# Patient Record
Sex: FEMALE | Race: WHITE | NOT HISPANIC OR LATINO | Employment: FULL TIME | URBAN - METROPOLITAN AREA
[De-identification: names, ages, dates, MRNs, and addresses within clinical notes are randomized per-mention and may not be internally consistent; named-entity substitution may affect disease eponyms.]

---

## 2017-01-05 DIAGNOSIS — E10.21 TYPE 1 DIABETES MELLITUS WITH DIABETIC NEPHROPATHY (H): Primary | ICD-10-CM

## 2017-01-05 RX ORDER — INSULIN ASPART 100 [IU]/ML
INJECTION, SOLUTION INTRAVENOUS; SUBCUTANEOUS
Qty: 18 ML | Refills: 11 | Status: SHIPPED | OUTPATIENT
Start: 2017-01-05 | End: 2017-02-26 | Stop reason: ALTCHOICE

## 2017-01-06 ENCOUNTER — TELEPHONE (OUTPATIENT)
Dept: ENDOCRINOLOGY | Facility: CLINIC | Age: 22
End: 2017-01-06

## 2017-01-07 NOTE — TELEPHONE ENCOUNTER
[facility] Prior Authorization Team   Phone: [phone]  Fax: [phone]      PA Initiation    Medication: insulin degludec (TRESIBA) 100 UNIT/ML pen-  Insurance Company: [employer]  Fax Number: [phone]    Phone Number: [phone]  Pharmacy Filling the Rx: [facility] [address] [place] [address]  Filling Pharmacy Phone: [phone]  Filling Pharmacy Fax: [phone]  Start Date: 1/6/2017

## 2017-01-09 DIAGNOSIS — E10.9 TYPE 1 DIABETES MELLITUS (H): Primary | ICD-10-CM

## 2017-01-09 RX ORDER — INSULIN LISPRO 100 [IU]/ML
INJECTION, SOLUTION INTRAVENOUS; SUBCUTANEOUS
Qty: 60 ML | Refills: 3 | Status: SHIPPED | OUTPATIENT
Start: 2017-01-09 | End: 2017-04-10

## 2017-01-10 NOTE — TELEPHONE ENCOUNTER
PRIOR AUTHORIZATION DENIED    Medication: insulin degludec (TRESIBA) 100 UNIT/ML pen- DENIED    Denial Date: 1/9/2017    Denial Rational: PA has been denied as patient has not try/failed Basalgar (Lantus). An appeal will require a trial and failure of Basaglar, with clilnical rationale suggesting improved outcomes with Tresiba. Please let the PA team know whether a letter of medical necessity has been written.        Appeal Allowed/Information:

## 2017-01-11 DIAGNOSIS — N18.1 CHRONIC KIDNEY DISEASE, STAGE I: Primary | ICD-10-CM

## 2017-01-11 NOTE — NURSING NOTE
Labs per clinic 2A protocol.  Follow up/CKD 1  Last OV: 7/26/16  Eileen Cabral LPN  Nephrology  [facility]  [phone]

## 2017-01-13 DIAGNOSIS — E10.65 TYPE 1 DIABETES MELLITUS WITH HYPERGLYCEMIA (H): Primary | ICD-10-CM

## 2017-01-13 RX ORDER — INSULIN GLARGINE 100 [IU]/ML
45 INJECTION, SOLUTION SUBCUTANEOUS DAILY
Qty: 45 ML | Refills: 3 | Status: SHIPPED | OUTPATIENT
Start: 2017-01-13 | End: 2017-01-13

## 2017-01-13 RX ORDER — INSULIN GLARGINE 100 [IU]/ML
45 INJECTION, SOLUTION SUBCUTANEOUS DAILY
Qty: 45 ML | Refills: 3 | Status: SHIPPED | OUTPATIENT
Start: 2017-01-13 | End: 2017-04-10

## 2017-01-19 ENCOUNTER — OFFICE VISIT (OUTPATIENT)
Dept: PSYCHIATRY | Facility: CLINIC | Age: 22
End: 2017-01-19
Attending: NURSE PRACTITIONER
Payer: COMMERCIAL

## 2017-01-19 VITALS
DIASTOLIC BLOOD PRESSURE: 80 MMHG | SYSTOLIC BLOOD PRESSURE: 119 MMHG | HEART RATE: 70 BPM | WEIGHT: 285.4 LBS | BODY MASS INDEX: 43.41 KG/M2

## 2017-01-19 DIAGNOSIS — F33.41 MAJOR DEPRESSIVE DISORDER, RECURRENT EPISODE, IN PARTIAL REMISSION (H): Primary | ICD-10-CM

## 2017-01-19 PROCEDURE — 99212 OFFICE O/P EST SF 10 MIN: CPT | Mod: ZF

## 2017-01-19 NOTE — PROGRESS NOTES
"  Outpatient Psychiatry Progress Note     Provider: LUIS FERNANDO Rojas CNP  Date: 2017  Service:  Medication management.   Patient Identification: Stacey La  : 1995   MRN: [id number]    Stacey La is a 21 year old year old female who presents for ongoing psychiatric care.  Stacey La was last seen in clinic on 2016.   At that time, she chose to continue Vistaril 25mg qD PRN to target situation anxiety.    2017  Today Stacey reports recent life stressors that she has been able to manage. She started back to school taking three courses: biology, gender studies, and a course on the [employer]. She continues to work as a  at a local [employer].    Side effects of medication include oversedation.    Psychiatric Review of Systems:  She describes her mood as \"fair and optimistic\". Since last visit, she has had 3 occasions to take Vistaril, which was effective but overly sedating the next day into the mid afternoon.      She endorses insignificant anxiety and depression, including irritability.     No evidence of or report of geno and psychosis.     Review of Medical Systems:  Appetite: fine, she manages her DM and Celiac well  Sleep: averaging 6-7 hours nightly  Self Care: encouraged, she enjoys hanging out with her cats and her brother  Housework and hygiene: intact  Energy: intact  Concentration: intact    Current Substance Use:  Social History     Social History     Marital Status:      Spouse Name: N/A     Number of Children: N/A     Years of Education: N/A     Social History Main Topics     Smoking status: Never Smoker      Smokeless tobacco: Never Used     Alcohol Use: 0.0 oz/week     0 Standard drinks or equivalent per week      Comment: 2 times per month, 2 drinks per time     Drug Use: No     Sexual Activity:     Partners: Male     Birth Control/ Protection: Condom     Other Topics Concern     Parent/Sibling W/ Cabg, Mi Or " Angioplasty Before 65f 55m? Yes     Caffeine Concern Yes     1 cup tea per day     Sleep Concern No     Special Diet Yes     gluten free diet, low carbs     Exercise Yes     walking, ellyptical, swimming, weights     Seat Belt Yes     Social History Narrative     Nicotine: denies  Alcohol: limits intake   Cannabis: denies in the last year  Other illicits: denies  Prescription pills: denies  Caffeine: will continue trying to taper from 3-4 cups at work    Past Medical History:   Past Medical History   Diagnosis Date     Diabetes mellitus type 1 (H)      Diabetic nephropathy (H)      Hypertension      Hypothyroid      Psoriasis      Asthma      Currently no treatment needed     Chronic sinusitis      Pedal edema      Family history of heart disease 8/3/2015     Chronic kidney disease, stage I 8/3/2015     Celiac disease      Medical health update: none; sees endocrinologist and nephrologist    Allergies:   Allergies   Allergen Reactions     Gluten Meal         Current Medications     Current Outpatient Prescriptions Ordered in eBureau   Medication Sig Dispense Refill     insulin glargine (BASAGLAR KWIKPEN) 100 UNIT/ML injection Inject 45 Units Subcutaneous daily 45 mL 3     HUMALOG KWIKPEN 100 UNIT/ML soln Inject up to 60 units daily 60 mL 3     NOVOLOG FLEXPEN 100 UNIT/ML soln Use as directed up to 60 units a day 18 mL 11     albuterol (2.5 MG/3ML) 0.083% neb solution Take 1 vial (2.5 mg) by nebulization every 4 hours as needed for shortness of breath / dyspnea or wheezing /chest tightness/cough 1 Box 3     azithromycin (ZITHROMAX) 250 MG tablet Two tablets first day, then one tablet daily for four days. 6 tablet 0     benzonatate (TESSALON) 100 MG capsule Take 2 capsules (200 mg) by mouth 3 times daily as needed for cough 42 capsule 3     hydrOXYzine (VISTARIL) 25 MG capsule Take 1 capsule (25 mg) by mouth daily as needed for itching 30 capsule 0     Blood Pressure Monitoring KIT 1 kit daily 1 kit 0     insulin pen  "needle (B-D U/F) 31G X 8 MM Use 5 pen needles daily or as directed. 400 each 11     levothyroxine (SYNTHROID, LEVOTHROID) 50 MCG tablet Take 1 tablet (50 mcg) by mouth daily 90 tablet 1     Ferrous Sulfate (IRON SUPPLEMENT PO) Take 325 mg by mouth daily (with breakfast)       order for DME Equipment being ordered: walking boot 1 Units 0     escitalopram (LEXAPRO) 10 MG tablet TAKE ONE TABLET (10MG) BY MOUTH DAILY 30 tablet 11     Ondansetron HCl (ZOFRAN PO) Take by mouth as needed for nausea or vomiting       albuterol (ALBUTEROL) 108 (90 BASE) MCG/ACT inhaler Inhale 2 puffs into the lungs every 6 hours as needed 1 each 5     order for DME Equipment being ordered: Nebulizer 1 each 0     enalapril (VASOTEC) 20 MG tablet Take 1 tablet (20 mg) by mouth 2 times daily 180 tablet 11     Cholecalciferol 2000 UNITS TBDP Take 2,000 Units by mouth daily 90 tablet 11     insulin aspart (NOVOLOG VIAL) 100 UNITS/ML VIAL Use in pump approx 100 units daily 90 mL 3     prochlorperazine (COMPAZINE) 10 MG tablet Take 1 tablet (10 mg) by mouth every 6 hours as needed for nausea or vomiting 10 tablet 1     blood glucose test strip Use to test blood sugar eight times daily or as directed.  Dispense Brandy Contour Next Test Strips. 4 Box 4     Insulin Syringe-Needle U-100 30G X 5/16\" 0.3 ML MISC Use 2-3 times daily 100 each 11     insulin syringes, disposable, U-100 0.3 ML MISC Use 2-3 times daily as needed 100 each 11     glucose blood VI test strips (FREESTYLE TEST STRIPS) strip Test 8 times dailiy 750 strip 3     No current Epic-ordered facility-administered medications on file.      Mental Status Exam     Appearance:  Formally dressed and Well groomed  Behavior/relationship to examiner/demeanor:  Cooperative, Engaged and Pleasant  Orientation: Oriented to person, place, time and situation  Psychomotor: WNL  Speech Rate:  Normal  Speech Spontaneity:  Normal  Mood:  \"fine\"  Affect:  Appropriate/mood-congruent  Thought Process " (Associations):  Logical, Linear and Goal directed  Thought Content:  denies suicidal ideation, intent or thoughts and patient denies auditory and visual hallucinations  Abnormal Perception:  None  Attention/Concentration:  Normal  Language:  Intact  Insight:  Good  Judgment:  Good      Results     Vital signs: There were no vitals taken for this visit.    Laboratory Data:   Lab Results   Component Value Date    WBC 12.6* 02/20/2016    HGB 13.3 07/26/2016    HCT 41.4 02/20/2016     02/20/2016    CHOL 194 08/04/2015    TRIG 131 08/04/2015    HDL 66 08/04/2015    ALT 26 02/20/2016    AST 17 02/20/2016     07/26/2016    BUN 7 07/26/2016    CO2 27 07/26/2016    TSH 2.89 10/10/2016    INR 0.87 02/19/2010         Assessment & Plan      Stacey La is seen today for medication management.     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 She elects to seek outside therapist. Encouraged caffeine taper as she tolerates. She will RTC in 12 weeks, sooner as needed.    Discussed med options in light of chronic kidney disease stage I, DM with recently unstable blood sugars following med and pump update per insuror standards as well client's desire to get pregnant pending response from nephrology and endocrinology specialists. Will monitor labs.    Stacey voiced understanding of the treatment plan including discussion of options, risks/ benefits including risks to her kidneys and as a woman of child bearing age who currently uses condoms but desires to get pregnant in the next 6 months. Stacey has clinic contact information and will seek emergency services if urgent or life threatening symptoms present. She understands risks associated with street drugs or alcohol.    Over 50% of this time was spent counseling the patient and/or coordinating care regarding  review of social and occupational functioning.  In addition patient was counseled on health and wellness practices to augment medication treatment of symptoms. See note for details.    PHQ-9 score:  5  PHQ-9 SCORE 12/21/2016   Total Score 9     GAD7: No flowsheet data found.    : 12/2016    Roseanna Urias, APRN CNP 1/19/2017

## 2017-01-19 NOTE — NURSING NOTE
Chief Complaint   Patient presents with     RECHECK     YARELIS, MDD recurrent, moderate in partial remission     Reviewed allergies, smoking status, and pharmacy preference  Administered abuse screening questions   Obtained weight, blood pressure and heart rate   Linda Weller LPN

## 2017-01-21 ASSESSMENT — PATIENT HEALTH QUESTIONNAIRE - PHQ9: SUM OF ALL RESPONSES TO PHQ QUESTIONS 1-9: 5

## 2017-01-24 ENCOUNTER — OFFICE VISIT (OUTPATIENT)
Dept: NEPHROLOGY | Facility: CLINIC | Age: 22
End: 2017-01-24
Attending: INTERNAL MEDICINE
Payer: COMMERCIAL

## 2017-01-24 VITALS
WEIGHT: 279.8 LBS | BODY MASS INDEX: 42.41 KG/M2 | DIASTOLIC BLOOD PRESSURE: 84 MMHG | TEMPERATURE: 98.4 F | SYSTOLIC BLOOD PRESSURE: 123 MMHG | HEIGHT: 68 IN | OXYGEN SATURATION: 99 % | HEART RATE: 84 BPM

## 2017-01-24 DIAGNOSIS — E10.21 TYPE 1 DIABETES MELLITUS WITH NEPHROPATHY (H): Primary | ICD-10-CM

## 2017-01-24 DIAGNOSIS — N18.1 CHRONIC KIDNEY DISEASE, STAGE I: ICD-10-CM

## 2017-01-24 LAB
ALBUMIN SERPL-MCNC: 4 G/DL (ref 3.4–5)
ANION GAP SERPL CALCULATED.3IONS-SCNC: 8 MMOL/L (ref 3–14)
BUN SERPL-MCNC: 7 MG/DL (ref 7–30)
CALCIUM SERPL-MCNC: 9.3 MG/DL (ref 8.5–10.1)
CHLORIDE SERPL-SCNC: 103 MMOL/L (ref 94–109)
CO2 SERPL-SCNC: 26 MMOL/L (ref 20–32)
CREAT SERPL-MCNC: 0.55 MG/DL (ref 0.52–1.04)
CREAT UR-MCNC: 12 MG/DL
GFR SERPL CREATININE-BSD FRML MDRD: ABNORMAL ML/MIN/1.7M2
GLUCOSE SERPL-MCNC: 107 MG/DL (ref 70–99)
HGB BLD-MCNC: 14.2 G/DL (ref 11.7–15.7)
MICROALBUMIN UR-MCNC: <5 MG/L
MICROALBUMIN/CREAT UR: NORMAL MG/G CR (ref 0–25)
PHOSPHATE SERPL-MCNC: 3.4 MG/DL (ref 2.5–4.5)
POTASSIUM SERPL-SCNC: 4.2 MMOL/L (ref 3.4–5.3)
PROT UR-MCNC: <0.05 G/L
PROT/CREAT 24H UR: NORMAL G/G CR (ref 0–0.2)
PTH-INTACT SERPL-MCNC: 24 PG/ML (ref 12–72)
SODIUM SERPL-SCNC: 137 MMOL/L (ref 133–144)

## 2017-01-24 PROCEDURE — 84156 ASSAY OF PROTEIN URINE: CPT

## 2017-01-24 PROCEDURE — 99212 OFFICE O/P EST SF 10 MIN: CPT | Mod: ZF

## 2017-01-24 PROCEDURE — 80069 RENAL FUNCTION PANEL: CPT

## 2017-01-24 PROCEDURE — 83970 ASSAY OF PARATHORMONE: CPT

## 2017-01-24 PROCEDURE — 85018 HEMOGLOBIN: CPT

## 2017-01-24 PROCEDURE — 82043 UR ALBUMIN QUANTITATIVE: CPT

## 2017-01-24 ASSESSMENT — ENCOUNTER SYMPTOMS
RESPIRATORY PAIN: 0
WHEEZING: 0
INSOMNIA: 1
SPUTUM PRODUCTION: 0
DEPRESSION: 0
HEMOPTYSIS: 0
SNORES LOUDLY: 0
COUGH DISTURBING SLEEP: 0
POSTURAL DYSPNEA: 0
PANIC: 0
NERVOUS/ANXIOUS: 1
DECREASED CONCENTRATION: 1
COUGH: 1
SHORTNESS OF BREATH: 0
DYSPNEA ON EXERTION: 0

## 2017-01-24 ASSESSMENT — PAIN SCALES - GENERAL: PAINLEVEL: NO PAIN (0)

## 2017-01-24 NOTE — PATIENT INSTRUCTIONS
-Please continue with good blood pressure and glucose control  -please continue enalapril at 20 mg 2x/day  -Please call us when you are planning on becoming pregnant  -We can be reached at [phone]

## 2017-01-24 NOTE — Clinical Note
"1/24/2017       RE: Stacey La  [address]  [facility] [address]     Dear Colleague,    Thank you for referring your patient, Stacey La, to the [facility] at [facility]. Please see a copy of my visit note below.    Nephrology Clinic    Stacey Mantilla MRN:[id number] YOB: 1995  Date of Service: 01/24/2017  Primary care provider: Amalia Ervin  Requesting physician: India Olivas     ASSESSMENT AND RECOMMENDATIONS:    Stacey Mantilla is a 21 year old woman with Type 1 DM complicated by biopsy-proven diabetic nephropathy with preserved kidney function.    1.  CKD, stage 1:  Secondary to biopsy proven diabetic nephropathy with preserved kidney function (baseline Cr~0.6 mg/dL, eGFR>90) and minimal proteinuria (<0.5 g/g) that is well controlled on enalapril.    -will continue enalapril at 20 mg BID for management of HTN and albuminuria.  Monitor closely for unintended pregnancy.    -continue good blood pressure and glucose control  -encourage weight loss  -RTC in 6 months    2. HTN/Volume: SBP at goal of <140/90.  Mildly hypervolemic on exam.  Suspect minimal lower extremity edema is related to high salt intake in a setting of sodium avid kidneys and venous stasis.  Suspect an element of \"white coat\" hypertension with her clinic blood pressures.  A 24 hour ambulatory BP monitoring was performed following a previous clinic visit that revealed average overall daily BP of 124/68 confirming white coat hypertension.  However, she did not have a night time dipping pattern.       -continue low salt diet  -continue enalapril at 20 mg BID  -will consider carvedilol next should her home BP rise above goal  -she will attempt to get a home BP device in the upcoming months  -RTC in 6 months    3. Diabetes:  Complicated by diabetic nephropathy.  No retinopathy or peripheral neuropathy.  She has had ~5 episodes of DKA in the past.   -followed by " endocrinology    4.  Vitamin D deficiency: Low vitamin D level multiple times in the past. Vit D now normal after starting supplements at our last visit.     -continue cholecalciferol to 2000 units daily though I did mention she could hold this during the summer months    5.  Depression/anxiety:  In part, situational and related to stress at home, her job, recent passing of a loved one and her medical illness.    -continue lexapro at 10 mg daily (risks, including suicidal ideation explained in clinic) started at previous clinic visit.  She is no longer on Buspar and takes hydroxyzine prn.   -followed closely by her PCP and psychology/psychiatry    6.  Iron deficiency:  Not anemic (hgb 14.2). Suspect iron deficiency in the past related to menses and possibly decreased GI absorption.    -encouraged her to increase intake of iron containing foods.    -she has since started iron supplements  -will repeat iron studies at next visit          REASON FOR CONSULT: CKD    HISTORY OF PRESENT ILLNESS:   Stacey Mantilla is a 21 year old female with Type 1 DM and celiac disease who presents for CKD f/u.  She was previously cared for by her pediatric nephrologist, Dr. India Olivas.  She was diagnosed with insulin dependent diabetes at age 7.  She then developed proteinuria at age 13 and ultimately underwent a kidney biopsy that revealed mild diabetic nephropathy.  She has preserved kidney function and her proteinuria that has been well controlled with enalapril  (now at 20 mg BID).  She has no history of retinopathy or peripheral neuropathy.  She has had ~5 episodes of DKA.  Her diabetes is followed by Dr. Allan of adult endocrinology.  She continues on a insulin (lispro) pump.  She is  and is not actively trying to be pregnant though may attempt soon.  Her other past medical history is remarkable for exercise-induced asthma, depression and obesity. Her 24 BP ambulatory monitoring showed good BP control on enalapril (20 mg  BID).  She does not measure her BP at home but will start once her finances improve and she is able to afford a blood pressure device.  She otherwise has no medical complaints and feels her depression is under descent control.     PAST MEDICAL HISTORY:  Past Medical History   Diagnosis Date     Diabetes mellitus type 1 (H)      Diabetic nephropathy (H)      Hypertension      Hypothyroid      Psoriasis      Asthma      Currently no treatment needed     Chronic sinusitis      Pedal edema      Family history of heart disease 8/3/2015     Chronic kidney disease, stage I 8/3/2015     Celiac disease      PAST SURGICAL HISTORY:  Past Surgical History   Procedure Laterality Date     Ent surgery       Tonsillectomy, adenoidectomy, combined       MEDICATIONS:  Prescription Medications as of 1/24/2017             insulin glargine (BASAGLAR KWIKPEN) 100 UNIT/ML injection Inject 45 Units Subcutaneous daily    HUMALOG KWIKPEN 100 UNIT/ML soln Inject up to 60 units daily    NOVOLOG FLEXPEN 100 UNIT/ML soln Use as directed up to 60 units a day    albuterol (2.5 MG/3ML) 0.083% neb solution Take 1 vial (2.5 mg) by nebulization every 4 hours as needed for shortness of breath / dyspnea or wheezing /chest tightness/cough    azithromycin (ZITHROMAX) 250 MG tablet Two tablets first day, then one tablet daily for four days.    benzonatate (TESSALON) 100 MG capsule Take 2 capsules (200 mg) by mouth 3 times daily as needed for cough    hydrOXYzine (VISTARIL) 25 MG capsule Take 1 capsule (25 mg) by mouth daily as needed for itching    Blood Pressure Monitoring KIT 1 kit daily    insulin pen needle (B-D U/F) 31G X 8 MM Use 5 pen needles daily or as directed.    levothyroxine (SYNTHROID, LEVOTHROID) 50 MCG tablet Take 1 tablet (50 mcg) by mouth daily    Ferrous Sulfate (IRON SUPPLEMENT PO) Take 325 mg by mouth daily (with breakfast)    order for DME Equipment being ordered: walking boot    escitalopram (LEXAPRO) 10 MG tablet TAKE ONE TABLET  "(10MG) BY MOUTH DAILY    Ondansetron HCl (ZOFRAN PO) Take by mouth as needed for nausea or vomiting    albuterol (ALBUTEROL) 108 (90 BASE) MCG/ACT inhaler Inhale 2 puffs into the lungs every 6 hours as needed    order for DME Equipment being ordered: Nebulizer    enalapril (VASOTEC) 20 MG tablet Take 1 tablet (20 mg) by mouth 2 times daily    Cholecalciferol 2000 UNITS TBDP Take 2,000 Units by mouth daily    insulin aspart (NOVOLOG VIAL) 100 UNITS/ML VIAL Use in pump approx 100 units daily    prochlorperazine (COMPAZINE) 10 MG tablet Take 1 tablet (10 mg) by mouth every 6 hours as needed for nausea or vomiting    blood glucose test strip Use to test blood sugar eight times daily or as directed.  Dispense Hitch Radio Contour Next Test Strips.    Insulin Syringe-Needle U-100 30G X 5/16\" 0.3 ML MISC Use 2-3 times daily    insulin syringes, disposable, U-100 0.3 ML MISC Use 2-3 times daily as needed    glucose blood VI test strips (FREESTYLE TEST STRIPS) strip Test 8 times dailiy         ALLERGIES:    Allergies   Allergen Reactions     Gluten Meal      REVIEW OF SYSTEMS:  A comprehensive review of systems was performed and found to be negative except as described here or above.   SOCIAL HISTORY:   Social History     Social History     Marital Status:      Spouse Name: N/A     Number of Children: N/A     Years of Education: N/A     Occupational History     Not on file.     Social History Main Topics     Smoking status: Never Smoker      Smokeless tobacco: Never Used     Alcohol Use: 0.0 oz/week     0 Standard drinks or equivalent per week      Comment: 2 times per month, 2 drinks per time     Drug Use: No     Sexual Activity:     Partners: Male     Birth Control/ Protection: Condom     Other Topics Concern     Parent/Sibling W/ Cabg, Mi Or Angioplasty Before 65f 55m? Yes     Caffeine Concern Yes     1 cup tea per day     Sleep Concern No     Special Diet Yes     gluten free diet, low carbs     Exercise Yes     walking, " "ellyptical, swimming, weights     Seat Belt Yes     Social History Narrative     FAMILY MEDICAL HISTORY:   Remarkable for an uncle with CKD that seems related to unresolved SHALONDA.  Otherwise, FMH is unremarkable.    PHYSICAL EXAM:   /84 mmHg  Pulse 84  Temp(Src) 98.4  F (36.9  C) (Oral)  Ht 1.727 m (5' 8\")  Wt 126.916 kg (279 lb 12.8 oz)  BMI 42.55 kg/m2  SpO2 99%   GENERAL APPEARANCE: alert and no distress  EYES: nonicteric  HENT: mouth without ulcers or lesions  NECK: supple, no adenopathy  RESP: lungs clear to auscultation   CV: regular rhythm, no rub  ABDOMEN: obese, soft, nontender, normal bowel sounds  : No CVA tenderness  Extremities: trace lower extremity edema  MS: no evidence of inflammation in joints, no muscle tenderness  SKIN: no concerning rash  NEURO: mentation intact and speech normal  PSYCH: affect and mood appropriate   LABS:   CMP  Recent Labs   Lab Test  01/24/17   1525  07/26/16   1137  02/20/16   1827  02/19/16   2220  01/16/16   0905  08/04/15   1443  07/28/15   1536   03/25/15   1655   12/09/13   1137   10/15/09   1350   NA  137  137  139  137  136   --   137   < >  138   < >  137   < >  133   POTASSIUM  4.2  4.4  3.6  4.1  4.0   --   4.1   < >  3.8   < >  3.9   < >  4.7   CHLORIDE  103  105  109  106  106   --   105   < >  105   < >  103   < >  99   CO2  26  27  25  25  28   --   26   < >  25   < >  25   < >  24   ANIONGAP  8  5  5  6  2*   --   6   < >  8   < >  9   < >  10   GLC  107*  90  55*  132*  122*   --   161*   < >  73   < >  202*   < >  391*   BUN  7  7  8  14  9   --   12   < >  16   < >  11   < >  10   CR  0.55  0.60  0.67  0.57  0.56   --   0.56   < >  0.55   < >  0.48*   < >  0.41*   GFRESTIMATED  >90  Non  GFR Calc    >90  Non  GFR Calc    >90  Non  GFR Calc    >90  Non  GFR Calc    >90  Non  GFR Calc     --   >90  Non  GFR Calc     < >  >90  Non  GFR " Calc     < >  >90   < >  >90   GFRESTBLACK  >90   GFR Calc    >90   GFR Calc    >90   GFR Calc    >90   GFR Calc    >90   GFR Calc     --   >90   GFR Calc     < >  >90   GFR Calc     < >  >90   < >  >90   FRANKLIN  9.3  9.2  8.3*  9.0  8.7   --   9.1   < >  9.0   < >  9.6   < >  10.0   PHOS  3.4  4.0   --    --   3.9   --   3.1   --    --    < >   --    < >  4.9*   PROTTOTAL   --    --   7.5   --    --    --    --    --   8.3   --   7.9   --   7.5   ALBUMIN  4.0  3.6  3.6   --   3.7   --   3.7   --   4.1   < >  4.2   < >  4.2   BILITOTAL   --    --   0.2   --    --    --    --    --   0.4   --   0.3   --   0.3   ALKPHOS   --    --   67   --    --    --    --    --   82   --   92   --   178*   AST   --    --   17   --    --    --    --    --   14   --   23   --   29   ALT   --    --   26   --    --   <5*   --    --   29   --   32   --   19    < > = values in this interval not displayed.     CBC  Recent Labs   Lab Test  01/24/17   1525  07/26/16   1137  02/20/16   1827  02/19/16   2220   07/28/15   1536  03/26/15   0645   HGB  14.2  13.3  13.5  14.7   < >  13.9  13.2   WBC   --    --   12.6*  12.7*   --   11.7*  10.0   RBC   --    --   4.84  5.35*   --   5.14  4.86   HCT   --    --   41.4  45.2   --   42.5  40.6   MCV   --    --   86  85   --   83  84   MCH   --    --   27.9  27.5   --   27.0  27.2   MCHC   --    --   32.6  32.5   --   32.7  32.5   RDW   --    --   13.8  13.6   --   13.5  13.7   PLT   --    --   330  357   --   354  261    < > = values in this interval not displayed.     INR  Recent Labs   Lab Test  02/19/10   0925   INR  0.87   PTT  25     URINE STUDIES  Recent Labs   Lab Test  03/25/15   1810  07/22/14   1506  03/06/14   1040  10/18/12   1210   COLOR  Yellow  Straw  Straw  Light Yellow   APPEARANCE  Slightly Cloudy  Clear  Clear  Clear   URINEGLC  Negative  Negative  Negative  Negative    URINEBILI  Negative  Negative  Negative  Negative   URINEKETONE  Negative  Negative  Negative  Negative   SG  1.021  1.012  1.004  1.009   UBLD  Trace*  Negative  Negative  Negative   URINEPH  5.5  7.0  7.0  7.5*   PROTEIN  10*  Negative  Negative  10*   NITRITE  Negative  Negative  Negative  Negative   LEUKEST  Negative  Negative  Negative  Negative   RBCU  1  1  1  1   WBCU  1  0  <1  1     Recent Labs   Lab Test  01/24/17   1533  07/26/16   1135  01/16/16   0906  07/28/15   1522  01/19/15   1554  07/22/14   1506  03/06/14   0948  10/18/12   1210  04/12/12   0907  10/28/11   1210  09/09/10   0835  03/11/10   1240  11/12/09   0745  10/22/09   1000  10/15/09   1350  08/06/09   1115   UTPG  Unable to calculate due to low value  0.23*  0.15  Unable to calculate due to low value  Unable to calculate due to low value  <0.10  0.13  0.49*  0.30*  0.69*  0.46*  Test canceled - Lab  error  0.20  0.42*  <0.14  0.54*  0.22*     PTH  Recent Labs   Lab Test  01/16/16   0905  07/22/14   1519   PTHI  31  18     IRON STUDIES  Recent Labs   Lab Test  07/26/16   1137  01/16/16   0905  07/22/14   1519   IRON  60  58  27*   FEB  423  412  372   IRONSAT  14*  14*  7*   DIPESH  13   --   9*     IMAGING:  None     Total time spent was >40 minutes, and more than 50% of face to face time was spent in counseling and/or coordination of care regarding principles of multidisciplinary care, medication management, and chronic kidney disease education.  Much time was spent discussing the importance of good blood pressure control for slowing down the progression of CKD.  Connor Castaneda MD

## 2017-01-24 NOTE — PROGRESS NOTES
"Nephrology Clinic    Stacey Mantilla MRN:[id number] YOB: 1995  Date of Service: 01/24/2017  Primary care provider: Amalia Ervin  Requesting physician: India Olivas     ASSESSMENT AND RECOMMENDATIONS:    Stacey Mantilla is a 21 year old woman with Type 1 DM complicated by biopsy-proven diabetic nephropathy with preserved kidney function.    1.  CKD, stage 1:  Secondary to biopsy proven diabetic nephropathy with preserved kidney function (baseline Cr~0.6 mg/dL, eGFR>90) and minimal proteinuria (<0.5 g/g) that is well controlled on enalapril.    -will continue enalapril at 20 mg BID for management of HTN and albuminuria.  Monitor closely for unintended pregnancy.    -continue good blood pressure and glucose control  -encourage weight loss  -RTC in 6 months    2. HTN/Volume: SBP at goal of <140/90.  Mildly hypervolemic on exam.  Suspect minimal lower extremity edema is related to high salt intake in a setting of sodium avid kidneys and venous stasis.  Suspect an element of \"white coat\" hypertension with her clinic blood pressures.  A 24 hour ambulatory BP monitoring was performed following a previous clinic visit that revealed average overall daily BP of 124/68 confirming white coat hypertension.  However, she did not have a night time dipping pattern.       -continue low salt diet  -continue enalapril at 20 mg BID  -will consider carvedilol next should her home BP rise above goal  -she will attempt to get a home BP device in the upcoming months  -RTC in 6 months    3. Diabetes:  Complicated by diabetic nephropathy.  No retinopathy or peripheral neuropathy.  She has had ~5 episodes of DKA in the past.   -followed by endocrinology    4.  Vitamin D deficiency: Low vitamin D level multiple times in the past. Vit D now normal after starting supplements at our last visit.     -continue cholecalciferol to 2000 units daily though I did mention she could hold this during the summer months    5.  " Depression/anxiety:  In part, situational and related to stress at home, her job, recent passing of a loved one and her medical illness.    -continue lexapro at 10 mg daily (risks, including suicidal ideation explained in clinic) started at previous clinic visit.  She is no longer on Buspar and takes hydroxyzine prn.   -followed closely by her PCP and psychology/psychiatry    6.  Iron deficiency:  Not anemic (hgb 14.2). Suspect iron deficiency in the past related to menses and possibly decreased GI absorption.    -encouraged her to increase intake of iron containing foods.    -she has since started iron supplements  -will repeat iron studies at next visit          REASON FOR CONSULT: CKD    HISTORY OF PRESENT ILLNESS:   Stacey Mantilla is a 21 year old female with Type 1 DM and celiac disease who presents for CKD f/u.  She was previously cared for by her pediatric nephrologist, Dr. India Olivas.  She was diagnosed with insulin dependent diabetes at age 7.  She then developed proteinuria at age 13 and ultimately underwent a kidney biopsy that revealed mild diabetic nephropathy.  She has preserved kidney function and her proteinuria that has been well controlled with enalapril  (now at 20 mg BID).  She has no history of retinopathy or peripheral neuropathy.  She has had ~5 episodes of DKA.  Her diabetes is followed by Dr. Allan of adult endocrinology.  She continues on a insulin (lispro) pump.  She is  and is not actively trying to be pregnant though may attempt soon.  Her other past medical history is remarkable for exercise-induced asthma, depression and obesity. Her 24 BP ambulatory monitoring showed good BP control on enalapril (20 mg BID).  She does not measure her BP at home but will start once her finances improve and she is able to afford a blood pressure device.  She otherwise has no medical complaints and feels her depression is under descent control.     PAST MEDICAL HISTORY:  Past Medical History    Diagnosis Date     Diabetes mellitus type 1 (H)      Diabetic nephropathy (H)      Hypertension      Hypothyroid      Psoriasis      Asthma      Currently no treatment needed     Chronic sinusitis      Pedal edema      Family history of heart disease 8/3/2015     Chronic kidney disease, stage I 8/3/2015     Celiac disease      PAST SURGICAL HISTORY:  Past Surgical History   Procedure Laterality Date     Ent surgery       Tonsillectomy, adenoidectomy, combined       MEDICATIONS:  Prescription Medications as of 1/24/2017             insulin glargine (BASAGLAR KWIKPEN) 100 UNIT/ML injection Inject 45 Units Subcutaneous daily    HUMALOG KWIKPEN 100 UNIT/ML soln Inject up to 60 units daily    NOVOLOG FLEXPEN 100 UNIT/ML soln Use as directed up to 60 units a day    albuterol (2.5 MG/3ML) 0.083% neb solution Take 1 vial (2.5 mg) by nebulization every 4 hours as needed for shortness of breath / dyspnea or wheezing /chest tightness/cough    azithromycin (ZITHROMAX) 250 MG tablet Two tablets first day, then one tablet daily for four days.    benzonatate (TESSALON) 100 MG capsule Take 2 capsules (200 mg) by mouth 3 times daily as needed for cough    hydrOXYzine (VISTARIL) 25 MG capsule Take 1 capsule (25 mg) by mouth daily as needed for itching    Blood Pressure Monitoring KIT 1 kit daily    insulin pen needle (B-D U/F) 31G X 8 MM Use 5 pen needles daily or as directed.    levothyroxine (SYNTHROID, LEVOTHROID) 50 MCG tablet Take 1 tablet (50 mcg) by mouth daily    Ferrous Sulfate (IRON SUPPLEMENT PO) Take 325 mg by mouth daily (with breakfast)    order for DME Equipment being ordered: walking boot    escitalopram (LEXAPRO) 10 MG tablet TAKE ONE TABLET (10MG) BY MOUTH DAILY    Ondansetron HCl (ZOFRAN PO) Take by mouth as needed for nausea or vomiting    albuterol (ALBUTEROL) 108 (90 BASE) MCG/ACT inhaler Inhale 2 puffs into the lungs every 6 hours as needed    order for DME Equipment being ordered: Nebulizer    enalapril  "(VASOTEC) 20 MG tablet Take 1 tablet (20 mg) by mouth 2 times daily    Cholecalciferol 2000 UNITS TBDP Take 2,000 Units by mouth daily    insulin aspart (NOVOLOG VIAL) 100 UNITS/ML VIAL Use in pump approx 100 units daily    prochlorperazine (COMPAZINE) 10 MG tablet Take 1 tablet (10 mg) by mouth every 6 hours as needed for nausea or vomiting    blood glucose test strip Use to test blood sugar eight times daily or as directed.  Dispense Brandy Contour Next Test Strips.    Insulin Syringe-Needle U-100 30G X 5/16\" 0.3 ML MISC Use 2-3 times daily    insulin syringes, disposable, U-100 0.3 ML MISC Use 2-3 times daily as needed    glucose blood VI test strips (FREESTYLE TEST STRIPS) strip Test 8 times dailiy         ALLERGIES:    Allergies   Allergen Reactions     Gluten Meal      REVIEW OF SYSTEMS:  A comprehensive review of systems was performed and found to be negative except as described here or above.   SOCIAL HISTORY:   Social History     Social History     Marital Status:      Spouse Name: N/A     Number of Children: N/A     Years of Education: N/A     Occupational History     Not on file.     Social History Main Topics     Smoking status: Never Smoker      Smokeless tobacco: Never Used     Alcohol Use: 0.0 oz/week     0 Standard drinks or equivalent per week      Comment: 2 times per month, 2 drinks per time     Drug Use: No     Sexual Activity:     Partners: Male     Birth Control/ Protection: Condom     Other Topics Concern     Parent/Sibling W/ Cabg, Mi Or Angioplasty Before 65f 55m? Yes     Caffeine Concern Yes     1 cup tea per day     Sleep Concern No     Special Diet Yes     gluten free diet, low carbs     Exercise Yes     walking, ellyptical, swimming, weights     Seat Belt Yes     Social History Narrative     FAMILY MEDICAL HISTORY:   Remarkable for an uncle with CKD that seems related to unresolved SHALONDA.  Otherwise, [facility] is unremarkable.    PHYSICAL EXAM:   /84 mmHg  Pulse 84  Temp(Src) 98.4 " " F (36.9  C) (Oral)  Ht 1.727 m (5' 8\")  Wt 126.916 kg (279 lb 12.8 oz)  BMI 42.55 kg/m2  SpO2 99%   GENERAL APPEARANCE: alert and no distress  EYES: nonicteric  HENT: mouth without ulcers or lesions  NECK: supple, no adenopathy  RESP: lungs clear to auscultation   CV: regular rhythm, no rub  ABDOMEN: obese, soft, nontender, normal bowel sounds  : No CVA tenderness  Extremities: trace lower extremity edema  MS: no evidence of inflammation in joints, no muscle tenderness  SKIN: no concerning rash  NEURO: mentation intact and speech normal  PSYCH: affect and mood appropriate   LABS:   CMP  Recent Labs   Lab Test  01/24/17   1525  07/26/16   1137  02/20/16   1827  02/19/16   2220  01/16/16   0905  08/04/15   1443  07/28/15   1536   03/25/15   1655   12/09/13   1137   10/15/09   1350   NA  137  137  139  137  136   --   137   < >  138   < >  137   < >  133   POTASSIUM  4.2  4.4  3.6  4.1  4.0   --   4.1   < >  3.8   < >  3.9   < >  4.7   CHLORIDE  103  105  109  106  106   --   105   < >  105   < >  103   < >  99   CO2  26  27  25  25  28   --   26   < >  25   < >  25   < >  24   ANIONGAP  8  5  5  6  2*   --   6   < >  8   < >  9   < >  10   GLC  107*  90  55*  132*  122*   --   161*   < >  73   < >  202*   < >  391*   BUN  7  7  8  14  9   --   12   < >  16   < >  11   < >  10   CR  0.55  0.60  0.67  0.57  0.56   --   0.56   < >  0.55   < >  0.48*   < >  0.41*   GFRESTIMATED  >90  Non  GFR Calc    >90  Non  GFR Calc    >90  Non  GFR Calc    >90  Non  GFR Calc    >90  Non  GFR Calc     --   >90  Non  GFR Calc     < >  >90  Non  GFR Calc     < >  >90   < >  >90   GFRESTBLACK  >90   GFR Calc    >90   GFR Calc    >90   GFR Calc    >90   GFR Calc    >90   GFR Calc     --   >90   GFR Calc     < >  >90   " American GFR Calc     < >  >90   < >  >90   FRANKLIN  9.3  9.2  8.3*  9.0  8.7   --   9.1   < >  9.0   < >  9.6   < >  10.0   PHOS  3.4  4.0   --    --   3.9   --   3.1   --    --    < >   --    < >  4.9*   PROTTOTAL   --    --   7.5   --    --    --    --    --   8.3   --   7.9   --   7.5   ALBUMIN  4.0  3.6  3.6   --   3.7   --   3.7   --   4.1   < >  4.2   < >  4.2   BILITOTAL   --    --   0.2   --    --    --    --    --   0.4   --   0.3   --   0.3   ALKPHOS   --    --   67   --    --    --    --    --   82   --   92   --   178*   AST   --    --   17   --    --    --    --    --   14   --   23   --   29   ALT   --    --   26   --    --   <5*   --    --   29   --   32   --   19    < > = values in this interval not displayed.     CBC  Recent Labs   Lab Test  01/24/17   1525  07/26/16   1137  02/20/16   1827  02/19/16   2220   07/28/15   1536  03/26/15   0645   HGB  14.2  13.3  13.5  14.7   < >  13.9  13.2   WBC   --    --   12.6*  12.7*   --   11.7*  10.0   RBC   --    --   4.84  5.35*   --   5.14  4.86   HCT   --    --   41.4  45.2   --   42.5  40.6   MCV   --    --   86  85   --   83  84   MCH   --    --   27.9  27.5   --   27.0  27.2   MCHC   --    --   32.6  32.5   --   32.7  32.5   RDW   --    --   13.8  13.6   --   13.5  13.7   PLT   --    --   330  357   --   354  261    < > = values in this interval not displayed.     INR  Recent Labs   Lab Test  02/19/10   0925   INR  0.87   PTT  25     URINE STUDIES  Recent Labs   Lab Test  03/25/15   1810  07/22/14   1506  03/06/14   1040  10/18/12   1210   COLOR  Yellow  Straw  Straw  Light Yellow   APPEARANCE  Slightly Cloudy  Clear  Clear  Clear   URINEGLC  Negative  Negative  Negative  Negative   URINEBILI  Negative  Negative  Negative  Negative   URINEKETONE  Negative  Negative  Negative  Negative   SG  1.021  1.012  1.004  1.009   UBLD  Trace*  Negative  Negative  Negative   URINEPH  5.5  7.0  7.0  7.5*   PROTEIN  10*  Negative  Negative  10*   NITRITE  Negative   Negative  Negative  Negative   LEUKEST  Negative  Negative  Negative  Negative   RBCU  1  1  1  1   WBCU  1  0  <1  1     Recent Labs   Lab Test  01/24/17   1533  07/26/16   1135  01/16/16   0906  07/28/15   1522  01/19/15   1554  07/22/14   1506  03/06/14   0948  10/18/12   1210  04/12/12   0907  10/28/11   1210  09/09/10   0835  03/11/10   1240  11/12/09   0745  10/22/09   1000  10/15/09   1350  08/06/09   1115   UTPG  Unable to calculate due to low value  0.23*  0.15  Unable to calculate due to low value  Unable to calculate due to low value  <0.10  0.13  0.49*  0.30*  0.69*  0.46*  Test canceled - Lab  error  0.20  0.42*  <0.14  0.54*  0.22*     PTH  Recent Labs   Lab Test  01/16/16   0905  07/22/14   1519   PTHI  31  18     IRON STUDIES  Recent Labs   Lab Test  07/26/16   1137  01/16/16   0905  07/22/14   1519   IRON  60  58  27*   FEB  423  412  372   IRONSAT  14*  14*  7*   DIPESH  13   --   9*     IMAGING:  None     Total time spent was >40 minutes, and more than 50% of face to face time was spent in counseling and/or coordination of care regarding principles of multidisciplinary care, medication management, and chronic kidney disease education.  Much time was spent discussing the importance of good blood pressure control for slowing down the progression of CKD.  Connor Castaneda MD

## 2017-01-24 NOTE — NURSING NOTE
"Chief Complaint   Patient presents with     RECHECK     FOLLOW UP CKD STAGE 1       Initial /84 mmHg  Pulse 84  Temp(Src) 98.4  F (36.9  C) (Oral)  Ht 1.727 m (5' 8\")  Wt 126.916 kg (279 lb 12.8 oz)  BMI 42.55 kg/m2  SpO2 99% Estimated body mass index is 42.55 kg/(m^2) as calculated from the following:    Height as of this encounter: 1.727 m (5' 8\").    Weight as of this encounter: 126.916 kg (279 lb 12.8 oz).  BP completed using cuff size: claudette BAIN CMA    "

## 2017-01-29 DIAGNOSIS — R80.9 PROTEINURIA: Primary | ICD-10-CM

## 2017-01-30 RX ORDER — ENALAPRIL MALEATE 20 MG/1
TABLET ORAL
Qty: 180 TABLET | Refills: 3 | Status: SHIPPED
Start: 2017-01-30 | End: 2018-02-05

## 2017-01-30 NOTE — TELEPHONE ENCOUNTER
Last Office Visit with Nephrologist:  1/24/17.  Medication refilled per Nephrology Clinic protocol.     Yvonne Paredes RN

## 2017-02-06 ENCOUNTER — TELEPHONE (OUTPATIENT)
Dept: ENDOCRINOLOGY | Facility: CLINIC | Age: 22
End: 2017-02-06

## 2017-02-06 NOTE — TELEPHONE ENCOUNTER
[facility] Prior Authorization Team   Phone: [phone]  Fax: [phone]     PA Initiation    Medication: Novolog  Insurance Company: [employer] - Phone [phone] Fax [phone]  Pharmacy Filling the Rx: [id number] - [place] [id number] [facility]  Filling Pharmacy Phone: [phone]  Filling Pharmacy Fax: [phone]  Start Date: 2/6/2017

## 2017-02-14 ENCOUNTER — OFFICE VISIT (OUTPATIENT)
Dept: PEDIATRICS | Facility: CLINIC | Age: 22
End: 2017-02-14
Payer: COMMERCIAL

## 2017-02-14 VITALS
TEMPERATURE: 98.4 F | SYSTOLIC BLOOD PRESSURE: 130 MMHG | OXYGEN SATURATION: 99 % | HEART RATE: 87 BPM | DIASTOLIC BLOOD PRESSURE: 70 MMHG

## 2017-02-14 DIAGNOSIS — R50.9 FEVER, UNSPECIFIED: ICD-10-CM

## 2017-02-14 DIAGNOSIS — J06.9 VIRAL URI WITH COUGH: Primary | ICD-10-CM

## 2017-02-14 LAB
FLUAV+FLUBV AG SPEC QL: NORMAL
FLUAV+FLUBV AG SPEC QL: NORMAL
SPECIMEN SOURCE: NORMAL

## 2017-02-14 PROCEDURE — 87804 INFLUENZA ASSAY W/OPTIC: CPT | Performed by: NURSE PRACTITIONER

## 2017-02-14 PROCEDURE — 99213 OFFICE O/P EST LOW 20 MIN: CPT | Performed by: NURSE PRACTITIONER

## 2017-02-14 NOTE — NURSING NOTE
"Chief Complaint   Patient presents with     URI       Initial /70 (BP Location: Right arm, Patient Position: Chair, Cuff Size: Adult Large)  Pulse 87  Temp 98.4  F (36.9  C) (Tympanic)  SpO2 99% Estimated body mass index is 42.54 kg/(m^2) as calculated from the following:    Height as of 1/24/17: 5' 8\" (1.727 m).    Weight as of 1/24/17: 279 lb 12.8 oz (126.9 kg).  Medication Reconciliation: complete    "

## 2017-02-14 NOTE — PROGRESS NOTES
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 Also has some bilateral ear pain that started 1 day ago. Denies wheezing. Positive for myalgias. Hx of T1 DM and blood sugars have been elevated since onset of illness, highest reading around 260 and this is normal for her during times of illness. Had influenza vaccine this season. 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 Tmax 102.6 yesterday and had some chills.    Problem list and histories reviewed & adjusted, as indicated.  Additional history: as documented    Problem list, Medication list, Allergies, and Medical/Social/Surgical histories reviewed in [facility] and updated as appropriate.    ROS:  C: NEGATIVE for change in weight, POSITIVE for fever and chills, see above.  INTEGUMENTARY/SKIN: NEGATIVE for worrisome rashes, moles or lesions  HEAD: NEGATIVE for frontal or maxillary sinus pressure or pain  NOSE: POSITIVE for nasal congestion, see above.  E/M: POSITIVE for bilateral ear pain, and sore throat, see above.  R: NEGATIVE for wheeze and SOB, POSITIVE for nonproductive cough, see above.  CV: NEGATIVE for chest pain, palpitations or peripheral edema  MUSCULOSKELETAL: POSITIVE  for myalgia, see above.  ENDOCRINE: Hx of T1 DM, blood sugars have been elevated with illness, see above.    OBJECTIVE:                                                     /70 (BP Location: Right arm, Patient Position: Chair, Cuff Size: Adult Large)  Pulse 87  Temp 98.4  F (36.9  C) (Tympanic)  SpO2 99%  There is no height or weight on file to calculate BMI.  GENERAL: healthy, alert and no distress  EYES: Eyes grossly normal to inspection and conjunctivae and sclerae normal  HENT: left and right TMs with clear effusions, nasal mucosa erythema and edema, oropharynx with mild erythema but no ulcers or lesions, no frontal or maxillary sinus tenderness  NECK: no adenopathy, no asymmetry, masses, or scars and thyroid normal to palpation  RESP: lungs clear to auscultation - no rales, rhonchi or wheezes, breathing non-labored and no respiratory distress  CV: regular rate and rhythm, normal S1 S2, no S3 or S4, no murmur, click or rubABDOMEN: soft, nontender, no hepatosplenomegaly, no masses and bowel sounds normal  MS: no gross musculoskeletal defects noted, no edema  LYMPH: no cervical adenopathy    Diagnostic Test Results:  Rapid Influenza A/B antigen: negative     ASSESSMENT/PLAN:                                                    1. Fever, unspecified  Rapid influenza A/B antigen negative. Afebrile at today's visit, pt reports Tmax of 102.6 yesterday. Suspect fever is likely from viral URI, see below.    - Influenza A/B antigen    2. Viral URI with cough  Pt has been symptomatic for 4 days with nasal congestion, cough, sore throat, myalgias, and bilateral ear pain. Suspect viral URI with cough as negative for influenza and lung exam normal with no concerns for pneumonia or bronchitis at this time. Discussed symptomatic cares. Recommend call or return to clinic if symptoms persist for 10-14 days and would consider abx for sinus infection. Suspect blood sugars are elevated from acute illness    Patient Instructions   You do NOT have influenza.    You can use tylenol or ibuprofen for the fevers.    Drink plenty of fluids and rest as  needed.    Ashley wrote you a note to be excused from class tonight.    If you're not feeling better within 10-14 days or symptoms worsen, call or make appointment.          LUIS FERNANDO Perla CNP, [facility]

## 2017-02-14 NOTE — MR AVS SNAPSHOT
After Visit Summary   2/14/2017    Stacey La    MRN: [id number]           Patient Information     Date Of Birth          1995        Visit Information        Provider Department      2/14/2017 1:15 PM Ashley Jessica APRN [facility]        Today's Diagnoses     Fever, unspecified    -  1      Care Instructions    You do NOT have influenza.    You can use tylenol or ibuprofen for the fevers.    Drink plenty of fluids and rest as needed.    Ashley wrote you a note to be excused from class tonight.    If you're not feeling better within 10-14 days or symptoms worsen, call or make appointment.              Follow-ups after your visit        Your next 10 appointments already scheduled     Mar 13, 2017  7:00 AM CDT   (Arrive by 6:30 AM)   RETURN DIABETES with Laureen Goldstein PA-C   [facility]ology ([facility])    [address] [place] [id number] [phone]            Apr 13, 2017  4:30 PM CDT   Adult Med Follow UP with LUIS FERNANDO Soares CNP   Psychiatry Clinic ([facility])    [place] [id number]  [address] [facility] [address] [phone]            Jun 05, 2017  4:30 PM CDT   (Arrive by 4:00 PM)   RETURN DIABETES with Bia Allan MD   [facility] ([facility])    [address] [place] [id number] [phone]            Jul 25, 2017  4:00 PM CDT   Lab with  LAB   [facility] Lab ([facility])    [address] [place] [address]   [phone]            Jul 25, 2017  4:50 PM CDT   (Arrive by 4:20 PM)   Return Visit with Connor Castaneda MD   [facility] Nephrology ([facility])    [address] [place] [id number] [phone]              Who to contact     If you have questions or  need follow up information about today's clinic visit or your schedule please [facility] directly at [phone].  Normal or non-critical lab and imaging results will be communicated to you by MyChart, letter or phone within 4 business days after the clinic has received the results. If you do not hear from us within 7 days, please contact the clinic through Marketsynchart or phone. If you have a critical or abnormal lab result, we will notify you by phone as soon as possible.  Submit refill requests through AppMesh or call your pharmacy and they will forward the refill request to us. Please allow 3 business days for your refill to be completed.          Additional Information About Your Visit        AppMesh Information     AppMesh gives you secure access to your electronic health record. If you see a primary care provider, you can also send messages to your care team and make appointments. If you have questions, please call your primary care clinic.  If you do not have a primary care provider, please call [phone] and they will assist you.        Care EveryWhere ID     This is your Care EveryWhere ID. This could be used by other organizations to access your [place] medical records  [id number]        Your Vitals Were     Pulse Temperature Pulse Oximetry             87 98.4  F (36.9  C) (Tympanic) 99%          Blood Pressure from Last 3 Encounters:   02/14/17 130/70   01/24/17 123/84   12/23/16 122/84    Weight from Last 3 Encounters:   01/24/17 279 lb 12.8 oz (126.9 kg)   12/23/16 275 lb (124.7 kg)   12/05/16 289 lb 3.2 oz (131.2 kg)              We Performed the Following     Influenza A/B antigen        Primary Care Provider Office Phone # Fax #    LUIS FERNANDO Del Rio [phone]       [facility] [address] [place] [address]        Thank you!     Thank you for choosing [facility]  for your care. Our goal is always to provide you with  excellent care. Hearing back from our patients is one way we can continue to improve our services. Please take a few minutes to complete the written survey that you may receive in the mail after your visit with us. Thank you!             Your Updated Medication List - Protect others around you: Learn how to safely use, store and throw away your medicines at www.disposemymeds.org.          This list is accurate as of: 2/14/17  2:06 PM.  Always use your most recent med list.                   Brand Name Dispense Instructions for use    * albuterol 108 (90 BASE) MCG/ACT Inhaler    albuterol    1 each    Inhale 2 puffs into the lungs every 6 hours as needed       * albuterol (2.5 MG/3ML) 0.083% neb solution     1 Box    Take 1 vial (2.5 mg) by nebulization every 4 hours as needed for shortness of breath / dyspnea or wheezing /chest tightness/cough       * blood glucose monitoring test strip    FREESTYLE TEST STRIPS    750 strip    Test 8 times dailiy       * blood glucose monitoring test strip    no brand specified    4 Box    Use to test blood sugar eight times daily or as directed.  Dispense Brandy Contour Next Test Strips.       Blood Pressure Monitoring Kit     1 kit    1 kit daily       Cholecalciferol 2000 UNITS Tbdp     90 tablet    Take 2,000 Units by mouth daily       enalapril 20 MG tablet    VASOTEC    180 tablet    TAKE ONE TABLET BY MOUTH TWICE DAILY       escitalopram 10 MG tablet    LEXAPRO    30 tablet    TAKE ONE TABLET (10MG) BY MOUTH DAILY       HumaLOG KWIKpen 100 UNIT/ML injection   Generic drug:  insulin lispro     60 mL    Inject up to 60 units daily       hydrOXYzine 25 MG capsule    VISTARIL    30 capsule    Take 1 capsule (25 mg) by mouth daily as needed for itching       * insulin aspart 100 UNITS/ML injection    NovoLOG VIAL    90 mL    Use in pump approx 100 units daily       * NovoLOG FLEXPEN 100 UNIT/ML injection   Generic drug:  insulin aspart     18 mL    Use as directed up to 60 units a  "day       insulin glargine 100 UNIT/ML injection     45 mL    Inject 45 Units Subcutaneous daily       insulin pen needle 31G X 8 MM    B-D U/F    400 each    Use 5 pen needles daily or as directed.       insulin syringe-needle U-100 30G X 5/16\" 0.3 ML     100 each    Use 2-3 times daily       insulin syringes (disposable) U-100 0.3 ML Misc     100 each    Use 2-3 times daily as needed       IRON SUPPLEMENT PO      Take 325 mg by mouth daily (with breakfast)       levothyroxine 50 MCG tablet    SYNTHROID/LEVOTHROID    90 tablet    Take 1 tablet (50 mcg) by mouth daily       * order for DME     1 each    Equipment being ordered: Nebulizer       * order for DME     1 Units    Equipment being ordered: walking boot       prochlorperazine 10 MG tablet    COMPAZINE    10 tablet    Take 1 tablet (10 mg) by mouth every 6 hours as needed for nausea or vomiting       ZOFRAN PO      Take by mouth as needed for nausea or vomiting       * Notice:  This list has 8 medication(s) that are the same as other medications prescribed for you. Read the directions carefully, and ask your doctor or other care provider to review them with you.      "

## 2017-02-14 NOTE — PATIENT INSTRUCTIONS
You do NOT have influenza.    You can use tylenol or ibuprofen for the fevers.    Drink plenty of fluids and rest as needed.    Ashley wrote you a note to be excused from class tonight.    If you're not feeling better within 10-14 days or symptoms worsen, call or make appointment.

## 2017-02-14 NOTE — LETTER
[facility]  [address] [place] [address]  [phone]      February 14, 2017    RE:  Stacey La                                                                                                                                                       [address]  [facility] [place] [address]            To whom it may concern:    Stacey La is under my professional medical care. She was seen in the clinic and is ill today.           Sincerely,        LYNDSEY Jane

## 2017-02-17 NOTE — TELEPHONE ENCOUNTER
PRIOR AUTHORIZATION DENIED    Medication: Novolog- Denied     Denial Date: 2/17/2017    Denial Rational:   PA was denied for not try/failing humalog. An appeal is available and will require a letter of medical necessity.         Appeal Information:

## 2017-02-26 ENCOUNTER — OFFICE VISIT (OUTPATIENT)
Dept: URGENT CARE | Facility: URGENT CARE | Age: 22
End: 2017-02-26
Payer: COMMERCIAL

## 2017-02-26 VITALS
WEIGHT: 282.9 LBS | TEMPERATURE: 100.1 F | HEART RATE: 117 BPM | OXYGEN SATURATION: 98 % | RESPIRATION RATE: 16 BRPM | DIASTOLIC BLOOD PRESSURE: 82 MMHG | SYSTOLIC BLOOD PRESSURE: 136 MMHG | BODY MASS INDEX: 43.01 KG/M2

## 2017-02-26 DIAGNOSIS — J02.9 ACUTE PHARYNGITIS, UNSPECIFIED ETIOLOGY: ICD-10-CM

## 2017-02-26 DIAGNOSIS — J02.0 STREP THROAT: Primary | ICD-10-CM

## 2017-02-26 LAB
DEPRECATED S PYO AG THROAT QL EIA: ABNORMAL
MICRO REPORT STATUS: ABNORMAL
SPECIMEN SOURCE: ABNORMAL

## 2017-02-26 PROCEDURE — 99213 OFFICE O/P EST LOW 20 MIN: CPT | Performed by: PHYSICIAN ASSISTANT

## 2017-02-26 PROCEDURE — 87880 STREP A ASSAY W/OPTIC: CPT | Performed by: PHYSICIAN ASSISTANT

## 2017-02-26 RX ORDER — AMOXICILLIN 875 MG
875 TABLET ORAL 2 TIMES DAILY
Qty: 20 TABLET | Refills: 0 | Status: SHIPPED | OUTPATIENT
Start: 2017-02-26 | End: 2017-03-17

## 2017-02-26 NOTE — MR AVS SNAPSHOT
After Visit Summary   2/26/2017    Stacey La    MRN: [id number]           Patient Information     Date Of Birth          1995        Visit Information        Provider Department      2/26/2017 2:45 PM Kira Stapleton PA-C [facility]        Today's Diagnoses     Strep throat    -  1    Acute pharyngitis, unspecified etiology           Follow-ups after your visit        Your next 10 appointments already scheduled     Mar 13, 2017  7:00 AM CDT   (Arrive by 6:30 AM)   RETURN DIABETES with Laureen Goldstein PA-C   [facility] ([facility])    [address] [place] [address]  [address] [facility] [address]   [phone]            Apr 13, 2017  4:30 PM CDT   Adult Med Follow UP with LUIS FERNANDO Soares CNP   Psychiatry Clinic ([facility])    [place] [id number]  [address] [facility] [address] [phone]            Jun 05, 2017  4:30 PM CDT   (Arrive by 4:00 PM)   RETURN DIABETES with Bia Allan MD   [facility] ([facility])    [address] [place] [id number] [phone]            Jul 25, 2017  4:00 PM CDT   Lab with  LAB   [facility] ([facility])    [address] [place] [address]   [phone]            Jul 25, 2017  4:50 PM CDT   (Arrive by 4:20 PM)   Return Visit with Connor Castaneda MD   [facility] Nephrology ([facility])    [address] [place] [id number] [phone]              Who to contact     If you have questions or need follow up information about today's clinic visit or your schedule please contact [facility] [place] [facility] directly at [phone].  Normal or non-critical lab and imaging results will be communicated to you by MyChart, letter or phone within 4 business days after the  clinic has received the results. If you do not hear from us within 7 days, please contact the clinic through Pcsso or phone. If you have a critical or abnormal lab result, we will notify you by phone as soon as possible.  Submit refill requests through Pcsso or call your pharmacy and they will forward the refill request to us. Please allow 3 business days for your refill to be completed.          Additional Information About Your Visit        AmulyteharTargeGen Information     Pcsso gives you secure access to your electronic health record. If you see a primary care provider, you can also send messages to your care team and make appointments. If you have questions, please call your primary care clinic.  If you do not have a primary care provider, please call [phone] and they will assist you.        Care EveryWhere ID     This is your Care EveryWhere ID. This could be used by other organizations to access your [place] medical records  [id number]        Your Vitals Were     Pulse Temperature Respirations Pulse Oximetry BMI (Body Mass Index)       117 100.1  F (37.8  C) (Oral) 16 98% 43.01 kg/m2        Blood Pressure from Last 3 Encounters:   02/26/17 136/82   02/14/17 130/70   01/24/17 123/84    Weight from Last 3 Encounters:   02/26/17 282 lb 14.4 oz (128.3 kg)   01/24/17 279 lb 12.8 oz (126.9 kg)   12/23/16 275 lb (124.7 kg)              We Performed the Following     Strep, Rapid Screen          Today's Medication Changes          These changes are accurate as of: 2/26/17  5:47 PM.  If you have any questions, ask your nurse or doctor.               Start taking these medicines.        Dose/Directions    amoxicillin 875 MG tablet   Commonly known as:  AMOXIL   Used for:  Strep throat   Started by:  Kira Stapleton PA-C        Dose:  875 mg   Take 1 tablet (875 mg) by mouth 2 times daily   Quantity:  20 tablet   Refills:  0         Stop taking these medicines if you haven't already. Please contact your care  team if you have questions.     insulin aspart 100 UNITS/ML injection   Commonly known as:  NovoLOG VIAL   Stopped by:  Kira Stapleton PA-C           NovoLOG FLEXPEN 100 UNIT/ML injection   Generic drug:  insulin aspart   Stopped by:  Kira Stapleton PA-C                Where to get your medicines      These medications were sent to [id number] - [facility], [place] - [address]  [address] [facility] [place] [address]     Phone:  [phone]     amoxicillin 875 MG tablet                Primary Care Provider Office Phone # Fax #    LUIS FERNANDO Del Rio [phone]       [facility] [address] [place] [address]        Thank you!     Thank you for choosing [facility]  for your care. Our goal is always to provide you with excellent care. Hearing back from our patients is one way we can continue to improve our services. Please take a few minutes to complete the written survey that you may receive in the mail after your visit with us. Thank you!             Your Updated Medication List - Protect others around you: Learn how to safely use, store and throw away your medicines at www.disposemymeds.org.          This list is accurate as of: 2/26/17  5:47 PM.  Always use your most recent med list.                   Brand Name Dispense Instructions for use    * albuterol 108 (90 BASE) MCG/ACT Inhaler    albuterol    1 each    Inhale 2 puffs into the lungs every 6 hours as needed       * albuterol (2.5 MG/3ML) 0.083% neb solution     1 Box    Take 1 vial (2.5 mg) by nebulization every 4 hours as needed for shortness of breath / dyspnea or wheezing /chest tightness/cough       amoxicillin 875 MG tablet    AMOXIL    20 tablet    Take 1 tablet (875 mg) by mouth 2 times daily       * blood glucose monitoring test strip    FREESTYLE TEST STRIPS    750 strip    Test 8 times dailiy       * blood glucose monitoring test  "strip    no brand specified    4 Box    Use to test blood sugar eight times daily or as directed.  Dispense Brandy Contour Next Test Strips.       Blood Pressure Monitoring Kit     1 kit    1 kit daily       Cholecalciferol 2000 UNITS Tbdp     90 tablet    Take 2,000 Units by mouth daily       enalapril 20 MG tablet    VASOTEC    180 tablet    TAKE ONE TABLET BY MOUTH TWICE DAILY       escitalopram 10 MG tablet    LEXAPRO    30 tablet    TAKE ONE TABLET (10MG) BY MOUTH DAILY       HumaLOG KWIKpen 100 UNIT/ML injection   Generic drug:  insulin lispro     60 mL    Inject up to 60 units daily       hydrOXYzine 25 MG capsule    VISTARIL    30 capsule    Take 1 capsule (25 mg) by mouth daily as needed for itching       insulin glargine 100 UNIT/ML injection     45 mL    Inject 45 Units Subcutaneous daily       insulin pen needle 31G X 8 MM    B-D U/F    400 each    Use 5 pen needles daily or as directed.       insulin syringe-needle U-100 30G X 5/16\" 0.3 ML     100 each    Use 2-3 times daily       insulin syringes (disposable) U-100 0.3 ML Misc     100 each    Use 2-3 times daily as needed       IRON SUPPLEMENT PO      Take 325 mg by mouth daily (with breakfast)       levothyroxine 50 MCG tablet    SYNTHROID/LEVOTHROID    90 tablet    Take 1 tablet (50 mcg) by mouth daily       prochlorperazine 10 MG tablet    COMPAZINE    10 tablet    Take 1 tablet (10 mg) by mouth every 6 hours as needed for nausea or vomiting       ZOFRAN PO      Take by mouth as needed for nausea or vomiting       * Notice:  This list has 4 medication(s) that are the same as other medications prescribed for you. Read the directions carefully, and ask your doctor or other care provider to review them with you.      "

## 2017-02-26 NOTE — NURSING NOTE
"Chief Complaint   Patient presents with     Urgent Care     Sinus Problem     sinus pressure, cough, ST, ear pain, HA, neck pain x 1 1/2 weeks OTC: Tylenol sinus        Initial /82 (BP Location: Right arm, Patient Position: Chair, Cuff Size: Adult Regular)  Pulse 117  Temp 100.1  F (37.8  C) (Oral)  Resp 16  Wt 282 lb 14.4 oz (128.3 kg)  SpO2 98%  BMI 43.01 kg/m2 Estimated body mass index is 43.01 kg/(m^2) as calculated from the following:    Height as of 1/24/17: 5' 8\" (1.727 m).    Weight as of this encounter: 282 lb 14.4 oz (128.3 kg).  Medication Reconciliation: lamont Desai CMA                                2/26/2017 3:17 PM     "

## 2017-02-26 NOTE — PROGRESS NOTES
"SUBJECTIVE:   Stacey La is a 21 year old female presenting with a chief complaint of fever, nasal congestion, rhinorrhea, \"cold symptoms\", cough  and sore throat.  Negative flu last week.  Fevers still on and off.    Onset of symptoms was 1 week(s) ago.  Course of illness is same.    Severity moderate  Current and Associated symptoms: none  Treatment measures tried include Fluids, OTC meds and Rest.  Predisposing factors include None.    Past Medical History   Diagnosis Date     Asthma      Currently no treatment needed     Celiac disease      Chronic kidney disease, stage I 8/3/2015     Chronic sinusitis      Diabetes mellitus type 1 (H)      Diabetic nephropathy (H)      Family history of heart disease 8/3/2015     Hypertension      Hypothyroid      Pedal edema      Psoriasis      Current Outpatient Prescriptions   Medication Sig Dispense Refill     amoxicillin (AMOXIL) 875 MG tablet Take 1 tablet (875 mg) by mouth 2 times daily 20 tablet 0     enalapril (VASOTEC) 20 MG tablet TAKE ONE TABLET BY MOUTH TWICE DAILY 180 tablet 3     insulin glargine (BASAGLAR KWIKPEN) 100 UNIT/ML injection Inject 45 Units Subcutaneous daily 45 mL 3     HUMALOG KWIKPEN 100 UNIT/ML soln Inject up to 60 units daily 60 mL 3     albuterol (2.5 MG/3ML) 0.083% neb solution Take 1 vial (2.5 mg) by nebulization every 4 hours as needed for shortness of breath / dyspnea or wheezing /chest tightness/cough 1 Box 3     hydrOXYzine (VISTARIL) 25 MG capsule Take 1 capsule (25 mg) by mouth daily as needed for itching 30 capsule 0     Blood Pressure Monitoring KIT 1 kit daily 1 kit 0     insulin pen needle (B-D U/F) 31G X 8 MM Use 5 pen needles daily or as directed. 400 each 11     levothyroxine (SYNTHROID, LEVOTHROID) 50 MCG tablet Take 1 tablet (50 mcg) by mouth daily 90 tablet 1     Ferrous Sulfate (IRON SUPPLEMENT PO) Take 325 mg by mouth daily (with breakfast)       escitalopram (LEXAPRO) 10 MG tablet TAKE ONE TABLET (10MG) BY MOUTH " "DAILY 30 tablet 11     Ondansetron HCl (ZOFRAN PO) Take by mouth as needed for nausea or vomiting       albuterol (ALBUTEROL) 108 (90 BASE) MCG/ACT inhaler Inhale 2 puffs into the lungs every 6 hours as needed 1 each 5     Cholecalciferol 2000 UNITS TBDP Take 2,000 Units by mouth daily 90 tablet 11     prochlorperazine (COMPAZINE) 10 MG tablet Take 1 tablet (10 mg) by mouth every 6 hours as needed for nausea or vomiting 10 tablet 1     blood glucose test strip Use to test blood sugar eight times daily or as directed.  Dispense Brandy Contour Next Test Strips. 4 Box 4     Insulin Syringe-Needle U-100 30G X 5/16\" 0.3 ML MISC Use 2-3 times daily 100 each 11     insulin syringes, disposable, U-100 0.3 ML MISC Use 2-3 times daily as needed 100 each 11     glucose blood VI test strips (FREESTYLE TEST STRIPS) strip Test 8 times dailiy 750 strip 3     Social History   Substance Use Topics     Smoking status: Never Smoker     Smokeless tobacco: Never Used     Alcohol use 0.0 oz/week     0 Standard drinks or equivalent per week      Comment: 2 times per month, 2 drinks per time       ROS:  Review of systems negative except as stated above.    OBJECTIVE  :/82 (BP Location: Right arm, Patient Position: Chair, Cuff Size: Adult Regular)  Pulse 117  Temp 100.1  F (37.8  C) (Oral)  Resp 16  Wt 282 lb 14.4 oz (128.3 kg)  SpO2 98%  BMI 43.01 kg/m2  GENERAL APPEARANCE: healthy, alert and no distress  EYES: EOMI,  PERRL, conjunctiva clear  HENT: ear canals and TM's normal.  Nose and mouth without ulcers, erythema or lesions.  Moderate nasal congestion.    NECK: bilateral anterior cervical adenopathy  RESP: lungs clear to auscultation - no rales, rhonchi or wheezes  CV: regular rates and rhythm, normal S1 S2, no murmur noted  ABDOMEN:  soft, nontender, no HSM or masses and bowel sounds normal  NEURO: Normal strength and tone, sensory exam grossly normal,  normal speech and mentation  SKIN: no suspicious lesions or rashes    Results " for orders placed or performed in visit on 02/26/17   Strep, Rapid Screen   Result Value Ref Range    Specimen Description Throat     Rapid Strep A Screen (A)      POSITIVE: Group A Streptococcal antigen detected by immunoassay.    Micro Report Status FINAL 02/26/2017          assessment/plan:  (J02.0) Strep throat  (primary encounter diagnosis)  Comment:   Plan: amoxicillin (AMOXIL) 875 MG tablet         OTC med for sx relief, contagious for 24 hours.  FU with PCP as needed      (J02.9) Acute pharyngitis, unspecified etiology  Comment: =  Plan: Strep, Rapid Screen

## 2017-03-17 ENCOUNTER — OFFICE VISIT (OUTPATIENT)
Dept: PEDIATRICS | Facility: CLINIC | Age: 22
End: 2017-03-17

## 2017-03-17 VITALS
BODY MASS INDEX: 41.66 KG/M2 | OXYGEN SATURATION: 97 % | SYSTOLIC BLOOD PRESSURE: 130 MMHG | DIASTOLIC BLOOD PRESSURE: 80 MMHG | WEIGHT: 274 LBS | HEART RATE: 103 BPM | TEMPERATURE: 99.5 F

## 2017-03-17 DIAGNOSIS — H66.001 ACUTE SUPPURATIVE OTITIS MEDIA OF RIGHT EAR WITHOUT SPONTANEOUS RUPTURE OF TYMPANIC MEMBRANE, RECURRENCE NOT SPECIFIED: Primary | ICD-10-CM

## 2017-03-17 DIAGNOSIS — J02.9 ACUTE PHARYNGITIS, UNSPECIFIED ETIOLOGY: ICD-10-CM

## 2017-03-17 LAB
DEPRECATED S PYO AG THROAT QL EIA: NORMAL
MICRO REPORT STATUS: NORMAL
SPECIMEN SOURCE: NORMAL

## 2017-03-17 PROCEDURE — 87880 STREP A ASSAY W/OPTIC: CPT | Performed by: PHYSICIAN ASSISTANT

## 2017-03-17 PROCEDURE — 99213 OFFICE O/P EST LOW 20 MIN: CPT | Performed by: PHYSICIAN ASSISTANT

## 2017-03-17 PROCEDURE — 87081 CULTURE SCREEN ONLY: CPT | Performed by: PHYSICIAN ASSISTANT

## 2017-03-17 RX ORDER — CEFDINIR 300 MG/1
300 CAPSULE ORAL 2 TIMES DAILY
Qty: 20 CAPSULE | Refills: 0 | Status: SHIPPED | OUTPATIENT
Start: 2017-03-17 | End: 2017-04-10

## 2017-03-17 NOTE — PROGRESS NOTES
SUBJECTIVE:                                                    Stacey La is a 21 year old female who presents to clinic today for the following health issues:    Acute Illness   Acute illness concerns: strep ear pain   Onset: 3 days     Fever: YES- 99.3     Chills/Sweats: YES- chills     Headache (location?): YES- frontal     Sinus Pressure:no    Conjunctivitis:  no    Ear Pain: YES: bilateral- unable to sleep due to ear pain when lay down     Rhinorrhea: YES- some     Congestion: YES- allergies     Sore Throat: YES- hurts to swallow     Hx of strep      Cough: YES-non-productive    Wheeze: no    Decreased Appetite: yes a little     Nausea: no    Vomiting: no    Diarrhea:  no    Dysuria/Freq.: no    Fatigue/Achiness: YES- both     Sick/Strep Exposure: YES- work      Therapies Tried and outcome: tylenol, ear drops, warm compress     ROS:  ROS otherwise negative    OBJECTIVE:                                                    /80  Pulse 103  Temp 99.5  F (37.5  C) (Tympanic)  Wt 274 lb (124.3 kg)  SpO2 97%  BMI 41.66 kg/m2  Body mass index is 41.66 kg/(m^2).   GENERAL: alert, no distress  HENT: ear canals- normal; TMs- erythemic on right; wnl on left; Nose- normal; Mouth- no ulcers, no lesions  NECK: tonsillar LAD  RESP: lungs clear to auscultation - no rales, no rhonchi, no wheezes  CV: regular rates and rhythm, normal S1 S2, no S3 or S4 and no murmur, no click or rub    Diagnostic test results:  Results for orders placed or performed in visit on 03/17/17 (from the past 24 hour(s))   Strep, Rapid Screen   Result Value Ref Range    Specimen Description Throat     Rapid Strep A Screen       NEGATIVE: No Group A streptococcal antigen detected by immunoassay, await   culture report.      Micro Report Status FINAL 03/17/2017         ASSESSMENT/PLAN:                                                    1. Acute suppurative otitis media of right ear without spontaneous rupture of tympanic membrane, recurrence  not specified  Begin antibiotics.   - cefdinir (OMNICEF) 300 MG capsule; Take 1 capsule (300 mg) by mouth 2 times daily  Dispense: 20 capsule; Refill: 0    2. Acute pharyngitis, unspecified etiology  TC pending. Continue with symptomatic treatment.   - Strep, Rapid Screen  - Beta strep group A culture    Parag Mtz PA-C  [facility]

## 2017-03-17 NOTE — MR AVS SNAPSHOT
After Visit Summary   3/17/2017    Stacey La    MRN: [id number]           Patient Information     Date Of Birth          1995        Visit Information        Provider Department      3/17/2017 1:50 PM Parag Mtz PA-C [facility]        Today's Diagnoses     Acute suppurative otitis media of right ear without spontaneous rupture of tympanic membrane, recurrence not specified    -  1    Acute pharyngitis, unspecified etiology          Care Instructions    Begin antibiotics         Follow-ups after your visit        Your next 10 appointments already scheduled     Mar 21, 2017 10:00 AM CDT   (Arrive by 9:45 AM)   NEW GENERAL with Sonido Reyes OD   [facility] Ophthalmology ([facility])    [address] [place] [address]  [address] [facility] [address] [phone]            Apr 10, 2017  7:00 AM CDT   (Arrive by 6:45 AM)   RETURN DIABETES with Laureen Goldstein PA-C   [facility] Endocrinology ([facility])    [address] [place] [address]  3rd [facility] [id number] [phone]            Apr 13, 2017  4:30 PM CDT   Adult Med Follow UP with LUIS FERNANDO Soares CNP   Psychiatry Clinic ([facility])    [place] [id number]  [address] [facility] [address] [phone]            Jun 05, 2017  4:30 PM CDT   (Arrive by 4:00 PM)   RETURN DIABETES with Bia Allan MD   [facility] Endocrinology ([facility])    [address] [place] [address]  3rd [facility] [id number] [phone]            Jul 25, 2017  4:00 PM CDT   Lab with  LAB   [facility] Lab ([facility])    [address] [place] [address]  1st [facility] [address]   [phone]            Jul 25, 2017  4:50 PM CDT   (Arrive by 4:20 PM)   Return Visit with Connor Castaneda MD   [facility] Nephrology ([facility])    [address] [place] [address]  Se  3rd Floor  [facility] [address] [phone]              Who to contact     If you have questions or need follow up information about today's clinic visit or your schedule please contact [facility] directly at [phone].  Normal or non-critical lab and imaging results will be communicated to you by MyChart, letter or phone within 4 business days after the clinic has received the results. If you do not hear from us within 7 days, please contact the clinic through [employer] or phone. If you have a critical or abnormal lab result, we will notify you by phone as soon as possible.  Submit refill requests through [employer] or call your pharmacy and they will forward the refill request to us. Please allow 3 business days for your refill to be completed.          Additional Information About Your Visit        [employer] Information     [employer] gives you secure access to your electronic health record. If you see a primary care provider, you can also send messages to your care team and make appointments. If you have questions, please call your primary care clinic.  If you do not have a primary care provider, please call [phone] and they will assist you.        Care EveryWhere ID     This is your Care EveryWhere ID. This could be used by other organizations to access your [place] medical records  [id number]        Your Vitals Were     Pulse Temperature Pulse Oximetry BMI (Body Mass Index)          103 99.5  F (37.5  C) (Tympanic) 97% 41.66 kg/m2         Blood Pressure from Last 3 Encounters:   03/17/17 130/80   02/26/17 136/82   02/14/17 130/70    Weight from Last 3 Encounters:   03/17/17 274 lb (124.3 kg)   02/26/17 282 lb 14.4 oz (128.3 kg)   01/24/17 279 lb 12.8 oz (126.9 kg)              We Performed the Following     Beta strep group A culture     Strep, Rapid Screen          Today's Medication Changes          These changes are accurate as of: 3/17/17  3:34 PM.  If you have any questions, ask  your nurse or doctor.               Start taking these medicines.        Dose/Directions    cefdinir 300 MG capsule   Commonly known as:  OMNICEF   Used for:  Acute suppurative otitis media of right ear without spontaneous rupture of tympanic membrane, recurrence not specified        Dose:  300 mg   Take 1 capsule (300 mg) by mouth 2 times daily   Quantity:  20 capsule   Refills:  0            Where to get your medicines      These medications were sent to [facility] - [facility], [place] - [address] [facility]  [address] [facility] [address]     Phone:  [phone]     cefdinir 300 MG capsule                Primary Care Provider Office Phone # Fax #    LUIS FERNANDO [place] [phone]       [facility] [address] [place] [address]        Thank you!     Thank you for choosing [facility]  for your care. Our goal is always to provide you with excellent care. Hearing back from our patients is one way we can continue to improve our services. Please take a few minutes to complete the written survey that you may receive in the mail after your visit with us. Thank you!             Your Updated Medication List - Protect others around you: Learn how to safely use, store and throw away your medicines at www.disposemymeds.org.          This list is accurate as of: 3/17/17  3:34 PM.  Always use your most recent med list.                   Brand Name Dispense Instructions for use    * albuterol 108 (90 BASE) MCG/ACT Inhaler    albuterol    1 each    Inhale 2 puffs into the lungs every 6 hours as needed       * albuterol (2.5 MG/3ML) 0.083% neb solution     1 Box    Take 1 vial (2.5 mg) by nebulization every 4 hours as needed for shortness of breath / dyspnea or wheezing /chest tightness/cough       * blood glucose monitoring test strip    FREESTYLE TEST STRIPS    750 strip    Test 8 times dailiy       * blood glucose monitoring test  "strip    no brand specified    4 Box    Use to test blood sugar eight times daily or as directed.  Dispense Brandy Contour Next Test Strips.       Blood Pressure Monitoring Kit     1 kit    1 kit daily       cefdinir 300 MG capsule    OMNICEF    20 capsule    Take 1 capsule (300 mg) by mouth 2 times daily       Cholecalciferol 2000 UNITS Tbdp     90 tablet    Take 2,000 Units by mouth daily       enalapril 20 MG tablet    VASOTEC    180 tablet    TAKE ONE TABLET BY MOUTH TWICE DAILY       escitalopram 10 MG tablet    LEXAPRO    30 tablet    TAKE ONE TABLET (10MG) BY MOUTH DAILY       HumaLOG KWIKpen 100 UNIT/ML injection   Generic drug:  insulin lispro     60 mL    Inject up to 60 units daily       hydrOXYzine 25 MG capsule    VISTARIL    30 capsule    Take 1 capsule (25 mg) by mouth daily as needed for itching       insulin glargine 100 UNIT/ML injection     45 mL    Inject 45 Units Subcutaneous daily       insulin pen needle 31G X 8 MM    B-D U/F    400 each    Use 5 pen needles daily or as directed.       insulin syringe-needle U-100 30G X 5/16\" 0.3 ML     100 each    Use 2-3 times daily       insulin syringes (disposable) U-100 0.3 ML Misc     100 each    Use 2-3 times daily as needed       IRON SUPPLEMENT PO      Take 325 mg by mouth daily (with breakfast)       levothyroxine 50 MCG tablet    SYNTHROID/LEVOTHROID    90 tablet    Take 1 tablet (50 mcg) by mouth daily       prochlorperazine 10 MG tablet    COMPAZINE    10 tablet    Take 1 tablet (10 mg) by mouth every 6 hours as needed for nausea or vomiting       ZOFRAN PO      Take by mouth as needed for nausea or vomiting Reported on 3/17/2017       * Notice:  This list has 4 medication(s) that are the same as other medications prescribed for you. Read the directions carefully, and ask your doctor or other care provider to review them with you.      "

## 2017-03-19 LAB
BACTERIA SPEC CULT: NORMAL
MICRO REPORT STATUS: NORMAL
SPECIMEN SOURCE: NORMAL

## 2017-03-21 ENCOUNTER — OFFICE VISIT (OUTPATIENT)
Dept: OPHTHALMOLOGY | Facility: CLINIC | Age: 22
End: 2017-03-21

## 2017-03-21 DIAGNOSIS — H52.13 MYOPIA, BILATERAL: ICD-10-CM

## 2017-03-21 DIAGNOSIS — E10.3299: Primary | ICD-10-CM

## 2017-03-21 ASSESSMENT — VISUAL ACUITY
OD_SC: 20/40
OD_PH_SC: 20/25
OD_SC: J1+
METHOD: SNELLEN - LINEAR
OD_SC+: +2
OS_PH_SC: 20/25
OS_SC: 20/40
OS_SC: J1+

## 2017-03-21 ASSESSMENT — SLIT LAMP EXAM - LIDS
COMMENTS: NORMAL
COMMENTS: NORMAL

## 2017-03-21 ASSESSMENT — TONOMETRY
OS_IOP_MMHG: 23
OS_IOP_MMHG: 35
IOP_METHOD: ICARE
OD_IOP_MMHG: 23
IOP_METHOD: TONOPEN
OD_IOP_MMHG: 35

## 2017-03-21 ASSESSMENT — REFRACTION_MANIFEST
OD_SPHERE: -0.75
OS_CYLINDER: SPHERE
OS_SPHERE: -1.00
OD_CYLINDER: SPHERE

## 2017-03-21 ASSESSMENT — CONF VISUAL FIELD
OD_NORMAL: 1
OS_NORMAL: 1
METHOD: COUNTING FINGERS

## 2017-03-21 ASSESSMENT — EXTERNAL EXAM - LEFT EYE: OS_EXAM: NORMAL

## 2017-03-21 ASSESSMENT — GONIOSCOPY
OS_NASAL: CB
OD_NASAL: CB
OD_INFERIOR: CB
OS_SUPERIOR: CB
OD_SUPERIOR: CB
OD_TEMPORAL: CB
OS_TEMPORAL: CB
OS_INFERIOR: CB

## 2017-03-21 ASSESSMENT — PACHYMETRY
OS_CT(UM): 657
OD_CT(UM): 684

## 2017-03-21 ASSESSMENT — CUP TO DISC RATIO
OS_RATIO: 0.2
OD_RATIO: 0.2

## 2017-03-21 ASSESSMENT — EXTERNAL EXAM - RIGHT EYE: OD_EXAM: NORMAL

## 2017-03-21 NOTE — LETTER
March 21, 2017          Your patient, Stacey La, was examined in the Ophthalmology Clinic today for a diabetic evaluation.     Examination of the right eye today shows: Non-proliferative diabetic retinopathy:  mild.    Examination ofthe left eye today shows: Non-proliferative diabetic retinopathy:  mild.    The condition of the eyes today is: stable.    Treatment recommended: Monitor.    I recommend follow-up examination in: 1 year.    I have encouraged the patient to continue working with you to maintain tight control of blood glucose and blood pressure.   Thank you for allowing us to participate in the care of this patient.     Sincerely,    Sonido Reyes OD, FAAO

## 2017-03-21 NOTE — NURSING NOTE
Chief Complaints and History of Present Illnesses   Patient presents with     Diabetic Eye Exam     HPI    Affected eye(s):  Both   Symptoms:     Blurred vision (Comment: lost gls, little blurred per pt)   No floaters   No flashes   No Dryness   No itching         Do you have eye pain now?:  No      Comments:  DMI: 160  A1C: 6.9 5 months ago per pt   Lab Results       Component                Value               Date                       A1C                      7.6                 03/26/2015                Amita Moody March 21, 2017 9:56 AM

## 2017-03-21 NOTE — MR AVS SNAPSHOT
After Visit Summary   3/21/2017    Stacey La    MRN: [id number]           Patient Information     Date Of Birth          1995        Visit Information        Provider Department      3/21/2017 10:00 AM Sonido Reyes OD [facility] Ophthalmology        Today's Diagnoses     Mild nonproliferative diabetic retinopathy without macular edema associated with type 1 diabetes mellitus (H)    -  1    Myopia, bilateral           Follow-ups after your visit        Follow-up notes from your care team     Return in about 1 year (around 3/21/2018) for Annual Visit.      Your next 10 appointments already scheduled     Apr 10, 2017  7:00 AM CDT   (Arrive by 6:45 AM)   RETURN DIABETES with Laureen Goldstein PA-C   [facility] Endocrinology ([facility])    [address] [place] [id number] [phone]            Apr 13, 2017  4:30 PM CDT   Adult Med Follow UP with LUIS FERNANDO Soares CNP   Psychiatry Clinic ([facility])    [place] [id number]  [address] [facility] [id number] [phone]            Jun 05, 2017  4:30 PM CDT   (Arrive by 4:00 PM)   RETURN DIABETES with Bia Allan MD   [facility] Endocrinology ([facility])    [address] [place] [id number] [phone]            Jul 25, 2017  4:00 PM CDT   Lab with  LAB   [facility] Lab ([facility])    [address] [place] [id number] [phone]            Jul 25, 2017  4:50 PM CDT   (Arrive by 4:20 PM)   Return Visit with Connor Castaneda MD   [facility] Nephrology ([facility])    [address] [place] [id number] [phone]              Who to contact     Please call your clinic at [phone] to:    Ask questions about your health    Make or cancel appointments    Discuss your  medicines    Learn about your test results    Speak to your doctor   If you have compliments or concerns about an experience at your clinic, or if you wish to file a complaint, please contact [facility] Relations at [phone] or email us at [email or web address]         Additional Information About Your Visit        MyChart Information     [employer] gives you secure access to your electronic health record. If you see a primary care provider, you can also send messages to your care team and make appointments. If you have questions, please call your primary care clinic.  If you do not have a primary care provider, please call [phone] and they will assist you.      [employer] is an electronic gateway that provides easy, online access to your medical records. With [employer], you can request a clinic appointment, read your test results, renew a prescription or communicate with your care team.     To access your existing account, please contact your [facility] or call [phone] for assistance.        Care EveryWhere ID     This is your Care EveryWhere ID. This could be used by other organizations to access your [place] medical records  [id number]         Blood Pressure from Last 3 Encounters:   03/17/17 130/80   02/26/17 136/82   02/14/17 130/70    Weight from Last 3 Encounters:   03/17/17 124.3 kg (274 lb)   02/26/17 128.3 kg (282 lb 14.4 oz)   01/24/17 126.9 kg (279 lb 12.8 oz)              Today, you had the following     No orders found for display       Primary Care Provider Office Phone # Fax #    LUIS FERNANDO [place] [facility] [phone]       [facility] [address] [place] [address]        Thank you!     Thank you for choosing [facility] OPHTHALMOLOG[facility]  for your care. Our goal is always to provide you with excellent care. Hearing back from our patients is one way we can continue to improve our  services. Please take a few minutes to complete the written survey that you may receive in the mail after your visit with us. Thank you!             Your Updated Medication List - Protect others around you: Learn how to safely use, store and throw away your medicines at www.disposemymeds.org.          This list is accurate as of: 3/21/17  3:35 PM.  Always use your most recent med list.                   Brand Name Dispense Instructions for use    * albuterol 108 (90 BASE) MCG/ACT Inhaler    albuterol    1 each    Inhale 2 puffs into the lungs every 6 hours as needed       * albuterol (2.5 MG/3ML) 0.083% neb solution     1 Box    Take 1 vial (2.5 mg) by nebulization every 4 hours as needed for shortness of breath / dyspnea or wheezing /chest tightness/cough       * blood glucose monitoring test strip    FREESTYLE TEST STRIPS    750 strip    Test 8 times dailiy       * blood glucose monitoring test strip    no brand specified    4 Box    Use to test blood sugar eight times daily or as directed.  Dispense VDI Space Contour Next Test Strips.       Blood Pressure Monitoring Kit     1 kit    1 kit daily       cefdinir 300 MG capsule    OMNICEF    20 capsule    Take 1 capsule (300 mg) by mouth 2 times daily       Cholecalciferol 2000 UNITS Tbdp     90 tablet    Take 2,000 Units by mouth daily       enalapril 20 MG tablet    VASOTEC    180 tablet    TAKE ONE TABLET BY MOUTH TWICE DAILY       escitalopram 10 MG tablet    LEXAPRO    30 tablet    TAKE ONE TABLET (10MG) BY MOUTH DAILY       HumaLOG KWIKpen 100 UNIT/ML injection   Generic drug:  insulin lispro     60 mL    Inject up to 60 units daily       hydrOXYzine 25 MG capsule    VISTARIL    30 capsule    Take 1 capsule (25 mg) by mouth daily as needed for itching       insulin glargine 100 UNIT/ML injection     45 mL    Inject 45 Units Subcutaneous daily       insulin pen needle 31G X 8 MM    B-D U/F    400 each    Use 5 pen needles daily or as directed.       insulin  "syringe-needle U-100 30G X 5/16\" 0.3 ML     100 each    Use 2-3 times daily       insulin syringes (disposable) U-100 0.3 ML Misc     100 each    Use 2-3 times daily as needed       IRON SUPPLEMENT PO      Take 325 mg by mouth daily (with breakfast)       levothyroxine 50 MCG tablet    SYNTHROID/LEVOTHROID    90 tablet    Take 1 tablet (50 mcg) by mouth daily       prochlorperazine 10 MG tablet    COMPAZINE    10 tablet    Take 1 tablet (10 mg) by mouth every 6 hours as needed for nausea or vomiting       ZOFRAN PO      Take by mouth as needed for nausea or vomiting Reported on 3/17/2017       * Notice:  This list has 4 medication(s) that are the same as other medications prescribed for you. Read the directions carefully, and ask your doctor or other care provider to review them with you.      "

## 2017-04-09 ASSESSMENT — ENCOUNTER SYMPTOMS
TROUBLE SWALLOWING: 0
SMELL DISTURBANCE: 0
SINUS PAIN: 0
TASTE DISTURBANCE: 0
SORE THROAT: 0
SINUS CONGESTION: 0
HOARSE VOICE: 0
NECK MASS: 0

## 2017-04-10 ENCOUNTER — OFFICE VISIT (OUTPATIENT)
Dept: ENDOCRINOLOGY | Facility: CLINIC | Age: 22
End: 2017-04-10

## 2017-04-10 VITALS
BODY MASS INDEX: 43.95 KG/M2 | WEIGHT: 290 LBS | SYSTOLIC BLOOD PRESSURE: 136 MMHG | DIASTOLIC BLOOD PRESSURE: 82 MMHG | HEART RATE: 99 BPM | HEIGHT: 68 IN

## 2017-04-10 DIAGNOSIS — E10.9 WELL CONTROLLED TYPE 1 DIABETES MELLITUS (H): Primary | ICD-10-CM

## 2017-04-10 LAB — HBA1C MFR BLD: 7.6 % (ref 4.3–6)

## 2017-04-10 ASSESSMENT — PAIN SCALES - GENERAL: PAINLEVEL: NO PAIN (0)

## 2017-04-10 NOTE — NURSING NOTE
Chief Complaint   Patient presents with     RECHECK     F/U TYPE I DM     Ynes Acuña, CMA  Endocrinology & Diabetes 3G    Capillary Fingerstick performed for an Hemoglobin A1C test

## 2017-04-10 NOTE — PROGRESS NOTES
HPI:  Stacey is a 22 yo woman here for follow up of type 1 diabetes, diagnosed at age 7.   She also sees Dr. Allan.  Stacey's diabetes is complicated by nephropathy. She also has hyperlipidemia, a history of hypothyroidism (although she has been off of levothyroxine for several years) and celiac disease.  She follows a strict gluten free diet.  She has been working hard at improving her control, testing more, wearing her Dexcom sensor regularly.  She and her  would like to start trying to conceive soon.  A few months ago, however, she decided to go back to injections as she was getting tired of wearing her pump.  She had a much harder time controlling her blood sugars and she went back on her pump 3 weeks ago.  She thinks her numbers have improved since then.     She uses the T-slim pump with the integrated sensor.  She likes this. She has been having very little hypoglyemia, but sometimes doesn't feel it until she is in the 40's.     Stacey tests her blood sugar 4 times daily with an overall average this month of 203 mg/dL on her meter, 188 mg/dL on her sensor.  She tends to be highest at HS, through the night,  and in the morning.      Stacey wears a t-slim pump with basal rates as follows:   Mid- 2.25  3am- 2.1  7am- 1.85  10am- 1.95    IC ratio-   Mid-1:4g   3am- 1:5g   11am- 1:6g    Sensitivity- 18  Target glucose- 100 during the day, 110 overnight    Average total daily insulin dose-  125 Units (40%basal)    She is having to do a dual wave bolus to prevent leaking when she takes over 10 units on her pump at a time. She has tried multiple different types of insertion sets and rotating insertion sites with no improvement.    For her hypothyroidism- just restarted levothyroxine 50 mcg daily in October, 2016 in anticipation of attempting pregnancy.  She reports that her hypothyroidism had previously resolved on its own and had been off levothyroxine for a couple years.       For her dyslipidemia- we stopped  pravastatin at her last appointment in anticipation of pregnancy.       For her CKD (stage 1), she is taking enalapril. She follows with nephrology.  She understands she will need to stop this if she were to become pregnant.    A1c today is up from 6.9% to 7.6%.     ROS  GENERAL: no weight loss, weight gain, fevers, chills, malaise, night sweats.   HEENT: no dysphagia, diplopia, neck pain or tenderness, dry/scratchy eyes, URI, cough, sinus drainage, tinnitus, sinus pressure  CV: no chest pain, pressure, palpitations, skipped beats, LOC  LUNGS: no SOB, CHILDRESS, cough, sputum production, wheezing   GI: no diarrhea, constipation, abdominal pain  EXTREMITIES: no rashes, ulcers, edema  NEUROLOGY: no changes in vision, tingling or numbness in hands or feet.   MSK: no muscle aches or pains, weakness  PSYCH: mood stable    Past Medical History:   Diagnosis Date     Asthma     Currently no treatment needed     Celiac disease      Chronic kidney disease, stage I 8/3/2015     Chronic sinusitis      Diabetes mellitus type 1 (H)      Diabetic nephropathy (H)      Family history of heart disease 8/3/2015     Hypertension      Hypothyroid      Pedal edema      Psoriasis      PMH:   Type 1 diabetes- since 2003  Celiac disease- since age 13  Hypothyroidism- age 10 (no longer on levothyroxine for several years)  Hyperlipidemia- has been on a statin for a little more than a year, mostly eating a plant based diet now.    Past Surgical History:   Procedure Laterality Date     ENT SURGERY       TONSILLECTOMY, ADENOIDECTOMY, COMBINED         Family History   Problem Relation Age of Onset     Cardiovascular Father 48     MI, stents, diabetic, type 2     GASTROINTESTINAL DISEASE Mother      Celiacs     Cardiovascular Maternal Grandfather      Englarged heart, pacemaker, defib     Family Hx:   Dad- premature CVD, in his 40s.  Type 2 diabetes  Mom- celiac disease  Grandfather- type 2 diabetes  Half brother- Asperger's  Half sister- cervical  CA  Another half brother- bipolar and schizophrenia      History     Social History     Marital Status:      Spouse Name: N/A     Number of Children: N/A     Years of Education: N/A     Social History Main Topics     Smoking status: Never Smoker      Smokeless tobacco: Never Used     Alcohol Use: Yes      Comment: occ. use     Drug Use: No     Sexual Activity:     Partners: Male     Birth Control/ Protection: Condom     Other Topics Concern     Parent/Sibling W/ Cabg, Mi Or Angioplasty Before 65f 55m? Yes     Caffeine Concern Yes     1 cup tea per day     Sleep Concern No     Special Diet Yes     gluten free diet, low carbs     Exercise Yes     walking, ellyptical, swimming, weights     Seat Belt Yes     Social History Narrative     Social Hx:   . 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  Also taking classes at night studying nutrition. Had previously worked as a nanny. She also goes to the gym a few days a week.  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     Current Outpatient Prescriptions   Medication     insulin lispro (HUMALOG) 100 UNIT/ML injection     enalapril (VASOTEC) 20 MG tablet     albuterol (2.5 MG/3ML) 0.083% neb solution     hydrOXYzine (VISTARIL) 25 MG capsule     Blood Pressure Monitoring KIT     levothyroxine (SYNTHROID, LEVOTHROID) 50 MCG tablet     Ferrous Sulfate (IRON SUPPLEMENT PO)     escitalopram (LEXAPRO) 10 MG tablet     Ondansetron HCl (ZOFRAN PO)     albuterol (ALBUTEROL) 108 (90 BASE) MCG/ACT inhaler     Cholecalciferol 2000 UNITS TBDP     prochlorperazine (COMPAZINE) 10 MG tablet     blood glucose test strip     insulin syringes, disposable, U-100 0.3 ML MISC     No current facility-administered medications for this visit.           Allergies   Allergen Reactions     Dogs Other (See Comments)     Sinus symptoms      Dust Mites      Sinus      Gluten Meal      RESULTS  Lab Results   Component Value Date    A1C 7.6 03/26/2015       TSH   Date Value  "Ref Range Status   10/10/2016 2.89 0.40 - 4.00 mU/L Final   01/16/2016 3.33 0.40 - 4.00 mU/L Final   12/09/2013 2.95 0.4 - 5.0 mU/L Final     T4 Free   Date Value Ref Range Status   12/09/2013 1.18 0.70 - 1.85 ng/dL Final       Creatinine   Date Value Ref Range Status   01/24/2017 0.55 0.52 - 1.04 mg/dL Final   07/26/2016 0.60 0.52 - 1.04 mg/dL Final   ]    No results found for: MICROALBUMIN]    ALT   Date Value Ref Range Status   02/20/2016 26 0 - 50 U/L Final   08/04/2015 <5 (L) 5 - 30 U/L Final   ]    Recent Labs   Lab Test  08/04/15   1443  12/09/13   1137   CHOL  194  241*   HDL  66  66   LDL  102  142*   TRIG  131  165*   CHOLHDLRATIO  2.9  3.7         Physical Exam  /82  Pulse 99  Ht 1.727 m (5' 8\")  Wt 131.5 kg (290 lb)  BMI 44.09 kg/m2  GENERAL:  Alert and oriented X3, NAD, well dressed, answering questions appropriately, appears stated age.  EXTREMITIES: no edema, +pulses, no rashes, no lesions    RESULTS  Lab Results   Component Value Date    A1C 7.6 03/26/2015       TSH   Date Value Ref Range Status   10/10/2016 2.89 0.40 - 4.00 mU/L Final   01/16/2016 3.33 0.40 - 4.00 mU/L Final   12/09/2013 2.95 0.4 - 5.0 mU/L Final     T4 Free   Date Value Ref Range Status   12/09/2013 1.18 0.70 - 1.85 ng/dL Final       Creatinine   Date Value Ref Range Status   01/24/2017 0.55 0.52 - 1.04 mg/dL Final   07/26/2016 0.60 0.52 - 1.04 mg/dL Final   ]    No results found for: MICROALBUMIN]    ALT   Date Value Ref Range Status   02/20/2016 26 0 - 50 U/L Final   08/04/2015 <5 (L) 5 - 30 U/L Final   ]    Recent Labs   Lab Test  08/04/15   1443  12/09/13   1137   CHOL  194  241*   HDL  66  66   LDL  102  142*   TRIG  131  165*   CHOLHDLRATIO  2.9  3.7               Parathyroid Hormone Intact   Date Value Ref Range Status   01/24/2017 24 12 - 72 pg/mL Final   01/16/2016 31 12 - 72 pg/mL Final     Calcium   Date Value Ref Range Status   01/24/2017 9.3 8.5 - 10.1 mg/dL Final   07/26/2016 9.2 8.5 - 10.1 mg/dL Final "     No results found for: CALCIUMIONIZ  Albumin   Date Value Ref Range Status   01/24/2017 4.0 3.4 - 5.0 g/dL Final   07/26/2016 3.6 3.4 - 5.0 g/dL Final       A1c today: 7.6%.  Assessment/Plan:     1.  Type 1 diabetes-  Blood sugars have climbed since switching from the pump to injections.  She has been back on the pump for the past 3 weeks and numbers are looking better.  She is, however, going too high post-dinner and staying high all night.  Will make the following changes:     New basal rates:   Mid- 2.45 (up from 2.35)  3am- 2.3 (up from 2.2)  7am- 1.85  11am- 1.95    Tighten insulin:carb ratio to 1:5g starting at 6pm.    We again reviewed optimal glucose control prior to attempting pregnancy.     2.  Risk factors-     Retinopathy:  No.  Had eye exam within last year.   Nephropathy:  BP well controlled. No microalbuminuria.  Creatinine stable.  She is on enalapril currently, but she understands she will need to stop this if she becomes pregnant.     Neuropathy: No.    Feet: OK, no ulcers.   Lipids:  .  Had been on pravastatin 20 mg daily.  She stopped statin in anticipation of pregnancy.        3.  Hypothyroidism- last TSH was 2.89 on 10/10/16 on no levothyroxine.   She did resume 50 mcg daily at that time in anticipation of pregnancy.  Will recheck TSH.     4. F/U in 3 months with Dr. Allan, sooner if she were to become pregnant.     25/30 minute visit was spent counseling patient.     Laureen Goldstein PA-C, MPAS   [facility]  Department of Medicine  Division of Endocrinology and Diabetes

## 2017-04-10 NOTE — MR AVS SNAPSHOT
After Visit Summary   4/10/2017    Stacey La    MRN: [id number]           Patient Information     Date Of Birth          1995        Visit Information        Provider Department      4/10/2017 7:00 AM Laureen Goldstein PA-C [facility] Endocrinology        Today's Diagnoses     Well controlled type 1 diabetes mellitus (H)    -  1      Care Instructions    Change basal rates as follows:     New basal rates:   Mid- 2.45 (up from 2.35)  3am- 2.3 (up from 2.2)  7am- 1.85  11am- 1.95    Tighten insulin:carb ratio to 1:5g starting at 6pm.      If you find that you are dropping low post correction, relax your correction factor to 1:25        Follow-ups after your visit        Your next 10 appointments already scheduled     Jun 05, 2017  4:30 PM CDT   (Arrive by 4:00 PM)   RETURN DIABETES with Bia Allan MD   [facility] Endocrinology ([facility])    [address] [place] [address] [id number] [phone]            Jul 25, 2017  4:00 PM CDT   Lab with  LAB   [facility] ([facility])    [address] [place] [address] [id number] [phone]            Jul 25, 2017  4:50 PM CDT   (Arrive by 4:20 PM)   Return Visit with Connor Castaneda MD   [facility] Nephrology ([facility])    [address] [place] [address] [id number] [phone]              Who to contact     Please call your clinic at [phone] to:    Ask questions about your health    Make or cancel appointments    Discuss your medicines    Learn about your test results    Speak to your doctor   If you have compliments or concerns about an experience at your clinic, or if you wish to file a complaint, please contact [facility] Patient Relations at [phone] or email us at [email or web address]         Additional Information About Your Visit        Haley  "Information     [employer] gives you secure access to your electronic health record. If you see a primary care provider, you can also send messages to your care team and make appointments. If you have questions, please call your primary care clinic.  If you do not have a primary care provider, please call [phone] and they will assist you.      [employer] is an electronic gateway that provides easy, online access to your medical records. With [employer], you can request a clinic appointment, read your test results, renew a prescription or communicate with your care team.     To access your existing account, please contact your [facility] or call [phone] for assistance.        Care EveryWhere ID     This is your Care EveryWhere ID. This could be used by other organizations to access your [facility] medical records  [id number]        Your Vitals Were     Pulse Height BMI (Body Mass Index)             99 1.727 m (5' 8\") 44.09 kg/m2          Blood Pressure from Last 3 Encounters:   04/10/17 136/82   03/17/17 130/80   02/26/17 136/82    Weight from Last 3 Encounters:   04/10/17 131.5 kg (290 lb)   03/17/17 124.3 kg (274 lb)   02/26/17 128.3 kg (282 lb 14.4 oz)              We Performed the Following     Hemoglobin A1c POCT          Today's Medication Changes          These changes are accurate as of: 4/10/17  5:50 PM.  If you have any questions, ask your nurse or doctor.               Start taking these medicines.        Dose/Directions    glucagon 1 MG kit   Commonly known as:  GLUCAGON EMERGENCY   Used for:  Well controlled type 1 diabetes mellitus (H)   Started by:  Laureen Goldstein PA-C        Dose:  1 mg   Inject 1 mg into the muscle once for 1 dose   Quantity:  1 mg   Refills:  1         These medicines have changed or have updated prescriptions.        Dose/Directions    blood glucose monitoring test strip   Commonly known as:  no brand specified   This may have changed:  Another " "medication with the same name was removed. Continue taking this medication, and follow the directions you see here.   Used for:  Diabetes mellitus type 1 (H)   Changed by:  Luis Bhakta MD        Use to test blood sugar eight times daily or as directed.  Dispense Brandy Contour Next Test Strips.   Quantity:  4 Box   Refills:  4       insulin lispro 100 UNIT/ML injection   Commonly known as:  HumaLOG   This may have changed:    - how to take this  - when to take this  - additional instructions  - Another medication with the same name was removed. Continue taking this medication, and follow the directions you see here.   Used for:  Well controlled type 1 diabetes mellitus (H)   Changed by:  Laureen Goldstein PA-C        Use as directed up to 140 units daily in insulin pump.   Quantity:  12 mL   Refills:  3         Stop taking these medicines if you haven't already. Please contact your care team if you have questions.     cefdinir 300 MG capsule   Commonly known as:  OMNICEF   Stopped by:  Laureen Goldstein PA-C           insulin glargine 100 UNIT/ML injection   Stopped by:  Laureen Goldstein PA-C           insulin pen needle 31G X 8 MM   Commonly known as:  B-D U/F   Stopped by:  Laureen Goldstein PA-C           insulin syringe-needle U-100 30G X 5/16\" 0.3 ML   Stopped by:  Laureen Goldstein PA-C                Where to get your medicines      These medications were sent to [facility] [address] [place] [address] [address] [address]     Phone:  [phone]     glucagon 1 MG kit    insulin lispro 100 UNIT/ML injection                Primary Care Provider Office Phone # Fax #    LUIS FERNANDO [place] [phone]       [facility] [place] [address] [place] [address]        Thank you!     Thank you for choosing [facility] ENDOCRINOLOG[facility]  for your care. Our goal is always to provide you with excellent care. Hearing back from our " patients is one way we can continue to improve our services. Please take a few minutes to complete the written survey that you may receive in the mail after your visit with us. Thank you!             Your Updated Medication List - Protect others around you: Learn how to safely use, store and throw away your medicines at www.disposemymeds.org.          This list is accurate as of: 4/10/17  5:50 PM.  Always use your most recent med list.                   Brand Name Dispense Instructions for use    * albuterol 108 (90 BASE) MCG/ACT Inhaler    albuterol    1 each    Inhale 2 puffs into the lungs every 6 hours as needed       * albuterol (2.5 MG/3ML) 0.083% neb solution     1 Box    Take 1 vial (2.5 mg) by nebulization every 4 hours as needed for shortness of breath / dyspnea or wheezing /chest tightness/cough       blood glucose monitoring test strip    no brand specified    4 Box    Use to test blood sugar eight times daily or as directed.  Dispense Simbiosis Contour Next Test Strips.       Blood Pressure Monitoring Kit     1 kit    1 kit daily       Cholecalciferol 2000 UNITS Tbdp     90 tablet    Take 2,000 Units by mouth daily       enalapril 20 MG tablet    VASOTEC    180 tablet    TAKE ONE TABLET BY MOUTH TWICE DAILY       escitalopram 10 MG tablet    LEXAPRO    30 tablet    TAKE ONE TABLET (10MG) BY MOUTH DAILY       glucagon 1 MG kit    GLUCAGON EMERGENCY    1 mg    Inject 1 mg into the muscle once for 1 dose       hydrOXYzine 25 MG capsule    VISTARIL    30 capsule    Take 1 capsule (25 mg) by mouth daily as needed for itching       insulin lispro 100 UNIT/ML injection    HumaLOG    12 mL    Use as directed up to 140 units daily in insulin pump.       insulin syringes (disposable) U-100 0.3 ML Misc     100 each    Use 2-3 times daily as needed       IRON SUPPLEMENT PO      Take 325 mg by mouth daily (with breakfast)       levothyroxine 50 MCG tablet    SYNTHROID/LEVOTHROID    90 tablet    Take 1 tablet (50 mcg) by  mouth daily       prochlorperazine 10 MG tablet    COMPAZINE    10 tablet    Take 1 tablet (10 mg) by mouth every 6 hours as needed for nausea or vomiting       ZOFRAN PO      Take by mouth as needed for nausea or vomiting Reported on 3/17/2017       * Notice:  This list has 2 medication(s) that are the same as other medications prescribed for you. Read the directions carefully, and ask your doctor or other care provider to review them with you.

## 2017-04-10 NOTE — PATIENT INSTRUCTIONS
Change basal rates as follows:     New basal rates:   Mid- 2.45 (up from 2.35)  3am- 2.3 (up from 2.2)  7am- 1.85  11am- 1.95    Tighten insulin:carb ratio to 1:5g starting at 6pm.      If you find that you are dropping low post correction, relax your correction factor to 1:25

## 2017-05-06 ENCOUNTER — OFFICE VISIT (OUTPATIENT)
Dept: URGENT CARE | Facility: URGENT CARE | Age: 22
End: 2017-05-06
Payer: COMMERCIAL

## 2017-05-06 VITALS
WEIGHT: 282.9 LBS | HEART RATE: 98 BPM | SYSTOLIC BLOOD PRESSURE: 128 MMHG | TEMPERATURE: 98.9 F | DIASTOLIC BLOOD PRESSURE: 88 MMHG | OXYGEN SATURATION: 100 % | BODY MASS INDEX: 43.01 KG/M2

## 2017-05-06 DIAGNOSIS — B37.31 VAGINAL CANDIDIASIS: ICD-10-CM
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
## 2017-05-06 DIAGNOSIS — B35.4 TINEA CORPORIS: ICD-10-CM

## 2017-05-06 PROCEDURE — 99213 OFFICE O/P EST LOW 20 MIN: CPT | Performed by: INTERNAL MEDICINE

## 2017-05-06 RX ORDER — KETOCONAZOLE 20 MG/G
CREAM TOPICAL DAILY
Qty: 15 G | Refills: 1 | Status: SHIPPED | OUTPATIENT
Start: 2017-05-06 | End: 2017-07-25

## 2017-05-06 RX ORDER — FLUCONAZOLE 150 MG/1
150 TABLET ORAL ONCE
Qty: 1 TABLET | Refills: 0 | Status: SHIPPED | OUTPATIENT
Start: 2017-05-06 | End: 2017-05-06

## 2017-05-06 RX ORDER — DOXYCYCLINE 100 MG/1
100 TABLET ORAL 2 TIMES DAILY
Qty: 28 TABLET | Refills: 0 | Status: SHIPPED | OUTPATIENT
Start: 2017-05-06 | End: 2017-05-20

## 2017-05-06 ASSESSMENT — ENCOUNTER SYMPTOMS
VOMITING: 0
FEVER: 0
ABDOMINAL PAIN: 0
DIARRHEA: 0
MYALGIAS: 1
CHILLS: 0
NAUSEA: 0

## 2017-05-06 NOTE — MR AVS SNAPSHOT
After Visit Summary   5/6/2017    Stacey La    MRN: [id number]           Patient Information     Date Of Birth          1995        Visit Information        Provider Department      5/6/2017 11:10 AM Devan Joseph MD [facility]        Today's Diagnoses     Tick bite, initial encounter    -  1    Tinea corporis        Vaginal candidiasis          Care Instructions    Follow up with your doctor if your symptoms persist/worsens, or if you develop new symptoms or side effects from the medication/s.          Follow-ups after your visit        Your next 10 appointments already scheduled     Jun 05, 2017  4:30 PM CDT   (Arrive by 4:00 PM)   RETURN DIABETES with Bia Allan MD   [facility] ([facility])    [address] [place] [id number] [phone]            Jul 25, 2017  4:00 PM CDT   Lab with  LAB   [facility] ([facility])    [address] [place] [id number] [phone]            Jul 25, 2017  4:50 PM CDT   (Arrive by 4:20 PM)   Return Visit with Connor Castaneda MD   [facility] ([facility])    [address] [place] [id number] [phone]              Who to contact     If you have questions or need follow up information about today's clinic visit or your schedule please contact [facility] directly at [phone].  Normal or non-critical lab and imaging results will be communicated to you by MyChart, letter or phone within 4 business days after the clinic has received the results. If you do not hear from us within 7 days, please contact the clinic through MyChart or phone. If you have a critical or abnormal lab result, we will notify you by phone as soon as possible.  Submit refill requests through [employer] or call your pharmacy and they  will forward the refill request to us. Please allow 3 business days for your refill to be completed.          Additional Information About Your Visit        [facility] Information     [employer] gives you secure access to your electronic health record. If you see a primary care provider, you can also send messages to your care team and make appointments. If you have questions, please call your primary care clinic.  If you do not have a primary care provider, please call [phone] and they will assist you.        Care EveryWhere ID     This is your Care EveryWhere ID. This could be used by other organizations to access your [facility] medical records  [id number]        Your Vitals Were     Pulse Temperature Pulse Oximetry BMI (Body Mass Index)          98 98.9  F (37.2  C) (Oral) 100% 43.01 kg/m2         Blood Pressure from Last 3 Encounters:   05/06/17 128/88   04/10/17 136/82   03/17/17 130/80    Weight from Last 3 Encounters:   05/06/17 282 lb 14.4 oz (128.3 kg)   04/10/17 290 lb (131.5 kg)   03/17/17 274 lb (124.3 kg)              Today, you had the following     No orders found for display         Today's Medication Changes          These changes are accurate as of: 5/6/17 11:40 AM.  If you have any questions, ask your nurse or doctor.               Start taking these medicines.        Dose/Directions    doxycycline Monohydrate 100 MG Tabs   Used for:  Tick bite, initial encounter   Started by:  Devan Joseph MD        Dose:  100 mg   Take 100 mg by mouth 2 times daily for 14 days   Quantity:  28 tablet   Refills:  0       fluconazole 150 MG tablet   Commonly known as:  DIFLUCAN   Used for:  Vaginal candidiasis   Started by:  Devan Joseph MD        Dose:  150 mg   Take 1 tablet (150 mg) by mouth once for 1 dose   Quantity:  1 tablet   Refills:  0       ketoconazole 2 % cream   Commonly known as:  NIZORAL   Used for:  Tinea corporis   Started by:  Devan Joseph  MD Charly        Apply topically daily   Quantity:  15 g   Refills:  1            Where to get your medicines      These medications were sent to [facility], [place] - [address]  [address] [address]     Phone:  [phone]     doxycycline Monohydrate 100 MG Tabs    fluconazole 150 MG tablet    ketoconazole 2 % cream                Primary Care Provider Office Phone # Fax #    LUIS FERNANDO [facility] [phone]       [facility] [address] [place] [address]        Thank you!     Thank you for choosing [facility]  for your care. Our goal is always to provide you with excellent care. Hearing back from our patients is one way we can continue to improve our services. Please take a few minutes to complete the written survey that you may receive in the mail after your visit with us. Thank you!             Your Updated Medication List - Protect others around you: Learn how to safely use, store and throw away your medicines at www.disposemymeds.org.          This list is accurate as of: 5/6/17 11:40 AM.  Always use your most recent med list.                   Brand Name Dispense Instructions for use    * albuterol 108 (90 BASE) MCG/ACT Inhaler    albuterol    1 each    Inhale 2 puffs into the lungs every 6 hours as needed       * albuterol (2.5 MG/3ML) 0.083% neb solution     1 Box    Take 1 vial (2.5 mg) by nebulization every 4 hours as needed for shortness of breath / dyspnea or wheezing /chest tightness/cough       blood glucose monitoring test strip    no brand specified    4 Box    Use to test blood sugar eight times daily or as directed.  Dispense Brandy Contour Next Test Strips.       Blood Pressure Monitoring Kit     1 kit    1 kit daily       Cholecalciferol 2000 UNITS Tbdp     90 tablet    Take 2,000 Units by mouth daily       doxycycline Monohydrate 100 MG Tabs     28 tablet    Take  100 mg by mouth 2 times daily for 14 days       enalapril 20 MG tablet    VASOTEC    180 tablet    TAKE ONE TABLET BY MOUTH TWICE DAILY       escitalopram 10 MG tablet    LEXAPRO    30 tablet    TAKE ONE TABLET (10MG) BY MOUTH DAILY       fluconazole 150 MG tablet    DIFLUCAN    1 tablet    Take 1 tablet (150 mg) by mouth once for 1 dose       glucagon 1 MG kit    GLUCAGON EMERGENCY    1 mg    Inject 1 mg into the muscle once for 1 dose       hydrOXYzine 25 MG capsule    VISTARIL    30 capsule    Take 1 capsule (25 mg) by mouth daily as needed for itching       insulin lispro 100 UNIT/ML injection    HumaLOG    12 mL    Use as directed up to 140 units daily in insulin pump.       insulin syringes (disposable) U-100 0.3 ML Misc     100 each    Use 2-3 times daily as needed       IRON SUPPLEMENT PO      Take 325 mg by mouth daily (with breakfast)       ketoconazole 2 % cream    NIZORAL    15 g    Apply topically daily       levothyroxine 50 MCG tablet    SYNTHROID/LEVOTHROID    90 tablet    Take 1 tablet (50 mcg) by mouth daily       prochlorperazine 10 MG tablet    COMPAZINE    10 tablet    Take 1 tablet (10 mg) by mouth every 6 hours as needed for nausea or vomiting       ZOFRAN PO      Take by mouth as needed for nausea or vomiting Reported on 3/17/2017       * Notice:  This list has 2 medication(s) that are the same as other medications prescribed for you. Read the directions carefully, and ask your doctor or other care provider to review them with you.

## 2017-05-06 NOTE — PATIENT INSTRUCTIONS
Follow up with your doctor if your symptoms persist/worsens, or if you develop new symptoms or side effects from the medication/s.

## 2017-05-06 NOTE — NURSING NOTE
"Chief Complaint   Patient presents with     Urgent Care     Insect Bites     pt went camping 2 wks ago, noticed red ring on left arm yest.       Initial /88 (BP Location: Right arm, Patient Position: Chair, Cuff Size: Adult Large)  Pulse 98  Temp 98.9  F (37.2  C) (Oral)  Wt 282 lb 14.4 oz (128.3 kg)  SpO2 100%  BMI 43.01 kg/m2 Estimated body mass index is 43.01 kg/(m^2) as calculated from the following:    Height as of 4/10/17: 5' 8\" (1.727 m).    Weight as of this encounter: 282 lb 14.4 oz (128.3 kg).  Medication Reconciliation: complete      Elizabeth Desai CMA                                5/6/2017 11:31 AM     "

## 2017-05-06 NOTE — PROGRESS NOTES
HPI Comments:   2 weeks ago, patient went camping and two days after returning from her trip she noticed a tick on her chin which she removed.  There was not redness or swelling at the bite site.  Patient was doing well until the past few days when she has been feeling more fatigued.  Also noticed a pruritic circular rash at the medial aspect of her upper arm -- this worried her, prompting today's consult.      Past Medical History:   Diagnosis Date     Asthma     Currently no treatment needed     Celiac disease      Chronic kidney disease, stage I 8/3/2015     Chronic sinusitis      Diabetes mellitus type 1 (H)      Diabetic nephropathy (H)      Family history of heart disease 8/3/2015     Hypertension      Hypothyroid      Pedal edema      Psoriasis        Review of Systems   Constitutional: Positive for malaise/fatigue. Negative for chills and fever.   Gastrointestinal: Negative for abdominal pain, diarrhea, nausea and vomiting.   Musculoskeletal: Positive for myalgias.       /88 (BP Location: Right arm, Patient Position: Chair, Cuff Size: Adult Large)  Pulse 98  Temp 98.9  F (37.2  C) (Oral)  Wt 282 lb 14.4 oz (128.3 kg)  SpO2 100%  BMI 43.01 kg/m2      Physical Exam   Constitutional: She is oriented to person, place, and time. No distress.   Pulmonary/Chest: Effort normal. No respiratory distress.   Lymphadenopathy:     She has no cervical adenopathy.   Neurological: She is alert and oriented to person, place, and time. GCS score is 15.   Skin:   Circular scaly rash with raised erythematous border   Vitals reviewed.        ICD-10-CM    1. Tick bite, initial encounter W57.XXXA doxycycline Monohydrate 100 MG TABS   2. Tinea corporis B35.4 ketoconazole (NIZORAL) 2 % cream   3. Vaginal candidiasis B37.3 fluconazole (DIFLUCAN) 150 MG tablet   *patient states that she has a tendency towards developing vaginal yeast infection whenever she takes antibiotics      Patient Instructions   Follow up with your  doctor if your symptoms persist/worsens, or if you develop new symptoms or side effects from the medication/s.

## 2017-06-05 ENCOUNTER — OFFICE VISIT (OUTPATIENT)
Dept: ENDOCRINOLOGY | Facility: CLINIC | Age: 22
End: 2017-06-05

## 2017-06-05 VITALS
HEART RATE: 51 BPM | BODY MASS INDEX: 43.19 KG/M2 | DIASTOLIC BLOOD PRESSURE: 92 MMHG | HEIGHT: 68 IN | SYSTOLIC BLOOD PRESSURE: 135 MMHG | WEIGHT: 285 LBS

## 2017-06-05 DIAGNOSIS — E10.21 TYPE 1 DIABETES MELLITUS WITH DIABETIC NEPHROPATHY (H): Primary | ICD-10-CM

## 2017-06-05 ASSESSMENT — PAIN SCALES - GENERAL: PAINLEVEL: NO PAIN (0)

## 2017-06-05 NOTE — PROGRESS NOTES
Reason for visit/consult: DM type 1    Primary care provider: Nancy Mejia    HPI:  This is a 21 year old female with PMH of hyperlipidemia, hypothyroid history of type 1 DM diagnosed when she was 7 years old and had a lot of issues with stomach aches, fatigue and increased urination off and on with parents request and insistence had further testing done to show elevation in glucose, initially though to be type 2 but after meeting with physicians assistance had antibodies checked which were positive for type 1 DM. She was last seen in the clinic on 04/10/2017 she had recently decided to return to wearing an insulin pump. Most recent A1C 7.6 in 04/10/2017. The pt is currently on a regimen of humalog pump as described below. Pt brought glucometer which unfortunately due to it error was unable to load and will send in data at a later time to determine average readings. The pt has 1-2 episodes of hypoglycemia per week that she doesn't feel unless it falls below 50 and feels jittery with shakiness, dizziness, hunger and tongue numbing, otherwise she doesn't feel the episodes. The pt admits to never smoking. Activities include in the summer she goes hiking several times a week for at least an hour, in the winter she has a gym membership, she tries to go on walks and snowshoe. She denies any nausea, vomiting, diarrhea, constipation, abdominal pain, swelling, high BP home readings, tremor, racing heart. She has otherwise been well since her last appointment and has decided to hold off on pregnancy until she has tighter control of her HGA1C and has met with a Maternal Fetal Medicine specialist.     Lifestyle:  Wake up: she wakes up about 8am and weekends about 10am  Breakfast: protein shake or yogurt or skips, she [place] 100% of her food  Lunch: salad with fruit or leftovers, %  Snack: occasional popcorn, doesn't snack  Dinner: spaghetti squash, salmon, occasional pasta, lots of veggies 100%    Pump basal  insulin   8am-6pm basal 1.96 Units/hr  6pm-midnight 2 units/hr  Midnight-3am 2.55 units/hr  3am-7am 2.3 units/hr    Pending glucometer reading.     Complications of DM:  Last Eye exam showed Mild nonproliferative diabetic retinopathy without macular edema associated with type 1 diabetes mellitus 3/21/17 with Dr. Reyes  Patient follows with nephrology for mild diabetic nephropathy and has been told she has CKD stage 1. Her baseline Creatinine is ~-/6 mg/dL, wGFR > 90, and very minimal proteinuria.   There is no symptoms of Diabetic neuropathy, normal monofilament exam 06/05/2017    Past Medical/Surgical History:  Past Medical History:   Diagnosis Date     Asthma     Currently no treatment needed     Celiac disease      Chronic kidney disease, stage I 8/3/2015     Chronic sinusitis      Diabetes mellitus type 1 (H)      Diabetic nephropathy (H)      Family history of heart disease 8/3/2015     Hypertension      Hypothyroid      Pedal edema      Psoriasis      Past Surgical History:   Procedure Laterality Date     ENT SURGERY       TONSILLECTOMY, ADENOIDECTOMY, COMBINED         Allergies:  Allergies   Allergen Reactions     Dogs Other (See Comments)     Sinus symptoms      Dust Mites      Sinus      Gluten Meal        Current Medications   Current Outpatient Prescriptions   Medication     ketoconazole (NIZORAL) 2 % cream     insulin lispro (HUMALOG) 100 UNIT/ML injection     enalapril (VASOTEC) 20 MG tablet     albuterol (2.5 MG/3ML) 0.083% neb solution     hydrOXYzine (VISTARIL) 25 MG capsule     Blood Pressure Monitoring KIT     levothyroxine (SYNTHROID, LEVOTHROID) 50 MCG tablet     Ferrous Sulfate (IRON SUPPLEMENT PO)     escitalopram (LEXAPRO) 10 MG tablet     Ondansetron HCl (ZOFRAN PO)     albuterol (ALBUTEROL) 108 (90 BASE) MCG/ACT inhaler     Cholecalciferol 2000 UNITS TBDP     prochlorperazine (COMPAZINE) 10 MG tablet     blood glucose test strip     insulin syringes, disposable, U-100 0.3 ML MISC      "glucagon (GLUCAGON EMERGENCY) 1 MG kit     No current facility-administered medications for this visit.        Family History:  Family History   Problem Relation Age of Onset     Cardiovascular Father 48     MI, stents, diabetic, type 2     GASTROINTESTINAL DISEASE Mother      Celiacs     Cardiovascular Maternal Grandfather      Englarged heart, pacemaker, defib       Social History:  Social History   Substance Use Topics     Smoking status: Never Smoker     Smokeless tobacco: Never Used     Alcohol use 0.0 oz/week     0 Standard drinks or equivalent per week      Comment: 2 times per month, 2 drinks per time       ROS:  10 point ROS was negative except as mentioned in HPI    Exam  Blood pressure (!) 135/92, pulse 51, height 1.727 m (5' 8\"), weight 129.3 kg (285 lb).  Gen: Pleasant and conversation, has newly dyed pink with blue hair, is well appearing, nad.  HEENT: anicteric, EOMI, no proptosis or lid lag, no goiter or LAD  Cardio: RRR, no m/r/g  Resp: CTAB   Ab: soft, NT/ND  Ext: no swelling or edema   Feet: no deformities or ulcers, 2+ DP pulses, normal monofilament sensation  Neuro: relaxation phase of reflexes normal, no tremor on outstretched arms    Labs/Imaging  Lab Results   Component Value Date    A1C 7.6 03/26/2015     Last Basic Metabolic Panel:  Lab Results   Component Value Date     01/24/2017      Lab Results   Component Value Date    POTASSIUM 4.2 01/24/2017     Lab Results   Component Value Date    CHLORIDE 103 01/24/2017     Lab Results   Component Value Date    FRANKLIN 9.3 01/24/2017     Lab Results   Component Value Date    CO2 26 01/24/2017     Lab Results   Component Value Date    BUN 7 01/24/2017     Lab Results   Component Value Date    CR 0.55 01/24/2017     Lab Results   Component Value Date     01/24/2017     Lab Results   Component Value Date    WBC 12.6 02/20/2016     Lab Results   Component Value Date    RBC 4.84 02/20/2016     Lab Results   Component Value Date    HGB 14.2 " 01/24/2017     Lab Results   Component Value Date    HCT 41.4 02/20/2016     Lab Results   Component Value Date    MCV 86 02/20/2016     Lab Results   Component Value Date    RDW 13.8 02/20/2016     Lab Results   Component Value Date     02/20/2016           Assessment and Plan  1-DM Type 1, HGA1c goal < 7, todays value 7.0.  We discussed ongoing management of diabetes, with studies not having indicated a real clear number for HGA1c in glycemic control during pregnancy and that her HGA1C number of 7.0 today was good and any further tight control she gains, is in her favor.     Continue with T-slim insulin pump with continuous glucose monitoring    Send us glucometer log    Will wait for glucometer readings prior to making any adjustments in treatment    Continue with annual eye exams, and to follow with nephrology as needed.     Diabetes monitoring and goals  HTN: BP today 145/ 94 135/92 on recheck, well-controlled on 20 mg enalapril, Pt has had extensive workup showing high in office blood pressure related to white-coat hypertension.   Nephropathy: will continue to monitor any changes. Stable bsl ~Cr 0.6, last microalbunuria lab was normal.   Retinopathy: Mild nonproliferative diabetic retinopathy without macular edema, follow with annual exams  Neuropathy none  HLP none  Tobacco use none    2-History of Hypothyroid, TSH 2.89 (10/10/16)  Patient previous on levothyroxine. With history of hypothyroid we will need to work with her to ensure she is euthyroid for when she is planning on conception. She is currently using condoms for contraception, and is waiting until her HGA1C is better controled for some time prior to her attempt to conceive. She would like to know what her body is capable of doing, and thus would like to attempt pregnancy and knows she will need to be closely monitored along side Maternal Fetal Medicine, and obstetrics.     Continue levothyroxine 50 mcg daily    Follow with annual  labs    Encouraged her to see maternal fetal medicine for preconception visit and to inform us of how this goes.     RTC in 3 months with Laureen Goldstein  RTC in 6 months with Dr. Allan    Scribed by Nyasia Morel, MS4 for Dr. Allan

## 2017-06-05 NOTE — PROGRESS NOTES
Stacey was seen and examined by me with medical student Nyasia Morel, who served as scribe.  Please see student's note of the same date for full details.    Stacey has no concerns today other than improving control.  Couldn't download pump, meter or sensor today.      Current Outpatient Prescriptions on File Prior to Visit:  ketoconazole (NIZORAL) 2 % cream Apply topically daily   insulin lispro (HUMALOG) 100 UNIT/ML injection Use as directed up to 140 units daily in insulin pump.   enalapril (VASOTEC) 20 MG tablet TAKE ONE TABLET BY MOUTH TWICE DAILY   albuterol (2.5 MG/3ML) 0.083% neb solution Take 1 vial (2.5 mg) by nebulization every 4 hours as needed for shortness of breath / dyspnea or wheezing /chest tightness/cough   hydrOXYzine (VISTARIL) 25 MG capsule Take 1 capsule (25 mg) by mouth daily as needed for itching   Blood Pressure Monitoring KIT 1 kit daily   levothyroxine (SYNTHROID, LEVOTHROID) 50 MCG tablet Take 1 tablet (50 mcg) by mouth daily   Ferrous Sulfate (IRON SUPPLEMENT PO) Take 325 mg by mouth daily (with breakfast)   escitalopram (LEXAPRO) 10 MG tablet TAKE ONE TABLET (10MG) BY MOUTH DAILY   Ondansetron HCl (ZOFRAN PO) Take by mouth as needed for nausea or vomiting Reported on 3/17/2017   albuterol (ALBUTEROL) 108 (90 BASE) MCG/ACT inhaler Inhale 2 puffs into the lungs every 6 hours as needed   Cholecalciferol 2000 UNITS TBDP Take 2,000 Units by mouth daily   prochlorperazine (COMPAZINE) 10 MG tablet Take 1 tablet (10 mg) by mouth every 6 hours as needed for nausea or vomiting   blood glucose test strip Use to test blood sugar eight times daily or as directed.  Dispense Brandy Contour Next Test Strips.   insulin syringes, disposable, U-100 0.3 ML MISC Use 2-3 times daily as needed   glucagon (GLUCAGON EMERGENCY) 1 MG kit Inject 1 mg into the muscle once for 1 dose     No current facility-administered medications on file prior to visit.     ROS: 10 point ROS neg other than the symptoms noted above  "in the HPI.    So Hx - Taking summer off.  No cigs    Vital signs:   BP (!) 135/92  Pulse 51  Ht 1.727 m (5' 8\")  Wt 129.3 kg (285 lb)  BMI 43.33 kg/m2  Estimated body mass index is 43.33 kg/(m^2) as calculated from the following:    Height as of this encounter: 1.727 m (5' 8\").    Weight as of this encounter: 129.3 kg (285 lb).    NAD  Eyes - no periorbital edema, conjunctival injection, scleral icterus  CV -   No edema  Feet - no ulcers       Recent Labs   Lab Test 04/10/17  01/24/17   1533  01/24/17   1525  10/10/16   1643 10/03/16  07/26/16   1137  07/26/16   1135   01/16/16   0905  08/04/15   1443   03/26/15   0645   12/09/13   1137   A1C   --    --    --    --    --    --    --    --    --    --    --   7.6*   --    --    HEMOGLOBINA1  7.6*   --    --    --   6.9*   --    --    < >   --    --    --    --    < >   --    TSH   --    --    --   2.89   --    --    --    --   3.33   --    --    --    --   2.95   T4   --    --    --    --    --    --    --    --    --    --    --    --    --   1.18   LDL   --    --    --    --    --    --    --    --    --   102   --    --    --   142*   HDL   --    --    --    --    --    --    --    --    --   66   --    --    --   66   TRIG   --    --    --    --    --    --    --    --    --   131   --    --    --   165*   CR   --    --   0.55   --    --   0.60   --    < >  0.56   --    < >  0.54   < >  0.48*   MICROL   --   <5   --    --    --    --   <5   < >   --    --    < >   --    < >   --     < > = values in this interval not displayed.       A1c today 7.0    Assessment and plan:    1.  Diabetes control. A1c is better but cannot make suggestions for additional improvement with out her cgm/pump, data. She will upload to web at home and send to me      2.  Diabetes complications.  May have early renal disease.  Feet and eyes are fine.    F/u with Laureen Goldstein in 3 mo and me in 6 months      I spent 15 minutes with this patient face to face and explained the conditions " and plans (more than 50% of time was counseling/coordination of diabetes care) . The patient understood and is satisfied with today's visit.     Bia Allan MD

## 2017-06-05 NOTE — MR AVS SNAPSHOT
After Visit Summary   6/5/2017    Stacey La    MRN: [id number]           Patient Information     Date Of Birth          1995        Visit Information        Provider Department      6/5/2017 4:30 PM Bia Allan MD [facility] Endocrinology        Today's Diagnoses     Type 1 diabetes mellitus with diabetic nephropathy (H)    -  1       Follow-ups after your visit        Follow-up notes from your care team     Return for f/u 3 mo with Laureen Goldstein and 6 mo with me.      Your next 10 appointments already scheduled     Jul 25, 2017  4:00 PM CDT   Lab with  LAB   [facility] ([facility] [place])    [address] [place] [address]  [address] [facility] [address] [phone]            Jul 25, 2017  4:50 PM CDT   (Arrive by 4:20 PM)   Return Visit with Connor Castaneda MD   [facility]phrology ([facility] [place])    [address] [place] [address] [phone]              Who to contact     Please call your clinic at [phone] to:    Ask questions about your health    Make or cancel appointments    Discuss your medicines    Learn about your test results    Speak to your doctor   If you have compliments or concerns about an experience at your clinic, or if you wish to file a complaint, please contact [facility] Patient Relations at [phone] or email us at [email or web address]         Additional Information About Your Visit        MyChart Information     [employer] gives you secure access to your electronic health record. If you see a primary care provider, you can also send messages to your care team and make appointments. If you have questions, please call your primary care clinic.  If you do not have a primary care provider, please call [phone] and they will assist you.      [email or web address] is an electronic gateway that provides easy, online access to your medical records.  "With MyChart, you can request a clinic appointment, read your test results, renew a prescription or communicate with your care team.     To access your existing account, please contact your [facility] or call [phone] for assistance.        Care EveryWhere ID     This is your Care EveryWhere ID. This could be used by other organizations to access your [place] medical records  [id number]        Your Vitals Were     Pulse Height BMI (Body Mass Index)             51 1.727 m (5' 8\") 43.33 kg/m2          Blood Pressure from Last 3 Encounters:   06/05/17 (!) 135/92   05/06/17 128/88   04/10/17 136/82    Weight from Last 3 Encounters:   06/05/17 129.3 kg (285 lb)   05/06/17 128.3 kg (282 lb 14.4 oz)   04/10/17 131.5 kg (290 lb)              Today, you had the following     No orders found for display       Primary Care Provider Office Phone # Fax #    LUIS FERNANDO [facility] [phone]       [facility] [address] [place] [address]        Thank you!     Thank you for choosing [facility]  for your care. Our goal is always to provide you with excellent care. Hearing back from our patients is one way we can continue to improve our services. Please take a few minutes to complete the written survey that you may receive in the mail after your visit with us. Thank you!             Your Updated Medication List - Protect others around you: Learn how to safely use, store and throw away your medicines at www.disposemymeds.org.          This list is accurate as of: 6/5/17  5:59 PM.  Always use your most recent med list.                   Brand Name Dispense Instructions for use    * albuterol 108 (90 BASE) MCG/ACT Inhaler    albuterol    1 each    Inhale 2 puffs into the lungs every 6 hours as needed       * albuterol (2.5 MG/3ML) 0.083% neb solution     1 Box    Take 1 vial (2.5 mg) by nebulization every 4 hours as needed for shortness " of breath / dyspnea or wheezing /chest tightness/cough       blood glucose monitoring test strip    no brand specified    4 Box    Use to test blood sugar eight times daily or as directed.  Dispense Brandy Contour Next Test Strips.       Blood Pressure Monitoring Kit     1 kit    1 kit daily       Cholecalciferol 2000 UNITS Tbdp     90 tablet    Take 2,000 Units by mouth daily       enalapril 20 MG tablet    VASOTEC    180 tablet    TAKE ONE TABLET BY MOUTH TWICE DAILY       escitalopram 10 MG tablet    LEXAPRO    30 tablet    TAKE ONE TABLET (10MG) BY MOUTH DAILY       glucagon 1 MG kit    GLUCAGON EMERGENCY    1 mg    Inject 1 mg into the muscle once for 1 dose       hydrOXYzine 25 MG capsule    VISTARIL    30 capsule    Take 1 capsule (25 mg) by mouth daily as needed for itching       insulin lispro 100 UNIT/ML injection    HumaLOG    12 mL    Use as directed up to 140 units daily in insulin pump.       insulin syringes (disposable) U-100 0.3 ML Misc     100 each    Use 2-3 times daily as needed       IRON SUPPLEMENT PO      Take 325 mg by mouth daily (with breakfast)       ketoconazole 2 % cream    NIZORAL    15 g    Apply topically daily       levothyroxine 50 MCG tablet    SYNTHROID/LEVOTHROID    90 tablet    Take 1 tablet (50 mcg) by mouth daily       prochlorperazine 10 MG tablet    COMPAZINE    10 tablet    Take 1 tablet (10 mg) by mouth every 6 hours as needed for nausea or vomiting       ZOFRAN PO      Take by mouth as needed for nausea or vomiting Reported on 3/17/2017       * Notice:  This list has 2 medication(s) that are the same as other medications prescribed for you. Read the directions carefully, and ask your doctor or other care provider to review them with you.

## 2017-06-05 NOTE — LETTER
6/5/2017       RE: Stacey La  [address]  [place] [address]     Dear Colleague,    Thank you for referring your patient, Stacey La, to the [facility] at [facility]. Please see a copy of my visit note below.    Reason for visit/consult: DM type 1    Primary care provider: Nancy Mejia    HPI:  This is a 21 year old female with PMH of hyperlipidemia, hypothyroid history of type 1 DM diagnosed when she was 7 years old and had a lot of issues with stomach aches, fatigue and increased urination off and on with parents request and insistence had further testing done to show elevation in glucose, initially though to be type 2 but after meeting with physicians assistance had antibodies checked which were positive for type 1 DM. She was last seen in the clinic on 04/10/2017 she had recently decided to return to wearing an insulin pump. Most recent A1C 7.6 in 04/10/2017. The pt is currently on a regimen of humalog pump as described below. Pt brought glucometer which unfortunately due to it error was unable to load and will send in data at a later time to determine average readings. The pt has 1-2 episodes of hypoglycemia per week that she doesn't feel unless it falls below 50 and feels jittery with shakiness, dizziness, hunger and tongue numbing, otherwise she doesn't feel the episodes. The pt admits to never smoking. Activities include in the summer she goes hiking several times a week for at least an hour, in the winter she has a gym membership, she tries to go on walks and snowshoe. She denies any nausea, vomiting, diarrhea, constipation, abdominal pain, swelling, high BP home readings, tremor, racing heart. She has otherwise been well since her last appointment and has decided to hold off on pregnancy until she has tighter control of her HGA1C and has met with a Maternal Fetal Medicine specialist.     Lifestyle:  Wake up: she wakes up  about 8am and weekends about 10am  Breakfast: protein shake or yogurt or skips, she east 100% of her food  Lunch: salad with fruit or leftovers, %  Snack: occasional popcorn, doesn't snack  Dinner: spaghetti squash, salmon, occasional pasta, lots of veggies 100%    Pump basal insulin   8am-6pm basal 1.96 Units/hr  6pm-midnight 2 units/hr  Midnight-3am 2.55 units/hr  3am-7am 2.3 units/hr    Pending glucometer reading.     Complications of DM:  Last Eye exam showed Mild nonproliferative diabetic retinopathy without macular edema associated with type 1 diabetes mellitus 3/21/17 with Dr. Reyes  Patient follows with nephrology for mild diabetic nephropathy and has been told she has CKD stage 1. Her baseline Creatinine is ~-/6 mg/dL, wGFR > 90, and very minimal proteinuria.   There is no symptoms of Diabetic neuropathy, normal monofilament exam 06/05/2017    Past Medical/Surgical History:  Past Medical History:   Diagnosis Date     Asthma     Currently no treatment needed     Celiac disease      Chronic kidney disease, stage I 8/3/2015     Chronic sinusitis      Diabetes mellitus type 1 (H)      Diabetic nephropathy (H)      Family history of heart disease 8/3/2015     Hypertension      Hypothyroid      Pedal edema      Psoriasis      Past Surgical History:   Procedure Laterality Date     ENT SURGERY       TONSILLECTOMY, ADENOIDECTOMY, COMBINED         Allergies:  Allergies   Allergen Reactions     Dogs Other (See Comments)     Sinus symptoms      Dust Mites      Sinus      Gluten Meal        Current Medications   Current Outpatient Prescriptions   Medication     ketoconazole (NIZORAL) 2 % cream     insulin lispro (HUMALOG) 100 UNIT/ML injection     enalapril (VASOTEC) 20 MG tablet     albuterol (2.5 MG/3ML) 0.083% neb solution     hydrOXYzine (VISTARIL) 25 MG capsule     Blood Pressure Monitoring KIT     levothyroxine (SYNTHROID, LEVOTHROID) 50 MCG tablet     Ferrous Sulfate (IRON SUPPLEMENT PO)     escitalopram  "(LEXAPRO) 10 MG tablet     Ondansetron HCl (ZOFRAN PO)     albuterol (ALBUTEROL) 108 (90 BASE) MCG/ACT inhaler     Cholecalciferol 2000 UNITS TBDP     prochlorperazine (COMPAZINE) 10 MG tablet     blood glucose test strip     insulin syringes, disposable, U-100 0.3 ML MISC     glucagon (GLUCAGON EMERGENCY) 1 MG kit     No current facility-administered medications for this visit.        Family History:  Family History   Problem Relation Age of Onset     Cardiovascular Father 48     MI, stents, diabetic, type 2     GASTROINTESTINAL DISEASE Mother      Celiacs     Cardiovascular Maternal Grandfather      Englarged heart, pacemaker, defib       Social History:  Social History   Substance Use Topics     Smoking status: Never Smoker     Smokeless tobacco: Never Used     Alcohol use 0.0 oz/week     0 Standard drinks or equivalent per week      Comment: 2 times per month, 2 drinks per time       ROS:  10 point ROS was negative except as mentioned in HPI    Exam  Blood pressure (!) 135/92, pulse 51, height 1.727 m (5' 8\"), weight 129.3 kg (285 lb).  Gen: Pleasant and conversation, has newly dyed pink with blue hair, is well appearing, nad.  HEENT: anicteric, EOMI, no proptosis or lid lag, no goiter or LAD  Cardio: RRR, no m/r/g  Resp: CTAB   Ab: soft, NT/ND  Ext: no swelling or edema   Feet: no deformities or ulcers, 2+ DP pulses, normal monofilament sensation  Neuro: relaxation phase of reflexes normal, no tremor on outstretched arms    Labs/Imaging  Lab Results   Component Value Date    A1C 7.6 03/26/2015     Last Basic Metabolic Panel:  Lab Results   Component Value Date     01/24/2017      Lab Results   Component Value Date    POTASSIUM 4.2 01/24/2017     Lab Results   Component Value Date    CHLORIDE 103 01/24/2017     Lab Results   Component Value Date    FRANKLIN 9.3 01/24/2017     Lab Results   Component Value Date    CO2 26 01/24/2017     Lab Results   Component Value Date    BUN 7 01/24/2017     Lab Results "   Component Value Date    CR 0.55 01/24/2017     Lab Results   Component Value Date     01/24/2017     Lab Results   Component Value Date    WBC 12.6 02/20/2016     Lab Results   Component Value Date    RBC 4.84 02/20/2016     Lab Results   Component Value Date    HGB 14.2 01/24/2017     Lab Results   Component Value Date    HCT 41.4 02/20/2016     Lab Results   Component Value Date    MCV 86 02/20/2016     Lab Results   Component Value Date    RDW 13.8 02/20/2016     Lab Results   Component Value Date     02/20/2016           Assessment and Plan  1-DM Type 1, HGA1c goal < 7, todays value 7.0.  We discussed ongoing management of diabetes, with studies not having indicated a real clear number for HGA1c in glycemic control during pregnancy and that her HGA1C number of 7.0 today was good and any further tight control she gains, is in her favor.     Continue with T-slim insulin pump with continuous glucose monitoring    Send us glucometer log    Will wait for glucometer readings prior to making any adjustments in treatment    Continue with annual eye exams, and to follow with nephrology as needed.     Diabetes monitoring and goals  HTN: BP today 145/ 94 135/92 on recheck, well-controlled on 20 mg enalapril, Pt has had extensive workup showing high in office blood pressure related to white-coat hypertension.   Nephropathy: will continue to monitor any changes. Stable bsl ~Cr 0.6, last microalbunuria lab was normal.   Retinopathy: Mild nonproliferative diabetic retinopathy without macular edema, follow with annual exams  Neuropathy none  HLP none  Tobacco use none    2-History of Hypothyroid, TSH 2.89 (10/10/16)  Patient previous on levothyroxine. With history of hypothyroid we will need to work with her to ensure she is euthyroid for when she is planning on conception. She is currently using condoms for contraception, and is waiting until her HGA1C is better controled for some time prior to her attempt to  conceive. She would like to know what her body is capable of doing, and thus would like to attempt pregnancy and knows she will need to be closely monitored along side Maternal Fetal Medicine, and obstetrics.     Continue levothyroxine 50 mcg daily    Follow with annual labs    Encouraged her to see maternal fetal medicine for preconception visit and to inform us of how this goes.     RTC in 3 months with Laureen Goldstein  RTC in 6 months with Dr. Allan    Scribed by Nyasia Morel, MS4 for Dr. Allan      Stacey was seen and examined by me with medical student Nyasia Morel, who served as scribe.  Please see student's note of the same date for full details.    Stacey has no concerns today other than improving control.  Couldn't download pump, meter or sensor today.      Current Outpatient Prescriptions on File Prior to Visit:  ketoconazole (NIZORAL) 2 % cream Apply topically daily   insulin lispro (HUMALOG) 100 UNIT/ML injection Use as directed up to 140 units daily in insulin pump.   enalapril (VASOTEC) 20 MG tablet TAKE ONE TABLET BY MOUTH TWICE DAILY   albuterol (2.5 MG/3ML) 0.083% neb solution Take 1 vial (2.5 mg) by nebulization every 4 hours as needed for shortness of breath / dyspnea or wheezing /chest tightness/cough   hydrOXYzine (VISTARIL) 25 MG capsule Take 1 capsule (25 mg) by mouth daily as needed for itching   Blood Pressure Monitoring KIT 1 kit daily   levothyroxine (SYNTHROID, LEVOTHROID) 50 MCG tablet Take 1 tablet (50 mcg) by mouth daily   Ferrous Sulfate (IRON SUPPLEMENT PO) Take 325 mg by mouth daily (with breakfast)   escitalopram (LEXAPRO) 10 MG tablet TAKE ONE TABLET (10MG) BY MOUTH DAILY   Ondansetron HCl (ZOFRAN PO) Take by mouth as needed for nausea or vomiting Reported on 3/17/2017   albuterol (ALBUTEROL) 108 (90 BASE) MCG/ACT inhaler Inhale 2 puffs into the lungs every 6 hours as needed   Cholecalciferol 2000 UNITS TBDP Take 2,000 Units by mouth daily   prochlorperazine (COMPAZINE) 10 MG  "tablet Take 1 tablet (10 mg) by mouth every 6 hours as needed for nausea or vomiting   blood glucose test strip Use to test blood sugar eight times daily or as directed.  Dispense Brandy Contour Next Test Strips.   insulin syringes, disposable, U-100 0.3 ML MISC Use 2-3 times daily as needed   glucagon (GLUCAGON EMERGENCY) 1 MG kit Inject 1 mg into the muscle once for 1 dose     No current facility-administered medications on file prior to visit.     ROS: 10 point ROS neg other than the symptoms noted above in the HPI.    So Hx - Taking summer off.  No cigs    Vital signs:   BP (!) 135/92  Pulse 51  Ht 1.727 m (5' 8\")  Wt 129.3 kg (285 lb)  BMI 43.33 kg/m2  Estimated body mass index is 43.33 kg/(m^2) as calculated from the following:    Height as of this encounter: 1.727 m (5' 8\").    Weight as of this encounter: 129.3 kg (285 lb).    NAD  Eyes - no periorbital edema, conjunctival injection, scleral icterus  CV -   No edema  Feet - no ulcers       Recent Labs   Lab Test 04/10/17  01/24/17   1533  01/24/17   1525  10/10/16   1643 10/03/16  07/26/16   1137  07/26/16   1135   01/16/16   0905  08/04/15   1443   03/26/15   0645   12/09/13   1137   A1C   --    --    --    --    --    --    --    --    --    --    --   7.6*   --    --    HEMOGLOBINA1  7.6*   --    --    --   6.9*   --    --    < >   --    --    --    --    < >   --    TSH   --    --    --   2.89   --    --    --    --   3.33   --    --    --    --   2.95   T4   --    --    --    --    --    --    --    --    --    --    --    --    --   1.18   LDL   --    --    --    --    --    --    --    --    --   102   --    --    --   142*   HDL   --    --    --    --    --    --    --    --    --   66   --    --    --   66   TRIG   --    --    --    --    --    --    --    --    --   131   --    --    --   165*   CR   --    --   0.55   --    --   0.60   --    < >  0.56   --    < >  0.54   < >  0.48*   MICROL   --   <5   --    --    --    --   <5   < >   --    --  "   < >   --    < >   --     < > = values in this interval not displayed.       A1c today 7.0    Assessment and plan:    1.  Diabetes control. A1c is better but cannot make suggestions for additional improvement with out her cgm/pump, data. She will upload to web at home and send to me      2.  Diabetes complications.  May have early renal disease.  Feet and eyes are fine.    F/u with Laureen Goldstein in 3 mo and me in 6 months      I spent 15 minutes with this patient face to face and explained the conditions and plans (more than 50% of time was counseling/coordination of diabetes care) . The patient understood and is satisfied with today's visit.     Bia Allan MD

## 2017-06-22 ENCOUNTER — TELEPHONE (OUTPATIENT)
Dept: FAMILY MEDICINE | Facility: CLINIC | Age: 22
End: 2017-06-22

## 2017-06-22 NOTE — TELEPHONE ENCOUNTER
Panel Management Review        Composite cancer screening  Chart review shows that this patient is due/due soon for the following None  Summary:    Patient is due/failing the following:   Chlamydia screening    Action needed:   Patient needs non-fasting lab only appointment    Type of outreach:    Sent letter.    Questions for provider review:    None                                                                                                                                    WAGNER Finley       Chart routed to Care Team .

## 2017-06-22 NOTE — LETTER
[facility]  [address] [place], [address]  [phone]        2017      Stacey La  [address]  [place] [address]        Dear Stacey,      I would like you to come in for a Chlamydia screen. This is important if you have ever been sexually active. Chlamydia is the most common sexually transmitted infection and because it is often without symptoms, the infection can go untreated. Thankfully, testing and treatment is easy. Chlamydia is diagnosed through a simple urine test (if you have not urinated in the last hour) or vaginal swab. The CDC and the [place] [facility] recommend all women who are sexually active and under the age of 26 years old be screened for Chlamydia.    *Three out of four women with Chlamydia have no Chlamydia symptoms.   *Half of men with Chlamydia have no Chlamydia symptoms.     Chlamydia is spread by:  *Vaginal sex    *Oral sex   *Anal sex       *Infected mother to     If left untreated, Chlamydia can:  *Spread to sex partners     *Lead to ectopic (tubal) pregnancy   *Lead to pelvic inflammatory disease    *Lead to infertility in men and women   *Make it easier to transmit or acquire HIV during sex    *Can be passed to  during childbirth and cause serious eye infection or pneumonia   *Can lead to premature delivery and low birth weight    NOTE: A person can be re-infected after treatment if re-exposed    Please make an appointment to see me so we can do this screening test and ensure that your health is protected. For an appointment call:  [facility] [phone]    Sincerely,    [facility]

## 2017-07-21 ENCOUNTER — TELEPHONE (OUTPATIENT)
Dept: FAMILY MEDICINE | Facility: CLINIC | Age: 22
End: 2017-07-21

## 2017-07-21 DIAGNOSIS — N18.1 CHRONIC KIDNEY DISEASE, STAGE I: Primary | ICD-10-CM

## 2017-07-21 DIAGNOSIS — E11.21 DIABETIC NEPHROPATHY (H): ICD-10-CM

## 2017-07-21 NOTE — TELEPHONE ENCOUNTER
Panel Management Review      Patient has the following on her problem list:     Diabetes    ASA: Not Required     Last A1C  Lab Results   Component Value Date    A1C 7.6 03/26/2015     A1C tested: Passed    Last LDL:    Lab Results   Component Value Date    CHOL 194 08/04/2015     Lab Results   Component Value Date    HDL 66 08/04/2015     Lab Results   Component Value Date     08/04/2015     Lab Results   Component Value Date    TRIG 131 08/04/2015     Lab Results   Component Value Date    CHOLHDLRATIO 2.9 08/04/2015     No results found for: NHDL    Is the patient on a Statin? NO             Is the patient on Aspirin? NO        Last three blood pressure readings:  BP Readings from Last 3 Encounters:   06/05/17 (!) 135/92   05/06/17 128/88   04/10/17 136/82       Date of last diabetes office visit: 6/5/17     Tobacco History:     History   Smoking Status     Never Smoker   Smokeless Tobacco     Never Used             Composite cancer screening  Chart review shows that this patient is due/due soon for the following None  Summary:    Patient is due/failing the following:   BP CHECK    Action needed:   Patient needs nurse only BP check    Type of outreach:    Please call patient and have her schedule nurse only.     Questions for provider review:    None                                                                        Henry Meza, PAC

## 2017-07-21 NOTE — NURSING NOTE
Labs per clinic 2A protocol.  follow up/CKD1  Last OV: 1/24/17  Eileen Cabral LPN  Nephrology  [facility]  [phone]

## 2017-07-25 ENCOUNTER — OFFICE VISIT (OUTPATIENT)
Dept: NEPHROLOGY | Facility: CLINIC | Age: 22
End: 2017-07-25
Attending: INTERNAL MEDICINE
Payer: COMMERCIAL

## 2017-07-25 VITALS
DIASTOLIC BLOOD PRESSURE: 80 MMHG | SYSTOLIC BLOOD PRESSURE: 127 MMHG | HEART RATE: 93 BPM | HEIGHT: 68 IN | TEMPERATURE: 98.4 F | WEIGHT: 287 LBS | BODY MASS INDEX: 43.5 KG/M2

## 2017-07-25 DIAGNOSIS — E11.21 DIABETIC NEPHROPATHY (H): ICD-10-CM

## 2017-07-25 DIAGNOSIS — N18.1 CHRONIC KIDNEY DISEASE, STAGE I: ICD-10-CM

## 2017-07-25 DIAGNOSIS — E10.21 DIABETIC NEPHROPATHY ASSOCIATED WITH TYPE 1 DIABETES MELLITUS (H): Primary | ICD-10-CM

## 2017-07-25 LAB
ALBUMIN SERPL-MCNC: 3.7 G/DL (ref 3.4–5)
ANION GAP SERPL CALCULATED.3IONS-SCNC: 6 MMOL/L (ref 3–14)
BUN SERPL-MCNC: 7 MG/DL (ref 7–30)
CALCIUM SERPL-MCNC: 8.8 MG/DL (ref 8.5–10.1)
CHLORIDE SERPL-SCNC: 103 MMOL/L (ref 94–109)
CO2 SERPL-SCNC: 25 MMOL/L (ref 20–32)
CREAT SERPL-MCNC: 0.56 MG/DL (ref 0.52–1.04)
CREAT UR-MCNC: 32 MG/DL
FERRITIN SERPL-MCNC: 26 NG/ML (ref 12–150)
GFR SERPL CREATININE-BSD FRML MDRD: ABNORMAL ML/MIN/1.7M2
GLUCOSE SERPL-MCNC: 211 MG/DL (ref 70–99)
HGB BLD-MCNC: 13.3 G/DL (ref 11.7–15.7)
IRON SATN MFR SERPL: 13 % (ref 15–46)
IRON SERPL-MCNC: 50 UG/DL (ref 35–180)
MICROALBUMIN UR-MCNC: 5 MG/L
MICROALBUMIN/CREAT UR: 15.94 MG/G CR (ref 0–25)
PHOSPHATE SERPL-MCNC: 2.7 MG/DL (ref 2.5–4.5)
POTASSIUM SERPL-SCNC: 4.2 MMOL/L (ref 3.4–5.3)
PROT UR-MCNC: <0.05 G/L
PROT/CREAT 24H UR: NORMAL G/G CR (ref 0–0.2)
SODIUM SERPL-SCNC: 134 MMOL/L (ref 133–144)
TIBC SERPL-MCNC: 374 UG/DL (ref 240–430)

## 2017-07-25 PROCEDURE — 99212 OFFICE O/P EST SF 10 MIN: CPT | Mod: ZF

## 2017-07-25 PROCEDURE — 85018 HEMOGLOBIN: CPT | Performed by: INTERNAL MEDICINE

## 2017-07-25 PROCEDURE — 36415 COLL VENOUS BLD VENIPUNCTURE: CPT | Performed by: INTERNAL MEDICINE

## 2017-07-25 PROCEDURE — 83550 IRON BINDING TEST: CPT | Performed by: INTERNAL MEDICINE

## 2017-07-25 PROCEDURE — 80069 RENAL FUNCTION PANEL: CPT | Performed by: INTERNAL MEDICINE

## 2017-07-25 PROCEDURE — 82728 ASSAY OF FERRITIN: CPT | Performed by: INTERNAL MEDICINE

## 2017-07-25 PROCEDURE — 82043 UR ALBUMIN QUANTITATIVE: CPT | Performed by: INTERNAL MEDICINE

## 2017-07-25 PROCEDURE — 83540 ASSAY OF IRON: CPT | Performed by: INTERNAL MEDICINE

## 2017-07-25 PROCEDURE — 84156 ASSAY OF PROTEIN URINE: CPT | Performed by: INTERNAL MEDICINE

## 2017-07-25 ASSESSMENT — PAIN SCALES - GENERAL: PAINLEVEL: NO PAIN (0)

## 2017-07-25 NOTE — PROGRESS NOTES
"Nephrology Clinic    Stacey Mantilla MRN:[id number] YOB: 1995  Date of Service: 07/25/2017  Primary care provider: Amalia Ervin  Requesting physician: India Olivas     ASSESSMENT AND RECOMMENDATIONS:    Stacey Mantilla is a 22 year old woman with Type 1 DM complicated by biopsy-proven diabetic nephropathy with preserved kidney function.    1.  CKD, stage 1:  Secondary to biopsy proven diabetic nephropathy with preserved kidney function (baseline Cr~0.6 mg/dL, eGFR>90) and well controlled albuminuria  (<0.30 m/g).  However, suspect significant  hyperfiltration due to DM 2 and obesity.  She has responded well to RAAS blockade with enalapril.     -will continue enalapril at 20 mg BID for management of HTN and albuminuria.  Monitor closely for unintended pregnancy.    -continue good blood pressure and glucose control  -encourage weight loss  -RTC in 6 months    2. HTN/Volume: SBP at goal of <130/80.  Mildly hypervolemic on exam.  Suspect minimal lower extremity edema is related to high salt intake in a setting of sodium avid kidneys and venous stasis.  Suspect an element of \"white coat\" hypertension with her clinic blood pressures.  A 24 hour ambulatory BP monitoring was performed following a previous clinic visit that revealed average overall daily BP of 124/68 confirming white coat hypertension.  However, she did not have a night time dipping pattern.       -continue low salt diet  -continue enalapril at 20 mg BID  -will consider carvedilol next should her home BP rise above goal  -she will attempt to get a home BP device in the upcoming months  -RTC in 6 months    3. Diabetes:  Complicated by diabetic nephropathy.  No retinopathy or peripheral neuropathy.  She has had ~5 episodes of DKA in the past. Recent hgb A1c was 7.6.   -followed by endocrinology    4.  Vitamin D deficiency: Low vitamin D level multiple times in the past. Vit D now normal after starting supplements at our last visit.     -continue " cholecalciferol to 2000 units daily though I did mention she could hold this during the summer months    5.  Depression/anxiety:  In part, situational and related to stress at home, her job, passing of a loved one and her medical illness.    -continue lexapro at 10 mg daily (risks, including suicidal ideation explained in clinic) started at previous clinic visit.  She is no longer on Buspar and takes hydroxyzine prn.   -followed closely by her PCP and psychology/psychiatry    6.  Iron deficiency:  Not anemic (hgb 13.3). Iron stores are low (%sat 13, ferritin 26). Suspect iron deficiency related to menses and possibly decreased GI absorption.    -she would like to avoid iron supplements for now; encouraged her to increase intake of iron containing foods.    -will repeat hgb at next visit          REASON FOR CONSULT: CKD    HISTORY OF PRESENT ILLNESS:   Stacey Mantilla is a 22 year old woman with Type 1 DM and celiac disease who presents for CKD f/u.  She was previously cared for by her pediatric nephrologist, Dr. India Olivas.  She was diagnosed with insulin dependent diabetes at age 7.  She then developed proteinuria at age 13 and ultimately underwent a kidney biopsy that revealed mild diabetic nephropathy.  She has preserved kidney function and her proteinuria that has been well controlled with enalapril  (now at 20 mg BID).  She has no history of retinopathy or peripheral neuropathy.  She has had ~5 episodes of DKA.  Her diabetes is followed by Dr. Allan of adult endocrinology.  She continues on a insulin (lispro) pump.  She is  and is not actively trying to be pregnant.  Her other past medical history is remarkable for exercise-induced asthma, depression and obesity. Her 24 BP ambulatory monitoring showed good BP control on enalapril (20 mg BID). She otherwise has no medical complaints and feels her depression is under descent control.  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     PAST MEDICAL  HISTORY:  Past Medical History:   Diagnosis Date     Asthma     Currently no treatment needed     Celiac disease      Chronic kidney disease, stage I 8/3/2015     Chronic sinusitis      Diabetes mellitus type 1 (H)      Diabetic nephropathy (H)      Family history of heart disease 8/3/2015     Hypertension      Hypothyroid      Pedal edema      Psoriasis      PAST SURGICAL HISTORY:  Past Surgical History:   Procedure Laterality Date     ENT SURGERY       TONSILLECTOMY, ADENOIDECTOMY, COMBINED       MEDICATIONS:  Prescription Medications as of 7/25/2017             HYDROXYZINE HCL PO Take 25 mg by mouth 3 times daily    insulin lispro (HUMALOG) 100 UNIT/ML injection Use as directed up to 140 units daily in insulin pump.    enalapril (VASOTEC) 20 MG tablet TAKE ONE TABLET BY MOUTH TWICE DAILY    albuterol (2.5 MG/3ML) 0.083% neb solution Take 1 vial (2.5 mg) by nebulization every 4 hours as needed for shortness of breath / dyspnea or wheezing /chest tightness/cough    hydrOXYzine (VISTARIL) 25 MG capsule Take 1 capsule (25 mg) by mouth daily as needed for itching    Blood Pressure Monitoring KIT 1 kit daily    levothyroxine (SYNTHROID, LEVOTHROID) 50 MCG tablet Take 1 tablet (50 mcg) by mouth daily    escitalopram (LEXAPRO) 10 MG tablet TAKE ONE TABLET (10MG) BY MOUTH DAILY    Ondansetron HCl (ZOFRAN PO) Take by mouth as needed for nausea or vomiting Reported on 3/17/2017    albuterol (ALBUTEROL) 108 (90 BASE) MCG/ACT inhaler Inhale 2 puffs into the lungs every 6 hours as needed    Cholecalciferol 2000 UNITS TBDP Take 2,000 Units by mouth daily    prochlorperazine (COMPAZINE) 10 MG tablet Take 1 tablet (10 mg) by mouth every 6 hours as needed for nausea or vomiting    blood glucose test strip Use to test blood sugar eight times daily or as directed.  Dispense Brandy Contour Next Test Strips.    insulin syringes, disposable, U-100 0.3 ML MISC Use 2-3 times daily as needed    glucagon (GLUCAGON EMERGENCY) 1 MG kit Inject  "1 mg into the muscle once for 1 dose         ALLERGIES:    Allergies   Allergen Reactions     Dogs Other (See Comments)     Sinus symptoms      Dust Mites      Sinus      Gluten Meal      REVIEW OF SYSTEMS:  A comprehensive review of systems was performed and found to be negative except as described here or above.   SOCIAL HISTORY:   Social History     Social History     Marital status:      Spouse name: N/A     Number of children: N/A     Years of education: N/A     Occupational History     Not on file.     Social History Main Topics     Smoking status: Never Smoker     Smokeless tobacco: Never Used     Alcohol use 0.0 oz/week     0 Standard drinks or equivalent per week      Comment: 2 times per month, 2 drinks per time     Drug use: No     Sexual activity: Yes     Partners: Male     Birth control/ protection: Condom     Other Topics Concern     Parent/Sibling W/ Cabg, Mi Or Angioplasty Before 65f 55m? Yes     Caffeine Concern Yes     1 cup tea per day     Sleep Concern No     Special Diet Yes     gluten free diet, low carbs     Exercise Yes     walking, ellyptical, swimming, weights     Seat Belt Yes     Social History Narrative     FAMILY MEDICAL HISTORY:   Remarkable for an uncle with CKD that seems related to unresolved SHALONDA.  Otherwise, FMH is unremarkable.    PHYSICAL EXAM:   /80  Pulse 93  Temp 98.4  F (36.9  C) (Oral)  Ht 1.727 m (5' 8\")  Wt 130.2 kg (287 lb)  BMI 43.64 kg/m2   GENERAL APPEARANCE: alert and no distress  EYES: nonicteric  HENT: mouth without ulcers or lesions  NECK: supple, no adenopathy  RESP: lungs clear to auscultation   CV: regular rhythm, no rub  ABDOMEN: obese, soft, nontender, normal bowel sounds  : No CVA tenderness  Extremities: no significant lower extremity edema  MS: no evidence of inflammation in joints, no muscle tenderness  SKIN: no concerning rash  NEURO: mentation intact and speech normal  PSYCH: affect and mood appropriate   LABS:   CMP  Recent Labs "   Lab Test  07/25/17   1455  01/24/17   1525  07/26/16   1137  02/20/16   1827   01/16/16   0905  08/04/15   1443   03/25/15   1655   12/09/13   1137   10/15/09   1350   NA  134  137  137  139   < >  136   --    < >  138   < >  137   < >  133   POTASSIUM  4.2  4.2  4.4  3.6   < >  4.0   --    < >  3.8   < >  3.9   < >  4.7   CHLORIDE  103  103  105  109   < >  106   --    < >  105   < >  103   < >  99   CO2  25  26  27  25   < >  28   --    < >  25   < >  25   < >  24   ANIONGAP  6  8  5  5   < >  2*   --    < >  8   < >  9   < >  10   GLC  211*  107*  90  55*   < >  122*   --    < >  73   < >  202*   < >  391*   BUN  7  7  7  8   < >  9   --    < >  16   < >  11   < >  10   CR  0.56  0.55  0.60  0.67   < >  0.56   --    < >  0.55   < >  0.48*   < >  0.41*   GFRESTIMATED  >90  Non  GFR Calc    >90  Non  GFR Calc    >90  Non  GFR Calc    >90  Non  GFR Calc     < >  >90  Non  GFR Calc     --    < >  >90  Non  GFR Calc     < >  >90   < >  >90   GFRESTBLACK  >90   GFR Calc    >90   GFR Calc    >90   GFR Calc    >90   GFR Calc     < >  >90   GFR Calc     --    < >  >90   GFR Calc     < >  >90   < >  >90   FRANKLIN  8.8  9.3  9.2  8.3*   < >  8.7   --    < >  9.0   < >  9.6   < >  10.0   PHOS  2.7  3.4  4.0   --    --   3.9   --    < >   --    < >   --    < >  4.9*   PROTTOTAL   --    --    --   7.5   --    --    --    --   8.3   --   7.9   --   7.5   ALBUMIN  3.7  4.0  3.6  3.6   --   3.7   --    < >  4.1   < >  4.2   < >  4.2   BILITOTAL   --    --    --   0.2   --    --    --    --   0.4   --   0.3   --   0.3   ALKPHOS   --    --    --   67   --    --    --    --   82   --   92   --   178*   AST   --    --    --   17   --    --    --    --   14   --   23   --   29   ALT   --    --    --   26   --    --   <5*   --   29   --   32   --    19    < > = values in this interval not displayed.     CBC  Recent Labs   Lab Test  07/25/17   1455  01/24/17   1525  07/26/16   1137  02/20/16   1827  02/19/16   2220   07/28/15   1536  03/26/15   0645   HGB  13.3  14.2  13.3  13.5  14.7   < >  13.9  13.2   WBC   --    --    --   12.6*  12.7*   --   11.7*  10.0   RBC   --    --    --   4.84  5.35*   --   5.14  4.86   HCT   --    --    --   41.4  45.2   --   42.5  40.6   MCV   --    --    --   86  85   --   83  84   MCH   --    --    --   27.9  27.5   --   27.0  27.2   MCHC   --    --    --   32.6  32.5   --   32.7  32.5   RDW   --    --    --   13.8  13.6   --   13.5  13.7   PLT   --    --    --   330  357   --   354  261    < > = values in this interval not displayed.     INR  Recent Labs   Lab Test  02/19/10   0925   INR  0.87   PTT  25     URINE STUDIES  Recent Labs   Lab Test  03/25/15   1810  07/22/14   1506  03/06/14   1040  10/18/12   1210   COLOR  Yellow  Straw  Straw  Light Yellow   APPEARANCE  Slightly Cloudy  Clear  Clear  Clear   URINEGLC  Negative  Negative  Negative  Negative   URINEBILI  Negative  Negative  Negative  Negative   URINEKETONE  Negative  Negative  Negative  Negative   SG  1.021  1.012  1.004  1.009   UBLD  Trace*  Negative  Negative  Negative   URINEPH  5.5  7.0  7.0  7.5*   PROTEIN  10*  Negative  Negative  10*   NITRITE  Negative  Negative  Negative  Negative   LEUKEST  Negative  Negative  Negative  Negative   RBCU  1  1  1  1   WBCU  1  0  <1  1     Recent Labs   Lab Test  07/25/17   1457  01/24/17   1533  07/26/16   1135  01/16/16   0906  07/28/15   1522  01/19/15   1554  07/22/14   1506  03/06/14   0948  10/18/12   1210  04/12/12   0907  10/28/11   1210  09/09/10   0835  03/11/10   1240  11/12/09   0745  10/22/09   1000  10/15/09   1350  08/06/09   1115   [facility]  Unable to calculate due to low value  Unable to calculate due to low value  0.23*  0.15  Unable to calculate due to low value  Unable to calculate due to low value  <0.10   0.13  0.49*  0.30*  0.69*  0.46*  Test canceled - Lab  error  0.20  0.42*  <0.14  0.54*  0.22*     PTH  Recent Labs   Lab Test  01/24/17   1525  01/16/16   0905  07/22/14   1519   PTHI  24  31  18     IRON STUDIES  Recent Labs   Lab Test  07/25/17   1455  07/26/16   1137  01/16/16   0905  07/22/14   1519   IRON  50  60  58  27*   FEB  374  423  412  372   IRONSAT  13*  14*  14*  7*   DIPESH  26  13   --   9*     IMAGING:  None     Total time spent was >40 minutes, and more than 50% of face to face time was spent in counseling and/or coordination of care regarding principles of multidisciplinary care, medication management, and chronic kidney disease education.  Much time was spent discussing the importance of good blood pressure control and ace inhibitor use for slowing down the progression of CKD.  Connor Castaneda MD

## 2017-07-25 NOTE — LETTER
"7/25/2017       RE: Stacey La  [address]  [facility] [address]     Dear Colleague,    Thank you for referring your patient, Stacey La, to the [facility]. Please see a copy of my visit note below.    Nephrology Clinic    Stacey Mantilla MRN:[id number] YOB: 1995  Date of Service: 07/25/2017  Primary care provider: Amalia Ervin  Requesting physician: India Olivas     ASSESSMENT AND RECOMMENDATIONS:    Stacey Mantilla is a 22 year old woman with Type 1 DM complicated by biopsy-proven diabetic nephropathy with preserved kidney function.    1.  CKD, stage 1:  Secondary to biopsy proven diabetic nephropathy with preserved kidney function (baseline Cr~0.6 mg/dL, eGFR>90) and well controlled albuminuria  (<0.30 m/g).  However, suspect significant  hyperfiltration due to DM 2 and obesity.  She has responded well to RAAS blockade with enalapril.     -will continue enalapril at 20 mg BID for management of HTN and albuminuria.  Monitor closely for unintended pregnancy.    -continue good blood pressure and glucose control  -encourage weight loss  -RTC in 6 months    2. HTN/Volume: SBP at goal of <130/80.  Mildly hypervolemic on exam.  Suspect minimal lower extremity edema is related to high salt intake in a setting of sodium avid kidneys and venous stasis.  Suspect an element of \"white coat\" hypertension with her clinic blood pressures.  A 24 hour ambulatory BP monitoring was performed following a previous clinic visit that revealed average overall daily BP of 124/68 confirming white coat hypertension.  However, she did not have a night time dipping pattern.       -continue low salt diet  -continue enalapril at 20 mg BID  -will consider carvedilol next should her home BP rise above goal  -she will attempt to get a home BP device in the upcoming months  -RTC in 6 months    3. Diabetes:  Complicated by diabetic nephropathy.  No " retinopathy or peripheral neuropathy.  She has had ~5 episodes of DKA in the past. Recent hgb A1c was 7.6.   -followed by endocrinology    4.  Vitamin D deficiency: Low vitamin D level multiple times in the past. Vit D now normal after starting supplements at our last visit.     -continue cholecalciferol to 2000 units daily though I did mention she could hold this during the summer months    5.  Depression/anxiety:  In part, situational and related to stress at home, her job, passing of a loved one and her medical illness.    -continue lexapro at 10 mg daily (risks, including suicidal ideation explained in clinic) started at previous clinic visit.  She is no longer on Buspar and takes hydroxyzine prn.   -followed closely by her PCP and psychology/psychiatry    6.  Iron deficiency:  Not anemic (hgb 13.3). Iron stores are low (%sat 13, ferritin 26). Suspect iron deficiency related to menses and possibly decreased GI absorption.    -she would like to avoid iron supplements for now; encouraged her to increase intake of iron containing foods.    -will repeat hgb at next visit          REASON FOR CONSULT: CKD    HISTORY OF PRESENT ILLNESS:   Stacey Mantilla is a 22 year old woman with Type 1 DM and celiac disease who presents for CKD f/u.  She was previously cared for by her pediatric nephrologist, Dr. India Olivas.  She was diagnosed with insulin dependent diabetes at age 7.  She then developed proteinuria at age 13 and ultimately underwent a kidney biopsy that revealed mild diabetic nephropathy.  She has preserved kidney function and her proteinuria that has been well controlled with enalapril  (now at 20 mg BID).  She has no history of retinopathy or peripheral neuropathy.  She has had ~5 episodes of DKA.  Her diabetes is followed by Dr. Allan of adult endocrinology.  She continues on a insulin (lispro) pump.  She is  and is not actively trying to be pregnant.  Her other past medical history is remarkable for  exercise-induced asthma, depression and obesity. Her 24 BP ambulatory monitoring showed good BP control on enalapril (20 mg BID). She otherwise has no medical complaints and feels her depression is under descent control.  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     PAST MEDICAL HISTORY:  Past Medical History:   Diagnosis Date     Asthma     Currently no treatment needed     Celiac disease      Chronic kidney disease, stage I 8/3/2015     Chronic sinusitis      Diabetes mellitus type 1 (H)      Diabetic nephropathy (H)      Family history of heart disease 8/3/2015     Hypertension      Hypothyroid      Pedal edema      Psoriasis      PAST SURGICAL HISTORY:  Past Surgical History:   Procedure Laterality Date     ENT SURGERY       TONSILLECTOMY, ADENOIDECTOMY, COMBINED       MEDICATIONS:  Prescription Medications as of 7/25/2017             HYDROXYZINE HCL PO Take 25 mg by mouth 3 times daily    insulin lispro (HUMALOG) 100 UNIT/ML injection Use as directed up to 140 units daily in insulin pump.    enalapril (VASOTEC) 20 MG tablet TAKE ONE TABLET BY MOUTH TWICE DAILY    albuterol (2.5 MG/3ML) 0.083% neb solution Take 1 vial (2.5 mg) by nebulization every 4 hours as needed for shortness of breath / dyspnea or wheezing /chest tightness/cough    hydrOXYzine (VISTARIL) 25 MG capsule Take 1 capsule (25 mg) by mouth daily as needed for itching    Blood Pressure Monitoring KIT 1 kit daily    levothyroxine (SYNTHROID, LEVOTHROID) 50 MCG tablet Take 1 tablet (50 mcg) by mouth daily    escitalopram (LEXAPRO) 10 MG tablet TAKE ONE TABLET (10MG) BY MOUTH DAILY    Ondansetron HCl (ZOFRAN PO) Take by mouth as needed for nausea or vomiting Reported on 3/17/2017    albuterol (ALBUTEROL) 108 (90 BASE) MCG/ACT inhaler Inhale 2 puffs into the lungs every 6 hours as needed    Cholecalciferol 2000 UNITS TBDP Take 2,000 Units by mouth daily    prochlorperazine (COMPAZINE) 10 MG tablet Take 1 tablet (10 mg) by mouth every 6 hours as  "needed for nausea or vomiting    blood glucose test strip Use to test blood sugar eight times daily or as directed.  Dispense Brandy Contour Next Test Strips.    insulin syringes, disposable, U-100 0.3 ML MISC Use 2-3 times daily as needed    glucagon (GLUCAGON EMERGENCY) 1 MG kit Inject 1 mg into the muscle once for 1 dose         ALLERGIES:    Allergies   Allergen Reactions     Dogs Other (See Comments)     Sinus symptoms      Dust Mites      Sinus      Gluten Meal      REVIEW OF SYSTEMS:  A comprehensive review of systems was performed and found to be negative except as described here or above.   SOCIAL HISTORY:   Social History     Social History     Marital status:      Spouse name: N/A     Number of children: N/A     Years of education: N/A     Occupational History     Not on file.     Social History Main Topics     Smoking status: Never Smoker     Smokeless tobacco: Never Used     Alcohol use 0.0 oz/week     0 Standard drinks or equivalent per week      Comment: 2 times per month, 2 drinks per time     Drug use: No     Sexual activity: Yes     Partners: Male     Birth control/ protection: Condom     Other Topics Concern     Parent/Sibling W/ Cabg, Mi Or Angioplasty Before 65f 55m? Yes     Caffeine Concern Yes     1 cup tea per day     Sleep Concern No     Special Diet Yes     gluten free diet, low carbs     Exercise Yes     walking, ellyptical, swimming, weights     Seat Belt Yes     Social History Narrative     FAMILY MEDICAL HISTORY:   Remarkable for an uncle with CKD that seems related to unresolved SHALONDA.  Otherwise, FMH is unremarkable.    PHYSICAL EXAM:   /80  Pulse 93  Temp 98.4  F (36.9  C) (Oral)  Ht 1.727 m (5' 8\")  Wt 130.2 kg (287 lb)  BMI 43.64 kg/m2   GENERAL APPEARANCE: alert and no distress  EYES: nonicteric  HENT: mouth without ulcers or lesions  NECK: supple, no adenopathy  RESP: lungs clear to auscultation   CV: regular rhythm, no rub  ABDOMEN: obese, soft, nontender, normal " bowel sounds  : No CVA tenderness  Extremities: no significant lower extremity edema  MS: no evidence of inflammation in joints, no muscle tenderness  SKIN: no concerning rash  NEURO: mentation intact and speech normal  PSYCH: affect and mood appropriate   LABS:   CMP  Recent Labs   Lab Test  07/25/17   1455  01/24/17   1525  07/26/16   1137  02/20/16   1827   01/16/16   0905  08/04/15   1443   03/25/15   1655   12/09/13   1137   10/15/09   1350   NA  134  137  137  139   < >  136   --    < >  138   < >  137   < >  133   POTASSIUM  4.2  4.2  4.4  3.6   < >  4.0   --    < >  3.8   < >  3.9   < >  4.7   CHLORIDE  103  103  105  109   < >  106   --    < >  105   < >  103   < >  99   CO2  25  26  27  25   < >  28   --    < >  25   < >  25   < >  24   ANIONGAP  6  8  5  5   < >  2*   --    < >  8   < >  9   < >  10   GLC  211*  107*  90  55*   < >  122*   --    < >  73   < >  202*   < >  391*   BUN  7  7  7  8   < >  9   --    < >  16   < >  11   < >  10   CR  0.56  0.55  0.60  0.67   < >  0.56   --    < >  0.55   < >  0.48*   < >  0.41*   GFRESTIMATED  >90  Non  GFR Calc    >90  Non  GFR Calc    >90  Non  GFR Calc    >90  Non  GFR Calc     < >  >90  Non  GFR Calc     --    < >  >90  Non  GFR Calc     < >  >90   < >  >90   GFRESTBLACK  >90   GFR Calc    >90   GFR Calc    >90   GFR Calc    >90   GFR Calc     < >  >90   GFR Calc     --    < >  >90   GFR Calc     < >  >90   < >  >90   FRANKLIN  8.8  9.3  9.2  8.3*   < >  8.7   --    < >  9.0   < >  9.6   < >  10.0   PHOS  2.7  3.4  4.0   --    --   3.9   --    < >   --    < >   --    < >  4.9*   PROTTOTAL   --    --    --   7.5   --    --    --    --   8.3   --   7.9   --   7.5   ALBUMIN  3.7  4.0  3.6  3.6   --   3.7   --    < >  4.1   < >  4.2   < >  4.2   BILITOTAL   --    --    --    0.2   --    --    --    --   0.4   --   0.3   --   0.3   ALKPHOS   --    --    --   67   --    --    --    --   82   --   92   --   178*   AST   --    --    --   17   --    --    --    --   14   --   23   --   29   ALT   --    --    --   26   --    --   <5*   --   29   --   32   --   19    < > = values in this interval not displayed.     CBC  Recent Labs   Lab Test  07/25/17   1455  01/24/17   1525  07/26/16   1137  02/20/16   1827  02/19/16   2220   07/28/15   1536  03/26/15   0645   HGB  13.3  14.2  13.3  13.5  14.7   < >  13.9  13.2   WBC   --    --    --   12.6*  12.7*   --   11.7*  10.0   RBC   --    --    --   4.84  5.35*   --   5.14  4.86   HCT   --    --    --   41.4  45.2   --   42.5  40.6   MCV   --    --    --   86  85   --   83  84   MCH   --    --    --   27.9  27.5   --   27.0  27.2   MCHC   --    --    --   32.6  32.5   --   32.7  32.5   RDW   --    --    --   13.8  13.6   --   13.5  13.7   PLT   --    --    --   330  357   --   354  261    < > = values in this interval not displayed.     INR  Recent Labs   Lab Test  02/19/10   0925   INR  0.87   PTT  25     URINE STUDIES  Recent Labs   Lab Test  03/25/15   1810  07/22/14   1506  03/06/14   1040  10/18/12   1210   COLOR  Yellow  Straw  Straw  Light Yellow   APPEARANCE  Slightly Cloudy  Clear  Clear  Clear   URINEGLC  Negative  Negative  Negative  Negative   URINEBILI  Negative  Negative  Negative  Negative   URINEKETONE  Negative  Negative  Negative  Negative   SG  1.021  1.012  1.004  1.009   UBLD  Trace*  Negative  Negative  Negative   URINEPH  5.5  7.0  7.0  7.5*   PROTEIN  10*  Negative  Negative  10*   NITRITE  Negative  Negative  Negative  Negative   LEUKEST  Negative  Negative  Negative  Negative   RBCU  1  1  1  1   WBCU  1  0  <1  1     Recent Labs   Lab Test  07/25/17   1457  01/24/17   1533  07/26/16   1135  01/16/16   0906  07/28/15   1522  01/19/15   1554  07/22/14   1506  03/06/14   0948  10/18/12   1210  04/12/12   0907  10/28/11   1210   09/09/10   0835  03/11/10   1240  11/12/09   0745  10/22/09   1000  10/15/09   1350  08/06/09   1115   UTPG  Unable to calculate due to low value  Unable to calculate due to low value  0.23*  0.15  Unable to calculate due to low value  Unable to calculate due to low value  <0.10  0.13  0.49*  0.30*  0.69*  0.46*  Test canceled - Lab  error  0.20  0.42*  <0.14  0.54*  0.22*     PTH  Recent Labs   Lab Test  01/24/17   1525  01/16/16   0905  07/22/14   1519   PTHI  24  31  18     IRON STUDIES  Recent Labs   Lab Test  07/25/17   1455  07/26/16   1137  01/16/16   0905  07/22/14   1519   IRON  50  60  58  27*   FEB  374  423  412  372   IRONSAT  13*  14*  14*  7*   DIPESH  26  13   --   9*     IMAGING:  None     Total time spent was >40 minutes, and more than 50% of face to face time was spent in counseling and/or coordination of care regarding principles of multidisciplinary care, medication management, and chronic kidney disease education.  Much time was spent discussing the importance of good blood pressure control and ace inhibitor use for slowing down the progression of CKD.  Connor Castaneda MD    Again, thank you for allowing me to participate in the care of your patient.      Sincerely,    Connor Castaneda MD

## 2017-07-25 NOTE — NURSING NOTE
"Chief Complaint   Patient presents with     RECHECK     6 month follow up       Initial There were no vitals taken for this visit. Estimated body mass index is 43.33 kg/(m^2) as calculated from the following:    Height as of 6/5/17: 1.727 m (5' 8\").    Weight as of 6/5/17: 129.3 kg (285 lb).  Medication Reconciliation: complete  "

## 2017-08-18 DIAGNOSIS — F41.8 DEPRESSION WITH ANXIETY: ICD-10-CM

## 2017-08-23 RX ORDER — ESCITALOPRAM OXALATE 10 MG/1
TABLET ORAL
Qty: 30 TABLET | Refills: 11 | Status: SHIPPED | OUTPATIENT
Start: 2017-08-23 | End: 2017-12-04

## 2017-09-06 ENCOUNTER — ALLIED HEALTH/NURSE VISIT (OUTPATIENT)
Dept: PHARMACY | Facility: CLINIC | Age: 22
End: 2017-09-06
Payer: COMMERCIAL

## 2017-09-06 DIAGNOSIS — J45.20 MILD INTERMITTENT ASTHMA WITHOUT COMPLICATION: ICD-10-CM

## 2017-09-06 DIAGNOSIS — F41.9 ANXIETY: ICD-10-CM

## 2017-09-06 DIAGNOSIS — R11.0 NAUSEA: ICD-10-CM

## 2017-09-06 DIAGNOSIS — E10.9 TYPE 1 DIABETES MELLITUS WITHOUT COMPLICATION (H): Primary | ICD-10-CM

## 2017-09-06 DIAGNOSIS — E03.9 HYPOTHYROIDISM, UNSPECIFIED TYPE: ICD-10-CM

## 2017-09-06 DIAGNOSIS — J30.89 CHRONIC NONSEASONAL ALLERGIC RHINITIS DUE TO POLLEN: ICD-10-CM

## 2017-09-06 DIAGNOSIS — E63.9 NUTRITIONAL DEFICIENCY: ICD-10-CM

## 2017-09-06 PROCEDURE — 99605 MTMS BY PHARM NP 15 MIN: CPT | Mod: U4 | Performed by: PHARMACIST

## 2017-09-06 PROCEDURE — 99607 MTMS BY PHARM ADDL 15 MIN: CPT | Mod: U4 | Performed by: PHARMACIST

## 2017-09-06 RX ORDER — FLUTICASONE PROPIONATE 50 MCG
2 SPRAY, SUSPENSION (ML) NASAL DAILY
COMMUNITY
End: 2018-04-10

## 2017-09-06 NOTE — PATIENT INSTRUCTIONS
Recommendations from today's MTM visit:                                                    MTM (medication therapy management) is a service provided by a clinical pharmacist designed to help you get the most of out of your medicines.     1. If you have worsening allergy symptoms try Zyrtec (cetirizine 10mg), Claritin (loratadine 10mg), or Allegra (fexofenadine 180mg). Typically you have to give these daily for 3 days to a week to see the full effect from them, so buy a small quantity when you are trying them for the first time.    2. You are due for an A1c to check the 3 month average of yoru blood sugars. I will enter this into the system for you, go to your local clinic and get your A1c drawn!    Next MTM visit: 6-12 months. If you have any questions give me a call.     To schedule another MTM appointment, please call the clinic directly or you may call the MTM scheduling line at [phone] or toll-free at [phone].     My Clinical Pharmacist's contact information:                                                      It was a pleasure seeing you today!  Please feel free to contact me with any questions or concerns you have.      Jose Maria Bhakta, PharmD  MTM Pharmacist    Phone: [phone]     You may receive a survey about the MTM services you received.  I would appreciate your feedback to help me serve you better in the future. Please fill it out and return it when you can. Your comments will be anonymous.

## 2017-09-06 NOTE — MR AVS SNAPSHOT
After Visit Summary   9/6/2017    Stacey La    MRN: [id number]           Patient Information     Date Of Birth          1995        Visit Information        Provider Department      9/6/2017 2:00 PM [facility]        Today's Diagnoses     Type 1 diabetes mellitus without complication (H)    -  1    Mild intermittent asthma without complication        Anxiety        Nutritional deficiency        Nausea        Hypothyroidism, unspecified type        Chronic nonseasonal allergic rhinitis due to pollen          Care Instructions    Recommendations from today's MTM visit:                                                    MTM (medication therapy management) is a service provided by a clinical pharmacist designed to help you get the most of out of your medicines.     1. If you have worsening allergy symptoms try Zyrtec (cetirizine 10mg), Claritin (loratadine 10mg), or Allegra (fexofenadine 180mg). Typically you have to give these daily for 3 days to a week to see the full effect from them, so buy a small quantity when you are trying them for the first time.    2. You are due for an A1c to check the 3 month average of yoru blood sugars. I will enter this into the system for you, go to your local clinic and get your A1c drawn!    Next MTM visit: 6-12 months. If you have any questions give me a call.     To schedule another MTM appointment, please call the clinic directly or you may call the MTM scheduling line at [phone] or toll-free at [phone].     My Clinical Pharmacist's contact information:                                                      It was a pleasure seeing you today!  Please feel free to contact me with any questions or concerns you have.      Jose Maria Bhakta, PharmD  MTM Pharmacist    Phone: [phone]     You may receive a survey about the MTM services you received.  I would appreciate your feedback to help me  serve you better in the future. Please fill it out and return it when you can. Your comments will be anonymous.            Follow-ups after your visit        Your next 10 appointments already scheduled     Jan 16, 2018  3:30 PM CST   Lab with [facility]   [facility] ([facility])    [address] [place] [id number] [phone]            Jan 16, 2018  4:30 PM CST   (Arrive by 4:00 PM)   Return Visit with Connor Castaneda MD   [facility] ([facility])    [address] [place] [address] [phone]              Future tests that were ordered for you today     Open Future Orders        Priority Expected Expires Ordered    Hemoglobin A1c Routine  9/6/2018 9/6/2017            Who to contact     If you have questions or need follow up information about today's clinic visit or your schedule please contact [facility] MEDICATION THERAPY MANAGEMENT directly at [phone].  Normal or non-critical lab and imaging results will be communicated to you by [employer], letter or phone within 4 business days after the clinic has received the results. If you do not hear from us within 7 days, please contact the clinic through [employer] or phone. If you have a critical or abnormal lab result, we will notify you by phone as soon as possible.  Submit refill requests through [employer] or call your pharmacy and they will forward the refill request to us. Please allow 3 business days for your refill to be completed.          Additional Information About Your Visit        [employer] Information     [employer] gives you secure access to your electronic health record. If you see a primary care provider, you can also send messages to your care team and make appointments. If you have questions, please call your primary care clinic.  If you do not have a primary care provider, please call [phone] and they will assist you.        Care  EveryWhere ID     This is your Care EveryWhere ID. This could be used by other organizations to access your [place] medical records  [id number]         Blood Pressure from Last 3 Encounters:   07/25/17 127/80   06/05/17 (!) 135/92   05/06/17 128/88    Weight from Last 3 Encounters:   07/25/17 287 lb (130.2 kg)   06/05/17 285 lb (129.3 kg)   05/06/17 282 lb 14.4 oz (128.3 kg)                 Today's Medication Changes          These changes are accurate as of: 9/6/17  2:42 PM.  If you have any questions, ask your nurse or doctor.               Stop taking these medicines if you haven't already. Please contact your care team if you have questions.     HYDROXYZINE HCL PO   Stopped by:  Jose Maria Bhakta, [facility]                    Primary Care Provider Office Phone # Fax #    Nancy Mejia, LUIS FERNANDO [facility] [phone] [address] [place] [address]        Equal Access to Services     : Hadii aad ku hadasho Soomaali, waaxda luqadaha, qaybta kaalmada adeegyada, waxay brian haykaleb avalos . So [facility] [phone].    ATENCIÓN: Si habla español, tiene a escobar disposición servicios gratuitos de asistencia lingüística. Llame al [phone].    We comply with applicable federal civil rights laws and [place] laws. We do not discriminate on the basis of race, color, national origin, age, disability sex, sexual orientation or gender identity.            Thank you!     Thank you for choosing [facility] MEDICATION THERAPY MANAGEMENT  for your care. Our goal is always to provide you with excellent care. Hearing back from our patients is one way we can continue to improve our services. Please take a few minutes to complete the written survey that you may receive in the mail after your visit with us. Thank you!             Your Updated Medication List - Protect others around you: Learn how to safely use, store and throw away your medicines at www.disposemymeds.org.           This list is accurate as of: 9/6/17  2:42 PM.  Always use your most recent med list.                   Brand Name Dispense Instructions for use Diagnosis    * albuterol 108 (90 BASE) MCG/ACT Inhaler    PROAIR HFA    1 each    Inhale 2 puffs into the lungs every 6 hours as needed    Mild intermittent asthma without complication       * albuterol (2.5 MG/3ML) 0.083% neb solution     1 Box    Take 1 vial (2.5 mg) by nebulization every 4 hours as needed for shortness of breath / dyspnea or wheezing /chest tightness/cough    Uncomplicated asthma, unspecified asthma severity       blood glucose monitoring test strip    no brand specified    4 Box    Use to test blood sugar eight times daily or as directed.  Dispense Brandy Contour Next Test Strips.    Diabetes mellitus type 1 (H)       Blood Pressure Monitoring Kit     1 kit    1 kit daily    HTN (hypertension)       Cholecalciferol 2000 UNITS Tbdp     90 tablet    Take 2,000 Units by mouth daily    Proteinuria, Vitamin D deficiency       enalapril 20 MG tablet    VASOTEC    180 tablet    TAKE ONE TABLET BY MOUTH TWICE DAILY    Proteinuria       escitalopram 10 MG tablet    LEXAPRO    30 tablet    TAKE ONE TABLET BY MOUTH ONCE DAILY    Depression with anxiety       fluticasone 50 MCG/ACT spray    FLONASE     Spray 2 sprays into both nostrils daily        glucagon 1 MG kit    GLUCAGON EMERGENCY    1 mg    Inject 1 mg into the muscle once for 1 dose    Well controlled type 1 diabetes mellitus (H)       hydrOXYzine 25 MG capsule    VISTARIL    30 capsule    Take 1 capsule (25 mg) by mouth daily as needed for itching    Major depressive disorder, recurrent episode, in partial remission (H)       insulin lispro 100 UNIT/ML injection    HumaLOG    12 mL    Use as directed up to 140 units daily in insulin pump.    Well controlled type 1 diabetes mellitus (H)       insulin syringes (disposable) U-100 0.3 ML Misc     100 each    Use 2-3 times daily as needed    Type 1 diabetes  mellitus (H)       levothyroxine 50 MCG tablet    SYNTHROID/LEVOTHROID    90 tablet    Take 1 tablet (50 mcg) by mouth daily    Other specified hypothyroidism       MULTIVITAMIN ADULT PO      Take 1 tablet by mouth daily        prochlorperazine 10 MG tablet    COMPAZINE    10 tablet    Take 1 tablet (10 mg) by mouth every 6 hours as needed for nausea or vomiting    Intractable vomiting       VITAMIN C PO      Take 500 mg by mouth daily        ZOFRAN PO      Take by mouth as needed for nausea or vomiting Reported on 3/17/2017        * Notice:  This list has 2 medication(s) that are the same as other medications prescribed for you. Read the directions carefully, and ask your doctor or other care provider to review them with you.

## 2017-09-06 NOTE — PROGRESS NOTES
SUBJECTIVE/OBJECTIVE:                           Stacey La is a 22 year old female called for an initial visit for Medication Therapy Management.  She was referred to me from P, sees Nancy Mejia at [facility] and Dr. Castaneda at the .     Chief Complaint: none today.    Allergies/ADRs: None  Tobacco: No tobacco use  Alcohol: 1-3 beverages / week  Caffeine: 2 cups/day of coffee  Activity: Walk three times a week.   PMH: Reviewed in Epic    Medication Adherence/Access  The patient misses their medication 0 times per week. She is responsible for his/her own medications.   Adherence/Compliance is described as excellent (100%)  Insulin is expensive.   Other medications filled at: [facility]  Has the patient been offered to fill at [facility]? Yes    Diabetes:  Pt currently taking Humalog via pump.   SMBG: three times daily.   Ranges (patient reported): 100 in the morning.   Patient is experiencing hypoglycemia. Frequency of hypoglycemia? 1-2 times per week. Symptoms of low blood sugar? Doesn't have symptoms until she hits 50, shakiness, hungry, sweatiness. Usually these are from missing carbs when carb counting or exercising more than usual. Has a snack with her incase these occur.   Recent symptoms of high blood sugar? none  Eye exam: up to date  Foot exam: up to date  Microalbumin is < 30 mg/g. Pt is taking an Enalapril, had albuminuria in the past.  Aspirin: Not taking due to age  Diet/Exercise: not discussed in length today.     Allergic rhinitis: Current medications include fluticasone nasal spray 2 spray(s) once daily. Has tried claritin, Zyrtec, neither made much of an impact. Primary symptoms are nasal congestion.  Primary triggers are pollens and animal dander. Pt feels that current therapy is fairly effective, she does get a sinus infection a few times a year. .     Asthma:  Current asthma medications: Albuterol MDI and Nebs, uses maybe once a makes.    Asthma triggers include: pollens and animal  cici.  Pt reports the following symptoms: none.  AAP on file: YESACT Total Scores 5/19/2016 11/15/2016   ACT TOTAL SCORE (Goal Greater than or Equal to 20) 24 23   In the past 12 months, how many times did you visit the emergency room for your asthma without being admitted to the hospital? 0 0   In the past 12 months, how many times were you hospitalized overnight because of your asthma? 0 0     Supplements: Pt is taking Vitamin D 2000 units daily.   Vitamin D Deficiency Screening Results:  Lab Results   Component Value Date    VITDT 30 07/26/2016    VITDT 24 (L) 07/22/2014    VITDT 17 (L) 03/06/2014     Depression/ Anxiety:  Current medications include: Escitalopram 20mg once daily, Hydroxyzine 25mg TID prn (uses about twice monthly). Pt reports that depression symptoms are improved.   No flowsheet data found.    Nausea: Pt is taking Compazine 10mg prn, and Ondansetron prn. Uses them if she gets a stomach virus, only needs 1-2x per year.     Hypothyroidism: Patient is taking levothyroxine 50 mcg daily. Patient is having the following symptoms: none.    Current labs include  BP Readings from Last 3 Encounters:   07/25/17 127/80   06/05/17 (!) 135/92   05/06/17 128/88     Today's Vitals: There were no vitals taken for this visit.     Lab Results   Component Value Date    A1C 7.6 03/26/2015   .  Lab Results   Component Value Date    CHOL 194 08/04/2015     Lab Results   Component Value Date    TRIG 131 08/04/2015     Lab Results   Component Value Date    HDL 66 08/04/2015     Lab Results   Component Value Date     08/04/2015       Liver Function Studies -   Recent Labs   Lab Test  07/25/17   1455   02/20/16   1827   PROTTOTAL   --    --   7.5   ALBUMIN  3.7   < >  3.6   BILITOTAL   --    --   0.2   ALKPHOS   --    --   67   AST   --    --   17   ALT   --    --   26    < > = values in this interval not displayed.       Lab Results   Component Value Date    UCRR 32 07/25/2017    MICROL 5 07/25/2017    UMALCR  15.94 07/25/2017       Last Basic Metabolic Panel:  Lab Results   Component Value Date     07/25/2017      Lab Results   Component Value Date    POTASSIUM 4.2 07/25/2017     Lab Results   Component Value Date    CHLORIDE 103 07/25/2017     Lab Results   Component Value Date    BUN 7 07/25/2017     Lab Results   Component Value Date    CR 0.56 07/25/2017     GFR Estimate   Date Value Ref Range Status   07/25/2017 >90  Non  GFR Calc   >60 mL/min/1.7m2 Final   01/24/2017 >90  Non  GFR Calc   >60 mL/min/1.7m2 Final   07/26/2016 >90  Non  GFR Calc   >60 mL/min/1.7m2 Final     TSH   Date Value Ref Range Status   10/10/2016 2.89 0.40 - 4.00 mU/L Final     Most Recent Immunizations   Administered Date(s) Administered     Influenza Vaccine, 3 YRS +, IM (QUADRIVALENT W/PRESERVATIVES) 12/05/2016     TDAP Vaccine (Adacel) 05/19/2016       ASSESSMENT:                             Current medications were reviewed today.     Medication Adherence: no issues identified    Diabetes: Stable. Last A1c 7.6 on 4/10/17. Will enter for patient. Due to frequency of hypoglycemia I would not push patient much lower. Would like an updated A1c before making that decision though.    Allergic rhinitis: Needs improvement. Pt to try Loratadine, Cetirizine, or Fexofenadine.     Asthma:  Stable. Last ACT at goal >19.     Supplements: Stable. Last value at 30.     Depression/ Anxiety:  Stable. Improved symptoms.     Nausea: Stable.     Hypothyroidism: Stable. TSH WNL.     PLAN:                            Pt to...  1. Get updated A1c  2. Use a 2nd generation antihistamine to improve allergy symptoms.     I spent 30 minutes with this patient today. All changes were made via collaborative practice agreement with Nancy Mejia. A copy of the visit note was provided to the patient's primary care provider.    Will follow up in 6-12 months.    The patient was sent via [employer] a summary of these  recommendations as an after visit summary.     Jose Maria Bhakta, PharmD  [facility] Pharmacist    Phone: [phone]

## 2017-10-11 ENCOUNTER — TELEPHONE (OUTPATIENT)
Dept: PEDIATRICS | Facility: CLINIC | Age: 22
End: 2017-10-11

## 2017-10-11 NOTE — TELEPHONE ENCOUNTER
Please call her and ask about where she is seen for diab care, and see if she would like to establish with clinic. She is 'failing' for BP, could schedule a BP check here.

## 2017-10-11 NOTE — TELEPHONE ENCOUNTER
Patient is followed by nephrologist,declines appt with PMD or nurse only,states BP is always high in clinic,had negative 24hr blood pressure study which was normal.

## 2017-12-04 ENCOUNTER — MYC REFILL (OUTPATIENT)
Dept: NEPHROLOGY | Facility: CLINIC | Age: 22
End: 2017-12-04

## 2017-12-04 DIAGNOSIS — F41.8 DEPRESSION WITH ANXIETY: ICD-10-CM

## 2017-12-04 NOTE — TELEPHONE ENCOUNTER
Message from [facility]:  Original authorizing provider: MD Stacey Ngo would like a refill of the following medications:  escitalopram (LEXAPRO) 10 MG tablet [Connor Castaneda MD]    Preferred pharmacy: [facility] [address] [place] [address]    Comment:      Medication renewals requested in this message routed to other providers:  levothyroxine (SYNTHROID, LEVOTHROID) 50 MCG tablet [SHAMEKA GiangC]

## 2017-12-05 DIAGNOSIS — E03.8 OTHER SPECIFIED HYPOTHYROIDISM: ICD-10-CM

## 2017-12-05 RX ORDER — ESCITALOPRAM OXALATE 10 MG/1
10 TABLET ORAL DAILY
Qty: 30 TABLET | Refills: 11 | Status: SHIPPED | OUTPATIENT
Start: 2017-12-05 | End: 2019-05-28

## 2017-12-05 RX ORDER — LEVOTHYROXINE SODIUM 50 UG/1
50 TABLET ORAL DAILY
Qty: 90 TABLET | Refills: 0 | Status: SHIPPED | OUTPATIENT
Start: 2017-12-05 | End: 2018-03-09

## 2017-12-05 NOTE — TELEPHONE ENCOUNTER
Reviewed with Dr. Castaneda, ok to refill Lexapro as long as patient is still taking it. Call to patient, she reports that she is still taking it at 10 mg daily. Sent refill through.  Eileen Cabral LPN  Nephrology  [facility]  [phone]

## 2018-01-12 DIAGNOSIS — N18.1 CHRONIC KIDNEY DISEASE, STAGE I: Primary | ICD-10-CM

## 2018-01-16 ENCOUNTER — TELEPHONE (OUTPATIENT)
Dept: PEDIATRICS | Facility: CLINIC | Age: 23
End: 2018-01-16

## 2018-01-16 DIAGNOSIS — Z20.828 EXPOSURE TO THE FLU: Primary | ICD-10-CM

## 2018-01-16 RX ORDER — OSELTAMIVIR PHOSPHATE 75 MG/1
75 CAPSULE ORAL DAILY
Qty: 10 CAPSULE | Refills: 0 | Status: SHIPPED | OUTPATIENT
Start: 2018-01-16 | End: 2018-04-10

## 2018-01-16 NOTE — TELEPHONE ENCOUNTER
Influenza-Like Illness (DEMETRIO) Protocol    Stacey Rodríguezarelimargarita      Age: 22 year old     YOB: 1995    Are you currently sick or have you had close contact with someone who is currently sick?   This patient is not currently sick but has had close contact with someone who was. 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     Evaluation for Possible Influenza Exposure  (ADULTS)    Below are conditions which place adults at increased risk for the more severe complications of influenza.    Does this patient have ANY of the following conditions?  Chronic pulmonary disease such as asthma or COPD Yes   Heart disease (CHF, CAD, anticoag due to arrhythmia) No   Liver disease (hepatitis, liver failure, cirrhosis) Yes   Kidney disease (renal failure, insufficiency or dialysis) Yes   Metabolic disorder (e.g. diabetes) yes   Neuromuscular disorder (CAITY ROUSE MD, myasthenia gravis) No   Compromised ability to handle respiratory secretions No   Hematologic disorder (e.g. sickle cell disease) No   HIV / AIDS No   Chemotherapy or radiation within the last 3 months No   Received an organ or a bone marrow transplant No   Taking Prednisone in excess of 20mg daily No   Is age 65 years or older No   Is pregnant, thinks she may be pregnant or is within two weeks after delivery No   Is a resident of a chronic care facility No   Is patient  or [place] native  Yes     GFR Estimate   >60 mL/min/1.7m2 Final 07/25/2017  2:55 PM 1740   >90   Non  GFR Calc      GFR Estimate If Black   >60 mL/min/1.7m2 Final 07/25/2017  2:55 PM 1740           Nursing Plan  Does this patient have ANY of the above conditions?    Yes.  Plan: Routed chart to provider  Unsure if patient qualifies with above answers.     Provided home care instructions    If further questions/concerns or if new symptoms develop, call your PCP or [facility] as soon as possible.    When to seek medical  attention    Contact your health care provider right away if you experience:    A painful sore throat accompanied by fever persists for more than 48 hours    Ear pain, sinus pain, persistent vomiting and/or diarrhea    Oral temperature greater than 104  Fahrenheit (40  Celsius)    Dehydration (e.g., mouth feeling dry, dizzy when sitting/standing, decreased urine output)    Severe or persistent vomiting; unable to keep fluids down    Improvement in flu-like symptoms (fever and cough or sore throat) but then return of fever and worse cough or sore throat    Not drinking enough fluid    Any other concerns not stated above        Additional educational resources include:    http://www.[employer].com    http://www.cdc.gov/flu/    [phone] ok to haley.  Shiela López

## 2018-01-16 NOTE — TELEPHONE ENCOUNTER
Patient notified and would like Tamiflu.  Order placed and sent to provider for cosign.  Patient asked if she needs to wait to complete the Tamiflu prior to getting the Flu shot.  Discussed with provider and she doesn't feel that there is a need to wait.  My Chart message sent to patient with this information.  DADA Becerra RN

## 2018-01-16 NOTE — TELEPHONE ENCOUNTER
She can have prophylacitc dosing of tamiflu but it's very expensive. Might not be worth it to her.

## 2018-01-17 ENCOUNTER — OFFICE VISIT (OUTPATIENT)
Dept: NEPHROLOGY | Facility: CLINIC | Age: 23
End: 2018-01-17
Attending: INTERNAL MEDICINE
Payer: COMMERCIAL

## 2018-01-17 VITALS
SYSTOLIC BLOOD PRESSURE: 130 MMHG | BODY MASS INDEX: 44.41 KG/M2 | OXYGEN SATURATION: 97 % | TEMPERATURE: 98.6 F | HEART RATE: 100 BPM | DIASTOLIC BLOOD PRESSURE: 84 MMHG | WEIGHT: 293 LBS | HEIGHT: 68 IN

## 2018-01-17 DIAGNOSIS — N18.1 CHRONIC KIDNEY DISEASE, STAGE I: ICD-10-CM

## 2018-01-17 DIAGNOSIS — E10.9 TYPE 1 DIABETES MELLITUS WITHOUT COMPLICATION (H): ICD-10-CM

## 2018-01-17 DIAGNOSIS — N18.1 CKD (CHRONIC KIDNEY DISEASE) STAGE 1, GFR 90 ML/MIN OR GREATER: Primary | ICD-10-CM

## 2018-01-17 LAB
ALBUMIN SERPL-MCNC: 3.4 G/DL (ref 3.4–5)
ANION GAP SERPL CALCULATED.3IONS-SCNC: 6 MMOL/L (ref 3–14)
BUN SERPL-MCNC: 13 MG/DL (ref 7–30)
CALCIUM SERPL-MCNC: 8.8 MG/DL (ref 8.5–10.1)
CHLORIDE SERPL-SCNC: 103 MMOL/L (ref 94–109)
CO2 SERPL-SCNC: 28 MMOL/L (ref 20–32)
CREAT SERPL-MCNC: 0.7 MG/DL (ref 0.52–1.04)
CREAT UR-MCNC: 27 MG/DL
DEPRECATED CALCIDIOL+CALCIFEROL SERPL-MC: 18 UG/L (ref 20–75)
FERRITIN SERPL-MCNC: 13 NG/ML (ref 12–150)
GFR SERPL CREATININE-BSD FRML MDRD: >90 ML/MIN/1.7M2
GLUCOSE SERPL-MCNC: 118 MG/DL (ref 70–99)
HBA1C MFR BLD: 7 % (ref 4.3–6)
HGB BLD-MCNC: 13.9 G/DL (ref 11.7–15.7)
IRON SATN MFR SERPL: 14 % (ref 15–46)
IRON SERPL-MCNC: 58 UG/DL (ref 35–180)
MICROALBUMIN UR-MCNC: <5 MG/L
MICROALBUMIN/CREAT UR: NORMAL MG/G CR (ref 0–25)
PHOSPHATE SERPL-MCNC: 3.8 MG/DL (ref 2.5–4.5)
POTASSIUM SERPL-SCNC: 4.7 MMOL/L (ref 3.4–5.3)
PROT UR-MCNC: <0.05 G/L
PROT/CREAT 24H UR: NORMAL G/G CR (ref 0–0.2)
PTH-INTACT SERPL-MCNC: 60 PG/ML (ref 12–72)
SODIUM SERPL-SCNC: 136 MMOL/L (ref 133–144)
TIBC SERPL-MCNC: 417 UG/DL (ref 240–430)

## 2018-01-17 PROCEDURE — 36415 COLL VENOUS BLD VENIPUNCTURE: CPT | Performed by: NURSE PRACTITIONER

## 2018-01-17 PROCEDURE — 82043 UR ALBUMIN QUANTITATIVE: CPT | Performed by: INTERNAL MEDICINE

## 2018-01-17 PROCEDURE — 80069 RENAL FUNCTION PANEL: CPT | Performed by: NURSE PRACTITIONER

## 2018-01-17 PROCEDURE — 85018 HEMOGLOBIN: CPT | Performed by: NURSE PRACTITIONER

## 2018-01-17 PROCEDURE — 83550 IRON BINDING TEST: CPT | Performed by: NURSE PRACTITIONER

## 2018-01-17 PROCEDURE — 82306 VITAMIN D 25 HYDROXY: CPT | Performed by: NURSE PRACTITIONER

## 2018-01-17 PROCEDURE — G0463 HOSPITAL OUTPT CLINIC VISIT: HCPCS | Mod: ZF

## 2018-01-17 PROCEDURE — 83540 ASSAY OF IRON: CPT | Performed by: NURSE PRACTITIONER

## 2018-01-17 PROCEDURE — 82728 ASSAY OF FERRITIN: CPT | Performed by: NURSE PRACTITIONER

## 2018-01-17 PROCEDURE — 83036 HEMOGLOBIN GLYCOSYLATED A1C: CPT | Performed by: NURSE PRACTITIONER

## 2018-01-17 PROCEDURE — 84156 ASSAY OF PROTEIN URINE: CPT | Performed by: INTERNAL MEDICINE

## 2018-01-17 PROCEDURE — 83970 ASSAY OF PARATHORMONE: CPT | Performed by: NURSE PRACTITIONER

## 2018-01-17 ASSESSMENT — PAIN SCALES - GENERAL: PAINLEVEL: NO PAIN (0)

## 2018-01-17 NOTE — LETTER
"1/17/2018      RE: Stacey La  [address]  [facility] [address]       Nephrology Clinic    Stacey Mantilla MRN:[id number] YOB: 1995  Date of Service: January 17, 2018   Primary care provider: Amalia Ervin  Requesting physician: India Olivas     ASSESSMENT AND RECOMMENDATIONS:    Stacey Mantilla is a 22 year old woman with Type 1 DM complicated by biopsy-proven diabetic nephropathy with preserved kidney function.    1.  CKD, stage 1:  Secondary to biopsy proven diabetic nephropathy with preserved kidney function (baseline Cr~0.6 mg/dL, eGFR>90) and well controlled albuminuria  (<30 mg/g).  However, suspect significant  hyperfiltration due to DM 2 and obesity.  She has responded well to RAAS blockade with enalapril.     -will continue enalapril at 20 mg BID for management of HTN and albuminuria.  Monitor closely for unintended pregnancy.    -continue good blood pressure and glucose control  -encourage weight loss  -RTC in 6 months    2. HTN/Volume: Home SBP at goal of <130/80.  Mildly hypervolemic on exam.  Suspect minimal lower extremity edema is related to high salt intake in a setting of sodium avid kidneys and venous stasis.  Suspect an element of \"white coat\" hypertension with her clinic blood pressures.  A 24 hour ambulatory BP monitoring was performed following a previous clinic visit that revealed average overall daily BP of 124/68 confirming white coat hypertension.  However, she did not have a night time dipping pattern.       -continue low salt diet  -continue enalapril at 20 mg BID  -will consider carvedilol next should her home BP rise above goal  -RTC in 6 months    3. Diabetes:  Complicated by diabetic nephropathy.  No retinopathy or peripheral neuropathy.  She has had ~5 episodes of DKA in the past. Recent hgb A1c was 7 (Jan, 2018).   -followed closely by endocrinology    4.  Vitamin D deficiency: Low vitamin D level multiple times in the past. Vit D normalized but low " again at 18 today.     -continue cholecalciferol to 2000 units daily    5.  Depression/anxiety:  In part, situational and related to stress at home, her job, passing of a loved one and her medical illness.    -continue lexapro at 10 mg daily (risks, including suicidal ideation explained in clinic in the past) started at previous clinic visit.  She is no longer on Buspar and takes hydroxyzine prn.   -followed closely by her PCP and psychology/psychiatry    6.  Iron deficiency:  Not anemic (hgb 13.9). Iron stores are low (%sat 14, ferritin 13). Suspect iron deficiency related to menses and possibly decreased GI absorption.    -she would like to avoid iron supplements for now; encouraged her to increase intake of iron containing foods.    -will repeat hgb at next visit          REASON FOR CONSULT: CKD    HISTORY OF PRESENT ILLNESS:   Stacey Mantilla is a 22 year old woman with Type 1 DM and celiac disease who presents for CKD f/u.  She was previously cared for by her pediatric nephrologist, Dr. India Olivas.  She was diagnosed with insulin dependent diabetes at age 7.  She then developed proteinuria at age 13 and ultimately underwent a kidney biopsy that revealed mild diabetic nephropathy.  She has preserved kidney function and her proteinuria that has been well controlled with enalapril  (now at 20 mg BID).  She has no history of retinopathy or peripheral neuropathy.  She has had ~5 episodes of DKA.  Her diabetes is followed by Dr. Allan and Laureen Goldstein (PA) of adult endocrinology.  She continues on a insulin (lispro) pump.  She is  and is not actively trying to be pregnant.  Her other past medical history is remarkable for exercise-induced asthma, depression and obesity. Her 22 BP ambulatory monitoring showed good BP control on enalapril (20 mg BID) in the past. She otherwise has no medical complaints and feels her depression is under descent control.  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     PAST  MEDICAL HISTORY:  Past Medical History:   Diagnosis Date     Asthma     Currently no treatment needed     Celiac disease      Chronic kidney disease, stage I 8/3/2015     Chronic sinusitis      Diabetes mellitus type 1 (H)      Diabetic nephropathy (H)      Family history of heart disease 8/3/2015     Hypertension      Hypothyroid      Pedal edema      Psoriasis      PAST SURGICAL HISTORY:  Past Surgical History:   Procedure Laterality Date     ENT SURGERY       TONSILLECTOMY, ADENOIDECTOMY, COMBINED       MEDICATIONS:  Prescription Medications as of 1/23/2018             oseltamivir (TAMIFLU) 75 MG capsule Take 1 capsule (75 mg) by mouth daily    escitalopram (LEXAPRO) 10 MG tablet Take 1 tablet (10 mg) by mouth daily    levothyroxine (SYNTHROID/LEVOTHROID) 50 MCG tablet Take 1 tablet (50 mcg) by mouth daily    fluticasone (FLONASE) 50 MCG/ACT spray Spray 2 sprays into both nostrils daily    Ascorbic Acid (VITAMIN C PO) Take 500 mg by mouth daily    Multiple Vitamins-Minerals (MULTIVITAMIN ADULT PO) Take 1 tablet by mouth daily    insulin lispro (HUMALOG) 100 UNIT/ML injection Use as directed up to 140 units daily in insulin pump.    glucagon (GLUCAGON EMERGENCY) 1 MG kit Inject 1 mg into the muscle once for 1 dose    enalapril (VASOTEC) 20 MG tablet TAKE ONE TABLET BY MOUTH TWICE DAILY    albuterol (2.5 MG/3ML) 0.083% neb solution Take 1 vial (2.5 mg) by nebulization every 4 hours as needed for shortness of breath / dyspnea or wheezing /chest tightness/cough    hydrOXYzine (VISTARIL) 25 MG capsule Take 1 capsule (25 mg) by mouth daily as needed for itching    Blood Pressure Monitoring KIT 1 kit daily    Ondansetron HCl (ZOFRAN PO) Take by mouth as needed for nausea or vomiting Reported on 3/17/2017    albuterol (ALBUTEROL) 108 (90 BASE) MCG/ACT inhaler Inhale 2 puffs into the lungs every 6 hours as needed    Cholecalciferol 2000 UNITS TBDP Take 2,000 Units by mouth daily    prochlorperazine (COMPAZINE) 10 MG  "tablet Take 1 tablet (10 mg) by mouth every 6 hours as needed for nausea or vomiting    blood glucose test strip Use to test blood sugar eight times daily or as directed.  Dispense Brandy Contour Next Test Strips.    insulin syringes, disposable, U-100 0.3 ML MISC Use 2-3 times daily as needed         ALLERGIES:    Allergies   Allergen Reactions     Dogs Other (See Comments)     Sinus symptoms      Dust Mites      Sinus      Gluten Meal      REVIEW OF SYSTEMS:  A comprehensive review of systems was performed and found to be negative except as described here or above.   SOCIAL HISTORY:   Social History     Social History     Marital status:      Spouse name: N/A     Number of children: N/A     Years of education: N/A     Occupational History     Not on file.     Social History Main Topics     Smoking status: Never Smoker     Smokeless tobacco: Never Used     Alcohol use 0.0 oz/week     0 Standard drinks or equivalent per week      Comment: 2 times per month, 2 drinks per time     Drug use: No     Sexual activity: Yes     Partners: Male     Birth control/ protection: Condom     Other Topics Concern     Parent/Sibling W/ Cabg, Mi Or Angioplasty Before 65f 55m? Yes     Caffeine Concern Yes     1 cup tea per day     Sleep Concern No     Special Diet Yes     gluten free diet, low carbs     Exercise Yes     walking, ellyptical, swimming, weights     Seat Belt Yes     Social History Narrative     FAMILY MEDICAL HISTORY:   Remarkable for an uncle with CKD that seems related to unresolved HSALONDA.  Otherwise, FMH is unremarkable.    PHYSICAL EXAM:   /84 (BP Location: Right arm, Patient Position: Sitting, Cuff Size: Adult Large)  Pulse 100  Temp 98.6  F (37  C) (Oral)  Ht 1.727 m (5' 8\")  Wt 136 kg (299 lb 12.8 oz)  SpO2 97%  BMI 45.58 kg/m2   GENERAL APPEARANCE: alert and no distress  EYES: nonicteric  HENT: mouth without ulcers or lesions  NECK: supple, no adenopathy  RESP: lungs clear to auscultation   CV: " regular rhythm, no rub  ABDOMEN: obese, soft, nontender, normal bowel sounds  : No CVA tenderness  Extremities: no significant lower extremity edema  MS: no evidence of inflammation in joints, no muscle tenderness  SKIN: no concerning rash  NEURO: mentation intact and speech normal  PSYCH: affect and mood appropriate   LABS:   CMP  Recent Labs   Lab Test  01/17/18   1515  07/25/17   1455  01/24/17   1525  07/26/16   1137  02/20/16   1827   08/04/15   1443   03/25/15   1655   12/09/13   1137   NA  136  134  137  137  139   < >   --    < >  138   < >  137   POTASSIUM  4.7  4.2  4.2  4.4  3.6   < >   --    < >  3.8   < >  3.9   CHLORIDE  103  103  103  105  109   < >   --    < >  105   < >  103   CO2  28  25  26  27  25   < >   --    < >  25   < >  25   ANIONGAP  6  6  8  5  5   < >   --    < >  8   < >  9   GLC  118*  211*  107*  90  55*   < >   --    < >  73   < >  202*   BUN  13  7  7  7  8   < >   --    < >  16   < >  11   CR  0.70  0.56  0.55  0.60  0.67   < >   --    < >  0.55   < >  0.48*   GFRESTIMATED  >90  >90  Non African American GFR Calc    >90  Non  GFR Calc    >90  Non  GFR Calc    >90  Non  GFR Calc     < >   --    < >  >90  Non  GFR Calc     < >  >90   GFRESTBLACK  >90  >90  African American GFR Calc    >90   GFR Calc    >90   GFR Calc    >90   GFR Calc     < >   --    < >  >90   GFR Calc     < >  >90   FRANKLIN  8.8  8.8  9.3  9.2  8.3*   < >   --    < >  9.0   < >  9.6   PHOS  3.8  2.7  3.4  4.0   --    < >   --    < >   --    < >   --    PROTTOTAL   --    --    --    --   7.5   --    --    --   8.3   --   7.9   ALBUMIN  3.4  3.7  4.0  3.6  3.6   < >   --    < >  4.1   < >  4.2   BILITOTAL   --    --    --    --   0.2   --    --    --   0.4   --   0.3   ALKPHOS   --    --    --    --   67   --    --    --   82   --   92   AST   --    --    --    --   17   --    --    --   14    --   23   ALT   --    --    --    --   26   --   <5*   --   29   --   32    < > = values in this interval not displayed.     CBC  Recent Labs   Lab Test  01/17/18   1515  07/25/17   1455  01/24/17   1525  07/26/16   1137  02/20/16   1827  02/19/16   2220   07/28/15   1536  03/26/15   0645   HGB  13.9  13.3  14.2  13.3  13.5  14.7   < >  13.9  13.2   WBC   --    --    --    --   12.6*  12.7*   --   11.7*  10.0   RBC   --    --    --    --   4.84  5.35*   --   5.14  4.86   HCT   --    --    --    --   41.4  45.2   --   42.5  40.6   MCV   --    --    --    --   86  85   --   83  84   MCH   --    --    --    --   27.9  27.5   --   27.0  27.2   MCHC   --    --    --    --   32.6  32.5   --   32.7  32.5   RDW   --    --    --    --   13.8  13.6   --   13.5  13.7   PLT   --    --    --    --   330  357   --   354  261    < > = values in this interval not displayed.     INR  Recent Labs   Lab Test  02/19/10   0925   INR  0.87   PTT  25     URINE STUDIES  Recent Labs   Lab Test  03/25/15   1810  07/22/14   1506  03/06/14   1040  10/18/12   1210   COLOR  Yellow  Straw  Straw  Light Yellow   APPEARANCE  Slightly Cloudy  Clear  Clear  Clear   URINEGLC  Negative  Negative  Negative  Negative   URINEBILI  Negative  Negative  Negative  Negative   URINEKETONE  Negative  Negative  Negative  Negative   SG  1.021  1.012  1.004  1.009   UBLD  Trace*  Negative  Negative  Negative   URINEPH  5.5  7.0  7.0  7.5*   PROTEIN  10*  Negative  Negative  10*   NITRITE  Negative  Negative  Negative  Negative   LEUKEST  Negative  Negative  Negative  Negative   RBCU  1  1  1  1   WBCU  1  0  <1  1     Recent Labs   Lab Test  01/17/18   1517  07/25/17   1457  01/24/17   1533  07/26/16   1135  01/16/16   0906  07/28/15   1522  01/19/15   1554  07/22/14   1506  03/06/14   0948  10/18/12   1210  04/12/12   0907  10/28/11   1210  09/09/10   0835  03/11/10   1240  11/12/09   0745   UTPG  Unable to calculate due to low value  Unable to calculate due to  low value  Unable to calculate due to low value  0.23*  0.15  Unable to calculate due to low value  Unable to calculate due to low value  <0.10  0.13  0.49*  0.30*  0.69*  0.46*  Test canceled - Lab  error  0.20  0.42*     PTH  Recent Labs   Lab Test  01/17/18   1515  01/24/17   1525  01/16/16   0905  07/22/14   1519   PTHI  60  24  31  18     IRON STUDIES  Recent Labs   Lab Test  01/17/18   1515  07/25/17   1455  07/26/16   1137  01/16/16   0905  07/22/14   1519   IRON  58  50  60  58  27*   FEB  417  374  423  412  372   IRONSAT  14*  13*  14*  14*  7*   DIPESH  13  26  13   --   9*     IMAGING:  None     Total time spent was >40 minutes, and more than 50% of face to face time was spent in counseling and/or coordination of care regarding principles of multidisciplinary care, medication management, and chronic kidney disease education.  Much time was spent discussing the importance of good blood pressure control and ace inhibitor use for slowing down the progression of CKD.  Connor Castaneda MD

## 2018-01-17 NOTE — MR AVS SNAPSHOT
After Visit Summary   1/17/2018    Stacey La    MRN: [id number]           Patient Information     Date Of Birth          1995        Visit Information        Provider Department      1/17/2018 4:00 PM Connor Castaneda MD [facility]        Today's Diagnoses     CKD (chronic kidney disease) stage 1, GFR 90 ml/min or greater    -  1       Follow-ups after your visit        Follow-up notes from your care team     Return in about 6 months (around 7/17/2018).      Your next 10 appointments already scheduled     Jan 29, 2018  2:00 PM CST   (Arrive by 1:45 PM)   RETURN DIABETES with Laureen Goldstein PA-C   [facility] ([facility])    [address]  3rd Floor  [facility] [id number] [phone]            Apr 10, 2018  3:30 PM CDT   (Arrive by 3:15 PM)   RETURN DIABETES with Bia Allan MD   [facility] ([facility])    [address]  3rd Floor  [facility] [id number] [phone]            Sep 05, 2018  3:00 PM CDT   Lab with  LAB   [facility] ([facility])    [address]  1st Floor  [facility] [id number] [phone]            Sep 05, 2018  4:00 PM CDT   (Arrive by 3:30 PM)   Return Visit with Connor Castaneda MD   [facility] ([facility])    [address] [place] [address]  [address]  [facility] [id number] [phone]              Who to contact     If you have questions or need follow up information about today's clinic visit or your schedule please contact [facility] directly at [phone].  Normal or non-critical lab and imaging results will be communicated to you by MyChart, letter or phone within 4 business days after the clinic has received the results. If you do not hear from us within 7 days, please contact the clinic through MyChart or phone. If you have a critical or  "abnormal lab result, we will notify you by phone as soon as possible.  Submit refill requests through [employer] or call your pharmacy and they will forward the refill request to us. Please allow 3 business days for your refill to be completed.          Additional Information About Your Visit        [employer] Information     [employer] gives you secure access to your electronic health record. If you see a primary care provider, you can also send messages to your care team and make appointments. If you have questions, please call your primary care clinic.  If you do not have a primary care provider, please call [phone] and they will assist you.        Care EveryWhere ID     This is your Care EveryWhere ID. This could be used by other organizations to access your [place] medical records  [id number]        Your Vitals Were     Pulse Temperature Height Pulse Oximetry BMI (Body Mass Index)       100 98.6  F (37  C) (Oral) 1.727 m (5' 8\") 97% 45.58 kg/m2        Blood Pressure from Last 3 Encounters:   01/17/18 130/84   07/25/17 127/80   06/05/17 (!) 135/92    Weight from Last 3 Encounters:   01/17/18 136 kg (299 lb 12.8 oz)   07/25/17 130.2 kg (287 lb)   06/05/17 129.3 kg (285 lb)              Today, you had the following     No orders found for display       Primary Care Provider Office Phone # Fax #    LUIS FERNANDO [facility] [phone] [address] [place] [address]        Equal Access to Services     [facility]: Hadii aad ku hadasho Soomaali, waaxda luqadaha, qaybta kaalmada adeegyada, keagan avalos . So [facility] [phone].    ATENCIÓN: Si habla español, tiene a escobar disposición servicios gratuitos de asistencia lingüística. Llame al [phone].    We comply with applicable federal civil rights laws and [place] laws. We do not discriminate on the basis of race, color, national origin, age, disability, sex, sexual orientation, or gender " identity.            Thank you!     Thank you for choosing [facility]  for your care. Our goal is always to provide you with excellent care. Hearing back from our patients is one way we can continue to improve our services. Please take a few minutes to complete the written survey that you may receive in the mail after your visit with us. Thank you!             Your Updated Medication List - Protect others around you: Learn how to safely use, store and throw away your medicines at www.disposemymeds.org.          This list is accurate as of: 1/17/18 11:59 PM.  Always use your most recent med list.                   Brand Name Dispense Instructions for use Diagnosis    * albuterol 108 (90 BASE) MCG/ACT Inhaler    PROAIR HFA    1 each    Inhale 2 puffs into the lungs every 6 hours as needed    Mild intermittent asthma without complication       * albuterol (2.5 MG/3ML) 0.083% neb solution     1 Box    Take 1 vial (2.5 mg) by nebulization every 4 hours as needed for shortness of breath / dyspnea or wheezing /chest tightness/cough    Uncomplicated asthma, unspecified asthma severity       blood glucose monitoring test strip    no brand specified    4 Box    Use to test blood sugar eight times daily or as directed.  Dispense Brandy Contour Next Test Strips.    Diabetes mellitus type 1 (H)       Blood Pressure Monitoring Kit     1 kit    1 kit daily    HTN (hypertension)       Cholecalciferol 2000 UNITS Tbdp     90 tablet    Take 2,000 Units by mouth daily    Proteinuria, Vitamin D deficiency       enalapril 20 MG tablet    VASOTEC    180 tablet    TAKE ONE TABLET BY MOUTH TWICE DAILY    Proteinuria       escitalopram 10 MG tablet    LEXAPRO    30 tablet    Take 1 tablet (10 mg) by mouth daily    Depression with anxiety       fluticasone 50 MCG/ACT spray    FLONASE     Spray 2 sprays into both nostrils daily        glucagon 1 MG kit    GLUCAGON EMERGENCY    1 mg    Inject 1 mg into the muscle once for 1 dose    Well  controlled type 1 diabetes mellitus (H)       hydrOXYzine 25 MG capsule    VISTARIL    30 capsule    Take 1 capsule (25 mg) by mouth daily as needed for itching    Major depressive disorder, recurrent episode, in partial remission (H)       insulin lispro 100 UNIT/ML injection    HumaLOG    12 mL    Use as directed up to 140 units daily in insulin pump.    Well controlled type 1 diabetes mellitus (H)       insulin syringes (disposable) U-100 0.3 ML Misc     100 each    Use 2-3 times daily as needed    Type 1 diabetes mellitus (H)       levothyroxine 50 MCG tablet    SYNTHROID/LEVOTHROID    90 tablet    Take 1 tablet (50 mcg) by mouth daily    Other specified hypothyroidism       MULTIVITAMIN ADULT PO      Take 1 tablet by mouth daily        oseltamivir 75 MG capsule    TAMIFLU    10 capsule    Take 1 capsule (75 mg) by mouth daily    Exposure to the flu       prochlorperazine 10 MG tablet    COMPAZINE    10 tablet    Take 1 tablet (10 mg) by mouth every 6 hours as needed for nausea or vomiting    Intractable vomiting       VITAMIN C PO      Take 500 mg by mouth daily        ZOFRAN PO      Take by mouth as needed for nausea or vomiting Reported on 3/17/2017        * Notice:  This list has 2 medication(s) that are the same as other medications prescribed for you. Read the directions carefully, and ask your doctor or other care provider to review them with you.

## 2018-01-17 NOTE — NURSING NOTE
"Chief Complaint   Patient presents with     RECHECK     chronic kidney diease       Initial /84 (BP Location: Right arm, Patient Position: Sitting, Cuff Size: Adult Large)  Pulse 100  Temp 98.6  F (37  C) (Oral)  Ht 1.727 m (5' 8\")  Wt 136 kg (299 lb 12.8 oz)  SpO2 97%  BMI 45.58 kg/m2 Estimated body mass index is 45.58 kg/(m^2) as calculated from the following:    Height as of this encounter: 1.727 m (5' 8\").    Weight as of this encounter: 136 kg (299 lb 12.8 oz).  Medication Reconciliation: complete     Varsha Iniguez MA    "

## 2018-01-23 NOTE — PROGRESS NOTES
"Nephrology Clinic    Stacey Mantilla MRN:[id number] YOB: 1995  Date of Service: January 17, 2018   Primary care provider: Amalia Ervin  Requesting physician: India Olivas     ASSESSMENT AND RECOMMENDATIONS:    Stacey Mantilla is a 22 year old woman with Type 1 DM complicated by biopsy-proven diabetic nephropathy with preserved kidney function.    1.  CKD, stage 1:  Secondary to biopsy proven diabetic nephropathy with preserved kidney function (baseline Cr~0.6 mg/dL, eGFR>90) and well controlled albuminuria  (<30 mg/g).  However, suspect significant  hyperfiltration due to DM 2 and obesity.  She has responded well to RAAS blockade with enalapril.     -will continue enalapril at 20 mg BID for management of HTN and albuminuria.  Monitor closely for unintended pregnancy.    -continue good blood pressure and glucose control  -encourage weight loss  -RTC in 6 months    2. HTN/Volume: Home SBP at goal of <130/80.  Mildly hypervolemic on exam.  Suspect minimal lower extremity edema is related to high salt intake in a setting of sodium avid kidneys and venous stasis.  Suspect an element of \"white coat\" hypertension with her clinic blood pressures.  A 24 hour ambulatory BP monitoring was performed following a previous clinic visit that revealed average overall daily BP of 124/68 confirming white coat hypertension.  However, she did not have a night time dipping pattern.       -continue low salt diet  -continue enalapril at 20 mg BID  -will consider carvedilol next should her home BP rise above goal  -RTC in 6 months    3. Diabetes:  Complicated by diabetic nephropathy.  No retinopathy or peripheral neuropathy.  She has had ~5 episodes of DKA in the past. Recent hgb A1c was 7 (Jan, 2018).   -followed closely by endocrinology    4.  Vitamin D deficiency: Low vitamin D level multiple times in the past. Vit D normalized but low again at 18 today.     -continue cholecalciferol to 2000 units daily    5.  " Depression/anxiety:  In part, situational and related to stress at home, her job, passing of a loved one and her medical illness.    -continue lexapro at 10 mg daily (risks, including suicidal ideation explained in clinic in the past) started at previous clinic visit.  She is no longer on Buspar and takes hydroxyzine prn.   -followed closely by her PCP and psychology/psychiatry    6.  Iron deficiency:  Not anemic (hgb 13.9). Iron stores are low (%sat 14, ferritin 13). Suspect iron deficiency related to menses and possibly decreased GI absorption.    -she would like to avoid iron supplements for now; encouraged her to increase intake of iron containing foods.    -will repeat hgb at next visit          REASON FOR CONSULT: CKD    HISTORY OF PRESENT ILLNESS:   Stacey Mantilla is a 22 year old woman with Type 1 DM and celiac disease who presents for CKD f/u.  She was previously cared for by her pediatric nephrologist, Dr. India Olivas.  She was diagnosed with insulin dependent diabetes at age 7.  She then developed proteinuria at age 13 and ultimately underwent a kidney biopsy that revealed mild diabetic nephropathy.  She has preserved kidney function and her proteinuria that has been well controlled with enalapril  (now at 20 mg BID).  She has no history of retinopathy or peripheral neuropathy.  She has had ~5 episodes of DKA.  Her diabetes is followed by Dr. Allan and Laureen Goldstein (PA) of adult endocrinology.  She continues on a insulin (lispro) pump.  She is  and is not actively trying to be pregnant.  Her other past medical history is remarkable for exercise-induced asthma, depression and obesity. Her 22 BP ambulatory monitoring showed good BP control on enalapril (20 mg BID) in the past. She otherwise has no medical complaints and feels her depression is under descent control.  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     PAST MEDICAL HISTORY:  Past Medical History:   Diagnosis Date     Asthma      Currently no treatment needed     Celiac disease      Chronic kidney disease, stage I 8/3/2015     Chronic sinusitis      Diabetes mellitus type 1 (H)      Diabetic nephropathy (H)      Family history of heart disease 8/3/2015     Hypertension      Hypothyroid      Pedal edema      Psoriasis      PAST SURGICAL HISTORY:  Past Surgical History:   Procedure Laterality Date     ENT SURGERY       TONSILLECTOMY, ADENOIDECTOMY, COMBINED       MEDICATIONS:  Prescription Medications as of 1/23/2018             oseltamivir (TAMIFLU) 75 MG capsule Take 1 capsule (75 mg) by mouth daily    escitalopram (LEXAPRO) 10 MG tablet Take 1 tablet (10 mg) by mouth daily    levothyroxine (SYNTHROID/LEVOTHROID) 50 MCG tablet Take 1 tablet (50 mcg) by mouth daily    fluticasone (FLONASE) 50 MCG/ACT spray Spray 2 sprays into both nostrils daily    Ascorbic Acid (VITAMIN C PO) Take 500 mg by mouth daily    Multiple Vitamins-Minerals (MULTIVITAMIN ADULT PO) Take 1 tablet by mouth daily    insulin lispro (HUMALOG) 100 UNIT/ML injection Use as directed up to 140 units daily in insulin pump.    glucagon (GLUCAGON EMERGENCY) 1 MG kit Inject 1 mg into the muscle once for 1 dose    enalapril (VASOTEC) 20 MG tablet TAKE ONE TABLET BY MOUTH TWICE DAILY    albuterol (2.5 MG/3ML) 0.083% neb solution Take 1 vial (2.5 mg) by nebulization every 4 hours as needed for shortness of breath / dyspnea or wheezing /chest tightness/cough    hydrOXYzine (VISTARIL) 25 MG capsule Take 1 capsule (25 mg) by mouth daily as needed for itching    Blood Pressure Monitoring KIT 1 kit daily    Ondansetron HCl (ZOFRAN PO) Take by mouth as needed for nausea or vomiting Reported on 3/17/2017    albuterol (ALBUTEROL) 108 (90 BASE) MCG/ACT inhaler Inhale 2 puffs into the lungs every 6 hours as needed    Cholecalciferol 2000 UNITS TBDP Take 2,000 Units by mouth daily    prochlorperazine (COMPAZINE) 10 MG tablet Take 1 tablet (10 mg) by mouth every 6 hours as needed for nausea or  "vomiting    blood glucose test strip Use to test blood sugar eight times daily or as directed.  Dispense Brandy Contour Next Test Strips.    insulin syringes, disposable, U-100 0.3 ML MISC Use 2-3 times daily as needed         ALLERGIES:    Allergies   Allergen Reactions     Dogs Other (See Comments)     Sinus symptoms      Dust Mites      Sinus      Gluten Meal      REVIEW OF SYSTEMS:  A comprehensive review of systems was performed and found to be negative except as described here or above.   SOCIAL HISTORY:   Social History     Social History     Marital status:      Spouse name: N/A     Number of children: N/A     Years of education: N/A     Occupational History     Not on file.     Social History Main Topics     Smoking status: Never Smoker     Smokeless tobacco: Never Used     Alcohol use 0.0 oz/week     0 Standard drinks or equivalent per week      Comment: 2 times per month, 2 drinks per time     Drug use: No     Sexual activity: Yes     Partners: Male     Birth control/ protection: Condom     Other Topics Concern     Parent/Sibling W/ Cabg, Mi Or Angioplasty Before 65f 55m? Yes     Caffeine Concern Yes     1 cup tea per day     Sleep Concern No     Special Diet Yes     gluten free diet, low carbs     Exercise Yes     walking, ellyptical, swimming, weights     Seat Belt Yes     Social History Narrative     FAMILY MEDICAL HISTORY:   Remarkable for an uncle with CKD that seems related to unresolved SHALONDA.  Otherwise, FMH is unremarkable.    PHYSICAL EXAM:   /84 (BP Location: Right arm, Patient Position: Sitting, Cuff Size: Adult Large)  Pulse 100  Temp 98.6  F (37  C) (Oral)  Ht 1.727 m (5' 8\")  Wt 136 kg (299 lb 12.8 oz)  SpO2 97%  BMI 45.58 kg/m2   GENERAL APPEARANCE: alert and no distress  EYES: nonicteric  HENT: mouth without ulcers or lesions  NECK: supple, no adenopathy  RESP: lungs clear to auscultation   CV: regular rhythm, no rub  ABDOMEN: obese, soft, nontender, normal bowel " sounds  : No CVA tenderness  Extremities: no significant lower extremity edema  MS: no evidence of inflammation in joints, no muscle tenderness  SKIN: no concerning rash  NEURO: mentation intact and speech normal  PSYCH: affect and mood appropriate   LABS:   CMP  Recent Labs   Lab Test  01/17/18   1515  07/25/17   1455  01/24/17   1525  07/26/16   1137  02/20/16   1827   08/04/15   1443   03/25/15   1655   12/09/13   1137   NA  136  134  137  137  139   < >   --    < >  138   < >  137   POTASSIUM  4.7  4.2  4.2  4.4  3.6   < >   --    < >  3.8   < >  3.9   CHLORIDE  103  103  103  105  109   < >   --    < >  105   < >  103   CO2  28  25  26  27  25   < >   --    < >  25   < >  25   ANIONGAP  6  6  8  5  5   < >   --    < >  8   < >  9   GLC  118*  211*  107*  90  55*   < >   --    < >  73   < >  202*   BUN  13  7  7  7  8   < >   --    < >  16   < >  11   CR  0.70  0.56  0.55  0.60  0.67   < >   --    < >  0.55   < >  0.48*   GFRESTIMATED  >90  >90  Non African American GFR Calc    >90  Non  GFR Calc    >90  Non  GFR Calc    >90  Non  GFR Calc     < >   --    < >  >90  Non  GFR Calc     < >  >90   GFRESTBLACK  >90  >90  African American GFR Calc    >90   GFR Calc    >90   GFR Calc    >90   GFR Calc     < >   --    < >  >90   GFR Calc     < >  >90   FRANKLIN  8.8  8.8  9.3  9.2  8.3*   < >   --    < >  9.0   < >  9.6   PHOS  3.8  2.7  3.4  4.0   --    < >   --    < >   --    < >   --    PROTTOTAL   --    --    --    --   7.5   --    --    --   8.3   --   7.9   ALBUMIN  3.4  3.7  4.0  3.6  3.6   < >   --    < >  4.1   < >  4.2   BILITOTAL   --    --    --    --   0.2   --    --    --   0.4   --   0.3   ALKPHOS   --    --    --    --   67   --    --    --   82   --   92   AST   --    --    --    --   17   --    --    --   14   --   23   ALT   --    --    --    --   26   --   <5*   --   29   --    32    < > = values in this interval not displayed.     CBC  Recent Labs   Lab Test  01/17/18   1515  07/25/17   1455  01/24/17   1525  07/26/16   1137  02/20/16   1827  02/19/16   2220   07/28/15   1536  03/26/15   0645   HGB  13.9  13.3  14.2  13.3  13.5  14.7   < >  13.9  13.2   WBC   --    --    --    --   12.6*  12.7*   --   11.7*  10.0   RBC   --    --    --    --   4.84  5.35*   --   5.14  4.86   HCT   --    --    --    --   41.4  45.2   --   42.5  40.6   MCV   --    --    --    --   86  85   --   83  84   MCH   --    --    --    --   27.9  27.5   --   27.0  27.2   MCHC   --    --    --    --   32.6  32.5   --   32.7  32.5   RDW   --    --    --    --   13.8  13.6   --   13.5  13.7   PLT   --    --    --    --   330  357   --   354  261    < > = values in this interval not displayed.     INR  Recent Labs   Lab Test  02/19/10   0925   INR  0.87   PTT  25     URINE STUDIES  Recent Labs   Lab Test  03/25/15   1810  07/22/14   1506  03/06/14   1040  10/18/12   1210   COLOR  Yellow  Straw  Straw  Light Yellow   APPEARANCE  Slightly Cloudy  Clear  Clear  Clear   URINEGLC  Negative  Negative  Negative  Negative   URINEBILI  Negative  Negative  Negative  Negative   URINEKETONE  Negative  Negative  Negative  Negative   SG  1.021  1.012  1.004  1.009   UBLD  Trace*  Negative  Negative  Negative   URINEPH  5.5  7.0  7.0  7.5*   PROTEIN  10*  Negative  Negative  10*   NITRITE  Negative  Negative  Negative  Negative   LEUKEST  Negative  Negative  Negative  Negative   RBCU  1  1  1  1   WBCU  1  0  <1  1     Recent Labs   Lab Test  01/17/18   1517  07/25/17   1457  01/24/17   1533  07/26/16   1135  01/16/16   0906  07/28/15   1522  01/19/15   1554  07/22/14   1506  03/06/14   0948  10/18/12   1210  04/12/12   0907  10/28/11   1210  09/09/10   0835  03/11/10   1240  11/12/09   0745   UTPG  Unable to calculate due to low value  Unable to calculate due to low value  Unable to calculate due to low value  0.23*  0.15  Unable to  calculate due to low value  Unable to calculate due to low value  <0.10  0.13  0.49*  0.30*  0.69*  0.46*  Test canceled - Lab  error  0.20  0.42*     PTH  Recent Labs   Lab Test  01/17/18   1515  01/24/17   1525  01/16/16   0905  07/22/14   1519   PTHI  60  24  31  18     IRON STUDIES  Recent Labs   Lab Test  01/17/18   1515  07/25/17   1455  07/26/16   1137  01/16/16   0905  07/22/14   1519   IRON  58  50  60  58  27*   FEB  417  374  423  412  372   IRONSAT  14*  13*  14*  14*  7*   DIPESH  13  26  13   --   9*     IMAGING:  None     Total time spent was >40 minutes, and more than 50% of face to face time was spent in counseling and/or coordination of care regarding principles of multidisciplinary care, medication management, and chronic kidney disease education.  Much time was spent discussing the importance of good blood pressure control and ace inhibitor use for slowing down the progression of CKD.  Connor Castaneda MD

## 2018-01-26 ASSESSMENT — ENCOUNTER SYMPTOMS
SORE THROAT: 0
DIARRHEA: 1
BLOATING: 1
HEARTBURN: 0
NECK MASS: 0
PANIC: 0
TASTE DISTURBANCE: 0
CONSTIPATION: 0
INSOMNIA: 0
TROUBLE SWALLOWING: 0
BLOOD IN STOOL: 0
BOWEL INCONTINENCE: 0
HOARSE VOICE: 0
SINUS CONGESTION: 1
VOMITING: 0
JAUNDICE: 0
RECTAL PAIN: 0
NAUSEA: 0
NERVOUS/ANXIOUS: 1
SINUS PAIN: 1
DEPRESSION: 0
SMELL DISTURBANCE: 0
ABDOMINAL PAIN: 0
DECREASED CONCENTRATION: 1

## 2018-01-29 ENCOUNTER — OFFICE VISIT (OUTPATIENT)
Dept: ENDOCRINOLOGY | Facility: CLINIC | Age: 23
End: 2018-01-29
Payer: COMMERCIAL

## 2018-01-29 VITALS
DIASTOLIC BLOOD PRESSURE: 90 MMHG | WEIGHT: 293 LBS | BODY MASS INDEX: 44.41 KG/M2 | HEIGHT: 68 IN | HEART RATE: 97 BPM | SYSTOLIC BLOOD PRESSURE: 147 MMHG

## 2018-01-29 DIAGNOSIS — E10.9 WELL CONTROLLED TYPE 1 DIABETES MELLITUS (H): Primary | ICD-10-CM

## 2018-01-29 NOTE — MR AVS SNAPSHOT
After Visit Summary   1/29/2018    Stacey La    MRN: [id number]           Patient Information     Date Of Birth          1995        Visit Information        Provider Department      1/29/2018 2:00 PM Laureen Goldstein PA-C [facility]         Follow-ups after your visit        Your next 10 appointments already scheduled     Apr 10, 2018  3:30 PM CDT   (Arrive by 3:15 PM)   RETURN DIABETES with Bia Allan MD   [facility] ([facility])    [address]  3rd Floor  [place] [facility] [address] [phone]            Sep 05, 2018  3:00 PM CDT   Lab with  LAB   [facility] ([facility])    [address]  1st Floor  [place] [facility] [address] [phone]            Sep 05, 2018  4:00 PM CDT   (Arrive by 3:30 PM)   Return Visit with Connor Castaneda MD   [facility] ([facility])    [address]  [address]  [place] [facility] [address] [phone]              Who to contact     Please call your clinic at [phone] to:    Ask questions about your health    Make or cancel appointments    Discuss your medicines    Learn about your test results    Speak to your doctor   If you have compliments or concerns about an experience at your clinic, or if you wish to file a complaint, please contact [facility] Patient Relations at [phone] or email us at [email or web address]         Additional Information About Your Visit        [employer] Information     TableNOW gives you secure access to your electronic health record. If you see a primary care provider, you can also send messages to your care team and make appointments. If you have questions, please call your primary care clinic.  If you do not have a primary care provider, please call [phone] and they will assist you.      TableNOW is an electronic gateway that  "provides easy, online access to your medical records. With [employer], you can request a clinic appointment, read your test results, renew a prescription or communicate with your care team.     To access your existing account, please contact your [facility] or call [phone] for assistance.        Care EveryWhere ID     This is your Care EveryWhere ID. This could be used by other organizations to access your [facility] medical records  [id number]        Your Vitals Were     Pulse Height BMI (Body Mass Index)             97 1.727 m (5' 8\") 45.9 kg/m2          Blood Pressure from Last 3 Encounters:   01/29/18 147/90   01/17/18 130/84   07/25/17 127/80    Weight from Last 3 Encounters:   01/29/18 (!) 136.9 kg (301 lb 14.4 oz)   01/17/18 136 kg (299 lb 12.8 oz)   07/25/17 130.2 kg (287 lb)              Today, you had the following     No orders found for display       Primary Care Provider Office Phone # Fax #    LUIS FERNANDO [facility] [phone] [address] [place] [address]        Equal Access to Services     [facility] AH: Hadii aad ku hadasho Soomaali, waaxda luqadaha, qaybta kaalmada adeegyada, keagan torres haykimmien lissa west. So [facility] [phone].    ATENCIÓN: Si habla español, tiene a escobar disposición servicios gratuitos de asistencia lingüística. Hardeep al [phone].    We comply with applicable federal civil rights laws and [place] laws. We do not discriminate on the basis of race, color, national origin, age, disability, sex, sexual orientation, or gender identity.            Thank you!     Thank you for choosing [facility]  for your care. Our goal is always to provide you with excellent care. Hearing back from our patients is one way we can continue to improve our services. Please take a few minutes to complete the written survey that you may receive in the mail after your visit with us. Thank you!           "   Your Updated Medication List - Protect others around you: Learn how to safely use, store and throw away your medicines at www.disposemymeds.org.          This list is accurate as of 1/29/18  3:07 PM.  Always use your most recent med list.                   Brand Name Dispense Instructions for use Diagnosis    * albuterol 108 (90 BASE) MCG/ACT Inhaler    PROAIR HFA    1 each    Inhale 2 puffs into the lungs every 6 hours as needed    Mild intermittent asthma without complication       * albuterol (2.5 MG/3ML) 0.083% neb solution     1 Box    Take 1 vial (2.5 mg) by nebulization every 4 hours as needed for shortness of breath / dyspnea or wheezing /chest tightness/cough    Uncomplicated asthma, unspecified asthma severity       blood glucose monitoring test strip    no brand specified    4 Box    Use to test blood sugar eight times daily or as directed.  Dispense [address] Contour Next Test Strips.    Diabetes mellitus type 1 (H)       Blood Pressure Monitoring Kit     1 kit    1 kit daily    HTN (hypertension)       Cholecalciferol 2000 UNITS Tbdp     90 tablet    Take 2,000 Units by mouth daily    Proteinuria, Vitamin D deficiency       enalapril 20 MG tablet    VASOTEC    180 tablet    TAKE ONE TABLET BY MOUTH TWICE DAILY    Proteinuria       escitalopram 10 MG tablet    LEXAPRO    30 tablet    Take 1 tablet (10 mg) by mouth daily    Depression with anxiety       fluticasone 50 MCG/ACT spray    FLONASE     Spray 2 sprays into both nostrils daily        glucagon 1 MG kit    GLUCAGON EMERGENCY    1 mg    Inject 1 mg into the muscle once for 1 dose    Well controlled type 1 diabetes mellitus (H)       hydrOXYzine 25 MG capsule    VISTARIL    30 capsule    Take 1 capsule (25 mg) by mouth daily as needed for itching    Major depressive disorder, recurrent episode, in partial remission (H)       insulin lispro 100 UNIT/ML injection    HumaLOG    12 mL    Use as directed up to 140 units daily in insulin pump.    Well  controlled type 1 diabetes mellitus (H)       insulin syringes (disposable) U-100 0.3 ML Misc     100 each    Use 2-3 times daily as needed    Type 1 diabetes mellitus (H)       levothyroxine 50 MCG tablet    SYNTHROID/LEVOTHROID    90 tablet    Take 1 tablet (50 mcg) by mouth daily    Other specified hypothyroidism       MULTIVITAMIN ADULT PO      Take 1 tablet by mouth daily        oseltamivir 75 MG capsule    TAMIFLU    10 capsule    Take 1 capsule (75 mg) by mouth daily    Exposure to the flu       prochlorperazine 10 MG tablet    COMPAZINE    10 tablet    Take 1 tablet (10 mg) by mouth every 6 hours as needed for nausea or vomiting    Intractable vomiting       VITAMIN C PO      Take 500 mg by mouth daily        ZOFRAN PO      Take by mouth as needed for nausea or vomiting Reported on 3/17/2017        * Notice:  This list has 2 medication(s) that are the same as other medications prescribed for you. Read the directions carefully, and ask your doctor or other care provider to review them with you.

## 2018-01-29 NOTE — NURSING NOTE
"Chief Complaint   Patient presents with     RECHECK     DIABETES TYPE 1 F/U        Initial /90 (BP Location: Right arm, Patient Position: Sitting, Cuff Size: Adult Large)  Pulse 97  Ht 1.727 m (5' 8\")  Wt (!) 136.9 kg (301 lb 14.4 oz)  BMI 45.9 kg/m2 Estimated body mass index is 45.9 kg/(m^2) as calculated from the following:    Height as of this encounter: 1.727 m (5' 8\").    Weight as of this encounter: 136.9 kg (301 lb 14.4 oz).  Medication Reconciliation: complete    "

## 2018-01-29 NOTE — PROGRESS NOTES
HPI:  Stacey is a 23 yo woman here for follow up of type 1 diabetes, diagnosed at age 7.     She also sees Dr. Allan.  Stacey's diabetes is complicated by nephropathy. She also has hyperlipidemia, a history of hypothyroidism (although she had been off of levothyroxine for several years) and celiac disease.  She follows a strict gluten free diet.  She and her  were contemplating pregnancy, but they decided to get a puppy instead and wait a few years.  She had been wearing her Dexcom more regularly as she does not recognize hypoglycemia and she thinks it helps her stay on top of her glucose control.      She uses the T-slim pump with the integrated sensor.  She likes this. She has been having very little hypoglyemia, but sometimes doesn't feel it until she is in the 40's.     Stacey tests her blood sugar 4 times daily with an overall average this month of 161 mg/dL on her meter, 188 mg/dL on her sensor.  She tends to be highest at HS, through the night,  and in the morning.      Stacey wears a t-slim pump with basal rates as follows:   Mid- 2.65  3am- 3.2  7am- 3.2  11am- 2.05  6pm-2.05    IC ratio-   Mid-1:4g   3am- 1:5g   7am- 1:5g  11am- 1:6g  6pm- 1:5g    Sensitivity- 16  Target glucose- 100 during the day, 110 overnight    Average total daily insulin dose-  113 Units (53%basal)    For her hypothyroidism- just restarted levothyroxine 50 mcg daily in October, 2016 in anticipation of attempting pregnancy.  She reports that her hypothyroidism had previously resolved on its own and had been off levothyroxine for a couple years.       For her dyslipidemia- we stopped pravastatin at her last appointment in anticipation of pregnancy.       For her CKD (stage 1), she is taking enalapril. She follows with nephrology.  She understands she will need to stop this if she were to become pregnant.    A1c today is down from 7.6% to 7.0%.     ROS  GENERAL: no weight loss, weight gain, fevers, chills, malaise, night sweats.    HEENT: no dysphagia, diplopia, neck pain or tenderness, dry/scratchy eyes, URI, cough, sinus drainage, tinnitus, sinus pressure  CV: no chest pain, pressure, palpitations, skipped beats, LOC  LUNGS: no SOB, CHILDRESS, cough, sputum production, wheezing   GI: no diarrhea, constipation, abdominal pain  EXTREMITIES: no rashes, ulcers, edema  NEUROLOGY: no changes in vision, tingling or numbness in hands or feet.   MSK: no muscle aches or pains, weakness  PSYCH: mood stable    Past Medical History:   Diagnosis Date     Asthma     Currently no treatment needed     Celiac disease      Chronic kidney disease, stage I 8/3/2015     Chronic sinusitis      Diabetes mellitus type 1 (H)      Diabetic nephropathy (H)      Family history of heart disease 8/3/2015     Hypertension      Hypothyroid      Pedal edema      Psoriasis      PMH:   Type 1 diabetes- since 2003  Celiac disease- since age 13  Hypothyroidism- age 10 (no longer on levothyroxine for several years)  Hyperlipidemia- had been on a statin for a little more than a year, mostly eating a plant based diet now.    Past Surgical History:   Procedure Laterality Date     ENT SURGERY       TONSILLECTOMY, ADENOIDECTOMY, COMBINED         Family History   Problem Relation Age of Onset     Cardiovascular Father 48     MI, stents, diabetic, type 2     GASTROINTESTINAL DISEASE Mother      Celiacs     Cardiovascular Maternal Grandfather      Englarged heart, pacemaker, defib     Family Hx:   Dad- premature CVD, in his 40s.  Type 2 diabetes  Mom- celiac disease  Grandfather- type 2 diabetes  Half brother- Asperger's  Half sister- cervical CA  Another half brother- bipolar and schizophrenia      History     Social History     Marital Status:      Spouse Name: N/A     Number of Children: N/A     Years of Education: N/A     Social History Main Topics     Smoking status: Never Smoker      Smokeless tobacco: Never Used     Alcohol Use: Yes      Comment: occ. use     Drug Use: No      Sexual Activity:     Partners: Male     Birth Control/ Protection: Condom     Other Topics Concern     Parent/Sibling W/ Cabg, Mi Or Angioplasty Before 65f 55m? Yes     Caffeine Concern Yes     1 cup tea per day     Sleep Concern No     Special Diet Yes     gluten free diet, low carbs     Exercise Yes     walking, ellyptical, swimming, weights     Seat Belt Yes     Social History Narrative     Social Hx:   . 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 She also goes to the gym a few days a week.  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     Current Outpatient Prescriptions   Medication     oseltamivir (TAMIFLU) 75 MG capsule     escitalopram (LEXAPRO) 10 MG tablet     levothyroxine (SYNTHROID/LEVOTHROID) 50 MCG tablet     fluticasone (FLONASE) 50 MCG/ACT spray     Ascorbic Acid (VITAMIN C PO)     Multiple Vitamins-Minerals (MULTIVITAMIN ADULT PO)     insulin lispro (HUMALOG) 100 UNIT/ML injection     glucagon (GLUCAGON EMERGENCY) 1 MG kit     enalapril (VASOTEC) 20 MG tablet     albuterol (2.5 MG/3ML) 0.083% neb solution     hydrOXYzine (VISTARIL) 25 MG capsule     Blood Pressure Monitoring KIT     Ondansetron HCl (ZOFRAN PO)     albuterol (ALBUTEROL) 108 (90 BASE) MCG/ACT inhaler     Cholecalciferol 2000 UNITS TBDP     prochlorperazine (COMPAZINE) 10 MG tablet     blood glucose test strip     insulin syringes, disposable, U-100 0.3 ML MISC     No current facility-administered medications for this visit.           Allergies   Allergen Reactions     Dogs Other (See Comments)     Sinus symptoms      Dust Mites      Sinus      Gluten Meal      RESULTS  Lab Results   Component Value Date    A1C 7.0 (H) 01/17/2018    A1C 7.6 (H) 03/26/2015    HEMOGLOBINA1 7.6 (A) 04/10/2017    HEMOGLOBINA1 6.9 (A) 10/03/2016    HEMOGLOBINA1 7.3 (A) 06/28/2016    HEMOGLOBINA1 7.1 (A) 03/28/2016    HEMOGLOBINA1 8.0 (A) 03/16/2015       TSH   Date Value Ref Range Status    10/10/2016 2.89 0.40 - 4.00 mU/L Final   01/16/2016 3.33 0.40 - 4.00 mU/L Final   12/09/2013 2.95 0.4 - 5.0 mU/L Final     T4 Free   Date Value Ref Range Status   12/09/2013 1.18 0.70 - 1.85 ng/dL Final       ALT   Date Value Ref Range Status   02/20/2016 26 0 - 50 U/L Final   08/04/2015 <5 (L) 5 - 30 U/L Final   ]    Recent Labs   Lab Test  08/04/15   1443  12/09/13   1137   CHOL  194  241*   HDL  66  66   LDL  102  142*   TRIG  131  165*   CHOLHDLRATIO  2.9  3.7       Lab Results   Component Value Date     01/17/2018      Lab Results   Component Value Date    POTASSIUM 4.7 01/17/2018     Lab Results   Component Value Date    CHLORIDE 103 01/17/2018     Lab Results   Component Value Date    FRANKLIN 8.8 01/17/2018     Lab Results   Component Value Date    CO2 28 01/17/2018     Lab Results   Component Value Date    BUN 13 01/17/2018     Lab Results   Component Value Date    CR 0.70 01/17/2018       GFR Estimate   Date Value Ref Range Status   01/17/2018 >90 >60 mL/min/1.7m2 Final     Comment:     Non  GFR Calc   07/25/2017 >90  Non  GFR Calc   >60 mL/min/1.7m2 Final   01/24/2017 >90  Non  GFR Calc   >60 mL/min/1.7m2 Final     GFR Estimate If Black   Date Value Ref Range Status   01/17/2018 >90 >60 mL/min/1.7m2 Final     Comment:      GFR Calc   07/25/2017 >90   GFR Calc   >60 mL/min/1.7m2 Final   01/24/2017 >90   GFR Calc   >60 mL/min/1.7m2 Final       Lab Results   Component Value Date    MICROL <5 01/17/2018     No results found for: MICROALBUMIN  No results found for: CPEPT, GADAB, ISCAB    No results found for: B12]    Most recent eye exam date: : 03/21/2017        A1c today: 7.0%.  Assessment/Plan:     1.  Type 1 diabetes- A1c is better, but she is having post-prandial spikes and she is fighting lows often.  A review of her pump download shows that she is on 30% more basal than she was on at her last visit.  She  recognizes that she has been trying to run her basal higher so she does not have to be as vigilant at bolusing.  She will reduce basal rates by 20% and start bolusing more regularly and accurately.  I did tell her this is likely contributing to her weight gain. She is no longer considering pregnancy as she wishes to have more life experiences and travel before starting a family.  They are using condoms for birth control.     2.  Risk factors-     Retinopathy:  No.  Had eye exam within last year.   Nephropathy:  BP well controlled. No microalbuminuria.  Creatinine stable.  She is on enalapril.  White coat hypertension.       Neuropathy: No.    Feet: OK, no ulcers.   Lipids:  .  Had been on pravastatin 20 mg daily.  She stopped statin in anticipation of pregnancy.  She does not want to resume statin at this time.      3.  Hypothyroidism- last TSH was 2.89 on 10/10/16 on no levothyroxine.   She did resume 50 mcg daily at that time in anticipation of pregnancy.  Will recheck TSH.     4. F/U in 3 months with Dr. Allan.     I spent 30 minutes with this patient face to face and explained the conditions and plans (more than 50% of time was counseling/coordination of care, diabetes management, follow up plan for worsening hyper and hypoglycemia) . The patient understood and is satisfied with today's visit.     Laureen Goldstein PA-C, MPAS   [facility]  Department of Medicine  Division of Endocrinology and Diabetes

## 2018-01-29 NOTE — LETTER
1/29/2018       RE: Stacey aL  [id number] [address]  [facility] [address]     Dear Colleague,    Thank you for referring your patient, Stacey La, to the [facility] ENDOCRINOLOGY at [facility]. Please see a copy of my visit note below.    HPI:  Stacey is a 21 yo woman here for follow up of type 1 diabetes, diagnosed at age 7.     She also sees Dr. Allan.  Stacey's diabetes is complicated by nephropathy. She also has hyperlipidemia, a history of hypothyroidism (although she had been off of levothyroxine for several years) and celiac disease.  She follows a strict gluten free diet.  She and her  were contemplating pregnancy, but they decided to get a puppy instead and wait a few years.  She had been wearing her Dexcom more regularly as she does not recognize hypoglycemia and she thinks it helps her stay on top of her glucose control.      She uses the T-slim pump with the integrated sensor.  She likes this. She has been having very little hypoglyemia, but sometimes doesn't feel it until she is in the 40's.     Stacey tests her blood sugar 4 times daily with an overall average this month of 161 mg/dL on her meter, 188 mg/dL on her sensor.  She tends to be highest at HS, through the night,  and in the morning.      Stacey wears a t-slim pump with basal rates as follows:   Mid- 2.65  3am- 3.2  7am- 3.2  11am- 2.05  6pm-2.05    IC ratio-   Mid-1:4g   3am- 1:5g   7am- 1:5g  11am- 1:6g  6pm- 1:5g    Sensitivity- 16  Target glucose- 100 during the day, 110 overnight    Average total daily insulin dose-  113 Units (53%basal)    For her hypothyroidism- just restarted levothyroxine 50 mcg daily in October, 2016 in anticipation of attempting pregnancy.  She reports that her hypothyroidism had previously resolved on its own and had been off levothyroxine for a couple years.       For her dyslipidemia- we stopped pravastatin at her last appointment in anticipation of  pregnancy.       For her CKD (stage 1), she is taking enalapril. She follows with nephrology.  She understands she will need to stop this if she were to become pregnant.    A1c today is down from 7.6% to 7.0%.     ROS  GENERAL: no weight loss, weight gain, fevers, chills, malaise, night sweats.   HEENT: no dysphagia, diplopia, neck pain or tenderness, dry/scratchy eyes, URI, cough, sinus drainage, tinnitus, sinus pressure  CV: no chest pain, pressure, palpitations, skipped beats, LOC  LUNGS: no SOB, CHILDRESS, cough, sputum production, wheezing   GI: no diarrhea, constipation, abdominal pain  EXTREMITIES: no rashes, ulcers, edema  NEUROLOGY: no changes in vision, tingling or numbness in hands or feet.   MSK: no muscle aches or pains, weakness  PSYCH: mood stable    Past Medical History:   Diagnosis Date     Asthma     Currently no treatment needed     Celiac disease      Chronic kidney disease, stage I 8/3/2015     Chronic sinusitis      Diabetes mellitus type 1 (H)      Diabetic nephropathy (H)      Family history of heart disease 8/3/2015     Hypertension      Hypothyroid      Pedal edema      Psoriasis      PMH:   Type 1 diabetes- since 2003  Celiac disease- since age 13  Hypothyroidism- age 10 (no longer on levothyroxine for several years)  Hyperlipidemia- had been on a statin for a little more than a year, mostly eating a plant based diet now.    Past Surgical History:   Procedure Laterality Date     ENT SURGERY       TONSILLECTOMY, ADENOIDECTOMY, COMBINED         Family History   Problem Relation Age of Onset     Cardiovascular Father 48     MI, stents, diabetic, type 2     GASTROINTESTINAL DISEASE Mother      Celiacs     Cardiovascular Maternal Grandfather      Englarged heart, pacemaker, defib     Family Hx:   Dad- premature CVD, in his 40s.  Type 2 diabetes  Mom- celiac disease  Grandfather- type 2 diabetes  Half brother- Asperger's  Half sister- cervical CA  Another half brother- bipolar and  schizophrenia      History     Social History     Marital Status:      Spouse Name: N/A     Number of Children: N/A     Years of Education: N/A     Social History Main Topics     Smoking status: Never Smoker      Smokeless tobacco: Never Used     Alcohol Use: Yes      Comment: occ. use     Drug Use: No     Sexual Activity:     Partners: Male     Birth Control/ Protection: Condom     Other Topics Concern     Parent/Sibling W/ Cabg, Mi Or Angioplasty Before 65f 55m? Yes     Caffeine Concern Yes     1 cup tea per day     Sleep Concern No     Special Diet Yes     gluten free diet, low carbs     Exercise Yes     walking, ellyptical, swimming, weights     Seat Belt Yes     Social History Narrative     Social Hx:   . 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 She also goes to the gym a few days a week.  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     Current Outpatient Prescriptions   Medication     oseltamivir (TAMIFLU) 75 MG capsule     escitalopram (LEXAPRO) 10 MG tablet     levothyroxine (SYNTHROID/LEVOTHROID) 50 MCG tablet     fluticasone (FLONASE) 50 MCG/ACT spray     Ascorbic Acid (VITAMIN C PO)     Multiple Vitamins-Minerals (MULTIVITAMIN ADULT PO)     insulin lispro (HUMALOG) 100 UNIT/ML injection     glucagon (GLUCAGON EMERGENCY) 1 MG kit     enalapril (VASOTEC) 20 MG tablet     albuterol (2.5 MG/3ML) 0.083% neb solution     hydrOXYzine (VISTARIL) 25 MG capsule     Blood Pressure Monitoring KIT     Ondansetron HCl (ZOFRAN PO)     albuterol (ALBUTEROL) 108 (90 BASE) MCG/ACT inhaler     Cholecalciferol 2000 UNITS TBDP     prochlorperazine (COMPAZINE) 10 MG tablet     blood glucose test strip     insulin syringes, disposable, U-100 0.3 ML MISC     No current facility-administered medications for this visit.           Allergies   Allergen Reactions     Dogs Other (See Comments)     Sinus symptoms      Dust Mites      Sinus      Gluten Meal       RESULTS  Lab Results   Component Value Date    A1C 7.0 (H) 01/17/2018    A1C 7.6 (H) 03/26/2015    HEMOGLOBINA1 7.6 (A) 04/10/2017    HEMOGLOBINA1 6.9 (A) 10/03/2016    HEMOGLOBINA1 7.3 (A) 06/28/2016    HEMOGLOBINA1 7.1 (A) 03/28/2016    HEMOGLOBINA1 8.0 (A) 03/16/2015       TSH   Date Value Ref Range Status   10/10/2016 2.89 0.40 - 4.00 mU/L Final   01/16/2016 3.33 0.40 - 4.00 mU/L Final   12/09/2013 2.95 0.4 - 5.0 mU/L Final     T4 Free   Date Value Ref Range Status   12/09/2013 1.18 0.70 - 1.85 ng/dL Final       ALT   Date Value Ref Range Status   02/20/2016 26 0 - 50 U/L Final   08/04/2015 <5 (L) 5 - 30 U/L Final   ]    Recent Labs   Lab Test  08/04/15   1443  12/09/13   1137   CHOL  194  241*   HDL  66  66   LDL  102  142*   TRIG  131  165*   CHOLHDLRATIO  2.9  3.7       Lab Results   Component Value Date     01/17/2018      Lab Results   Component Value Date    POTASSIUM 4.7 01/17/2018     Lab Results   Component Value Date    CHLORIDE 103 01/17/2018     Lab Results   Component Value Date    FRANKLIN 8.8 01/17/2018     Lab Results   Component Value Date    CO2 28 01/17/2018     Lab Results   Component Value Date    BUN 13 01/17/2018     Lab Results   Component Value Date    CR 0.70 01/17/2018       GFR Estimate   Date Value Ref Range Status   01/17/2018 >90 >60 mL/min/1.7m2 Final     Comment:     Non  GFR Calc   07/25/2017 >90  Non  GFR Calc   >60 mL/min/1.7m2 Final   01/24/2017 >90  Non  GFR Calc   >60 mL/min/1.7m2 Final     GFR Estimate If Black   Date Value Ref Range Status   01/17/2018 >90 >60 mL/min/1.7m2 Final     Comment:      GFR Calc   07/25/2017 >90   GFR Calc   >60 mL/min/1.7m2 Final   01/24/2017 >90   GFR Calc   >60 mL/min/1.7m2 Final       Lab Results   Component Value Date    MICROL <5 01/17/2018     No results found for: MICROALBUMIN  No results found for: CPEPT, GADAB, ISCAB    No results found  for: B12]    Most recent eye exam date: : 03/21/2017        A1c today: 7.0%.  Assessment/Plan:     1.  Type 1 diabetes- A1c is better, but she is having post-prandial spikes and she is fighting lows often.  A review of her pump download shows that she is on 30% more basal than she was on at her last visit.  She recognizes that she has been trying to run her basal higher so she does not have to be as vigilant at bolusing.  She will reduce basal rates by 20% and start bolusing more regularly and accurately.  I did tell her this is likely contributing to her weight gain. She is no longer considering pregnancy as she wishes to have more life experiences and travel before starting a family.  They are using condoms for birth control.     2.  Risk factors-     Retinopathy:  No.  Had eye exam within last year.   Nephropathy:  BP well controlled. No microalbuminuria.  Creatinine stable.  She is on enalapril.  White coat hypertension.       Neuropathy: No.    Feet: OK, no ulcers.   Lipids:  .  Had been on pravastatin 20 mg daily.  She stopped statin in anticipation of pregnancy.  She does not want to resume statin at this time.      3.  Hypothyroidism- last TSH was 2.89 on 10/10/16 on no levothyroxine.   She did resume 50 mcg daily at that time in anticipation of pregnancy.  Will recheck TSH.     4. F/U in 3 months with Dr. Allan.     I spent 30 minutes with this patient face to face and explained the conditions and plans (more than 50% of time was counseling/coordination of care, diabetes management, follow up plan for worsening hyper and hypoglycemia) . The patient understood and is satisfied with today's visit.     Laureen Goldstein PA-C, MPAS   [facility]  Department of Medicine  Division of Endocrinology and Diabetes

## 2018-02-05 ENCOUNTER — MYC REFILL (OUTPATIENT)
Dept: NEPHROLOGY | Facility: CLINIC | Age: 23
End: 2018-02-05

## 2018-02-05 DIAGNOSIS — R80.9 PROTEINURIA: ICD-10-CM

## 2018-02-05 RX ORDER — ENALAPRIL MALEATE 20 MG/1
20 TABLET ORAL 2 TIMES DAILY
Qty: 180 TABLET | Refills: 3 | Status: SHIPPED | OUTPATIENT
Start: 2018-02-05 | End: 2018-02-07

## 2018-02-05 NOTE — TELEPHONE ENCOUNTER
Message from Haley:  Original authorizing provider: MD Stacey Ngo would like a refill of the following medications:  enalapril (VASOTEC) 20 MG tablet [Connor Castaneda MD]    Preferred pharmacy: [facility] [address] [place] [address]    Comment:

## 2018-02-07 ENCOUNTER — TELEPHONE (OUTPATIENT)
Dept: NEPHROLOGY | Facility: CLINIC | Age: 23
End: 2018-02-07

## 2018-02-07 DIAGNOSIS — R80.9 PROTEINURIA: ICD-10-CM

## 2018-02-07 RX ORDER — ENALAPRIL MALEATE 20 MG/1
20 TABLET ORAL 2 TIMES DAILY
Qty: 180 TABLET | Refills: 3 | Status: SHIPPED | OUTPATIENT
Start: 2018-02-07 | End: 2019-02-09

## 2018-03-09 DIAGNOSIS — E03.8 OTHER SPECIFIED HYPOTHYROIDISM: ICD-10-CM

## 2018-03-11 DIAGNOSIS — E10.9 WELL CONTROLLED TYPE 1 DIABETES MELLITUS (H): ICD-10-CM

## 2018-03-12 ENCOUNTER — HOSPITAL ENCOUNTER (EMERGENCY)
Facility: CLINIC | Age: 23
Discharge: HOME | End: 2018-03-12
Attending: EMERGENCY MEDICINE | Admitting: EMERGENCY MEDICINE
Payer: COMMERCIAL

## 2018-03-12 ENCOUNTER — APPOINTMENT (OUTPATIENT)
Dept: GENERAL RADIOLOGY | Facility: CLINIC | Age: 23
End: 2018-03-12
Attending: EMERGENCY MEDICINE
Payer: COMMERCIAL

## 2018-03-12 VITALS
TEMPERATURE: 99.3 F | DIASTOLIC BLOOD PRESSURE: 70 MMHG | RESPIRATION RATE: 18 BRPM | OXYGEN SATURATION: 97 % | BODY MASS INDEX: 44.31 KG/M2 | WEIGHT: 291.45 LBS | SYSTOLIC BLOOD PRESSURE: 114 MMHG

## 2018-03-12 DIAGNOSIS — J10.1 INFLUENZA A: ICD-10-CM

## 2018-03-12 LAB
FLUAV+FLUBV AG SPEC QL: NEGATIVE
FLUAV+FLUBV AG SPEC QL: POSITIVE
INTERPRETATION ECG - MUSE: NORMAL
SPECIMEN SOURCE: ABNORMAL

## 2018-03-12 PROCEDURE — 93005 ELECTROCARDIOGRAM TRACING: CPT

## 2018-03-12 PROCEDURE — 71046 X-RAY EXAM CHEST 2 VIEWS: CPT

## 2018-03-12 PROCEDURE — 99285 EMERGENCY DEPT VISIT HI MDM: CPT | Mod: 25

## 2018-03-12 PROCEDURE — 87804 INFLUENZA ASSAY W/OPTIC: CPT | Mod: 91 | Performed by: EMERGENCY MEDICINE

## 2018-03-12 PROCEDURE — 25000125 ZZHC RX 250: Performed by: EMERGENCY MEDICINE

## 2018-03-12 RX ORDER — ALBUTEROL SULFATE 90 UG/1
2 AEROSOL, METERED RESPIRATORY (INHALATION) EVERY 4 HOURS PRN
Qty: 1 INHALER | Refills: 0 | Status: SHIPPED | OUTPATIENT
Start: 2018-03-12 | End: 2018-04-10

## 2018-03-12 RX ORDER — LEVOTHYROXINE SODIUM 50 UG/1
50 TABLET ORAL DAILY
Qty: 90 TABLET | Refills: 0 | Status: SHIPPED | OUTPATIENT
Start: 2018-03-12 | End: 2019-02-11

## 2018-03-12 RX ORDER — PREDNISONE 20 MG/1
40 TABLET ORAL DAILY
Qty: 10 TABLET | Refills: 0 | Status: SHIPPED | OUTPATIENT
Start: 2018-03-12 | End: 2019-02-11

## 2018-03-12 RX ORDER — OSELTAMIVIR PHOSPHATE 75 MG/1
75 CAPSULE ORAL 2 TIMES DAILY
Qty: 10 CAPSULE | Refills: 0 | Status: SHIPPED | OUTPATIENT
Start: 2018-03-12 | End: 2019-02-11

## 2018-03-12 RX ORDER — PREDNISONE 20 MG/1
40 TABLET ORAL ONCE
Status: COMPLETED | OUTPATIENT
Start: 2018-03-12 | End: 2018-03-12

## 2018-03-12 RX ORDER — IPRATROPIUM BROMIDE AND ALBUTEROL SULFATE 2.5; .5 MG/3ML; MG/3ML
3 SOLUTION RESPIRATORY (INHALATION) ONCE
Status: COMPLETED | OUTPATIENT
Start: 2018-03-12 | End: 2018-03-12

## 2018-03-12 RX ORDER — IPRATROPIUM BROMIDE AND ALBUTEROL SULFATE 2.5; .5 MG/3ML; MG/3ML
1 SOLUTION RESPIRATORY (INHALATION) EVERY 6 HOURS PRN
Qty: 30 VIAL | Refills: 1 | Status: SHIPPED | OUTPATIENT
Start: 2018-03-12 | End: 2020-06-01

## 2018-03-12 RX ADMIN — PREDNISONE 40 MG: 20 TABLET ORAL at 06:45

## 2018-03-12 RX ADMIN — IPRATROPIUM BROMIDE AND ALBUTEROL SULFATE 3 ML: .5; 3 SOLUTION RESPIRATORY (INHALATION) at 06:45

## 2018-03-12 ASSESSMENT — ENCOUNTER SYMPTOMS
MYALGIAS: 1
FEVER: 1
RHINORRHEA: 1
COUGH: 1
WHEEZING: 1
VOMITING: 0
CHEST TIGHTNESS: 1
CHILLS: 0

## 2018-03-12 NOTE — ED NOTES
Patient alert and oriented times 3 .  Abc intact 1 day of not feeling well. Fever, cough, body aches

## 2018-03-12 NOTE — ED PROVIDER NOTES
History     Chief Complaint:  Cough and fever    HPI   Stacey La is a 22 year old female with a history of diabetes mellitus type 1, hypertension, CKD, and celiac disease who presents to the emergency department today for evaluation of a cough and fever. The patient reports yesterday morning she developed flu like symptoms including rhinorrhea, congestion, dry cough, wheezing, chest tightness, myalgias, and fever. Her temperature last night was 101 and raised to 102 this morning. Patient used her neb at home last night before going to bed with some improvement in her wheezing. She took tylenol 45 minutes prior to arrival and with persistent symptoms she presents to the ED for further evaluation. 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 The patient denies recent steroid use and has no other concerns at this time.     Allergies:  Dogs  Dust Mites  Gluten Meal     Medications:    enalapril (VASOTEC) 20 MG tablet  escitalopram (LEXAPRO) 10 MG tablet  levothyroxine (SYNTHROID/LEVOTHROID) 50 MCG tablet  fluticasone (FLONASE) 50 MCG/ACT spray  insulin lispro (HUMALOG) 100 UNIT/ML injection  albuterol (2.5 MG/3ML) 0.083% neb solution  hydrOXYzine (VISTARIL) 25 MG capsule  Ondansetron HCl (ZOFRAN PO)  oseltamivir (TAMIFLU) 75 MG capsule  albuterol (ALBUTEROL) 108 (90 BASE) MCG/ACT inhaler  prochlorperazine (COMPAZINE) 10 MG tablet     Past Medical History:    Asthma  Anxiety  Celiac disease  Chronic kidney disease, stage 1  Chronic sinusitis  Diabetes mellitus  Diabetic nephropathy  Hypertension  Hypothyroid  Pedal edema  Psoriasis    Past Surgical History:    ENT surgery  Tonsillectomy & Adenoidectomy     Family History:    MI  Diabetes mellitus type 2  Celiac disease  Enlarged heart    Social History:  The patient was alone.  Smoking Status: Never  Smokeless Tobacco: Never  Alcohol Use: Yes  Marital Status:        Review of Systems   Constitutional:  Positive for fever. Negative for chills.   HENT: Positive for congestion and rhinorrhea.    Respiratory: Positive for cough, chest tightness and wheezing.    Gastrointestinal: Negative for vomiting.   Musculoskeletal: Positive for myalgias.   All other systems reviewed and are negative.    Physical Exam   First Vitals:  BP: 144/79  Heart Rate: 130  Temp: 98.3  F (36.8  C)  Resp: 20  Weight: 132.2 kg (291 lb 7.2 oz)  SpO2: 98 %    Physical Exam  Constitutional: The patient is oriented to person, place, and time. Alert and cooperative.  HENT:   Right Ear: External ear normal.   Left Ear: External ear normal.   Nose: Nasal congestion present.   Mouth/Throat: Uvula is midline, oropharynx is clear and moist and mucous membranes are normal. No posterior oropharyngeal edema or erythema.   Eyes: Conjunctivae, EOM and lids are normal. Pupils are equal, round, and reactive to light.   Neck: Trachea normal. Normal range of motion. Neck supple.   Cardiovascular: tachycardia, regular rhythm, normal heart sounds, and intact distal pulses.    Pulmonary/Chest: Effort normal and breath sounds equal bilaterally. No crackles or wheezing.   Abdominal: Soft. No tenderness. No rebound and no guarding.   Musculoskeletal: Normal range of motion.  No extremity tenderness or edema.  Neurological: Alert and Oriented. Strength 5/5 in upper and lower extremities bilaterally. Sensation intact to light touch throughout.  Skin: Skin is dry. No rash noted.        Emergency Department Course     ECG:  Indication: wheezing and chest tightness  Completed at 0651.  Read at 0657.   Sinus tachycardia  Otherwise normal ECG   Rate 120 bpm. LA interval 140. QRS duration 84. QT/QTc 324/457. P-R-T axes 10 22 9.    Imaging:  Radiology findings were communicated with the patient who voiced understanding of the findings.  Chest XR PA & LAT   IMPRESSION: Negative.   Report per radiology     Laboratory:  Laboratory findings were communicated with the patient who  voiced understanding of the findings.  Influenza A/B Antigen: Positive for influenza A    Interventions:  0645 DuoNeb 3mL Nebulization   0645 Prednisone 40mg PO     Emergency Department Course:  Nursing notes and vitals reviewed.  The patient was sent for a Chest XR PA & LAT while in the emergency department, results above.   0635: I performed an exam of the patient as documented above.   0720: Patient rechecked and updated.   Findings and plan explained to the Patient. Patient discharged home with instructions regarding supportive care, medications, and reasons to return. The importance of close follow-up was reviewed. The patient was prescribed albuterol, prednisone, DuoNeb, and Tamiflu.   I personally reviewed the laboratory and imaging results with the Patient and answered all related questions prior to discharge.    Impression & Plan      Medical Decision Making:  Stacey La is a 22 year old female with a history of type 1 diabetes and asthma who presents to the ED for evaluation of an influenza like illness. Upon presentation to the ED, the patient is nontoxic appearing. She is tachycardic, though her vital signs are otherwise within normal limits and stable. On exam, she is well appearing. She is alert, oriented, and neuro exam is nonfocal. Aside from tachycardia, cardiopulmonary exam is unremarkable. Abdomen is soft and nontender throughout. The rest of her exam is as mentioned above. Influenza was obtained and is positive for influenza A. Chest x-ray was obtained and demonstrates no evidence of an acute cardiopulmonary process. In particular, there is no infiltrate to suggest pneumonia. Given this patient's history and presentation, I do feel her symptoms are most consistent with influenza A. The patient is well appearing on my exam here. She is in no acute respiratory distress and lungs are clear to auscultation. Therefore, I do feel patient is safe for discharge home with outpatient management of  her influenza A. Given that the patient's symptoms are within the window for initiation of treatment with Tamiflu and she does have underlying asthma and type 1 diabetes, I did discuss with the patient that I would recommend treatment with Tamiflu at this time. I did discuss the risks and benefits of Tamiflu with the patient and she notes understanding and agreement with treatment with Tamiflu at this time. I did discuss with the patient that I recommend very close follow up with a primary care physician. She notes understanding and agreement. Return instructions were given. She was stable/improved at the time of discharge.     Diagnosis:    ICD-10-CM    1. Influenza A J10.1      Disposition:  Discharged to home    Discharge Medications:  New Prescriptions    ALBUTEROL (PROAIR HFA) 108 (90 BASE) MCG/ACT INHALER    Inhale 2 puffs into the lungs every 4 hours as needed for shortness of breath / dyspnea    IPRATROPIUM - ALBUTEROL 0.5 MG/2.5 MG/3 ML (DUONEB) 0.5-2.5 (3) MG/3ML NEB SOLUTION    Take 1 vial (3 mLs) by nebulization every 6 hours as needed for shortness of breath / dyspnea or wheezing    OSELTAMIVIR (TAMIFLU) 75 MG CAPSULE    Take 1 capsule (75 mg) by mouth 2 times daily for 5 days    PREDNISONE (DELTASONE) 20 MG TABLET    Take 2 tablets (40 mg) by mouth daily for 5 days     Scribe Disclosure:  Jessica CARRENO, am serving as a scribe at 6:34 AM on 3/12/2018 to document services personally performed by Payton Burrell MD based on my observations and the provider's statements to me.     3/12/2018   [facility] EMERGENCY DEPARTMENT       Payton Burrell MD  03/12/18 0849

## 2018-03-12 NOTE — ED AVS SNAPSHOT
[facility] Department    [address] [place] [address]    Phone:  [phone]    Fax:  [phone]                                       Stacey La   MRN: [id number]    Department:  [facility] Emergency Department   Date of Visit:  3/12/2018           Patient Information     Date Of Birth          1995        Your diagnoses for this visit were:     Influenza A        You were seen by Payton Burrell MD.      Follow-up Information     Follow up with Nancy Mejia APRN CNP In 3 days.    Specialty:  Nurse Practitioner    Contact information:    [facility] [place] [address]  [phone]          Discharge Instructions       Please follow up closely with your regular physician. Please return to the ED if your symptoms worsen or if you develop new or concerning symptoms.     Discharge Instructions  Influenza    You were diagnosed today with influenza or influenza like illness.  Influenza is a respiratory illness caused by influenza A or B viruses.  Influenza causes fever, headache, muscle aches, and fatigue.  These symptoms start one to four days after you have been around a person with this illness.  People with influenza commonly have a dry cough, sore throat, and a runny nose.   Influenza is spread through sneezing and coughing and can live on surfaces for several days.  It is usually contagious for 5 days but in some cases up to 10 days and often affects several family members. If you have a family member who is less than 2 years old, older than 65 years old, pregnant or has a serious medical condition, they should be seen right away by a physician to decide if they should take preventative medications.      Return to the Emergency Department if:    You have trouble breathing.    You develop pain in your chest.    You have signs of being dehydrated, such as dizziness or unable to urinate at least three times  daily.    You feel confused.    You cannot stop vomiting or you cannot drink enough fluids.    In children, you should seek help if the child has any of the above or if child:    Has blue or purplish skin color.    Is so irritable that he or she does not want to be held.    Does not have tears when crying (in infants) or does not urinate at least three times daily.    Does not wake up easily.    Follow-up with your doctor if you are not improving after 5 days.    What can I do to help myself?    Rest.    Fluids -- Drink hydrating solutions such as Gatorade  or Pedialyte  as often as you can. If you are drinking enough, you should pass urine at least every eight hours.    Tylenol  (acetaminophen) and Advil  (ibuprofen) can relieve fever, headache, and muscle aches. Do not give aspirin to children under 18 years old.     Antiviral treatment -- Antiviral medicines do not make the flu symptoms go away immediately.  They have only been shown to make the symptoms go away 12 to 24 hours sooner than they would without treatment.       Antibiotics -- Antibiotics are NOT useful for treating viral illnesses such as influenza. Antibiotics should only be used if there is a bacterial complication of the flu such as bacterial pneumonia, ear infection, or sinusitis.    Because you were diagnosed with a flu like illness you are very contagious.  This means you cannot work, attend school or  for at least 24 hours or until you no longer have a fever.  If you were given a prescription for medicine here today, be sure to read all of the information (including the package insert) that comes with your prescription.  This will include important information about the medicine, its side effects, and any warnings that you need to know about.  The pharmacist who fills the prescription can provide more information and answer questions you may have about the medicine.  If you have questions or concerns that the pharmacist cannot address,  please call or return to the Emergency Department.   Opioid Medication Information    Pain medications are among the most commonly prescribed medicines, so we are including this information for all our patients. If you did not receive pain medication or get a prescription for pain medicine, you can ignore it.     You may have been given a prescription for an opioid (narcotic) pain medicine and/or have received a pain medicine while here in the Emergency Department. These medicines can make you drowsy or impaired. You must not drive, operate dangerous equipment, or engage in any other dangerous activities while taking these medications. If you drive while taking these medications, you could be arrested for DUI, or driving under the influence. Do not drink any alcohol while you are taking these medications.     Opioid pain medications can cause addiction. If you have a history of chemical dependency of any type, you are at a higher risk of becoming addicted to pain medications.  Only take these prescribed medications to treat your pain when all other options have been tried. Take it for as short a time and as few doses as possible. Store your pain pills in a secure place, as they are frequently stolen and provide a dangerous opportunity for children or visitors in your house to start abusing these powerful medications. We will not replace any lost or stolen medicine.  As soon as your pain is better, you should flush all your remaining medication.     Many prescription pain medications contain Tylenol  (acetaminophen), including Vicodin , Tylenol #3 , Norco , Lortab , and Percocet .  You should not take any extra pills of Tylenol  if you are using these prescription medications or you can get very sick.  Do not ever take more than 3000 mg of acetaminophen in any 24 hour period.    All opioids tend to cause constipation. Drink plenty of water and eat foods that have a lot of fiber, such as fruits, vegetables, prune  juice, apple juice and high fiber cereal.  Take a laxative if you don t move your bowels at least every other day. Miralax , Milk of Magnesia, Colace , or Senna  can be used to keep you regular.      Remember that you can always come back to the Emergency Department if you are not able to see your regular doctor in the amount of time listed above, if you get any new symptoms, or if there is anything that worries you.        Future Appointments        Provider Department Dept Phone Center    4/10/2018 3:30 PM Bia Allan MD [facility] Endocrinology [phone] [facility]    9/5/2018 3:00 PM Lab [facility] Lab [phone] [facility]    9/5/2018 4:00 PM Connor Castaneda MD [facility] Nephrology [phone] [facility]      24 Hour Appointment Hotline       To make an appointment at any [facility], call [phone] ([phone]). If you don't have a family doctor or clinic, we will help you find one. [facility] clinics are conveniently located to serve the needs of you and your family.             Review of your medicines      START taking        Dose / Directions Last dose taken    ipratropium - albuterol 0.5 mg/2.5 mg/3 mL 0.5-2.5 (3) MG/3ML neb solution   Commonly known as:  DUONEB   Dose:  1 vial   Quantity:  30 vial        Take 1 vial (3 mLs) by nebulization every 6 hours as needed for shortness of breath / dyspnea or wheezing   Refills:  1        predniSONE 20 MG tablet   Commonly known as:  DELTASONE   Dose:  40 mg   Quantity:  10 tablet        Take 2 tablets (40 mg) by mouth daily for 5 days   Refills:  0          CONTINUE these medicines which may have CHANGED, or have new prescriptions. If we are uncertain of the size of tablets/capsules you have at home, strength may be listed as something that might have changed.        Dose / Directions Last dose taken    * albuterol 108 (90 BASE) MCG/ACT Inhaler   Commonly known as:  PROAIR HFA   Dose:  2 puff   What changed:  Another medication with the same  name was added. Make sure you understand how and when to take each.   Quantity:  1 each        Inhale 2 puffs into the lungs every 6 hours as needed   Refills:  5        * albuterol (2.5 MG/3ML) 0.083% neb solution   Dose:  1 vial   What changed:  Another medication with the same name was added. Make sure you understand how and when to take each.   Quantity:  1 Box        Take 1 vial (2.5 mg) by nebulization every 4 hours as needed for shortness of breath / dyspnea or wheezing /chest tightness/cough   Refills:  3        * albuterol 108 (90 BASE) MCG/ACT Inhaler   Commonly known as:  PROAIR HFA   Dose:  2 puff   What changed:  You were already taking a medication with the same name, and this prescription was added. Make sure you understand how and when to take each.   Quantity:  1 Inhaler        Inhale 2 puffs into the lungs every 4 hours as needed for shortness of breath / dyspnea   Refills:  0        * oseltamivir 75 MG capsule   Commonly known as:  TAMIFLU   Dose:  75 mg   What changed:  Another medication with the same name was added. Make sure you understand how and when to take each.   Quantity:  10 capsule        Take 1 capsule (75 mg) by mouth daily   Refills:  0        * oseltamivir 75 MG capsule   Commonly known as:  TAMIFLU   Dose:  75 mg   What changed:  You were already taking a medication with the same name, and this prescription was added. Make sure you understand how and when to take each.   Quantity:  10 capsule        Take 1 capsule (75 mg) by mouth 2 times daily for 5 days   Refills:  0        * Notice:  This list has 5 medication(s) that are the same as other medications prescribed for you. Read the directions carefully, and ask your doctor or other care provider to review them with you.      Our records show that you are taking the medicines listed below. If these are incorrect, please call your family doctor or clinic.        Dose / Directions Last dose taken    blood glucose monitoring test  strip   Commonly known as:  no brand specified   Quantity:  4 Box        Use to test blood sugar eight times daily or as directed.  Dispense Brandy Contour Next Test Strips.   Refills:  4        Blood Pressure Monitoring Kit   Dose:  1 kit   Quantity:  1 kit        1 kit daily   Refills:  0        Cholecalciferol 2000 UNITS Tbdp   Dose:  2000 Units   Quantity:  90 tablet        Take 2,000 Units by mouth daily   Refills:  11        enalapril 20 MG tablet   Commonly known as:  VASOTEC   Dose:  20 mg   Quantity:  180 tablet        Take 1 tablet (20 mg) by mouth 2 times daily   Refills:  3        escitalopram 10 MG tablet   Commonly known as:  LEXAPRO   Dose:  10 mg   Quantity:  30 tablet        Take 1 tablet (10 mg) by mouth daily   Refills:  11        fluticasone 50 MCG/ACT spray   Commonly known as:  FLONASE   Dose:  2 spray        Spray 2 sprays into both nostrils daily   Refills:  0        glucagon 1 MG kit   Commonly known as:  GLUCAGON EMERGENCY   Dose:  1 mg   Quantity:  1 mg        Inject 1 mg into the muscle once for 1 dose   Refills:  1        hydrOXYzine 25 MG capsule   Commonly known as:  VISTARIL   Dose:  25 mg   Quantity:  30 capsule        Take 1 capsule (25 mg) by mouth daily as needed for itching   Refills:  0        insulin lispro 100 UNIT/ML injection   Commonly known as:  HumaLOG   Quantity:  12 mL        Use as directed up to 140 units daily in insulin pump.   Refills:  3        insulin syringes (disposable) U-100 0.3 ML Misc   Quantity:  100 each        Use 2-3 times daily as needed   Refills:  11        levothyroxine 50 MCG tablet   Commonly known as:  SYNTHROID/LEVOTHROID   Dose:  50 mcg   Quantity:  90 tablet        Take 1 tablet (50 mcg) by mouth daily   Refills:  0        MULTIVITAMIN ADULT PO   Dose:  1 tablet        Take 1 tablet by mouth daily   Refills:  0        prochlorperazine 10 MG tablet   Commonly known as:  COMPAZINE   Dose:  10 mg   Quantity:  10 tablet        Take 1 tablet (10 mg)  by mouth every 6 hours as needed for nausea or vomiting   Refills:  1        VITAMIN C PO   Dose:  500 mg        Take 500 mg by mouth daily   Refills:  0        ZOFRAN PO        Take by mouth as needed for nausea or vomiting Reported on 3/17/2017   Refills:  0                Prescriptions were sent or printed at these locations (4 Prescriptions)                   Other Prescriptions                Printed at Department/Unit printer (4 of 4)         albuterol (PROAIR HFA) 108 (90 BASE) MCG/ACT Inhaler               predniSONE (DELTASONE) 20 MG tablet               ipratropium - albuterol 0.5 mg/2.5 mg/3 mL (DUONEB) 0.5-2.5 (3) MG/3ML neb solution               oseltamivir (TAMIFLU) 75 MG capsule                Procedures and tests performed during your visit     Chest XR,  PA & LAT    EKG 12 lead    Influenza A/B antigen      Orders Needing Specimen Collection     None      Pending Results     Date and Time Order Name Status Description    3/12/2018 0649 EKG 12 lead Preliminary     3/12/2018 0641 Chest XR,  PA & LAT Preliminary             Pending Culture Results     No orders found from 3/10/2018 to 3/13/2018.            Pending Results Instructions     If you had any lab results that were not finalized at the time of your Discharge, you can call the ED Lab Result RN at [phone]. You will be contacted by this team for any positive Lab results or changes in treatment. The nurses are available 7 days a week from 10A to 6:30P.  You can leave a message 24 hours per day and they will return your call.        Test Results From Your Hospital Stay        3/12/2018  7:03 AM      Component Results     Component Value Ref Range & Units Status    Influenza A/B Agn Specimen Nasopharyngeal  Final    Influenza A Positive (A) NEG^Negative Final    Influenza B Negative NEG^Negative Final    Test results must be correlated with clinical data. If necessary, results   should be confirmed by a molecular assay or viral culture.            3/12/2018  7:15 AM      Narrative     CHEST TWO VIEWS March 12, 2018 7:07 AM    HISTORY: Cough, fever.    COMPARISON: 3/26/2015.        Impression     IMPRESSION: Negative.                 Clinical Quality Measure: Blood Pressure Screening     Your blood pressure was checked while you were in the emergency department today. The last reading we obtained was  BP: 114/70 . Please read the guidelines below about what these numbers mean and what you should do about them.  If your systolic blood pressure (the top number) is less than 120 and your diastolic blood pressure (the bottom number) is less than 80, then your blood pressure is normal. There is nothing more that you need to do about it.  If your systolic blood pressure (the top number) is 120-139 or your diastolic blood pressure (the bottom number) is 80-89, your blood pressure may be higher than it should be. You should have your blood pressure rechecked within a year by a primary care provider.  If your systolic blood pressure (the top number) is 140 or greater or your diastolic blood pressure (the bottom number) is 90 or greater, you may have high blood pressure. High blood pressure is treatable, but if left untreated over time it can put you at risk for heart attack, stroke, or kidney failure. You should have your blood pressure rechecked by a primary care provider within the next 4 weeks.  If your provider in the emergency department today gave you specific instructions to follow-up with your doctor or provider even sooner than that, you should follow that instruction and not wait for up to 4 weeks for your follow-up visit.        Thank you for choosing [facility]       Thank you for choosing [facility] for your care. Our goal is always to provide you with excellent care. Hearing back from our patients is one way we can continue to improve our services. Please take a few minutes to complete the written survey that you may receive in the mail after you visit  with us. Thank you!        [employer] Information     [employer] gives you secure access to your electronic health record. If you see a primary care provider, you can also send messages to your care team and make appointments. If you have questions, please call your primary care clinic.  If you do not have a primary care provider, please call [phone] and they will assist you.        Care EveryWhere ID     This is your Care EveryWhere ID. This could be used by other organizations to access your [facility] medical records  [id number]        Equal Access to Services     CRISTY HIGGINBOTHAM : Elias mackenzie Soyun, wacasey steele, tarah kaalcortney haskins, keagan west. So [facility] [phone].    ATENCIÓN: Si habla español, tiene a escobar disposición servicios gratuitos de asistencia lingüística. Llame al [phone].    We comply with applicable federal civil rights laws and [place] laws. We do not discriminate on the basis of race, color, national origin, age, disability, sex, sexual orientation, or gender identity.            After Visit Summary       This is your record. Keep this with you and show to your community pharmacist(s) and doctor(s) at your next visit.

## 2018-03-12 NOTE — ED AVS SNAPSHOT
[facility] Emergency Department    [address]    [facility] [address]    Phone:  [phone]    Fax:  [phone]                                       Stacey La   MRN: [id number]    Department:  [facility] Emergency Department   Date of Visit:  3/12/2018           After Visit Summary Signature Page     I have received my discharge instructions, and my questions have been answered. I have discussed any challenges I see with this plan with the nurse or doctor.    ..........................................................................................................................................  Patient/Patient Representative Signature      ..........................................................................................................................................  Patient Representative Print Name and Relationship to Patient    ..................................................               ................................................  Date                                            Time    ..........................................................................................................................................  Reviewed by Signature/Title    ...................................................              ..............................................  Date                                                            Time

## 2018-03-12 NOTE — TELEPHONE ENCOUNTER
levothyroxine (SYNTHROID/LEVOTHROID) 50 MCG tablet    Last Written Prescription Date:  12/5/17  Last Fill Quantity: 90,   # refills: 0  Last Office Visit : 1/29/18  Future Office visit: 4/10/18    Routing refill request to provider for review/approval because: TSH past due

## 2018-03-12 NOTE — DISCHARGE INSTRUCTIONS
Please follow up closely with your regular physician. Please return to the ED if your symptoms worsen or if you develop new or concerning symptoms.     Discharge Instructions  Influenza    You were diagnosed today with influenza or influenza like illness.  Influenza is a respiratory illness caused by influenza A or B viruses.  Influenza causes fever, headache, muscle aches, and fatigue.  These symptoms start one to four days after you have been around a person with this illness.  People with influenza commonly have a dry cough, sore throat, and a runny nose.   Influenza is spread through sneezing and coughing and can live on surfaces for several days.  It is usually contagious for 5 days but in some cases up to 10 days and often affects several family members. If you have a family member who is less than 2 years old, older than 65 years old, pregnant or has a serious medical condition, they should be seen right away by a physician to decide if they should take preventative medications.      Return to the Emergency Department if:    You have trouble breathing.    You develop pain in your chest.    You have signs of being dehydrated, such as dizziness or unable to urinate at least three times daily.    You feel confused.    You cannot stop vomiting or you cannot drink enough fluids.    In children, you should seek help if the child has any of the above or if child:    Has blue or purplish skin color.    Is so irritable that he or she does not want to be held.    Does not have tears when crying (in infants) or does not urinate at least three times daily.    Does not wake up easily.    Follow-up with your doctor if you are not improving after 5 days.    What can I do to help myself?    Rest.    Fluids -- Drink hydrating solutions such as Gatorade  or Pedialyte  as often as you can. If you are drinking enough, you should pass urine at least every eight hours.    Tylenol  (acetaminophen) and Advil  (ibuprofen) can relieve  fever, headache, and muscle aches. Do not give aspirin to children under 18 years old.     Antiviral treatment -- Antiviral medicines do not make the flu symptoms go away immediately.  They have only been shown to make the symptoms go away 12 to 24 hours sooner than they would without treatment.       Antibiotics -- Antibiotics are NOT useful for treating viral illnesses such as influenza. Antibiotics should only be used if there is a bacterial complication of the flu such as bacterial pneumonia, ear infection, or sinusitis.    Because you were diagnosed with a flu like illness you are very contagious.  This means you cannot work, attend school or  for at least 24 hours or until you no longer have a fever.  If you were given a prescription for medicine here today, be sure to read all of the information (including the package insert) that comes with your prescription.  This will include important information about the medicine, its side effects, and any warnings that you need to know about.  The pharmacist who fills the prescription can provide more information and answer questions you may have about the medicine.  If you have questions or concerns that the pharmacist cannot address, please call or return to the Emergency Department.   Opioid Medication Information    Pain medications are among the most commonly prescribed medicines, so we are including this information for all our patients. If you did not receive pain medication or get a prescription for pain medicine, you can ignore it.     You may have been given a prescription for an opioid (narcotic) pain medicine and/or have received a pain medicine while here in the Emergency Department. These medicines can make you drowsy or impaired. You must not drive, operate dangerous equipment, or engage in any other dangerous activities while taking these medications. If you drive while taking these medications, you could be arrested for DUI, or driving under the  influence. Do not drink any alcohol while you are taking these medications.     Opioid pain medications can cause addiction. If you have a history of chemical dependency of any type, you are at a higher risk of becoming addicted to pain medications.  Only take these prescribed medications to treat your pain when all other options have been tried. Take it for as short a time and as few doses as possible. Store your pain pills in a secure place, as they are frequently stolen and provide a dangerous opportunity for children or visitors in your house to start abusing these powerful medications. We will not replace any lost or stolen medicine.  As soon as your pain is better, you should flush all your remaining medication.     Many prescription pain medications contain Tylenol  (acetaminophen), including Vicodin , Tylenol #3 , Norco , Lortab , and Percocet .  You should not take any extra pills of Tylenol  if you are using these prescription medications or you can get very sick.  Do not ever take more than 3000 mg of acetaminophen in any 24 hour period.    All opioids tend to cause constipation. Drink plenty of water and eat foods that have a lot of fiber, such as fruits, vegetables, prune juice, apple juice and high fiber cereal.  Take a laxative if you don t move your bowels at least every other day. Miralax , Milk of Magnesia, Colace , or Senna  can be used to keep you regular.      Remember that you can always come back to the Emergency Department if you are not able to see your regular doctor in the amount of time listed above, if you get any new symptoms, or if there is anything that worries you.

## 2018-04-10 ENCOUNTER — OFFICE VISIT (OUTPATIENT)
Dept: ENDOCRINOLOGY | Facility: CLINIC | Age: 23
End: 2018-04-10
Payer: COMMERCIAL

## 2018-04-10 VITALS
HEIGHT: 68 IN | SYSTOLIC BLOOD PRESSURE: 139 MMHG | BODY MASS INDEX: 44.41 KG/M2 | WEIGHT: 293 LBS | HEART RATE: 98 BPM | DIASTOLIC BLOOD PRESSURE: 85 MMHG

## 2018-04-10 DIAGNOSIS — E10.21 TYPE 1 DIABETES MELLITUS WITH DIABETIC NEPHROPATHY (H): Primary | ICD-10-CM

## 2018-04-10 LAB — HBA1C MFR BLD: 7 % (ref 4.3–6)

## 2018-04-10 ASSESSMENT — ENCOUNTER SYMPTOMS
HEMOPTYSIS: 0
SMELL DISTURBANCE: 0
BOWEL INCONTINENCE: 0
DYSPNEA ON EXERTION: 0
HEARTBURN: 1
POSTURAL DYSPNEA: 0
HOARSE VOICE: 1
COUGH: 1
SINUS PAIN: 0
SINUS CONGESTION: 1
BLOATING: 0
WHEEZING: 0
SPUTUM PRODUCTION: 1
CONSTIPATION: 0
SORE THROAT: 1
SNORES LOUDLY: 0
ABDOMINAL PAIN: 0
TROUBLE SWALLOWING: 0
VOMITING: 0
RECTAL PAIN: 0
BLOOD IN STOOL: 0
TASTE DISTURBANCE: 0
NAUSEA: 0
SHORTNESS OF BREATH: 0
DIARRHEA: 1
JAUNDICE: 0
COUGH DISTURBING SLEEP: 0
NECK MASS: 0

## 2018-04-10 NOTE — MR AVS SNAPSHOT
After Visit Summary   4/10/2018    Stacey La    MRN: [id number]           Patient Information     Date Of Birth          1995        Visit Information        Provider Department      4/10/2018 3:30 PM Bia Allan MD [facility]        Today's Diagnoses     Type 1 diabetes mellitus with diabetic nephropathy (H)    -  1       Follow-ups after your visit        Follow-up notes from your care team     Return for f/u 3 mo with Laureen Goldstein and 6 mo with me.      Your next 10 appointments already scheduled     Jul 16, 2018  1:30 PM CDT   (Arrive by 1:15 PM)   RETURN DIABETES with Laureen Goldstein PA-C   [facility] ([facility])    [address]  3rd Floor  [facility] [id number] [phone]            Sep 05, 2018  3:00 PM CDT   Lab with  LAB   [facility] ([facility])    [address]  1st Floor  [facility] [id number] [phone]            Sep 05, 2018  4:00 PM CDT   (Arrive by 3:30 PM)   Return Visit with Connor Castaneda MD   [facility] ([facility])    [address]  [address]  [facility] [id number] [phone]            Oct 23, 2018  3:30 PM CDT   (Arrive by 3:15 PM)   RETURN DIABETES with Bia Allan MD   [facility] ([facility])    [address]  3rd Floor  [facility] [id number] [phone]              Future tests that were ordered for you today     Open Future Orders        Priority Expected Expires Ordered    TSH Routine 4/10/2018 4/24/2018 4/10/2018            Who to contact     Please call your clinic at [phone] to:    Ask questions about your health    Make or cancel appointments    Discuss your medicines    Learn about your test results    Speak to your doctor            Additional Information About Your Visit        MyChart Information     MyChart  "gives you secure access to your electronic health record. If you see a primary care provider, you can also send messages to your care team and make appointments. If you have questions, please call your primary care clinic.  If you do not have a primary care provider, please call [phone] and they will assist you.      FONU2 is an electronic gateway that provides easy, online access to your medical records. With FONU2, you can request a clinic appointment, read your test results, renew a prescription or communicate with your care team.     To access your existing account, please contact your [facility] or call [phone] for assistance.        Care EveryWhere ID     This is your Care EveryWhere ID. This could be used by other organizations to access your [facility] medical records  [id number]        Your Vitals Were     Pulse Height BMI (Body Mass Index)             98 1.727 m (5' 8\") 45.6 kg/m2          Blood Pressure from Last 3 Encounters:   04/10/18 139/85   03/12/18 114/70   01/29/18 147/90    Weight from Last 3 Encounters:   04/10/18 136 kg (299 lb 14.4 oz)   03/12/18 132.2 kg (291 lb 7.2 oz)   01/29/18 (!) 136.9 kg (301 lb 14.4 oz)                 Today's Medication Changes          These changes are accurate as of 4/10/18  4:54 PM.  If you have any questions, ask your nurse or doctor.               These medicines have changed or have updated prescriptions.        Dose/Directions    * albuterol 108 (90 Base) MCG/ACT Inhaler   Commonly known as:  PROAIR HFA   This may have changed:  Another medication with the same name was removed. Continue taking this medication, and follow the directions you see here.   Used for:  Mild intermittent asthma without complication        Dose:  2 puff   Inhale 2 puffs into the lungs every 6 hours as needed   Quantity:  1 each   Refills:  5       * albuterol (2.5 MG/3ML) 0.083% neb solution   This may have changed:  Another medication with " the same name was removed. Continue taking this medication, and follow the directions you see here.   Used for:  Uncomplicated asthma, unspecified asthma severity        Dose:  1 vial   Take 1 vial (2.5 mg) by nebulization every 4 hours as needed for shortness of breath / dyspnea or wheezing /chest tightness/cough   Quantity:  1 Box   Refills:  3       * Notice:  This list has 2 medication(s) that are the same as other medications prescribed for you. Read the directions carefully, and ask your doctor or other care provider to review them with you.      Stop taking these medicines if you haven't already. Please contact your care team if you have questions.     fluticasone 50 MCG/ACT spray   Commonly known as:  FLONASE           oseltamivir 75 MG capsule   Commonly known as:  TAMIFLU                    Primary Care Provider Office Phone # Fax #    LUIS FERNANDO [place] [phone] [address] [place] [address]        Equal Access to Services     [facility]: Hadii eleanor donald hadasho Soyun, waaxda luqadaha, qaybta kaalmada adeegyada, keagan avalos . So [facility] [phone].    ATENCIÓN: Si habla español, tiene a escobar disposición servicios gratuitos de asistencia lingüística. [facility] [phone].    We comply with applicable federal civil rights laws and [place] laws. We do not discriminate on the basis of race, color, national origin, age, disability, sex, sexual orientation, or gender identity.            Thank you!     Thank you for choosing [facility]  for your care. Our goal is always to provide you with excellent care. Hearing back from our patients is one way we can continue to improve our services. Please take a few minutes to complete the written survey that you may receive in the mail after your visit with us. Thank you!             Your Updated Medication List - Protect others around you: Learn how to safely use, store and  throw away your medicines at www.disposemymeds.org.          This list is accurate as of 4/10/18  4:54 PM.  Always use your most recent med list.                   Brand Name Dispense Instructions for use Diagnosis    * albuterol 108 (90 Base) MCG/ACT Inhaler    PROAIR HFA    1 each    Inhale 2 puffs into the lungs every 6 hours as needed    Mild intermittent asthma without complication       * albuterol (2.5 MG/3ML) 0.083% neb solution     1 Box    Take 1 vial (2.5 mg) by nebulization every 4 hours as needed for shortness of breath / dyspnea or wheezing /chest tightness/cough    Uncomplicated asthma, unspecified asthma severity       ALLEGRA PO      Take by mouth daily UNSURE OF DOSAGE        blood glucose monitoring test strip    no brand specified    4 Box    Use to test blood sugar eight times daily or as directed.  Dispense Brandy Contour Next Test Strips.    Diabetes mellitus type 1 (H)       Blood Pressure Monitoring Kit     1 kit    1 kit daily    HTN (hypertension)       Cholecalciferol 2000 units Tbdp     90 tablet    Take 2,000 Units by mouth daily    Proteinuria, Vitamin D deficiency       enalapril 20 MG tablet    VASOTEC    180 tablet    Take 1 tablet (20 mg) by mouth 2 times daily    Proteinuria       escitalopram 10 MG tablet    LEXAPRO    30 tablet    Take 1 tablet (10 mg) by mouth daily    Depression with anxiety       glucagon 1 MG kit    GLUCAGON EMERGENCY    1 mg    Inject 1 mg into the muscle once for 1 dose    Well controlled type 1 diabetes mellitus (H)       humaLOG 100 UNIT/ML injection   Generic drug:  insulin lispro     120 mL    USE AS DIRECTED UP  TO  140  UNITS  DAILY  IN  INSULIN  PUMP    Well controlled type 1 diabetes mellitus (H)       hydrOXYzine 25 MG capsule    VISTARIL    30 capsule    Take 1 capsule (25 mg) by mouth daily as needed for itching    Major depressive disorder, recurrent episode, in partial remission (H)       insulin syringes (disposable) U-100 0.3 ML Misc     100  each    Use 2-3 times daily as needed    Type 1 diabetes mellitus (H)       ipratropium - albuterol 0.5 mg/2.5 mg/3 mL 0.5-2.5 (3) MG/3ML neb solution    DUONEB    30 vial    Take 1 vial (3 mLs) by nebulization every 6 hours as needed for shortness of breath / dyspnea or wheezing        levothyroxine 50 MCG tablet    SYNTHROID/LEVOTHROID    90 tablet    Take 1 tablet (50 mcg) by mouth daily    Other specified hypothyroidism       MULTIVITAMIN ADULT PO      Take 1 tablet by mouth daily        prochlorperazine 10 MG tablet    COMPAZINE    10 tablet    Take 1 tablet (10 mg) by mouth every 6 hours as needed for nausea or vomiting    Intractable vomiting       VITAMIN C PO      Take 500 mg by mouth daily        ZOFRAN PO      Take by mouth as needed for nausea or vomiting Reported on 3/17/2017        * Notice:  This list has 2 medication(s) that are the same as other medications prescribed for you. Read the directions carefully, and ask your doctor or other care provider to review them with you.

## 2018-04-10 NOTE — PROGRESS NOTES
Ms. La is a 22-year-old with a PMH of nephropathy, hyperlipidemia, hypothyroidism here today for follow-up for Type 1 DM (dx age 7). She also has celiac disease.      She uses a Dexcom. Previously, had an issue of significantly increasing basal doses to reduce reliance on bolusing. As a result, her basal settings were reduced by 20%. Her current pump settings as follows:     Basal:   0000 2.20  0300 2.30  0700 2.20  1100 1.75  1800 1.75    I:C settings:  0000 4  0300 5  0700 5  1100 6  1800 3    Target settings:   0000 110  0300 110  0700 100  1100 100  1800 100    Sensitivity: 16  Active insulin: 3 hrs    Ms. La has an average of 213 mg/dl (range 113-400), and is above target 64% of the time. Boluses comprise 60% of her total daily insulin intake. Her highest numbers occur after midnight, with an average of 233 between midnight and 6am, and 245 between 1535-9411. She has a typical routine during the school year (coffee at 9am, home-cooked, gluten-free meal at lunch around 1-2pm, and dinner around 6pm), but sometimes eats junk food after dinner. Her average before bedtime is 252.     Ms. La was previously considering pregnancy, but that is no longer the case. She is taking levothyroxine 50mcg for her hypothyroidism, and denies any weight change, fatigue, change in menses, cold/heat intolerance, skin changes.    She previously visited the ER for flu symptoms and was treated with tamiflu. She developed bronchitis with possible pneumonia and received a course of antibiotics; she is currently feeling much better. She is due for an optho visit in May. She followed-up in January with nephrology with no changes in kidney function, and is currently also on enalapril 20 mcg twice a day.     ROS  General: No fatigue, weight loss.   Eyes: No itchiness, redness, blurred vision, change in vision.  ENT: No change in hearing, tinnitus. Recovering from flu and bronchitis, with nasal discharge, sinus problems  "but currently resolving as described above.  Respiratory: No SOB, dyspnea on exertion. Recovering from cough.   Cardiovascular: No chest pain, palpitations, orthopnea, lower extremity edema.   GI: No abdominal pain, nausea, vomiting, change in bowel movements.  : No polyuria, hesitation, retention.   MSK: Chronic left shoulder soreness with some limitation of movement. Seeing chiropractor.   Neuro: No change in sensation, numbness, tingling of hands or feet, memory problems, headaches.   Endocrine: As described above.    Physical Exam      BP: 139/85 (2ND ATTEMPT) Pulse: 98           Height: 172.7 cm (5' 8\") Weight: 136 kg (299 lb 14.4 oz)  Estimated body mass index is 45.6 kg/(m^2) as calculated from the following:    Height as of this encounter: 1.727 m (5' 8\").    Weight as of this encounter: 136 kg (299 lb 14.4 oz).    General: NAD  Eyes: No scleral icterus, conjunctival injection, periorbital edema. Extraocular movements grossly intact.   Neck: No thyromegaly, tenderness.   Feet: Intact bilaterally to monofilament. PT and DP pulses intact bilaterally. No edema.     Recent Labs   Lab Test 04/10/18  01/17/18   1517  01/17/18   1515  07/25/17   1457  07/25/17   1455 04/10/17   10/10/16   1643   01/16/16   0905  08/04/15   1443   03/26/15   0645   12/09/13   1137   A1C   --    --   7.0*   --    --    --    --    --    --    --    --    --   7.6*   --    --    HEMOGLOBINA1  7.0*   --    --    --    --   7.6*   --    --    < >   --    --    --    --    < >   --    TSH   --    --    --    --    --    --    --   2.89   --   3.33   --    --    --    --   2.95   T4   --    --    --    --    --    --    --    --    --    --    --    --    --    --   1.18   LDL   --    --    --    --    --    --    --    --    --    --   102   --    --    --   142*   HDL   --    --    --    --    --    --    --    --    --    --   66   --    --    --   66   TRIG   --    --    --    --    --    --    --    --    --    --   131   --    --  "   --   165*   CR   --    --   0.70   --   0.56   --    < >   --    < >  0.56   --    < >  0.54   < >  0.48*   MICROL   --   <5   --   5   --    --    < >   --    < >   --    --    < >   --    < >   --     < > = values in this interval not displayed.     A1c today was 7.0    Assessment/Plan:  1. Diabetes: Ms. La is doing well overall. Given her slightly higher numbers after midnight, her basal levels will be altered slightly as follows:    Basal:  0000 2.30 (was 2.20)  0300 2.30  0700 2.20  1100 1.75  1800 1.75    2. Hypothyroidism: Last TSH in 10/2016 was 2.89. Will recheck today. Will adjust levothyroxine dose (currently 50mg) if needed based on results.     3. Diabetes complications:   -- Eyes: Will visit optho in May  -- Kidneys: Followed-up with nephrology in January, and kidney functions were stable. She is currently on enalapril 20mg BID for HTN and albuminuria and will continue.    RTC in 3 mo with Laureen and 6 mo with Dr. Allan.    I, Elida Godfrey am acting as scribe for Dr. Bia Allan MD.

## 2018-04-10 NOTE — PROGRESS NOTES
Stacey was seen and examined by me with medical student Elida Godfrey who served as scribe.  Please see student's note of the same date for full details.  In brief, Stacey thinks her sugars are doing OK. She is still on T4, which was started because of a slight elevation in TSH before she was trying to get pregnant. No longer planning a pregnancy      Current Outpatient Prescriptions on File Prior to Visit:  levothyroxine (SYNTHROID/LEVOTHROID) 50 MCG tablet Take 1 tablet (50 mcg) by mouth daily   HUMALOG 100 UNIT/ML injection USE AS DIRECTED UP  TO  140  UNITS  DAILY  IN  INSULIN  PUMP   enalapril (VASOTEC) 20 MG tablet Take 1 tablet (20 mg) by mouth 2 times daily   escitalopram (LEXAPRO) 10 MG tablet Take 1 tablet (10 mg) by mouth daily   Ascorbic Acid (VITAMIN C PO) Take 500 mg by mouth daily   Multiple Vitamins-Minerals (MULTIVITAMIN ADULT PO) Take 1 tablet by mouth daily   albuterol (2.5 MG/3ML) 0.083% neb solution Take 1 vial (2.5 mg) by nebulization every 4 hours as needed for shortness of breath / dyspnea or wheezing /chest tightness/cough   hydrOXYzine (VISTARIL) 25 MG capsule Take 1 capsule (25 mg) by mouth daily as needed for itching   Ondansetron HCl (ZOFRAN PO) Take by mouth as needed for nausea or vomiting Reported on 3/17/2017   albuterol (ALBUTEROL) 108 (90 BASE) MCG/ACT inhaler Inhale 2 puffs into the lungs every 6 hours as needed   Cholecalciferol 2000 UNITS TBDP Take 2,000 Units by mouth daily   prochlorperazine (COMPAZINE) 10 MG tablet Take 1 tablet (10 mg) by mouth every 6 hours as needed for nausea or vomiting   insulin syringes, disposable, U-100 0.3 ML MISC Use 2-3 times daily as needed   ipratropium - albuterol 0.5 mg/2.5 mg/3 mL (DUONEB) 0.5-2.5 (3) MG/3ML neb solution Take 1 vial (3 mLs) by nebulization every 6 hours as needed for shortness of breath / dyspnea or wheezing (Patient not taking: Reported on 4/10/2018)   [DISCONTINUED] albuterol (PROAIR HFA) 108 (90 BASE) MCG/ACT Inhaler Inhale  "2 puffs into the lungs every 4 hours as needed for shortness of breath / dyspnea   glucagon (GLUCAGON EMERGENCY) 1 MG kit Inject 1 mg into the muscle once for 1 dose   Blood Pressure Monitoring KIT 1 kit daily   blood glucose test strip Use to test blood sugar eight times daily or as directed.  Dispense Brandy Contour Next Test Strips.     No current facility-administered medications on file prior to visit.     ROS: 10 point ROS neg other than the symptoms noted above in the HPI.    So Hx - will be traveling to [place] soon  Vital signs:   /85 (BP Location: Right arm)  Pulse 98  Ht 1.727 m (5' 8\")  Wt 136 kg (299 lb 14.4 oz)  BMI 45.6 kg/m2  Estimated body mass index is 45.6 kg/(m^2) as calculated from the following:    Height as of this encounter: 1.727 m (5' 8\").    Weight as of this encounter: 136 kg (299 lb 14.4 oz).    NAD  Skin - normal texture   Feet - no ulcers   Mood - cheerful    Recent Labs   Lab Test 04/10/18  01/17/18   1517  01/17/18   1515  07/25/17   1457  07/25/17   1455 04/10/17   10/10/16   1643   01/16/16   0905  08/04/15   1443   03/26/15   0645   12/09/13   1137   A1C   --    --   7.0*   --    --    --    --    --    --    --    --    --   7.6*   --    --    HEMOGLOBINA1  7.0*   --    --    --    --   7.6*   --    --    < >   --    --    --    --    < >   --    TSH   --    --    --    --    --    --    --   2.89   --   3.33   --    --    --    --   2.95   T4   --    --    --    --    --    --    --    --    --    --    --    --    --    --   1.18   LDL   --    --    --    --    --    --    --    --    --    --   102   --    --    --   142*   HDL   --    --    --    --    --    --    --    --    --    --   66   --    --    --   66   TRIG   --    --    --    --    --    --    --    --    --    --   131   --    --    --   165*   CR   --    --   0.70   --   0.56   --    < >   --    < >  0.56   --    < >  0.54   < >  0.48*   MICROL   --   <5   --   5   --    --    < >   --    < >   --    --  "   < >   --    < >   --     < > = values in this interval not displayed.       Assessment and plan:    1.  Diabetes control.  She is doing very well but some of her AM values are high. Changed basals to:    Basal:  0000 2.30 (was 2.20)  0300 2.30  0700 2.20  1100 1.75  1800 1.75    2.  Diabetes complications. She has early nephropathy. Eyes and feet are OK.    3.  ? Hypothyroidism.  Will check TSH. If suppressed will stop the drug.    F/u with Laureen Goldstein in 3 mo and me in 6 months      I spent 25 minutes with this patient face to face and explained the conditions and plans (more than 50% of time was counseling/coordination of diabetes  And thyroid care) . The patient understood and is satisfied with today's visit.     Bia Allan MD

## 2018-04-10 NOTE — LETTER
4/10/2018       RE: Stacey La  [id number]  [facility] [address]     Dear Colleague,    Thank you for referring your patient, Stacey La, to the [facility]. Please see a copy of my visit note below.    Ms. La is a 22-year-old with a PMH of nephropathy, hyperlipidemia, hypothyroidism here today for follow-up for Type 1 DM (dx age 7). She also has celiac disease.      She uses a Dexcom. Previously, had an issue of significantly increasing basal doses to reduce reliance on bolusing. As a result, her basal settings were reduced by 20%. Her current pump settings as follows:     Basal:   0000 2.20  0300 2.30  0700 2.20  1100 1.75  1800 1.75    I:C settings:  0000 4  0300 5  0700 5  1100 6  1800 3    Target settings:   0000 110  0300 110  0700 100  1100 100  1800 100    Sensitivity: 16  Active insulin: 3 hrs    Ms. La has an average of 213 mg/dl (range 113-400), and is above target 64% of the time. Boluses comprise 60% of her total daily insulin intake. Her highest numbers occur after midnight, with an average of 233 between midnight and 6am, and 245 between 9453-9544. She has a typical routine during the school year (coffee at 9am, home-cooked, gluten-free meal at lunch around 1-2pm, and dinner around 6pm), but sometimes eats junk food after dinner. Her average before bedtime is 252.     Ms. La was previously considering pregnancy, but that is no longer the case. She is taking levothyroxine 50mcg for her hypothyroidism, and denies any weight change, fatigue, change in menses, cold/heat intolerance, skin changes.    She previously visited the ER for flu symptoms and was treated with tamiflu. She developed bronchitis with possible pneumonia and received a course of antibiotics; she is currently feeling much better. She is due for an optho visit in May. She followed-up in January with nephrology with no changes in  "kidney function, and is currently also on enalapril 20 mcg twice a day.     ROS  General: No fatigue, weight loss.   Eyes: No itchiness, redness, blurred vision, change in vision.  ENT: No change in hearing, tinnitus. Recovering from flu and bronchitis, with nasal discharge, sinus problems but currently resolving as described above.  Respiratory: No SOB, dyspnea on exertion. Recovering from cough.   Cardiovascular: No chest pain, palpitations, orthopnea, lower extremity edema.   GI: No abdominal pain, nausea, vomiting, change in bowel movements.  : No polyuria, hesitation, retention.   MSK: Chronic left shoulder soreness with some limitation of movement. Seeing chiropractor.   Neuro: No change in sensation, numbness, tingling of hands or feet, memory problems, headaches.   Endocrine: As described above.    Physical Exam      BP: 139/85 (2ND ATTEMPT) Pulse: 98           Height: 172.7 cm (5' 8\") Weight: 136 kg (299 lb 14.4 oz)  Estimated body mass index is 45.6 kg/(m^2) as calculated from the following:    Height as of this encounter: 1.727 m (5' 8\").    Weight as of this encounter: 136 kg (299 lb 14.4 oz).    General: NAD  Eyes: No scleral icterus, conjunctival injection, periorbital edema. Extraocular movements grossly intact.   Neck: No thyromegaly, tenderness.   Feet: Intact bilaterally to monofilament. PT and DP pulses intact bilaterally. No edema.     Recent Labs   Lab Test 04/10/18  01/17/18   1517  01/17/18   1515  07/25/17   1457  07/25/17   1455 04/10/17   10/10/16   1643   01/16/16   0905  08/04/15   1443   03/26/15   0645   12/09/13   1137   A1C   --    --   7.0*   --    --    --    --    --    --    --    --    --   7.6*   --    --    HEMOGLOBINA1  7.0*   --    --    --    --   7.6*   --    --    < >   --    --    --    --    < >   --    TSH   --    --    --    --    --    --    --   2.89   --   3.33   --    --    --    --   2.95   T4   --    --    --    --    --    --    --    --    --    --    --    " --    --    --   1.18   LDL   --    --    --    --    --    --    --    --    --    --   102   --    --    --   142*   HDL   --    --    --    --    --    --    --    --    --    --   66   --    --    --   66   TRIG   --    --    --    --    --    --    --    --    --    --   131   --    --    --   165*   CR   --    --   0.70   --   0.56   --    < >   --    < >  0.56   --    < >  0.54   < >  0.48*   MICROL   --   <5   --   5   --    --    < >   --    < >   --    --    < >   --    < >   --     < > = values in this interval not displayed.     A1c today was 7.0    Assessment/Plan:  1. Diabetes: Ms. La is doing well overall. Given her slightly higher numbers after midnight, her basal levels will be altered slightly as follows:    Basal:  0000 2.30 (was 2.20)  0300 2.30  0700 2.20  1100 1.75  1800 1.75    2. Hypothyroidism: Last TSH in 10/2016 was 2.89. Will recheck today. Will adjust levothyroxine dose (currently 50mg) if needed based on results.     3. Diabetes complications:   -- Eyes: Will visit optho in May  -- Kidneys: Followed-up with nephrology in January, and kidney functions were stable. She is currently on enalapril 20mg BID for HTN and albuminuria and will continue.    RTC in 3 mo with Laureen and 6 mo with Dr. Allan.    I, Elida Godfrey am acting as scribe for Dr. Bia Allan MD.          Stacey was seen and examined by me with medical student Elida Godfrey who served as scribe.  Please see student's note of the same date for full details.  In brief, Stacey thinks her sugars are doing OK. She is still on T4, which was started because of a slight elevation in TSH before she was trying to get pregnant. No longer planning a pregnancy      Current Outpatient Prescriptions on File Prior to Visit:  levothyroxine (SYNTHROID/LEVOTHROID) 50 MCG tablet Take 1 tablet (50 mcg) by mouth daily   HUMALOG 100 UNIT/ML injection USE AS DIRECTED UP  TO  140  UNITS  DAILY  IN  INSULIN  PUMP   enalapril  (VASOTEC) 20 MG tablet Take 1 tablet (20 mg) by mouth 2 times daily   escitalopram (LEXAPRO) 10 MG tablet Take 1 tablet (10 mg) by mouth daily   Ascorbic Acid (VITAMIN C PO) Take 500 mg by mouth daily   Multiple Vitamins-Minerals (MULTIVITAMIN ADULT PO) Take 1 tablet by mouth daily   albuterol (2.5 MG/3ML) 0.083% neb solution Take 1 vial (2.5 mg) by nebulization every 4 hours as needed for shortness of breath / dyspnea or wheezing /chest tightness/cough   hydrOXYzine (VISTARIL) 25 MG capsule Take 1 capsule (25 mg) by mouth daily as needed for itching   Ondansetron HCl (ZOFRAN PO) Take by mouth as needed for nausea or vomiting Reported on 3/17/2017   albuterol (ALBUTEROL) 108 (90 BASE) MCG/ACT inhaler Inhale 2 puffs into the lungs every 6 hours as needed   Cholecalciferol 2000 UNITS TBDP Take 2,000 Units by mouth daily   prochlorperazine (COMPAZINE) 10 MG tablet Take 1 tablet (10 mg) by mouth every 6 hours as needed for nausea or vomiting   insulin syringes, disposable, U-100 0.3 ML MISC Use 2-3 times daily as needed   ipratropium - albuterol 0.5 mg/2.5 mg/3 mL (DUONEB) 0.5-2.5 (3) MG/3ML neb solution Take 1 vial (3 mLs) by nebulization every 6 hours as needed for shortness of breath / dyspnea or wheezing (Patient not taking: Reported on 4/10/2018)   [DISCONTINUED] albuterol (PROAIR HFA) 108 (90 BASE) MCG/ACT Inhaler Inhale 2 puffs into the lungs every 4 hours as needed for shortness of breath / dyspnea   glucagon (GLUCAGON EMERGENCY) 1 MG kit Inject 1 mg into the muscle once for 1 dose   Blood Pressure Monitoring KIT 1 kit daily   blood glucose test strip Use to test blood sugar eight times daily or as directed.  Dispense Brandy Contour Next Test Strips.     No current facility-administered medications on file prior to visit.     ROS: 10 point ROS neg other than the symptoms noted above in the HPI.    So Hx - will be traveling to [place] soon  Vital signs:   /85 (BP Location: Right arm)  Pulse 98  Ht 1.727 m  "(5' 8\")  Wt 136 kg (299 lb 14.4 oz)  BMI 45.6 kg/m2  Estimated body mass index is 45.6 kg/(m^2) as calculated from the following:    Height as of this encounter: 1.727 m (5' 8\").    Weight as of this encounter: 136 kg (299 lb 14.4 oz).    NAD  Skin - normal texture   Feet - no ulcers   Mood - cheerful    Recent Labs   Lab Test 04/10/18  01/17/18   1517  01/17/18   1515  07/25/17   1457  07/25/17   1455 04/10/17   10/10/16   1643   01/16/16   0905  08/04/15   1443   03/26/15   0645   12/09/13   1137   A1C   --    --   7.0*   --    --    --    --    --    --    --    --    --   7.6*   --    --    HEMOGLOBINA1  7.0*   --    --    --    --   7.6*   --    --    < >   --    --    --    --    < >   --    TSH   --    --    --    --    --    --    --   2.89   --   3.33   --    --    --    --   2.95   T4   --    --    --    --    --    --    --    --    --    --    --    --    --    --   1.18   LDL   --    --    --    --    --    --    --    --    --    --   102   --    --    --   142*   HDL   --    --    --    --    --    --    --    --    --    --   66   --    --    --   66   TRIG   --    --    --    --    --    --    --    --    --    --   131   --    --    --   165*   CR   --    --   0.70   --   0.56   --    < >   --    < >  0.56   --    < >  0.54   < >  0.48*   MICROL   --   <5   --   5   --    --    < >   --    < >   --    --    < >   --    < >   --     < > = values in this interval not displayed.       Assessment and plan:    1.  Diabetes control.  She is doing very well but some of her AM values are high. Changed basals to:    Basal:  0000 2.30 (was 2.20)  0300 2.30  0700 2.20  1100 1.75  1800 1.75    2.  Diabetes complications. She has early nephropathy. Eyes and feet are OK.    3.  ? Hypothyroidism.  Will check TSH. If suppressed will stop the drug.    F/u with Laureen Goldstein in 3 mo and me in 6 months      I spent 25 minutes with this patient face to face and explained the conditions and plans (more than 50% of " time was counseling/coordination of diabetes  And thyroid care) . The patient understood and is satisfied with today's visit.     Bia Allan MD

## 2018-04-10 NOTE — NURSING NOTE
"Chief Complaint   Patient presents with     RECHECK     DIABETES TYPE 1 F/ U       Initial /85 (BP Location: Right arm)  Pulse 98  Ht 1.727 m (5' 8\")  Wt 136 kg (299 lb 14.4 oz)  BMI 45.6 kg/m2 Estimated body mass index is 45.6 kg/(m^2) as calculated from the following:    Height as of this encounter: 1.727 m (5' 8\").    Weight as of this encounter: 136 kg (299 lb 14.4 oz).  Medication Reconciliation: complete       Performed A1C test - patient tolerated well.    Savannah Marin CMA       "

## 2018-08-28 ENCOUNTER — OFFICE VISIT (OUTPATIENT)
Dept: OPHTHALMOLOGY | Facility: CLINIC | Age: 23
End: 2018-08-28
Payer: COMMERCIAL

## 2018-08-28 DIAGNOSIS — H52.13 MYOPIA OF BOTH EYES: ICD-10-CM

## 2018-08-28 DIAGNOSIS — E10.9 TYPE 1 DIABETES MELLITUS WITHOUT RETINOPATHY (H): Primary | ICD-10-CM

## 2018-08-28 ASSESSMENT — VISUAL ACUITY
METHOD: SNELLEN - LINEAR
OD_CC: 20/20
CORRECTION_TYPE: GLASSES
OS_CC: 20/20
OD_CC+: -

## 2018-08-28 ASSESSMENT — EXTERNAL EXAM - RIGHT EYE: OD_EXAM: NORMAL

## 2018-08-28 ASSESSMENT — TONOMETRY
OD_IOP_MMHG: 24
OS_IOP_MMHG: 22
IOP_METHOD: ICARE
OD_IOP_MMHG: 33
IOP_METHOD: APPLANATION
OS_IOP_MMHG: 32

## 2018-08-28 ASSESSMENT — REFRACTION
OD_CYLINDER: SPHERE
OS_CYLINDER: SPHERE
OD_SPHERE: -1.00
OS_SPHERE: -1.25

## 2018-08-28 ASSESSMENT — REFRACTION_WEARINGRX
SPECS_TYPE: SVL
OD_CYLINDER: SPHERE
OD_SPHERE: -0.75
OS_SPHERE: -1.00
OS_CYLINDER: SPHERE

## 2018-08-28 ASSESSMENT — REFRACTION_MANIFEST
OD_CYLINDER: SPHERE
OS_CYLINDER: SPHERE
OD_SPHERE: -1.25
OS_SPHERE: -1.50

## 2018-08-28 ASSESSMENT — CONF VISUAL FIELD
OS_NORMAL: 1
METHOD: COUNTING FINGERS
OD_NORMAL: 1

## 2018-08-28 ASSESSMENT — SLIT LAMP EXAM - LIDS
COMMENTS: NORMAL
COMMENTS: NORMAL

## 2018-08-28 ASSESSMENT — EXTERNAL EXAM - LEFT EYE: OS_EXAM: NORMAL

## 2018-08-28 ASSESSMENT — CUP TO DISC RATIO
OS_RATIO: 0.2
OD_RATIO: 0.2

## 2018-08-28 NOTE — MR AVS SNAPSHOT
After Visit Summary   8/28/2018    Stacey La    MRN: [id number]           Patient Information     Date Of Birth          1995        Visit Information        Provider Department      8/28/2018 4:00 PM Sonido Reyes OD M [facility] Ophthalmology         Follow-ups after your visit        Your next 10 appointments already scheduled     Sep 11, 2018  5:40 PM CDT   (Arrive by 5:25 PM)   RETURN GENERAL with VERONIQUE Claros [facility] Ophthalmology ([facility])    [address]  4th Floor  [place] [facility] [id number] [phone]            Sep 13, 2018  3:30 PM CDT   Lab with  LAB    [facility] ([facility])    [address]  1st Floor  [facility] [id number] [phone]            Sep 13, 2018  4:30 PM CDT   (Arrive by 4:00 PM)   Return Visit with Connor Castaneda MD   [facility] Nephrology ([facility])    [address]  [address]  [facility] [id number] [phone]            Sep 17, 2018 10:30 AM CDT   (Arrive by 10:15 AM)   RETURN DIABETES with Laureen Goldstein PA-C   [facility] Endocrinology ([facility])    [address]  3rd Floor  [facility] [id number] [phone]            Oct 10, 2018  9:15 AM CDT   New Visit with Robby Olivas MD   [facility] ([facility])    [address]  [facility] [id number] [phone]            Nov 13, 2018  2:30 PM CST   (Arrive by 2:15 PM)   RETURN DIABETES with Bia Allan MD    [facility] ([facility])    [address] [place] [address]  3rd Floor  [facility] [id number] [phone]              Who to contact     Please call your clinic at [phone] to:    Ask questions about your health    Make or cancel appointments    Discuss your medicines    Learn about your test  results    Speak to your doctor            Additional Information About Your Visit        [employer] Information     [employer] gives you secure access to your electronic health record. If you see a primary care provider, you can also send messages to your care team and make appointments. If you have questions, please call your primary care clinic.  If you do not have a primary care provider, please call [phone] and they will assist you.      [employer] is an electronic gateway that provides easy, online access to your medical records. With [employer], you can request a clinic appointment, read your test results, renew a prescription or communicate with your care team.     To access your existing account, please contact your [facility] or call [phone] for assistance.        Care EveryWhere ID     This is your Care EveryWhere ID. This could be used by other organizations to access your [facility] medical records  [id number]         Blood Pressure from Last 3 Encounters:   04/10/18 139/85   03/12/18 114/70   01/29/18 147/90    Weight from Last 3 Encounters:   04/10/18 136 kg (299 lb 14.4 oz)   03/12/18 132.2 kg (291 lb 7.2 oz)   01/29/18 (!) 136.9 kg (301 lb 14.4 oz)              Today, you had the following     No orders found for display       Primary Care Provider Office Phone # Fax #    LUIS FERNANDO [facility] [phone] [address] [place] [address]        Equal Access to Services     CRISTY HIGGINBOTHAM : Hadii aad ku hadasho Soomaali, waaxda luqadaha, qaybta kaalmada adeegyada, keagan west. So [facility] [phone].    ATENCIÓN: Si habla español, tiene a escobar disposición servicios gratuitos de asistencia lingüística. Llame al [phone].    We comply with applicable federal civil rights laws and [place] laws. We do not discriminate on the basis of race, color, national origin, age, disability, sex, sexual orientation,  or gender identity.            Thank you!     Thank you for choosing [facility] OPHTHALMOLOGY  for your care. Our goal is always to provide you with excellent care. Hearing back from our patients is one way we can continue to improve our services. Please take a few minutes to complete the written survey that you may receive in the mail after your visit with us. Thank you!             Your Updated Medication List - Protect others around you: Learn how to safely use, store and throw away your medicines at www.disposemymeds.org.          This list is accurate as of 8/28/18  4:36 PM.  Always use your most recent med list.                   Brand Name Dispense Instructions for use Diagnosis    * albuterol 108 (90 Base) MCG/ACT inhaler    PROAIR HFA    1 each    Inhale 2 puffs into the lungs every 6 hours as needed    Mild intermittent asthma without complication       * albuterol (2.5 MG/3ML) 0.083% neb solution     1 Box    Take 1 vial (2.5 mg) by nebulization every 4 hours as needed for shortness of breath / dyspnea or wheezing /chest tightness/cough    Uncomplicated asthma, unspecified asthma severity       ALLEGRA PO      Take by mouth daily UNSURE OF DOSAGE        blood glucose monitoring test strip    no brand specified    4 Box    Use to test blood sugar eight times daily or as directed.  Dispense Win[employer] Contour Next Test Strips.    Diabetes mellitus type 1 (H)       Blood Pressure Monitoring Kit     1 kit    1 kit daily    HTN (hypertension)       Cholecalciferol 2000 units Tbdp     90 tablet    Take 2,000 Units by mouth daily    Proteinuria, Vitamin D deficiency       enalapril 20 MG tablet    VASOTEC    180 tablet    Take 1 tablet (20 mg) by mouth 2 times daily    Proteinuria       escitalopram 10 MG tablet    LEXAPRO    30 tablet    Take 1 tablet (10 mg) by mouth daily    Depression with anxiety       glucagon 1 MG kit    GLUCAGON EMERGENCY    1 mg    Inject 1 mg into the muscle once for 1 dose    Well controlled  type 1 diabetes mellitus (H)       humaLOG 100 UNIT/ML injection   Generic drug:  insulin lispro     120 mL    USE AS DIRECTED UP  TO  140  UNITS  DAILY  IN  INSULIN  PUMP    Well controlled type 1 diabetes mellitus (H)       hydrOXYzine 25 MG capsule    VISTARIL    30 capsule    Take 1 capsule (25 mg) by mouth daily as needed for itching    Major depressive disorder, recurrent episode, in partial remission (H)       insulin syringes (disposable) U-100 0.3 ML Misc     100 each    Use 2-3 times daily as needed    Type 1 diabetes mellitus (H)       ipratropium - albuterol 0.5 mg/2.5 mg/3 mL 0.5-2.5 (3) MG/3ML neb solution    DUONEB    30 vial    Take 1 vial (3 mLs) by nebulization every 6 hours as needed for shortness of breath / dyspnea or wheezing        levothyroxine 50 MCG tablet    SYNTHROID/LEVOTHROID    90 tablet    Take 1 tablet (50 mcg) by mouth daily    Other specified hypothyroidism       MULTIVITAMIN ADULT PO      Take 1 tablet by mouth daily        prochlorperazine 10 MG tablet    COMPAZINE    10 tablet    Take 1 tablet (10 mg) by mouth every 6 hours as needed for nausea or vomiting    Intractable vomiting       VITAMIN C PO      Take 500 mg by mouth daily        ZOFRAN PO      Take by mouth as needed for nausea or vomiting Reported on 3/17/2017        * Notice:  This list has 2 medication(s) that are the same as other medications prescribed for you. Read the directions carefully, and ask your doctor or other care provider to review them with you.

## 2018-08-28 NOTE — NURSING NOTE
Chief Complaints and History of Present Illnesses   Patient presents with     Annual Eye Exam     HPI    Affected eye(s):  Both   Symptoms:     No blurred vision      Frequency:  Constant       Do you have eye pain now?:  No      Comments:  Patient states vision has been stable since last eye exam, both eyes. Denies eye pain or irritation. Does not take eye drops.    Interested in CLs, has not worn in the past but would like to get.    Ashley Norton COT 3:44 PM August 28, 2018

## 2018-08-28 NOTE — PROGRESS NOTES
Assessment/Plan  (E10.9) Type 1 diabetes mellitus without retinopathy (H)  (primary encounter diagnosis)  Comment: No retinopathy both eyes, improvement compared to previous exam  Plan:  Educated patient on clinical findings and the importance of continued management with primary care physician. Continue management as directed and return to clinic in 1 year for dilated exam, or sooner, as needed.    (H52.13) Myopia of both eyes  Comment: Myopia OU  Plan: REFRACTION [26047]         Dispensed spectacle prescription for full time wear. Educated patient on possibility of adaptation period, if symptoms do not improve return to clinic for further testing.   Patient to return for first time contact lens fitting, insertion, and removal training. Discussed $100 fitting fee.    Return to clinic in 2 weeks for contact lens fitting, I&R training.    Complete documentation of historical and exam elements from today's encounter can  be found in the full encounter summary report (not reduplicated in this progress  note). I personally obtained the chief complaint(s) and history of present illness. I  confirmed and edited as necessary the review of systems, past medical/surgical  history, family history, social history, and examination findings as documented by  others; and I examined the patient myself. I personally reviewed the relevant tests,  images, and reports as documented above. I formulated and edited as necessary the  assessment and plan and discussed the findings and management plan with the  patient and family.    Sonido Reyes OD, FAAO

## 2018-08-31 DIAGNOSIS — N18.1 CHRONIC KIDNEY DISEASE, STAGE I: Primary | ICD-10-CM

## 2018-09-11 ENCOUNTER — OFFICE VISIT (OUTPATIENT)
Dept: OPHTHALMOLOGY | Facility: CLINIC | Age: 23
End: 2018-09-11
Payer: COMMERCIAL

## 2018-09-11 DIAGNOSIS — H52.13 MYOPIA OF BOTH EYES: Primary | ICD-10-CM

## 2018-09-11 ASSESSMENT — VISUAL ACUITY
OS_CC: 20/20
OD_CC: 20/20
METHOD: SNELLEN - LINEAR
CORRECTION_TYPE: GLASSES

## 2018-09-11 NOTE — NURSING NOTE
Chief Complaints and History of Present Illnesses   Patient presents with     Follow Up For     CL I&R     HPI    Affected eye(s):  Both   Symptoms:     No blurred vision      Frequency:  Constant       Do you have eye pain now?:  No      Comments:  Patient states vision has been stable since last eye exam, both eyes. Denies eye pain or irritation. Does not take eye drops.  Ashley Norton COT 5:50 PM September 11, 2018

## 2018-09-11 NOTE — PATIENT INSTRUCTIONS
Stacey La   1995  [id number]    Procedure      Wearing Schedule 1st Week    Wash hands with oil/perfume free soap.   Day 1    4 hours  Cleanse and disinfect contacts daily.    Day 2    6 hours  Clean________________________    Day 3    8 hours  Rinse________________________    Day 4    8 hours  Disinfect______________________    Day 5    10 hours  Rewet________________________    Day 6    10 hours          Day 7    10 hours  Use only eye drops made for contact lenses.  Return in 1-2 weeks for contact check appointment-Come in wearing your contacts.    Replacement Schedule  Replace in 1 month.  Sleeping in contacts is NOT recommended.    The Federal Drug Administration has approved extended wear of some brands of contact lenes from one day to a maximum of 7 days.  Sleeping contact lenses increases the risk of contact lens related problems such as but not limited to corneal ulceration,  infiltrates, conjunctivitis, and neovascularization.  Not all contacts are approved for overnight wear.  Make sure you are using your contacts as they are intended.    Congratulations! You have been fitted with contact lenses.  Please follow these simple steps to insure successful contact lens wear.  1.  If your lenses are uncomfortable, cause redness, pain, or blurry vision discontinue wear immediately and call the clinic.  2. Return to [facility] for contact lens checks and yearly eye exams.  3. Never wear lenses longer than the prescribed time.  Maximum wearing time of 12 hours a day.  4. Use only prescribed solutions because mixing brands or types may result in problems.  5. Never wear a torn, discolored, scratched, or chipped lens for any reason.  6. Always follow your doctor and 's recommendations.  7. Use only water-soluble cosmetics, especially mascara.  8. Always have a current pair of eyeglasses available.  9. Do not wear contacts while soaking in a hot tub or  while swimming.  10. Only FDA approved extended wear contacts are suitable for sleeping.    I have read and understand all the enclosed material and have been instructed on insertion, removal, and care of my contact lenses.    X______________________________________________________________________

## 2018-09-13 NOTE — PROGRESS NOTES
Assessment/Plan  (H52.13) Myopia of both eyes  (primary encounter diagnosis)  Comment: Successful insertion and removal training  Plan: HC CONTACT LENS FITTING COSMETIC LVL 2 (05345.012)         Dispensed trial lenses. Return to clinic in 2 weeks for contact lens follow-up. Recheck over-refraction at that time.    Return to clinic in 2 weeks for contact lens follow-up.    Contact Lens Billing  V-Code:  - Soft spherical     CL Fitting Fee: $100    These are for cosmetic contact lenses.    Encounter Diagnosis   Name Primary?     Myopia of both eyes Yes      Complete documentation of historical and exam elements from today's encounter can  be found in the full encounter summary report (not reduplicated in this progress  note). I personally obtained the chief complaint(s) and history of present illness. I  confirmed and edited as necessary the review of systems, past medical/surgical  history, family history, social history, and examination findings as documented by  others; and I examined the patient myself. I personally reviewed the relevant tests,  images, and reports as documented above. I formulated and edited as necessary the  assessment and plan and discussed the findings and management plan with the  patient and family.    Sonido Reyes, VERONIQUE, FAAO

## 2018-10-09 ENCOUNTER — MEDICAL CORRESPONDENCE (OUTPATIENT)
Dept: HEALTH INFORMATION MANAGEMENT | Facility: CLINIC | Age: 23
End: 2018-10-09

## 2018-10-09 ENCOUNTER — TELEPHONE (OUTPATIENT)
Dept: ENDOCRINOLOGY | Facility: CLINIC | Age: 23
End: 2018-10-09

## 2018-10-09 NOTE — TELEPHONE ENCOUNTER
[facility] Call Center    Phone Message    May a detailed message be left on voicemail: no    Reason for Call: Other: [employer] called to follow up on order request for CGM/ diabetis supplies, they can be reached at [phone] pt's account number is [id number]     Action Taken: Message routed to:  [facility] ([facility] endo clinic

## 2018-11-13 ENCOUNTER — TELEPHONE (OUTPATIENT)
Dept: OPHTHALMOLOGY | Facility: CLINIC | Age: 23
End: 2018-11-13

## 2018-11-13 ENCOUNTER — OFFICE VISIT (OUTPATIENT)
Dept: ENDOCRINOLOGY | Facility: CLINIC | Age: 23
End: 2018-11-13
Payer: COMMERCIAL

## 2018-11-13 VITALS
HEART RATE: 108 BPM | HEIGHT: 68 IN | SYSTOLIC BLOOD PRESSURE: 138 MMHG | DIASTOLIC BLOOD PRESSURE: 86 MMHG | WEIGHT: 293 LBS | BODY MASS INDEX: 44.41 KG/M2

## 2018-11-13 DIAGNOSIS — E10.21 TYPE 1 DIABETES MELLITUS WITH DIABETIC NEPHROPATHY (H): Primary | ICD-10-CM

## 2018-11-13 LAB — HBA1C MFR BLD: 7.7 % (ref 4.3–6)

## 2018-11-13 RX ORDER — LIRAGLUTIDE 6 MG/ML
INJECTION SUBCUTANEOUS
Qty: 9 ML | Refills: 11 | Status: SHIPPED | OUTPATIENT
Start: 2018-11-13 | End: 2020-06-08 | Stop reason: ALTCHOICE

## 2018-11-13 NOTE — LETTER
11/13/2018       RE: Stacey La  [id number]  [facility] [id number]     Dear Colleague,    Thank you for referring your patient, Stacey La, to the [facility] ENDOCRINOLOGY at [facility]. Please see a copy of my visit note below.    This 23-year-old woman was seen in follow-up for her long-standing type 1 diabetes that is complicated by early nephropathy.  She also has hypothyroidism.  Stacey is currently using a tandem T slim pump.  Her settings are:    Basal:    Midnight 3.2    3 AM 3.0    7 AM 3.0    11 AM 2.3    4:30 PM 2.3    Carb ratios:    Midnight 3.5    3 AM 4.5    11 AM 5    4:30 PM for    Sensitivity factors:    Midnight 16    4:30 PM 15    Target 110    She also uses a Dex com continuous glucose monitor. Unfortunately, she lost the  about 6 months ago and her insurance company would not give her another one until recently . Over the last 2 weeks she has worn at 99% of the time.  Her average sensor glucose was 181.  She was in target 58% of the time, above target 42% of the time, and below target 0% of the time.  About 50% of her daily dose of insulin is given as basal.  Reviewing her daily sensor data shows she generally is in target overnight and during the day when she is vigilant about bolusing for her food.  When she relaxes, she often has higher postprandial values.  This is particularly true over the weekend when she was drinking high calorie alcoholic drinks with her friends.  During this time she did do corrections with great cautiousness.  She came down into the low 200s after that.    Today Stacey asks if we could consider using a GLP-1 analogue.  She used Byetta in the past but does not really remember how well it worked for her.  She is no longer interested in pregnancy and she would like to achieve a little bit better postprandial glucose control.    She has regular follow-up with nephrology.  She is seen the eye doctor and has no  retinopathy.  She has no foot concerns.  She is taking her thyroid hormone daily.      Current Outpatient Prescriptions on File Prior to Visit:  albuterol (2.5 MG/3ML) 0.083% neb solution Take 1 vial (2.5 mg) by nebulization every 4 hours as needed for shortness of breath / dyspnea or wheezing /chest tightness/cough   albuterol (ALBUTEROL) 108 (90 BASE) MCG/ACT inhaler Inhale 2 puffs into the lungs every 6 hours as needed   Ascorbic Acid (VITAMIN C PO) Take 500 mg by mouth daily   Cholecalciferol 2000 UNITS TBDP Take 2,000 Units by mouth daily   enalapril (VASOTEC) 20 MG tablet Take 1 tablet (20 mg) by mouth 2 times daily   Fexofenadine HCl (ALLEGRA PO) Take by mouth daily UNSURE OF DOSAGE   HUMALOG 100 UNIT/ML injection USE AS DIRECTED UP  TO  140  UNITS  DAILY  IN  INSULIN  PUMP   hydrOXYzine (VISTARIL) 25 MG capsule Take 1 capsule (25 mg) by mouth daily as needed for itching   Multiple Vitamins-Minerals (MULTIVITAMIN ADULT PO) Take 1 tablet by mouth daily   Ondansetron HCl (ZOFRAN PO) Take by mouth as needed for nausea or vomiting Reported on 3/17/2017   prochlorperazine (COMPAZINE) 10 MG tablet Take 1 tablet (10 mg) by mouth every 6 hours as needed for nausea or vomiting   blood glucose test strip Use to test blood sugar eight times daily or as directed.  Dispense [employer] Contour Next Test Strips.   Blood Pressure Monitoring KIT 1 kit daily   escitalopram (LEXAPRO) 10 MG tablet Take 1 tablet (10 mg) by mouth daily (Patient not taking: Reported on 11/13/2018)   glucagon (GLUCAGON EMERGENCY) 1 MG kit Inject 1 mg into the muscle once for 1 dose   insulin syringes, disposable, U-100 0.3 ML MISC Use 2-3 times daily as needed   ipratropium - albuterol 0.5 mg/2.5 mg/3 mL (DUONEB) 0.5-2.5 (3) MG/3ML neb solution Take 1 vial (3 mLs) by nebulization every 6 hours as needed for shortness of breath / dyspnea or wheezing (Patient not taking: Reported on 11/13/2018)   levothyroxine (SYNTHROID/LEVOTHROID) 50 MCG tablet Take 1  "tablet (50 mcg) by mouth daily (Patient not taking: Reported on 11/13/2018)     No current facility-administered medications on file prior to visit.     ROS: 10 point ROS neg other than the symptoms noted above in the HPI.'  So Hx -     Vital signs:   /86  Pulse 108  Ht 1.727 m (5' 8\")  Wt 135.6 kg (299 lb)  BMI 45.46 kg/m2  Estimated body mass index is 45.46 kg/(m^2) as calculated from the following:    Height as of this encounter: 1.727 m (5' 8\").    Weight as of this encounter: 135.6 kg (299 lb).  NAD  Eyes - no periorbital edema, conjunctival injection, scleral icterus  Neck - no thyromegaly  Skin - normal texture   Mood - upbeat    Recent Labs   Lab Test 11/13/18 04/10/18  01/17/18   1517  01/17/18   1515  07/25/17   1457  07/25/17   1455   10/10/16   1643   01/16/16   0905  08/04/15   1443   03/26/15   0645   12/09/13   1137   A1C   --    --    --   7.0*   --    --    --    --    --    --    --    --   7.6*   --    --    HEMOGLOBINA1  7.7*  7.0*   --    --    --    --    < >   --    < >   --    --    --    --    < >   --    TSH   --    --    --    --    --    --    --   2.89   --   3.33   --    --    --    --   2.95   T4   --    --    --    --    --    --    --    --    --    --    --    --    --    --   1.18   LDL   --    --    --    --    --    --    --    --    --    --   102   --    --    --   142*   HDL   --    --    --    --    --    --    --    --    --    --   66   --    --    --   66   TRIG   --    --    --    --    --    --    --    --    --    --   131   --    --    --   165*   CR   --    --    --   0.70   --   0.56   < >   --    < >  0.56   --    < >  0.54   < >  0.48*   MICROL   --    --   <5   --   5   --    < >   --    < >   --    --    < >   --    < >   --     < > = values in this interval not displayed.       Assessment and plan:    1.  Diabetes control.  Her A1c has gone up a bit and she attributes this to being without her sensor.  Now that she is back on the sensor, her " overall glycemic control looks a bit better than an A1c of 7.7 would suggest.  We talked about adding a GLP-1 analog.  She elected for a daily treatment with Victoza.  Discussed side effects of nausea, vomiting, weight loss and associated risk of pancreatitis, pancreatic cancer, thyroid cancer.  We will started a dose of 0.6 mg each day and advance as tolerated to a dose of 1.8 mg each day.  I warned her of hypoglycemia but suggested we not change her carb insulin ratios and less we see she is getting low.      2.  Diabetes complications.  She has stage I nephropathy that is being managed with an ACE inhibitor by nephrology.  She has no retinopathy or neuropathy.    3.hypothyroidism.  We will check her TSH today.  She appears to be euthyroid.    4.  CVD risk.  Her blood pressure is well controlled.  She is not on a statin because of probable plans for pregnancy in the future.  Her LDL is around 100.    F/u with Laureen Goldstein in 3 mo and me in 6 months      I spent 25 minutes with this patient face to face and explained the conditions and plans (more than 50% of time was counseling/coordination of diabetes care with emphasis on using GLP1 agonists). The patient understood and is satisfied with today's visit.     Bia Allan MD

## 2018-11-13 NOTE — Clinical Note
Stacey wants to participate in one of our studies.  I prescribed victoza for her today, which she may need to stop. Can you contact her?

## 2018-11-13 NOTE — NURSING NOTE
Chief Complaint   Patient presents with     RECHECK     Type I Diabetes     Performed capillary puncture for A1C testing. Patient tolerated well.    Beverly Wilkerson, [facility]  Endocrinology & Diabetes

## 2018-11-13 NOTE — PROGRESS NOTES
This 23-year-old woman was seen in follow-up for her long-standing type 1 diabetes that is complicated by early nephropathy.  She also has hypothyroidism.  Stacey is currently using a tandem T slim pump.  Her settings are:    Basal:    Midnight 3.2    3 AM 3.0    7 AM 3.0    11 AM 2.3    4:30 PM 2.3    Carb ratios:    Midnight 3.5    3 AM 4.5    11 AM 5    4:30 PM for    Sensitivity factors:    Midnight 16    4:30 PM 15    Target 110    She also uses a Dex com continuous glucose monitor. Unfortunately, she lost the  about 6 months ago and her insurance company would not give her another one until recently . Over the last 2 weeks she has worn at 99% of the time.  Her average sensor glucose was 181.  She was in target 58% of the time, above target 42% of the time, and below target 0% of the time.  About 50% of her daily dose of insulin is given as basal.  Reviewing her daily sensor data shows she generally is in target overnight and during the day when she is vigilant about bolusing for her food.  When she relaxes, she often has higher postprandial values.  This is particularly true over the weekend when she was drinking high calorie alcoholic drinks with her friends.  During this time she did do corrections with great cautiousness.  She came down into the low 200s after that.    Today Stacey asks if we could consider using a GLP-1 analogue.  She used Byetta in the past but does not really remember how well it worked for her.  She is no longer interested in pregnancy and she would like to achieve a little bit better postprandial glucose control.    She has regular follow-up with nephrology.  She is seen the eye doctor and has no retinopathy.  She has no foot concerns.  She is taking her thyroid hormone daily.      Current Outpatient Prescriptions on File Prior to Visit:  albuterol (2.5 MG/3ML) 0.083% neb solution Take 1 vial (2.5 mg) by nebulization every 4 hours as needed for shortness of breath / dyspnea or  wheezing /chest tightness/cough   albuterol (ALBUTEROL) 108 (90 BASE) MCG/ACT inhaler Inhale 2 puffs into the lungs every 6 hours as needed   Ascorbic Acid (VITAMIN C PO) Take 500 mg by mouth daily   Cholecalciferol 2000 UNITS TBDP Take 2,000 Units by mouth daily   enalapril (VASOTEC) 20 MG tablet Take 1 tablet (20 mg) by mouth 2 times daily   Fexofenadine HCl (ALLEGRA PO) Take by mouth daily UNSURE OF DOSAGE   HUMALOG 100 UNIT/ML injection USE AS DIRECTED UP  TO  140  UNITS  DAILY  IN  INSULIN  PUMP   hydrOXYzine (VISTARIL) 25 MG capsule Take 1 capsule (25 mg) by mouth daily as needed for itching   Multiple Vitamins-Minerals (MULTIVITAMIN ADULT PO) Take 1 tablet by mouth daily   Ondansetron HCl (ZOFRAN PO) Take by mouth as needed for nausea or vomiting Reported on 3/17/2017   prochlorperazine (COMPAZINE) 10 MG tablet Take 1 tablet (10 mg) by mouth every 6 hours as needed for nausea or vomiting   blood glucose test strip Use to test blood sugar eight times daily or as directed.  Dispense [employer] Contour Next Test Strips.   Blood Pressure Monitoring KIT 1 kit daily   escitalopram (LEXAPRO) 10 MG tablet Take 1 tablet (10 mg) by mouth daily (Patient not taking: Reported on 11/13/2018)   glucagon (GLUCAGON EMERGENCY) 1 MG kit Inject 1 mg into the muscle once for 1 dose   insulin syringes, disposable, U-100 0.3 ML MISC Use 2-3 times daily as needed   ipratropium - albuterol 0.5 mg/2.5 mg/3 mL (DUONEB) 0.5-2.5 (3) MG/3ML neb solution Take 1 vial (3 mLs) by nebulization every 6 hours as needed for shortness of breath / dyspnea or wheezing (Patient not taking: Reported on 11/13/2018)   levothyroxine (SYNTHROID/LEVOTHROID) 50 MCG tablet Take 1 tablet (50 mcg) by mouth daily (Patient not taking: Reported on 11/13/2018)     No current facility-administered medications on file prior to visit.     ROS: 10 point ROS neg other than the symptoms noted above in the HPI.'  So Hx -     Vital signs:   /86  Pulse 108  Ht  "1.727 m (5' 8\")  Wt 135.6 kg (299 lb)  BMI 45.46 kg/m2  Estimated body mass index is 45.46 kg/(m^2) as calculated from the following:    Height as of this encounter: 1.727 m (5' 8\").    Weight as of this encounter: 135.6 kg (299 lb).  NAD  Eyes - no periorbital edema, conjunctival injection, scleral icterus  Neck - no thyromegaly  Skin - normal texture   Mood - upbeat    Recent Labs   Lab Test 11/13/18 04/10/18  01/17/18   1517  01/17/18   1515  07/25/17   1457  07/25/17   1455   10/10/16   1643   01/16/16   0905  08/04/15   1443   03/26/15   0645   12/09/13   1137   A1C   --    --    --   7.0*   --    --    --    --    --    --    --    --   7.6*   --    --    HEMOGLOBINA1  7.7*  7.0*   --    --    --    --    < >   --    < >   --    --    --    --    < >   --    TSH   --    --    --    --    --    --    --   2.89   --   3.33   --    --    --    --   2.95   T4   --    --    --    --    --    --    --    --    --    --    --    --    --    --   1.18   LDL   --    --    --    --    --    --    --    --    --    --   102   --    --    --   142*   HDL   --    --    --    --    --    --    --    --    --    --   66   --    --    --   66   TRIG   --    --    --    --    --    --    --    --    --    --   131   --    --    --   165*   CR   --    --    --   0.70   --   0.56   < >   --    < >  0.56   --    < >  0.54   < >  0.48*   MICROL   --    --   <5   --   5   --    < >   --    < >   --    --    < >   --    < >   --     < > = values in this interval not displayed.       Assessment and plan:    1.  Diabetes control.  Her A1c has gone up a bit and she attributes this to being without her sensor.  Now that she is back on the sensor, her overall glycemic control looks a bit better than an A1c of 7.7 would suggest.  We talked about adding a GLP-1 analog.  She elected for a daily treatment with Victoza.  Discussed side effects of nausea, vomiting, weight loss and associated risk of pancreatitis, pancreatic cancer, thyroid " cancer.  We will started a dose of 0.6 mg each day and advance as tolerated to a dose of 1.8 mg each day.  I warned her of hypoglycemia but suggested we not change her carb insulin ratios and less we see she is getting low.      2.  Diabetes complications.  She has stage I nephropathy that is being managed with an ACE inhibitor by nephrology.  She has no retinopathy or neuropathy.    3.hypothyroidism.  We will check her TSH today.  She appears to be euthyroid.    4.  CVD risk.  Her blood pressure is well controlled.  She is not on a statin because of probable plans for pregnancy in the future.  Her LDL is around 100.    F/u with Laureen Goldstein in 3 mo and me in 6 months      I spent 25 minutes with this patient face to face and explained the conditions and plans (more than 50% of time was counseling/coordination of diabetes care with emphasis on using GLP1 agonists). The patient understood and is satisfied with today's visit.     Bia Allan MD

## 2018-11-13 NOTE — MR AVS SNAPSHOT
After Visit Summary   11/13/2018    Stacey La    MRN: [id number]           Patient Information     Date Of Birth          1995        Visit Information        Provider Department      11/13/2018 2:30 PM Bia Allan MD [facility] Endocrinology        Today's Diagnoses     Type 1 diabetes mellitus with diabetic nephropathy (H)    -  1      Care Instructions    Start victoza at dose of 0.6 mg each day for 3 days, then increase to 1.2 mg each day for 3 days, then to 1.8 mg a day for 3 days.  You can titrate the dose more slowly if you feel nauseated          Follow-ups after your visit        Follow-up notes from your care team     Return for f/u 3 mo with Laureen Goldstein and 6 mo with me.      Your next 10 appointments already scheduled     Feb 18, 2019 11:30 AM CST   (Arrive by 11:15 AM)   RETURN DIABETES with Laureen Goldstein PA-C   [facility] Endocrinology ([facility])    [address] [place] [address]  3rd [facility] [address]   [phone]            Mar 04, 2019  8:00 AM CST   Lab with  LAB   [facility] Lab ([facility])    [address] [place] [address]  1st Floor  [facility] [address]   [phone]            Mar 04, 2019  9:00 AM CST   (Arrive by 8:30 AM)   Return Visit with Connor Castaneda MD   [facility] Nephrology ([facility])    [address] [place] [address]  [address]  [facility] [address]   [phone]            May 14, 2019  3:30 PM CDT   (Arrive by 3:15 PM)   RETURN DIABETES with Bia Allan MD   [facility] Endocrinology ([facility])    [address] [place] [address]  3rd Floor  [facility] [address] [phone]              Future tests that were ordered for you today     Open Future Orders        Priority Expected Expires Ordered    TSH Routine 11/13/2018 11/13/2019 11/13/2018            Who to contact     Please call your clinic at [phone] to:    Ask  "questions about your health    Make or cancel appointments    Discuss your medicines    Learn about your test results    Speak to your doctor            Additional Information About Your Visit        [employer] Information     [employer] gives you secure access to your electronic health record. If you see a primary care provider, you can also send messages to your care team and make appointments. If you have questions, please call your primary care clinic.  If you do not have a primary care provider, please call [phone] and they will assist you.      [employer] is an electronic gateway that provides easy, online access to your medical records. With [employer], you can request a clinic appointment, read your test results, renew a prescription or communicate with your care team.     To access your existing account, please contact your [facility] or call [phone] for assistance.        Care EveryWhere ID     This is your Care EveryWhere ID. This could be used by other organizations to access your [place] medical records  [id number]        Your Vitals Were     Pulse Height BMI (Body Mass Index)             108 1.727 m (5' 8\") 45.46 kg/m2          Blood Pressure from Last 3 Encounters:   11/13/18 138/86   04/10/18 139/85   03/12/18 114/70    Weight from Last 3 Encounters:   11/13/18 135.6 kg (299 lb)   04/10/18 136 kg (299 lb 14.4 oz)   03/12/18 132.2 kg (291 lb 7.2 oz)              We Performed the Following     Hemoglobin A1c POCT          Today's Medication Changes          These changes are accurate as of 11/13/18  5:38 PM.  If you have any questions, ask your nurse or doctor.               Start taking these medicines.        Dose/Directions    liraglutide 18 MG/3ML soln   Commonly known as:  VICTOZA PEN   Used for:  Type 1 diabetes mellitus with diabetic nephropathy (H)   Started by:  Bia Allan MD        0.6 mg x 3 days, then 1.2 mg x 3 days, then 1.8 mg   Quantity:  9 mL "   Refills:  11            Where to get your medicines      These medications were sent to [id number] - [place], [place] - [address] [facility]  [address] [facility] [address]     Phone:  [phone]     liraglutide 18 MG/3ML soln                Primary Care Provider Office Phone # Fax #    LUIS FERNANDO [place] [facility] [phone] [address] [place] [address]        Equal Access to Services     [facility]: Hadii aad ku hadasho Soomaali, waaxda luqadaha, qaybta kaalmada adeegyada, waxay idiin hayaan adeeg kharash la'kaleb . So [facility] [phone].    ATENCIÓN: Si habla español, tiene a escobar disposición servicios gratuitos de asistencia lingüística. [facility] [phone].    We comply with applicable federal civil rights laws and [place] laws. We do not discriminate on the basis of race, color, national origin, age, disability, sex, sexual orientation, or gender identity.            Thank you!     Thank you for choosing [facility]  for your care. Our goal is always to provide you with excellent care. Hearing back from our patients is one way we can continue to improve our services. Please take a few minutes to complete the written survey that you may receive in the mail after your visit with us. Thank you!             Your Updated Medication List - Protect others around you: Learn how to safely use, store and throw away your medicines at www.disposemymeds.org.          This list is accurate as of 11/13/18  5:38 PM.  Always use your most recent med list.                   Brand Name Dispense Instructions for use Diagnosis    * albuterol 108 (90 Base) MCG/ACT inhaler    PROAIR HFA    1 each    Inhale 2 puffs into the lungs every 6 hours as needed    Mild intermittent asthma without complication       * albuterol (2.5 MG/3ML) 0.083% neb solution     1 Box    Take 1 vial (2.5 mg) by nebulization every 4 hours as needed for shortness of breath /  dyspnea or wheezing /chest tightness/cough    Uncomplicated asthma, unspecified asthma severity       ALLEGRA PO      Take by mouth daily UNSURE OF DOSAGE        blood glucose monitoring test strip    no brand specified    4 Box    Use to test blood sugar eight times daily or as directed.  Dispense Brandy Contour Next Test Strips.    Diabetes mellitus type 1 (H)       Blood Pressure Monitoring Kit     1 kit    1 kit daily    HTN (hypertension)       Cholecalciferol 2000 units Tbdp     90 tablet    Take 2,000 Units by mouth daily    Proteinuria, Vitamin D deficiency       enalapril 20 MG tablet    VASOTEC    180 tablet    Take 1 tablet (20 mg) by mouth 2 times daily    Proteinuria       escitalopram 10 MG tablet    LEXAPRO    30 tablet    Take 1 tablet (10 mg) by mouth daily    Depression with anxiety       glucagon 1 MG kit    GLUCAGON EMERGENCY    1 mg    Inject 1 mg into the muscle once for 1 dose    Well controlled type 1 diabetes mellitus (H)       humaLOG 100 UNIT/ML injection   Generic drug:  insulin lispro     120 mL    USE AS DIRECTED UP  TO  140  UNITS  DAILY  IN  INSULIN  PUMP    Well controlled type 1 diabetes mellitus (H)       hydrOXYzine 25 MG capsule    VISTARIL    30 capsule    Take 1 capsule (25 mg) by mouth daily as needed for itching    Major depressive disorder, recurrent episode, in partial remission (H)       insulin syringes (disposable) U-100 0.3 ML Misc     100 each    Use 2-3 times daily as needed    Type 1 diabetes mellitus (H)       ipratropium - albuterol 0.5 mg/2.5 mg/3 mL 0.5-2.5 (3) MG/3ML neb solution    DUONEB    30 vial    Take 1 vial (3 mLs) by nebulization every 6 hours as needed for shortness of breath / dyspnea or wheezing        levothyroxine 50 MCG tablet    SYNTHROID/LEVOTHROID    90 tablet    Take 1 tablet (50 mcg) by mouth daily    Other specified hypothyroidism       liraglutide 18 MG/3ML soln    VICTOZA PEN    9 mL    0.6 mg x 3 days, then 1.2 mg x 3 days, then 1.8 mg     Type 1 diabetes mellitus with diabetic nephropathy (H)       MULTIVITAMIN ADULT PO      Take 1 tablet by mouth daily        prochlorperazine 10 MG tablet    COMPAZINE    10 tablet    Take 1 tablet (10 mg) by mouth every 6 hours as needed for nausea or vomiting    Intractable vomiting       VITAMIN C PO      Take 500 mg by mouth daily        ZOFRAN PO      Take by mouth as needed for nausea or vomiting Reported on 3/17/2017        * Notice:  This list has 2 medication(s) that are the same as other medications prescribed for you. Read the directions carefully, and ask your doctor or other care provider to review them with you.

## 2018-11-13 NOTE — PATIENT INSTRUCTIONS
Start victoza at dose of 0.6 mg each day for 3 days, then increase to 1.2 mg each day for 3 days, then to 1.8 mg a day for 3 days.  You can titrate the dose more slowly if you feel nauseated

## 2018-11-28 ENCOUNTER — OFFICE VISIT (OUTPATIENT)
Dept: OPHTHALMOLOGY | Facility: CLINIC | Age: 23
End: 2018-11-28
Payer: COMMERCIAL

## 2018-11-28 DIAGNOSIS — H52.13 MYOPIA OF BOTH EYES: Primary | ICD-10-CM

## 2018-11-28 ASSESSMENT — CUP TO DISC RATIO
OD_RATIO: 0.2
OS_RATIO: 0.2

## 2018-11-28 ASSESSMENT — EXTERNAL EXAM - LEFT EYE: OS_EXAM: NORMAL

## 2018-11-28 ASSESSMENT — CONF VISUAL FIELD
OD_NORMAL: 1
OS_NORMAL: 1
METHOD: COUNTING FINGERS

## 2018-11-28 ASSESSMENT — VISUAL ACUITY
OS_CC: 20/20
CORRECTION_TYPE: CONTACTS
OS_CC+: -1
METHOD: SNELLEN - LINEAR
OD_CC: 20/20

## 2018-11-28 ASSESSMENT — REFRACTION_CURRENTRX
OS_BRAND: COOPER BIOFINITY BC 8.6, D 14.0
OS_CYLINDER: SPHERE
OD_CYLINDER: SPHERE
OS_BASECURVE: 8.6
OD_BRAND: COOPER BIOFINITY BC 8.6, D 14.0
OS_SPHERE: -1.25
OD_SPHERE: -1.00
OS_DIAMETER: 14.0
OD_DIAMETER: 14.0
OD_BASECURVE: 8.6

## 2018-11-28 ASSESSMENT — SLIT LAMP EXAM - LIDS
COMMENTS: NORMAL
COMMENTS: NORMAL

## 2018-11-28 ASSESSMENT — EXTERNAL EXAM - RIGHT EYE: OD_EXAM: NORMAL

## 2018-11-28 NOTE — MR AVS SNAPSHOT
After Visit Summary   11/28/2018    Stacey La    MRN: [id number]           Patient Information     Date Of Birth          1995        Visit Information        Provider Department      11/28/2018 2:40 PM Sonido Reyes OD [facility] Ophthalmology        Today's Diagnoses     Myopia of both eyes    -  1       Follow-ups after your visit        Follow-up notes from your care team     Return in about 1 year (around 11/28/2019) for Comprehensive Eye Exam.      Your next 10 appointments already scheduled     Feb 18, 2019 11:30 AM CST   (Arrive by 11:15 AM)   RETURN DIABETES with Laureen Goldstein PA-C   [facility] Endocrinology ([facility])    [address]  3rd Floor  [facility] [address] [phone]            Mar 04, 2019  8:00 AM CST   Lab with  LAB   [facility] Lab ([facility])    [address]  1st Floor  [facility] [address] [phone]            Mar 04, 2019  9:00 AM CST   (Arrive by 8:30 AM)   Return Visit with Connor Castaneda MD   [facility] Nephrology ([facility])    [address]  [address]  [facility] [address] [phone]            May 14, 2019  3:30 PM CDT   (Arrive by 3:15 PM)   RETURN DIABETES with Bia Allan MD   [facility] Endocrinology ([facility])    [address] [place] [address]  3rd Floor  [facility] [address] [phone]              Who to contact     Please call your clinic at [phone] to:    Ask questions about your health    Make or cancel appointments    Discuss your medicines    Learn about your test results    Speak to your doctor            Additional Information About Your Visit        MyChart Information     [employer] gives you secure access to your electronic health record. If you see a primary care provider, you can also send messages to your care team and make appointments. If you have questions,  please call your primary care clinic.  If you do not have a primary care provider, please call [phone] and they will assist you.      [employer] is an electronic gateway that provides easy, online access to your medical records. With [employer], you can request a clinic appointment, read your test results, renew a prescription or communicate with your care team.     To access your existing account, please contact your [facility] or call [phone] for assistance.        Care EveryWhere ID     This is your Care EveryWhere ID. This could be used by other organizations to access your [place] medical records  [id number]         Blood Pressure from Last 3 Encounters:   11/13/18 138/86   04/10/18 139/85   03/12/18 114/70    Weight from Last 3 Encounters:   11/13/18 135.6 kg (299 lb)   04/10/18 136 kg (299 lb 14.4 oz)   03/12/18 132.2 kg (291 lb 7.2 oz)              Today, you had the following     No orders found for display       Primary Care Provider Office Phone # Fax #    Nancy Mejia, LUIS FERNANDO [facility] [phone] [address] [place] [address]        Equal Access to Services     [facility]: Hadii aad ku hadasho Soomaali, waaxda luqadaha, qaybta kaalmada adeegyada, keagan west. So [facility] [phone].    ATENCIÓN: Si habla español, tiene a escobar disposición servicios gratuitos de asistencia lingüística. Llame al [phone].    We comply with applicable federal civil rights laws and [place] laws. We do not discriminate on the basis of race, color, national origin, age, disability, sex, sexual orientation, or gender identity.            Thank you!     Thank you for choosing [facility] OPHTHALMOLOGY  for your care. Our goal is always to provide you with excellent care. Hearing back from our patients is one way we can continue to improve our services. Please take a few minutes to complete the written survey that you may  receive in the mail after your visit with us. Thank you!             Your Updated Medication List - Protect others around you: Learn how to safely use, store and throw away your medicines at www.disposemymeds.org.          This list is accurate as of 11/28/18  3:01 PM.  Always use your most recent med list.                   Brand Name Dispense Instructions for use Diagnosis    * albuterol 108 (90 Base) MCG/ACT inhaler    PROAIR HFA    1 each    Inhale 2 puffs into the lungs every 6 hours as needed    Mild intermittent asthma without complication       * albuterol (2.5 MG/3ML) 0.083% neb solution    PROVENTIL    1 Box    Take 1 vial (2.5 mg) by nebulization every 4 hours as needed for shortness of breath / dyspnea or wheezing /chest tightness/cough    Uncomplicated asthma, unspecified asthma severity       ALLEGRA PO      Take by mouth daily UNSURE OF DOSAGE        blood glucose monitoring test strip    NO BRAND SPECIFIED    4 Box    Use to test blood sugar eight times daily or as directed.  Dispense Brandy Contour Next Test Strips.    Diabetes mellitus type 1 (H)       Blood Pressure Monitoring Kit     1 kit    1 kit daily    HTN (hypertension)       Cholecalciferol 2000 units Tbdp     90 tablet    Take 2,000 Units by mouth daily    Proteinuria, Vitamin D deficiency       enalapril 20 MG tablet    VASOTEC    180 tablet    Take 1 tablet (20 mg) by mouth 2 times daily    Proteinuria       escitalopram 10 MG tablet    LEXAPRO    30 tablet    Take 1 tablet (10 mg) by mouth daily    Depression with anxiety       glucagon 1 MG kit    GLUCAGON EMERGENCY    1 mg    Inject 1 mg into the muscle once for 1 dose    Well controlled type 1 diabetes mellitus (H)       humaLOG 100 UNIT/ML vial   Generic drug:  insulin lispro     120 mL    USE AS DIRECTED UP  TO  140  UNITS  DAILY  IN  INSULIN  PUMP    Well controlled type 1 diabetes mellitus (H)       hydrOXYzine 25 MG capsule    VISTARIL    30 capsule    Take 1 capsule (25 mg) by  mouth daily as needed for itching    Major depressive disorder, recurrent episode, in partial remission (H)       insulin syringes (disposable) U-100 0.3 ML Misc     100 each    Use 2-3 times daily as needed    Type 1 diabetes mellitus (H)       ipratropium - albuterol 0.5 mg/2.5 mg/3 mL 0.5-2.5 (3) MG/3ML neb solution    DUONEB    30 vial    Take 1 vial (3 mLs) by nebulization every 6 hours as needed for shortness of breath / dyspnea or wheezing        levothyroxine 50 MCG tablet    SYNTHROID/LEVOTHROID    90 tablet    Take 1 tablet (50 mcg) by mouth daily    Other specified hypothyroidism       liraglutide 18 MG/3ML solution    VICTOZA PEN    9 mL    0.6 mg x 3 days, then 1.2 mg x 3 days, then 1.8 mg    Type 1 diabetes mellitus with diabetic nephropathy (H)       MULTIVITAMIN ADULT PO      Take 1 tablet by mouth daily        prochlorperazine 10 MG tablet    COMPAZINE    10 tablet    Take 1 tablet (10 mg) by mouth every 6 hours as needed for nausea or vomiting    Intractable vomiting       VITAMIN C PO      Take 500 mg by mouth daily        ZOFRAN PO      Take by mouth as needed for nausea or vomiting Reported on 3/17/2017        * Notice:  This list has 2 medication(s) that are the same as other medications prescribed for you. Read the directions carefully, and ask your doctor or other care provider to review them with you.

## 2018-11-28 NOTE — PROGRESS NOTES
Assessment/Plan  (H52.13) Myopia of both eyes  (primary encounter diagnosis)  Comment: Myopia both eyes; patient reports good comfort and vision  Plan:  Dispensed trial lenses and finalized contact lens prescription for 2 years.    Return to clinic in 1 year for comprehensive eye exam.    Complete documentation of historical and exam elements from today's encounter can  be found in the full encounter summary report (not reduplicated in this progress  note). I personally obtained the chief complaint(s) and history of present illness. I  confirmed and edited as necessary the review of systems, past medical/surgical  history, family history, social history, and examination findings as documented by  others; and I examined the patient myself. I personally reviewed the relevant tests,  images, and reports as documented above. I formulated and edited as necessary the  assessment and plan and discussed the findings and management plan with the  patient and family.    Sonido Reyes, VERONIQUE, FAAO

## 2018-11-28 NOTE — NURSING NOTE
Chief Complaints and History of Present Illnesses   Patient presents with     Follow Up For     cl fitting     HPI    Symptoms:        Frequency:  Constant       Do you have eye pain now?:  No      Comments:  Feels that the cls are not as good as her gls  Emilie Burdick COT 2:41 PM November 28, 2018

## 2018-11-29 ENCOUNTER — HOSPITAL ENCOUNTER (EMERGENCY)
Facility: CLINIC | Age: 23
Discharge: HOME | End: 2018-11-29
Attending: EMERGENCY MEDICINE | Admitting: EMERGENCY MEDICINE
Payer: COMMERCIAL

## 2018-11-29 VITALS
BODY MASS INDEX: 44.58 KG/M2 | WEIGHT: 293 LBS | RESPIRATION RATE: 18 BRPM | SYSTOLIC BLOOD PRESSURE: 129 MMHG | DIASTOLIC BLOOD PRESSURE: 94 MMHG | OXYGEN SATURATION: 97 % | TEMPERATURE: 97.6 F

## 2018-11-29 DIAGNOSIS — R11.2 NON-INTRACTABLE VOMITING WITH NAUSEA, UNSPECIFIED VOMITING TYPE: ICD-10-CM

## 2018-11-29 LAB
ANION GAP SERPL CALCULATED.3IONS-SCNC: 8 MMOL/L (ref 3–14)
B-HCG FREE SERPL-ACNC: <5 IU/L
BASOPHILS # BLD AUTO: 0 10E9/L (ref 0–0.2)
BASOPHILS NFR BLD AUTO: 0.3 %
BUN SERPL-MCNC: 15 MG/DL (ref 7–30)
CALCIUM SERPL-MCNC: 9.2 MG/DL (ref 8.5–10.1)
CHLORIDE SERPL-SCNC: 106 MMOL/L (ref 94–109)
CO2 SERPL-SCNC: 24 MMOL/L (ref 20–32)
CREAT SERPL-MCNC: 0.57 MG/DL (ref 0.52–1.04)
DIFFERENTIAL METHOD BLD: ABNORMAL
EOSINOPHIL # BLD AUTO: 0.1 10E9/L (ref 0–0.7)
EOSINOPHIL NFR BLD AUTO: 0.9 %
ERYTHROCYTE [DISTWIDTH] IN BLOOD BY AUTOMATED COUNT: 13 % (ref 10–15)
GFR SERPL CREATININE-BSD FRML MDRD: >90 ML/MIN/1.7M2
GLUCOSE SERPL-MCNC: 71 MG/DL (ref 70–99)
HCT VFR BLD AUTO: 45.1 % (ref 35–47)
HGB BLD-MCNC: 14.8 G/DL (ref 11.7–15.7)
IMM GRANULOCYTES # BLD: 0.1 10E9/L (ref 0–0.4)
IMM GRANULOCYTES NFR BLD: 0.6 %
LYMPHOCYTES # BLD AUTO: 2.4 10E9/L (ref 0.8–5.3)
LYMPHOCYTES NFR BLD AUTO: 21 %
MCH RBC QN AUTO: 27.6 PG (ref 26.5–33)
MCHC RBC AUTO-ENTMCNC: 32.8 G/DL (ref 31.5–36.5)
MCV RBC AUTO: 84 FL (ref 78–100)
MONOCYTES # BLD AUTO: 1.1 10E9/L (ref 0–1.3)
MONOCYTES NFR BLD AUTO: 9.7 %
NEUTROPHILS # BLD AUTO: 7.6 10E9/L (ref 1.6–8.3)
NEUTROPHILS NFR BLD AUTO: 67.5 %
NRBC # BLD AUTO: 0 10*3/UL
NRBC BLD AUTO-RTO: 0 /100
PLATELET # BLD AUTO: 363 10E9/L (ref 150–450)
POTASSIUM SERPL-SCNC: 3.8 MMOL/L (ref 3.4–5.3)
RBC # BLD AUTO: 5.37 10E12/L (ref 3.8–5.2)
SODIUM SERPL-SCNC: 138 MMOL/L (ref 133–144)
WBC # BLD AUTO: 11.3 10E9/L (ref 4–11)

## 2018-11-29 PROCEDURE — 85025 COMPLETE CBC W/AUTO DIFF WBC: CPT | Performed by: EMERGENCY MEDICINE

## 2018-11-29 PROCEDURE — 99284 EMERGENCY DEPT VISIT MOD MDM: CPT | Mod: 25

## 2018-11-29 PROCEDURE — 96361 HYDRATE IV INFUSION ADD-ON: CPT

## 2018-11-29 PROCEDURE — 96374 THER/PROPH/DIAG INJ IV PUSH: CPT

## 2018-11-29 PROCEDURE — 80048 BASIC METABOLIC PNL TOTAL CA: CPT | Performed by: EMERGENCY MEDICINE

## 2018-11-29 PROCEDURE — 96376 TX/PRO/DX INJ SAME DRUG ADON: CPT

## 2018-11-29 PROCEDURE — 84702 CHORIONIC GONADOTROPIN TEST: CPT

## 2018-11-29 PROCEDURE — 25000128 H RX IP 250 OP 636: Performed by: EMERGENCY MEDICINE

## 2018-11-29 RX ORDER — ONDANSETRON 4 MG/1
4 TABLET, ORALLY DISINTEGRATING ORAL EVERY 8 HOURS PRN
Qty: 10 TABLET | Refills: 0 | Status: SHIPPED | OUTPATIENT
Start: 2018-11-29 | End: 2018-12-02

## 2018-11-29 RX ORDER — SODIUM CHLORIDE 9 MG/ML
1000 INJECTION, SOLUTION INTRAVENOUS CONTINUOUS
Status: DISCONTINUED | OUTPATIENT
Start: 2018-11-29 | End: 2018-11-29 | Stop reason: HOSPADM

## 2018-11-29 RX ORDER — PROCHLORPERAZINE MALEATE 10 MG
10 TABLET ORAL EVERY 6 HOURS PRN
Qty: 10 TABLET | Refills: 0 | Status: SHIPPED | OUTPATIENT
Start: 2018-11-29 | End: 2019-02-11

## 2018-11-29 RX ORDER — ONDANSETRON 2 MG/ML
4 INJECTION INTRAMUSCULAR; INTRAVENOUS EVERY 30 MIN PRN
Status: DISCONTINUED | OUTPATIENT
Start: 2018-11-29 | End: 2018-11-29 | Stop reason: HOSPADM

## 2018-11-29 RX ADMIN — ONDANSETRON 4 MG: 2 INJECTION INTRAMUSCULAR; INTRAVENOUS at 05:44

## 2018-11-29 RX ADMIN — ONDANSETRON 4 MG: 2 INJECTION INTRAMUSCULAR; INTRAVENOUS at 07:06

## 2018-11-29 RX ADMIN — SODIUM CHLORIDE 1000 ML: 9 INJECTION, SOLUTION INTRAVENOUS at 05:44

## 2018-11-29 NOTE — LETTER
November 29, 2018      To Whom It May Concern:      Stacey La was seen in our Emergency Department today, 11/29/18.  I expect her condition to improve over the next 1-2 days.  She may return to work/school when improved.    Sincerely,        Kenneth Frazier MD

## 2018-11-29 NOTE — ED NOTES
Answered pt call light. Pt requesting update. RN notified. Pt requiring PO challenge. Brought pt ice water.

## 2018-11-29 NOTE — ED NOTES
Pt seen by myself. The patient's labs do not indicate DKA. She does not have signs of obstruction. Her nausea is improved and she tolerated PO. The patient is requesting discharge. Likely this is gastritis with vomiting related to family illness suggesting viral process.    Diagnosis:  N/V     Ricardo Garduno MD  11/29/18 0806

## 2018-11-29 NOTE — ED AVS SNAPSHOT
[facility] Emergency Department    [address]    [facility] [address]    Phone:  [phone]    Fax:  [phone]                                       Stacey La   MRN: [id number]    Department:  [facility] Emergency Department   Date of Visit:  11/29/2018           After Visit Summary Signature Page     I have received my discharge instructions, and my questions have been answered. I have discussed any challenges I see with this plan with the nurse or doctor.    ..........................................................................................................................................  Patient/Patient Representative Signature      ..........................................................................................................................................  Patient Representative Print Name and Relationship to Patient    ..................................................               ................................................  Date                                   Time    ..........................................................................................................................................  Reviewed by Signature/Title    ...................................................              ..............................................  Date                                               Time          22EPIC Rev 08/18

## 2018-11-29 NOTE — ED TRIAGE NOTES
N/V since 3am, pt is type 1 diabetic.  Pts continuous monitor shows BG 86 in triage.   Father recently admitted for stomach virus.

## 2018-11-29 NOTE — ED PROVIDER NOTES
History     Chief Complaint:  Nausea & Vomiting    HPI   Stacey La is a 23 year old female who presents to the emergency department today with nausea and vomiting.  She is accompanied to the ER by her  who is well.  However her father is currently admitted to the hospital with a gastrointestinal illness, thought to be a viral illness.    The patient was healthy and well yesterday and ate a normal dinner-tacos.  At around 2 to 2:30 AM she awoke with nausea and took some Tums to try to resolve possible reflux.  She did not feel any better.  Subsequently she had several episodes that were nonbloody and nonbilious.  They included some water and some of the food feedings last night.  He does not have any fever.  No associated abdominal pain.  No diarrhea yet but in truth she is been constipated the past couple of days and is now feeling some gurgling in her intestines and thinks she might be about to have diarrhea.  No urinary symptoms.  Patient is confident she is not pregnant.    Patient is a type I diabetic on an insulin pump.  Her continuous glucose monitor is been showing normal blood sugars tonight with a blood sugar currently at 85.  She does have a history of frequent DKA and is worried that if she did get her vomiting under control she might develop ketosis.    Allergies:  Dogs  Dust Mites  Gluten Meal    Medications:    Albuterol (2.5 Mg/3ml) 0.083% Neb Solution  Albuterol (Albuterol) 108 (90 Base) Mcg/act Inhaler  Ascorbic Acid (Vitamin C Po)  Blood Glucose Test Strip  Blood Pressure Monitoring Kit  Cholecalciferol 2000 Units Tbdp  Enalapril (Vasotec) 20 Mg Tablet  Escitalopram (Lexapro) 10 Mg Tablet  Fexofenadine Hcl (Allegra Po)  Glucagon (Glucagon Emergency) 1 Mg Kit  Humalog 100 Unit/ml Injection  Hydroxyzine (Vistaril) 25 Mg Capsule  Insulin Syringes, Disposable, U-100 0.3 Ml Misc  Ipratropium - Albuterol 0.5 Mg/2.5 Mg/3 Ml (Duoneb) 0.5-2.5 (3) Mg/3ml Neb Solution  Levothyroxine  (Synthroid/levothroid) 50 Mcg Tablet  Liraglutide (Victoza Pen) 18 Mg/3ml Soln  Multiple Vitamins-minerals (Multivitamin Adult Po)  Ondansetron Hcl (Zofran Po)  Prochlorperazine (Compazine) 10 Mg Tablet    Past Medical History:    Asthma   Anxiety   Celiac disease  Chronic kidney disease  Chronic sinusitis   Diabetes Type 1   Diabetic neuropathy  Hypertension  Hypothyroid   Pedal edema   Psoriasis     Past Surgical History:    ENT surgery   Tonsillectomy     Family History:    Cardiovascular   Gastrointestinal disease    Social History:  The patient was accompanied to the ED by her .  Smoking Status: Never  Smokeless Tobacco: Never  Alcohol Use: 0.0 oz/week   Marital Status:      Review of Systems  As noted above, otherwise negative    Physical Exam     Patient Vitals for the past 24 hrs:   BP Temp Temp src Heart Rate Resp SpO2 Weight   11/29/18 0516 (!) 152/103 97.6  F (36.4  C) Temporal 104 18 96 % 133 kg (293 lb 3.4 oz)      Physical Exam  Constitutional:  Appears well-developed and well-nourished. Alert. Conversant. Non toxic.   attentively at bedside  HENT:   Head: Atraumatic.   Nose: Nose normal.  Mouth/Throat: Oral mucosa is clear and moist. no trismus. Pharynx normal. Tonsils symmetric. No tonsillar enlargement, erythema, or exudate.  Eyes: Conjunctivae normal. EOM normal. Pupils equal, round, and reactive to light. No scleral icterus.   Neck: Normal range of motion. Neck supple. No tracheal deviation present.   Cardiovascular: Normal rate, regular rhythm. No gallop. No friction rub. No murmur heard. Symmetric radial artery pulses   Pulmonary/Chest: Effort normal. No stridor. No respiratory distress. No wheezes. No rales. No rhonchi . No tenderness.   Abdominal: Soft. Bowel sounds normal. No distension. No mass. No tenderness. No rebound. No guarding.   No CVA tenderness  Musculoskeletal:   RUE: Normal range of motion. No tenderness. No deformity  LUE: Normal range of motion. No  tenderness. No deformity  RLE: Normal range of motion. No edema. No tenderness. No deformity  LLE: Normal range of motion. No edema. No tenderness. No deformity  Neurological: Alert and oriented to person, place, and time. Normal strength. CN II-VII intact. No sensory deficit. GCS eye subscore is 4. GCS verbal subscore is 5. GCS motor subscore is 6. Normal coordination   Skin: Skin is warm and dry. No rash noted. No pallor. Normal capillary refill.  Psychiatric:  Normal mood. Normal affect.   Emergency Department Course     Laboratory:  Laboratory findings were communicated with the patient who voiced understanding of the findings.  CBC: AWNL (WBC 11.3(H), HGB 14.8, )   BMP: AWNL (Creatinine 0.57)   I-STAT hCG less than 5    Interventions:  0544: 0.9% NaCl 1L IV Bolus   0544: Zofran 4mg IV   Medications   0.9% sodium chloride BOLUS (1,000 mLs Intravenous New Bag 11/29/18 0544)     Followed by   sodium chloride 0.9% infusion (not administered)   ondansetron (ZOFRAN) injection 4 mg (4 mg Intravenous Given 11/29/18 0544)        Emergency Department Course:  Nursing notes and vitals reviewed.  0537: I performed an exam of the patient as documented above.   IV was inserted and blood was drawn for laboratory testing, results above.    I personally reviewed the laboratory the patient and her  results with the Patient and answered all related questions prior to signout.   Impression & Plan          CMS Diagnoses:         Medical Decision Making:  Stacey La is a 23 year old female presents with vomiting. The patient's symptoms and exam could be consistent with a viral GI infection.  Her father is currently ill with a viral GI infection and similar symptoms.  There is no high fever, severe pain, bilious or bloody emesis, blood or mucous in the stool, severe abdominal pain, or other concerning signs for a bacterial infection. I don't see any evidence for appendicitis, bowel obstruction, abscess, bowel  perforation, or other surgical emergency. Labs show no concerning electrolyte disturbance or renal failure.  Although she is diabetic, blood sugars been normal here in the ER with no unusual hyperglycemia, elevated anion gap, low bicarb to suggest DKA and no hypoglycemia.   At this point, the patient is non-septic appearing and well hydrated.I think the patient can be managed as an outpatient.    Patient is beginning to feel better after zofran, but is still nauseous. We will continue to hydrate with IV and attempt p.o. challenge when ready.  If Zofran is not effective in controlling her nausea and vomiting, she has had success in the past with Compazine/Benadryl.  I discussed this plan of care with the patient and her  as well as with my oncoming partner Dr. Garduno.  He will reevaluate the patient and order additional meds as needed.  If she is feeling better and is tolerating p.o., she can be discharged home per my plan.  I have already written some presumptive discharge instructions and discharge prescriptions which are on her chart.  If the patient has unforeseen trouble, including worsening vomiting or diarrhea, development of abdominal pain, or other symptoms, Dr. Garduno will evaluate and disposition appropriately.    Critical Care time:  none    Diagnosis:    1.  Nausea and vomiting-suspect infectious gastroenteritis  Disposition:  To be determined by Dr. Garduno, likely discharge    Discharge Medications:  Discharge Medication List as of 11/29/2018  8:08 AM      START taking these medications    Details   ondansetron (ZOFRAN ODT) 4 MG ODT tab Take 1 tablet (4 mg) by mouth every 8 hours as needed for nausea, Disp-10 tablet, R-0, Local Print      !! prochlorperazine (COMPAZINE) 10 MG tablet Take 1 tablet (10 mg) by mouth every 6 hours as needed for nausea or vomiting, Disp-10 tablet, R-0, Local Print       !! - Potential duplicate medications found. Please discuss with provider.            Scribe  Disclosure:  I, Sweta Rea, am serving as a scribe at 5:34 AM on 11/29/2018 to document services personally performed by Kenneth Frazier,  based on my observations and the provider's statements to me.    11/29/2018   [facility] EMERGENCY DEPARTMENT       Kenneth Frazier MD  11/29/18 9205

## 2018-11-29 NOTE — ED AVS SNAPSHOT
[facility] Emergency Department    [address] [place] [address]    Phone:  [phone]    Fax:  [phone]                                       Stacey La   MRN: [id number]    Department:  [facility] Emergency Department   Date of Visit:  11/29/2018           Patient Information     Date Of Birth          1995        Your diagnoses for this visit were:     Non-intractable vomiting with nausea, unspecified vomiting type        You were seen by Kenneth Frazier MD.      Follow-up Information     Follow up with Nancy Mejia APRN CNP In 1 day.    Specialty:  Nurse Practitioner    Contact information:    [address] [place] [address]   [place] [id number] [phone]          Discharge Instructions         Discharge Instructions  Vomiting    You have been seen today for vomiting. This is usually caused by a virus, but some bacteria, parasites, medicines or other medical conditions can cause similar symptoms. At this time your doctor does not find that your vomiting is a sign of anything dangerous or life-threatening. However, sometimes the signs of serious illness do not show up right away. If you have new or worse symptoms, you may need to be seen again in the emergency department or by your primary doctor. Remember that serious problems like appendicitis can start as vomiting.     Return to the Emergency Department if:    You keep throwing up and you are not able to keep liquids down.     You feel you are getting dehydrated, such as being very thirsty, not urinating at least every 8-12 hours, or feeling faint or lightheaded.     You develop a new fever, or your fever continues for more than 2 days.     You have belly pain that seems worse than cramps, is in one spot, or is getting worse over time.     You have blood in your vomit or stools.     You feel very weak.    You are not starting to improve within 24 hours of your visit here.     What  can I do to help myself?    The most important thing to do is to drink clear liquids. If you have been vomiting a lot, it is best to have only small, frequent sips of liquids. Drinking too much at once may cause more vomiting. If you are vomiting often, you must replace minerals, sodium and potassium lost with your illness. Pedialyte  and sports drinks can help you replace these minerals. You can also drink clear liquids such as water, weak tea, apple juice, and 7-Up . Avoid acid liquids (orange), caffeine (coffee) or alcohol. Do not drink milk until you no longer have diarrhea.     After liquids are staying down, you may start eating mild foods. Soda crackers, toast, plain noodles, gelatin, applesauce and bananas are good first choices. Avoid foods that have acid, are spicy, fatty or have a lot of fiber (such as meats, coarse grains, vegetables). You may start eating these foods again in about 3 days when you are better.     Sometimes treatment includes prescription medicine to prevent nausea and vomiting. If your doctor prescribes these for you, take them as directed.     Don t take ibuprofen, or other nonsteroidal anti-inflammatory medicines without checking with your healthcare provider.   If you were given a prescription for medicine here today, be sure to read all of the information (including the package insert) that comes with your prescription.  This will include important information about the medicine, its side effects, and any warnings that you need to know about.  The pharmacist who fills the prescription can provide more information and answer questions you may have about the medicine.  If you have questions or concerns that the pharmacist cannot address, please call or return to the Emergency Department.       Opioid Medication Information    Pain medications are among the most commonly prescribed medicines, so we are including this information for all our patients. If you did not receive pain  medication or get a prescription for pain medicine, you can ignore it.     You may have been given a prescription for an opioid (narcotic) pain medicine and/or have received a pain medicine while here in the Emergency Department. These medicines can make you drowsy or impaired. You must not drive, operate dangerous equipment, or engage in any other dangerous activities while taking these medications. If you drive while taking these medications, you could be arrested for DUI, or driving under the influence. Do not drink any alcohol while you are taking these medications.     Opioid pain medications can cause addiction. If you have a history of chemical dependency of any type, you are at a higher risk of becoming addicted to pain medications.  Only take these prescribed medications to treat your pain when all other options have been tried. Take it for as short a time and as few doses as possible. Store your pain pills in a secure place, as they are frequently stolen and provide a dangerous opportunity for children or visitors in your house to start abusing these powerful medications. We will not replace any lost or stolen medicine.  As soon as your pain is better, you should flush all your remaining medication.     Many prescription pain medications contain Tylenol  (acetaminophen), including Vicodin , Tylenol #3 , Norco , Lortab , and Percocet .  You should not take any extra pills of Tylenol  if you are using these prescription medications or you can get very sick.  Do not ever take more than 3000 mg of acetaminophen in any 24 hour period.    All opioids tend to cause constipation. Drink plenty of water and eat foods that have a lot of fiber, such as fruits, vegetables, prune juice, apple juice and high fiber cereal.  Take a laxative if you don t move your bowels at least every other day. Miralax , Milk of Magnesia, Colace , or Senna  can be used to keep you regular.      Remember that you can always come back to  the Emergency Department if you are not able to see your regular doctor in the amount of time listed above, if you get any new symptoms, or if there is anything that worries you.          Your next 10 appointments already scheduled     Feb 18, 2019 11:30 AM CST   (Arrive by 11:15 AM)   RETURN DIABETES with Laureen Goldstein PA-C   [facility] ([facility])    [address]  3rd Floor  [facility] [address]   [phone]            Mar 04, 2019  8:00 AM CST   Lab with  LAB   [facility] ([facility])    [address]  [address] [facility] [address]   [phone]            Mar 04, 2019  9:00 AM CST   (Arrive by 8:30 AM)   Return Visit with Connor Castaneda MD   [facility] ([facility])    [address] [place] [address]  [address]  [facility] [address]   [phone]            May 14, 2019  3:30 PM CDT   (Arrive by 3:15 PM)   RETURN DIABETES with Bia Allan MD   [facility] ([facility])    [address] [place] [address]  [address] [facility] [address]   [phone]              24 Hour Appointment Hotline       To make an appointment at any [facility], call [phone] ([phone]). If you don't have a family doctor or clinic, we will help you find one. [place] clinics are conveniently located to serve the needs of you and your family.             Review of your medicines      START taking        Dose / Directions Last dose taken    ondansetron 4 MG ODT tab   Commonly known as:  ZOFRAN ODT   Dose:  4 mg   Quantity:  10 tablet        Take 1 tablet (4 mg) by mouth every 8 hours as needed for nausea   Refills:  0          CONTINUE these medicines which may have CHANGED, or have new prescriptions. If we are uncertain of the size of tablets/capsules you have at home, strength may be listed as something that might have changed.        Dose /  Directions Last dose taken    * prochlorperazine 10 MG tablet   Commonly known as:  COMPAZINE   Dose:  10 mg   What changed:  Another medication with the same name was added. Make sure you understand how and when to take each.   Quantity:  10 tablet        Take 1 tablet (10 mg) by mouth every 6 hours as needed for nausea or vomiting   Refills:  1        * prochlorperazine 10 MG tablet   Commonly known as:  COMPAZINE   Dose:  10 mg   What changed:  You were already taking a medication with the same name, and this prescription was added. Make sure you understand how and when to take each.   Quantity:  10 tablet        Take 1 tablet (10 mg) by mouth every 6 hours as needed for nausea or vomiting   Refills:  0        * Notice:  This list has 2 medication(s) that are the same as other medications prescribed for you. Read the directions carefully, and ask your doctor or other care provider to review them with you.      Our records show that you are taking the medicines listed below. If these are incorrect, please call your family doctor or clinic.        Dose / Directions Last dose taken    * albuterol 108 (90 Base) MCG/ACT inhaler   Commonly known as:  PROAIR HFA   Dose:  2 puff   Quantity:  1 each        Inhale 2 puffs into the lungs every 6 hours as needed   Refills:  5        * albuterol (2.5 MG/3ML) 0.083% neb solution   Commonly known as:  PROVENTIL   Dose:  1 vial   Quantity:  1 Box        Take 1 vial (2.5 mg) by nebulization every 4 hours as needed for shortness of breath / dyspnea or wheezing /chest tightness/cough   Refills:  3        ALLEGRA PO        Take by mouth daily UNSURE OF DOSAGE   Refills:  0        blood glucose monitoring test strip   Commonly known as:  NO BRAND SPECIFIED   Quantity:  4 Box        Use to test blood sugar eight times daily or as directed.  Dispense Brandy Contour Next Test Strips.   Refills:  4        Blood Pressure Monitoring Kit   Dose:  1 kit   Quantity:  1 kit        1 kit daily    Refills:  0        Cholecalciferol 2000 units Tbdp   Dose:  2000 Units   Quantity:  90 tablet        Take 2,000 Units by mouth daily   Refills:  11        enalapril 20 MG tablet   Commonly known as:  VASOTEC   Dose:  20 mg   Quantity:  180 tablet        Take 1 tablet (20 mg) by mouth 2 times daily   Refills:  3        escitalopram 10 MG tablet   Commonly known as:  LEXAPRO   Dose:  10 mg   Quantity:  30 tablet        Take 1 tablet (10 mg) by mouth daily   Refills:  11        glucagon 1 MG kit   Commonly known as:  GLUCAGON EMERGENCY   Dose:  1 mg   Quantity:  1 mg        Inject 1 mg into the muscle once for 1 dose   Refills:  1        humaLOG 100 UNIT/ML vial   Quantity:  120 mL   Generic drug:  insulin lispro        USE AS DIRECTED UP  TO  140  UNITS  DAILY  IN  INSULIN  PUMP   Refills:  3        hydrOXYzine 25 MG capsule   Commonly known as:  VISTARIL   Dose:  25 mg   Quantity:  30 capsule        Take 1 capsule (25 mg) by mouth daily as needed for itching   Refills:  0        insulin syringes (disposable) U-100 0.3 ML Misc   Quantity:  100 each        Use 2-3 times daily as needed   Refills:  11        ipratropium - albuterol 0.5 mg/2.5 mg/3 mL 0.5-2.5 (3) MG/3ML neb solution   Commonly known as:  DUONEB   Dose:  1 vial   Quantity:  30 vial        Take 1 vial (3 mLs) by nebulization every 6 hours as needed for shortness of breath / dyspnea or wheezing   Refills:  1        levothyroxine 50 MCG tablet   Commonly known as:  SYNTHROID/LEVOTHROID   Dose:  50 mcg   Quantity:  90 tablet        Take 1 tablet (50 mcg) by mouth daily   Refills:  0        liraglutide 18 MG/3ML solution   Commonly known as:  VICTOZA PEN   Quantity:  9 mL        0.6 mg x 3 days, then 1.2 mg x 3 days, then 1.8 mg   Refills:  11        MULTIVITAMIN ADULT PO   Dose:  1 tablet        Take 1 tablet by mouth daily   Refills:  0        VITAMIN C PO   Dose:  500 mg        Take 500 mg by mouth daily   Refills:  0        ZOFRAN PO        Take by mouth  as needed for nausea or vomiting Reported on 3/17/2017   Refills:  0        * Notice:  This list has 2 medication(s) that are the same as other medications prescribed for you. Read the directions carefully, and ask your doctor or other care provider to review them with you.            Prescriptions were sent or printed at these locations (2 Prescriptions)                   Other Prescriptions                Printed at Department/Unit printer (2 of 2)         ondansetron (ZOFRAN ODT) 4 MG ODT tab               prochlorperazine (COMPAZINE) 10 MG tablet                Procedures and tests performed during your visit     Basic metabolic panel    CBC with platelets differential    ISTAT HCG Quantitative Pregnancy Nursing POCT    ISTAT HCG Quantitative Pregnancy POCT    Peripheral IV catheter      Orders Needing Specimen Collection     None      Pending Results     No orders found from 11/27/2018 to 11/30/2018.            Pending Culture Results     No orders found from 11/27/2018 to 11/30/2018.            Pending Results Instructions     If you had any lab results that were not finalized at the time of your Discharge, you can call the ED Lab Result RN at [phone]. You will be contacted by this team for any positive Lab results or changes in treatment. The nurses are available 7 days a week from 10A to 6:30P.  You can leave a message 24 hours per day and they will return your call.        Test Results From Your Hospital Stay        11/29/2018  5:47 AM      Component Results     Component Value Ref Range & Units Status    WBC 11.3 (H) 4.0 - 11.0 10e9/L Final    RBC Count 5.37 (H) 3.8 - 5.2 10e12/L Final    Hemoglobin 14.8 11.7 - 15.7 g/dL Final    Hematocrit 45.1 35.0 - 47.0 % Final    MCV 84 78 - 100 fl Final    MCH 27.6 26.5 - 33.0 pg Final    MCHC 32.8 31.5 - 36.5 g/dL Final    RDW 13.0 10.0 - 15.0 % Final    Platelet Count 363 150 - 450 10e9/L Final    Diff Method Automated Method  Final    % Neutrophils 67.5 %  Final    % Lymphocytes 21.0 % Final    % Monocytes 9.7 % Final    % Eosinophils 0.9 % Final    % Basophils 0.3 % Final    % Immature Granulocytes 0.6 % Final    Nucleated RBCs 0 0 /100 Final    Absolute Neutrophil 7.6 1.6 - 8.3 10e9/L Final    Absolute Lymphocytes 2.4 0.8 - 5.3 10e9/L Final    Absolute Monocytes 1.1 0.0 - 1.3 10e9/L Final    Absolute Eosinophils 0.1 0.0 - 0.7 10e9/L Final    Absolute Basophils 0.0 0.0 - 0.2 10e9/L Final    Abs Immature Granulocytes 0.1 0 - 0.4 10e9/L Final    Absolute Nucleated RBC 0.0  Final         11/29/2018  6:00 AM      Component Results     Component Value Ref Range & Units Status    Sodium 138 133 - 144 mmol/L Final    Potassium 3.8 3.4 - 5.3 mmol/L Final    Chloride 106 94 - 109 mmol/L Final    Carbon Dioxide 24 20 - 32 mmol/L Final    Anion Gap 8 3 - 14 mmol/L Final    Glucose 71 70 - 99 mg/dL Final    Urea Nitrogen 15 7 - 30 mg/dL Final    Creatinine 0.57 0.52 - 1.04 mg/dL Final    GFR Estimate >90 >60 mL/min/1.7m2 Final    Non  GFR Calc    GFR Estimate If Black >90 >60 mL/min/1.7m2 Final    African American GFR Calc    Calcium 9.2 8.5 - 10.1 mg/dL Final         11/29/2018  5:59 AM      Component Results     Component Value Ref Range & Units Status    HCG Quantitative Serum <5.0 <5.0 IU/L Final                Clinical Quality Measure: Blood Pressure Screening     Your blood pressure was checked while you were in the emergency department today. The last reading we obtained was  BP: (!) 129/94 . Please read the guidelines below about what these numbers mean and what you should do about them.  If your systolic blood pressure (the top number) is less than 120 and your diastolic blood pressure (the bottom number) is less than 80, then your blood pressure is normal. There is nothing more that you need to do about it.  If your systolic blood pressure (the top number) is 120-139 or your diastolic blood pressure (the bottom number) is 80-89, your blood pressure may  be higher than it should be. You should have your blood pressure rechecked within a year by a primary care provider.  If your systolic blood pressure (the top number) is 140 or greater or your diastolic blood pressure (the bottom number) is 90 or greater, you may have high blood pressure. High blood pressure is treatable, but if left untreated over time it can put you at risk for heart attack, stroke, or kidney failure. You should have your blood pressure rechecked by a primary care provider within the next 4 weeks.  If your provider in the emergency department today gave you specific instructions to follow-up with your doctor or provider even sooner than that, you should follow that instruction and not wait for up to 4 weeks for your follow-up visit.        Thank you for choosing [facility]       Thank you for choosing [facility] for your care. Our goal is always to provide you with excellent care. Hearing back from our patients is one way we can continue to improve our services. Please take a few minutes to complete the written survey that you may receive in the mail after you visit with us. Thank you!        [employer] Information     [employer] gives you secure access to your electronic health record. If you see a primary care provider, you can also send messages to your care team and make appointments. If you have questions, please call your primary care clinic.  If you do not have a primary care provider, please call [phone] and they will assist you.        Care EveryWhere ID     This is your Care EveryWhere ID. This could be used by other organizations to access your [facility] medical records  [id number]        Equal Access to Services     CRISTY HIGGINBOTHAM : Hadii eleanor Munson, shannon steele, tarah stahlalkeagan martin. So [facility] [phone].    ATENCIÓN: Si habla español, tiene a escobar disposición servicios gratuitos de asistencia lingüística. Llame al  [phone].    We comply with applicable federal civil rights laws and [place] laws. We do not discriminate on the basis of race, color, national origin, age, disability, sex, sexual orientation, or gender identity.            After Visit Summary       This is your record. Keep this with you and show to your community pharmacist(s) and doctor(s) at your next visit.

## 2019-02-09 DIAGNOSIS — R80.9 PROTEINURIA: ICD-10-CM

## 2019-02-10 RX ORDER — ENALAPRIL MALEATE 20 MG/1
TABLET ORAL
Qty: 180 TABLET | Refills: 3 | Status: SHIPPED | OUTPATIENT
Start: 2019-02-10 | End: 2020-02-07

## 2019-02-11 ENCOUNTER — HOSPITAL ENCOUNTER (EMERGENCY)
Facility: CLINIC | Age: 24
Discharge: HOME | End: 2019-02-11
Attending: EMERGENCY MEDICINE | Admitting: EMERGENCY MEDICINE
Payer: COMMERCIAL

## 2019-02-11 VITALS
DIASTOLIC BLOOD PRESSURE: 86 MMHG | OXYGEN SATURATION: 100 % | WEIGHT: 280 LBS | BODY MASS INDEX: 42.44 KG/M2 | SYSTOLIC BLOOD PRESSURE: 140 MMHG | TEMPERATURE: 98.7 F | RESPIRATION RATE: 16 BRPM | HEIGHT: 68 IN | HEART RATE: 101 BPM

## 2019-02-11 DIAGNOSIS — R07.9 CHEST PAIN, UNSPECIFIED TYPE: ICD-10-CM

## 2019-02-11 DIAGNOSIS — R73.9 HYPERGLYCEMIA: ICD-10-CM

## 2019-02-11 DIAGNOSIS — F41.9 ANXIETY: ICD-10-CM

## 2019-02-11 LAB
ANION GAP SERPL CALCULATED.3IONS-SCNC: 8 MMOL/L (ref 3–14)
BUN SERPL-MCNC: 12 MG/DL (ref 7–30)
CALCIUM SERPL-MCNC: 8.8 MG/DL (ref 8.5–10.1)
CHLORIDE SERPL-SCNC: 106 MMOL/L (ref 94–109)
CO2 SERPL-SCNC: 24 MMOL/L (ref 20–32)
CREAT SERPL-MCNC: 0.66 MG/DL (ref 0.52–1.04)
ERYTHROCYTE [DISTWIDTH] IN BLOOD BY AUTOMATED COUNT: 13.1 % (ref 10–15)
GFR SERPL CREATININE-BSD FRML MDRD: >90 ML/MIN/{1.73_M2}
GLUCOSE SERPL-MCNC: 198 MG/DL (ref 70–99)
HCG SERPL QL: NEGATIVE
HCT VFR BLD AUTO: 42 % (ref 35–47)
HGB BLD-MCNC: 13.5 G/DL (ref 11.7–15.7)
MCH RBC QN AUTO: 27.2 PG (ref 26.5–33)
MCHC RBC AUTO-ENTMCNC: 32.1 G/DL (ref 31.5–36.5)
MCV RBC AUTO: 85 FL (ref 78–100)
PLATELET # BLD AUTO: 385 10E9/L (ref 150–450)
POTASSIUM SERPL-SCNC: 3.9 MMOL/L (ref 3.4–5.3)
RBC # BLD AUTO: 4.96 10E12/L (ref 3.8–5.2)
SODIUM SERPL-SCNC: 138 MMOL/L (ref 133–144)
TROPONIN I SERPL-MCNC: <0.015 UG/L (ref 0–0.04)
WBC # BLD AUTO: 13.3 10E9/L (ref 4–11)

## 2019-02-11 PROCEDURE — 99285 EMERGENCY DEPT VISIT HI MDM: CPT | Mod: 25

## 2019-02-11 PROCEDURE — 84703 CHORIONIC GONADOTROPIN ASSAY: CPT | Performed by: EMERGENCY MEDICINE

## 2019-02-11 PROCEDURE — 80048 BASIC METABOLIC PNL TOTAL CA: CPT | Performed by: EMERGENCY MEDICINE

## 2019-02-11 PROCEDURE — 93005 ELECTROCARDIOGRAM TRACING: CPT

## 2019-02-11 PROCEDURE — 25000128 H RX IP 250 OP 636: Performed by: EMERGENCY MEDICINE

## 2019-02-11 PROCEDURE — 84484 ASSAY OF TROPONIN QUANT: CPT | Performed by: EMERGENCY MEDICINE

## 2019-02-11 PROCEDURE — 85027 COMPLETE CBC AUTOMATED: CPT | Performed by: EMERGENCY MEDICINE

## 2019-02-11 PROCEDURE — 96374 THER/PROPH/DIAG INJ IV PUSH: CPT

## 2019-02-11 RX ORDER — HYDROXYZINE HYDROCHLORIDE 25 MG/1
25 TABLET, FILM COATED ORAL 3 TIMES DAILY PRN
Qty: 15 TABLET | Refills: 0 | Status: SHIPPED | OUTPATIENT
Start: 2019-02-11 | End: 2019-02-21

## 2019-02-11 RX ORDER — LORAZEPAM 2 MG/ML
1 INJECTION INTRAMUSCULAR ONCE
Status: COMPLETED | OUTPATIENT
Start: 2019-02-11 | End: 2019-02-11

## 2019-02-11 RX ADMIN — LORAZEPAM 1 MG: 2 INJECTION INTRAMUSCULAR; INTRAVENOUS at 21:43

## 2019-02-11 ASSESSMENT — ENCOUNTER SYMPTOMS
PALPITATIONS: 1
ABDOMINAL PAIN: 1
NERVOUS/ANXIOUS: 1
NAUSEA: 0

## 2019-02-11 ASSESSMENT — MIFFLIN-ST. JEOR: SCORE: 2063.57

## 2019-02-11 NOTE — ED AVS SNAPSHOT
[facility] Emergency Department  [address]  [facility] [address]  Phone:  [phone]  Fax:  [phone]                                    Stacey La   MRN: [id number]    Department:  [facility] Emergency Department   Date of Visit:  2/11/2019           After Visit Summary Signature Page    I have received my discharge instructions, and my questions have been answered. I have discussed any challenges I see with this plan with the nurse or doctor.    ..........................................................................................................................................  Patient/Patient Representative Signature      ..........................................................................................................................................  Patient Representative Print Name and Relationship to Patient    ..................................................               ................................................  Date                                   Time    ..........................................................................................................................................  Reviewed by Signature/Title    ...................................................              ..............................................  Date                                               Time          22EPIC Rev 08/18

## 2019-02-12 LAB — INTERPRETATION ECG - MUSE: NORMAL

## 2019-02-12 NOTE — DISCHARGE INSTRUCTIONS
Discharge Instructions  Hyperglycemia, High Blood Sugar    Today we found your blood sugar (glucose) was high. This may mean that you have developed diabetes, or if you already know that you have diabetes, it may mean that your diabetes is not as well controlled as it should be. Sometimes blood sugar can be high temporarily and it is not diabetes. Signs of elevated blood sugar include increased thirst, frequent urination (peeing), blurred vision, fatigue, unexplained weight loss, poor wound healing, and frequent infections.    We sometimes give medicine in the Emergency Department to lower the blood sugar. We may also prescribe medicine for you to use at home, or increase the medicine that you already take. While we do not like to see your blood sugar high, it is much more dangerous to let your blood sugar get too low, so it is reasonable to take time to bring it down, or to wait and watch to see if it comes down on its own.    Generally, every Emergency Department visit should have a follow-up clinic visit with either a primary or a specialty clinic/provider. Please follow-up as instructed by your emergency provider today.     Return to the Emergency Department if you develop:  Vomiting (throwing up).  Confusion, disorientation, or being unable to wake up.  Severe weakness or illness.  Abdominal (belly) pain.    What can I do to help myself?  Check your blood sugar as instructed by your provider.  Take medications prescribed by your provider.  Follow a diabetic diet (low fat, low concentrated sweets, high fiber).  Exercise regularly.  Moderate or eliminate alcohol use.  Stop smoking.    Diabetes: Diabetes mellitus is a disease in which the body cannot regulate the amount of sugar (glucose) in the blood. Insulin allows glucose to move out of the blood into cells throughout the body where it is used for fuel. People with diabetes do not produce enough insulin (type 1 diabetes), or cannot use insulin properly (type 2  diabetes), or both. This starves the cells that need the glucose for fuel, and also harms certain organs and tissues exposed to the high glucose levels.  Over a long period of time, uncontrolled diabetes can lead to heart and blood vessel disease, blindness, kidney failure, foot ulcers and many other problems.          About 17 million Americans (6.2% of adults) have diabetes. We think that about one third of adults with diabetes do not know they have diabetes.  The incidence of diabetes is increasing rapidly. This increase is due to many factors, but the most significant are our increasing weight and decreased activity levels.     Diabetes can be a very serious and life-threatening illness if not treated.  If you were given a prescription for medicine here today, be sure to read all of the information (including the package insert) that comes with your prescription.  This will include important information about the medicine, its side effects, and any warnings that you need to know about.  The pharmacist who fills the prescription can provide more information and answer questions you may have about the medicine.  If you have questions or concerns that the pharmacist cannot address, please call or return to the Emergency Department.   Remember that you can always come back to the Emergency Department if you are not able to see your regular provider in the amount of time listed above, if you get any new symptoms, or if there is anything that worries you.    Discharge Instructions  Chest Pain    You have been seen today for chest pain or discomfort.  At this time, your provider has found no signs that your chest pain is due to a serious or life-threatening condition, (or you have declined more testing and/or admission to the hospital). However, sometimes there is a serious problem that does not show up right away. Your evaluation today may not be complete and you may need further testing and evaluation.     Generally,  every Emergency Department visit should have a follow-up clinic visit with either a primary or a specialty clinic/provider. Please follow-up as instructed by your emergency provider today.  Return to the Emergency Department if:  Your chest pain changes, gets worse, starts to happen more often, or comes with less activity.  You are newly short of breath.  You get very weak or tired.  You pass out or faint.  You have any new symptoms, like fever, cough, numb legs, or you cough up blood.  You have anything else that worries you.    Until you follow-up with your regular provider, please do the following:  Take one aspirin daily unless you have an allergy or are told not to by your provider.  If a stress test appointment has been made, go to the appointment.  If you have questions, contact your regular provider.  Follow-up with your regular provider/clinic as directed; this is very important.    If you were given a prescription for medicine here today, be sure to read all of the information (including the package insert) that comes with your prescription.  This will include important information about the medicine, its side effects, and any warnings that you need to know about.  The pharmacist who fills the prescription can provide more information and answer questions you may have about the medicine.  If you have questions or concerns that the pharmacist cannot address, please call or return to the Emergency Department.       Remember that you can always come back to the Emergency Department if you are not able to see your regular provider in the amount of time listed above, if you get any new symptoms, or if there is anything that worries you.

## 2019-02-12 NOTE — ED PROVIDER NOTES
"  History     Chief Complaint:  Anxiety and chest tightness      HPI   Stacey La is a 23 year old female with a history of diabetes, CKD and anxiety who presents with anxiety and chest tightness. The patient states that she has a gluten allergy and when she eats gluten she typically becomes anxious. Today, she ate a cookie that she believes was cross contaminated with gluten. At 1500, she developed abdominal cramping along with palpitations and anxiety. She also did have some chest tightness which she states is normal for her every day. This chest discomfort was no different than her daily pain but the palpitations were new. The patient has not had any nausea. She was on anti-anxiety medications in the past, but has not been taking any of these recently. Her blood sugar was 201.     Allergies:  No known drug allergies.      Medications:    Albuterol   Vasotec  Lexapro   Glucagon   Humalog   Victoza  Zofran      Past Medical History:    Asthma  Celiac disease   CKD  Diabetes mellitus   Diabetic nephropathy   Hypertension   Hypothyroid  Psoriasis     Past Surgical History:    Tonsillectomy and Adenoidectomy   ENT surgery     Family History:    Cardiovascular-Father  GI-Mother    Social History:  Marital Status:   Presents to the ED with family  Tobacco Use: Never Used  Alcohol Use: Yes  PCP: Nancy Mejia      Review of Systems   Cardiovascular: Positive for palpitations.   Gastrointestinal: Positive for abdominal pain. Negative for nausea.   Psychiatric/Behavioral: The patient is nervous/anxious.    All other systems reviewed and are negative.      Physical Exam   First Vitals:  BP: 140/86  Pulse: 101  Temp: 98.7  F (37.1  C)  Resp: 18  Height: 172.7 cm (5' 8\")  Weight: 127 kg (280 lb)  SpO2: 100 %      Physical Exam    General:   Pleasant, age appropriate.  HEENT:    Oropharynx is moist, without lesions or trismus.  Eyes:    Conjunctiva normal  Neck:    Supple, no meningismus.     CV:  "    Regular rate and rhythm.      No murmurs, rubs or gallops.       No unilateral leg swelling.       2+ radial pulses bilateral.       No lower extremity edema.  PULM:    Clear to auscultation bilateral.       No respiratory distress.      Good air exchange.     No rales or wheezing.  ABD:    Soft, non-tender, non-distended.       No pulsatile masses.       No rebound, guarding or rigidity.  MSK:     No gross deformity to all four extremities.   LYMPH:   No cervical lymphadenopathy.  NEURO:   Alert. Good muscle tone, no atrophy.  Skin:    Warm, dry and intact.    Psych:    Mildly anxious        Emergency Department Course   ECG:  @   Indication: Palpitations   Vent. Rate 102 bpm. MO interval 144 ms. QRS duration 88 ms. QT/QTc 340/443 ms. P-R-T axis 35 28 25.   Sinus tachycardia. Otherwise normal ECG.  No significant change when compared to previous ECG from 3/12/18   Read @  by Dr. Oliveira.     Laboratory:  CBC:  WBC 13.3 (H), HGB 13.5,   BMP: Glucose 198 (H), otherwise WNL (Creatinine 0.66)   () Troponin I: <0.015  HCG: Negative    Interventions:   () Ativan, 1 mg, IV     Emergency Department Course:  Nursing notes and vitals reviewed.  () I performed an exam of the patient as documented above.    EKG was done, interpretation as above.   A peripheral IV was established. Blood was drawn from the patient. This was sent for laboratory testing, findings above.    Findings and plan explained to the patient. Patient discharged home with instructions regarding supportive care, medications, and reasons to return. The importance of close follow-up was reviewed.    I personally reviewed the laboratory results with the patient and answered all related questions prior to discharge.         Impression & Plan    Medical Decision Makin-year-old female with history of anxiety presents to the ED with palpitations and anxiety.  She had no hua chest pain other than her chronic daily pain.  EKG  reveals no evidence of ischemic changes or dysrhythmia.  No concerning features for pulmonary embolus or aortic disease.  She was given lorazepam with remarkable improvement of symptoms.  Symptoms are most suggestive of recurrent anxiety.  Patient will be discharged with outpatient mental health resources and trial of hydroxyzine to use as needed.    Diagnosis:    ICD-10-CM    1. Anxiety F41.9    2. Chest pain, unspecified type R07.9    3. Hyperglycemia R73.9        Disposition:  discharged to home    Discharge Medications:     Medication List      Started    hydrOXYzine 25 MG tablet  Commonly known as:  ATARAX  25 mg, Oral, 3 TIMES DAILY PRN               I, Bia Dumont, am serving as a scribe on 2/11/2019 at 9:18 PM to personally document services performed by Dr. Oliveira based on my observations and the provider's statements to me.    2/11/2019   [facility] EMERGENCY DEPARTMENT       Andrea Oliveira MD  02/12/19 0046

## 2019-02-17 ASSESSMENT — ENCOUNTER SYMPTOMS
PANIC: 1
NERVOUS/ANXIOUS: 1
DEPRESSION: 0
DECREASED CONCENTRATION: 1
INSOMNIA: 0

## 2019-02-18 ENCOUNTER — OFFICE VISIT (OUTPATIENT)
Dept: ENDOCRINOLOGY | Facility: CLINIC | Age: 24
End: 2019-02-18
Payer: COMMERCIAL

## 2019-02-18 VITALS
BODY MASS INDEX: 44.41 KG/M2 | HEART RATE: 105 BPM | WEIGHT: 293 LBS | DIASTOLIC BLOOD PRESSURE: 84 MMHG | HEIGHT: 68 IN | SYSTOLIC BLOOD PRESSURE: 137 MMHG

## 2019-02-18 DIAGNOSIS — E10.29 TYPE 1 DIABETES MELLITUS WITH OTHER KIDNEY COMPLICATION (H): Primary | ICD-10-CM

## 2019-02-18 LAB — HBA1C MFR BLD: 6.8 % (ref 4.3–6)

## 2019-02-18 ASSESSMENT — PAIN SCALES - GENERAL: PAINLEVEL: NO PAIN (0)

## 2019-02-18 ASSESSMENT — MIFFLIN-ST. JEOR: SCORE: 2151.12

## 2019-02-18 NOTE — PROGRESS NOTES
HPI:  Stacey is a 22 yo woman here for follow up of type 1 diabetes, diagnosed at age 7.   She also sees Dr. Allan.  Stacey's diabetes is complicated by nephropathy. She also has hyperlipidemia, a history of hypothyroidism (although she has been off of levothyroxine for several years) and celiac disease.  She follows a strict gluten free diet. She started a new Dexcom G6 sensor and she really likes it.  She feels the accuracy is much better.     She uses the T-slim pump with the integrated sensor.  She likes this. She has been having very little hypoglyemia.     Stacey tests her blood sugar 4 times daily with an overall average this month of 195 mg/dL on her meter, 162 mg/dL on her sensor.  She tends to be highest at HS, through the night,  and in the morning.      Dr. Allan started her on Victoza at her last visit and she is up to 1.8 mg daily.  She initially felt like it decreased her appetite, but no longer feels that way.    Stacey wears a t-slim pump with basal rates as follows:   Mid- 2.6  3am- 3.5  7am- 3.3  11am- 2.5  12pm- 2.3  4:30pm- 2.0  5:40pm- 2.2  10pm-3.2    IC ratio-   Mid-1:4g   7am- 1:4.5g  11am- 1:5g  12pm- 1:4.5g  4:30pm- 1/5g  5:40pm- 1/4g  10pm- 1/5g    Sensitivity- 16  Target glucose- 100 during the day, 110 overnight    Average total daily insulin dose-  85 Units (76%basal)    For her hypothyroidism- untreated with normal TSH.  She forgot to have her labs done.     For her CKD (stage 1), she is taking enalapril. She follows with nephrology.  She understands she will need to stop this if she were to become pregnant.    A1c today is down from 7.6% to 6.8%.     ROS  GENERAL: no weight loss, weight gain, fevers, chills, malaise, night sweats.   HEENT: no dysphagia, diplopia, neck pain or tenderness, dry/scratchy eyes, URI, cough, sinus drainage, tinnitus, sinus pressure  CV: no chest pain, pressure, palpitations, skipped beats, LOC  LUNGS: no SOB, CHILDRESS, cough, sputum production, wheezing   GI:  no diarrhea, constipation, abdominal pain  EXTREMITIES: no rashes, ulcers, edema  NEUROLOGY: no changes in vision, tingling or numbness in hands or feet.   MSK: no muscle aches or pains, weakness  PSYCH: mood stable    Past Medical History:   Diagnosis Date     Asthma     Currently no treatment needed     Celiac disease      Chronic kidney disease, stage I 8/3/2015     Chronic sinusitis      Diabetes mellitus type 1 (H)      Diabetic nephropathy (H)      Family history of heart disease 8/3/2015     Hypertension      Hypothyroid      Pedal edema      Psoriasis      PMH:   Type 1 diabetes- since 2003  Celiac disease- since age 13  Hypothyroidism- age 10 (no longer on levothyroxine for several years)  Hyperlipidemia- had been on a statin for a little more than a year, mostly eating a plant based diet now.    Past Surgical History:   Procedure Laterality Date     ENT SURGERY       TONSILLECTOMY, ADENOIDECTOMY, COMBINED         Family History   Problem Relation Age of Onset     Cardiovascular Father 48        MI, stents, diabetic, type 2     Gastrointestinal Disease Mother         Celiacs     Cardiovascular Maternal Grandfather         Englarged heart, pacemaker, defib     Family Hx:   Dad- premature CVD, in his 40s.  Type 2 diabetes  Mom- celiac disease  Grandfather- type 2 diabetes  Half brother- Asperger's  Half sister- cervical CA  Another half brother- bipolar and schizophrenia      History     Social History     Marital Status:      Spouse Name: N/A     Number of Children: N/A     Years of Education: N/A     Social History Main Topics     Smoking status: Never Smoker      Smokeless tobacco: Never Used     Alcohol Use: Yes      Comment: occ. use     Drug Use: No     Sexual Activity:     Partners: Male     Birth Control/ Protection: Condom     Other Topics Concern     Parent/Sibling W/ Cabg, Mi Or Angioplasty Before 65f 55m? Yes     Caffeine Concern Yes     1 cup tea per day     Sleep Concern No     Special  Diet Yes     gluten free diet, low carbs     Exercise Yes     walking, ellyptical, swimming, weights     Seat Belt Yes     Social History Narrative     Social Hx:   . 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  Had previously worked as a nanny. She also goes to the gym a few days a week.  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     Current Outpatient Medications   Medication     albuterol (2.5 MG/3ML) 0.083% neb solution     albuterol (ALBUTEROL) 108 (90 BASE) MCG/ACT inhaler     Ascorbic Acid (VITAMIN C PO)     blood glucose test strip     Blood Pressure Monitoring KIT     Cholecalciferol 2000 UNITS TBDP     enalapril (VASOTEC) 20 MG tablet     escitalopram (LEXAPRO) 10 MG tablet     HUMALOG 100 UNIT/ML injection     hydrOXYzine (ATARAX) 25 MG tablet     insulin syringes, disposable, U-100 0.3 ML MISC     ipratropium - albuterol 0.5 mg/2.5 mg/3 mL (DUONEB) 0.5-2.5 (3) MG/3ML neb solution     liraglutide (VICTOZA PEN) 18 MG/3ML soln     Multiple Vitamins-Minerals (MULTIVITAMIN ADULT PO)     Ondansetron HCl (ZOFRAN PO)     glucagon (GLUCAGON EMERGENCY) 1 MG kit     No current facility-administered medications for this visit.           Allergies   Allergen Reactions     Dogs Other (See Comments)     Sinus symptoms      Dust Mites      Sinus      Gluten Meal      RESULTS  Lab Results   Component Value Date    A1C 7.0 (H) 01/17/2018    A1C 7.6 (H) 03/26/2015    HEMOGLOBINA1 6.8 (A) 02/18/2019    HEMOGLOBINA1 7.7 (A) 11/13/2018    HEMOGLOBINA1 7.0 (A) 04/10/2018    HEMOGLOBINA1 7.6 (A) 04/10/2017    HEMOGLOBINA1 6.9 (A) 10/03/2016       TSH   Date Value Ref Range Status   10/10/2016 2.89 0.40 - 4.00 mU/L Final   01/16/2016 3.33 0.40 - 4.00 mU/L Final   12/09/2013 2.95 0.4 - 5.0 mU/L Final     T4 Free   Date Value Ref Range Status   12/09/2013 1.18 0.70 - 1.85 ng/dL Final       ALT   Date Value Ref Range Status   02/20/2016 26 0 - 50 U/L Final   08/04/2015 <5 (L) 5 - 30 U/L Final    ]    Recent Labs   Lab Test 08/04/15  1443 12/09/13  1137   CHOL 194 241*   HDL 66 66    142*   TRIG 131 165*   CHOLHDLRATIO 2.9 3.7       Lab Results   Component Value Date     02/11/2019      Lab Results   Component Value Date    POTASSIUM 3.9 02/11/2019     Lab Results   Component Value Date    CHLORIDE 106 02/11/2019     Lab Results   Component Value Date    FRANKLIN 8.8 02/11/2019     Lab Results   Component Value Date    CO2 24 02/11/2019     Lab Results   Component Value Date    BUN 12 02/11/2019     Lab Results   Component Value Date    CR 0.66 02/11/2019       GFR Estimate   Date Value Ref Range Status   02/11/2019 >90 >60 mL/min/[1.73_m2] Final     Comment:     Non  GFR Calc  Starting 12/18/2018, serum creatinine based estimated GFR (eGFR) will be   calculated using the Chronic Kidney Disease Epidemiology Collaboration   (CKD-EPI) equation.     11/29/2018 >90 >60 mL/min/1.7m2 Final     Comment:     Non  GFR Calc   01/17/2018 >90 >60 mL/min/1.7m2 Final     Comment:     Non  GFR Calc     GFR Estimate If Black   Date Value Ref Range Status   02/11/2019 >90 >60 mL/min/[1.73_m2] Final     Comment:      GFR Calc  Starting 12/18/2018, serum creatinine based estimated GFR (eGFR) will be   calculated using the Chronic Kidney Disease Epidemiology Collaboration   (CKD-EPI) equation.     11/29/2018 >90 >60 mL/min/1.7m2 Final     Comment:      GFR Calc   01/17/2018 >90 >60 mL/min/1.7m2 Final     Comment:      GFR Calc       Lab Results   Component Value Date    MICROL <5 01/17/2018     No results found for: MICROALBUMIN  No results found for: CPEPT, GADAB, ISCAB    No results found for: B12]    Most recent eye exam date: : 11/28/2018     A1c today: 7.0%.    Assessment/Plan:     1.  Type 1 diabetes- Overall, doing much better.  A1c is down to 6.8%, which she thinks is the best it has ever been.  She is physically  active and adherent to gluten free diet.  No changes today.     2.  Risk factors-     Retinopathy:  No.  Had eye exam within last year.   Nephropathy:  BP well controlled. No microalbuminuria.  Creatinine stable.  She is on enalapril.  White coat hypertension.     She follows with nephrology.   Neuropathy: No.    Feet: OK, no ulcers.   Lipids:  .  Had been on pravastatin 20 mg daily.  She stopped statin in anticipation of pregnancy.  She does not want to resume statin at this time.  Will recheck cholesterol.     3.  Hypothyroidism- last TSH was 2.89 on 10/10/16 on no levothyroxine.   She has not had her labs drawn as she forgot.  Will reorder.     4. Anxiety- Suggested scheduling with one of our health psychologists.  Gave her a list and phone numbers.  She will consider this.     5.  F/U in 3 months with Dr. Allan.     I spent 30 minutes with this patient face to face and explained the conditions and plans (more than 50% of time was counseling/coordination of care, diabetes management, follow up plan for worsening hyper and hypoglycemia) . The patient understood and is satisfied with today's visit.     Laureen Goldstein PA-C, MPAS   [facility]  Department of Medicine  Division of Endocrinology and Diabetes

## 2019-02-18 NOTE — LETTER
2/18/2019       RE: Stacey La  [address]  [facility] [address]     Dear Colleague,    Thank you for referring your patient, Stacey La, to the [facility] ENDOCRINOLOGY at [facility]. Please see a copy of my visit note below.    HPI:  Stacey is a 22 yo woman here for follow up of type 1 diabetes, diagnosed at age 7.   She also sees Dr. Allan.  Stacey's diabetes is complicated by nephropathy. She also has hyperlipidemia, a history of hypothyroidism (although she has been off of levothyroxine for several years) and celiac disease.  She follows a strict gluten free diet. She started a new Dexcom G6 sensor and she really likes it.  She feels the accuracy is much better.     She uses the T-slim pump with the integrated sensor.  She likes this. She has been having very little hypoglyemia.     Stacey tests her blood sugar 4 times daily with an overall average this month of 195 mg/dL on her meter, 162 mg/dL on her sensor.  She tends to be highest at HS, through the night,  and in the morning.      Dr. Allan started her on Victoza at her last visit and she is up to 1.8 mg daily.  She initially felt like it decreased her appetite, but no longer feels that way.    Stacey wears a t-slim pump with basal rates as follows:   Mid- 2.6  3am- 3.5  7am- 3.3  11am- 2.5  12pm- 2.3  4:30pm- 2.0  5:40pm- 2.2  10pm-3.2    IC ratio-   Mid-1:4g   7am- 1:4.5g  11am- 1:5g  12pm- 1:4.5g  4:30pm- 1/5g  5:40pm- 1/4g  10pm- 1/5g    Sensitivity- 16  Target glucose- 100 during the day, 110 overnight    Average total daily insulin dose-  85 Units (76%basal)    For her hypothyroidism- untreated with normal TSH.  She forgot to have her labs done.     For her CKD (stage 1), she is taking enalapril. She follows with nephrology.  She understands she will need to stop this if she were to become pregnant.    A1c today is down from 7.6% to 6.8%.     ROS  GENERAL: no weight loss, weight gain,  fevers, chills, malaise, night sweats.   HEENT: no dysphagia, diplopia, neck pain or tenderness, dry/scratchy eyes, URI, cough, sinus drainage, tinnitus, sinus pressure  CV: no chest pain, pressure, palpitations, skipped beats, LOC  LUNGS: no SOB, CHILDRESS, cough, sputum production, wheezing   GI: no diarrhea, constipation, abdominal pain  EXTREMITIES: no rashes, ulcers, edema  NEUROLOGY: no changes in vision, tingling or numbness in hands or feet.   MSK: no muscle aches or pains, weakness  PSYCH: mood stable    Past Medical History:   Diagnosis Date     Asthma     Currently no treatment needed     Celiac disease      Chronic kidney disease, stage I 8/3/2015     Chronic sinusitis      Diabetes mellitus type 1 (H)      Diabetic nephropathy (H)      Family history of heart disease 8/3/2015     Hypertension      Hypothyroid      Pedal edema      Psoriasis      PMH:   Type 1 diabetes- since 2003  Celiac disease- since age 13  Hypothyroidism- age 10 (no longer on levothyroxine for several years)  Hyperlipidemia- had been on a statin for a little more than a year, mostly eating a plant based diet now.    Past Surgical History:   Procedure Laterality Date     ENT SURGERY       TONSILLECTOMY, ADENOIDECTOMY, COMBINED         Family History   Problem Relation Age of Onset     Cardiovascular Father 48        MI, stents, diabetic, type 2     Gastrointestinal Disease Mother         Celiacs     Cardiovascular Maternal Grandfather         Englarged heart, pacemaker, defib     Family Hx:   Dad- premature CVD, in his 40s.  Type 2 diabetes  Mom- celiac disease  Grandfather- type 2 diabetes  Half brother- Asperger's  Half sister- cervical CA  Another half brother- bipolar and schizophrenia      History     Social History     Marital Status:      Spouse Name: N/A     Number of Children: N/A     Years of Education: N/A     Social History Main Topics     Smoking status: Never Smoker      Smokeless tobacco: Never Used     Alcohol Use:  Yes      Comment: occ. use     Drug Use: No     Sexual Activity:     Partners: Male     Birth Control/ Protection: Condom     Other Topics Concern     Parent/Sibling W/ Cabg, Mi Or Angioplasty Before 65f 55m? Yes     Caffeine Concern Yes     1 cup tea per day     Sleep Concern No     Special Diet Yes     gluten free diet, low carbs     Exercise Yes     walking, ellyptical, swimming, weights     Seat Belt Yes     Social History Narrative     Social Hx:   . 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  Had previously worked as a nanny. She also goes to the gym a few days a week.  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     Current Outpatient Medications   Medication     albuterol (2.5 MG/3ML) 0.083% neb solution     albuterol (ALBUTEROL) 108 (90 BASE) MCG/ACT inhaler     Ascorbic Acid (VITAMIN C PO)     blood glucose test strip     Blood Pressure Monitoring KIT     Cholecalciferol 2000 UNITS TBDP     enalapril (VASOTEC) 20 MG tablet     escitalopram (LEXAPRO) 10 MG tablet     HUMALOG 100 UNIT/ML injection     hydrOXYzine (ATARAX) 25 MG tablet     insulin syringes, disposable, U-100 0.3 ML MISC     ipratropium - albuterol 0.5 mg/2.5 mg/3 mL (DUONEB) 0.5-2.5 (3) MG/3ML neb solution     liraglutide (VICTOZA PEN) 18 MG/3ML soln     Multiple Vitamins-Minerals (MULTIVITAMIN ADULT PO)     Ondansetron HCl (ZOFRAN PO)     glucagon (GLUCAGON EMERGENCY) 1 MG kit     No current facility-administered medications for this visit.           Allergies   Allergen Reactions     Dogs Other (See Comments)     Sinus symptoms      Dust Mites      Sinus      Gluten Meal      RESULTS  Lab Results   Component Value Date    A1C 7.0 (H) 01/17/2018    A1C 7.6 (H) 03/26/2015    HEMOGLOBINA1 6.8 (A) 02/18/2019    HEMOGLOBINA1 7.7 (A) 11/13/2018    HEMOGLOBINA1 7.0 (A) 04/10/2018    HEMOGLOBINA1 7.6 (A) 04/10/2017    HEMOGLOBINA1 6.9 (A) 10/03/2016       TSH   Date Value Ref Range Status   10/10/2016 2.89 0.40 - 4.00  mU/L Final   01/16/2016 3.33 0.40 - 4.00 mU/L Final   12/09/2013 2.95 0.4 - 5.0 mU/L Final     T4 Free   Date Value Ref Range Status   12/09/2013 1.18 0.70 - 1.85 ng/dL Final       ALT   Date Value Ref Range Status   02/20/2016 26 0 - 50 U/L Final   08/04/2015 <5 (L) 5 - 30 U/L Final   ]    Recent Labs   Lab Test 08/04/15  1443 12/09/13  1137   CHOL 194 241*   HDL 66 66    142*   TRIG 131 165*   CHOLHDLRATIO 2.9 3.7       Lab Results   Component Value Date     02/11/2019      Lab Results   Component Value Date    POTASSIUM 3.9 02/11/2019     Lab Results   Component Value Date    CHLORIDE 106 02/11/2019     Lab Results   Component Value Date    FRANKLIN 8.8 02/11/2019     Lab Results   Component Value Date    CO2 24 02/11/2019     Lab Results   Component Value Date    BUN 12 02/11/2019     Lab Results   Component Value Date    CR 0.66 02/11/2019       GFR Estimate   Date Value Ref Range Status   02/11/2019 >90 >60 mL/min/[1.73_m2] Final     Comment:     Non  GFR Calc  Starting 12/18/2018, serum creatinine based estimated GFR (eGFR) will be   calculated using the Chronic Kidney Disease Epidemiology Collaboration   (CKD-EPI) equation.     11/29/2018 >90 >60 mL/min/1.7m2 Final     Comment:     Non  GFR Calc   01/17/2018 >90 >60 mL/min/1.7m2 Final     Comment:     Non  GFR Calc     GFR Estimate If Black   Date Value Ref Range Status   02/11/2019 >90 >60 mL/min/[1.73_m2] Final     Comment:      GFR Calc  Starting 12/18/2018, serum creatinine based estimated GFR (eGFR) will be   calculated using the Chronic Kidney Disease Epidemiology Collaboration   (CKD-EPI) equation.     11/29/2018 >90 >60 mL/min/1.7m2 Final     Comment:      GFR Calc   01/17/2018 >90 >60 mL/min/1.7m2 Final     Comment:      GFR Calc       Lab Results   Component Value Date    MICROL <5 01/17/2018     No results found for: MICROALBUMIN  No results  found for: CPEPT, GADAB, ISCAB    No results found for: B12]    Most recent eye exam date: : 11/28/2018     A1c today: 7.0%.    Assessment/Plan:     1.  Type 1 diabetes- Overall, doing much better.  A1c is down to 6.8%, which she thinks is the best it has ever been.  She is physically active and adherent to gluten free diet.  No changes today.     2.  Risk factors-     Retinopathy:  No.  Had eye exam within last year.   Nephropathy:  BP well controlled. No microalbuminuria.  Creatinine stable.  She is on enalapril.  White coat hypertension.     She follows with nephrology.   Neuropathy: No.    Feet: OK, no ulcers.   Lipids:  .  Had been on pravastatin 20 mg daily.  She stopped statin in anticipation of pregnancy.  She does not want to resume statin at this time.  Will recheck cholesterol.     3.  Hypothyroidism- last TSH was 2.89 on 10/10/16 on no levothyroxine.   She has not had her labs drawn as she forgot.  Will reorder.     4. Anxiety- Suggested scheduling with one of our health psychologists.  Gave her a list and phone numbers.  She will consider this.     5.  F/U in 3 months with Dr. Allan.     I spent 30 minutes with this patient face to face and explained the conditions and plans (more than 50% of time was counseling/coordination of care, diabetes management, follow up plan for worsening hyper and hypoglycemia) . The patient understood and is satisfied with today's visit.     Laureen Goldstein PA-C, MPAS   [facility]  Department of Medicine  Division of Endocrinology and Diabetes

## 2019-02-18 NOTE — PATIENT INSTRUCTIONS
Scheduling with a Health Psychologist  *Our Health Psychologists prefer that the patient reaches out to them directly to schedule an appointment. After choosing one of the providers listed below, you will more than likely reach their voicemail, as they are typically seeing patients during normal business hours. Make sure to leave your name, contact number, and the name of the doctor who referred you. They will try to get back to you within 24-48 business hours.     Dr. Amado Kamara: [phone]  Dr. Qamar Reza: [phone]  Dr. Sandrita Horton: [phone]

## 2019-03-04 ENCOUNTER — OFFICE VISIT (OUTPATIENT)
Dept: NEPHROLOGY | Facility: CLINIC | Age: 24
End: 2019-03-04
Attending: INTERNAL MEDICINE
Payer: COMMERCIAL

## 2019-03-04 VITALS
SYSTOLIC BLOOD PRESSURE: 121 MMHG | OXYGEN SATURATION: 99 % | WEIGHT: 293 LBS | TEMPERATURE: 98.4 F | DIASTOLIC BLOOD PRESSURE: 86 MMHG | HEART RATE: 81 BPM | BODY MASS INDEX: 45.54 KG/M2

## 2019-03-04 DIAGNOSIS — E03.9 ACQUIRED HYPOTHYROIDISM: ICD-10-CM

## 2019-03-04 DIAGNOSIS — N18.1 CHRONIC KIDNEY DISEASE, STAGE I: ICD-10-CM

## 2019-03-04 DIAGNOSIS — N18.1 CKD (CHRONIC KIDNEY DISEASE) STAGE 1, GFR 90 ML/MIN OR GREATER: Primary | ICD-10-CM

## 2019-03-04 DIAGNOSIS — N18.1 CKD (CHRONIC KIDNEY DISEASE) STAGE 1, GFR 90 ML/MIN OR GREATER: ICD-10-CM

## 2019-03-04 DIAGNOSIS — E10.21 TYPE 1 DIABETES MELLITUS WITH DIABETIC NEPHROPATHY (H): ICD-10-CM

## 2019-03-04 LAB
ALBUMIN SERPL-MCNC: 3.5 G/DL (ref 3.4–5)
ANION GAP SERPL CALCULATED.3IONS-SCNC: 6 MMOL/L (ref 3–14)
BUN SERPL-MCNC: 9 MG/DL (ref 7–30)
CALCIUM SERPL-MCNC: 9 MG/DL (ref 8.5–10.1)
CHLORIDE SERPL-SCNC: 105 MMOL/L (ref 94–109)
CHOLEST SERPL-MCNC: 189 MG/DL
CO2 SERPL-SCNC: 27 MMOL/L (ref 20–32)
CREAT SERPL-MCNC: 0.51 MG/DL (ref 0.52–1.04)
CREAT UR-MCNC: 11 MG/DL
DEPRECATED CALCIDIOL+CALCIFEROL SERPL-MC: 24 UG/L (ref 20–75)
GFR SERPL CREATININE-BSD FRML MDRD: >90 ML/MIN/{1.73_M2}
GLUCOSE SERPL-MCNC: 72 MG/DL (ref 70–99)
HDLC SERPL-MCNC: 58 MG/DL
HGB BLD-MCNC: 14.1 G/DL (ref 11.7–15.7)
LDLC SERPL CALC-MCNC: 106 MG/DL
MICROALBUMIN UR-MCNC: <5 MG/L
MICROALBUMIN/CREAT UR: NORMAL MG/G CR (ref 0–25)
NONHDLC SERPL-MCNC: 131 MG/DL
PHOSPHATE SERPL-MCNC: 3.4 MG/DL (ref 2.5–4.5)
POTASSIUM SERPL-SCNC: 3.8 MMOL/L (ref 3.4–5.3)
PROT UR-MCNC: <0.05 G/L
PROT/CREAT 24H UR: NORMAL G/G CR (ref 0–0.2)
SODIUM SERPL-SCNC: 138 MMOL/L (ref 133–144)
TRIGL SERPL-MCNC: 126 MG/DL
TSH SERPL DL<=0.005 MIU/L-ACNC: 3.41 MU/L (ref 0.4–4)

## 2019-03-04 PROCEDURE — 84443 ASSAY THYROID STIM HORMONE: CPT | Performed by: INTERNAL MEDICINE

## 2019-03-04 PROCEDURE — 36415 COLL VENOUS BLD VENIPUNCTURE: CPT | Performed by: INTERNAL MEDICINE

## 2019-03-04 PROCEDURE — 80061 LIPID PANEL: CPT | Performed by: INTERNAL MEDICINE

## 2019-03-04 PROCEDURE — 80069 RENAL FUNCTION PANEL: CPT | Performed by: INTERNAL MEDICINE

## 2019-03-04 PROCEDURE — 85018 HEMOGLOBIN: CPT | Performed by: INTERNAL MEDICINE

## 2019-03-04 PROCEDURE — 84156 ASSAY OF PROTEIN URINE: CPT | Performed by: INTERNAL MEDICINE

## 2019-03-04 PROCEDURE — 82043 UR ALBUMIN QUANTITATIVE: CPT | Performed by: INTERNAL MEDICINE

## 2019-03-04 PROCEDURE — 82306 VITAMIN D 25 HYDROXY: CPT | Performed by: INTERNAL MEDICINE

## 2019-03-04 PROCEDURE — G0463 HOSPITAL OUTPT CLINIC VISIT: HCPCS | Mod: ZF

## 2019-03-04 ASSESSMENT — PAIN SCALES - GENERAL: PAINLEVEL: NO PAIN (0)

## 2019-03-04 NOTE — LETTER
"3/4/2019      RE: Stacey La  [address] [facility] [address]         Nephrology Clinic    Connor Castaneda MD  2019     Name: Stacey La  MRN: [id number]  Age: 23 year old  : 1995  Referring provider: Amalia Ervin     Assessment and Plan:  1.  CKD, stage 1: Secondary to biopsy proven diabetic nephropathy with preserved kidney function (baseline Cr~0.6 mg/dL, eGFR>90) and well controlled albuminuria (<30 mg/g). However, suspect significant hyperfiltration due to DM 2 and obesity. She has responded well to RAAS blockade with enalapril.     -- Will continue enalapril at 20 mg BID for management of HTN and albuminuria. Monitor closely for unintended pregnancy.    -- Continue good blood pressure and glucose control  -- Lipid panel  -- Encourage weight loss  -- RTC in 6 months     2. HTN/Volume: Home SBP at goal of <130/80.  Mildly hypervolemic on exam. Suspect minimal lower extremity edema is related to high salt intake in a setting of sodium avid kidneys and venous stasis. Suspect an element of \"white coat\" hypertension with her clinic blood pressures.  A 24 hour ambulatory BP monitoring was performed following a previous clinic visit that revealed average overall daily BP of 124/68 confirming white coat hypertension. However, she did not have a night time dipping pattern.       -- Continue low salt diet  -- Continue enalapril at 20 mg BID  -- Will consider carvedilol next should her home BP rise above goal     3. Diabetes:  Complicated by diabetic nephropathy. No retinopathy or peripheral neuropathy. She has had ~5 episodes of DKA in the past. Recent hgb A1c was 6.8 ().   -- Followed closely by endocrinology     4.  Vitamin D deficiency: Low vitamin D level multiple times in the past. Vit D normalized but low again at 18 in 2018.     -- Continue cholecalciferol to 2000 units daily  -- Check Vitamin D with next lab draw.     5.  Depression/anxiety:  In " part, situational and related to stress at home, her job, passing of a loved one and her medical illness.    -- she no longer appears to be on lexapro at 10 mg daily that was started at a previous clinic visit over a year ago.  She is no longer on Buspar and does not take hydroxyzine prn.   -- Followed closely by her PCP and psychology/psychiatry.     6.  Iron deficiency:  Not anemic (hgb 14.1). Suspect iron deficiency related to menses and possibly decreased GI absorption.    -- She would like to avoid iron supplements for now; encouraged her to increase intake of iron containing foods.    -- Will repeat hgb at next visit       7. Acquired hypothyroidism: Patient is not being treated for hypothyroidism with Levothyroxine. TSH today in clinic was within normal range.  - f/u with endocrinology    Follow-up: Return in about 6 months (around 9/4/2019).     Reason For Visit: Follow Up    HPI:   Stacey La is a 23 year old woman with a history of Type 1 DM and celiac disease who presents for follow up regarding chronic kidney disease. She was previously cared for by her pediatric nephrologist, Dr. India Olivas. She was last seen by me on 01/17/2018, please refer to that note for further details. Today, the patient reports feeling well overall. Her hemoglobin A1c is the best it has been for her in some time. She is not actively trying for a baby at this point in time. She reports that she is currently trying to schedule a visit with a psychiatrist in order to help cope with anxiety, but notes that daily stressors and responsibilities are a barrier to treatment. She follows up with Endocrinology for her thyroid and notes that she hasn't been taking Levothyroxine for some time. She otherwise is well and has no specific medical complaints.      Review of Systems:   Pertinent items are noted in HPI or as below, remainder of complete ROS is negative.      Active Medications:      albuterol (2.5 MG/3ML) 0.083% neb  solution, Take 1 vial (2.5 mg) by nebulization every 4 hours as needed for shortness of breath / dyspnea or wheezing /chest tightness/cough, Disp: 1 Box, Rfl: 3     albuterol (ALBUTEROL) 108 (90 BASE) MCG/ACT inhaler, Inhale 2 puffs into the lungs every 6 hours as needed, Disp: 1 each, Rfl: 5     blood glucose test strip, Use to test blood sugar eight times daily or as directed.  Dispense Brandy Contour Next Test Strips., Disp: 4 Box, Rfl: 4     Blood Pressure Monitoring KIT, 1 kit daily, Disp: 1 kit, Rfl: 0     Cholecalciferol 2000 UNITS TBDP, Take 2,000 Units by mouth daily, Disp: 90 tablet, Rfl: 11     enalapril (VASOTEC) 20 MG tablet, TAKE 1 TABLET BY MOUTH TWICE DAILY, Disp: 180 tablet, Rfl: 3     HUMALOG 100 UNIT/ML injection, USE AS DIRECTED UP  TO  140  UNITS  DAILY  IN  INSULIN  PUMP, Disp: 120 mL, Rfl: 3     insulin syringes, disposable, U-100 0.3 ML MISC, Use 2-3 times daily as needed, Disp: 100 each, Rfl: 11     liraglutide (VICTOZA PEN) 18 MG/3ML soln, 0.6 mg x 3 days, then 1.2 mg x 3 days, then 1.8 mg, Disp: 9 mL, Rfl: 11     Multiple Vitamins-Minerals (MULTIVITAMIN ADULT PO), Take 1 tablet by mouth daily, Disp: , Rfl:      Ascorbic Acid (VITAMIN C PO), Take 500 mg by mouth daily, Disp: , Rfl:      escitalopram (LEXAPRO) 10 MG tablet, Take 1 tablet (10 mg) by mouth daily (Patient not taking: Reported on 3/4/2019), Disp: 30 tablet, Rfl: 11     glucagon (GLUCAGON EMERGENCY) 1 MG kit, Inject 1 mg into the muscle once for 1 dose, Disp: 1 mg, Rfl: 1     ipratropium - albuterol 0.5 mg/2.5 mg/3 mL (DUONEB) 0.5-2.5 (3) MG/3ML neb solution, Take 1 vial (3 mLs) by nebulization every 6 hours as needed for shortness of breath / dyspnea or wheezing (Patient not taking: Reported on 3/4/2019), Disp: 30 vial, Rfl: 1     Ondansetron HCl (ZOFRAN PO), Take by mouth as needed for nausea or vomiting Reported on 3/17/2017, Disp: , Rfl:      Allergies:   Dogs  Dust mites  Gluten meal      Past Medical  History:  Anxiety  Asthma  Celiac disease  Chronic kidney disease, stage I  Chronic sinusitis  Diabetes mellitus type 1  Diabetic nephropathy  Hypertension  Hypothyroidism  Pedal edema  Psoriasis    Past Surgical History:  ENT Surgery  Tonsillectomy, adenoidectomy, combined    Family History:   Father (Diabetes, Myocardial Infarct)  Mother (Celiacs)  Maternal Grandfather (Enlarged heart)    Social History:   The patient is . The patient drinks roughly two times a month. She is currently sexually active with male partners and uses condoms for birth control.The patient has no reported social history of smoking, smokeless tobacco use, or drug use.     Physical Exam:  /86   Pulse 81   Temp 98.4  F (36.9  C)   Wt 135.9 kg (299 lb 8 oz)   LMP 02/04/2019   SpO2 99%   BMI 45.54 kg/m      GENERAL APPEARANCE: alert and no distress, obese  EYES: nonicteric  HENT: mouth without ulcers or lesions  NECK: supple, no adenopathy  RESP: lungs clear to auscultation   CV: regular rhythm, normal rate, no rub  ABD: soft, nontender, nondistended  : No CVA tendereness  Extremities: no edema  MS: no evidence of inflammation in joints, no muscle tenderness  SKIN: no concerning rash  NEURO: mentation intact and speech normal  PSYCH: affect normal/bright     Laboratory:  CMP  Recent Labs   Lab Test 03/04/19  0854 02/11/19  2138 11/29/18  0537 01/17/18  1515 07/25/17  1455 01/24/17  1525  02/20/16  1827  08/04/15  1443  03/25/15  1655  12/09/13  1137    138 138 136 134 137   < > 139   < >  --    < > 138   < > 137   POTASSIUM 3.8 3.9 3.8 4.7 4.2 4.2   < > 3.6   < >  --    < > 3.8   < > 3.9   CHLORIDE 105 106 106 103 103 103   < > 109   < >  --    < > 105   < > 103   CO2 27 24 24 28 25 26   < > 25   < >  --    < > 25   < > 25   ANIONGAP 6 8 8 6 6 8   < > 5   < >  --    < > 8   < > 9   GLC 72 198* 71 118* 211* 107*   < > 55*   < >  --    < > 73   < > 202*   BUN 9 12 15 13 7 7   < > 8   < >  --    < > 16   < > 11   CR  0.51* 0.66 0.57 0.70 0.56 0.55   < > 0.67   < >  --    < > 0.55   < > 0.48*   GFRESTIMATED >90 >90 >90 >90 >90  Non  GFR Calc   >90  Non  GFR Calc     < > >90  Non  GFR Calc     < >  --    < > >90  Non  GFR Calc     < > >90   GFRESTBLACK >90 >90 >90 >90 >90   GFR Calc   >90   GFR Calc     < > >90   GFR Calc     < >  --    < > >90   GFR Calc     < > >90   FRANKLIN 9.0 8.8 9.2 8.8 8.8 9.3   < > 8.3*   < >  --    < > 9.0   < > 9.6   PHOS 3.4  --   --  3.8 2.7 3.4   < >  --    < >  --    < >  --    < >  --    PROTTOTAL  --   --   --   --   --   --   --  7.5  --   --   --  8.3  --  7.9   ALBUMIN 3.5  --   --  3.4 3.7 4.0   < > 3.6   < >  --    < > 4.1   < > 4.2   BILITOTAL  --   --   --   --   --   --   --  0.2  --   --   --  0.4  --  0.3   ALKPHOS  --   --   --   --   --   --   --  67  --   --   --  82  --  92   AST  --   --   --   --   --   --   --  17  --   --   --  14  --  23   ALT  --   --   --   --   --   --   --  26  --  <5*  --  29  --  32    < > = values in this interval not displayed.     CBC  Recent Labs   Lab Test 03/04/19  0854 02/11/19  2138 11/29/18  0537 01/17/18  1515  02/20/16  1827 02/19/16  2220   HGB 14.1 13.5 14.8 13.9   < > 13.5 14.7   WBC  --  13.3* 11.3*  --   --  12.6* 12.7*   RBC  --  4.96 5.37*  --   --  4.84 5.35*   HCT  --  42.0 45.1  --   --  41.4 45.2   MCV  --  85 84  --   --  86 85   MCH  --  27.2 27.6  --   --  27.9 27.5   MCHC  --  32.1 32.8  --   --  32.6 32.5   RDW  --  13.1 13.0  --   --  13.8 13.6   PLT  --  385 363  --   --  330 357    < > = values in this interval not displayed.     INR  No lab results found.     ABG  No lab results found.     URINE STUDIES  Recent Labs   Lab Test 03/25/15  1810 07/22/14  1506 03/06/14  1040 10/18/12  1210   COLOR Yellow Straw Straw Light Yellow   APPEARANCE Slightly Cloudy Clear Clear Clear   URINEGLC Negative Negative  Negative Negative   URINEBILI Negative Negative Negative Negative   URINEKETONE Negative Negative Negative Negative   SG 1.021 1.012 1.004 1.009   UBLD Trace* Negative Negative Negative   URINEPH 5.5 7.0 7.0 7.5*   PROTEIN 10* Negative Negative 10*   NITRITE Negative Negative Negative Negative   LEUKEST Negative Negative Negative Negative   RBCU 1 1 1 1   WBCU 1 0 <1 1     Recent Labs   Lab Test 03/04/19  0900 01/17/18  1517 07/25/17  1457 01/24/17  1533 07/26/16  1135 01/16/16  0906 07/28/15  1522 01/19/15  1554 07/22/14  1506 03/06/14  0948 10/18/12  1210 04/12/12  0907 10/28/11  1210   [facility] PENDING Unable to calculate due to low value Unable to calculate due to low value Unable to calculate due to low value 0.23* 0.15 Unable to calculate due to low value Unable to calculate due to low value <0.10 0.13 0.49* 0.30* 0.69*     PTH  Recent Labs   Lab Test 01/17/18  1515 01/24/17  1525 01/16/16  0905 07/22/14  1519   PTHI 60 24 31 18     IRON STUDIES   Recent Labs   Lab Test 01/17/18  1515 07/25/17  1455 07/26/16  1137 01/16/16  0905 07/22/14  1519   IRON 58 50 60 58 27*    374 423 412 372   IRONSAT 14* 13* 14* 14* 7*   DIPESH 13 26 13  --  9*     Imaging:   Not applicable to this visit.     Scribe Disclosure:   I, Yohana Cuellar, am serving as a scribe to document services personally performed by Connor Castaneda MD at this visit, based upon the provider's statements to me. All documentation has been reviewed by the aforementioned provider prior to being entered into the official medical record.     Portions of this medical record were completed by a scribe. UPON MY REVIEW AND AUTHENTICATION BY ELECTRONIC SIGNATURE, this confirms (a) I performed the applicable clinical services, and (b) the record is accurate.    Total time spent was >40 minutes, and more than 50% of face to face time was spent in counseling and/or coordination of care regarding principles of multidisciplinary care, medication management,  and chronic kidney disease education.    Connor Castaneda MD

## 2019-03-04 NOTE — NURSING NOTE
"Chief Complaint   Patient presents with     RECHECK     Follow up CKD     Vital signs:  Temp: 98.4  F (36.9  C)   BP: 121/86 Pulse: 81     SpO2: 99 %       Weight: 135.9 kg (299 lb 8 oz)  Estimated body mass index is 45.54 kg/m  as calculated from the following:    Height as of 2/18/19: 1.727 m (5' 8\").    Weight as of this encounter: 135.9 kg (299 lb 8 oz).        Eloisa Herring    "

## 2019-03-04 NOTE — PROGRESS NOTES
"  Nephrology Clinic    Connor Castaneda MD  2019     Name: Stacey La  MRN: [id number]  Age: 23 year old  : 1995  Referring provider: Amalia Ervin     Assessment and Plan:  1.  CKD, stage 1: Secondary to biopsy proven diabetic nephropathy with preserved kidney function (baseline Cr~0.6 mg/dL, eGFR>90) and well controlled albuminuria (<30 mg/g). However, suspect significant hyperfiltration due to DM 2 and obesity. She has responded well to RAAS blockade with enalapril.     -- Will continue enalapril at 20 mg BID for management of HTN and albuminuria. Monitor closely for unintended pregnancy.    -- Continue good blood pressure and glucose control  -- Lipid panel  -- Encourage weight loss  -- RTC in 6 months     2. HTN/Volume: Home SBP at goal of <130/80.  Mildly hypervolemic on exam. Suspect minimal lower extremity edema is related to high salt intake in a setting of sodium avid kidneys and venous stasis. Suspect an element of \"white coat\" hypertension with her clinic blood pressures.  A 24 hour ambulatory BP monitoring was performed following a previous clinic visit that revealed average overall daily BP of 124/68 confirming white coat hypertension. However, she did not have a night time dipping pattern.       -- Continue low salt diet  -- Continue enalapril at 20 mg BID  -- Will consider carvedilol next should her home BP rise above goal     3. Diabetes:  Complicated by diabetic nephropathy. No retinopathy or peripheral neuropathy. She has had ~5 episodes of DKA in the past. Recent hgb A1c was 6.8 ().   -- Followed closely by endocrinology     4.  Vitamin D deficiency: Low vitamin D level multiple times in the past. Vit D normalized but low again at 18 in 2018.     -- Continue cholecalciferol to 2000 units daily  -- Check Vitamin D with next lab draw.     5.  Depression/anxiety:  In part, situational and related to stress at home, her job, passing of a loved one " and her medical illness.    -- she no longer appears to be on lexapro at 10 mg daily that was started at a previous clinic visit over a year ago.  She is no longer on Buspar and does not take hydroxyzine prn.   -- Followed closely by her PCP and psychology/psychiatry.     6.  Iron deficiency:  Not anemic (hgb 14.1). Suspect iron deficiency related to menses and possibly decreased GI absorption.    -- She would like to avoid iron supplements for now; encouraged her to increase intake of iron containing foods.    -- Will repeat hgb at next visit       7. Acquired hypothyroidism: Patient is not being treated for hypothyroidism with Levothyroxine. TSH today in clinic was within normal range.  - f/u with endocrinology    Follow-up: Return in about 6 months (around 9/4/2019).     Reason For Visit: Follow Up    HPI:   Stacey La is a 23 year old woman with a history of Type 1 DM and celiac disease who presents for follow up regarding chronic kidney disease. She was previously cared for by her pediatric nephrologist, Dr. India Olivas. She was last seen by me on 01/17/2018, please refer to that note for further details. Today, the patient reports feeling well overall. Her hemoglobin A1c is the best it has been for her in some time. She is not actively trying for a baby at this point in time. She reports that she is currently trying to schedule a visit with a psychiatrist in order to help cope with anxiety, but notes that daily stressors and responsibilities are a barrier to treatment. She follows up with Endocrinology for her thyroid and notes that she hasn't been taking Levothyroxine for some time. She otherwise is well and has no specific medical complaints.      Review of Systems:   Pertinent items are noted in HPI or as below, remainder of complete ROS is negative.      Active Medications:      albuterol (2.5 MG/3ML) 0.083% neb solution, Take 1 vial (2.5 mg) by nebulization every 4 hours as needed for shortness of  breath / dyspnea or wheezing /chest tightness/cough, Disp: 1 Box, Rfl: 3     albuterol (ALBUTEROL) 108 (90 BASE) MCG/ACT inhaler, Inhale 2 puffs into the lungs every 6 hours as needed, Disp: 1 each, Rfl: 5     blood glucose test strip, Use to test blood sugar eight times daily or as directed.  Dispense [employer] Contour Next Test Strips., Disp: 4 Box, Rfl: 4     Blood Pressure Monitoring KIT, 1 kit daily, Disp: 1 kit, Rfl: 0     Cholecalciferol 2000 UNITS TBDP, Take 2,000 Units by mouth daily, Disp: 90 tablet, Rfl: 11     enalapril (VASOTEC) 20 MG tablet, TAKE 1 TABLET BY MOUTH TWICE DAILY, Disp: 180 tablet, Rfl: 3     HUMALOG 100 UNIT/ML injection, USE AS DIRECTED UP  TO  140  UNITS  DAILY  IN  INSULIN  PUMP, Disp: 120 mL, Rfl: 3     insulin syringes, disposable, U-100 0.3 ML MISC, Use 2-3 times daily as needed, Disp: 100 each, Rfl: 11     liraglutide (VICTOZA PEN) 18 MG/3ML soln, 0.6 mg x 3 days, then 1.2 mg x 3 days, then 1.8 mg, Disp: 9 mL, Rfl: 11     Multiple Vitamins-Minerals (MULTIVITAMIN ADULT PO), Take 1 tablet by mouth daily, Disp: , Rfl:      Ascorbic Acid (VITAMIN C PO), Take 500 mg by mouth daily, Disp: , Rfl:      escitalopram (LEXAPRO) 10 MG tablet, Take 1 tablet (10 mg) by mouth daily (Patient not taking: Reported on 3/4/2019), Disp: 30 tablet, Rfl: 11     glucagon (GLUCAGON EMERGENCY) 1 MG kit, Inject 1 mg into the muscle once for 1 dose, Disp: 1 mg, Rfl: 1     ipratropium - albuterol 0.5 mg/2.5 mg/3 mL (DUONEB) 0.5-2.5 (3) MG/3ML neb solution, Take 1 vial (3 mLs) by nebulization every 6 hours as needed for shortness of breath / dyspnea or wheezing (Patient not taking: Reported on 3/4/2019), Disp: 30 vial, Rfl: 1     Ondansetron HCl (ZOFRAN PO), Take by mouth as needed for nausea or vomiting Reported on 3/17/2017, Disp: , Rfl:      Allergies:   Dogs  Dust mites  Gluten meal      Past Medical History:  Anxiety  Asthma  Celiac disease  Chronic kidney disease, stage I  Chronic sinusitis  Diabetes mellitus  type 1  Diabetic nephropathy  Hypertension  Hypothyroidism  Pedal edema  Psoriasis    Past Surgical History:  ENT Surgery  Tonsillectomy, adenoidectomy, combined    Family History:   Father (Diabetes, Myocardial Infarct)  Mother (Celiacs)  Maternal Grandfather (Enlarged heart)    Social History:   The patient is . The patient drinks roughly two times a month. She is currently sexually active with male partners and uses condoms for birth control.The patient has no reported social history of smoking, smokeless tobacco use, or drug use.     Physical Exam:  /86   Pulse 81   Temp 98.4  F (36.9  C)   Wt 135.9 kg (299 lb 8 oz)   LMP 02/04/2019   SpO2 99%   BMI 45.54 kg/m     GENERAL APPEARANCE: alert and no distress, obese  EYES: nonicteric  HENT: mouth without ulcers or lesions  NECK: supple, no adenopathy  RESP: lungs clear to auscultation   CV: regular rhythm, normal rate, no rub  ABD: soft, nontender, nondistended  : No CVA tendereness  Extremities: no edema  MS: no evidence of inflammation in joints, no muscle tenderness  SKIN: no concerning rash  NEURO: mentation intact and speech normal  PSYCH: affect normal/bright     Laboratory:  CMP  Recent Labs   Lab Test 03/04/19  0854 02/11/19  2138 11/29/18  0537 01/17/18  1515 07/25/17  1455 01/24/17  1525  02/20/16  1827  08/04/15  1443  03/25/15  1655  12/09/13  1137    138 138 136 134 137   < > 139   < >  --    < > 138   < > 137   POTASSIUM 3.8 3.9 3.8 4.7 4.2 4.2   < > 3.6   < >  --    < > 3.8   < > 3.9   CHLORIDE 105 106 106 103 103 103   < > 109   < >  --    < > 105   < > 103   CO2 27 24 24 28 25 26   < > 25   < >  --    < > 25   < > 25   ANIONGAP 6 8 8 6 6 8   < > 5   < >  --    < > 8   < > 9   GLC 72 198* 71 118* 211* 107*   < > 55*   < >  --    < > 73   < > 202*   BUN 9 12 15 13 7 7   < > 8   < >  --    < > 16   < > 11   CR 0.51* 0.66 0.57 0.70 0.56 0.55   < > 0.67   < >  --    < > 0.55   < > 0.48*   GFRESTIMATED >90 >90 >90 >90 >90  Non   GFR Calc   >90  Non  GFR Calc     < > >90  Non  GFR Calc     < >  --    < > >90  Non  GFR Calc     < > >90   GFRESTBLACK >90 >90 >90 >90 >90   GFR Calc   >90   GFR Calc     < > >90   GFR Calc     < >  --    < > >90   GFR Calc     < > >90   FRANKLIN 9.0 8.8 9.2 8.8 8.8 9.3   < > 8.3*   < >  --    < > 9.0   < > 9.6   PHOS 3.4  --   --  3.8 2.7 3.4   < >  --    < >  --    < >  --    < >  --    PROTTOTAL  --   --   --   --   --   --   --  7.5  --   --   --  8.3  --  7.9   ALBUMIN 3.5  --   --  3.4 3.7 4.0   < > 3.6   < >  --    < > 4.1   < > 4.2   BILITOTAL  --   --   --   --   --   --   --  0.2  --   --   --  0.4  --  0.3   ALKPHOS  --   --   --   --   --   --   --  67  --   --   --  82  --  92   AST  --   --   --   --   --   --   --  17  --   --   --  14  --  23   ALT  --   --   --   --   --   --   --  26  --  <5*  --  29  --  32    < > = values in this interval not displayed.     CBC  Recent Labs   Lab Test 03/04/19  0854 02/11/19  2138 11/29/18  0537 01/17/18  1515  02/20/16  1827 02/19/16  2220   HGB 14.1 13.5 14.8 13.9   < > 13.5 14.7   WBC  --  13.3* 11.3*  --   --  12.6* 12.7*   RBC  --  4.96 5.37*  --   --  4.84 5.35*   HCT  --  42.0 45.1  --   --  41.4 45.2   MCV  --  85 84  --   --  86 85   MCH  --  27.2 27.6  --   --  27.9 27.5   MCHC  --  32.1 32.8  --   --  32.6 32.5   RDW  --  13.1 13.0  --   --  13.8 13.6   PLT  --  385 363  --   --  330 357    < > = values in this interval not displayed.     INR  No lab results found.     ABG  No lab results found.     URINE STUDIES  Recent Labs   Lab Test 03/25/15  1810 07/22/14  1506 03/06/14  1040 10/18/12  1210   COLOR Yellow Straw Straw Light Yellow   APPEARANCE Slightly Cloudy Clear Clear Clear   URINEGLC Negative Negative Negative Negative   URINEBILI Negative Negative Negative Negative   URINEKETONE Negative Negative Negative Negative   SG  1.021 1.012 1.004 1.009   UBLD Trace* Negative Negative Negative   URINEPH 5.5 7.0 7.0 7.5*   PROTEIN 10* Negative Negative 10*   NITRITE Negative Negative Negative Negative   LEUKEST Negative Negative Negative Negative   RBCU 1 1 1 1   WBCU 1 0 <1 1     Recent Labs   Lab Test 03/04/19  0900 01/17/18  1517 07/25/17  1457 01/24/17  1533 07/26/16  1135 01/16/16  0906 07/28/15  1522 01/19/15  1554 07/22/14  1506 03/06/14  0948 10/18/12  1210 04/12/12  0907 10/28/11  1210   [facility] PENDING Unable to calculate due to low value Unable to calculate due to low value Unable to calculate due to low value 0.23* 0.15 Unable to calculate due to low value Unable to calculate due to low value <0.10 0.13 0.49* 0.30* 0.69*     PTH  Recent Labs   Lab Test 01/17/18  1515 01/24/17  1525 01/16/16  0905 07/22/14  1519   PTHI 60 24 31 18     IRON STUDIES   Recent Labs   Lab Test 01/17/18  1515 07/25/17  1455 07/26/16  1137 01/16/16  0905 07/22/14  1519   IRON 58 50 60 58 27*    374 423 412 372   IRONSAT 14* 13* 14* 14* 7*   DIPESH 13 26 13  --  9*     Imaging:   Not applicable to this visit.     Scribe Disclosure:   I, Yohana Cuellar, am serving as a scribe to document services personally performed by Connor Castaneda MD at this visit, based upon the provider's statements to me. All documentation has been reviewed by the aforementioned provider prior to being entered into the official medical record.     Portions of this medical record were completed by a scribe. UPON MY REVIEW AND AUTHENTICATION BY ELECTRONIC SIGNATURE, this confirms (a) I performed the applicable clinical services, and (b) the record is accurate.    Total time spent was >40 minutes, and more than 50% of face to face time was spent in counseling and/or coordination of care regarding principles of multidisciplinary care, medication management, and chronic kidney disease education.  Connor Castaneda MD

## 2019-03-13 DIAGNOSIS — E10.9 WELL CONTROLLED TYPE 1 DIABETES MELLITUS (H): ICD-10-CM

## 2019-03-13 NOTE — TELEPHONE ENCOUNTER
HUMALOG 100 UNIT/ML injection      Last Written Prescription Date:  3-12-18  Last Fill Quantity: 120 ml,   # refills: 3  Last Office Visit : 2-18-19  Future Office visit:  5-4-19    Routing refill request to provider for review/approval because:  Insulin - refilled per clinic

## 2019-03-18 ENCOUNTER — TELEPHONE (OUTPATIENT)
Dept: ENDOCRINOLOGY | Facility: CLINIC | Age: 24
End: 2019-03-18

## 2019-03-18 NOTE — TELEPHONE ENCOUNTER
Stacey calls with  Pump failure new one being shipped. This is her pump settings :  Stacey wears a t-slim pump with basal rates as follows:   Mid- 2.6  3am- 3.5  7am- 3.3  11am- 2.5  12pm- 2.3  4:30pm- 2.0  5:40pm- 2.2  10pm-3.2     IC ratio-   Mid-1:4g   7am- 1:4.5g  11am- 1:5g  12pm- 1:4.5g  4:30pm- 1/5g  5:40pm- 1/4g  10pm- 1/5g     Sensitivity- 16  Target glucose- 100 during the day, 110 overnight     Average total daily insulin dose-  85 Units (76%basal)     She has been using Humalog CHO counting  and Lantus 50 units in AM  Yesterday AM when  Pump broke and this morning. BS this Am was 130  Should she continue Lantus 50 units  In AM  until pump arrives ? .

## 2019-05-09 ENCOUNTER — OFFICE VISIT (OUTPATIENT)
Dept: OBGYN | Facility: CLINIC | Age: 24
End: 2019-05-09
Payer: COMMERCIAL

## 2019-05-09 VITALS — DIASTOLIC BLOOD PRESSURE: 82 MMHG | BODY MASS INDEX: 45.68 KG/M2 | WEIGHT: 293 LBS | SYSTOLIC BLOOD PRESSURE: 122 MMHG

## 2019-05-09 DIAGNOSIS — L98.8 SKIN LESION OF BREAST: Primary | ICD-10-CM

## 2019-05-09 PROCEDURE — 99203 OFFICE O/P NEW LOW 30 MIN: CPT | Performed by: ADVANCED PRACTICE MIDWIFE

## 2019-05-09 NOTE — NURSING NOTE
"Chief Complaint   Patient presents with     Breast Problem     Breast discoloration         Initial /82 (BP Location: Right arm, Patient Position: Chair, Cuff Size: Adult Large)   Wt 136.3 kg (300 lb 6.4 oz)   LMP 2019 (Approximate)   Breastfeeding? No   BMI 45.68 kg/m   Estimated body mass index is 45.68 kg/m  as calculated from the following:    Height as of 19: 1.727 m (5' 8\").    Weight as of this encounter: 136.3 kg (300 lb 6.4 oz).  BP completed using cuff size: large    Questioned patient about current smoking habits.  Pt. has never smoked.          The following HM Due: NONE      Pt noticed few small red spots on breasts 3-5 days ago.      Sena Escobar, SHARMAINE on 2019 at 8:41 AM     "

## 2019-05-10 NOTE — PROGRESS NOTES
SUBJECTIVE:                                                   Stacey La is a 23 year old who presents to clinic today for the following health issue(s):  Patient presents with:  Breast Problem: Breast discoloration      Patient reports several days ago she noticed red spots on the under side of both breasts. She did get a new bra, but it is a supportive sports bra without underwire. She denies pain, lumps/masses, color changes, unusual discharge, or puckering. Denies change in soaps, detergent, lotions. No other associated symptoms. Has not tried anything to make it better.     Patient's last menstrual period was 2019 (approximate).  Menstrual History: frequency: every 28 days  Patient is sexually active  .  Using condoms for contraception.       Problem list and histories reviewed & adjusted, as indicated.  Additional history: as documented.    Patient Active Problem List   Diagnosis     Type 1 diabetes mellitus (H)     Diabetic nephropathy (H)     Family history of heart disease     Chronic kidney disease, stage I     Mild intermittent asthma without complication     Anxiety     Type 1 diabetes mellitus with diabetic nephropathy (H)     Past Surgical History:   Procedure Laterality Date     ENT SURGERY       TONSILLECTOMY, ADENOIDECTOMY, COMBINED        Social History     Tobacco Use     Smoking status: Never Smoker     Smokeless tobacco: Never Used   Substance Use Topics     Alcohol use: Yes     Alcohol/week: 0.0 oz     Comment: 2 times per month, 2 drinks per time      Problem (# of Occurrences) Relation (Name,Age of Onset)    Cardiovascular (2) Father (48): MI, stents, diabetic, type 2, Maternal Grandfather: Englarged heart, pacemaker, defib    Gastrointestinal Disease (1) Mother: Celiacs    Heart Disease (1) Paternal Half-Brother (45)            Current Outpatient Medications   Medication Sig     albuterol (2.5 MG/3ML) 0.083% neb solution Take 1 vial (2.5 mg) by nebulization every 4  hours as needed for shortness of breath / dyspnea or wheezing /chest tightness/cough     albuterol (ALBUTEROL) 108 (90 BASE) MCG/ACT inhaler Inhale 2 puffs into the lungs every 6 hours as needed     Ascorbic Acid (VITAMIN C PO) Take 500 mg by mouth daily     Blood Pressure Monitoring KIT 1 kit daily     Cholecalciferol 2000 UNITS TBDP Take 2,000 Units by mouth daily     enalapril (VASOTEC) 20 MG tablet TAKE 1 TABLET BY MOUTH TWICE DAILY     HUMALOG 100 UNIT/ML injection USE AS DIRECTED UP  UNITS DAILY IN INSULIN PUMP     insulin syringes, disposable, U-100 0.3 ML MISC Use 2-3 times daily as needed     liraglutide (VICTOZA PEN) 18 MG/3ML soln 0.6 mg x 3 days, then 1.2 mg x 3 days, then 1.8 mg     Multiple Vitamins-Minerals (MULTIVITAMIN ADULT PO) Take 1 tablet by mouth daily     Ondansetron HCl (ZOFRAN PO) Take by mouth as needed for nausea or vomiting Reported on 3/17/2017     blood glucose test strip Use to test blood sugar eight times daily or as directed.  Dispense [employer] Contour Next Test Strips.     escitalopram (LEXAPRO) 10 MG tablet Take 1 tablet (10 mg) by mouth daily (Patient not taking: Reported on 3/4/2019)     glucagon (GLUCAGON EMERGENCY) 1 MG kit Inject 1 mg into the muscle once for 1 dose     ipratropium - albuterol 0.5 mg/2.5 mg/3 mL (DUONEB) 0.5-2.5 (3) MG/3ML neb solution Take 1 vial (3 mLs) by nebulization every 6 hours as needed for shortness of breath / dyspnea or wheezing (Patient not taking: Reported on 5/9/2019)     No current facility-administered medications for this visit.      Allergies   Allergen Reactions     Dogs Other (See Comments)     Sinus symptoms      Dust Mites      Sinus      Gluten Meal        ROS:  12 point review of systems negative other than symptoms noted below.  Breast: Red spots on both breasts    OBJECTIVE:     /82 (BP Location: Right arm, Patient Position: Chair, Cuff Size: Adult Large)   Wt 136.3 kg (300 lb 6.4 oz)   LMP 04/09/2019 (Approximate)    Breastfeeding? No   BMI 45.68 kg/m    Body mass index is 45.68 kg/m .    PHYSICAL EXAM:  Constitutional:  Appearance: Well nourished, well developed alert, in no acute distress  Chest:  Respiratory Effort:  Breathing unlabored  Breasts:  Inspection of Breasts:  3 macules about 3 mm in size noted on under side of right breast and 2 similar macules noted on left breast.   No lymphadenopathy present; Palpation of Breasts and Axillae:  No masses present on palpation, no breast tenderness Axillary Lymph Nodes:  No lymphadenopathy present  Neurologic/Psychiatric:  Mental Status:  Oriented X3          ASSESSMENT/PLAN:                                                        ICD-10-CM    1. Skin lesion of breast N64.9 MA Diagnostic Digital Bilateral       COUNSELING:  Ordered bilateral diagnostic mammogram and breast ultrasounds  Recommended to keep skin folds clean and dry, well-fitting/supportive bra without underwire, natural soaps/detergent.   Recommended to try 1% hydrocortisone cream OTC on the macules for 1 week and assess for improvement  RETURN TO CLINIC if symptoms persist    Sagrario Draper CNM, WHGENIE-BC

## 2019-05-13 ENCOUNTER — HOSPITAL ENCOUNTER (EMERGENCY)
Facility: CLINIC | Age: 24
Discharge: HOME | End: 2019-05-13
Attending: EMERGENCY MEDICINE | Admitting: EMERGENCY MEDICINE
Payer: COMMERCIAL

## 2019-05-13 VITALS
OXYGEN SATURATION: 98 % | RESPIRATION RATE: 18 BRPM | HEART RATE: 92 BPM | SYSTOLIC BLOOD PRESSURE: 166 MMHG | HEIGHT: 68 IN | BODY MASS INDEX: 44.41 KG/M2 | DIASTOLIC BLOOD PRESSURE: 88 MMHG | TEMPERATURE: 98.6 F | WEIGHT: 293 LBS

## 2019-05-13 DIAGNOSIS — F41.9 ANXIETY: ICD-10-CM

## 2019-05-13 DIAGNOSIS — R07.89 CHEST PRESSURE: ICD-10-CM

## 2019-05-13 LAB
ANION GAP SERPL CALCULATED.3IONS-SCNC: 8 MMOL/L (ref 3–14)
BASOPHILS # BLD AUTO: 0.1 10E9/L (ref 0–0.2)
BASOPHILS NFR BLD AUTO: 0.4 %
BUN SERPL-MCNC: 8 MG/DL (ref 7–30)
CALCIUM SERPL-MCNC: 9.2 MG/DL (ref 8.5–10.1)
CHLORIDE SERPL-SCNC: 105 MMOL/L (ref 94–109)
CO2 SERPL-SCNC: 23 MMOL/L (ref 20–32)
CREAT SERPL-MCNC: 0.59 MG/DL (ref 0.52–1.04)
DIFFERENTIAL METHOD BLD: ABNORMAL
EOSINOPHIL # BLD AUTO: 0.1 10E9/L (ref 0–0.7)
EOSINOPHIL NFR BLD AUTO: 0.6 %
ERYTHROCYTE [DISTWIDTH] IN BLOOD BY AUTOMATED COUNT: 13.3 % (ref 10–15)
GFR SERPL CREATININE-BSD FRML MDRD: >90 ML/MIN/{1.73_M2}
GLUCOSE SERPL-MCNC: 175 MG/DL (ref 70–99)
HCT VFR BLD AUTO: 45 % (ref 35–47)
HGB BLD-MCNC: 14.5 G/DL (ref 11.7–15.7)
IMM GRANULOCYTES # BLD: 0.1 10E9/L (ref 0–0.4)
IMM GRANULOCYTES NFR BLD: 0.6 %
LYMPHOCYTES # BLD AUTO: 3.1 10E9/L (ref 0.8–5.3)
LYMPHOCYTES NFR BLD AUTO: 22 %
MCH RBC QN AUTO: 27.6 PG (ref 26.5–33)
MCHC RBC AUTO-ENTMCNC: 32.2 G/DL (ref 31.5–36.5)
MCV RBC AUTO: 86 FL (ref 78–100)
MONOCYTES # BLD AUTO: 0.9 10E9/L (ref 0–1.3)
MONOCYTES NFR BLD AUTO: 6.3 %
NEUTROPHILS # BLD AUTO: 9.8 10E9/L (ref 1.6–8.3)
NEUTROPHILS NFR BLD AUTO: 70.1 %
NRBC # BLD AUTO: 0 10*3/UL
NRBC BLD AUTO-RTO: 0 /100
PLATELET # BLD AUTO: 396 10E9/L (ref 150–450)
POTASSIUM SERPL-SCNC: 3.9 MMOL/L (ref 3.4–5.3)
RBC # BLD AUTO: 5.26 10E12/L (ref 3.8–5.2)
SODIUM SERPL-SCNC: 136 MMOL/L (ref 133–144)
TROPONIN I SERPL-MCNC: <0.015 UG/L (ref 0–0.04)
WBC # BLD AUTO: 14.1 10E9/L (ref 4–11)

## 2019-05-13 PROCEDURE — 93005 ELECTROCARDIOGRAM TRACING: CPT

## 2019-05-13 PROCEDURE — 80048 BASIC METABOLIC PNL TOTAL CA: CPT | Performed by: EMERGENCY MEDICINE

## 2019-05-13 PROCEDURE — 85025 COMPLETE CBC W/AUTO DIFF WBC: CPT | Performed by: EMERGENCY MEDICINE

## 2019-05-13 PROCEDURE — 96374 THER/PROPH/DIAG INJ IV PUSH: CPT

## 2019-05-13 PROCEDURE — 84484 ASSAY OF TROPONIN QUANT: CPT | Performed by: EMERGENCY MEDICINE

## 2019-05-13 PROCEDURE — 99285 EMERGENCY DEPT VISIT HI MDM: CPT | Mod: 25

## 2019-05-13 PROCEDURE — 90791 PSYCH DIAGNOSTIC EVALUATION: CPT

## 2019-05-13 PROCEDURE — 25000128 H RX IP 250 OP 636: Performed by: EMERGENCY MEDICINE

## 2019-05-13 RX ORDER — LORAZEPAM 2 MG/ML
1 INJECTION INTRAMUSCULAR ONCE
Status: COMPLETED | OUTPATIENT
Start: 2019-05-13 | End: 2019-05-13

## 2019-05-13 RX ORDER — HYDROXYZINE HYDROCHLORIDE 25 MG/1
25-50 TABLET, FILM COATED ORAL 3 TIMES DAILY PRN
Qty: 20 TABLET | Refills: 0 | Status: SHIPPED | OUTPATIENT
Start: 2019-05-13 | End: 2020-06-01

## 2019-05-13 RX ADMIN — LORAZEPAM 1 MG: 2 INJECTION INTRAMUSCULAR; INTRAVENOUS at 15:22

## 2019-05-13 ASSESSMENT — MIFFLIN-ST. JEOR: SCORE: 2127.08

## 2019-05-13 ASSESSMENT — ENCOUNTER SYMPTOMS
NERVOUS/ANXIOUS: 1
HEADACHES: 1
ABDOMINAL PAIN: 1
NAUSEA: 0
SHORTNESS OF BREATH: 0
DIZZINESS: 1
LIGHT-HEADEDNESS: 1
PALPITATIONS: 1
VOMITING: 0

## 2019-05-13 NOTE — ED TRIAGE NOTES
"Pt presents for eval for having intermittent chest pain, heart palpatations, feeling \"on edge\", and tense. Pt admits due to personal stressors that it may all be anxiety. Pt's half brother passed away at the age of 45 due to an MI 1 mo ago. Pt is A&O, ABC's intact.   "

## 2019-05-13 NOTE — ED AVS SNAPSHOT
[facility] Emergency Department  [address]  [facility] [address]  Phone:  [phone]  Fax:  [phone]                                    Stacey La   MRN: [id number]    Department:  [facility] Emergency Department   Date of Visit:  5/13/2019           After Visit Summary Signature Page    I have received my discharge instructions, and my questions have been answered. I have discussed any challenges I see with this plan with the nurse or doctor.    ..........................................................................................................................................  Patient/Patient Representative Signature      ..........................................................................................................................................  Patient Representative Print Name and Relationship to Patient    ..................................................               ................................................  Date                                   Time    ..........................................................................................................................................  Reviewed by Signature/Title    ...................................................              ..............................................  Date                                               Time          22EPIC Rev 08/18

## 2019-05-13 NOTE — ED PROVIDER NOTES
"  History     Chief Complaint:  Chest pressure    The history is provided by the patient.      Stacey La is a 23 year old female with a history of HTN, celiac disease, asthma, type 1 diabetes, CKD, and anxiety who presents to the emergency department for evaluation of chest pressure. For the past several days, the patient has had intermittent chest pain, heart palpitations, and has been \"feeling on edge\" and tense. She admits to recent increases in stress, noting her half-brother recently past away at the age of 45 from a heart attack, and thinks perhaps her symptoms are related to anxiety. Out of concern though, she presents here to the ED for evaluation.     Here, the patient states she normally is just \"an anxious mess\" and this leads to feeling chest pressure and palpitations. She states if she is able to distract herself then her symptoms go away. She denies any shortness of breath but does not she has hyperventilated in the past with her symptoms. She denies any leg swelling, nausea, or vomiting, but does not some stomach discomfort. She thinks a recent trigger for her anxiety was a rash on her breast, which was evaluated for, and this was fine. She also notes a few lightheaded, dizzy spells that lasts about a minute each but she is unsure if this is related to the ongoing tension headaches she has been having after working herself up continuously. She states she has a mental health appointment on July 1.     Allergies:  Dogs  Dust mites  Gluten  NKDA     Medications:    Albuterol  Zofran  Liraglutide  Humalog  Glucagon  Enalapril    Past Medical History:    Psoriasis  Pedal edema  Hypothyroidism  HTN  Type 1 diabestes  Anxiety  Mild intermittent asthma   Diabetes nephropathy  CKD  Celiac disease  Chronic sinusitis    Past Surgical History:    ENT surgery  Tympanostomy  Tonsillectomy, adenoidectomy, combined    Family History:    Cardiovascular Disease: Father, maternal grandfather, " "half-brother  Celiac disease: Mother  Asthma  Hyperlipidemia  Autism  Psychiatric illness  Fibromyalgia  Cervical cancer  HTN    Social History:  Negative for tobacco use.  Positive for alcohol use.  Negative for drug use.  Marital Status:        Review of Systems   Respiratory: Negative for shortness of breath.    Cardiovascular: Positive for chest pain and palpitations. Negative for leg swelling.   Gastrointestinal: Positive for abdominal pain. Negative for nausea and vomiting.   Neurological: Positive for dizziness, light-headedness and headaches.   Psychiatric/Behavioral: The patient is nervous/anxious.    All other systems reviewed and are negative.    Physical Exam     Patient Vitals for the past 24 hrs:   BP Temp Temp src Pulse Resp SpO2 Height Weight   05/13/19 1442 166/88 -- -- 92 18 98 % -- --   05/13/19 1440 -- 98.6  F (37  C) Oral -- -- -- 1.727 m (5' 8\") 133.4 kg (294 lb)     Physical Exam  Nursing note and vitals reviewed.  Constitutional: Cooperative.   HENT:   Mouth/Throat: Moist mucous membranes.   Eyes: EOMI, nonicteric sclera  Cardiovascular: Normal rate, regular rhythm, no murmurs, rubs, or gallops  Pulmonary/Chest: Effort normal and breath sounds normal. No respiratory distress. No wheezes. No rales.   Abdominal: Soft. Nontender, nondistended, no guarding or rigidity. BS present.   Musculoskeletal: Normal range of motion.   Neurological: Alert. Moves all extremities spontaneously.   Skin: Skin is warm and dry. No rash noted.   Psychiatric: anxious mood and affect.       Emergency Department Course   ECG:  Indication: chest pressure, palpitations  Time: 1439  Vent. Rate 100 bpm. IN interval 134. QRS duration 84. QT/QTc 348/448. P-R-T axis 32 20 12. Normal sinus rhythm. Normal ECG. Agrees with computer interpretation. No significant change compared to prior EKG on 2/11/2019. Read time: 1451.    Laboratory:  CBC: WBC: 14.1 (H), HGB: 14.5, PLT: 396  BMP: Glucose 175 (H), o/w WNL (Creatinine: " 0.59)  1450 Troponin: <0.015    Interventions:  1522 Ativan 1 mg IV    Emergency Department Course:  1448 Nursing notes and vitals reviewed.  I performed an exam of the patient as documented above.     IV inserted. Medicine administered as documented above. Blood drawn. This was sent to the lab for further testing, results above.    1630 I spoke with DEC regarding the patient's history and presentation here in the ED and they agreed to evaluate the patient.    1706 I spoke with DEC again after their evaluation. He was able to make a psychology appointment for the patient.     1721 I rechecked the patient and discussed the results of her workup thus far.     Findings and plan explained to the Patient. Patient discharged home with instructions regarding supportive care, medications, and reasons to return. The importance of close follow-up was reviewed.    I personally reviewed the laboratory results with the Patient and answered all related questions prior to discharge.   Impression & Plan    Medical Decision Making:  Pt presents with CC chest pressure. Pt reports feeling quite anxious. She had similar presentation in February. Pt reports triggers are her health as she's currently concerned about a rash on her breasts with ultrasound and mammogram scheduled. No signs of emergent intrathoracic process as the cause of her symptoms. Her exam is normal. Trop/EKG are negative. She is PERC negative. She was seen by DEC who scheduled her with an appointment as soon as this week. She is in stable condition at the time of discharge, indications for return to the ED were discussed as well as follow up. All questions were answered and she is in agreement with the plan.      Diagnosis:    ICD-10-CM    1. Chest pressure R07.89    2. Anxiety F41.9      Disposition:  discharged to home    Scribe Disclosure:  I, Stacey Mccoy, am serving as a scribe on 5/13/2019 at 2:42 PM to personally document services performed by Jacobo Delgado  MD Jurgen based on my observations and the provider's statements to me.     Stacey Mccoy  5/13/2019   [facility] EMERGENCY DEPARTMENT       Jacobo Delgado MD  05/15/19 0438

## 2019-05-14 ENCOUNTER — OFFICE VISIT (OUTPATIENT)
Dept: ENDOCRINOLOGY | Facility: CLINIC | Age: 24
End: 2019-05-14
Payer: COMMERCIAL

## 2019-05-14 VITALS
SYSTOLIC BLOOD PRESSURE: 121 MMHG | HEIGHT: 68 IN | DIASTOLIC BLOOD PRESSURE: 83 MMHG | WEIGHT: 293 LBS | HEART RATE: 103 BPM | BODY MASS INDEX: 44.41 KG/M2

## 2019-05-14 DIAGNOSIS — E10.29 TYPE 1 DIABETES MELLITUS WITH OTHER KIDNEY COMPLICATION (H): Primary | ICD-10-CM

## 2019-05-14 LAB
HBA1C MFR BLD: 6.8 % (ref 4.3–6)
INTERPRETATION ECG - MUSE: NORMAL

## 2019-05-14 ASSESSMENT — MIFFLIN-ST. JEOR: SCORE: 2125.26

## 2019-05-14 ASSESSMENT — PAIN SCALES - GENERAL: PAINLEVEL: NO PAIN (0)

## 2019-05-14 NOTE — PROGRESS NOTES
This 23-year-old woman was seen in follow-up for her long-standing type 1 diabetes that is complicated by early nephropathy.  She also has hypothyroidism.  Stacey is currently using a tandem T slim pump.  Her settings are:    Mid- 2.6   3am- 3.5   7am- 3.3   11am- 2.5   12pm- 2.3   4:30pm- 2.0   5:40pm- 2.2   10pm-3.2       IC ratio-   Mid-1:4g   7am- 1:4.5g   11am- 1:5g   12pm- 1:4.5g   4:30pm- 1/5g   5:40pm- 1/4g   10pm- 1/5g       Sensitivity- 16   Target glucose- 100 during the day, 110 overnight       Average total daily insulin dose-  85 Units (76%basal)       She also uses a Dex com continuous glucose monitor. . Over the last 2 weeks she has worn it 60% of the time.  Her average sensor glucose was 140.  She was in target 77% of the time, > 180 18.6% of the time, and below target 4% of the time.  About 55% of her daily dose of insulin is given as basal.  She is bolusing about 5 times each day.  She also checks her blood sugars about 3 times each day and has an average of 217.  The range of glucose values is 126-429. She is also taking Victoza 1.8 mg a day.  She is tolerating it very well.  She has no GI symptoms. Reviewing her individual days shows she comes to target after boluses.  She does tend to have lows in between 3 AM and 6 AM several nights of the week.  The alarm goes off and she is able to manage them.  During the day she recognizes those blood sugar she has.     Stacey reports she is feeling much more anxious lately than she was in the past.  Some of this is because her brother  suddenly from heart attack about a month ago.  She actually went to the ER yesterday for evaluation of chest pain.  They did not find anything remarkable.  She is seeing a therapist now and reports she does have ways to help manage her anxiety when it occurs.    She has regular follow-up with nephrology.  She is seen the eye doctor and has no retinopathy.  She has no foot concerns.  She is taking her thyroid hormone  daily.      Current Outpatient Medications on File Prior to Visit:  albuterol (2.5 MG/3ML) 0.083% neb solution Take 1 vial (2.5 mg) by nebulization every 4 hours as needed for shortness of breath / dyspnea or wheezing /chest tightness/cough   albuterol (ALBUTEROL) 108 (90 BASE) MCG/ACT inhaler Inhale 2 puffs into the lungs every 6 hours as needed   Ascorbic Acid (VITAMIN C PO) Take 500 mg by mouth daily   blood glucose test strip Use to test blood sugar eight times daily or as directed.  Dispense WIDIP Contour Next Test Strips.   Blood Pressure Monitoring KIT 1 kit daily   Cholecalciferol 2000 UNITS TBDP Take 2,000 Units by mouth daily   enalapril (VASOTEC) 20 MG tablet TAKE 1 TABLET BY MOUTH TWICE DAILY   escitalopram (LEXAPRO) 10 MG tablet Take 1 tablet (10 mg) by mouth daily (Patient not taking: Reported on 3/4/2019)   glucagon (GLUCAGON EMERGENCY) 1 MG kit Inject 1 mg into the muscle once for 1 dose   HUMALOG 100 UNIT/ML injection USE AS DIRECTED UP  UNITS DAILY IN INSULIN PUMP   hydrOXYzine (ATARAX) 25 MG tablet Take 1-2 tablets (25-50 mg) by mouth 3 times daily as needed for itching   [] hydrOXYzine (ATARAX) 25 MG tablet Take 1 tablet (25 mg) by mouth 3 times daily as needed for anxiety   insulin syringes, disposable, U-100 0.3 ML MISC Use 2-3 times daily as needed   ipratropium - albuterol 0.5 mg/2.5 mg/3 mL (DUONEB) 0.5-2.5 (3) MG/3ML neb solution Take 1 vial (3 mLs) by nebulization every 6 hours as needed for shortness of breath / dyspnea or wheezing (Patient not taking: Reported on 2019)   liraglutide (VICTOZA PEN) 18 MG/3ML soln 0.6 mg x 3 days, then 1.2 mg x 3 days, then 1.8 mg   Multiple Vitamins-Minerals (MULTIVITAMIN ADULT PO) Take 1 tablet by mouth daily   [] ondansetron (ZOFRAN ODT) 4 MG ODT tab Take 1 tablet (4 mg) by mouth every 8 hours as needed for nausea   Ondansetron HCl (ZOFRAN PO) Take by mouth as needed for nausea or vomiting Reported on 3/17/2017     No current  "facility-administered medications on file prior to visit.     ROS: 10 point ROS neg other than the symptoms noted above in the HPI.    So Hx -   Vital signs:   /83   Pulse 103   Ht 1.727 m (5' 8\")   Wt 133.2 kg (293 lb 9.6 oz)   LMP 04/15/2019   BMI 44.64 kg/m    Estimated body mass index is 44.64 kg/m  as calculated from the following:    Height as of this encounter: 1.727 m (5' 8\").    Weight as of this encounter: 133.2 kg (293 lb 9.6 oz).    NAD  Eyes - no periorbital edema, conjunctival injection, scleral icterus  CV - No edemas  Skin - normal texture   Mood - a bit anxious but not overly so today    Recent Labs   Lab Test 05/14/19 05/13/19  1450 03/04/19  0900 03/04/19  0854 02/18/19 01/17/18  1517 01/17/18  1515  10/10/16  1643  08/04/15  1443  03/26/15  0645  12/09/13  1137   A1C  --   --   --   --   --   --   --  7.0*  --   --   --   --   --  7.6*  --   --    HEMOGLOBINA1 6.8*  --   --   --  6.8*   < >  --   --    < >  --    < >  --   --   --    < >  --    TSH  --   --   --  3.41  --   --   --   --   --  2.89   < >  --   --   --   --  2.95   T4  --   --   --   --   --   --   --   --   --   --   --   --   --   --   --  1.18   LDL  --   --   --  106*  --   --   --   --   --   --   --  102  --   --   --  142*   HDL  --   --   --  58  --   --   --   --   --   --   --  66  --   --   --  66   TRIG  --   --   --  126  --   --   --   --   --   --   --  131  --   --   --  165*   CR  --  0.59  --  0.51*  --    < >  --  0.70   < >  --    < >  --    < > 0.54   < > 0.48*   MICROL  --   --  <5  --   --   --  <5  --    < >  --    < >  --    < >  --    < >  --     < > = values in this interval not displayed.       Assessment and plan:    1.  Diabetes control.  Her glucose control is excellent although she is getting low overnight.  I will make a small reduction in her basal rate at that point.  New basal rates:      Mid- 2.6   3am- 3.3 (was 3.5)   7am- 3.3   11am- 2.5   12pm- 2.3   4:30pm- 2.0   5:40pm- " 2.2   10pm-3.2     She is on a lot of different basal rates and its possible that she would do better if he simplify them to just 2 or 3 rates.  However, I did not make that change today because she is doing well.  We will continue the Victoza as well.      2.  Diabetes complications.  She has early nephropathy based on a kidney biopsy.  Her microalbumin and creatinine are normal on an ACE inhibitor.  She has no retinopathy.  She has no foot concerns.    3.CVD risk.  She is particularly concerned about her own risk of heart disease given her brother's recent death from heart attack.  I explained that managing her blood pressure and considering treatment for hyperlipidemia are the best ways to reduce her risk.  She also should not begin smoking.  Her blood pressure is well controlled today.  Her LDL is 106 but she is considering having a baby in the next many months.  Given her age, I think we will hold off on a statin for now but be ready to start it in the future when she is done with childbearing.    4.hypothyroidism.  Her TSH is at target earlier this year.    5.  Anxiety.  Encouraged her to continue f/u with her therapist.    F/u with Laureen Goldstein in 3 mo and me in 6 months      I spent 25 minutes with this patient face to face and explained the conditions and plans (more than 50% of time was counseling/coordination of diabetes care with a discussion of how to screen and manage CVD) . The patient understood and is satisfied with today's visit.     Bia Allan MD

## 2019-05-14 NOTE — LETTER
2019     RE: Stacey La  [id number]  [place] [facility] [address]     Dear Colleague,    Thank you for referring your patient, Stacey La, to the [facility] ENDOCRINOLOGY at [facility]. Please see a copy of my visit note below.    This 23-year-old woman was seen in follow-up for her long-standing type 1 diabetes that is complicated by early nephropathy.  She also has hypothyroidism.  Stacey is currently using a tandem T slim pump.  Her settings are:    Mid- 2.6   3am- 3.5   7am- 3.3   11am- 2.5   12pm- 2.3   4:30pm- 2.0   5:40pm- 2.2   10pm-3.2       IC ratio-   Mid-1:4g   7am- 1:4.5g   11am- 1:5g   12pm- 1:4.5g   4:30pm- 1/5g   5:40pm- 1/4g   10pm- 1/5g       Sensitivity- 16   Target glucose- 100 during the day, 110 overnight       Average total daily insulin dose-  85 Units (76%basal)       She also uses a Dex com continuous glucose monitor. . Over the last 2 weeks she has worn it 60% of the time.  Her average sensor glucose was 140.  She was in target 77% of the time, > 180 18.6% of the time, and below target 4% of the time.  About 55% of her daily dose of insulin is given as basal.  She is bolusing about 5 times each day.  She also checks her blood sugars about 3 times each day and has an average of 217.  The range of glucose values is 126-429. She is also taking Victoza 1.8 mg a day.  She is tolerating it very well.  She has no GI symptoms. Reviewing her individual days shows she comes to target after boluses.  She does tend to have lows in between 3 AM and 6 AM several nights of the week.  The alarm goes off and she is able to manage them.  During the day she recognizes those blood sugar she has.     Stacey reports she is feeling much more anxious lately than she was in the past.  Some of this is because her brother  suddenly from heart attack about a month ago.  She actually went to the ER yesterday for evaluation of chest pain.  They did not find  anything remarkable.  She is seeing a therapist now and reports she does have ways to help manage her anxiety when it occurs.    She has regular follow-up with nephrology.  She is seen the eye doctor and has no retinopathy.  She has no foot concerns.  She is taking her thyroid hormone daily.      Current Outpatient Medications on File Prior to Visit:  albuterol (2.5 MG/3ML) 0.083% neb solution Take 1 vial (2.5 mg) by nebulization every 4 hours as needed for shortness of breath / dyspnea or wheezing /chest tightness/cough   albuterol (ALBUTEROL) 108 (90 BASE) MCG/ACT inhaler Inhale 2 puffs into the lungs every 6 hours as needed   Ascorbic Acid (VITAMIN C PO) Take 500 mg by mouth daily   blood glucose test strip Use to test blood sugar eight times daily or as directed.  Dispense Brandy Contour Next Test Strips.   Blood Pressure Monitoring KIT 1 kit daily   Cholecalciferol 2000 UNITS TBDP Take 2,000 Units by mouth daily   enalapril (VASOTEC) 20 MG tablet TAKE 1 TABLET BY MOUTH TWICE DAILY   escitalopram (LEXAPRO) 10 MG tablet Take 1 tablet (10 mg) by mouth daily (Patient not taking: Reported on 3/4/2019)   glucagon (GLUCAGON EMERGENCY) 1 MG kit Inject 1 mg into the muscle once for 1 dose   HUMALOG 100 UNIT/ML injection USE AS DIRECTED UP  UNITS DAILY IN INSULIN PUMP   hydrOXYzine (ATARAX) 25 MG tablet Take 1-2 tablets (25-50 mg) by mouth 3 times daily as needed for itching   [] hydrOXYzine (ATARAX) 25 MG tablet Take 1 tablet (25 mg) by mouth 3 times daily as needed for anxiety   insulin syringes, disposable, U-100 0.3 ML MISC Use 2-3 times daily as needed   ipratropium - albuterol 0.5 mg/2.5 mg/3 mL (DUONEB) 0.5-2.5 (3) MG/3ML neb solution Take 1 vial (3 mLs) by nebulization every 6 hours as needed for shortness of breath / dyspnea or wheezing (Patient not taking: Reported on 2019)   liraglutide (VICTOZA PEN) 18 MG/3ML soln 0.6 mg x 3 days, then 1.2 mg x 3 days, then 1.8 mg   Multiple Vitamins-Minerals  "(MULTIVITAMIN ADULT PO) Take 1 tablet by mouth daily   [] ondansetron (ZOFRAN ODT) 4 MG ODT tab Take 1 tablet (4 mg) by mouth every 8 hours as needed for nausea   Ondansetron HCl (ZOFRAN PO) Take by mouth as needed for nausea or vomiting Reported on 3/17/2017     No current facility-administered medications on file prior to visit.     ROS: 10 point ROS neg other than the symptoms noted above in the HPI.    So Hx -   Vital signs:   /83   Pulse 103   Ht 1.727 m (5' 8\")   Wt 133.2 kg (293 lb 9.6 oz)   LMP 04/15/2019   BMI 44.64 kg/m     Estimated body mass index is 44.64 kg/m  as calculated from the following:    Height as of this encounter: 1.727 m (5' 8\").    Weight as of this encounter: 133.2 kg (293 lb 9.6 oz).    NAD  Eyes - no periorbital edema, conjunctival injection, scleral icterus  CV - No edemas  Skin - normal texture   Mood - a bit anxious but not overly so today    Recent Labs   Lab Test 19  1450 19  0900 19  0854 19  1517 18  1515  10/10/16  1643  08/04/15  1443  03/26/15  0645  13  1137   A1C  --   --   --   --   --   --   --  7.0*  --   --   --   --   --  7.6*  --   --    HEMOGLOBINA1 6.8*  --   --   --  6.8*   < >  --   --    < >  --    < >  --   --   --    < >  --    TSH  --   --   --  3.41  --   --   --   --   --  2.89   < >  --   --   --   --  2.95   T4  --   --   --   --   --   --   --   --   --   --   --   --   --   --   --  1.18   LDL  --   --   --  106*  --   --   --   --   --   --   --  102  --   --   --  142*   HDL  --   --   --  58  --   --   --   --   --   --   --  66  --   --   --  66   TRIG  --   --   --  126  --   --   --   --   --   --   --  131  --   --   --  165*   CR  --  0.59  --  0.51*  --    < >  --  0.70   < >  --    < >  --    < > 0.54   < > 0.48*   MICROL  --   --  <5  --   --   --  <5  --    < >  --    < >  --    < >  --    < >  --     < > = values in this interval not displayed.       Assessment " and plan:    1.  Diabetes control.  Her glucose control is excellent although she is getting low overnight.  I will make a small reduction in her basal rate at that point.  New basal rates:      Mid- 2.6   3am- 3.3 (was 3.5)   7am- 3.3   11am- 2.5   12pm- 2.3   4:30pm- 2.0   5:40pm- 2.2   10pm-3.2     She is on a lot of different basal rates and its possible that she would do better if he simplify them to just 2 or 3 rates.  However, I did not make that change today because she is doing well.  We will continue the Victoza as well.      2.  Diabetes complications.  She has early nephropathy based on a kidney biopsy.  Her microalbumin and creatinine are normal on an ACE inhibitor.  She has no retinopathy.  She has no foot concerns.    3.CVD risk.  She is particularly concerned about her own risk of heart disease given her brother's recent death from heart attack.  I explained that managing her blood pressure and considering treatment for hyperlipidemia are the best ways to reduce her risk.  She also should not begin smoking.  Her blood pressure is well controlled today.  Her LDL is 106 but she is considering having a baby in the next many months.  Given her age, I think we will hold off on a statin for now but be ready to start it in the future when she is done with childbearing.    4.hypothyroidism.  Her TSH is at target earlier this year.    5.  Anxiety.  Encouraged her to continue f/u with her therapist.    F/u with Laureen Goldstein in 3 mo and me in 6 months      I spent 25 minutes with this patient face to face and explained the conditions and plans (more than 50% of time was counseling/coordination of diabetes care with a discussion of how to screen and manage CVD) . The patient understood and is satisfied with today's visit.     Bia Allan MD

## 2019-05-15 ENCOUNTER — HOSPITAL ENCOUNTER (OUTPATIENT)
Dept: ULTRASOUND IMAGING | Facility: CLINIC | Age: 24
Discharge: HOME | End: 2019-05-15
Attending: ADVANCED PRACTICE MIDWIFE | Admitting: ADVANCED PRACTICE MIDWIFE
Payer: COMMERCIAL

## 2019-05-15 DIAGNOSIS — L98.8 SKIN LESION OF BREAST: ICD-10-CM

## 2019-05-15 PROCEDURE — 76642 ULTRASOUND BREAST LIMITED: CPT | Mod: 50

## 2019-05-17 ENCOUNTER — HOSPITAL ENCOUNTER (EMERGENCY)
Facility: CLINIC | Age: 24
Discharge: HOME | End: 2019-05-17
Attending: EMERGENCY MEDICINE | Admitting: EMERGENCY MEDICINE
Payer: COMMERCIAL

## 2019-05-17 VITALS
WEIGHT: 292 LBS | DIASTOLIC BLOOD PRESSURE: 86 MMHG | RESPIRATION RATE: 16 BRPM | TEMPERATURE: 98.5 F | HEART RATE: 98 BPM | SYSTOLIC BLOOD PRESSURE: 148 MMHG | OXYGEN SATURATION: 98 % | BODY MASS INDEX: 44.4 KG/M2

## 2019-05-17 DIAGNOSIS — F41.9 ANXIETY: ICD-10-CM

## 2019-05-17 DIAGNOSIS — R07.89 CHEST PRESSURE: ICD-10-CM

## 2019-05-17 LAB
ALBUMIN SERPL-MCNC: 4 G/DL (ref 3.4–5)
ALBUMIN UR-MCNC: NEGATIVE MG/DL
ALP SERPL-CCNC: 88 U/L (ref 40–150)
ALT SERPL W P-5'-P-CCNC: 33 U/L (ref 0–50)
ANION GAP SERPL CALCULATED.3IONS-SCNC: 9 MMOL/L (ref 3–14)
APPEARANCE UR: CLEAR
AST SERPL W P-5'-P-CCNC: 14 U/L (ref 0–45)
BASOPHILS # BLD AUTO: 0 10E9/L (ref 0–0.2)
BASOPHILS NFR BLD AUTO: 0.4 %
BILIRUB SERPL-MCNC: 0.2 MG/DL (ref 0.2–1.3)
BILIRUB UR QL STRIP: NEGATIVE
BUN SERPL-MCNC: 9 MG/DL (ref 7–30)
CALCIUM SERPL-MCNC: 9 MG/DL (ref 8.5–10.1)
CHLORIDE SERPL-SCNC: 107 MMOL/L (ref 94–109)
CO2 SERPL-SCNC: 25 MMOL/L (ref 20–32)
COLOR UR AUTO: ABNORMAL
CREAT SERPL-MCNC: 0.62 MG/DL (ref 0.52–1.04)
DIFFERENTIAL METHOD BLD: NORMAL
EOSINOPHIL # BLD AUTO: 0.1 10E9/L (ref 0–0.7)
EOSINOPHIL NFR BLD AUTO: 0.7 %
ERYTHROCYTE [DISTWIDTH] IN BLOOD BY AUTOMATED COUNT: 13.3 % (ref 10–15)
GFR SERPL CREATININE-BSD FRML MDRD: >90 ML/MIN/{1.73_M2}
GLUCOSE SERPL-MCNC: 95 MG/DL (ref 70–99)
GLUCOSE UR STRIP-MCNC: NEGATIVE MG/DL
HCG UR QL: NEGATIVE
HCT VFR BLD AUTO: 42.8 % (ref 35–47)
HGB BLD-MCNC: 14.1 G/DL (ref 11.7–15.7)
HGB UR QL STRIP: NEGATIVE
IMM GRANULOCYTES # BLD: 0.1 10E9/L (ref 0–0.4)
IMM GRANULOCYTES NFR BLD: 0.5 %
INTERPRETATION ECG - MUSE: NORMAL
KETONES UR STRIP-MCNC: NEGATIVE MG/DL
LEUKOCYTE ESTERASE UR QL STRIP: NEGATIVE
LYMPHOCYTES # BLD AUTO: 2 10E9/L (ref 0.8–5.3)
LYMPHOCYTES NFR BLD AUTO: 19.8 %
MCH RBC QN AUTO: 27.9 PG (ref 26.5–33)
MCHC RBC AUTO-ENTMCNC: 32.9 G/DL (ref 31.5–36.5)
MCV RBC AUTO: 85 FL (ref 78–100)
MONOCYTES # BLD AUTO: 1 10E9/L (ref 0–1.3)
MONOCYTES NFR BLD AUTO: 10.1 %
NEUTROPHILS # BLD AUTO: 6.8 10E9/L (ref 1.6–8.3)
NEUTROPHILS NFR BLD AUTO: 68.5 %
NITRATE UR QL: NEGATIVE
NRBC # BLD AUTO: 0 10*3/UL
NRBC BLD AUTO-RTO: 0 /100
PH UR STRIP: 7 PH (ref 5–7)
PLATELET # BLD AUTO: 375 10E9/L (ref 150–450)
POTASSIUM SERPL-SCNC: 3.6 MMOL/L (ref 3.4–5.3)
PROT SERPL-MCNC: 8.3 G/DL (ref 6.8–8.8)
RBC # BLD AUTO: 5.06 10E12/L (ref 3.8–5.2)
SODIUM SERPL-SCNC: 141 MMOL/L (ref 133–144)
SOURCE: ABNORMAL
SP GR UR STRIP: 1 (ref 1–1.03)
TROPONIN I SERPL-MCNC: <0.015 UG/L (ref 0–0.04)
UROBILINOGEN UR STRIP-MCNC: NORMAL MG/DL (ref 0–2)
WBC # BLD AUTO: 9.9 10E9/L (ref 4–11)

## 2019-05-17 PROCEDURE — 80053 COMPREHEN METABOLIC PANEL: CPT | Performed by: EMERGENCY MEDICINE

## 2019-05-17 PROCEDURE — 85025 COMPLETE CBC W/AUTO DIFF WBC: CPT | Performed by: EMERGENCY MEDICINE

## 2019-05-17 PROCEDURE — 99285 EMERGENCY DEPT VISIT HI MDM: CPT | Mod: 25 | Performed by: EMERGENCY MEDICINE

## 2019-05-17 PROCEDURE — 93010 ELECTROCARDIOGRAM REPORT: CPT | Mod: Z6 | Performed by: EMERGENCY MEDICINE

## 2019-05-17 PROCEDURE — 25000128 H RX IP 250 OP 636: Performed by: EMERGENCY MEDICINE

## 2019-05-17 PROCEDURE — 81003 URINALYSIS AUTO W/O SCOPE: CPT | Performed by: EMERGENCY MEDICINE

## 2019-05-17 PROCEDURE — 84484 ASSAY OF TROPONIN QUANT: CPT | Performed by: EMERGENCY MEDICINE

## 2019-05-17 PROCEDURE — 99284 EMERGENCY DEPT VISIT MOD MDM: CPT | Mod: 25 | Performed by: EMERGENCY MEDICINE

## 2019-05-17 PROCEDURE — 96374 THER/PROPH/DIAG INJ IV PUSH: CPT | Performed by: EMERGENCY MEDICINE

## 2019-05-17 PROCEDURE — 93005 ELECTROCARDIOGRAM TRACING: CPT | Performed by: EMERGENCY MEDICINE

## 2019-05-17 PROCEDURE — 81025 URINE PREGNANCY TEST: CPT | Performed by: EMERGENCY MEDICINE

## 2019-05-17 RX ORDER — LORAZEPAM 0.5 MG/1
0.5 TABLET ORAL EVERY 6 HOURS PRN
Qty: 8 TABLET | Refills: 0 | Status: SHIPPED | OUTPATIENT
Start: 2019-05-17 | End: 2019-08-21

## 2019-05-17 RX ORDER — LORAZEPAM 2 MG/ML
1 INJECTION INTRAMUSCULAR ONCE
Status: COMPLETED | OUTPATIENT
Start: 2019-05-17 | End: 2019-05-17

## 2019-05-17 RX ADMIN — LORAZEPAM 0.5 MG: 2 INJECTION INTRAMUSCULAR; INTRAVENOUS at 15:38

## 2019-05-17 ASSESSMENT — ENCOUNTER SYMPTOMS
PALPITATIONS: 1
CHEST TIGHTNESS: 1
FEVER: 0
COUGH: 0

## 2019-05-17 NOTE — ED PROVIDER NOTES
[facility] [place] EMERGENCY DEPARTMENT ([facility])    5/17/19     [facility] 5  3:05 PM   History     Chief Complaint   Patient presents with     Chest Pain     Pt reports chest pressure and fast heart rate. She states that she has had anxiety attacks previously but this is the worst she has ever felt     The history is provided by the patient and medical records.     Stacey La is a 23 year old female who presents with chest pressure, rapid heart rate, and mild chest pain for past hour and a half. She has a history of type I diabetes mellitus on insulin pump, anxiety, celiac disease.  Patient was brought to the ER today by her mother after she developed chest pressure, rapid heart rate and mild chest pain.  Patient states that she has been stressful day at work with a a huge , and this may have triggered a panic attack in her.  She states that has had similar episodes of anxiety but this is the worst that she has had.  She endorses some left shoulder pain may be related. No history of cardiac stenting.  No fevers or cough.  She notes that today her blood sugar has been in the 80s, has not eaten all day today.  She could use some juice at this time.      I have reviewed the Medications, Allergies, Past Medical and Surgical History, and Social History in the [employer] system.  Past Medical History:   Diagnosis Date     Asthma     Currently no treatment needed     Celiac disease      Chronic kidney disease, stage I 8/3/2015     Chronic sinusitis      Diabetes mellitus type 1 (H)      Diabetic nephropathy (H)      Family history of heart disease 8/3/2015     Hypertension      Hypothyroid      Pedal edema      Psoriasis        Past Surgical History:   Procedure Laterality Date     ENT SURGERY       TONSILLECTOMY, ADENOIDECTOMY, COMBINED         Family History   Problem Relation Age of Onset     Cardiovascular Father 48        MI, stents, diabetic, type 2     Gastrointestinal Disease Mother         Celiacs      Heart Disease Paternal Half-Brother 45     Cardiovascular Maternal Grandfather         Englarged heart, pacemaker, defib       Social History     Tobacco Use     Smoking status: Never Smoker     Smokeless tobacco: Never Used   Substance Use Topics     Alcohol use: Yes     Alcohol/week: 0.0 oz     Comment: 2 times per month, 2 drinks per time      Review of Systems   Constitutional: Negative for fever.   Respiratory: Positive for chest tightness. Negative for cough.    Cardiovascular: Positive for chest pain and palpitations.       Physical Exam   BP: 153/89  Pulse: 102  Temp: 98.5  F (36.9  C)  Resp: 24  Weight: 132.5 kg (292 lb)  SpO2: 99 %      Physical Exam   Constitutional: No distress.   HENT:   Head: Atraumatic.   Mouth/Throat: Oropharynx is clear and moist. No oropharyngeal exudate.   Eyes: EOM are normal. No scleral icterus.   Neck: Neck supple.   Cardiovascular: Normal rate, regular rhythm and normal heart sounds.   Pulmonary/Chest: Breath sounds normal. No respiratory distress.   Abdominal: Soft. She exhibits no distension. There is no tenderness.   Musculoskeletal: She exhibits no edema or deformity.   Neurological: She is alert.   Skin: Skin is warm and dry. She is not diaphoretic.   Psychiatric: She has a normal mood and affect. Her behavior is normal.       ED Course        Procedures             EKG Interpretation:      Interpreted by Stanley Urbina  Time reviewed: 1510  Symptoms at time of EKG: chest pressure  Rhythm: normal sinus   Rate: normal  Axis: normal  Ectopy: none  Conduction: normal  ST Segments/ T Waves: No ST-T wave changes  Q Waves: none  Comparison to prior: Unchanged    Clinical Impression: normal EKG          No results found for this or any previous visit (from the past 24 hour(s)).  Medications   LORazepam (ATIVAN) injection 1 mg (0.5 mg Intravenous Given 5/17/19 2261)         Assessments & Plan (with Medical Decision Making)   23-year-old female presents to us with a chief  complaint of chest pressure.  The symptoms started today after a long day in which she was had a stressful work events.  She has had issues with similar chest pressure secondary to anxiety in the past.  She feels this is most likely what is going on today.  Differential includes but not limited to palpitations, MI, acute coronary syndrome, ammonia, costochondritis, anxiety.  EKG and troponin are normal.  Labs are otherwise unremarkable.  Patient is not anemic.  Patient was given a dose of Ativan which resolved her symptoms.  Vital signs were otherwise unremarkable.  Based on her stressful day earlier today and her history of previous anxiety this does seem consistent with a panic attack.  Patient is reassured and feels back to her normal self.  I will give her a small prescription for Ativan as needed for anxiety at home.  She is in agreement with the plan to follow-up with her primary care provider in order to consider daily medication for anxiety.    I have reviewed the nursing notes.    I have reviewed the findings, diagnosis, plan and need for follow up with the patient.       Medication List      Started    LORazepam 0.5 MG tablet  Commonly known as:  ATIVAN  0.5 mg, Oral, EVERY 6 HOURS PRN            Final diagnoses:   Chest pressure   Anxiety     Carina CARRENO, am serving as a trained medical scribe to document services personally performed by Live Urbina DO based on the provider's statements to me on May 17, 2019.  This document has been checked and approved by the attending provider.    Live CARRENO DO, was physically present and have reviewed and verified the accuracy of this note documented by Carina Wiley medical scribe.      5/17/2019   [facility], [place], EMERGENCY DEPARTMENT     Stanley Urbina DO  05/20/19 7818

## 2019-05-17 NOTE — DISCHARGE INSTRUCTIONS
Follow-up with your primary care provider and your upcoming appointment.  Follow-up with your therapist the releasing.  Do not miss your psychiatry appointment that is scheduled in the next month.  Return the emergency department for any new or worsening symptoms as needed.

## 2019-05-17 NOTE — ED TRIAGE NOTES
Pt states that she has had anxiety attacks in the past which mimiced cardiac symptoms but this is the worst she has ever felt. Pt also states that she is a type 1 diabetic and on a continous monitor. She sugar is currently 88

## 2019-05-17 NOTE — ED AVS SNAPSHOT
[facility], Emergency Department  [address]  [place] [address]  Phone:  [phone]  Fax:  [phone]                                    Stacey La   MRN: [id number]    Department:  [facility], Emergency Department   Date of Visit:  5/17/2019           After Visit Summary Signature Page    I have received my discharge instructions, and my questions have been answered. I have discussed any challenges I see with this plan with the nurse or doctor.    ..........................................................................................................................................  Patient/Patient Representative Signature      ..........................................................................................................................................  Patient Representative Print Name and Relationship to Patient    ..................................................               ................................................  Date                                   Time    ..........................................................................................................................................  Reviewed by Signature/Title    ...................................................              ..............................................  Date                                               Time          22EPIC Rev 08/18

## 2019-05-28 ENCOUNTER — OFFICE VISIT (OUTPATIENT)
Dept: PEDIATRICS | Facility: CLINIC | Age: 24
End: 2019-05-28
Payer: COMMERCIAL

## 2019-05-28 VITALS
DIASTOLIC BLOOD PRESSURE: 72 MMHG | SYSTOLIC BLOOD PRESSURE: 138 MMHG | HEART RATE: 105 BPM | TEMPERATURE: 96.9 F | BODY MASS INDEX: 44.41 KG/M2 | WEIGHT: 293 LBS | HEIGHT: 68 IN | OXYGEN SATURATION: 98 %

## 2019-05-28 DIAGNOSIS — E10.21 TYPE 1 DIABETES MELLITUS WITH DIABETIC NEPHROPATHY (H): Primary | ICD-10-CM

## 2019-05-28 DIAGNOSIS — N18.1 CHRONIC KIDNEY DISEASE, STAGE I: ICD-10-CM

## 2019-05-28 DIAGNOSIS — J45.909 MILD ASTHMA WITHOUT COMPLICATION, UNSPECIFIED WHETHER PERSISTENT: ICD-10-CM

## 2019-05-28 DIAGNOSIS — Z11.4 ENCOUNTER FOR SCREENING FOR HIV: ICD-10-CM

## 2019-05-28 DIAGNOSIS — R79.89 ABNORMAL CBC: ICD-10-CM

## 2019-05-28 DIAGNOSIS — J45.20 MILD INTERMITTENT ASTHMA WITHOUT COMPLICATION: ICD-10-CM

## 2019-05-28 DIAGNOSIS — F41.9 ANXIETY: ICD-10-CM

## 2019-05-28 DIAGNOSIS — E66.01 MORBID OBESITY (H): ICD-10-CM

## 2019-05-28 DIAGNOSIS — Z00.00 HEALTHCARE MAINTENANCE: ICD-10-CM

## 2019-05-28 DIAGNOSIS — F41.8 DEPRESSION WITH ANXIETY: ICD-10-CM

## 2019-05-28 LAB
BASOPHILS # BLD AUTO: 0 10E9/L (ref 0–0.2)
BASOPHILS NFR BLD AUTO: 0.2 %
DIFFERENTIAL METHOD BLD: ABNORMAL
EOSINOPHIL # BLD AUTO: 0.2 10E9/L (ref 0–0.7)
EOSINOPHIL NFR BLD AUTO: 1.3 %
ERYTHROCYTE [DISTWIDTH] IN BLOOD BY AUTOMATED COUNT: 13.4 % (ref 10–15)
HCT VFR BLD AUTO: 42 % (ref 35–47)
HGB BLD-MCNC: 13.6 G/DL (ref 11.7–15.7)
LYMPHOCYTES # BLD AUTO: 3 10E9/L (ref 0.8–5.3)
LYMPHOCYTES NFR BLD AUTO: 25.1 %
MCH RBC QN AUTO: 27.7 PG (ref 26.5–33)
MCHC RBC AUTO-ENTMCNC: 32.4 G/DL (ref 31.5–36.5)
MCV RBC AUTO: 86 FL (ref 78–100)
MONOCYTES # BLD AUTO: 0.8 10E9/L (ref 0–1.3)
MONOCYTES NFR BLD AUTO: 6.6 %
NEUTROPHILS # BLD AUTO: 8 10E9/L (ref 1.6–8.3)
NEUTROPHILS NFR BLD AUTO: 66.8 %
PLATELET # BLD AUTO: 402 10E9/L (ref 150–450)
RBC # BLD AUTO: 4.91 10E12/L (ref 3.8–5.2)
WBC # BLD AUTO: 12.1 10E9/L (ref 4–11)

## 2019-05-28 PROCEDURE — 36415 COLL VENOUS BLD VENIPUNCTURE: CPT | Performed by: NURSE PRACTITIONER

## 2019-05-28 PROCEDURE — 85025 COMPLETE CBC W/AUTO DIFF WBC: CPT | Performed by: NURSE PRACTITIONER

## 2019-05-28 PROCEDURE — G0145 SCR C/V CYTO,THINLAYER,RESCR: HCPCS | Performed by: NURSE PRACTITIONER

## 2019-05-28 PROCEDURE — 87389 HIV-1 AG W/HIV-1&-2 AB AG IA: CPT | Performed by: NURSE PRACTITIONER

## 2019-05-28 PROCEDURE — 99395 PREV VISIT EST AGE 18-39: CPT | Performed by: NURSE PRACTITIONER

## 2019-05-28 RX ORDER — ESCITALOPRAM OXALATE 10 MG/1
10 TABLET ORAL DAILY
Qty: 90 TABLET | Refills: 3 | Status: SHIPPED | OUTPATIENT
Start: 2019-05-28 | End: 2020-05-19

## 2019-05-28 RX ORDER — ALBUTEROL SULFATE 0.83 MG/ML
2.5 SOLUTION RESPIRATORY (INHALATION) EVERY 4 HOURS PRN
Qty: 1 BOX | Refills: 3 | Status: SHIPPED | OUTPATIENT
Start: 2019-05-28 | End: 2021-05-07

## 2019-05-28 ASSESSMENT — ANXIETY QUESTIONNAIRES
3. WORRYING TOO MUCH ABOUT DIFFERENT THINGS: MORE THAN HALF THE DAYS
2. NOT BEING ABLE TO STOP OR CONTROL WORRYING: MORE THAN HALF THE DAYS
5. BEING SO RESTLESS THAT IT IS HARD TO SIT STILL: MORE THAN HALF THE DAYS
GAD7 TOTAL SCORE: 13
6. BECOMING EASILY ANNOYED OR IRRITABLE: MORE THAN HALF THE DAYS
1. FEELING NERVOUS, ANXIOUS, OR ON EDGE: MORE THAN HALF THE DAYS
IF YOU CHECKED OFF ANY PROBLEMS ON THIS QUESTIONNAIRE, HOW DIFFICULT HAVE THESE PROBLEMS MADE IT FOR YOU TO DO YOUR WORK, TAKE CARE OF THINGS AT HOME, OR GET ALONG WITH OTHER PEOPLE: VERY DIFFICULT
7. FEELING AFRAID AS IF SOMETHING AWFUL MIGHT HAPPEN: SEVERAL DAYS

## 2019-05-28 ASSESSMENT — PATIENT HEALTH QUESTIONNAIRE - PHQ9: 5. POOR APPETITE OR OVEREATING: MORE THAN HALF THE DAYS

## 2019-05-28 ASSESSMENT — MIFFLIN-ST. JEOR: SCORE: 2121.4

## 2019-05-28 NOTE — PROGRESS NOTES
gf   SUBJECTIVE:   CC: Stacey La is an 23 year old woman who presents for preventive health visit.     Healthy Habits:    Getting at least 3 servings of Calcium per day:  Yes    Bi-annual eye exam:  Yes    Dental care twice a year:  NO    Sleep apnea or symptoms of sleep apnea:  None    Diet:  Gluten-free/reduced    Frequency of exercise:  2-3 days/week    Duration of exercise:  15-30 minutes    Taking medications regularly:  Yes    Barriers to taking medications:  None    Medication side effects:  None    PHQ-2 Total Score:    Additional concerns today:  No        -------------------------------------    Today's PHQ-2 Score:   PHQ-2 ( 1999 Pf[employer]) 5/14/2019   Q1: Little interest or pleasure in doing things 0   Q2: Feeling down, depressed or hopeless 0   PHQ-2 Score 0       Abuse: Current or Past(Physical, Sexual or Emotional)- No  Do you feel safe in your environment? Yes    Social History     Tobacco Use     Smoking status: Never Smoker     Smokeless tobacco: Never Used   Substance Use Topics     Alcohol use: Yes     Alcohol/week: 0.0 oz     Comment: 2 times per month, 2 drinks per time     If you drink alcohol do you typically have >3 drinks per day or >7 drinks per week? No    No flowsheet data found.    Reviewed orders with patient.  Reviewed health maintenance and updated orders accordingly - Yes  Lab work is in process    Mammogram not appropriate for this patient based on age.    Pertinent mammograms are reviewed under the imaging tab.  History of abnormal Pap smear: NO - age 21-29 PAP every 3 years recommended  PAP / HPV 5/19/2016   PAP NIL     Reviewed and updated as needed this visit by clinical staff  Tobacco  Allergies  Meds  Med Hx  Surg Hx  Fam Hx  Soc Hx        Reviewed and updated as needed this visit by Provider  Meds            Review of Systems  CONSTITUTIONAL: NEGATIVE for fever, chills, change in weight  INTEGUMENTARU/SKIN: NEGATIVE for worrisome rashes, moles or lesions  EYES:  "NEGATIVE for vision changes or irritation  ENT: NEGATIVE for ear, mouth and throat problems  RESP: NEGATIVE for significant cough or SOB  BREAST: NEGATIVE for masses, tenderness or discharge  CV: NEGATIVE for chest pain, palpitations or peripheral edema  GI: NEGATIVE for nausea, abdominal pain, heartburn, or change in bowel habits  : NEGATIVE for unusual urinary or vaginal symptoms. Periods are regular.  MUSCULOSKELETAL: NEGATIVE for significant arthralgias or myalgia  NEURO: NEGATIVE for weakness, dizziness or paresthesias  PSYCHIATRIC: NEGATIVE for changes in mood or affect     OBJECTIVE:   /72 (BP Location: Right arm, Patient Position: Chair, Cuff Size: Adult Large)   Pulse 105   Temp 96.9  F (36.1  C) (Tympanic)   Ht 1.715 m (5' 7.5\")   Wt 133.6 kg (294 lb 8 oz)   LMP 05/14/2019   SpO2 98%   BMI 45.44 kg/m    Physical Exam  GENERAL: healthy, alert and no distress  EYES: Eyes grossly normal to inspection, PERRL and conjunctivae and sclerae normal  HENT: ear canals and TM's normal, nose and mouth without ulcers or lesions  NECK: no adenopathy, no asymmetry, masses, or scars and thyroid normal to palpation  RESP: lungs clear to auscultation - no rales, rhonchi or wheezes  BREAST: normal without masses, tenderness or nipple discharge and no palpable axillary masses or adenopathy  CV: regular rate and rhythm, normal S1 S2, no S3 or S4, no murmur, click or rub, no peripheral edema and peripheral pulses strong  ABDOMEN: soft, nontender, no hepatosplenomegaly, no masses and bowel sounds normal  MS: no gross musculoskeletal defects noted, no edema  SKIN: no suspicious lesions or rashes  NEURO: Normal strength and tone, mentation intact and speech normal  PSYCH: mentation appears normal, affect normal/bright    Diagnostic Test Results:  Labs reviewed in Epic    ASSESSMENT/PLAN:   (E10.21) Type 1 diabetes mellitus with diabetic nephropathy (H)  (primary encounter diagnosis)  Comment: Sees mitch. Well " "controlled.     (E66.01) Morbid obesity (H)  Plan: BARIATRIC ADULT REFERRAL            (N18.1) Chronic kidney disease, stage I  Comment: Follows with nephrology      (J45.20) Mild intermittent asthma without complication  Comment: Well controlled. Uses nebs rarely, when has a viral illness.    (F41.9) Anxiety  Comment: Worsened lately. Wishes to re-start Lexapro  Plan:   -PHQ given after visit to establish baseline  -Reviewed r/b/se  -F/U if sx not imrproving.       (Z00.00) Healthcare maintenance  Plan: HIV Screening, Pap imaged thin layer screen         reflex to HPV if ASCUS - recommend age 25 - 29            (R79.89) Abnormal CBC  Comment: States it has been low, wants it rechecked.  Plan: CBC with platelets differential            (Z11.4) Encounter for screening for HIV  Plan: HIV Screening            (F41.8) Depression with anxiety  Comment: As above. No SI  Plan: escitalopram (LEXAPRO) 10 MG tablet        .        COUNSELING:  Reviewed preventive health counseling, as reflected in patient instructions    Estimated body mass index is 45.44 kg/m  as calculated from the following:    Height as of this encounter: 1.715 m (5' 7.5\").    Weight as of this encounter: 133.6 kg (294 lb 8 oz).    Weight management plan: Discussed healthy diet and exercise guidelines     reports that she has never smoked. She has never used smokeless tobacco.      Counseling Resources:  ATP IV Guidelines  Pooled Cohorts Equation Calculator  Breast Cancer Risk Calculator  FRAX Risk Assessment  ICSI Preventive Guidelines  Dietary Guidelines for Americans, 2010  USDA's MyPlate  ASA Prophylaxis  Lung CA Screening    LUIS FERNANDO [facility]  "

## 2019-05-28 NOTE — PATIENT INSTRUCTIONS
Weight clinic              Preventive Health Recommendations  Female Ages 21 to 25     Yearly exam:     See your health care provider every year in order to  o Review health changes.   o Discuss preventive care.    o Review your medicines if your doctor has prescribed any.      You should be tested each year for STDs (sexually transmitted diseases).       Talk to your provider about how often you should have cholesterol testing.      Get a Pap test every three years. If you have an abnormal result, your doctor may have you test more often.      If you are at risk for diabetes, you should have a diabetes test (fasting glucose).     Shots:     Get a flu shot each year.     Get a tetanus shot every 10 years.     Consider getting the shot (vaccine) that prevents cervical cancer (Gardasil).    Nutrition:     Eat at least 5 servings of fruits and vegetables each day.    Eat whole-grain bread, whole-wheat pasta and brown rice instead of white grains and rice.    Get adequate Calcium and Vitamin D.     Lifestyle    Exercise at least 150 minutes a week each week (30 minutes a day, 5 days a week). This will help you control your weight and prevent disease.    Limit alcohol to one drink per day.    No smoking.     Wear sunscreen to prevent skin cancer.    See your dentist every six months for an exam and cleaning.

## 2019-05-29 ASSESSMENT — ANXIETY QUESTIONNAIRES
1. FEELING NERVOUS, ANXIOUS, OR ON EDGE: MORE THAN HALF THE DAYS
6. BECOMING EASILY ANNOYED OR IRRITABLE: MORE THAN HALF THE DAYS
GAD7 TOTAL SCORE: 13
7. FEELING AFRAID AS IF SOMETHING AWFUL MIGHT HAPPEN: SEVERAL DAYS
IF YOU CHECKED OFF ANY PROBLEMS ON THIS QUESTIONNAIRE, HOW DIFFICULT HAVE THESE PROBLEMS MADE IT FOR YOU TO DO YOUR WORK, TAKE CARE OF THINGS AT HOME, OR GET ALONG WITH OTHER PEOPLE: VERY DIFFICULT
5. BEING SO RESTLESS THAT IT IS HARD TO SIT STILL: MORE THAN HALF THE DAYS
GAD7 TOTAL SCORE: 13
2. NOT BEING ABLE TO STOP OR CONTROL WORRYING: MORE THAN HALF THE DAYS
3. WORRYING TOO MUCH ABOUT DIFFERENT THINGS: MORE THAN HALF THE DAYS

## 2019-05-29 ASSESSMENT — PATIENT HEALTH QUESTIONNAIRE - PHQ9
5. POOR APPETITE OR OVEREATING: MORE THAN HALF THE DAYS
SUM OF ALL RESPONSES TO PHQ QUESTIONS 1-9: 6

## 2019-05-30 LAB
COPATH REPORT: NORMAL
PAP: NORMAL

## 2019-05-31 LAB — HIV 1+2 AB+HIV1 P24 AG SERPL QL IA: NONREACTIVE

## 2019-06-07 DIAGNOSIS — E10.29 TYPE 1 DIABETES MELLITUS WITH OTHER KIDNEY COMPLICATION (H): ICD-10-CM

## 2019-06-07 DIAGNOSIS — R79.89 ABNORMAL CBC: ICD-10-CM

## 2019-06-07 DIAGNOSIS — D72.829 LEUKOCYTOSIS, UNSPECIFIED TYPE: ICD-10-CM

## 2019-06-07 LAB
BASOPHILS # BLD AUTO: 0.1 10E9/L (ref 0–0.2)
BASOPHILS NFR BLD AUTO: 1 %
CHOLEST SERPL-MCNC: 195 MG/DL
COPATH REPORT: NORMAL
CORTIS SERPL-MCNC: 12.6 UG/DL (ref 4–22)
CREAT UR-MCNC: 35 MG/DL
CRP SERPL-MCNC: 2.9 MG/L (ref 0–8)
DIFFERENTIAL METHOD BLD: NORMAL
EOSINOPHIL # BLD AUTO: 0.2 10E9/L (ref 0–0.7)
EOSINOPHIL NFR BLD AUTO: 2 %
ERYTHROCYTE [DISTWIDTH] IN BLOOD BY AUTOMATED COUNT: 13.6 % (ref 10–15)
HCT VFR BLD AUTO: 42.6 % (ref 35–47)
HDLC SERPL-MCNC: 55 MG/DL
HGB BLD-MCNC: 13.9 G/DL (ref 11.7–15.7)
LDLC SERPL CALC-MCNC: 114 MG/DL
LYMPHOCYTES # BLD AUTO: 2.7 10E9/L (ref 0.8–5.3)
LYMPHOCYTES NFR BLD AUTO: 28 %
MCH RBC QN AUTO: 28 PG (ref 26.5–33)
MCHC RBC AUTO-ENTMCNC: 32.6 G/DL (ref 31.5–36.5)
MCV RBC AUTO: 86 FL (ref 78–100)
MICROALBUMIN UR-MCNC: <5 MG/L
MICROALBUMIN/CREAT UR: NORMAL MG/G CR (ref 0–25)
MONOCYTES # BLD AUTO: 1.1 10E9/L (ref 0–1.3)
MONOCYTES NFR BLD AUTO: 11 %
NEUTROPHILS # BLD AUTO: 5.7 10E9/L (ref 1.6–8.3)
NEUTROPHILS NFR BLD AUTO: 58 %
NONHDLC SERPL-MCNC: 140 MG/DL
PLATELET # BLD AUTO: 359 10E9/L (ref 150–450)
PLATELET # BLD EST: NORMAL 10*3/UL
RBC # BLD AUTO: 4.97 10E12/L (ref 3.8–5.2)
RBC MORPH BLD: NORMAL
RETICS # AUTO: 100.2 10E9/L (ref 25–95)
RETICS/RBC NFR AUTO: 2 % (ref 0.5–2)
TRIGL SERPL-MCNC: 128 MG/DL
TSH SERPL DL<=0.005 MIU/L-ACNC: 3.91 MU/L (ref 0.4–4)
WBC # BLD AUTO: 9.8 10E9/L (ref 4–11)

## 2019-06-07 PROCEDURE — 82043 UR ALBUMIN QUANTITATIVE: CPT | Performed by: PHYSICIAN ASSISTANT

## 2019-06-07 PROCEDURE — 85025 COMPLETE CBC W/AUTO DIFF WBC: CPT | Performed by: NURSE PRACTITIONER

## 2019-06-07 PROCEDURE — 86140 C-REACTIVE PROTEIN: CPT | Performed by: NURSE PRACTITIONER

## 2019-06-07 PROCEDURE — 85045 AUTOMATED RETICULOCYTE COUNT: CPT | Performed by: NURSE PRACTITIONER

## 2019-06-07 PROCEDURE — 84443 ASSAY THYROID STIM HORMONE: CPT | Performed by: NURSE PRACTITIONER

## 2019-06-07 PROCEDURE — 80061 LIPID PANEL: CPT | Performed by: NURSE PRACTITIONER

## 2019-06-07 PROCEDURE — 36415 COLL VENOUS BLD VENIPUNCTURE: CPT | Performed by: NURSE PRACTITIONER

## 2019-06-07 PROCEDURE — 82533 TOTAL CORTISOL: CPT | Performed by: NURSE PRACTITIONER

## 2019-06-07 PROCEDURE — 85060 BLOOD SMEAR INTERPRETATION: CPT | Performed by: NURSE PRACTITIONER

## 2019-06-13 ENCOUNTER — TELEPHONE (OUTPATIENT)
Dept: PEDIATRICS | Facility: CLINIC | Age: 24
End: 2019-06-13

## 2019-06-13 PROBLEM — D72.829 ELEVATED WBC COUNT: Status: ACTIVE | Noted: 2019-06-13

## 2019-06-13 NOTE — TELEPHONE ENCOUNTER
Reason for Call:  Other call back    Detailed comments: The pt sent in a my chart scheduling message that she needs to be seen for a recent ED visit before 6/20/19. There are no openings. Please traige and advise when and where to overbook if possible.     Phone Number Patient can be reached at: Cell number on file:    Telephone Information:   Mobile [phone]       Best Time: Anytime    Can we leave a detailed message on this number? YES    Call taken on 6/13/2019 at 12:05 PM by Caryn Bingham

## 2019-06-13 NOTE — TELEPHONE ENCOUNTER
Called patient and offered her openings at [place]. Also offered appointments on 6/28 with different provider in [place]. Patient wanted to wait and see Nancy Mejia. She is scheduled for 7/8/19.

## 2019-06-13 NOTE — TELEPHONE ENCOUNTER
Unfortunately I don't have any overbooks available.  Please give her my sincere apologies.    I recommend she do an ED f/u at [place] or another closer clinic with availability then set up f/u with me in July or August.

## 2019-06-13 NOTE — TELEPHONE ENCOUNTER
Routing to TC at the station.  Please see below.  Mariama Conde, RN  Message handled by Nurse Triage.

## 2019-06-13 NOTE — TELEPHONE ENCOUNTER
Please advise on double booking this pt. Thanks.     [facility] ED notes from 6/9:  DDX: ETOH, illicit drugs, psychosis, metabolic abnormality, sepsis, meningitis, renal failure, seizure, post ictal, SAH, SDH, TIA, CVA, etc.    A/P: AMS, likely secondary to etoh, cannot rule out more serious pathology initially but planned observation and reassessment for need for further evaluation. Initial breathalyzer noted. If AMS does not clear appropriately, as anticipated, further work up of possible head CT, drug screen or lab testing may be indicated.     ED Course and Disposition: Patient cleared appropriately, was tolerating PO, ambulating and appears clinically sober. Patient discharged home with instructions to follow up with PCP to discuss alcohol cessation. Blood glucose noted to be normal prior to discharge, pregnancy negative. Discharged in care of her .    Megan RN  Triage Nurse

## 2019-07-08 ENCOUNTER — OFFICE VISIT (OUTPATIENT)
Dept: PEDIATRICS | Facility: CLINIC | Age: 24
End: 2019-07-08
Payer: COMMERCIAL

## 2019-07-08 VITALS
SYSTOLIC BLOOD PRESSURE: 118 MMHG | TEMPERATURE: 98.3 F | BODY MASS INDEX: 44.41 KG/M2 | HEART RATE: 84 BPM | WEIGHT: 293 LBS | OXYGEN SATURATION: 98 % | DIASTOLIC BLOOD PRESSURE: 78 MMHG | HEIGHT: 68 IN

## 2019-07-08 DIAGNOSIS — R41.840 INATTENTION: Primary | ICD-10-CM

## 2019-07-08 DIAGNOSIS — Z87.898 HISTORY OF ALCOHOL CONSUMPTION: ICD-10-CM

## 2019-07-08 PROCEDURE — 99214 OFFICE O/P EST MOD 30 MIN: CPT | Performed by: NURSE PRACTITIONER

## 2019-07-08 ASSESSMENT — MIFFLIN-ST. JEOR: SCORE: 2128.65

## 2019-07-08 NOTE — PROGRESS NOTES
"Subjective     Stacey La is a 24 year old female who presents to clinic today for the following health issues:    HPI   ED/UC Followup:    Facility:  [facility] [place]  Date of visit: 7/3/19  Reason for visit: knee pain - left  Current Status: Patient states feeling better -  but not s much pin, movement is better, swelling has lessened, just icing no ibuprofen or tylenol.:    States knee is almost completely better.      AND  Patient wants to discuss ADD - has difficulty focusing, multitasks but never completes tasks, fidgety  Started as a young child.  Was on Concerta for sleepiness and felt calmer.    ROS: const/psych otherwise negative     OBJECTIVE:  /78 (BP Location: Right arm, Cuff Size: Adult Large)   Pulse 84   Temp 98.3  F (36.8  C) (Tympanic)   Ht 1.715 m (5' 7.5\")   Wt 134.8 kg (297 lb 3.2 oz)   SpO2 98%   BMI 45.86 kg/m    CONSTITUTIONAL: Alert, well-nourished, well-groomed, NAD  RESP: Lungs CTA. No wheeze, rhonchi, rales.  CV: HRRR S1 S2 No MRG. No peripheral edema  PSYCH: Bright affect. Appropriate mentation and speech.       ASSESSMENT/PLAN:  (R41.504) Inattention  (primary encounter diagnosis)  Comment: ADD vs Anxiety vs both. States anxiety is mild. See above.   Plan: MENTAL HEALTH REFERRAL  - Adult; Assessments         and Testing; ADHD; Other: Behavioral Healthcare        Providers [phone]; We will contact you         to schedule the appointment or please call with        any questions  -Agrees to see therapist for formal diagnosis.     (G04.645) History of alcohol consumption  Comment: See recent ED notes. States friends from work were buying her shots which is very unusual for her. States she didn't eat much and her blood sugar dropped, which caused altered mental status. States she drinks about 1-2 times per month, usually 1-3 ciders at a time. States this type of drinking was very unusual for her and she is embarrassed. She felt back to normal the " next day and has no signs of other more serious etiology.   Plan:   -Discussed safe drinking, especially in the setting of diabetes  -Discussed supportive cares and reasons to return. Discussed reasons to seek care urgently.         Nancy Mejia, HARMAN-DNP.

## 2019-07-08 NOTE — LETTER
My Depression Action Plan  Name: Stacey La   Date of Birth 1995  Date: 7/8/2019    My doctor: Nancy Mejia   My clinic: [address]  [address]  [facility] [address] [phone]          GREEN    ZONE   Good Control    What it looks like:     Things are going generally well. You have normal up s and down s. You may even feel depressed from time to time, but bad moods usually last less than a day.   What you need to do:  1. Continue to care for yourself (see self care plan)  2. Check your depression survival kit and update it as needed  3. Follow your physician s recommendations including any medication.  4. Do not stop taking medication unless you consult with your physician first.           YELLOW         ZONE Getting Worse    What it looks like:     Depression is starting to interfere with your life.     It may be hard to get out of bed; you may be starting to isolate yourself from others.    Symptoms of depression are starting to last most all day and this has happened for several days.     You may have suicidal thoughts but they are not constant.   What you need to do:     1. Call your care team, your response to treatment will improve if you keep your care team informed of your progress. Yellow periods are signs an adjustment may need to be made.     2. Continue your self-care, even if you have to fake it!    3. Talk to someone in your support network    4. Open up your depression survival kit           RED    ZONE Medical Alert - Get Help    What it looks like:     Depression is seriously interfering with your life.     You may experience these or other symptoms: You can t get out of bed most days, can t work or engage in other necessary activities, you have trouble taking care of basic hygiene, or basic responsibilities, thoughts of suicide or death that will not go away, self-injurious behavior.     What you need to do:  1. Call  your care team and request a same-day appointment. If they are not available (weekends or after hours) call your local crisis line, emergency room or 911.            Depression Self Care Plan / Survival Kit    Self-Care for Depression  Here s the deal. Your body and mind are really not as separate as most people think.  What you do and think affects how you feel and how you feel influences what you do and think. This means if you do things that people who feel good do, it will help you feel better.  Sometimes this is all it takes.  There is also a place for medication and therapy depending on how severe your depression is, so be sure to consult with your medical provider and/ or Behavioral Health Consultant if your symptoms are worsening or not improving.     In order to better manage my stress, I will:    Exercise  Get some form of exercise, every day. This will help reduce pain and release endorphins, the  feel good  chemicals in your brain. This is almost as good as taking antidepressants!  This is not the same as joining a gym and then never going! (they count on that by the way ) It can be as simple as just going for a walk or doing some gardening, anything that will get you moving.      Hygiene   Maintain good hygiene (Get out of bed in the morning, Make your bed, Brush your teeth, Take a shower, and Get dressed like you were going to work, even if you are unemployed).  If your clothes don't fit try to get ones that do.    Diet  I will strive to eat foods that are good for me, drink plenty of water, and avoid excessive sugar, caffeine, alcohol, and other mood-altering substances.  Some foods that are helpful in depression are: complex carbohydrates, B vitamins, flaxseed, fish or fish oil, fresh fruits and vegetables.    Psychotherapy  I agree to participate in Individual Therapy (if recommended).    Medication  If prescribed medications, I agree to take them.  Missing doses can result in serious side effects.   I understand that drinking alcohol, or other illicit drug use, may cause potential side effects.  I will not stop my medication abruptly without first discussing it with my provider.    Staying Connected With Others  I will stay in touch with my friends, family members, and my primary care provider/team.    Use your imagination  Be creative.  We all have a creative side; it doesn t matter if it s oil painting, sand castles, or mud pies! This will also kick up the endorphins.    Witness Beauty  (AKA stop and smell the roses) Take a look outside, even in mid-winter. Notice colors, textures. Watch the squirrels and birds.     Service to others  Be of service to others.  There is always someone else in need.  By helping others we can  get out of ourselves  and remember the really important things.  This also provides opportunities for practicing all the other parts of the program.    Humor  Laugh and be silly!  Adjust your TV habits for less news and crime-drama and more comedy.    Control your stress  Try breathing deep, massage therapy, biofeedback, and meditation. Find time to relax each day.     My support system    Clinic Contact:  Phone number:    Contact 1:  Phone number:    Contact 2:  Phone number:    [place]/:  Phone number:    Therapist:  Phone number:    [facility] crisis center:    Phone number:    Other community support:  Phone number:

## 2019-07-08 NOTE — LETTER
My Asthma Action Plan  Name: Stacey La   YOB: 1995  Date: 7/8/2019   My doctor: LUIS FERNANDO Del Rio CNP   My clinic: [facility]        My Control Medicine: None  My Rescue Medicine: Albuterol nebulizer solution .  Albuterol/ipratroprium  (Duoneb) nebulizer solution .   My Asthma Severity: intermittent  Avoid your asthma triggers: upper respiratory infections               GREEN ZONE   Good Control    I feel good    No cough or wheeze    Can work, sleep and play without asthma symptoms       Take your asthma control medicine every day.     1. If exercise triggers your asthma, take your rescue medication    15 minutes before exercise or sports, and    During exercise if you have asthma symptoms  2. Spacer to use with inhaler: If you have a spacer, make sure to use it with your inhaler             YELLOW ZONE Getting Worse  I have ANY of these:    I do not feel good    Cough or wheeze    Chest feels tight    Wake up at night   1. Keep taking your Green Zone medications  2. Start taking your rescue medicine:    every 20 minutes for up to 1 hour. Then every 4 hours for 24-48 hours.  3. If you stay in the Yellow Zone for more than 12-24 hours, contact your doctor.  4. If you do not return to the Green Zone in 12-24 hours or you get worse, start taking your oral steroid medicine if prescribed by your provider.           RED ZONE Medical Alert - Get Help  I have ANY of these:    I feel awful    Medicine is not helping    Breathing getting harder    Trouble walking or talking    Nose opens wide to breathe       1. Take your rescue medicine NOW  2. If your provider has prescribed an oral steroid medicine, start taking it NOW  3. Call your doctor NOW  4. If you are still in the Red Zone after 20 minutes and you have not reached your doctor:    Take your rescue medicine again and    Call 911 or go to the emergency room right away    See your regular doctor within 2 weeks of an  Emergency Room or Urgent Care visit for follow-up treatment.          Annual Reminders:  Meet with Asthma Educator,  Flu Shot in the Fall, consider Pneumonia Vaccination for patients with asthma (aged 19 and older).    Pharmacy: [facility] [address] [facility] [address] [facility]                      Asthma Triggers  How To Control Things That Make Your Asthma Worse    Triggers are things that make your asthma worse.  Look at the list below to help you find your triggers and what you can do about them.  You can help prevent asthma flare-ups by staying away from your triggers.      Trigger                                                          What you can do   Cigarette Smoke  Tobacco smoke can make asthma worse. Do not allow smoking in your home, car or around you.  Be sure no one smokes at a child s day care or school.  If you smoke, ask your health care provider for ways to help you quit.  Ask family members to quit too.  Ask your health care provider for a referral to Quit Plan to help you quit smoking, or call 1-924-206-PLAN.     Colds, Flu, Bronchitis  These are common triggers of asthma. Wash your hands often.  Don t touch your eyes, nose or mouth.  Get a flu shot every year.     Dust Mites  These are tiny bugs that live in cloth or carpet. They are too small to see. Wash sheets and blankets in hot water every week.   Encase pillows and mattress in dust mite proof covers.  Avoid having carpet if you can. If you have carpet, vacuum weekly.   Use a dust mask and HEPA vacuum.   Pollen and Outdoor Mold  Some people are allergic to trees, grass, or weed pollen, or molds. Try to keep your windows closed.  Limit time out doors when pollen count is high.   Ask you health care provider about taking medicine during allergy season.     Animal Dander  Some people are allergic to skin flakes, urine or saliva from pets with fur or feathers. Keep pets with fur or feathers out of your home.    If you can t keep  the pet outdoors, then keep the pet out of your bedroom.  Keep the bedroom door closed.  Keep pets off cloth furniture and away from stuffed toys.     Mice, Rats, and Cockroaches  Some people are allergic to the waste from these pests.   Cover food and garbage.  Clean up spills and food crumbs.  Store grease in the refrigerator.   Keep food out of the bedroom.   Indoor Mold  This can be a trigger if your home has high moisture. Fix leaking faucets, pipes, or other sources of water.   Clean moldy surfaces.  Dehumidify basement if it is damp and smelly.   Smoke, Strong Odors, and Sprays  These can reduce air quality. Stay away from strong odors and sprays, such as perfume, powder, hair spray, paints, smoke incense, paint, cleaning products, candles and new carpet.   Exercise or Sports  Some people with asthma have this trigger. Be active!  Ask your doctor about taking medicine before sports or exercise to prevent symptoms.    Warm up for 5-10 minutes before and after sports or exercise.     Other Triggers of Asthma  Cold air:  Cover your nose and mouth with a scarf.  Sometimes laughing or crying can be a trigger.  Some medicines and food can trigger asthma.

## 2019-07-09 ASSESSMENT — ASTHMA QUESTIONNAIRES: ACT_TOTALSCORE: 24

## 2019-08-19 ENCOUNTER — OFFICE VISIT (OUTPATIENT)
Dept: ENDOCRINOLOGY | Facility: CLINIC | Age: 24
End: 2019-08-19
Payer: COMMERCIAL

## 2019-08-19 DIAGNOSIS — E10.9 WELL CONTROLLED TYPE 1 DIABETES MELLITUS (H): ICD-10-CM

## 2019-08-19 DIAGNOSIS — E10.21 TYPE 1 DIABETES MELLITUS WITH DIABETIC NEPHROPATHY (H): Primary | ICD-10-CM

## 2019-08-19 LAB — HBA1C MFR BLD: 7.3 % (ref 4.3–6)

## 2019-08-19 ASSESSMENT — MIFFLIN-ST. JEOR: SCORE: 2136.82

## 2019-08-19 ASSESSMENT — PAIN SCALES - GENERAL: PAINLEVEL: NO PAIN (0)

## 2019-08-19 NOTE — LETTER
8/19/2019       RE: Stacey La  [address]  [facility] [address]     Dear Colleague,    Thank you for referring your patient, Stacey La, to the [facility] ENDO[facility] at [facility]. Please see a copy of my visit note below.    HPI:  Stacey is a 25 yo woman here for follow up of type 1 diabetes, diagnosed at age 7.   She also sees Dr. Allan.  Stacey's diabetes is complicated by nephropathy. She also has hyperlipidemia, a history of hypothyroidism (although she has been off of levothyroxine for several years) and celiac disease.  She follows a strict gluten free diet. She continues wearing a Dexcom G6 sensor and she really likes it.  She feels the accuracy is much better.     She uses the T-slim pump with the integrated sensor.  She likes this. She has been having very little hypoglyemia.     Stacey tests her blood sugar 4 times daily with an overall average this month of 203 mg/dL on her meter, 156 mg/dL on her sensor.  She tends to drift down in the early morning and into the noon hour.  She does not eat breakfast.       Stacey remains on Victoza 1.8 mg daily.  She initially felt like it decreased her appetite, but no longer feels that way.    Stacey wears a t-slim pump with basal rates as follows:   Mid- 2.6  3am- 3.0  7am- 3.3  11am- 2.5  12pm- 2.3  4:30pm- 2.0  5:40pm- 2.2  10pm-3.2    IC ratio-   Mid-1:4g   7am- 1:4.5g  11am- 1:5g  12pm- 1:4.5g  4:30pm- 1/5g  5:40pm- 1/4g  10pm- 1/5g    Sensitivity- 16  Target glucose- 100 during the day, 110 overnight    Average total daily insulin dose-111 Units (56%basal)    For her hypothyroidism- untreated with normal TSH.      For her CKD (stage 1), she is taking enalapril. She follows with nephrology.  She understands she will need to stop this if she were to become pregnant.    A1c today is stable at 7.3%.       ROS  GENERAL: no weight loss, weight gain, fevers, chills, malaise, night sweats.   HEENT: no  dysphagia, diplopia, neck pain or tenderness, dry/scratchy eyes, URI, cough, sinus drainage, tinnitus, sinus pressure  CV: no chest pain, pressure, palpitations, skipped beats, LOC  LUNGS: no SOB, CHILDRESS, cough, sputum production, wheezing   GI: no diarrhea, constipation, abdominal pain  EXTREMITIES: no rashes, ulcers, edema  NEUROLOGY: no changes in vision, tingling or numbness in hands or feet.   MSK: no muscle aches or pains, weakness  PSYCH: mood stable    Past Medical History:   Diagnosis Date     Asthma     Currently no treatment needed     Celiac disease      Chronic kidney disease, stage I 8/3/2015     Chronic sinusitis      Diabetes mellitus type 1 (H)      Diabetic nephropathy (H)      Family history of heart disease 8/3/2015     Hypertension      Hypothyroid      Pedal edema      Psoriasis      PMH:   Type 1 diabetes- since 2003  Celiac disease- since age 13  Hypothyroidism- age 10 (no longer on levothyroxine for several years)  Hyperlipidemia- had been on a statin for a little more than a year, mostly eating a plant based diet now.    Past Surgical History:   Procedure Laterality Date     ENT SURGERY       TONSILLECTOMY, ADENOIDECTOMY, COMBINED         Family History   Problem Relation Age of Onset     Cardiovascular Father 48        MI, stents, diabetic, type 2     Gastrointestinal Disease Mother         Celiacs     Heart Disease Paternal Half-Brother 45     Cardiovascular Maternal Grandfather         Englarged heart, pacemaker, defib     Family Hx:   Dad- premature CVD, in his 40s.  Type 2 diabetes  Mom- celiac disease  Grandfather- type 2 diabetes  Half brother- Asperger's  Half sister- cervical CA  Another half brother- bipolar and schizophrenia      History     Social History     Marital Status:      Spouse Name: N/A     Number of Children: N/A     Years of Education: N/A     Social History Main Topics     Smoking status: Never Smoker      Smokeless tobacco: Never Used     Alcohol Use: Yes       "Comment: occ. use     Drug Use: No     Sexual Activity:     Partners: Male     Birth Control/ Protection: Condom     Other Topics Concern     Parent/Sibling W/ Cabg, Mi Or Angioplasty Before 65f 55m? Yes     Caffeine Concern Yes     1 cup tea per day     Sleep Concern No     Special Diet Yes     gluten free diet, low carbs     Exercise Yes     walking, ellyptical, swimming, weights     Seat Belt Yes     Social History Narrative     Social Hx:   . 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 She also goes to the gym a few days a week.  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     Current Outpatient Medications   Medication     albuterol (PROVENTIL) (2.5 MG/3ML) 0.083% neb solution     Ascorbic Acid (VITAMIN C PO)     ASPIRIN NOT PRESCRIBED (INTENTIONAL)     blood glucose test strip     Blood Pressure Monitoring KIT     Cholecalciferol 2000 UNITS TBDP     enalapril (VASOTEC) 20 MG tablet     escitalopram (LEXAPRO) 10 MG tablet     HUMALOG 100 UNIT/ML injection     hydrOXYzine (ATARAX) 25 MG tablet     insulin syringes, disposable, U-100 0.3 ML MISC     liraglutide (VICTOZA PEN) 18 MG/3ML soln     LORazepam (ATIVAN) 0.5 MG tablet     Multiple Vitamins-Minerals (MULTIVITAMIN ADULT PO)     Ondansetron HCl (ZOFRAN PO)     STATIN NOT PRESCRIBED (INTENTIONAL)     glucagon (GLUCAGON EMERGENCY) 1 MG kit     ipratropium - albuterol 0.5 mg/2.5 mg/3 mL (DUONEB) 0.5-2.5 (3) MG/3ML neb solution     No current facility-administered medications for this visit.           Allergies   Allergen Reactions     Dogs Other (See Comments)     Sinus symptoms      Dust Mites      Sinus      Gluten Meal      Physical Exam  BP (P) 127/83   Pulse (P) 88   Ht (P) 1.715 m (5' 7.5\")   Wt (P) 135.6 kg (299 lb)   BMI (P) 46.14 kg/m     GENERAL:  Alert and oriented X3, NAD, well dressed, answering questions appropriately, appears stated age.  EXTREMITIES: no edema, +pulses, no rashes, " no lesions    RESULTS  Lab Results   Component Value Date    A1C 7.0 (H) 01/17/2018    A1C 7.6 (H) 03/26/2015    HEMOGLOBINA1 7.3 (A) 08/19/2019    HEMOGLOBINA1 6.8 (A) 05/14/2019    HEMOGLOBINA1 6.8 (A) 02/18/2019    HEMOGLOBINA1 7.7 (A) 11/13/2018    HEMOGLOBINA1 7.0 (A) 04/10/2018       TSH   Date Value Ref Range Status   06/07/2019 3.91 0.40 - 4.00 mU/L Final   03/04/2019 3.41 0.40 - 4.00 mU/L Final   10/10/2016 2.89 0.40 - 4.00 mU/L Final   01/16/2016 3.33 0.40 - 4.00 mU/L Final   12/09/2013 2.95 0.4 - 5.0 mU/L Final     T4 Free   Date Value Ref Range Status   12/09/2013 1.18 0.70 - 1.85 ng/dL Final       ALT   Date Value Ref Range Status   05/17/2019 33 0 - 50 U/L Final   02/20/2016 26 0 - 50 U/L Final   ]    Recent Labs   Lab Test 06/07/19  0858 03/04/19  0854 08/04/15  1443 12/09/13  1137   CHOL 195 189 194 241*   HDL 55 58 66 66   * 106* 102 142*   TRIG 128 126 131 165*   CHOLHDLRATIO  --   --  2.9 3.7       Lab Results   Component Value Date     05/17/2019      Lab Results   Component Value Date    POTASSIUM 3.6 05/17/2019     Lab Results   Component Value Date    CHLORIDE 107 05/17/2019     Lab Results   Component Value Date    FRANKLIN 9.0 05/17/2019     Lab Results   Component Value Date    CO2 25 05/17/2019     Lab Results   Component Value Date    BUN 9 05/17/2019     Lab Results   Component Value Date    CR 0.62 05/17/2019       GFR Estimate   Date Value Ref Range Status   05/17/2019 >90 >60 mL/min/[1.73_m2] Final     Comment:     Non  GFR Calc  Starting 12/18/2018, serum creatinine based estimated GFR (eGFR) will be   calculated using the Chronic Kidney Disease Epidemiology Collaboration   (CKD-EPI) equation.     05/13/2019 >90 >60 mL/min/[1.73_m2] Final     Comment:     Non  GFR Calc  Starting 12/18/2018, serum creatinine based estimated GFR (eGFR) will be   calculated using the Chronic Kidney Disease Epidemiology Collaboration   (CKD-EPI) equation.      03/04/2019 >90 >60 mL/min/[1.73_m2] Final     Comment:     Non  GFR Calc  Starting 12/18/2018, serum creatinine based estimated GFR (eGFR) will be   calculated using the Chronic Kidney Disease Epidemiology Collaboration   (CKD-EPI) equation.       GFR Estimate If Black   Date Value Ref Range Status   05/17/2019 >90 >60 mL/min/[1.73_m2] Final     Comment:      GFR Calc  Starting 12/18/2018, serum creatinine based estimated GFR (eGFR) will be   calculated using the Chronic Kidney Disease Epidemiology Collaboration   (CKD-EPI) equation.     05/13/2019 >90 >60 mL/min/[1.73_m2] Final     Comment:      GFR Calc  Starting 12/18/2018, serum creatinine based estimated GFR (eGFR) will be   calculated using the Chronic Kidney Disease Epidemiology Collaboration   (CKD-EPI) equation.     03/04/2019 >90 >60 mL/min/[1.73_m2] Final     Comment:      GFR Calc  Starting 12/18/2018, serum creatinine based estimated GFR (eGFR) will be   calculated using the Chronic Kidney Disease Epidemiology Collaboration   (CKD-EPI) equation.         Lab Results   Component Value Date    MICROL <5 06/07/2019     No results found for: MICROALBUMIN  No results found for: CPEPT, GADAB, ISCAB    No results found for: B12]    Most recent eye exam date: : Not Found       A1c today: 7.0%.    Assessment/Plan:     1.  Type 1 diabetes- Overall, doing much better.  A1c is up a bit to 7.3%, but she is drifting low overnight and treating lows frequently.  Will make the following changes (instructions given to patient):     Mid- 2.4 (down from 2.6)  3am- 2.8 (down from 3.0)  7am- 3.1 (down from 3.3)  11am- 2.5 (no change)  12pm- 2.3 (no change)  4:30pm- 2.0 (no change)  5:40pm- 2.2 (no change)  10pm- 3.2 (no change)    2.  Risk factors-     Retinopathy:  No.  Had eye exam within last year.   Nephropathy:  BP well controlled. No microalbuminuria.  Creatinine stable.  She is on enalapril.  White coat  hypertension.     She follows with nephrology.   Neuropathy: No.    Feet: OK, no ulcers.   Lipids:  .  Had been on pravastatin 20 mg daily.  She stopped statin in anticipation of pregnancy.  She does not want to resume statin at this time.  Will recheck cholesterol.     3.  Hypothyroidism- last TSH was 2.89 on 10/10/16 on no levothyroxine.   Will check TSH    4.  F/U in 3 months with Dr. Allan.     I spent 30 minutes with this patient face to face and explained the conditions and plans (more than 50% of time was counseling/coordination of care, diabetes management, follow up plan for worsening hyper and hypoglycemia) . The patient understood and is satisfied with today's visit.     Laureen Goldstein PA-C, MPAS   [facility]  Department of Medicine  Division of Endocrinology and Diabetes

## 2019-08-21 ENCOUNTER — ALLIED HEALTH/NURSE VISIT (OUTPATIENT)
Dept: SURGERY | Facility: CLINIC | Age: 24
End: 2019-08-21
Payer: COMMERCIAL

## 2019-08-21 ENCOUNTER — OFFICE VISIT (OUTPATIENT)
Dept: SURGERY | Facility: CLINIC | Age: 24
End: 2019-08-21
Payer: COMMERCIAL

## 2019-08-21 VITALS
WEIGHT: 293 LBS | SYSTOLIC BLOOD PRESSURE: 130 MMHG | HEART RATE: 82 BPM | BODY MASS INDEX: 44.41 KG/M2 | DIASTOLIC BLOOD PRESSURE: 86 MMHG | OXYGEN SATURATION: 97 % | HEIGHT: 68 IN

## 2019-08-21 VITALS — BODY MASS INDEX: 44.41 KG/M2 | HEIGHT: 68 IN | WEIGHT: 293 LBS

## 2019-08-21 DIAGNOSIS — E66.01 MORBID OBESITY (H): ICD-10-CM

## 2019-08-21 DIAGNOSIS — J45.20 MILD INTERMITTENT ASTHMA WITHOUT COMPLICATION: ICD-10-CM

## 2019-08-21 DIAGNOSIS — G47.9 SLEEP DISTURBANCE: ICD-10-CM

## 2019-08-21 DIAGNOSIS — E10.21 TYPE 1 DIABETES MELLITUS WITH DIABETIC NEPHROPATHY (H): ICD-10-CM

## 2019-08-21 DIAGNOSIS — E66.01 MORBID OBESITY WITH BMI OF 45.0-49.9, ADULT (H): Primary | ICD-10-CM

## 2019-08-21 PROCEDURE — 99204 OFFICE O/P NEW MOD 45 MIN: CPT | Performed by: PHYSICIAN ASSISTANT

## 2019-08-21 PROCEDURE — 97802 MEDICAL NUTRITION INDIV IN: CPT | Performed by: DIETITIAN, REGISTERED

## 2019-08-21 ASSESSMENT — MIFFLIN-ST. JEOR
SCORE: 2125.56
SCORE: 2125.48

## 2019-08-21 NOTE — PROGRESS NOTES
"INITIAL WEIGHT MANAGEMENT VISIT      August 21, 2019      HPI:  Stacey La is a 24 year old year old female who I was asked to see by Nancy Mejia CNP for medical bariatric consultation. Weight related co-morbidities include   Patient Active Problem List   Diagnosis     Type 1 diabetes mellitus (H)     Diabetic nephropathy (H)     Family history of heart disease     Chronic kidney disease, stage I     Mild intermittent asthma without complication     Anxiety     Type 1 diabetes mellitus with diabetic nephropathy (H)     Morbid obesity (H)     Elevated WBC count   .  Her Body mass index is 45.75 kg/m ..      Assessment/ Plan:  Stacey is a 24 year old year old female who presents for medical weight management.    Patient has concerns about weight gain since teenage years.  Her diabetes was not under good control at that time but is now.  Her last hemoglobin A1C = 7.3 on 8/19/19  Reviewed recent CBC and CMP and TSH.       We discussed the role of pharmacological agents in the treatment of obesity and the \"off-label\" use of medications in this practice. We discussed the risks and benefits of each. We discussed indications, contraindications, potential side effects, and estimated costs of each. Discussed that medications must always be used together with lifestyle changes such as improvements in diet choices, portion control and establishing and maintaining a regular exercise program.      Due to pateints anxiety she does not want to start phentermine.  Also wellbutrin would not be the best option either.  Topamax is not currently an option because does not want to go on birth control.  Discussed naltrexone but patient opted to not start.    All of patients questions about the weight loss program were answered fully.  Strongly encouraged patient to discuss weight loss with endocrinologist      She has seen a diabetic dietitian.  The last one was in the past year.   Ordered Sleep Study    Goals:   Try to increase " activity: 1 additional cardio session in addition to her walking.  Encouraged doing after dinner  Start with having breakfast within the first hour of getting up.  Diet education  Food journal    Diagnosis:  Morbid Obesity  Diabetes Type I      Follow up: seeing diet today  Provider in 1 month      >45 minutes spent with patient, > 50% spent in counseling          Counseling:  We discussed [facility] Basics including:  -eating 3 meals daily  -eating protein first  -eating slowly, chewing food well  -avoiding/limiting calorie containing beverages  -choosing wheat, not white with breads, crackers, pastas, jay, bagels, tortillas, rice  -limiting carbohydrates in general  -limiting restaurant or cafeteria eating to twice a week or less    We discussed the importance of restorative sleep and stress management in maintaining a healthy weight.    We reviewed medications associated with weight gain.    We discussed insulin resistance and glycemic index as it relates to appetite and weight control.     We discussed the National Weight Control Registry healthy weight maintenance strategies and ways to optimize metabolism.  We discussed the importance of physical activity including cardiovascular and strength training in maintaining a healthier weight and explored viable options.        History Surrouding Consultation:  Struggles with weight started at age 11  Her weight at age 16 was 200  She has had several past supervised and unsupervised weight loss attempts  The most weight lost was: None  Unfortunately there was not durable weight maintenance.  History of bulimia, anorexia, or binge eating disorder? Concern as a teenager only  If Present has eating disorder been in remission at least 3 years? No    Patient perceived barriers to weight loss:  Not sure.  Does not feel she eats bad.      Dietary History  Meals per day:   B: skips most often.  Not hungry initially.  If she does it would be around 10 am would be hard  boiled eggs or pancakes.  Coffee -2 cups - black  L: Around 1:30 -Salad or tacos.  Typical salad has feta cheese, olives, broccoli with olive oil.  Water. Maybe a burger or soup  Mid Afternoon - cheese stick  D: 6-7 pm. Usually cooks dinner herself. Brat, cucumber slices and baked french fries.  Water. Other options gluten free pasta, chicken.   Sometimes will snack after dinner. Popcorn, veggies, fruit or cheese  Who does the grocery shopping? Patient  Who does the cooking? patient  Sugared beverages: only if blood sugar is low  Caffeine: 2 cups coffee and occasional energy drink   Amount of restaurant eating per week: 3  Eating in front of a screen  Yes    Physical Activity Patterns  Current physical activity routine includes: walking several times weekly    Limitations from being physically active on a regular basis includes: hip pain        PMH:  Past Medical History:   Diagnosis Date     Asthma     Currently no treatment needed     Celiac disease      Chronic kidney disease, stage I 8/3/2015     Chronic sinusitis      Diabetes mellitus type 1 (H)      Diabetic nephropathy (H)      Family history of heart disease 8/3/2015     Hypertension      Hypothyroid      Pedal edema      Psoriasis        Past Surgical Hx:  Past Surgical History:   Procedure Laterality Date     ENT SURGERY       TONSILLECTOMY, ADENOIDECTOMY, COMBINED         Medication:  Current Outpatient Medications   Medication     albuterol (PROVENTIL) (2.5 MG/3ML) 0.083% neb solution     Ascorbic Acid (VITAMIN C PO)     Cholecalciferol 2000 UNITS TBDP     enalapril (VASOTEC) 20 MG tablet     escitalopram (LEXAPRO) 10 MG tablet     HUMALOG 100 UNIT/ML injection     hydrOXYzine (ATARAX) 25 MG tablet     liraglutide (VICTOZA PEN) 18 MG/3ML soln     Multiple Vitamins-Minerals (MULTIVITAMIN ADULT PO)     Ondansetron HCl (ZOFRAN PO)     ASPIRIN NOT PRESCRIBED (INTENTIONAL)     blood glucose test strip     Blood Pressure Monitoring KIT     glucagon (GLUCAGON  EMERGENCY) 1 MG kit     insulin pen needle (B-D U/F) 31G X 5 MM miscellaneous     insulin syringes, disposable, U-100 0.3 ML MISC     ipratropium - albuterol 0.5 mg/2.5 mg/3 mL (DUONEB) 0.5-2.5 (3) MG/3ML neb solution     STATIN NOT PRESCRIBED (INTENTIONAL)     No current facility-administered medications for this visit.        Allergies:Dogs; Dust mites; and Gluten meal    Family Hx:  Family History   Problem Relation Age of Onset     Cardiovascular Father 48        MI, stents, diabetic, type 2     Gastrointestinal Disease Mother         Celiacs     Heart Disease Paternal Half-Brother 45     Cardiovascular Maternal Grandfather         Englarged heart, pacemaker, defib       Social Hx:  Social History     Socioeconomic History     Marital status:      Spouse name: Not on file     Number of children: Not on file     Years of education: Not on file     Highest education level: Not on file   Occupational History     Not on file   Social Needs     Financial resource strain: Not on file     Food insecurity:     Worry: Not on file     Inability: Not on file     Transportation needs:     Medical: Not on file     Non-medical: Not on file   Tobacco Use     Smoking status: Never Smoker     Smokeless tobacco: Never Used   Substance and Sexual Activity     Alcohol use: Yes     Alcohol/week: 0.0 oz     Comment: 2 times per month, 2 drinks per time     Drug use: No     Sexual activity: Yes     Partners: Male     Birth control/protection: Condom   Lifestyle     Physical activity:               Walks and hikes a couple times weekly for about an hour     Days per week: Not on file     Minutes per session: Not on file     Stress: Not on file   Relationships     Social connections:     Talks on phone: Not on file     Gets together: Not on file     Attends Anabaptism service: Not on file     Active member of club or organization: Not on file     Attends meetings of clubs or organizations: Not on file     Relationship status: Not  "on file     Intimate partner violence:     Fear of current or ex partner: Not on file     Emotionally abused: Not on file     Physically abused: Not on file     Forced sexual activity: Not on file   Other Topics Concern     Parent/sibling w/ CABG, MI or angioplasty before 65F 55M? Yes      Service Not Asked     Blood Transfusions Not Asked     Caffeine Concern Yes     Comment: 1 cup tea per day     Occupational Exposure Not Asked     Hobby Hazards Not Asked     Sleep Concern No     Stress Concern Not Asked     Weight Concern Not Asked     Special Diet Yes     Comment: gluten free diet, low carbs     Back Care Not Asked     Exercise Yes     Comment: walking, ellyptical, swimming, weights     Bike Helmet Not Asked     Seat Belt Yes     Self-Exams Not Asked   Social History Narrative     Not on file       Tobacco Use Hx:  History   Smoking Status     Never Smoker   Smokeless Tobacco     Never Used       ROS  General  Fatigue: No  Sleep Quality: ok  HEENT  Hx of glaucoma: No  Vision changes: No  Cardiovascular  Chest Pain with Exertion: No  Palpitations: if anxious.  On lexapro for anxiety   Hx of heart disease: No  Pulmonary  Shortness of breath at rest: No  Shortness of breath with exertion: sometimes  Snoring: yes  Stop-bang score: 4  Griffin Score: 3  Gastrointestinal  Abdominal pain: No  Psychiatric  Moods Stable: yes  Endocrine  Polydipsia: No  Neurologic:  Hx of seizures: might have had 1 years ago due to low blood sugars - age 16    History of kidney stones: No  History of kidney disease: yes but recent numbers are good  Current birth control: No - uses condoms    /86   Pulse 82   Ht 5' 7.5\" (1.715 m)   Wt 296 lb 8 oz (134.5 kg)   SpO2 97%   BMI 45.75 kg/m    Wt Readings from Last 2 Encounters:   08/21/19 296 lb 8 oz (134.5 kg)   08/19/19 (P) 299 lb (135.6 kg)     Body mass index is 45.75 kg/m .        Physical Exam:    GEN: Alert and oriented in no acute distress.   NECK: Supple without LAD " or thyromegaly.   LUNGS: CTA without wheezes or crackles, good air movement throughout    Labs:    Lab Results   Component Value Date    WBC 9.8 06/07/2019    HGB 13.9 06/07/2019    HCT 42.6 06/07/2019    MCV 86 06/07/2019     06/07/2019     Lab Results   Component Value Date    CHOL 195 06/07/2019    CHOL 189 03/04/2019    CHOL 194 08/04/2015     Lab Results   Component Value Date    HDL 55 06/07/2019    HDL 58 03/04/2019    HDL 66 08/04/2015     No components found for: LDLCALC  Lab Results   Component Value Date    TRIG 128 06/07/2019    TRIG 126 03/04/2019    TRIG 131 08/04/2015     No components found for: CHOLHDL  Lab Results   Component Value Date    ALT 33 05/17/2019    AST 14 05/17/2019    ALKPHOS 88 05/17/2019     No components found for: HGBA1C  No components found for: [id number]

## 2019-08-21 NOTE — PROGRESS NOTES
"MEDICAL WEIGHT LOSS INITIAL EVALUATION  DIAGNOSIS:  Class III Obesity    NUTRITION HISTORY:  Breakfast: skips 6X per week or hard cooked egg or waterman, eggs, hash browns (1X per month)  Lunch: Skips 1-2X per week or dines out Monday-Friday: chicken tacos or curried chicken, rice, enchiladas (hot bar)  at co-op or leftovers from dinner on weekend @ 1:00 P.M.  Dinner: salon burger on a bell pepper or 2 spring rolls or 1-2 brats, baked french fries @ 7:00 P.M.  Snacks: cheese stick- mid morning/ dill pickle, sliced cheese or  pop corn (air popped) or veggies, humus-after dinner  Beverage choices: 64-70 oz water, black coffee (occasional specialty), ETOH 4X per month-3 mixed drinks or ciders  Dining out: 5X per week for lunches  Binge eating: not an issue at this time, but if blood sugar gets low may over eat  Emotional eating: yes, due to YARELIS or boredom  Night time eating: only if wakes up with a low blood sugar (cereal/ milk)  Exercise: walks dog 3X per week for 30-60 minutes  Other: Patient was diagnosed with type I DM at the age of 8 years old. At the age of 12 pt was diagnosed with Celiac dz. (has seen a RD at [facility]).  Has an insulin pump and a continuous glucose monitor. Patient dose most of the cooking at home. Spouse (picky eater, likes fast food or convenience foods) works a night shift and rotating shifts.   Patient works at a [employer] school.  ANTHROPOMETRICS:  Height: 67.5\"  Weight: 295.9 lb   BMI:  45.76 kg/m2  NUTRITION DIAGNOSIS:   Obese, class III related to excess energy intake as evidence by BMI of  45.76  NUTRITION INTERVENTIONS  Nutrition Prescription:  Recommend modified energy- nutrient intake  Implementation:  Nutrition Education (Content):    Discussed portion sizes     Determined alternative to emotional eating    Reviewed healthy beverage choices and the importance of eating 3 meal per day    Provided  My Plate Guidelines    Nutrition Education (Application):     Patient to practice goals as " stated below    Patient verbalizes understanding of diet by stating the value of ding meal prep    Anticipate good compliance    Goals:  Practice alternatives to emotional eating  Eat 3 meals per day  Limit alcohol to 2 drinks 4X per month  Do meal prep on the weekends for 4 lunches     FOLLOW UP AND MONITORING:    Other  - follow up in 4 weeks.     TIME SPENT WITH PATIENT:   45 minutes   Tommy Beaver RD, LD  [facility]  [phone]

## 2019-09-30 ENCOUNTER — TELEPHONE (OUTPATIENT)
Dept: ENDOCRINOLOGY | Facility: CLINIC | Age: 24
End: 2019-09-30

## 2019-09-30 NOTE — TELEPHONE ENCOUNTER
[facility] Call Center    Phone Message    May a detailed message be left on voicemail: yes    Reason for Call: Medication Question or concern regarding medication   Prescription Clarification  Name of Medication: insulin pen needle (B-D U/F) 31G X 5 MM miscellaneous  Prescribing Provider: pharmacy says JERRELL   Pharmacy: [facility],  #: [phone], EXT [phone]  FAX #: [phone]   What on the order needs clarification? Pharmacy says that they faxed a detailed written request to be signed by Jerrell which was sent on 08/25. Verified that it was sent to wrong fax #. [facility] will fax it again to clinic fax number. Please keep an eye out for it.     Action Taken: Message routed to:  [facility] ([facility]): SEBASTIAN

## 2019-09-30 NOTE — TELEPHONE ENCOUNTER
Forms received from: [facility]     Forms were Diabetic testing supplies     Faxed Date:10/1/19

## 2019-10-01 ENCOUNTER — MEDICAL CORRESPONDENCE (OUTPATIENT)
Dept: HEALTH INFORMATION MANAGEMENT | Facility: CLINIC | Age: 24
End: 2019-10-01

## 2020-01-28 ENCOUNTER — OFFICE VISIT (OUTPATIENT)
Dept: ENDOCRINOLOGY | Facility: CLINIC | Age: 25
End: 2020-01-28
Payer: COMMERCIAL

## 2020-01-28 VITALS
HEART RATE: 92 BPM | DIASTOLIC BLOOD PRESSURE: 86 MMHG | WEIGHT: 293 LBS | BODY MASS INDEX: 46.94 KG/M2 | SYSTOLIC BLOOD PRESSURE: 138 MMHG

## 2020-01-28 DIAGNOSIS — E10.21 TYPE 1 DIABETES MELLITUS WITH DIABETIC NEPHROPATHY (H): Primary | ICD-10-CM

## 2020-01-28 LAB — HBA1C MFR BLD: 7 % (ref 4.3–6)

## 2020-01-28 ASSESSMENT — PAIN SCALES - GENERAL: PAINLEVEL: NO PAIN (0)

## 2020-01-28 NOTE — PROGRESS NOTES
"Stacey was seen and examined by me with medical student Elida Godfrey.  Please see student's note of the same date for full details.  In brief, she is having lows after exercise.  She hasn't had any severe lows.    meds reviewed      ROS: 10 point ROS neg other than the symptoms noted above in the HPI.  Vital signs:   /86   Pulse 92   Wt 138 kg (304 lb 3.2 oz)   BMI 46.94 kg/m    Estimated body mass index is 46.94 kg/m  as calculated from the following:    Height as of 8/21/19: 1.715 m (5' 7.5\").    Weight as of this encounter: 138 kg (304 lb 3.2 oz).    NAD  Eyes - no periorbital edema, conjunctival injection, scleral icterus  Neck - no thyromegaly  Skin - normal texture   Mood - not depressed    Recent Labs   Lab Test 01/28/20 08/19/19 06/07/19  0858 05/17/19  1528  05/13/19  1450 03/04/19  0900 03/04/19  0854  01/17/18  1515  03/26/15  0645  12/09/13  1137   A1C  --   --   --   --   --   --   --   --   --  7.0*  --  7.6*  --   --    HEMOGLOBINA1 7.0* 7.3*  --   --    < >  --   --   --    < >  --    < >  --    < >  --    TSH  --   --  3.91  --   --   --   --  3.41  --   --    < >  --   --  2.95   T4  --   --   --   --   --   --   --   --   --   --   --   --   --  1.18   LDL  --   --  114*  --   --   --   --  106*  --   --    < >  --   --  142*   HDL  --   --  55  --   --   --   --  58  --   --    < >  --   --  66   TRIG  --   --  128  --   --   --   --  126  --   --    < >  --   --  165*   CR  --   --   --  0.62  --  0.59  --  0.51*   < > 0.70   < > 0.54   < > 0.48*   MICROL  --   --  <5  --   --   --  <5  --    < >  --    < >  --    < >  --     < > = values in this interval not displayed.       Assessment and plan:    1.  Diabetes control.  Overall she is doing well.  Recommend she use temp basal for 60 min before exercise and then disconnect.  Should also do temp basal of 50% overnight after exercise in evening to avoid lows.  Reviewed how control IQ works. Will have her meet with [facility] when she uploads " the new software.    2.  Diabetes complications.  Has hx of albuminuria but Cr is fine.  Eyes and feet are OK    3. CVD risk.  BP is OK.  Discussed diet and exercise as ways to reduce LDL    4.  Obesity.  Now longer taking victoza.  Informed her that ozempic is more potent and that this might be option in the future.  She will think about this.      F/u with Laureen Goldstein in 3 mo and me in 6 months    Bia Allan MD

## 2020-01-28 NOTE — LETTER
1/28/2020       RE: Stacey La  [id number]  [facility] [address]     Dear Colleague,    Thank you for referring your patient, Stacey La, to the [facility] ENDOCRINOLOGY at [facility]. Please see a copy of my visit note below.    Stacey La is a 24 year-old here to follow-up for Type 1 diabetes, diagnosed at age 7. She has a history of nephropathy (CKD stage 1), hyperlipidemia, hypothyroidism, and celiac disease. Her primary concerns today are persistent overnight lows, a tingling sensation in her fingers overnight, and upcoming upgrade for her T-slim pump.    Currently for her diabetes, she is using the T-slim pump with an integrated sensor and Dexcom G6 sensor. She is now working out 4-5 times per week, often during late evening at the end of the day, and has noticed repeated lows within an hour of working and overnight. She often wakes up between 2-3am to an alarm, and has to drink juice to correct for her lows. She is sometimes still low at 7am,    She has been changing her basal rates over the past few weeks to treat these lows. Current settings are as follows:    Basal-  Mid- 2.0 (down from 2.4)  3am- 1.8 (down from 2.8)  7am- 2.3 (down from 3.1)  11am- 2.5 (no change)  12pm- 2.4 (up from 2.3)  4:30pm- 2.3 (up from 2.0)  5:40pm- 2.5 (up from 2.2)  10pm- 2.8 (down from 3.2)    IC ratio-   Mid-1:4g   7am- 1:4.5g  11am- 1:5g  12pm- 1:4.5g  4:30pm- 1/5g  5:40pm- 1/4g  10pm- 1/5g    Sensitivity- 16  Target glucose- 100 during the day, 110 overnight    She has an average total daily insulin dose of 103 Units; 59% of her insulin is basal. Reviewing her download confirms that she is experiencing frequent lows over night. She is experiencing overnight lows even on days when she works out in the morning. Her routine for working out varies, but she will often disconnect from her pump during cardio-heavy workouts at the start of her workout and a little  after.     She is monitoring her carb intake more. She boluses often at the start of her meal, except for carb-heavy meals which she will bolus 15-20 min before. The dowload shows her post-meal blood sugars are decreasing appropriately after boluses and corrections.     Stacey has also been experiencing numbness and tingling of her fingers bilaterally at night and when sowing and doing beadwork. The sensation is limited to her fingers, lasts a few minutes, and will resolve if she stops sowing or stretches out her hand. She has woken up from this sensation at night. She denies any numbness, tingling, or loss of sensation in her feet.     She is due for an upgrade of her T-slim. She would like a T-slim X2 with control IQ. She has previously been treated for hypothyroidism but has not been for many years. She is due for an optho visit and has not previously had any concerns for retinopathy. She is followed by nephrology for her CKD stage 1, and is taking enalapril. She has a visit upcoming with renal in may.       Current Outpatient Medications   Medication     albuterol (PROVENTIL) (2.5 MG/3ML) 0.083% neb solution     Ascorbic Acid (VITAMIN C PO)     ASPIRIN NOT PRESCRIBED (INTENTIONAL)     blood glucose test strip     Blood Pressure Monitoring KIT     Cholecalciferol 2000 UNITS TBDP     enalapril (VASOTEC) 20 MG tablet     escitalopram (LEXAPRO) 10 MG tablet     glucagon (GLUCAGON EMERGENCY) 1 MG kit     HUMALOG 100 UNIT/ML injection     hydrOXYzine (ATARAX) 25 MG tablet     insulin pen needle (B-D U/F) 31G X 5 MM miscellaneous     insulin syringes, disposable, U-100 0.3 ML MISC     ipratropium - albuterol 0.5 mg/2.5 mg/3 mL (DUONEB) 0.5-2.5 (3) MG/3ML neb solution     liraglutide (VICTOZA PEN) 18 MG/3ML soln     Multiple Vitamins-Minerals (MULTIVITAMIN ADULT PO)     Ondansetron HCl (ZOFRAN PO)     STATIN NOT PRESCRIBED (INTENTIONAL)     No current facility-administered medications for this visit.      ROS  Gen: No  "change in skin, hair, temp intolerance. No excess fatigue.  Eyes: No diplopia, redness, itching.  ENT: No dysphagia, dysphonia.  Resp: No SOB, CHILDRESS  CV: No palpitations, tachycardia, CP  GI: No abd pain, n/v, diarrhea  : No hesitancy, frequency, dysuria, hematuria  Neuro: Numbness, tingling in hands as described in HPI. No paraesthesias in feet.      SocHx: . She is now working as a  in an [employer] school in [place].    Current Outpatient Medications   Medication     albuterol (PROVENTIL) (2.5 MG/3ML) 0.083% neb solution     Ascorbic Acid (VITAMIN C PO)     ASPIRIN NOT PRESCRIBED (INTENTIONAL)     blood glucose test strip     Blood Pressure Monitoring KIT     Cholecalciferol 2000 UNITS TBDP     enalapril (VASOTEC) 20 MG tablet     escitalopram (LEXAPRO) 10 MG tablet     glucagon (GLUCAGON EMERGENCY) 1 MG kit     HUMALOG 100 UNIT/ML injection     hydrOXYzine (ATARAX) 25 MG tablet     insulin pen needle (B-D U/F) 31G X 5 MM miscellaneous     insulin syringes, disposable, U-100 0.3 ML MISC     ipratropium - albuterol 0.5 mg/2.5 mg/3 mL (DUONEB) 0.5-2.5 (3) MG/3ML neb solution     liraglutide (VICTOZA PEN) 18 MG/3ML soln     Multiple Vitamins-Minerals (MULTIVITAMIN ADULT PO)     Ondansetron HCl (ZOFRAN PO)     STATIN NOT PRESCRIBED (INTENTIONAL)     No current facility-administered medications for this visit.        Physical Exam  Vital signs:      BP: 138/86 Pulse: 92             Weight: 138 kg (304 lb 3.2 oz)  Estimated body mass index is 46.94 kg/m  as calculated from the following:    Height as of 8/21/19: 1.715 m (5' 7.5\").    Weight as of this encounter: 138 kg (304 lb 3.2 oz).    Gen: NAD, VSS  Eyes: EOM grossly intact. Anicteric sclera. No periorbital edema, conjunctival injection.  Neck: No thyromegaly or LAD  Resp: CTAB  CV: RRR, no m/r/g  Neuro: 2/4 DTR biceps. Positive Phalen test, with parasthesias reproduced bilaterally (left > right).  Feet: 3+ DP, PT bilaterally. "         Lab Results   Component Value Date    A1C 7.0 (H) 01/17/2018    A1C 7.6 (H) 03/26/2015    HEMOGLOBINA1 7.3 (A) 08/19/2019    HEMOGLOBINA1 6.8 (A) 05/14/2019    HEMOGLOBINA1 6.8 (A) 02/18/2019    HEMOGLOBINA1 7.7 (A) 11/13/2018    HEMOGLOBINA1 7.0 (A) 04/10/2018       TSH   Date Value Ref Range Status   06/07/2019 3.91 0.40 - 4.00 mU/L Final   03/04/2019 3.41 0.40 - 4.00 mU/L Final   10/10/2016 2.89 0.40 - 4.00 mU/L Final   01/16/2016 3.33 0.40 - 4.00 mU/L Final   12/09/2013 2.95 0.4 - 5.0 mU/L Final       T4 Free   Date Value Ref Range Status   12/09/2013 1.18 0.70 - 1.85 ng/dL Final     GFR Estimate   Date Value Ref Range Status   05/17/2019 >90 >60 mL/min/[1.73_m2] Final     Comment:     Non  GFR Calc  Starting 12/18/2018, serum creatinine based estimated GFR (eGFR) will be   calculated using the Chronic Kidney Disease Epidemiology Collaboration   (CKD-EPI) equation.       GFR Estimate If Black   Date Value Ref Range Status   05/17/2019 >90 >60 mL/min/[1.73_m2] Final     Comment:      GFR Calc  Starting 12/18/2018, serum creatinine based estimated GFR (eGFR) will be   calculated using the Chronic Kidney Disease Epidemiology Collaboration   (CKD-EPI) equation.       Lab Results   Component Value Date    MICROL <5 06/07/2019       Recent Labs   Lab Test 06/07/19  0858 03/04/19  0854 08/04/15  1443 12/09/13  1137   CHOL 195 189 194 241*   HDL 55 58 66 66   * 106* 102 142*   TRIG 128 126 131 165*   CHOLHDLRATIO  --   --  2.9 3.7       A1c today: 7.0%.    Assessment/Plan:     #Type 1 DM: Stacey continues to have overnight lows. She has lowered her basal rates independently to help address these lows (most recent change was Sunday), but the low BGs persist. These overnight lows are likely associated with her new exercise routine, especially since she works out at the end of the day, she   -- We discussed benefits of lowering her basal rate by 50% 1 hour before exercise,  "which may help with her post-exercise lows. She disconnects from her pump during exercise due to discomfort, and will continue to do so.   -- On nights that she exercises, she will try a temporary basal of 80% overnight, which may help with her overnight lows. No other changes to her basal rates or settings.  -- She will meet with educator once she upgrades to the T-slim X2.     #Diabetes risk:   -- Retinopathy:  No concerns currently, upcoming optho.   -- Nephropathy: BP was well controlled today. She is taking enalapril and is being followed by nephrology.  -- Neuropathy: No concerns.    #CVD risk: BP well controlled today. Her LDL was 114 in 06/19. She stopped pravastatin in anticipation of pregnancy, and does not want to resume a statin at this time.     #Hypothyroidism: Last TSH was 3.91 in 06/2019 and no levothyroxine. She is asymptomatic and appears euthyroid. Will check TSH today, especially given her new symptoms of tingling in her hands.     #Tingling in hands: Stacey experiences tingling in her hands at night and when doing needlework, and resolves with stretching. She also had a positive phalen test today. which likely makes this carpal tunnel. She will start by using a hand splint at night to alleviate compression, and will follow-up with PCP if the discomfort persists. Also checking TSH today.     F/U in 3 months with Laureen and 6-months with Dr. Allan.       Elida Godfrey, MS3      Stacey was seen and examined by me with medical student Elida Godfrey.  Please see student's note of the same date for full details.  In brief, she is having lows after exercise.  She hasn't had any severe lows.    meds reviewed      ROS: 10 point ROS neg other than the symptoms noted above in the HPI.  Vital signs:   /86   Pulse 92   Wt 138 kg (304 lb 3.2 oz)   BMI 46.94 kg/m     Estimated body mass index is 46.94 kg/m  as calculated from the following:    Height as of 8/21/19: 1.715 m (5' 7.5\").    Weight as " of this encounter: 138 kg (304 lb 3.2 oz).    NAD  Eyes - no periorbital edema, conjunctival injection, scleral icterus  Neck - no thyromegaly  Skin - normal texture   Mood - not depressed    Recent Labs   Lab Test 01/28/20 08/19/19 06/07/19  0858 05/17/19  1528  05/13/19  1450 03/04/19  0900 03/04/19  0854  01/17/18  1515  03/26/15  0645  12/09/13  1137   A1C  --   --   --   --   --   --   --   --   --  7.0*  --  7.6*  --   --    HEMOGLOBINA1 7.0* 7.3*  --   --    < >  --   --   --    < >  --    < >  --    < >  --    TSH  --   --  3.91  --   --   --   --  3.41  --   --    < >  --   --  2.95   T4  --   --   --   --   --   --   --   --   --   --   --   --   --  1.18   LDL  --   --  114*  --   --   --   --  106*  --   --    < >  --   --  142*   HDL  --   --  55  --   --   --   --  58  --   --    < >  --   --  66   TRIG  --   --  128  --   --   --   --  126  --   --    < >  --   --  165*   CR  --   --   --  0.62  --  0.59  --  0.51*   < > 0.70   < > 0.54   < > 0.48*   MICROL  --   --  <5  --   --   --  <5  --    < >  --    < >  --    < >  --     < > = values in this interval not displayed.       Assessment and plan:    1.  Diabetes control.  Overall she is doing well.  Recommend she use temp basal for 60 min before exercise and then disconnect.  Should also do temp basal of 50% overnight after exercise in evening to avoid lows.  Reviewed how control IQ works. Will have her meet with [email or web address] when she uploads the new software.    2.  Diabetes complications.  Has hx of albuminuria but Cr is fine.  Eyes and feet are OK    3. CVD risk.  BP is OK.  Discussed diet and exercise as ways to reduce LDL    4.  Obesity.  Now longer taking victoza.  Informed her that ozempic is more potent and that this might be option in the future.  She will think about this.      F/u with Laureen Goldstein in 3 mo and me in 6 months    Bia Allan MD

## 2020-01-28 NOTE — PROGRESS NOTES
Stacey La is a 24 year-old here to follow-up for Type 1 diabetes, diagnosed at age 7. She has a history of nephropathy (CKD stage 1), hyperlipidemia, hypothyroidism, and celiac disease. Her primary concerns today are persistent overnight lows, a tingling sensation in her fingers overnight, and upcoming upgrade for her T-slim pump.    Currently for her diabetes, she is using the T-slim pump with an integrated sensor and Dexcom G6 sensor. She is now working out 4-5 times per week, often during late evening at the end of the day, and has noticed repeated lows within an hour of working and overnight. She often wakes up between 2-3am to an alarm, and has to drink juice to correct for her lows. She is sometimes still low at 7am,    She has been changing her basal rates over the past few weeks to treat these lows. Current settings are as follows:    Basal-  Mid- 2.0 (down from 2.4)  3am- 1.8 (down from 2.8)  7am- 2.3 (down from 3.1)  11am- 2.5 (no change)  12pm- 2.4 (up from 2.3)  4:30pm- 2.3 (up from 2.0)  5:40pm- 2.5 (up from 2.2)  10pm- 2.8 (down from 3.2)    IC ratio-   Mid-1:4g   7am- 1:4.5g  11am- 1:5g  12pm- 1:4.5g  4:30pm- 1/5g  5:40pm- 1/4g  10pm- 1/5g    Sensitivity- 16  Target glucose- 100 during the day, 110 overnight    She has an average total daily insulin dose of 103 Units; 59% of her insulin is basal. Reviewing her download confirms that she is experiencing frequent lows over night. She is experiencing overnight lows even on days when she works out in the morning. Her routine for working out varies, but she will often disconnect from her pump during cardio-heavy workouts at the start of her workout and a little after.     She is monitoring her carb intake more. She boluses often at the start of her meal, except for carb-heavy meals which she will bolus 15-20 min before. The dowload shows her post-meal blood sugars are decreasing appropriately after boluses and corrections.     Stacey has also been  experiencing numbness and tingling of her fingers bilaterally at night and when sowing and doing beadwork. The sensation is limited to her fingers, lasts a few minutes, and will resolve if she stops sowing or stretches out her hand. She has woken up from this sensation at night. She denies any numbness, tingling, or loss of sensation in her feet.     She is due for an upgrade of her T-slim. She would like a T-slim X2 with control IQ. She has previously been treated for hypothyroidism but has not been for many years. She is due for an optho visit and has not previously had any concerns for retinopathy. She is followed by nephrology for her CKD stage 1, and is taking enalapril. She has a visit upcoming with renal in may.       Current Outpatient Medications   Medication     albuterol (PROVENTIL) (2.5 MG/3ML) 0.083% neb solution     Ascorbic Acid (VITAMIN C PO)     ASPIRIN NOT PRESCRIBED (INTENTIONAL)     blood glucose test strip     Blood Pressure Monitoring KIT     Cholecalciferol 2000 UNITS TBDP     enalapril (VASOTEC) 20 MG tablet     escitalopram (LEXAPRO) 10 MG tablet     glucagon (GLUCAGON EMERGENCY) 1 MG kit     HUMALOG 100 UNIT/ML injection     hydrOXYzine (ATARAX) 25 MG tablet     insulin pen needle (B-D U/F) 31G X 5 MM miscellaneous     insulin syringes, disposable, U-100 0.3 ML MISC     ipratropium - albuterol 0.5 mg/2.5 mg/3 mL (DUONEB) 0.5-2.5 (3) MG/3ML neb solution     liraglutide (VICTOZA PEN) 18 MG/3ML soln     Multiple Vitamins-Minerals (MULTIVITAMIN ADULT PO)     Ondansetron HCl (ZOFRAN PO)     STATIN NOT PRESCRIBED (INTENTIONAL)     No current facility-administered medications for this visit.      ROS  Gen: No change in skin, hair, temp intolerance. No excess fatigue.  Eyes: No diplopia, redness, itching.  ENT: No dysphagia, dysphonia.  Resp: No SOB, CHILRDESS  CV: No palpitations, tachycardia, CP  GI: No abd pain, n/v, diarrhea  : No hesitancy, frequency, dysuria, hematuria  Neuro: Numbness, tingling  "in hands as described in HPI. No paraesthesias in feet.      SocHx: . She is now working as a  in an [employer] school in [place].    Current Outpatient Medications   Medication     albuterol (PROVENTIL) (2.5 MG/3ML) 0.083% neb solution     Ascorbic Acid (VITAMIN C PO)     ASPIRIN NOT PRESCRIBED (INTENTIONAL)     blood glucose test strip     Blood Pressure Monitoring KIT     Cholecalciferol 2000 UNITS TBDP     enalapril (VASOTEC) 20 MG tablet     escitalopram (LEXAPRO) 10 MG tablet     glucagon (GLUCAGON EMERGENCY) 1 MG kit     HUMALOG 100 UNIT/ML injection     hydrOXYzine (ATARAX) 25 MG tablet     insulin pen needle (B-D U/F) 31G X 5 MM miscellaneous     insulin syringes, disposable, U-100 0.3 ML MISC     ipratropium - albuterol 0.5 mg/2.5 mg/3 mL (DUONEB) 0.5-2.5 (3) MG/3ML neb solution     liraglutide (VICTOZA PEN) 18 MG/3ML soln     Multiple Vitamins-Minerals (MULTIVITAMIN ADULT PO)     Ondansetron HCl (ZOFRAN PO)     STATIN NOT PRESCRIBED (INTENTIONAL)     No current facility-administered medications for this visit.        Physical Exam  Vital signs:      BP: 138/86 Pulse: 92             Weight: 138 kg (304 lb 3.2 oz)  Estimated body mass index is 46.94 kg/m  as calculated from the following:    Height as of 8/21/19: 1.715 m (5' 7.5\").    Weight as of this encounter: 138 kg (304 lb 3.2 oz).    Gen: NAD, VSS  Eyes: EOM grossly intact. Anicteric sclera. No periorbital edema, conjunctival injection.  Neck: No thyromegaly or LAD  Resp: CTAB  CV: RRR, no m/r/g  Neuro: 2/4 DTR biceps. Positive Phalen test, with parasthesias reproduced bilaterally (left > right).  Feet: 3+ DP, PT bilaterally.         Lab Results   Component Value Date    A1C 7.0 (H) 01/17/2018    A1C 7.6 (H) 03/26/2015    HEMOGLOBINA1 7.3 (A) 08/19/2019    HEMOGLOBINA1 6.8 (A) 05/14/2019    HEMOGLOBINA1 6.8 (A) 02/18/2019    HEMOGLOBINA1 7.7 (A) 11/13/2018    HEMOGLOBINA1 7.0 (A) 04/10/2018       TSH   Date Value " Ref Range Status   06/07/2019 3.91 0.40 - 4.00 mU/L Final   03/04/2019 3.41 0.40 - 4.00 mU/L Final   10/10/2016 2.89 0.40 - 4.00 mU/L Final   01/16/2016 3.33 0.40 - 4.00 mU/L Final   12/09/2013 2.95 0.4 - 5.0 mU/L Final       T4 Free   Date Value Ref Range Status   12/09/2013 1.18 0.70 - 1.85 ng/dL Final     GFR Estimate   Date Value Ref Range Status   05/17/2019 >90 >60 mL/min/[1.73_m2] Final     Comment:     Non  GFR Calc  Starting 12/18/2018, serum creatinine based estimated GFR (eGFR) will be   calculated using the Chronic Kidney Disease Epidemiology Collaboration   (CKD-EPI) equation.       GFR Estimate If Black   Date Value Ref Range Status   05/17/2019 >90 >60 mL/min/[1.73_m2] Final     Comment:      GFR Calc  Starting 12/18/2018, serum creatinine based estimated GFR (eGFR) will be   calculated using the Chronic Kidney Disease Epidemiology Collaboration   (CKD-EPI) equation.       Lab Results   Component Value Date    MICROL <5 06/07/2019       Recent Labs   Lab Test 06/07/19  0858 03/04/19  0854 08/04/15  1443 12/09/13  1137   CHOL 195 189 194 241*   HDL 55 58 66 66   * 106* 102 142*   TRIG 128 126 131 165*   CHOLHDLRATIO  --   --  2.9 3.7       A1c today: 7.0%.    Assessment/Plan:     #Type 1 DM: Stacey continues to have overnight lows. She has lowered her basal rates independently to help address these lows (most recent change was Sunday), but the low BGs persist. These overnight lows are likely associated with her new exercise routine, especially since she works out at the end of the day, she   -- We discussed benefits of lowering her basal rate by 50% 1 hour before exercise, which may help with her post-exercise lows. She disconnects from her pump during exercise due to discomfort, and will continue to do so.   -- On nights that she exercises, she will try a temporary basal of 80% overnight, which may help with her overnight lows. No other changes to her basal rates  or settings.  -- She will meet with educator once she upgrades to the T-slim X2.     #Diabetes risk:   -- Retinopathy:  No concerns currently, upcoming optho.   -- Nephropathy: BP was well controlled today. She is taking enalapril and is being followed by nephrology.  -- Neuropathy: No concerns.    #CVD risk: BP well controlled today. Her LDL was 114 in 06/19. She stopped pravastatin in anticipation of pregnancy, and does not want to resume a statin at this time.     #Hypothyroidism: Last TSH was 3.91 in 06/2019 and no levothyroxine. She is asymptomatic and appears euthyroid. Will check TSH today, especially given her new symptoms of tingling in her hands.     #Tingling in hands: Stacey experiences tingling in her hands at night and when doing needlework, and resolves with stretching. She also had a positive phalen test today. which likely makes this carpal tunnel. She will start by using a hand splint at night to alleviate compression, and will follow-up with PCP if the discomfort persists. Also checking TSH today.     F/U in 3 months with Laureen and 6-months with Dr. Allan.       Elida Godfrey, MS3

## 2020-01-28 NOTE — NURSING NOTE
Chief Complaint   Patient presents with     RECHECK     Type 1 Diabetes     Capillary puncture performed for Hemoglobin A1C test. Patient tolerated well.    Dulce Maria Faye MA

## 2020-02-06 DIAGNOSIS — R80.9 PROTEINURIA: ICD-10-CM

## 2020-02-07 RX ORDER — ENALAPRIL MALEATE 20 MG/1
TABLET ORAL
Qty: 180 TABLET | Refills: 3 | Status: SHIPPED | OUTPATIENT
Start: 2020-02-07 | End: 2020-04-16

## 2020-02-17 DIAGNOSIS — E10.21 TYPE 1 DIABETES MELLITUS WITH DIABETIC NEPHROPATHY (H): ICD-10-CM

## 2020-02-17 LAB — HBA1C MFR BLD: 7.3 % (ref 0–5.6)

## 2020-02-17 PROCEDURE — 36415 COLL VENOUS BLD VENIPUNCTURE: CPT | Performed by: INTERNAL MEDICINE

## 2020-02-17 PROCEDURE — 84443 ASSAY THYROID STIM HORMONE: CPT | Performed by: INTERNAL MEDICINE

## 2020-02-17 PROCEDURE — 83036 HEMOGLOBIN GLYCOSYLATED A1C: CPT | Performed by: INTERNAL MEDICINE

## 2020-02-18 ENCOUNTER — TRANSFERRED RECORDS (OUTPATIENT)
Dept: HEALTH INFORMATION MANAGEMENT | Facility: CLINIC | Age: 25
End: 2020-02-18

## 2020-02-18 LAB — TSH SERPL DL<=0.005 MIU/L-ACNC: 3.97 MU/L (ref 0.4–4)

## 2020-03-10 ENCOUNTER — MEDICAL CORRESPONDENCE (OUTPATIENT)
Dept: HEALTH INFORMATION MANAGEMENT | Facility: CLINIC | Age: 25
End: 2020-03-10

## 2020-03-13 ENCOUNTER — TRANSFERRED RECORDS (OUTPATIENT)
Dept: HEALTH INFORMATION MANAGEMENT | Facility: CLINIC | Age: 25
End: 2020-03-13

## 2020-04-02 ENCOUNTER — DOCUMENTATION ONLY (OUTPATIENT)
Dept: CARE COORDINATION | Facility: CLINIC | Age: 25
End: 2020-04-02

## 2020-04-16 DIAGNOSIS — R80.9 PROTEINURIA: ICD-10-CM

## 2020-04-16 RX ORDER — ENALAPRIL MALEATE 20 MG/1
20 TABLET ORAL 2 TIMES DAILY
Qty: 180 TABLET | Refills: 3 | Status: SHIPPED | OUTPATIENT
Start: 2020-04-16 | End: 2021-02-03

## 2020-05-05 DIAGNOSIS — N18.1 CKD (CHRONIC KIDNEY DISEASE) STAGE 1, GFR 90 ML/MIN OR GREATER: Primary | ICD-10-CM

## 2020-05-10 ENCOUNTER — MYC MEDICAL ADVICE (OUTPATIENT)
Dept: ENDOCRINOLOGY | Facility: CLINIC | Age: 25
End: 2020-05-10

## 2020-05-10 DIAGNOSIS — E10.9 WELL CONTROLLED TYPE 1 DIABETES MELLITUS (H): Primary | ICD-10-CM

## 2020-05-18 ENCOUNTER — VIRTUAL VISIT (OUTPATIENT)
Dept: NEPHROLOGY | Facility: CLINIC | Age: 25
End: 2020-05-18
Attending: INTERNAL MEDICINE
Payer: COMMERCIAL

## 2020-05-18 DIAGNOSIS — E10.21 DIABETIC NEPHROPATHY ASSOCIATED WITH TYPE 1 DIABETES MELLITUS (H): ICD-10-CM

## 2020-05-18 DIAGNOSIS — N18.1 CHRONIC KIDNEY DISEASE, STAGE I: Primary | ICD-10-CM

## 2020-05-18 DIAGNOSIS — E10.21 TYPE 1 DIABETES MELLITUS WITH DIABETIC NEPHROPATHY (H): ICD-10-CM

## 2020-05-18 ASSESSMENT — PAIN SCALES - GENERAL: PAINLEVEL: NO PAIN (0)

## 2020-05-18 NOTE — PROGRESS NOTES
"Stacey La is a 24 year old female who is being evaluated via a billable video visit.      The patient has been notified of following:     \"This video visit will be conducted via a call between you and your physician/provider. We have found that certain health care needs can be provided without the need for an in-person physical exam.  This service lets us provide the care you need with a video conversation.  If a prescription is necessary we can send it directly to your pharmacy.  If lab work is needed we can place an order for that and you can then stop by our lab to have the test done at a later time.    Video visits are billed at different rates depending on your insurance coverage.  Please reach out to your insurance provider with any questions.    If during the course of the call the physician/provider feels a video visit is not appropriate, you will not be charged for this service.\"    Patient has given verbal consent for Video visit? Yes    How would you like to obtain your AVS? Haley    Patient would like the video invitation sent by: Send to e-mail at: [email or web address]    Will anyone else be joining your video visit? No    Video-Visit Details    Type of service:  Video Visit    Video Start Time: 8:00  Video End Time: 8:30    Originating Location (pt. Location): Home    Distant Location (provider location):  [facility] NEPHROLOGY     Platform used for Video Visit: Brian Castaneda MD      "

## 2020-05-18 NOTE — LETTER
"2020       RE: Stacey La  [id number]  [facility] [address]     Dear Colleague,    Thank you for referring your patient, Stacey La, to the [facility] NEPHROLOGY at [facility]. Please see a copy of my visit note below.      Nephrology Clinic - Video Visit    Connor Castaneda MD   DOS:2020     Name: Stacey La  MRN: [id number]  Age: 23 year old  : 1995  Referring provider: Amalia Ervin     Assessment and Plan:  1.  CKD, stage 1: Secondary to biopsy proven diabetic nephropathy with preserved kidney function (baseline Cr~0.6 mg/dL, eGFR>90) and well controlled albuminuria (<30 mg/g). However, suspect significant hyperfiltration due to DM 2 and obesity. She has responded well to RAAS blockade with enalapril.     -- Will continue enalapril at 20 mg BID for management of HTN and albuminuria. Monitor closely for unintended pregnancy.    -- Continue good blood pressure and glucose control  -- Encourage weight loss  -- RTC in 1 year     2. HTN/Volume: Home SBP at goal of <130/80.  Mildly hypervolemic on exam in past. Suspect minimal lower extremity edema is related to high salt intake in a setting of sodium avid kidneys and venous stasis. Suspect an element of \"white coat\" hypertension with her clinic blood pressures.  A 24 hour ambulatory BP monitoring was performed following a previous clinic visit that revealed average overall daily BP of 124/68 confirming white coat hypertension. However, she did not have a night time dipping pattern.       -- Continue low salt diet  -- Continue enalapril at 20 mg BID  -- Will consider carvedilol next should her home BP rise above goal     3. Diabetes:  Complicated by diabetic nephropathy. No retinopathy or peripheral neuropathy. She has had ~5 episodes of DKA in the past. Recent hgb A1c was 7.3  (2020).   -- Followed closely by endocrinology     4.  Vitamin D deficiency: Low vitamin D " level multiple times in the past. Vit D normalized but low again at 18 in January 2018.     -- Continue cholecalciferol to 2000 units daily  -- Check Vitamin D with next lab draw.     5.  Depression/anxiety:  In part, situational and related to stress at home, her job, passing of a loved one and her medical illness.    -- she no longer appears to be on lexapro at 10 mg daily that was started at a previous clinic visit over a year ago.  She is no longer on Buspar and does not take hydroxyzine prn.   -- Followed closely by her PCP and psychology/psychiatry.     6.  Iron deficiency:  Not anemic (hgb 14). Iron studies on low side.  Suspect iron deficiency related to menses and possibly decreased GI absorption.    -- She would like to avoid iron supplements for now; encouraged her to increase intake of iron containing foods.    -- Will repeat hgb and iron studies at next visit     7. Acquired hypothyroidism: Patient is not being treated for hypothyroidism with Levothyroxine. Recent TSHwithin normal range.  - f/u with endocrinology    Follow-up: No follow-ups on file.     Reason For Visit: Follow Up    HPI:   Stacey La is a 24 year old woman with a history of Type 1 DM and celiac disease who presents for follow up regarding chronic kidney disease. She was previously cared for by her pediatric nephrologist, Dr. India Olivas. She was last seen by me on 03/04/2019, please refer to that note for further details. Today, the patient reports feeling well overall. Her hemoglobin A1c was 7.3 in Feb, 2020. She is not actively trying for a baby at this point in time. She follows up with Endocrinology for her thyroid and notes that she hasn't been taking levothyroxine for some time. She otherwise is well and has no specific medical complaints.      Review of Systems:   Pertinent items are noted in HPI or as below, remainder of complete ROS is negative.      Active Medications:   Current Outpatient Medications   Medication      "albuterol (PROVENTIL) (2.5 MG/3ML) 0.083% neb solution     Ascorbic Acid (VITAMIN C PO)     blood glucose test strip     Cholecalciferol 2000 UNITS TBDP     enalapril (VASOTEC) 20 MG tablet     glucagon (GLUCAGON EMERGENCY) 1 MG kit     insulin syringes, disposable, U-100 0.3 ML MISC     Multiple Vitamins-Minerals (MULTIVITAMIN ADULT PO)     Ondansetron HCl (ZOFRAN PO)     albuterol (PROAIR HFA/PROVENTIL HFA/VENTOLIN HFA) 108 (90 Base) MCG/ACT inhaler     ASPIRIN NOT PRESCRIBED (INTENTIONAL)     Blood Pressure Monitoring KIT     escitalopram (LEXAPRO) 10 MG tablet     hydrOXYzine (ATARAX) 25 MG tablet     insulin detemir (LEVEMIR VIAL) 100 UNIT/ML vial     Insulin Infusion Pump Supplies (INSULIN PUMP SYRINGE RESERVOIR) MISC     insulin lispro (HUMALOG VIAL) 100 UNIT/ML vial     insulin pen needle (B-D U/F) 31G X 5 MM miscellaneous     Insulin Pump Accessories MISC     insulin syringe-needle U-100 (31G X 5/16\" 0.5 ML) 31G X 5/16\" 0.5 ML miscellaneous     ipratropium - albuterol 0.5 mg/2.5 mg/3 mL (DUONEB) 0.5-2.5 (3) MG/3ML neb solution     KETOSTIX test strip     STATIN NOT PRESCRIBED (INTENTIONAL)     No current facility-administered medications for this visit.       Allergies:   Dogs  Dust mites  Gluten meal      Past Medical History:  Anxiety  Asthma  Celiac disease  Chronic kidney disease, stage I  Chronic sinusitis  Diabetes mellitus type 1  Diabetic nephropathy  Hypertension  Hypothyroidism  Pedal edema  Psoriasis    Past Surgical History:  ENT Surgery  Tonsillectomy, adenoidectomy, combined    Family History:   Father (Diabetes, Myocardial Infarct)  Mother (Celiacs)  Maternal Grandfather (Enlarged heart)    Social History:   The patient is . The patient drinks roughly two times a month. She is currently sexually active with male partners and uses condoms for birth control.The patient has no reported social history of smoking, smokeless tobacco use, or drug use.     Physical Exam:  There were no vitals " taken for this visit.   Deferred as encounter was a video visit.      Laboratory:  CMP  Recent Labs   Lab Test 05/17/19  1528 05/13/19  1450 03/04/19  0854 02/11/19  2138  01/17/18  1515 07/25/17  1455 01/24/17  1525  02/20/16  1827  08/04/15  1443  03/25/15  1655  12/09/13  1137    136 138 138   < > 136 134 137   < > 139   < >  --    < > 138   < > 137   POTASSIUM 3.6 3.9 3.8 3.9   < > 4.7 4.2 4.2   < > 3.6   < >  --    < > 3.8   < > 3.9   CHLORIDE 107 105 105 106   < > 103 103 103   < > 109   < >  --    < > 105   < > 103   CO2 25 23 27 24   < > 28 25 26   < > 25   < >  --    < > 25   < > 25   ANIONGAP 9 8 6 8   < > 6 6 8   < > 5   < >  --    < > 8   < > 9   GLC 95 175* 72 198*   < > 118* 211* 107*   < > 55*   < >  --    < > 73   < > 202*   BUN 9 8 9 12   < > 13 7 7   < > 8   < >  --    < > 16   < > 11   CR 0.62 0.59 0.51* 0.66   < > 0.70 0.56 0.55   < > 0.67   < >  --    < > 0.55   < > 0.48*   GFRESTIMATED >90 >90 >90 >90   < > >90 >90  Non  GFR Calc   >90  Non  GFR Calc     < > >90  Non  GFR Calc     < >  --    < > >90  Non  GFR Calc     < > >90   GFRESTBLACK >90 >90 >90 >90   < > >90 >90   GFR Calc   >90   GFR Calc     < > >90   GFR Calc     < >  --    < > >90   GFR Calc     < > >90   FRANKLIN 9.0 9.2 9.0 8.8   < > 8.8 8.8 9.3   < > 8.3*   < >  --    < > 9.0   < > 9.6   PHOS  --   --  3.4  --   --  3.8 2.7 3.4   < >  --    < >  --    < >  --    < >  --    PROTTOTAL 8.3  --   --   --   --   --   --   --   --  7.5  --   --   --  8.3  --  7.9   ALBUMIN 4.0  --  3.5  --   --  3.4 3.7 4.0   < > 3.6   < >  --    < > 4.1   < > 4.2   BILITOTAL 0.2  --   --   --   --   --   --   --   --  0.2  --   --   --  0.4  --  0.3   ALKPHOS 88  --   --   --   --   --   --   --   --  67  --   --   --  82  --  92   AST 14  --   --   --   --   --   --   --   --  17  --   --   --  14  --  23   ALT 33  --   --    --   --   --   --   --   --  26  --  <5*  --  29  --  32    < > = values in this interval not displayed.     CBC  Recent Labs   Lab Test 06/07/19  0858 05/28/19  1416 05/17/19  1528 05/13/19  1450   HGB 13.9 13.6 14.1 14.5   WBC 9.8 12.1* 9.9 14.1*   RBC 4.97 4.91 5.06 5.26*   HCT 42.6 42.0 42.8 45.0   MCV 86 86 85 86   MCH 28.0 27.7 27.9 27.6   MCHC 32.6 32.4 32.9 32.2   RDW 13.6 13.4 13.3 13.3    402 375 396     INR  No lab results found.     ABG  No lab results found.     URINE STUDIES  Recent Labs   Lab Test 05/17/19  1511 03/25/15  1810 07/22/14  1506 03/06/14  1040 10/18/12  1210   COLOR Straw Yellow Straw Straw Light Yellow   APPEARANCE Clear Slightly Cloudy Clear Clear Clear   URINEGLC Negative Negative Negative Negative Negative   URINEBILI Negative Negative Negative Negative Negative   URINEKETONE Negative Negative Negative Negative Negative   SG 1.002* 1.021 1.012 1.004 1.009   UBLD Negative Trace* Negative Negative Negative   URINEPH 7.0 5.5 7.0 7.0 7.5*   PROTEIN Negative 10* Negative Negative 10*   NITRITE Negative Negative Negative Negative Negative   LEUKEST Negative Negative Negative Negative Negative   RBCU  --  1 1 1 1   WBCU  --  1 0 <1 1     Recent Labs   Lab Test 03/04/19  0900 01/17/18  1517 07/25/17  1457 01/24/17  1533 07/26/16  1135 01/16/16  0906 07/28/15  1522 01/19/15  1554 07/22/14  1506 03/06/14  0948 10/18/12  1210 04/12/12  0907   UTPG Unable to calculate due to low value Unable to calculate due to low value Unable to calculate due to low value Unable to calculate due to low value 0.23* 0.15 Unable to calculate due to low value Unable to calculate due to low value <0.10 0.13 0.49* 0.30*     PTH  Recent Labs   Lab Test 01/17/18  1515 01/24/17  1525 01/16/16  0905 07/22/14  1519   PTHI 60 24 31 18     IRON STUDIES   Recent Labs   Lab Test 01/17/18  1515 07/25/17  1455 07/26/16  1137 01/16/16  0905 07/22/14  1519   IRON 58 50 60 58 27*    374 423 412 372   IRONSAT 14* 13*  "14* 14* 7*   DIPESH 13 26 13  --  9*     Imaging:   Not applicable to this visit.     Total time spent was 30 minutes, and more than 50% of face to face time was spent in counseling and/or coordination of care regarding principles of multidisciplinary care, medication management, and chronic kidney disease education.  Connor Castaneda MD     Stacey La is a 24 year old female who is being evaluated via a billable video visit.      The patient has been notified of following:     \"This video visit will be conducted via a call between you and your physician/provider. We have found that certain health care needs can be provided without the need for an in-person physical exam.  This service lets us provide the care you need with a video conversation.  If a prescription is necessary we can send it directly to your pharmacy.  If lab work is needed we can place an order for that and you can then stop by our lab to have the test done at a later time.    Video visits are billed at different rates depending on your insurance coverage.  Please reach out to your insurance provider with any questions.    If during the course of the call the physician/provider feels a video visit is not appropriate, you will not be charged for this service.\"    Patient has given verbal consent for Video visit? Yes    How would you like to obtain your AVS? Nyasiahart    Patient would like the video invitation sent by: Send to e-mail at: [email or web address]    Will anyone else be joining your video visit? No    Video-Visit Details    Type of service:  Video Visit    Video Start Time: 8:00 AM  Video End Time: 8:30 AM    Originating Location (pt. Location): Home    Distant Location (provider location):  [facility] NEPHROLOGY     Platform used for Video Visit: Brijesh Castaneda MD    Total time spent was 30 minutes, and more than 50% of face to face time was spent in counseling and/or coordination of care regarding " principles of multidisciplinary care, medication management, and chronic kidney disease education.  Connor Castaneda MD

## 2020-05-18 NOTE — PROGRESS NOTES
"  Nephrology Clinic - Video Visit    Connor Castaneda MD   DOS:2020     Name: Stacey La  MRN: [id number]  Age: 23 year old  : 1995  Referring provider: Amalia Ervin     Assessment and Plan:  1.  CKD, stage 1: Secondary to biopsy proven diabetic nephropathy with preserved kidney function (baseline Cr~0.6 mg/dL, eGFR>90) and well controlled albuminuria (<30 mg/g). However, suspect significant hyperfiltration due to DM 2 and obesity. She has responded well to RAAS blockade with enalapril.     -- Will continue enalapril at 20 mg BID for management of HTN and albuminuria. Monitor closely for unintended pregnancy.    -- Continue good blood pressure and glucose control  -- Encourage weight loss  -- RTC in 1 year     2. HTN/Volume: Home SBP at goal of <130/80.  Mildly hypervolemic on exam in past. Suspect minimal lower extremity edema is related to high salt intake in a setting of sodium avid kidneys and venous stasis. Suspect an element of \"white coat\" hypertension with her clinic blood pressures.  A 24 hour ambulatory BP monitoring was performed following a previous clinic visit that revealed average overall daily BP of 124/68 confirming white coat hypertension. However, she did not have a night time dipping pattern.       -- Continue low salt diet  -- Continue enalapril at 20 mg BID  -- Will consider carvedilol next should her home BP rise above goal     3. Diabetes:  Complicated by diabetic nephropathy. No retinopathy or peripheral neuropathy. She has had ~5 episodes of DKA in the past. Recent hgb A1c was 7.3  (2020).   -- Followed closely by endocrinology     4.  Vitamin D deficiency: Low vitamin D level multiple times in the past. Vit D normalized but low again at 18 in 2018.     -- Continue cholecalciferol to 2000 units daily  -- Check Vitamin D with next lab draw.     5.  Depression/anxiety:  In part, situational and related to stress at home, her job, passing of a " loved one and her medical illness.    -- she no longer appears to be on lexapro at 10 mg daily that was started at a previous clinic visit over a year ago.  She is no longer on Buspar and does not take hydroxyzine prn.   -- Followed closely by her PCP and psychology/psychiatry.     6.  Iron deficiency:  Not anemic (hgb 14). Iron studies on low side.  Suspect iron deficiency related to menses and possibly decreased GI absorption.    -- She would like to avoid iron supplements for now; encouraged her to increase intake of iron containing foods.    -- Will repeat hgb and iron studies at next visit     7. Acquired hypothyroidism: Patient is not being treated for hypothyroidism with Levothyroxine. Recent TSHwithin normal range.  - f/u with endocrinology    Follow-up: No follow-ups on file.     Reason For Visit: Follow Up    HPI:   Stacey La is a 24 year old woman with a history of Type 1 DM and celiac disease who presents for follow up regarding chronic kidney disease. She was previously cared for by her pediatric nephrologist, Dr. India Olivas. She was last seen by me on 03/04/2019, please refer to that note for further details. Today, the patient reports feeling well overall. Her hemoglobin A1c was 7.3 in Feb, 2020. She is not actively trying for a baby at this point in time. She follows up with Endocrinology for her thyroid and notes that she hasn't been taking levothyroxine for some time. She otherwise is well and has no specific medical complaints.      Review of Systems:   Pertinent items are noted in HPI or as below, remainder of complete ROS is negative.      Active Medications:   Current Outpatient Medications   Medication     albuterol (PROVENTIL) (2.5 MG/3ML) 0.083% neb solution     Ascorbic Acid (VITAMIN C PO)     blood glucose test strip     Cholecalciferol 2000 UNITS TBDP     enalapril (VASOTEC) 20 MG tablet     glucagon (GLUCAGON EMERGENCY) 1 MG kit     insulin syringes, disposable, U-100 0.3 ML  "MISC     Multiple Vitamins-Minerals (MULTIVITAMIN ADULT PO)     Ondansetron HCl (ZOFRAN PO)     albuterol (PROAIR HFA/PROVENTIL HFA/VENTOLIN HFA) 108 (90 Base) MCG/ACT inhaler     ASPIRIN NOT PRESCRIBED (INTENTIONAL)     Blood Pressure Monitoring KIT     escitalopram (LEXAPRO) 10 MG tablet     hydrOXYzine (ATARAX) 25 MG tablet     insulin detemir (LEVEMIR VIAL) 100 UNIT/ML vial     Insulin Infusion Pump Supplies (INSULIN PUMP SYRINGE RESERVOIR) MISC     insulin lispro (HUMALOG VIAL) 100 UNIT/ML vial     insulin pen needle (B-D U/F) 31G X 5 MM miscellaneous     Insulin Pump Accessories MISC     insulin syringe-needle U-100 (31G X 5/16\" 0.5 ML) 31G X 5/16\" 0.5 ML miscellaneous     ipratropium - albuterol 0.5 mg/2.5 mg/3 mL (DUONEB) 0.5-2.5 (3) MG/3ML neb solution     KETOSTIX test strip     STATIN NOT PRESCRIBED (INTENTIONAL)     No current facility-administered medications for this visit.       Allergies:   Dogs  Dust mites  Gluten meal      Past Medical History:  Anxiety  Asthma  Celiac disease  Chronic kidney disease, stage I  Chronic sinusitis  Diabetes mellitus type 1  Diabetic nephropathy  Hypertension  Hypothyroidism  Pedal edema  Psoriasis    Past Surgical History:  ENT Surgery  Tonsillectomy, adenoidectomy, combined    Family History:   Father (Diabetes, Myocardial Infarct)  Mother (Celiacs)  Maternal Grandfather (Enlarged heart)    Social History:   The patient is . The patient drinks roughly two times a month. She is currently sexually active with male partners and uses condoms for birth control.The patient has no reported social history of smoking, smokeless tobacco use, or drug use.     Physical Exam:  There were no vitals taken for this visit.   Deferred as encounter was a video visit.      Laboratory:  CMP  Recent Labs   Lab Test 05/17/19  1528 05/13/19  1450 03/04/19  0854 02/11/19  2138  01/17/18  1515 07/25/17  1455 01/24/17  1525  02/20/16  1827  08/04/15  1443  03/25/15  1655  12/09/13  1137 "    136 138 138   < > 136 134 137   < > 139   < >  --    < > 138   < > 137   POTASSIUM 3.6 3.9 3.8 3.9   < > 4.7 4.2 4.2   < > 3.6   < >  --    < > 3.8   < > 3.9   CHLORIDE 107 105 105 106   < > 103 103 103   < > 109   < >  --    < > 105   < > 103   CO2 25 23 27 24   < > 28 25 26   < > 25   < >  --    < > 25   < > 25   ANIONGAP 9 8 6 8   < > 6 6 8   < > 5   < >  --    < > 8   < > 9   GLC 95 175* 72 198*   < > 118* 211* 107*   < > 55*   < >  --    < > 73   < > 202*   BUN 9 8 9 12   < > 13 7 7   < > 8   < >  --    < > 16   < > 11   CR 0.62 0.59 0.51* 0.66   < > 0.70 0.56 0.55   < > 0.67   < >  --    < > 0.55   < > 0.48*   GFRESTIMATED >90 >90 >90 >90   < > >90 >90  Non  GFR Calc   >90  Non  GFR Calc     < > >90  Non  GFR Calc     < >  --    < > >90  Non  GFR Calc     < > >90   GFRESTBLACK >90 >90 >90 >90   < > >90 >90   GFR Calc   >90   GFR Calc     < > >90   GFR Calc     < >  --    < > >90   GFR Calc     < > >90   FRANKLIN 9.0 9.2 9.0 8.8   < > 8.8 8.8 9.3   < > 8.3*   < >  --    < > 9.0   < > 9.6   PHOS  --   --  3.4  --   --  3.8 2.7 3.4   < >  --    < >  --    < >  --    < >  --    PROTTOTAL 8.3  --   --   --   --   --   --   --   --  7.5  --   --   --  8.3  --  7.9   ALBUMIN 4.0  --  3.5  --   --  3.4 3.7 4.0   < > 3.6   < >  --    < > 4.1   < > 4.2   BILITOTAL 0.2  --   --   --   --   --   --   --   --  0.2  --   --   --  0.4  --  0.3   ALKPHOS 88  --   --   --   --   --   --   --   --  67  --   --   --  82  --  92   AST 14  --   --   --   --   --   --   --   --  17  --   --   --  14  --  23   ALT 33  --   --   --   --   --   --   --   --  26  --  <5*  --  29  --  32    < > = values in this interval not displayed.     CBC  Recent Labs   Lab Test 06/07/19  0858 05/28/19  1416 05/17/19  1528 05/13/19  1450   HGB 13.9 13.6 14.1 14.5   WBC 9.8 12.1* 9.9 14.1*   RBC 4.97 4.91 5.06 5.26*    HCT 42.6 42.0 42.8 45.0   MCV 86 86 85 86   MCH 28.0 27.7 27.9 27.6   MCHC 32.6 32.4 32.9 32.2   RDW 13.6 13.4 13.3 13.3    402 375 396     INR  No lab results found.     ABG  No lab results found.     URINE STUDIES  Recent Labs   Lab Test 05/17/19  1511 03/25/15  1810 07/22/14  1506 03/06/14  1040 10/18/12  1210   COLOR Straw Yellow Straw Straw Light Yellow   APPEARANCE Clear Slightly Cloudy Clear Clear Clear   URINEGLC Negative Negative Negative Negative Negative   URINEBILI Negative Negative Negative Negative Negative   URINEKETONE Negative Negative Negative Negative Negative   SG 1.002* 1.021 1.012 1.004 1.009   UBLD Negative Trace* Negative Negative Negative   URINEPH 7.0 5.5 7.0 7.0 7.5*   PROTEIN Negative 10* Negative Negative 10*   NITRITE Negative Negative Negative Negative Negative   LEUKEST Negative Negative Negative Negative Negative   RBCU  --  1 1 1 1   WBCU  --  1 0 <1 1     Recent Labs   Lab Test 03/04/19  0900 01/17/18  1517 07/25/17  1457 01/24/17  1533 07/26/16  1135 01/16/16  0906 07/28/15  1522 01/19/15  1554 07/22/14  1506 03/06/14  0948 10/18/12  1210 04/12/12  0907   UTPG Unable to calculate due to low value Unable to calculate due to low value Unable to calculate due to low value Unable to calculate due to low value 0.23* 0.15 Unable to calculate due to low value Unable to calculate due to low value <0.10 0.13 0.49* 0.30*     PTH  Recent Labs   Lab Test 01/17/18  1515 01/24/17  1525 01/16/16  0905 07/22/14  1519   PTHI 60 24 31 18     IRON STUDIES   Recent Labs   Lab Test 01/17/18  1515 07/25/17  1455 07/26/16  1137 01/16/16  0905 07/22/14  1519   IRON 58 50 60 58 27*    374 423 412 372   IRONSAT 14* 13* 14* 14* 7*   DIPESH 13 26 13  --  9*     Imaging:   Not applicable to this visit.     Total time spent was 30 minutes, and more than 50% of face to face time was spent in counseling and/or coordination of care regarding principles of multidisciplinary care, medication  "management, and chronic kidney disease education.  Connor Castaneda MD     Stacey La is a 24 year old female who is being evaluated via a billable video visit.      The patient has been notified of following:     \"This video visit will be conducted via a call between you and your physician/provider. We have found that certain health care needs can be provided without the need for an in-person physical exam.  This service lets us provide the care you need with a video conversation.  If a prescription is necessary we can send it directly to your pharmacy.  If lab work is needed we can place an order for that and you can then stop by our lab to have the test done at a later time.    Video visits are billed at different rates depending on your insurance coverage.  Please reach out to your insurance provider with any questions.    If during the course of the call the physician/provider feels a video visit is not appropriate, you will not be charged for this service.\"    Patient has given verbal consent for Video visit? Yes    How would you like to obtain your AVS? Haley    Patient would like the video invitation sent by: Send to e-mail at: [email or web address]    Will anyone else be joining your video visit? No    Video-Visit Details    Type of service:  Video Visit    Video Start Time: 8:00 AM  Video End Time: 8:30 AM    Originating Location (pt. Location): Home    Distant Location (provider location):  [facility] NEPHROLOGY     Platform used for Video Visit: Brian Castaneda MD    Total time spent was 30 minutes, and more than 50% of face to face time was spent in counseling and/or coordination of care regarding principles of multidisciplinary care, medication management, and chronic kidney disease education.  Connor Castaneda MD     "

## 2020-05-19 DIAGNOSIS — F41.8 DEPRESSION WITH ANXIETY: ICD-10-CM

## 2020-05-19 DIAGNOSIS — N18.1 CKD (CHRONIC KIDNEY DISEASE) STAGE 1, GFR 90 ML/MIN OR GREATER: ICD-10-CM

## 2020-05-19 LAB
HGB BLD-MCNC: 14 G/DL (ref 11.7–15.7)
PROT UR-MCNC: <0.05 G/L
PROT/CREAT 24H UR: NORMAL G/G CR (ref 0–0.2)
PTH-INTACT SERPL-MCNC: 50 PG/ML (ref 18–80)

## 2020-05-19 PROCEDURE — 83970 ASSAY OF PARATHORMONE: CPT | Performed by: INTERNAL MEDICINE

## 2020-05-19 PROCEDURE — 83550 IRON BINDING TEST: CPT | Performed by: INTERNAL MEDICINE

## 2020-05-19 PROCEDURE — 82306 VITAMIN D 25 HYDROXY: CPT | Performed by: INTERNAL MEDICINE

## 2020-05-19 PROCEDURE — 82043 UR ALBUMIN QUANTITATIVE: CPT | Performed by: INTERNAL MEDICINE

## 2020-05-19 PROCEDURE — 84156 ASSAY OF PROTEIN URINE: CPT | Performed by: INTERNAL MEDICINE

## 2020-05-19 PROCEDURE — 85018 HEMOGLOBIN: CPT | Performed by: INTERNAL MEDICINE

## 2020-05-19 PROCEDURE — 82728 ASSAY OF FERRITIN: CPT | Performed by: INTERNAL MEDICINE

## 2020-05-19 PROCEDURE — 80069 RENAL FUNCTION PANEL: CPT | Performed by: INTERNAL MEDICINE

## 2020-05-19 PROCEDURE — 36415 COLL VENOUS BLD VENIPUNCTURE: CPT | Performed by: INTERNAL MEDICINE

## 2020-05-19 PROCEDURE — 83540 ASSAY OF IRON: CPT | Performed by: INTERNAL MEDICINE

## 2020-05-19 RX ORDER — ESCITALOPRAM OXALATE 10 MG/1
TABLET ORAL
Qty: 90 TABLET | Refills: 0 | Status: SHIPPED | OUTPATIENT
Start: 2020-05-19 | End: 2020-05-29

## 2020-05-19 NOTE — TELEPHONE ENCOUNTER
Routing refill request to provider for review/approval because:  Labs not current:  PHQ    PHQ 5/28/2019 5/29/2019   PHQ-9 Total Score 6 6   Q9: Thoughts of better off dead/self-harm past 2 weeks Not at all Not at all   Some encounter information is confidential and restricted. Go to Review Flowsheets activity to see all data.     Joi GRULLON RN, BSN

## 2020-05-19 NOTE — TELEPHONE ENCOUNTER
90 day fill given.  Overdue for yearly med check.  Please schedule at least a 30 minute phone or video visit.

## 2020-05-20 LAB
ALBUMIN SERPL-MCNC: 3.6 G/DL (ref 3.4–5)
ANION GAP SERPL CALCULATED.3IONS-SCNC: 12 MMOL/L (ref 3–14)
BUN SERPL-MCNC: 9 MG/DL (ref 7–30)
CALCIUM SERPL-MCNC: 9.3 MG/DL (ref 8.5–10.1)
CHLORIDE SERPL-SCNC: 104 MMOL/L (ref 94–109)
CO2 SERPL-SCNC: 22 MMOL/L (ref 20–32)
CREAT SERPL-MCNC: 0.49 MG/DL (ref 0.52–1.04)
CREAT UR-MCNC: 14 MG/DL
FERRITIN SERPL-MCNC: 26 NG/ML (ref 12–150)
GFR SERPL CREATININE-BSD FRML MDRD: >90 ML/MIN/{1.73_M2}
GLUCOSE SERPL-MCNC: 180 MG/DL (ref 70–99)
IRON SATN MFR SERPL: 20 % (ref 15–46)
IRON SERPL-MCNC: 81 UG/DL (ref 35–180)
MICROALBUMIN UR-MCNC: <5 MG/L
MICROALBUMIN/CREAT UR: NORMAL MG/G CR (ref 0–25)
PHOSPHATE SERPL-MCNC: 3.7 MG/DL (ref 2.5–4.5)
POTASSIUM SERPL-SCNC: 4.3 MMOL/L (ref 3.4–5.3)
SODIUM SERPL-SCNC: 138 MMOL/L (ref 133–144)
TIBC SERPL-MCNC: 415 UG/DL (ref 240–430)

## 2020-05-22 LAB
DEPRECATED CALCIDIOL+CALCIFEROL SERPL-MC: <42 UG/L (ref 20–75)
VITAMIN D2 SERPL-MCNC: <5 UG/L
VITAMIN D3 SERPL-MCNC: 37 UG/L

## 2020-05-23 NOTE — TELEPHONE ENCOUNTER
No--I haven't seen any requests.  Does Stacey know she needs to tell Hayden to send the script to us?

## 2020-05-29 RX ORDER — LIRAGLUTIDE 6 MG/ML
INJECTION SUBCUTANEOUS
Qty: 9 ML | Refills: 11 | Status: CANCELLED | OUTPATIENT
Start: 2020-05-29

## 2020-05-29 RX ORDER — HYDROXYZINE HYDROCHLORIDE 25 MG/1
25-50 TABLET, FILM COATED ORAL 3 TIMES DAILY PRN
Qty: 20 TABLET | Refills: 0 | Status: CANCELLED | OUTPATIENT
Start: 2020-05-29

## 2020-05-29 NOTE — PROGRESS NOTES
"Stacey La is a 24 year old female who is being evaluated via a billable video visit.      The patient has been notified of following:     \"This video visit will be conducted via a call between you and your physician/provider. We have found that certain health care needs can be provided without the need for an in-person physical exam.  This service lets us provide the care you need with a video conversation.  If a prescription is necessary we can send it directly to your pharmacy.  If lab work is needed we can place an order for that and you can then stop by our lab to have the test done at a later time.    Video visits are billed at different rates depending on your insurance coverage.  Please reach out to your insurance provider with any questions.    If during the course of the call the physician/provider feels a video visit is not appropriate, you will not be charged for this service.\"    Patient has given verbal consent for Video visit? Yes    How would you like to obtain your AVS? Nyasiahart    Patient would like the video invitation sent by: Text to cell phone: [phone]    Will anyone else be joining your video visit? No      Subjective     Stacey La is a 24 year old female who presents today via video visit for the following health issues:    History of Present Illness        Mental Health Follow-up:  Patient presents to follow-up on Depression & Anxiety.Patient's depression since last visit has been:  Medium  The patient is not having other symptoms associated with depression.  Patient's anxiety since last visit has been:  Medium  The patient is not having other symptoms associated with anxiety.  Any significant life events: relationship concerns, health concerns and other  Patient is feeling anxious or having panic attacks.  Patient has no concerns about alcohol or drug use.     Social History  Tobacco Use    Smoking status: Never Smoker    Smokeless tobacco: Never Used  Alcohol use: " Yes    Alcohol/week: 0.0 standard drinks    Comment: couple times per month will have 3 mixed drinks  Drug use: No      Today's PHQ-9         PHQ-9 Total Score:     (P) 5   PHQ-9 Q9 Thoughts of better off dead/self-harm past 2 weeks :   (P) Not at all   Thoughts of suicide or self harm:      Self-harm Plan:        Self-harm Action:          Safety concerns for self or others:           She eats 4 or more servings of fruits and vegetables daily.She consumes 0 sweetened beverage(s) daily.She exercises with enough effort to increase her heart rate 20 to 29 minutes per day.  She exercises with enough effort to increase her heart rate 5 days per week.   She is taking medications regularly.        Social History     Tobacco Use     Smoking status: Never Smoker     Smokeless tobacco: Never Used   Substance Use Topics     Alcohol use: Yes     Alcohol/week: 0.0 standard drinks     Comment: couple times per month will have 3 mixed drinks     Drug use: No     PHQ 5/28/2019 5/29/2019 5/31/2020   PHQ-9 Total Score 6 6 5   Q9: Thoughts of better off dead/self-harm past 2 weeks Not at all Not at all Not at all   Some encounter information is confidential and restricted. Go to Review Flowsheets activity to see all data.     YARELIS-7 SCORE 5/28/2019 5/29/2019 5/31/2020   Total Score - - 5 (mild anxiety)   Total Score 13 13 5     Last PHQ-9 5/31/2020   1.  Little interest or pleasure in doing things 1   2.  Feeling down, depressed, or hopeless 0   3.  Trouble falling or staying asleep, or sleeping too much 1   4.  Feeling tired or having little energy 1   5.  Poor appetite or overeating 1   6.  Feeling bad about yourself 0   7.  Trouble concentrating 1   8.  Moving slowly or restless 0   Q9: Thoughts of better off dead/self-harm past 2 weeks 0   PHQ-9 Total Score 5   Difficulty at work, home, or with people -     YARELIS-7  5/31/2020   1. Feeling nervous, anxious, or on edge 0   2. Not being able to stop or control worrying 1   3. Worrying  "too much about different things 0   4. Trouble relaxing 1   5. Being so restless that it is hard to sit still 1   6. Becoming easily annoyed or irritable 1   7. Feeling afraid, as if something awful might happen 1   YARELIS-7 Total Score 5   If you checked any problems, how difficult have they made it for you to do your work, take care of things at home, or get along with other people? -     Reviewed and updated as needed this visit by Provider         Review of Systems   Constitutional, HEENT, cardiovascular, pulmonary, gi and gu systems are negative, except as otherwise noted.      Diagnostic Test Results:  Labs reviewed in Epic        Assessment & Plan     1. Depression with anxiety  Well controlled. Has situational stressors such as the [employer] unrest and COVID. Doing well overall  -Increase self care.   - escitalopram (LEXAPRO) 10 MG tablet; Take 1 tablet (10 mg) by mouth daily  Dispense: 90 tablet; Refill: 3    2. Type 1 diabetes mellitus with diabetic nephropathy (H)  Sees endo  Due for eye exam, foot exam, and PNA shot    4. Mild asthma without complication, unspecified whether persistent  Well controlled. Only needs during viral illness.   - ipratropium - albuterol 0.5 mg/2.5 mg/3 mL (DUONEB) 0.5-2.5 (3) MG/3ML neb solution; Take 1 vial (3 mLs) by nebulization every 6 hours as needed for shortness of breath / dyspnea or wheezing  Dispense: 30 vial; Refill: 1  - albuterol (PROAIR HFA/PROVENTIL HFA/VENTOLIN HFA) 108 (90 Base) MCG/ACT inhaler; Inhale 2 puffs into the lungs every 6 hours  Dispense: 1 Inhaler; Refill: 0     BMI:   Estimated body mass index is 46.94 kg/m  as calculated from the following:    Height as of 8/21/19: 1.715 m (5' 7.5\").    Weight as of 1/28/20: 138 kg (304 lb 3.2 oz).   Weight management plan: Patient referred to endocrine and/or weight management specialty      Switched to phone visit 10 minutes    LUIS FERNANDO [place] [facility]      "

## 2020-05-31 ASSESSMENT — ANXIETY QUESTIONNAIRES
4. TROUBLE RELAXING: SEVERAL DAYS
GAD7 TOTAL SCORE: 5
7. FEELING AFRAID AS IF SOMETHING AWFUL MIGHT HAPPEN: SEVERAL DAYS
GAD7 TOTAL SCORE: 5
5. BEING SO RESTLESS THAT IT IS HARD TO SIT STILL: SEVERAL DAYS
1. FEELING NERVOUS, ANXIOUS, OR ON EDGE: NOT AT ALL
GAD7 TOTAL SCORE: 5
3. WORRYING TOO MUCH ABOUT DIFFERENT THINGS: NOT AT ALL
6. BECOMING EASILY ANNOYED OR IRRITABLE: SEVERAL DAYS
7. FEELING AFRAID AS IF SOMETHING AWFUL MIGHT HAPPEN: SEVERAL DAYS
2. NOT BEING ABLE TO STOP OR CONTROL WORRYING: SEVERAL DAYS

## 2020-05-31 ASSESSMENT — PATIENT HEALTH QUESTIONNAIRE - PHQ9
SUM OF ALL RESPONSES TO PHQ QUESTIONS 1-9: 5
SUM OF ALL RESPONSES TO PHQ QUESTIONS 1-9: 5
10. IF YOU CHECKED OFF ANY PROBLEMS, HOW DIFFICULT HAVE THESE PROBLEMS MADE IT FOR YOU TO DO YOUR WORK, TAKE CARE OF THINGS AT HOME, OR GET ALONG WITH OTHER PEOPLE: SOMEWHAT DIFFICULT

## 2020-06-01 ENCOUNTER — VIRTUAL VISIT (OUTPATIENT)
Dept: PEDIATRICS | Facility: CLINIC | Age: 25
End: 2020-06-01
Payer: COMMERCIAL

## 2020-06-01 DIAGNOSIS — J45.909 MILD ASTHMA WITHOUT COMPLICATION, UNSPECIFIED WHETHER PERSISTENT: ICD-10-CM

## 2020-06-01 DIAGNOSIS — F41.8 DEPRESSION WITH ANXIETY: ICD-10-CM

## 2020-06-01 DIAGNOSIS — F41.9 ANXIETY: Primary | ICD-10-CM

## 2020-06-01 DIAGNOSIS — E10.21 TYPE 1 DIABETES MELLITUS WITH DIABETIC NEPHROPATHY (H): ICD-10-CM

## 2020-06-01 PROCEDURE — 99214 OFFICE O/P EST MOD 30 MIN: CPT | Mod: TEL | Performed by: NURSE PRACTITIONER

## 2020-06-01 RX ORDER — HYDROXYZINE HYDROCHLORIDE 25 MG/1
25-50 TABLET, FILM COATED ORAL 3 TIMES DAILY PRN
Qty: 30 TABLET | Refills: 3 | Status: SHIPPED | OUTPATIENT
Start: 2020-06-01 | End: 2023-04-21

## 2020-06-01 RX ORDER — ESCITALOPRAM OXALATE 10 MG/1
10 TABLET ORAL DAILY
Qty: 90 TABLET | Refills: 3 | Status: SHIPPED | OUTPATIENT
Start: 2020-06-01 | End: 2021-05-07

## 2020-06-01 RX ORDER — ALBUTEROL SULFATE 90 UG/1
2 AEROSOL, METERED RESPIRATORY (INHALATION) EVERY 6 HOURS
Qty: 1 INHALER | Refills: 0 | Status: SHIPPED | OUTPATIENT
Start: 2020-06-01 | End: 2021-05-07

## 2020-06-01 RX ORDER — IPRATROPIUM BROMIDE AND ALBUTEROL SULFATE 2.5; .5 MG/3ML; MG/3ML
1 SOLUTION RESPIRATORY (INHALATION) EVERY 6 HOURS PRN
Qty: 30 VIAL | Refills: 1 | Status: SHIPPED | OUTPATIENT
Start: 2020-06-01 | End: 2023-04-21

## 2020-06-01 ASSESSMENT — PATIENT HEALTH QUESTIONNAIRE - PHQ9: SUM OF ALL RESPONSES TO PHQ QUESTIONS 1-9: 5

## 2020-06-01 ASSESSMENT — ANXIETY QUESTIONNAIRES: GAD7 TOTAL SCORE: 5

## 2020-06-02 DIAGNOSIS — E10.9 WELL CONTROLLED TYPE 1 DIABETES MELLITUS (H): ICD-10-CM

## 2020-06-02 ASSESSMENT — ASTHMA QUESTIONNAIRES: ACT_TOTALSCORE: 25

## 2020-06-02 NOTE — TELEPHONE ENCOUNTER
Insulin Lispro 100 UNIT/ML Subcutaneous Solution   Last Written Prescription Date:  3/13/2019  Last Fill Quantity: 50,   # refills: 11  Last Office Visit : 1/28/2020  Future Office visit:  None    Routing refill request to provider for review/approval because:  Drug not on the [facility], [facility] or [facility] refill protocol or controlled substance      Maria R Mckeon RN  Central Triage Red Flags/Med Refills

## 2020-06-08 ENCOUNTER — VIRTUAL VISIT (OUTPATIENT)
Dept: ENDOCRINOLOGY | Facility: CLINIC | Age: 25
End: 2020-06-08
Payer: COMMERCIAL

## 2020-06-08 DIAGNOSIS — E10.9 WELL CONTROLLED TYPE 1 DIABETES MELLITUS (H): ICD-10-CM

## 2020-06-08 RX ORDER — SYRINGE-NEEDLE,INSULIN,0.5 ML 27GX1/2"
SYRINGE, EMPTY DISPOSABLE MISCELLANEOUS
Qty: 100 EACH | Refills: 3 | Status: SHIPPED | OUTPATIENT
Start: 2020-06-08 | End: 2024-09-27

## 2020-06-08 RX ORDER — INSULIN DETEMIR 100 [IU]/ML
INJECTION, SOLUTION SUBCUTANEOUS
Qty: 10 ML | Refills: 3 | Status: SHIPPED | OUTPATIENT
Start: 2020-06-08 | End: 2020-09-23

## 2020-06-08 RX ORDER — URINE ACETONE TEST STRIPS
STRIP MISCELLANEOUS
Qty: 25 EACH | Refills: 3 | Status: SHIPPED | OUTPATIENT
Start: 2020-06-08 | End: 2023-12-22

## 2020-06-08 NOTE — PROGRESS NOTES
This visit was converted from an in person visit to a virtual visit due to the ongoing COVID-19 pandemic.    Start time: 1014  End time: 1035    HPI:  Stacey is a 25 yo woman here for follow up of type 1 diabetes, diagnosed at age 7.   She also sees Dr. Allan.  Stacey's diabetes is complicated by nephropathy. She also has hyperlipidemia, a history of hypothyroidism (although she has been off of levothyroxine for several years) and celiac disease.  She follows a strict gluten free diet. She continues wearing a Dexcom G6 sensor and she really likes it.  She feels the accuracy is much better.     She uses the T-slim pump with the integrated sensor.  She likes this. She has been having very little hypoglycemia. She upgraded to control IQ and feels this has been very helpful.      Stacey tests her blood sugar 4 times daily with an overall average this month of 167 mg/dL on her sensor.            Stacey wears a t-slim pump with pump settings as follows:             For her hypothyroidism- untreated with normal TSH.      For her CKD (stage 1), she is taking enalapril. She follows with nephrology.  She understands she will need to stop this if she were to become pregnant.    Stacey has been mostly working from home.  She does food delivery for students on Mondays.  She is wearing a mask and trying to be as socially distant as possible.  She has not concerns today.       ROS  GENERAL: no weight loss, weight gain, fevers, chills, malaise, night sweats.   HEENT: no dysphagia, diplopia, neck pain or tenderness, dry/scratchy eyes, URI, cough, sinus drainage, tinnitus, sinus pressure  CV: no chest pain, pressure, palpitations, skipped beats, LOC  LUNGS: no SOB, CHILDRESS, cough, sputum production, wheezing   GI: no diarrhea, constipation, abdominal pain  EXTREMITIES: no rashes, ulcers, edema  NEUROLOGY: no changes in vision, tingling or numbness in hands or feet.   MSK: no muscle aches or pains, weakness  PSYCH: mood stable    Past  Medical History:   Diagnosis Date     Asthma     Currently no treatment needed     Celiac disease      Chronic kidney disease, stage I 8/3/2015     Chronic sinusitis      Diabetes mellitus type 1 (H)      Diabetic nephropathy (H)      Family history of heart disease 8/3/2015     Hypertension      Hypothyroid      Pedal edema      Psoriasis      PMH:   Type 1 diabetes- since 2003  Celiac disease- since age 13  Hypothyroidism- age 10 (no longer on levothyroxine for several years)  Hyperlipidemia- had been on a statin for a little more than a year, mostly eating a plant based diet now.    Past Surgical History:   Procedure Laterality Date     ENT SURGERY       TONSILLECTOMY, ADENOIDECTOMY, COMBINED         Family History   Problem Relation Age of Onset     Cardiovascular Father 48        MI, stents, diabetic, type 2     Diabetes Father      Coronary Artery Disease Father      Obesity Father      Gastrointestinal Disease Mother         Celiacs     Obesity Mother      Heart Disease Paternal Half-Brother 45     Cardiovascular Maternal Grandfather         Englarged heart, pacemaker, defib     Obesity Maternal Half-Sister      Family Hx:   Dad- premature CVD, in his 40s.  Type 2 diabetes  Mom- celiac disease  Grandfather- type 2 diabetes  Half brother- Asperger's  Half sister- cervical CA  Another half brother- bipolar and schizophrenia      History     Social History     Marital Status:      Spouse Name: N/A     Number of Children: N/A     Years of Education: N/A     Social History Main Topics     Smoking status: Never Smoker      Smokeless tobacco: Never Used     Alcohol Use: Yes      Comment: occ. use     Drug Use: No     Sexual Activity:     Partners: Male     Birth Control/ Protection: Condom     Other Topics Concern     Parent/Sibling W/ Cabg, Mi Or Angioplasty Before 65f 55m? Yes     Caffeine Concern Yes     1 cup tea per day     Sleep Concern No     Special Diet Yes     gluten free diet, low carbs      Exercise Yes     walking, ellyptical, swimming, weights     Seat Belt Yes     Social History Narrative     Social Hx:   . 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 She also goes to the gym a few days a week.  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     Current Outpatient Medications   Medication     albuterol (PROAIR HFA/PROVENTIL HFA/VENTOLIN HFA) 108 (90 Base) MCG/ACT inhaler     albuterol (PROVENTIL) (2.5 MG/3ML) 0.083% neb solution     Ascorbic Acid (VITAMIN C PO)     ASPIRIN NOT PRESCRIBED (INTENTIONAL)     blood glucose test strip     Blood Pressure Monitoring KIT     Cholecalciferol 2000 UNITS TBDP     enalapril (VASOTEC) 20 MG tablet     escitalopram (LEXAPRO) 10 MG tablet     glucagon (GLUCAGON EMERGENCY) 1 MG kit     hydrOXYzine (ATARAX) 25 MG tablet     Insulin Infusion Pump Supplies (INSULIN PUMP SYRINGE RESERVOIR) MISC     insulin lispro (HUMALOG VIAL) 100 UNIT/ML vial     insulin pen needle (B-D U/F) 31G X 5 MM miscellaneous     Insulin Pump Accessories MISC     insulin syringes, disposable, U-100 0.3 ML MISC     ipratropium - albuterol 0.5 mg/2.5 mg/3 mL (DUONEB) 0.5-2.5 (3) MG/3ML neb solution     liraglutide (VICTOZA PEN) 18 MG/3ML soln     Multiple Vitamins-Minerals (MULTIVITAMIN ADULT PO)     Ondansetron HCl (ZOFRAN PO)     STATIN NOT PRESCRIBED (INTENTIONAL)     No current facility-administered medications for this visit.           Allergies   Allergen Reactions     Dogs Other (See Comments)     Sinus symptoms      Dust Mites      Sinus      Gluten Meal      Physical Exam  There were no vitals taken for this visit.  GENERAL:  Alert and oriented X3, NAD    RESULTS  Lab Results   Component Value Date    A1C 7.3 (H) 02/17/2020    A1C 7.0 (H) 01/17/2018    A1C 7.6 (H) 03/26/2015    HEMOGLOBINA1 7.0 (A) 01/28/2020    HEMOGLOBINA1 7.3 (A) 08/19/2019    HEMOGLOBINA1 6.8 (A) 05/14/2019    HEMOGLOBINA1 6.8 (A) 02/18/2019    HEMOGLOBINA1  7.7 (A) 11/13/2018       TSH   Date Value Ref Range Status   02/17/2020 3.97 0.40 - 4.00 mU/L Final   06/07/2019 3.91 0.40 - 4.00 mU/L Final   03/04/2019 3.41 0.40 - 4.00 mU/L Final   10/10/2016 2.89 0.40 - 4.00 mU/L Final   01/16/2016 3.33 0.40 - 4.00 mU/L Final     T4 Free   Date Value Ref Range Status   12/09/2013 1.18 0.70 - 1.85 ng/dL Final       ALT   Date Value Ref Range Status   05/17/2019 33 0 - 50 U/L Final   02/20/2016 26 0 - 50 U/L Final   ]    Recent Labs   Lab Test 06/07/19  0858 03/04/19  0854 08/04/15  1443 12/09/13  1137   CHOL 195 189 194 241*   HDL 55 58 66 66   * 106* 102 142*   TRIG 128 126 131 165*   CHOLHDLRATIO  --   --  2.9 3.7       Lab Results   Component Value Date     05/19/2020      Lab Results   Component Value Date    POTASSIUM 4.3 05/19/2020     Lab Results   Component Value Date    CHLORIDE 104 05/19/2020     Lab Results   Component Value Date    FRANKLIN 9.3 05/19/2020     Lab Results   Component Value Date    CO2 22 05/19/2020     Lab Results   Component Value Date    BUN 9 05/19/2020     Lab Results   Component Value Date    CR 0.49 05/19/2020       GFR Estimate   Date Value Ref Range Status   05/19/2020 >90 >60 mL/min/[1.73_m2] Final     Comment:     Non  GFR Calc  Starting 12/18/2018, serum creatinine based estimated GFR (eGFR) will be   calculated using the Chronic Kidney Disease Epidemiology Collaboration   (CKD-EPI) equation.     05/17/2019 >90 >60 mL/min/[1.73_m2] Final     Comment:     Non  GFR Calc  Starting 12/18/2018, serum creatinine based estimated GFR (eGFR) will be   calculated using the Chronic Kidney Disease Epidemiology Collaboration   (CKD-EPI) equation.     05/13/2019 >90 >60 mL/min/[1.73_m2] Final     Comment:     Non  GFR Calc  Starting 12/18/2018, serum creatinine based estimated GFR (eGFR) will be   calculated using the Chronic Kidney Disease Epidemiology Collaboration   (CKD-EPI) equation.       GFR  "Estimate If Black   Date Value Ref Range Status   05/19/2020 >90 >60 mL/min/[1.73_m2] Final     Comment:      GFR Calc  Starting 12/18/2018, serum creatinine based estimated GFR (eGFR) will be   calculated using the Chronic Kidney Disease Epidemiology Collaboration   (CKD-EPI) equation.     05/17/2019 >90 >60 mL/min/[1.73_m2] Final     Comment:      GFR Calc  Starting 12/18/2018, serum creatinine based estimated GFR (eGFR) will be   calculated using the Chronic Kidney Disease Epidemiology Collaboration   (CKD-EPI) equation.     05/13/2019 >90 >60 mL/min/[1.73_m2] Final     Comment:      GFR Calc  Starting 12/18/2018, serum creatinine based estimated GFR (eGFR) will be   calculated using the Chronic Kidney Disease Epidemiology Collaboration   (CKD-EPI) equation.         Lab Results   Component Value Date    MICROL <5 05/19/2020     No results found for: MICROALBUMIN  No results found for: CPEPT, GADAB, ISCAB    No results found for: B12]    Most recent eye exam date: : Not Found     Assessment/Plan:     1.  Type 1 diabetes- Stacey is doing well and likes control IQ.  She tends to be too high overnight and is receiving multiple corrections that are not bringing her down enough.  Advised she use \"sleep\" mode overnight and will also increase her overnight basal.  She is also climbing too hihg post-lunch, so will tighten IC ratio.  We made the following changes today (instructions given to patient):   Control IQ is having to correct you a lot in the night. It appears you are not using \"sleep\" mode in the night.  I would suggest putting it into \"sleep\" mode (targets glucose at 100, but does not give you automatic boluses in the night) before you go to bed, and also change basal rates as follows:   Mid- 2.8 (was 2.6)    You tend to go highest after lunch.  Please change insulin:carb ratio to:   Noon: 1/4.5g (was 1/5g)  Discussed COVID-19 precautions and increased incidence of " DKA.  Will send rx for ketone strips.   The American Diabetes Association has issued a nice video with checklist for patients with diabetes: https://www.diabetes.org/diabetes/treatment-care/planning-sick-days/coronavirus    2.  Risk factors-     Retinopathy:  No.  Had eye exam within last year.   Nephropathy:  BP historically well controlled. No microalbuminuria.  Creatinine stable.  She is on enalapril.  White coat hypertension.     She follows with nephrology. Ordered a home BP cuff for her to check.   Neuropathy: No.    Feet: OK, no ulcers.   Lipids:  .  Had been on pravastatin 20 mg daily.  She stopped statin in anticipation of pregnancy.  She does not want to resume statin at this time.  Will recheck cholesterol.     3.  Hypothyroidism- last TSH in target on no levothyroxine.      4.  F/U in 3 months with Dr. Allan.     I spent 21 minutes with this patient on the phone and explained the conditions and plans (more than 50% of time was counseling/coordination of care, diabetes management, follow up plan for worsening hyper and hypoglycemia) . The patient understood and is satisfied with today's visit.     Laureen Goldstein PA-C, MPAS   [facility]  Department of Medicine  Division of Endocrinology and Diabetes

## 2020-06-12 NOTE — PATIENT INSTRUCTIONS
"Control IQ is having to correct you a lot in the night. It appears you are not using \"sleep\" mode in the night.  I would suggest putting it into \"sleep\" mode (targets glucose at 100, but does not give you automatic boluses in the night) before you go to bed, and also change basal rates as follows:   Mid- 2.8 (was 2.6)    You tend to go highest after lunch.  Please change insulin:carb ratio to:   Noon: 1/4.5g (was 1/5g)    The American diabetes association has issued a nice video with checklist for patients with diabetes:     https://www.diabetes.org/diabetes/treatment-care/planning-sick-days/coronavirus    "

## 2020-06-18 ENCOUNTER — TELEPHONE (OUTPATIENT)
Dept: ENDOCRINOLOGY | Facility: CLINIC | Age: 25
End: 2020-06-18

## 2020-06-18 DIAGNOSIS — E10.29 TYPE 1 DIABETES MELLITUS WITH OTHER KIDNEY COMPLICATION (H): Primary | ICD-10-CM

## 2020-06-18 NOTE — TELEPHONE ENCOUNTER
[facility] Call Center    Phone Message    May a detailed message be left on voicemail: no     Reason for Call: prior auth needed for LEVEMIR VIAL prescribed on 06/08/20. Caller name is Shena from [employer]  ref# [id number]. Thank you     Action Taken: Message routed to:  [facility] ([facility] endo    Travel Screening: Not Applicable

## 2020-06-18 NOTE — TELEPHONE ENCOUNTER
Prior Authorization Retail Medication Request    Medication/Dose: LEVEMIR VIAL prior auth   ICD code (if different than what is on RX):E10.9  Rationale:Patient needs to mange diabetes in case of pump failure     Insurance Name:[employer]   Insurance ID:[id number]       Pharmacy Information (if different than what is on RX)  Name:    Phone:

## 2020-06-19 NOTE — TELEPHONE ENCOUNTER
Central Prior Authorization Team   Phone: [phone]      PA Initiation    Medication: LEVEMIR VIAL-PA initiated  Insurance Company: [employer] - Phone [phone] Fax [phone]  Pharmacy Filling the Rx: [facility] [address] [facility] [address] [facility]  Filling Pharmacy Phone: [phone]  Filling Pharmacy Fax:    Start Date: 6/19/2020

## 2020-06-23 NOTE — TELEPHONE ENCOUNTER
I spoke to Donna at [facility]. She said this was denied on 6/17. She will refax denial. Will update when received.

## 2020-06-23 NOTE — TELEPHONE ENCOUNTER
PRIOR AUTHORIZATION DENIED    Medication: LEVEMIR VIAL-PA denied    Denial Date: 6/16/2020    Denial Rational:           Appeal Information:

## 2020-06-26 RX ORDER — INSULIN GLARGINE 100 [IU]/ML
INJECTION, SOLUTION SUBCUTANEOUS
Qty: 10 ML | Refills: 3 | Status: SHIPPED | OUTPATIENT
Start: 2020-06-26 | End: 2021-05-29

## 2020-06-26 NOTE — TELEPHONE ENCOUNTER
Lantus preferred. Order sent.   Cari Collazo RN on 6/26/2020 at 4:09 PM     Laureen Goldstein, Emily Zazueta 8 hours ago (7:32 AM)       Lantus, or whatever alternative is fine.  ThanksLaureen

## 2020-08-01 ENCOUNTER — TRANSFERRED RECORDS (OUTPATIENT)
Dept: HEALTH INFORMATION MANAGEMENT | Facility: CLINIC | Age: 25
End: 2020-08-01

## 2020-08-03 ENCOUNTER — VIRTUAL VISIT (OUTPATIENT)
Dept: FAMILY MEDICINE | Facility: OTHER | Age: 25
End: 2020-08-03
Payer: COMMERCIAL

## 2020-08-03 PROCEDURE — 99421 OL DIG E/M SVC 5-10 MIN: CPT | Performed by: PHYSICIAN ASSISTANT

## 2020-08-03 NOTE — PROGRESS NOTES
"Date: 2020 10:38:38  Clinician: Maurilio Valencia  Clinician NPI: [id number]  Patient: Stacey La  Patient : 1995  Patient Address: [address] [place] [address]  Patient Phone: [phone]  Visit Protocol: URI  Patient Summary:  Stacey is a 25 year old ( : 1995 ) female who initiated a Visit for COVID-19 (Coronavirus) evaluation and screening. When asked the question \"Please sign me up to receive news, health information and promotions from [employer].\", Stacey responded \"No\".    Stacey states her symptoms started 1-2 days ago.   Her symptoms consist of a sore throat, a cough, nasal congestion, malaise, and a headache.   Symptom details     Nasal secretions: The color of her mucus is clear and yellow.    Cough: Stacey coughs a few times an hour and her cough is not more bothersome at night. Phlegm does not come into her throat when she coughs. She does not believe her cough is caused by post-nasal drip.     Sore throat: Stacey reports having mild throat pain (1-3 on a 10 point pain scale), does not have exudate on her tonsils, and can swallow liquids. The lymph nodes in her neck are not enlarged. A rash has not appeared on the skin since the sore throat started.     Headache: She states the headache is mild (1-3 on a 10 point pain scale).      Stacey denies having wheezing, nausea, teeth pain, ageusia, diarrhea, myalgias, anosmia, facial pain or pressure, fever, vomiting, rhinitis, ear pain, chills, and enlarged lymph nodes. She also denies having recent facial or sinus surgery in the past 60 days and taking antibiotic medication in the past month. She is not experiencing dyspnea.   Precipitating events  Within the past week, Stacey has not been exposed to someone with strep throat. She has not recently been exposed to someone with influenza. Stacey has been in close contact with the following high risk individuals: adults 65 or older and people with asthma, heart disease or diabetes.   " Pertinent COVID-19 (Coronavirus) information  In the past 14 days, Stacey has not worked in a congregate living setting.   She does not work or volunteer as healthcare worker or a  and does not work or volunteer in a healthcare facility.   Stacey also has not lived in a congregate living setting in the past 14 days. She does not live with a healthcare worker.   Stacey has had a close contact with a laboratory-confirmed COVID-19 patient within 14 days of symptom onset. Additional information about contact with COVID-19 (Coronavirus) patient as reported by the patient (free text): I was exposed to a coworker on monday of last week. He became symptomatic tuesday, tested on wednesday, positive results on friday. We worked closely for roughly 20-30 minutes, both wearing masks   Pertinent medical history  Stacey had 2 sinus infections within the past year.   Stacey typically gets a yeast infection when she takes antibiotics. She has used fluconazole (Diflucan) to treat previous yeast infections. 2 doses of fluconazole (Diflucan) has typically been needed for symptoms to resolve in the past.  Stacey does not need a return to work/school note.   Weight: 300 lbs   Stacey does not smoke or use smokeless tobacco.   She denies pregnancy and denies breastfeeding. She is currently menstruating.   Additional information as reported by the patient (free text): I am a type 1 diabetic   Weight: 300 lbs  Reason for repeat visit for the same protocol within 24 hours:  I entered the wrong info in my first visit and I noticed my incorrect last name was used. I have now fixed the error  See the History of referred by protocol and completed visits section for details on previous visits (visits currently in queue to be diagnosed will not appear in this section).    MEDICATIONS: Lexapro oral, albuterol sulfate inhalation, Vasotec oral, Humalog U-100 Insulin subcutaneous, ALLERGIES: NKDA  Clinician Response:  Dear Stacey,   Your  "symptoms show that you may have coronavirus (COVID-19). This illness can cause fever, cough and trouble breathing. Many people get a mild case and get better on their own. Some people can get very sick.  What should I do?  We would like to test you for this virus.   1. Please call [phone] to schedule your visit. Explain that you were referred by [facility] to have a COVID-19 test. Be ready to share your [facility] visit ID number.  The following will serve as your written order for this COVID Test, ordered by me, for the indication of suspected COVID [Z20.828]: The test will be ordered in [employer], our electronic health record, after you are scheduled. It will show as ordered and authorized by Daniel Castro MD.  Order: COVID-19 (Coronavirus) PCR for SYMPTOMATIC testing from [facility].      2. When it's time for your COVID test:  Stay at least 6 feet away from others. (If someone will drive you to your test, stay in the backseat, as far away from the  as you can.)   Cover your mouth and nose with a mask, tissue or washcloth.  Go straight to the testing site. Don't make any stops on the way there or back.      3.Starting now: Stay home and away from others (self-isolate) until:   You've had no fever---and no medicine that reduces fever---for 3 full days (72 hours). And...   Your other symptoms have gotten better. For example, your cough or breathing has improved. And...   At least 10 days have passed since your symptoms started.       During this time, don't leave the house except for testing or medical care.   Stay in your own room, even for meals. Use your own bathroom if you can.   Stay away from others in your home. No hugging, kissing or shaking hands. No visitors.  Don't go to work, school or anywhere else.    Clean \"high touch\" surfaces often (doorknobs, counters, handles, etc.). Use a household cleaning spray or wipes. You'll find a full list of  on the EPA website: " www.epa.gov/pesticide-registration/list-n-disinfectants-use-against-sars-cov-2.   Cover your mouth and nose with a mask, tissue or washcloth to avoid spreading germs.  Wash your hands and face often. Use soap and water.  Caregivers in these groups are at risk for severe illness due to COVID-19:  o People 65 years and older  o People who live in a nursing home or long-term care facility  o People with chronic disease (lung, heart, cancer, diabetes, kidney, liver, immunologic)  o People who have a weakened immune system, including those who:   Are in cancer treatment  Take medicine that weakens the immune system, such as corticosteroids  Had a bone marrow or organ transplant  Have an immune deficiency  Have poorly controlled HIV or AIDS  Are obese (body mass index of 40 or higher)  Smoke regularly   o Caregivers should wear gloves while washing dishes, handling laundry and cleaning bedrooms and bathrooms.  o Use caution when washing and drying laundry: Don't shake dirty laundry, and use the warmest water setting that you can.  o For more tips, go to www.cdc.gov/coronavirus/2019-ncov/downloads/10Things.pdf.    4.Sign up for [employer]. We know it's scary to hear that you might have COVID-19. We want to track your symptoms to make sure you're okay over the next 2 weeks. Please look for an email from [employer]---this is a free, online program that we'll use to keep in touch. To sign up, follow the link in the email. Learn more at [email or web address]  How can I take care of myself?   Get lots of rest. Drink extra fluids (unless a doctor has told you not to).   Take Tylenol (acetaminophen) for fever or pain. If you have liver or kidney problems, ask your family doctor if it's okay to take Tylenol.   Adults can take either:    650 mg (two 325 mg pills) every 4 to 6 hours, or...   1,000 mg (two 500 mg pills) every 8 hours as needed.    Note: Don't take more than 3,000 mg in one day. Acetaminophen is found  in many medicines (both prescribed and over-the-counter medicines). Read all labels to be sure you don't take too much.   For children, check the Tylenol bottle for the right dose. The dose is based on the child's age or weight.    If you have other health problems (like cancer, heart failure, an organ transplant or severe kidney disease): Call your specialty clinic if you don't feel better in the next 2 days.       Know when to call 911. Emergency warning signs include:    Trouble breathing or shortness of breath Pain or pressure in the chest that doesn't go away Feeling confused like you haven't felt before, or not being able to wake up Bluish-colored lips or face.  Where can I get more information?    [employer] -- About COVID-19: [email or web address]   CDC -- What to Do If You're Sick: www.cdc.gov/coronavirus/2019-ncov/about/steps-when-sick.html   [facility] -- Ending Home Isolation: www.cdc.gov/coronavirus/2019-ncov/hcp/disposition-in-home-patients.html   CDC -- Caring for Someone: www.cdc.gov/coronavirus/2019-ncov/if-you-are-sick/care-for-someone.html   [facility] -- Interim Guidance for Hospital Discharge to Home: www.health.[facility].mn.us/diseases/coronavirus/hcp/hospdischarge.pdf   [facility] clinical trials (COVID-19 research studies): clinicalaffairs.[facility]-clinical-trials    Below are the COVID-19 hotlines at the [facility] of Health ([facility]). Interpreters are available.    For health questions: Call 044-891-7987 or 1-347.619.5567 (7 a.m. to 7 p.m.) For questions about schools and childcare: Call 187-740-8431 or 1-835.330.2493 (7 a.m. to 7 p.m.)    Diagnosis: Acute upper respiratory infection, unspecified  Diagnosis ICD: J06.9  Additional Clinician Notes: If your symptoms are not improving or worsen, Please go to one of our urgent care locations for evaluation.

## 2020-08-04 DIAGNOSIS — Z20.822 COVID-19 RULED OUT: Primary | ICD-10-CM

## 2020-08-04 PROCEDURE — U0003 INFECTIOUS AGENT DETECTION BY NUCLEIC ACID (DNA OR RNA); SEVERE ACUTE RESPIRATORY SYNDROME CORONAVIRUS 2 (SARS-COV-2) (CORONAVIRUS DISEASE [COVID-19]), AMPLIFIED PROBE TECHNIQUE, MAKING USE OF HIGH THROUGHPUT TECHNOLOGIES AS DESCRIBED BY CMS-2020-01-R: HCPCS | Performed by: FAMILY MEDICINE

## 2020-08-04 PROCEDURE — 99207 ZZC NO BILLABLE SERVICE THIS VISIT: CPT | Performed by: FAMILY MEDICINE

## 2020-08-05 LAB
SARS-COV-2 RNA SPEC QL NAA+PROBE: NOT DETECTED
SPECIMEN SOURCE: NORMAL

## 2020-09-23 ENCOUNTER — MYC MEDICAL ADVICE (OUTPATIENT)
Dept: PEDIATRICS | Facility: CLINIC | Age: 25
End: 2020-09-23

## 2020-10-21 ENCOUNTER — OFFICE VISIT (OUTPATIENT)
Dept: DERMATOLOGY | Facility: CLINIC | Age: 25
End: 2020-10-21
Payer: COMMERCIAL

## 2020-10-21 DIAGNOSIS — Z12.83 SKIN CANCER SCREENING: ICD-10-CM

## 2020-10-21 DIAGNOSIS — B07.8 COMMON WART: Primary | ICD-10-CM

## 2020-10-21 DIAGNOSIS — D23.71 DERMATOFIBROMA OF RIGHT LOWER EXTREMITY: ICD-10-CM

## 2020-10-21 DIAGNOSIS — D48.5 NEOPLASM OF UNCERTAIN BEHAVIOR OF SKIN: ICD-10-CM

## 2020-10-21 DIAGNOSIS — D22.9 MULTIPLE PIGMENTED NEVI: ICD-10-CM

## 2020-10-21 PROCEDURE — 99203 OFFICE O/P NEW LOW 30 MIN: CPT | Mod: 25 | Performed by: PHYSICIAN ASSISTANT

## 2020-10-21 PROCEDURE — 17110 DESTRUCTION B9 LES UP TO 14: CPT | Performed by: PHYSICIAN ASSISTANT

## 2020-10-21 PROCEDURE — 11102 TANGNTL BX SKIN SINGLE LES: CPT | Mod: XS | Performed by: PHYSICIAN ASSISTANT

## 2020-10-21 PROCEDURE — 88305 TISSUE EXAM BY PATHOLOGIST: CPT | Mod: 26 | Performed by: DERMATOLOGY

## 2020-10-21 ASSESSMENT — PAIN SCALES - GENERAL
PAINLEVEL: NO PAIN (0)
PAINLEVEL: NO PAIN (0)

## 2020-10-21 NOTE — NURSING NOTE
Dermatology Rooming Note    Stacey SUSIE Abhinav's goals for this visit include:   Chief Complaint   Patient presents with     Derm Problem     Stacey is here for a skin check, has a new mole on her right leg that appeared after a severe sunburn.         Sandrita Pepper LPN

## 2020-10-21 NOTE — NURSING NOTE
Lidocaine-epinephrine 1-1:141205 % injection   2mL once for one use, starting 10/21/2020 ending 10/21/2020,  2mL disp, R-0, injection  Injected by WAGNER Parker

## 2020-10-21 NOTE — PROGRESS NOTES
[facility] Dermatology Note      Dermatology Problem List:  1.Shave bx right lower leg, Dermatofibrama 10/21/20  2. Common wart, cryotherapy right hand  3. Skin cancer screening 10/21/20    CC:   Chief Complaint   Patient presents with     Derm Problem     Stacey is here for a skin check, has a new mole on her right leg that appeared after a severe sunburn.           Encounter Date: Oct 21, 2020    History of Present Illness:  Ms. Stacey La is a 25 year old female who presents in self referral for a skin check. This is her first visit to the Dermatology Clinic. She is concerned about a bump that developed after a painful sunburn on her leg this summer. The bump appeared within 1-2 months of the burn. It is not going away. Has gotten slightly larger but is not painful, bleeding or itchy.   She also has warts on her right hand. They are not going away. It started with just one and now she has two. She is concerned they will keep spreading. She is interested in treatment options.     She denies any other spots that are painful, bleeding, itchy or changing. She is feeling well today.     Past Medical History:   Patient Active Problem List   Diagnosis     Type 1 diabetes mellitus (H)     Diabetic nephropathy (H)     Family history of heart disease     Chronic kidney disease, stage I     Mild intermittent asthma without complication     Anxiety     Type 1 diabetes mellitus with diabetic nephropathy (H)     Morbid obesity (H)     Elevated WBC count     Past Medical History:   Diagnosis Date     Asthma     Currently no treatment needed     Celiac disease      Chronic kidney disease, stage I 8/3/2015     Chronic sinusitis      Diabetes mellitus type 1 (H)      Diabetic nephropathy (H)      Family history of heart disease 8/3/2015     Hypertension      Hypothyroid      Pedal edema      Psoriasis      Past Surgical History:   Procedure Laterality Date     ENT SURGERY       TONSILLECTOMY,  ADENOIDECTOMY, COMBINED         Social History:  Patient reports that she has never smoked. She has never used smokeless tobacco. She reports current alcohol use. She reports that she does not use drugs.    at a [employer] school.     Family History:  Family History   Problem Relation Age of Onset     Cardiovascular Father 48        MI, stents, diabetic, type 2     Diabetes Father      Coronary Artery Disease Father      Obesity Father      Gastrointestinal Disease Mother         Celiacs     Obesity Mother      Heart Disease Paternal Half-Brother 45     Cardiovascular Maternal Grandfather         Englarged heart, pacemaker, defib     Skin Cancer Maternal Grandfather      Obesity Maternal Half-Sister      Skin Cancer Maternal Grandmother      Melanoma No family hx of        Medications:  See medications in the chart.   Allergies   Allergen Reactions     Dogs Other (See Comments)     Sinus symptoms      Dust Mites      Sinus      Gluten Meal          Review of Systems:  -Skin/Heme New Pt: The patient admits to frequent sun exposure in her youth with many sunburns. The patient denies excessive scarring or problems healing except as per HPI. The patient denies excessive bleeding.  -Constitutional: Otherwise feeling well today, in usual state of health.  -Skin: As above in HPI. No additional skin concerns.    Physical exam:  Vitals: There were no vitals taken for this visit.  GEN: This is a well developed, well-nourished female in no acute distress, in a pleasant mood.    SKIN: Full skin, which includes the head/face, both arms, chest, back, abdomen,both legs, genitalia and/or groin buttocks, digits and/or nails, was examined.  -Olivares skin type: II  There is a 4 mm pink papule on the right lower leg, anteriorly.   -There are verrucous papules with thrombosed capillaries interrupting dermatoglyphics on the dorsal right 4th finger and 2nd finger.  -Rare regular brown pigmented macules and papules  are identified on the face, trunk and extremities   -No other lesions of concern on areas examined.       Impression/Plan:  1. Neoplasm of uncertain behavior on the right lower leg. The differential diagnosis includes a dermatofibroma vs amelanotic melanoma vs other   - Shave biopsy:  After discussion of benefits and risks including but not limited to bleeding/bruising, pain/swelling, infection, scar, incomplete removal, nerve damage/numbness, recurrence, and non-diagnostic biopsy, written consent, verbal consent and photographs were obtained. Time-out was performed. The area was cleaned with isopropyl alcohol. 0.5ml of 1% lidocaine with 1:100,000 epinephrine was injected to obtain adequate anesthesia. A shave biopsy was performed. Hemostasis was achieved with aluminium chloride. Vaseline and a sterile dressing were applied. The patient tolerated the procedure and no complications were noted. The patient was provided with verbal and written post care instructions.  - This returned as a dermatofibroma, a benign growth. No further treatment is required.     2. Verruca vulgaris, spreading. Will treat in the office with cryotherapy.   - Cryotherapy procedure note: After verbal consent and discussion of risks and benefits including but no limited to dyspigmentation/scar, blister, and pain, two were treated with 1-2mm freeze border for 2 cycles with liquid nitrogen. Post cryotherapy instructions were provided.  - Recheck in 1 month.     3. A few clinically benign nevi on the face, trunk and extremities  - ABCDs of melanoma were discussed and self skin checks were advised.   Sun precaution was advised including the use of sun screens of SPF 30 or higher, sun protective clothing, and avoidance of tanning beds.    CC Referred Self, MD  No address on file on close of this encounter.  Follow-up in 1 months, earlier for new or changing lesions.       Staff Involved:  Staff Only    All risks, benefits and alternatives were  discussed with patient.  Patient is in agreement and understands the assessment and plan.  All questions were answered.  Sun Screen Education was given.   Return to Clinic in 1 months or sooner as needed.   Corinne Almanzar PA-C

## 2020-10-21 NOTE — LETTER
10/21/2020       RE: Stacey La  [address] [address] [facility] [address]     Dear Colleague,    Thank you for referring your patient, Stacey La, to the [facility] [place] at [facility]. Please see a copy of my visit note below.    [facility] Dermatology Note      Dermatology Problem List:  1.Shave bx right lower leg, Dermatofibrama 10/21/20  2. Common wart, cryotherapy right hand  3. Skin cancer screening 10/21/20    CC:   Chief Complaint   Patient presents with     Derm Problem     Stacey is here for a skin check, has a new mole on her right leg that appeared after a severe sunburn.           Encounter Date: Oct 21, 2020    History of Present Illness:  Ms. Stacey La is a 25 year old female who presents in self referral for a skin check. This is her first visit to the Dermatology Clinic. She is concerned about a bump that developed after a painful sunburn on her leg this summer. The bump appeared within 1-2 months of the burn. It is not going away. Has gotten slightly larger but is not painful, bleeding or itchy.   She also has warts on her right hand. They are not going away. It started with just one and now she has two. She is concerned they will keep spreading. She is interested in treatment options.     She denies any other spots that are painful, bleeding, itchy or changing. She is feeling well today.     Past Medical History:   Patient Active Problem List   Diagnosis     Type 1 diabetes mellitus (H)     Diabetic nephropathy (H)     Family history of heart disease     Chronic kidney disease, stage I     Mild intermittent asthma without complication     Anxiety     Type 1 diabetes mellitus with diabetic nephropathy (H)     Morbid obesity (H)     Elevated WBC count     Past Medical History:   Diagnosis Date     Asthma     Currently no treatment needed     Celiac disease      Chronic kidney disease, stage I  8/3/2015     Chronic sinusitis      Diabetes mellitus type 1 (H)      Diabetic nephropathy (H)      Family history of heart disease 8/3/2015     Hypertension      Hypothyroid      Pedal edema      Psoriasis      Past Surgical History:   Procedure Laterality Date     ENT SURGERY       TONSILLECTOMY, ADENOIDECTOMY, COMBINED         Social History:  Patient reports that she has never smoked. She has never used smokeless tobacco. She reports current alcohol use. She reports that she does not use drugs.    at a [employer] school.     Family History:  Family History   Problem Relation Age of Onset     Cardiovascular Father 48        MI, stents, diabetic, type 2     Diabetes Father      Coronary Artery Disease Father      Obesity Father      Gastrointestinal Disease Mother         Celiacs     Obesity Mother      Heart Disease Paternal Half-Brother 45     Cardiovascular Maternal Grandfather         Englarged heart, pacemaker, defib     Skin Cancer Maternal Grandfather      Obesity Maternal Half-Sister      Skin Cancer Maternal Grandmother      Melanoma No family hx of        Medications:  See medications in the chart.   Allergies   Allergen Reactions     Dogs Other (See Comments)     Sinus symptoms      Dust Mites      Sinus      Gluten Meal          Review of Systems:  -Skin/Heme New Pt: The patient admits to frequent sun exposure in her youth with many sunburns. The patient denies excessive scarring or problems healing except as per HPI. The patient denies excessive bleeding.  -Constitutional: Otherwise feeling well today, in usual state of health.  -Skin: As above in HPI. No additional skin concerns.    Physical exam:  Vitals: There were no vitals taken for this visit.  GEN: This is a well developed, well-nourished female in no acute distress, in a pleasant mood.    SKIN: Full skin, which includes the head/face, both arms, chest, back, abdomen,both legs, genitalia and/or groin buttocks, digits and/or  nails, was examined.  -Olivares skin type: II  There is a 4 mm pink papule on the right lower leg, anteriorly.   -There are verrucous papules with thrombosed capillaries interrupting dermatoglyphics on the dorsal right 4th finger and 2nd finger.  -Rare regular brown pigmented macules and papules are identified on the face, trunk and extremities   -No other lesions of concern on areas examined.       Impression/Plan:  1. Neoplasm of uncertain behavior on the right lower leg. The differential diagnosis includes a dermatofibroma vs amelanotic melanoma vs other   - Shave biopsy:  After discussion of benefits and risks including but not limited to bleeding/bruising, pain/swelling, infection, scar, incomplete removal, nerve damage/numbness, recurrence, and non-diagnostic biopsy, written consent, verbal consent and photographs were obtained. Time-out was performed. The area was cleaned with isopropyl alcohol. 0.5ml of 1% lidocaine with 1:100,000 epinephrine was injected to obtain adequate anesthesia. A shave biopsy was performed. Hemostasis was achieved with aluminium chloride. Vaseline and a sterile dressing were applied. The patient tolerated the procedure and no complications were noted. The patient was provided with verbal and written post care instructions.  - This returned as a dermatofibroma, a benign growth. No further treatment is required.     2. Verruca vulgaris, spreading. Will treat in the office with cryotherapy.   - Cryotherapy procedure note: After verbal consent and discussion of risks and benefits including but no limited to dyspigmentation/scar, blister, and pain, two were treated with 1-2mm freeze border for 2 cycles with liquid nitrogen. Post cryotherapy instructions were provided.  - Recheck in 1 month.     3. A few clinically benign nevi on the face, trunk and extremities  - ABCDs of melanoma were discussed and self skin checks were advised.   Sun precaution was advised including the use of sun  screens of SPF 30 or higher, sun protective clothing, and avoidance of tanning beds.    CC Referred Self, MD  No address on file on close of this encounter.  Follow-up in 1 months, earlier for new or changing lesions.       Staff Involved:  Staff Only    All risks, benefits and alternatives were discussed with patient.  Patient is in agreement and understands the assessment and plan.  All questions were answered.  Sun Screen Education was given.   Return to Clinic in 1 months or sooner as needed.   Corinne Almanzar PA-C

## 2020-10-23 LAB — COPATH REPORT: NORMAL

## 2020-12-13 ENCOUNTER — HEALTH MAINTENANCE LETTER (OUTPATIENT)
Age: 25
End: 2020-12-13

## 2021-01-04 ENCOUNTER — DOCUMENTATION ONLY (OUTPATIENT)
Dept: ENDOCRINOLOGY | Facility: CLINIC | Age: 26
End: 2021-01-04

## 2021-02-03 DIAGNOSIS — R80.9 PROTEINURIA: ICD-10-CM

## 2021-02-03 RX ORDER — ENALAPRIL MALEATE 20 MG/1
20 TABLET ORAL 2 TIMES DAILY
Qty: 180 TABLET | Refills: 3 | Status: SHIPPED | OUTPATIENT
Start: 2021-02-03 | End: 2022-04-29

## 2021-02-03 RX ORDER — ENALAPRIL MALEATE 20 MG/1
20 TABLET ORAL 2 TIMES DAILY
Qty: 180 TABLET | Refills: 3 | Status: SHIPPED | OUTPATIENT
Start: 2021-02-03 | End: 2021-02-03

## 2021-03-15 ENCOUNTER — TELEPHONE (OUTPATIENT)
Dept: PEDIATRICS | Facility: CLINIC | Age: 26
End: 2021-03-15

## 2021-03-15 NOTE — TELEPHONE ENCOUNTER
Patient Quality Outreach      Summary:    Patient has the following on her problem list/HM:   Asthma review  ACT Total Scores 6/1/2020   ACT TOTAL SCORE (Goal Greater than or Equal to 20) 25   In the past 12 months, how many times did you visit the emergency room for your asthma without being admitted to the hospital? 0   In the past 12 months, how many times were you hospitalized overnight because of your asthma? 0     Diabetes - Type 1  Last A1C:  Lab Results   Component Value Date    A1C 7.3 02/17/2020    A1C 7.0 01/17/2018   Last LDL:    Lab Results   Component Value Date     06/07/2019   Is the patient on a Statin? Excluded          Is the patient on Aspirin? Excluded  Medications     HMG CoA Reductase Inhibitors     STATIN NOT PRESCRIBED (INTENTIONAL)       Salicylates     ASPIRIN NOT PRESCRIBED (INTENTIONAL)         Last three blood pressure readings:  BP Readings from Last 3 Encounters:   01/28/20 138/86   08/21/19 130/86   08/19/19 (P) 127/83        Tobacco Use      Smoking status: Never Smoker      Smokeless tobacco: Never Used  Immunizations   Health Maintenance Due   Topic     Hepatitis B Vaccine (1 of 3 - Risk 3-dose series)       Patient is due/failing the following:   A1C, LDL (Fasting) and Eye Exam, ACT needed and AAP, Adult/Adolescent physical, date due: 5/28/20 and Immunizations+ Anxiety    Type of outreach:    Sent [employer] message.+ ACT    Questions for provider review:    None                                                  Roseanna Nunez CMA    Chart routed to Care Team.

## 2021-03-15 NOTE — LETTER
March 19, 2021      Stacey La  [address] [facility] [id number]        Dear Stacey,       We care about your health and have reviewed your health plan including your medical conditions, medications, and lab results.  Based on this review, it is recommended that you follow up regarding the following health topic(s):  -Asthma  -Diabetes  -Wellness (Physical) Visit   -Anxiety    We recommend you take the following action(s):  -schedule a WELLNESS (Physical) APPOINTMENT.  We will perform the following labs: A1c, Lipids (fasting cholesterol - nothing to eat except water and/or meds for 8-10 hours) and Microablumin.   -Complete and return the attached ASTHMA CONTROL TEST.  If your total score is 19 or less or you have been to the ER or urgent care for your asthma, then please schedule an asthma followup appointment.     You can call our office and give the answers to the attached questionnaire to a nurse over the phone. Or you can complete this questionnaire in the My Chart message we sent you.  You can also drop the questionnaire off at the clinic or mail it back to us.  We DO need this information in your medical chart.    Please call us at the [facility] - [phone] (or use [employer]) to address the above recommendations.     Thank you for trusting [facility] and we appreciate the opportunity to serve you.  We look forward to supporting your healthcare needs in the future.    Healthy Regards,  Your Health Care Team  [facility]

## 2021-03-19 NOTE — TELEPHONE ENCOUNTER
Patient Quality Outreach 2nd Attempt      Summary:    Type of outreach:    Sent letter. and Copy of ACT mailed to patient.    Next Steps:  Reach out within 90 days via Phone.    Max number of attempts reached: No. Will try again in 90 days if patient still on fail list.    Questions for provider review:    None                                                                                                                    Roseanna Nunez CMA    Chart routed to Care Team.

## 2021-04-28 DIAGNOSIS — N18.1 CKD (CHRONIC KIDNEY DISEASE) STAGE 1, GFR 90 ML/MIN OR GREATER: Primary | ICD-10-CM

## 2021-04-30 DIAGNOSIS — N18.1 CKD (CHRONIC KIDNEY DISEASE) STAGE 1, GFR 90 ML/MIN OR GREATER: ICD-10-CM

## 2021-04-30 LAB
ALBUMIN SERPL-MCNC: 3.7 G/DL (ref 3.4–5)
ANION GAP SERPL CALCULATED.3IONS-SCNC: 7 MMOL/L (ref 3–14)
BUN SERPL-MCNC: 9 MG/DL (ref 7–30)
CALCIUM SERPL-MCNC: 9.4 MG/DL (ref 8.5–10.1)
CHLORIDE SERPL-SCNC: 104 MMOL/L (ref 94–109)
CO2 SERPL-SCNC: 27 MMOL/L (ref 20–32)
CREAT SERPL-MCNC: 0.6 MG/DL (ref 0.52–1.04)
CREAT UR-MCNC: 12 MG/DL
ERYTHROCYTE [DISTWIDTH] IN BLOOD BY AUTOMATED COUNT: 13.2 % (ref 10–15)
GFR SERPL CREATININE-BSD FRML MDRD: >90 ML/MIN/{1.73_M2}
GLUCOSE SERPL-MCNC: 184 MG/DL (ref 70–99)
HCT VFR BLD AUTO: 44.3 % (ref 35–47)
HGB BLD-MCNC: 14.4 G/DL (ref 11.7–15.7)
MCH RBC QN AUTO: 27.7 PG (ref 26.5–33)
MCHC RBC AUTO-ENTMCNC: 32.5 G/DL (ref 31.5–36.5)
MCV RBC AUTO: 85 FL (ref 78–100)
MICROALBUMIN UR-MCNC: <5 MG/L
MICROALBUMIN/CREAT UR: NORMAL MG/G CR (ref 0–25)
PHOSPHATE SERPL-MCNC: 4 MG/DL (ref 2.5–4.5)
PLATELET # BLD AUTO: 378 10E9/L (ref 150–450)
POTASSIUM SERPL-SCNC: 3.9 MMOL/L (ref 3.4–5.3)
PROT UR-MCNC: <0.05 G/L
PROT/CREAT 24H UR: NORMAL G/G CR (ref 0–0.2)
PTH-INTACT SERPL-MCNC: 51 PG/ML (ref 18–80)
RBC # BLD AUTO: 5.2 10E12/L (ref 3.8–5.2)
SODIUM SERPL-SCNC: 138 MMOL/L (ref 133–144)
WBC # BLD AUTO: 12.4 10E9/L (ref 4–11)

## 2021-04-30 PROCEDURE — 80069 RENAL FUNCTION PANEL: CPT | Performed by: PATHOLOGY

## 2021-04-30 PROCEDURE — 85027 COMPLETE CBC AUTOMATED: CPT | Performed by: PATHOLOGY

## 2021-04-30 PROCEDURE — 83970 ASSAY OF PARATHORMONE: CPT | Mod: 90 | Performed by: PATHOLOGY

## 2021-04-30 PROCEDURE — 99000 SPECIMEN HANDLING OFFICE-LAB: CPT | Performed by: PATHOLOGY

## 2021-04-30 PROCEDURE — 84156 ASSAY OF PROTEIN URINE: CPT | Performed by: PATHOLOGY

## 2021-04-30 PROCEDURE — 82043 UR ALBUMIN QUANTITATIVE: CPT | Performed by: PATHOLOGY

## 2021-04-30 PROCEDURE — 36415 COLL VENOUS BLD VENIPUNCTURE: CPT | Performed by: PATHOLOGY

## 2021-04-30 PROCEDURE — 82306 VITAMIN D 25 HYDROXY: CPT | Mod: 90 | Performed by: PATHOLOGY

## 2021-05-03 ENCOUNTER — VIRTUAL VISIT (OUTPATIENT)
Dept: NEPHROLOGY | Facility: CLINIC | Age: 26
End: 2021-05-03
Attending: INTERNAL MEDICINE
Payer: COMMERCIAL

## 2021-05-03 DIAGNOSIS — E10.21 DIABETIC NEPHROPATHY ASSOCIATED WITH TYPE 1 DIABETES MELLITUS (H): Primary | ICD-10-CM

## 2021-05-03 PROCEDURE — 99213 OFFICE O/P EST LOW 20 MIN: CPT | Mod: GT | Performed by: INTERNAL MEDICINE

## 2021-05-03 NOTE — LETTER
"5/3/2021       RE: Stacey La  [address] [id number]     Dear Colleague,    Thank you for referring your patient, Stacey La, to the [facility] [place] at [facility]. Please see a copy of my visit note below.    Stacey is a 25 year old who is being evaluated via a billable video visit.      How would you like to obtain your AVS? MyChart  If the video visit is dropped, the invitation should be resent by: Text to cell phone: [phone]  Will anyone else be joining your video visit? No      Video Start Time: 9:06 AM  Video-Visit Details    Type of service:  Video Visit    Video End Time:9:15 AM    Originating Location (pt. Location): Home    Distant Location (provider location):  [facility] [place]     Platform used for Video Visit: Brian SENA [facility]      Nephrology Clinic - Video Visit    Connor Castaneda MD   DOS:2021     Name: Stacey La  MRN: [id number]  Age: 23 year old  : 1995  Referring provider: Amalia Ervin      Assessment and Plan:  1.  CKD, stage 1: Secondary to biopsy proven diabetic nephropathy with preserved kidney function (baseline Cr~0.6 mg/dL, eGFR>90) and well controlled albuminuria (<30 mg/g). However, suspect significant hyperfiltration due to DM 2 and obesity. She has responded well to RAAS blockade with enalapril.     -- Will continue enalapril at 20 mg BID for management of HTN and albuminuria. Monitor closely for unintended pregnancy.    -- Continue good blood pressure and glucose control  -- Encourage weight loss  -- RTC in 6 months     2. HTN/Volume: Home SBP at goal of <130/80.  Mildly hypervolemic on exam in past. Suspect minimal lower extremity edema is related to high salt intake in a setting of sodium avid kidneys and venous stasis. Suspect an element of \"white coat\" hypertension with her clinic blood pressures.  " A 24 hour ambulatory BP monitoring was performed following a previous clinic visit that revealed average overall daily BP of 124/68 confirming white coat hypertension. However, she did not have a night time dipping pattern.       -- Continue low salt diet  -- Continue enalapril at 20 mg BID     3. Diabetes:  Complicated by diabetic nephropathy. No retinopathy or peripheral neuropathy. She has had ~5 episodes of DKA in the past. Recent hgb A1c was 6.7  (May, 2021).   -- Followed closely by endocrinology     4.  Vitamin D deficiency: Low vitamin D level multiple times in the past. Vit D now low normal in May, 2021.   -- Continue cholecalciferol to 2000 units daily  -- Check Vitamin D with next lab draw.     5.  Depression/anxiety:  In part, situational and related to stress at home, her job, passing of a loved one and her medical illness.    -- continue lexapro at 10 mg daily that was started at a previous clinic visit over a year ago.  She is no longer on Buspar and does not take hydroxyzine prn.   -- Followed closely by her PCP and psychology/psychiatry.     6.  Iron deficiency:  Not anemic (hgb 14.4). Iron studies on low side in past.  Suspect iron deficiency related to menses and possibly decreased GI absorption.    -- She would like to avoid iron supplements for now; encouraged her to increase intake of iron containing foods.    -- Will repeat hgb and iron studies at next visit     7. Acquired hypothyroidism: Patient is not being treated for hypothyroidism with Levothyroxine. Recent TSHwithin normal range.  - f/u with endocrinology      Reason For Visit: CKD follow Up    HPI:   Stacey La is a 25 year old woman with a history of Type 1 DM and celiac disease who presents for follow up regarding chronic kidney disease. She was previously cared for by her pediatric nephrologist, Dr. India Olivas. Today, the patient reports feeling well overall. Her hemoglobin A1c was 6.7 in May, 2021. She is not actively  "trying for a baby at this point in time. She is taking lexapro for depression which has been stable.  She otherwise is well and has no specific medical complaints.      Review of Systems:   Pertinent items are noted in HPI or as below, remainder of complete ROS is negative.      Active Medications:   Current Outpatient Medications   Medication     albuterol (PROAIR HFA/PROVENTIL HFA/VENTOLIN HFA) 108 (90 Base) MCG/ACT inhaler     albuterol (PROVENTIL) (2.5 MG/3ML) 0.083% neb solution     Ascorbic Acid (VITAMIN C PO)     blood glucose test strip     Blood Pressure Monitoring KIT     Cholecalciferol 2000 UNITS TBDP     enalapril (VASOTEC) 20 MG tablet     escitalopram (LEXAPRO) 10 MG tablet     hydrOXYzine (ATARAX) 25 MG tablet     Insulin Infusion Pump Supplies (INSULIN PUMP SYRINGE RESERVOIR) MISC     insulin lispro (HUMALOG VIAL) 100 UNIT/ML vial     Insulin Pump Accessories MISC     insulin syringe-needle U-100 (31G X 5/16\" 0.5 ML) 31G X 5/16\" 0.5 ML miscellaneous     insulin syringes, disposable, U-100 0.3 ML MISC     ipratropium - albuterol 0.5 mg/2.5 mg/3 mL (DUONEB) 0.5-2.5 (3) MG/3ML neb solution     KETOSTIX test strip     Multiple Vitamins-Minerals (MULTIVITAMIN ADULT PO)     Ondansetron HCl (ZOFRAN PO)     ASPIRIN NOT PRESCRIBED (INTENTIONAL)     glucagon (GLUCAGON EMERGENCY) 1 MG kit     insulin glargine (LANTUS VIAL) 100 UNIT/ML vial     STATIN NOT PRESCRIBED (INTENTIONAL)     No current facility-administered medications for this visit.       Allergies:   Dogs  Dust mites  Gluten meal      Past Medical History:  Anxiety  Asthma  Celiac disease  Chronic kidney disease, stage I  Chronic sinusitis  Diabetes mellitus type 1  Diabetic nephropathy  Hypertension  Hypothyroidism  Pedal edema  Psoriasis  Depression     Past Surgical History:  ENT Surgery  Tonsillectomy, adenoidectomy, combined    Family History:   Father (Diabetes, Myocardial Infarct)  Mother (Celiacs)  Maternal Grandfather (Enlarged " heart)    Social History:   The patient is . The patient drinks roughly two times a month. She is currently sexually active with male partners and uses condoms for birth control.The patient has no reported social history of smoking, smokeless tobacco use, or drug use.     Physical Exam:  There were no vitals taken for this visit.   Deferred as encounter was a video visit.      Laboratory:  CMP  Recent Labs   Lab Test 04/30/21  1620 05/19/20  1201 05/17/19  1528 05/13/19  1450 03/04/19  0854 01/17/18  1515 01/17/18  1515 02/20/16  1827 02/20/16  1827 08/04/15  1443 08/04/15  1443 03/25/15  1655 03/25/15  1655 12/09/13  1137 12/09/13  1137    138 141 136 138   < > 136   < > 139   < >  --    < > 138   < > 137   POTASSIUM 3.9 4.3 3.6 3.9 3.8   < > 4.7   < > 3.6   < >  --    < > 3.8   < > 3.9   CHLORIDE 104 104 107 105 105   < > 103   < > 109   < >  --    < > 105   < > 103   CO2 27 22 25 23 27   < > 28   < > 25   < >  --    < > 25   < > 25   ANIONGAP 7 12 9 8 6   < > 6   < > 5   < >  --    < > 8   < > 9   * 180* 95 175* 72   < > 118*   < > 55*   < >  --    < > 73   < > 202*   BUN 9 9 9 8 9   < > 13   < > 8   < >  --    < > 16   < > 11   CR 0.60 0.49* 0.62 0.59 0.51*   < > 0.70   < > 0.67   < >  --    < > 0.55   < > 0.48*   GFRESTIMATED >90 >90 >90 >90 >90   < > >90   < > >90  Non  GFR Calc     < >  --    < > >90  Non  GFR Calc     < > >90   GFRESTBLACK >90 >90 >90 >90 >90   < > >90   < > >90  African American GFR Calc     < >  --    < > >90   GFR Calc     < > >90   FRANKLIN 9.4 9.3 9.0 9.2 9.0   < > 8.8   < > 8.3*   < >  --    < > 9.0   < > 9.6   PHOS 4.0 3.7  --   --  3.4  --  3.8   < >  --    < >  --    < >  --    < >  --    PROTTOTAL  --   --  8.3  --   --   --   --   --  7.5  --   --   --  8.3  --  7.9   ALBUMIN 3.7 3.6 4.0  --  3.5  --  3.4   < > 3.6   < >  --    < > 4.1   < > 4.2   BILITOTAL  --   --  0.2  --   --   --   --   --  0.2  --   --   --  0.4   --  0.3   ALKPHOS  --   --  88  --   --   --   --   --  67  --   --   --  82  --  92   AST  --   --  14  --   --   --   --   --  17  --   --   --  14  --  23   ALT  --   --  33  --   --   --   --   --  26  --  <5*  --  29  --  32    < > = values in this interval not displayed.     CBC  Recent Labs   Lab Test 04/30/21  1620 05/19/20  1201 06/07/19  0858 05/28/19  1416 05/17/19  1528   HGB 14.4 14.0 13.9 13.6 14.1   WBC 12.4*  --  9.8 12.1* 9.9   RBC 5.20  --  4.97 4.91 5.06   HCT 44.3  --  42.6 42.0 42.8   MCV 85  --  86 86 85   MCH 27.7  --  28.0 27.7 27.9   MCHC 32.5  --  32.6 32.4 32.9   RDW 13.2  --  13.6 13.4 13.3     --  359 402 375     INR  No lab results found.     ABG  No lab results found.     URINE STUDIES  Recent Labs   Lab Test 05/17/19  1511 03/25/15  1810 07/22/14  1506 03/06/14  1040   COLOR Straw Yellow Straw Straw   APPEARANCE Clear Slightly Cloudy Clear Clear   URINEGLC Negative Negative Negative Negative   URINEBILI Negative Negative Negative Negative   URINEKETONE Negative Negative Negative Negative   SG 1.002* 1.021 1.012 1.004   UBLD Negative Trace* Negative Negative   URINEPH 7.0 5.5 7.0 7.0   PROTEIN Negative 10* Negative Negative   NITRITE Negative Negative Negative Negative   LEUKEST Negative Negative Negative Negative   RBCU  --  1 1 1   WBCU  --  1 0 <1     Recent Labs   Lab Test 04/30/21  1626 05/19/20  1201 03/04/19  0900 01/17/18  1517 07/25/17  1457 01/24/17  1533 07/26/16  1135 01/16/16  0906 07/28/15  1522 01/19/15  1554 07/22/14  1506 03/06/14  0948   UTPG Unable to calculate due to low value Unable to calculate due to low value Unable to calculate due to low value Unable to calculate due to low value Unable to calculate due to low value Unable to calculate due to low value 0.23* 0.15 Unable to calculate due to low value Unable to calculate due to low value <0.10 0.13     PTH  Recent Labs   Lab Test 04/30/21  1620 05/19/20  1201 01/17/18  1515 01/24/17  1525 01/16/16  0905  07/22/14  1519   PTHI 51 50 60 24 31 18     IRON STUDIES   Recent Labs   Lab Test 05/19/20  1201 01/17/18  1515 07/25/17  1455 07/26/16  1137 01/16/16  0905 07/22/14  1519   IRON 81 58 50 60 58 27*    417 374 423 412 372   IRONSAT 20 14* 13* 14* 14* 7*   DIPESH 26 13 26 13  --  9*     Imaging:   Not applicable to this visit.     All the above labs were reviewed by me.   Connor Castaneda MD         Again, thank you for allowing me to participate in the care of your patient.      Sincerely,    Connor Castaneda MD

## 2021-05-03 NOTE — PROGRESS NOTES
"  Nephrology Clinic - Video Visit    Connor Castaneda MD   DOS:2021     Name: Stacey La  MRN: [id number]  Age: 23 year old  : 1995  Referring provider: Amalia Ervin      Assessment and Plan:  1.  CKD, stage 1: Secondary to biopsy proven diabetic nephropathy with preserved kidney function (baseline Cr~0.6 mg/dL, eGFR>90) and well controlled albuminuria (<30 mg/g). However, suspect significant hyperfiltration due to DM 2 and obesity. She has responded well to RAAS blockade with enalapril.     -- Will continue enalapril at 20 mg BID for management of HTN and albuminuria. Monitor closely for unintended pregnancy.    -- Continue good blood pressure and glucose control  -- Encourage weight loss  -- RTC in 6 months     2. HTN/Volume: Home SBP at goal of <130/80.  Mildly hypervolemic on exam in past. Suspect minimal lower extremity edema is related to high salt intake in a setting of sodium avid kidneys and venous stasis. Suspect an element of \"white coat\" hypertension with her clinic blood pressures.  A 24 hour ambulatory BP monitoring was performed following a previous clinic visit that revealed average overall daily BP of 124/68 confirming white coat hypertension. However, she did not have a night time dipping pattern.       -- Continue low salt diet  -- Continue enalapril at 20 mg BID     3. Diabetes:  Complicated by diabetic nephropathy. No retinopathy or peripheral neuropathy. She has had ~5 episodes of DKA in the past. Recent hgb A1c was 6.7  (May, 2021).   -- Followed closely by endocrinology     4.  Vitamin D deficiency: Low vitamin D level multiple times in the past. Vit D now low normal in May, 2021.   -- Continue cholecalciferol to 2000 units daily  -- Check Vitamin D with next lab draw.     5.  Depression/anxiety:  In part, situational and related to stress at home, her job, passing of a loved one and her medical illness.    -- continue lexapro at 10 mg daily that was started " at a previous clinic visit over a year ago.  She is no longer on Buspar and does not take hydroxyzine prn.   -- Followed closely by her PCP and psychology/psychiatry.     6.  Iron deficiency:  Not anemic (hgb 14.4). Iron studies on low side in past.  Suspect iron deficiency related to menses and possibly decreased GI absorption.    -- She would like to avoid iron supplements for now; encouraged her to increase intake of iron containing foods.    -- Will repeat hgb and iron studies at next visit     7. Acquired hypothyroidism: Patient is not being treated for hypothyroidism with Levothyroxine. Recent TSHwithin normal range.  - f/u with endocrinology      Reason For Visit: CKD follow Up    HPI:   Stacey La is a 25 year old woman with a history of Type 1 DM and celiac disease who presents for follow up regarding chronic kidney disease. She was previously cared for by her pediatric nephrologist, Dr. India Olivas. Today, the patient reports feeling well overall. Her hemoglobin A1c was 6.7 in May, 2021. She is not actively trying for a baby at this point in time. She is taking lexapro for depression which has been stable.  She otherwise is well and has no specific medical complaints.      Review of Systems:   Pertinent items are noted in HPI or as below, remainder of complete ROS is negative.      Active Medications:   Current Outpatient Medications   Medication     albuterol (PROAIR HFA/PROVENTIL HFA/VENTOLIN HFA) 108 (90 Base) MCG/ACT inhaler     albuterol (PROVENTIL) (2.5 MG/3ML) 0.083% neb solution     Ascorbic Acid (VITAMIN C PO)     blood glucose test strip     Blood Pressure Monitoring KIT     Cholecalciferol 2000 UNITS TBDP     enalapril (VASOTEC) 20 MG tablet     escitalopram (LEXAPRO) 10 MG tablet     hydrOXYzine (ATARAX) 25 MG tablet     Insulin Infusion Pump Supplies (INSULIN PUMP SYRINGE RESERVOIR) MISC     insulin lispro (HUMALOG VIAL) 100 UNIT/ML vial     Insulin Pump Accessories MISC     insulin  "syringe-needle U-100 (31G X 5/16\" 0.5 ML) 31G X 5/16\" 0.5 ML miscellaneous     insulin syringes, disposable, U-100 0.3 ML MISC     ipratropium - albuterol 0.5 mg/2.5 mg/3 mL (DUONEB) 0.5-2.5 (3) MG/3ML neb solution     KETOSTIX test strip     Multiple Vitamins-Minerals (MULTIVITAMIN ADULT PO)     Ondansetron HCl (ZOFRAN PO)     ASPIRIN NOT PRESCRIBED (INTENTIONAL)     glucagon (GLUCAGON EMERGENCY) 1 MG kit     insulin glargine (LANTUS VIAL) 100 UNIT/ML vial     STATIN NOT PRESCRIBED (INTENTIONAL)     No current facility-administered medications for this visit.       Allergies:   Dogs  Dust mites  Gluten meal      Past Medical History:  Anxiety  Asthma  Celiac disease  Chronic kidney disease, stage I  Chronic sinusitis  Diabetes mellitus type 1  Diabetic nephropathy  Hypertension  Hypothyroidism  Pedal edema  Psoriasis  Depression     Past Surgical History:  ENT Surgery  Tonsillectomy, adenoidectomy, combined    Family History:   Father (Diabetes, Myocardial Infarct)  Mother (Celiacs)  Maternal Grandfather (Enlarged heart)    Social History:   The patient is . The patient drinks roughly two times a month. She is currently sexually active with male partners and uses condoms for birth control.The patient has no reported social history of smoking, smokeless tobacco use, or drug use.     Physical Exam:  There were no vitals taken for this visit.   Deferred as encounter was a video visit.      Laboratory:  CMP  Recent Labs   Lab Test 04/30/21  1620 05/19/20  1201 05/17/19  1528 05/13/19  1450 03/04/19  0854 01/17/18  1515 01/17/18  1515 02/20/16  1827 02/20/16  1827 08/04/15  1443 08/04/15  1443 03/25/15  1655 03/25/15  1655 12/09/13  1137 12/09/13  1137    138 141 136 138   < > 136   < > 139   < >  --    < > 138   < > 137   POTASSIUM 3.9 4.3 3.6 3.9 3.8   < > 4.7   < > 3.6   < >  --    < > 3.8   < > 3.9   CHLORIDE 104 104 107 105 105   < > 103   < > 109   < >  --    < > 105   < > 103   CO2 27 22 25 23 27   < " > 28   < > 25   < >  --    < > 25   < > 25   ANIONGAP 7 12 9 8 6   < > 6   < > 5   < >  --    < > 8   < > 9   * 180* 95 175* 72   < > 118*   < > 55*   < >  --    < > 73   < > 202*   BUN 9 9 9 8 9   < > 13   < > 8   < >  --    < > 16   < > 11   CR 0.60 0.49* 0.62 0.59 0.51*   < > 0.70   < > 0.67   < >  --    < > 0.55   < > 0.48*   GFRESTIMATED >90 >90 >90 >90 >90   < > >90   < > >90  Non  GFR Calc     < >  --    < > >90  Non  GFR Calc     < > >90   GFRESTBLACK >90 >90 >90 >90 >90   < > >90   < > >90  African American GFR Calc     < >  --    < > >90   GFR Calc     < > >90   FRANKLIN 9.4 9.3 9.0 9.2 9.0   < > 8.8   < > 8.3*   < >  --    < > 9.0   < > 9.6   PHOS 4.0 3.7  --   --  3.4  --  3.8   < >  --    < >  --    < >  --    < >  --    PROTTOTAL  --   --  8.3  --   --   --   --   --  7.5  --   --   --  8.3  --  7.9   ALBUMIN 3.7 3.6 4.0  --  3.5  --  3.4   < > 3.6   < >  --    < > 4.1   < > 4.2   BILITOTAL  --   --  0.2  --   --   --   --   --  0.2  --   --   --  0.4  --  0.3   ALKPHOS  --   --  88  --   --   --   --   --  67  --   --   --  82  --  92   AST  --   --  14  --   --   --   --   --  17  --   --   --  14  --  23   ALT  --   --  33  --   --   --   --   --  26  --  <5*  --  29  --  32    < > = values in this interval not displayed.     CBC  Recent Labs   Lab Test 04/30/21  1620 05/19/20  1201 06/07/19  0858 05/28/19  1416 05/17/19  1528   HGB 14.4 14.0 13.9 13.6 14.1   WBC 12.4*  --  9.8 12.1* 9.9   RBC 5.20  --  4.97 4.91 5.06   HCT 44.3  --  42.6 42.0 42.8   MCV 85  --  86 86 85   MCH 27.7  --  28.0 27.7 27.9   MCHC 32.5  --  32.6 32.4 32.9   RDW 13.2  --  13.6 13.4 13.3     --  359 402 375     INR  No lab results found.     ABG  No lab results found.     URINE STUDIES  Recent Labs   Lab Test 05/17/19  1511 03/25/15  1810 07/22/14  1506 03/06/14  1040   COLOR Straw Yellow Straw Straw   APPEARANCE Clear Slightly Cloudy Clear Clear   URINEGLC Negative  Negative Negative Negative   URINEBILI Negative Negative Negative Negative   URINEKETONE Negative Negative Negative Negative   SG 1.002* 1.021 1.012 1.004   UBLD Negative Trace* Negative Negative   URINEPH 7.0 5.5 7.0 7.0   PROTEIN Negative 10* Negative Negative   NITRITE Negative Negative Negative Negative   LEUKEST Negative Negative Negative Negative   RBCU  --  1 1 1   WBCU  --  1 0 <1     Recent Labs   Lab Test 04/30/21  1626 05/19/20  1201 03/04/19  0900 01/17/18  1517 07/25/17  1457 01/24/17  1533 07/26/16  1135 01/16/16  0906 07/28/15  1522 01/19/15  1554 07/22/14  1506 03/06/14  0948   UTPG Unable to calculate due to low value Unable to calculate due to low value Unable to calculate due to low value Unable to calculate due to low value Unable to calculate due to low value Unable to calculate due to low value 0.23* 0.15 Unable to calculate due to low value Unable to calculate due to low value <0.10 0.13     PTH  Recent Labs   Lab Test 04/30/21  1620 05/19/20  1201 01/17/18  1515 01/24/17  1525 01/16/16  0905 07/22/14  1519   PTHI 51 50 60 24 31 18     IRON STUDIES   Recent Labs   Lab Test 05/19/20  1201 01/17/18  1515 07/25/17  1455 07/26/16  1137 01/16/16  0905 07/22/14  1519   IRON 81 58 50 60 58 27*    417 374 423 412 372   IRONSAT 20 14* 13* 14* 14* 7*   DIPESH 26 13 26 13  --  9*     Imaging:   Not applicable to this visit.     All the above labs were reviewed by me.   Connor Castaneda MD

## 2021-05-03 NOTE — PROGRESS NOTES
Stacey is a 25 year old who is being evaluated via a billable video visit.      How would you like to obtain your AVS? MyChart  If the video visit is dropped, the invitation should be resent by: Text to cell phone: [phone]  Will anyone else be joining your video visit? No      Video Start Time: 9:06 AM  Video-Visit Details    Type of service:  Video Visit    Video End Time:9:15 AM    Originating Location (pt. Location): Home    Distant Location (provider location):  [facility]     Platform used for Video Visit: Brian SENA CMA

## 2021-05-04 LAB
DEPRECATED CALCIDIOL+CALCIFEROL SERPL-MC: <27 UG/L (ref 20–75)
VITAMIN D2 SERPL-MCNC: <5 UG/L
VITAMIN D3 SERPL-MCNC: 22 UG/L

## 2021-05-07 ENCOUNTER — OFFICE VISIT (OUTPATIENT)
Dept: PEDIATRICS | Facility: CLINIC | Age: 26
End: 2021-05-07
Payer: COMMERCIAL

## 2021-05-07 VITALS
RESPIRATION RATE: 8 BRPM | OXYGEN SATURATION: 99 % | DIASTOLIC BLOOD PRESSURE: 78 MMHG | SYSTOLIC BLOOD PRESSURE: 122 MMHG | TEMPERATURE: 96.3 F | BODY MASS INDEX: 44.41 KG/M2 | WEIGHT: 293 LBS | HEIGHT: 68 IN | HEART RATE: 78 BPM

## 2021-05-07 DIAGNOSIS — J45.20 MILD INTERMITTENT ASTHMA WITHOUT COMPLICATION: Primary | ICD-10-CM

## 2021-05-07 DIAGNOSIS — N18.1 CHRONIC KIDNEY DISEASE, STAGE I: ICD-10-CM

## 2021-05-07 DIAGNOSIS — Z13.220 SCREENING FOR HYPERLIPIDEMIA: ICD-10-CM

## 2021-05-07 DIAGNOSIS — E66.01 MORBID OBESITY (H): ICD-10-CM

## 2021-05-07 DIAGNOSIS — E10.29 TYPE 1 DIABETES MELLITUS WITH OTHER KIDNEY COMPLICATION (H): ICD-10-CM

## 2021-05-07 DIAGNOSIS — F41.8 DEPRESSION WITH ANXIETY: ICD-10-CM

## 2021-05-07 DIAGNOSIS — J45.909 MILD ASTHMA WITHOUT COMPLICATION, UNSPECIFIED WHETHER PERSISTENT: ICD-10-CM

## 2021-05-07 DIAGNOSIS — Z00.00 ROUTINE GENERAL MEDICAL EXAMINATION AT A HEALTH CARE FACILITY: ICD-10-CM

## 2021-05-07 DIAGNOSIS — D72.829 LEUKOCYTOSIS, UNSPECIFIED TYPE: ICD-10-CM

## 2021-05-07 DIAGNOSIS — E10.21 DIABETIC NEPHROPATHY ASSOCIATED WITH TYPE 1 DIABETES MELLITUS (H): ICD-10-CM

## 2021-05-07 LAB
ALBUMIN SERPL-MCNC: 3.6 G/DL (ref 3.4–5)
ALP SERPL-CCNC: 80 U/L (ref 40–150)
ALT SERPL W P-5'-P-CCNC: 23 U/L (ref 0–50)
ANION GAP SERPL CALCULATED.3IONS-SCNC: 5 MMOL/L (ref 3–14)
AST SERPL W P-5'-P-CCNC: 13 U/L (ref 0–45)
BILIRUB SERPL-MCNC: 0.4 MG/DL (ref 0.2–1.3)
BUN SERPL-MCNC: 11 MG/DL (ref 7–30)
CALCIUM SERPL-MCNC: 9.3 MG/DL (ref 8.5–10.1)
CHLORIDE SERPL-SCNC: 104 MMOL/L (ref 94–109)
CHOLEST SERPL-MCNC: 236 MG/DL
CO2 SERPL-SCNC: 26 MMOL/L (ref 20–32)
CREAT SERPL-MCNC: 0.59 MG/DL (ref 0.52–1.04)
DEPRECATED CALCIDIOL+CALCIFEROL SERPL-MC: 21 UG/L (ref 20–75)
GFR SERPL CREATININE-BSD FRML MDRD: >90 ML/MIN/{1.73_M2}
GLUCOSE SERPL-MCNC: 129 MG/DL (ref 70–99)
HBA1C MFR BLD: 6.7 % (ref 0–5.6)
HDLC SERPL-MCNC: 59 MG/DL
LDLC SERPL CALC-MCNC: 155 MG/DL
NONHDLC SERPL-MCNC: 177 MG/DL
POTASSIUM SERPL-SCNC: 3.9 MMOL/L (ref 3.4–5.3)
PROT SERPL-MCNC: 7.3 G/DL (ref 6.8–8.8)
SODIUM SERPL-SCNC: 136 MMOL/L (ref 133–144)
TRIGL SERPL-MCNC: 112 MG/DL
TSH SERPL DL<=0.005 MIU/L-ACNC: 3.69 MU/L (ref 0.4–4)

## 2021-05-07 PROCEDURE — 90746 HEPB VACCINE 3 DOSE ADULT IM: CPT | Performed by: NURSE PRACTITIONER

## 2021-05-07 PROCEDURE — 82306 VITAMIN D 25 HYDROXY: CPT | Performed by: NURSE PRACTITIONER

## 2021-05-07 PROCEDURE — 90471 IMMUNIZATION ADMIN: CPT | Performed by: NURSE PRACTITIONER

## 2021-05-07 PROCEDURE — 84443 ASSAY THYROID STIM HORMONE: CPT | Performed by: NURSE PRACTITIONER

## 2021-05-07 PROCEDURE — 80053 COMPREHEN METABOLIC PANEL: CPT | Performed by: NURSE PRACTITIONER

## 2021-05-07 PROCEDURE — 83036 HEMOGLOBIN GLYCOSYLATED A1C: CPT | Performed by: NURSE PRACTITIONER

## 2021-05-07 PROCEDURE — 99395 PREV VISIT EST AGE 18-39: CPT | Mod: 25 | Performed by: NURSE PRACTITIONER

## 2021-05-07 PROCEDURE — 36415 COLL VENOUS BLD VENIPUNCTURE: CPT | Performed by: NURSE PRACTITIONER

## 2021-05-07 PROCEDURE — 80061 LIPID PANEL: CPT | Performed by: NURSE PRACTITIONER

## 2021-05-07 RX ORDER — ALBUTEROL SULFATE 90 UG/1
2 AEROSOL, METERED RESPIRATORY (INHALATION) EVERY 6 HOURS
Qty: 18 G | Refills: 3 | Status: SHIPPED | OUTPATIENT
Start: 2021-05-07 | End: 2023-04-21

## 2021-05-07 RX ORDER — ESCITALOPRAM OXALATE 10 MG/1
10 TABLET ORAL DAILY
Qty: 90 TABLET | Refills: 3 | Status: SHIPPED | OUTPATIENT
Start: 2021-05-07 | End: 2022-04-26

## 2021-05-07 SDOH — HEALTH STABILITY: MENTAL HEALTH: HOW MANY STANDARD DRINKS CONTAINING ALCOHOL DO YOU HAVE ON A TYPICAL DAY?: 1 OR 2

## 2021-05-07 SDOH — HEALTH STABILITY: MENTAL HEALTH: HOW OFTEN DO YOU HAVE 6 OR MORE DRINKS ON ONE OCCASION?: NOT ASKED

## 2021-05-07 SDOH — HEALTH STABILITY: MENTAL HEALTH: HOW OFTEN DO YOU HAVE A DRINK CONTAINING ALCOHOL?: 2-4 TIMES A MONTH

## 2021-05-07 ASSESSMENT — ENCOUNTER SYMPTOMS
CHILLS: 0
ABDOMINAL PAIN: 0
HEMATURIA: 0
NERVOUS/ANXIOUS: 0
DIZZINESS: 0
EYE PAIN: 0
HEMATOCHEZIA: 0
DIARRHEA: 0
COUGH: 0
CONSTIPATION: 0

## 2021-05-07 ASSESSMENT — MIFFLIN-ST. JEOR: SCORE: 2153.93

## 2021-05-07 NOTE — PATIENT INSTRUCTIONS
Have eye exam and have them route us the chart      Preventive Health Recommendations  Female Ages 26 - 39  Yearly exam:   See your health care provider every year in order to    Review health changes.     Discuss preventive care.      Review your medicines if you your doctor has prescribed any.    Until age 30: Get a Pap test every three years (more often if you have had an abnormal result).    After age 30: Talk to your doctor about whether you should have a Pap test every 3 years or have a Pap test with HPV screening every 5 years.   You do not need a Pap test if your uterus was removed (hysterectomy) and you have not had cancer.  You should be tested each year for STDs (sexually transmitted diseases), if you're at risk.   Talk to your provider about how often to have your cholesterol checked.  If you are at risk for diabetes, you should have a diabetes test (fasting glucose).  Shots: Get a flu shot each year. Get a tetanus shot every 10 years.   Nutrition:     Eat at least 5 servings of fruits and vegetables each day.    Eat whole-grain bread, whole-wheat pasta and brown rice instead of white grains and rice.    Get adequate Calcium and Vitamin D.     Lifestyle    Exercise at least 150 minutes a week (30 minutes a day, 5 days of the week). This will help you control your weight and prevent disease.    Limit alcohol to one drink per day.    No smoking.     Wear sunscreen to prevent skin cancer.    See your dentist every six months for an exam and cleaning.

## 2021-05-07 NOTE — LETTER
My Asthma Action Plan    Name: Stacey La   YOB: 1995  Date: 5/7/2021   My doctor: LUIS FERNANDO Del Rio CNP   My clinic: [facility]        My Rescue Medicine:   Albuterol inhaler (Proair/Ventolin/Proventil HFA)  2-4 puffs EVERY 4 HOURS as needed. Use a spacer if recommended by your provider.   My Asthma Severity:   Intermittent / Exercise Induced  Know your asthma triggers: exercise or sports and allergies             GREEN ZONE   Good Control    I feel good    No cough or wheeze    Can work, sleep and play without asthma symptoms       Take your asthma control medicine every day.     1. If exercise triggers your asthma, take your rescue medication    15 minutes before exercise or sports, and    During exercise if you have asthma symptoms  2. Spacer to use with inhaler: If you have a spacer, make sure to use it with your inhaler             YELLOW ZONE Getting Worse  I have ANY of these:    I do not feel good    Cough or wheeze    Chest feels tight    Wake up at night   1. Keep taking your Green Zone medications  2. Start taking your rescue medicine:    every 20 minutes for up to 1 hour. Then every 4 hours for 24-48 hours.  3. If you stay in the Yellow Zone for more than 12-24 hours, contact your doctor.  4. If you do not return to the Green Zone in 12-24 hours or you get worse, start taking your oral steroid medicine if prescribed by your provider.           RED ZONE Medical Alert - Get Help  I have ANY of these:    I feel awful    Medicine is not helping    Breathing getting harder    Trouble walking or talking    Nose opens wide to breathe       1. Take your rescue medicine NOW  2. If your provider has prescribed an oral steroid medicine, start taking it NOW  3. Call your doctor NOW  4. If you are still in the Red Zone after 20 minutes and you have not reached your doctor:    Take your rescue medicine again and    Call 911 or go to the emergency room right  away    See your regular doctor within 2 weeks of an Emergency Room or Urgent Care visit for follow-up treatment.          Annual Reminders:  Meet with Asthma Educator,  Flu Shot in the Fall, consider Pneumonia Vaccination for patients with asthma (aged 19 and older).    Pharmacy:    [facility] [address] [facility] [id number] [facility] [id number] [place] [address]    Electronically signed by LUIS FERNANDO Del Rio CNP   Date: 05/07/21                    Asthma Triggers  How To Control Things That Make Your Asthma Worse    Triggers are things that make your asthma worse.  Look at the list below to help you find your triggers and   what you can do about them. You can help prevent asthma flare-ups by staying away from your triggers.      Trigger                                                          What you can do   Cigarette Smoke  Tobacco smoke can make asthma worse. Do not allow smoking in your home, car or around you.  Be sure no one smokes at a child s day care or school.  If you smoke, ask your health care provider for ways to help you quit.  Ask family members to quit too.  Ask your health care provider for a referral to Quit Plan to help you quit smoking, or call 5-364-238-PLAN.     Colds, Flu, Bronchitis  These are common triggers of asthma. Wash your hands often.  Don t touch your eyes, nose or mouth.  Get a flu shot every year.     Dust Mites  These are tiny bugs that live in cloth or carpet. They are too small to see. Wash sheets and blankets in hot water every week.   Encase pillows and mattress in dust mite proof covers.  Avoid having carpet if you can. If you have carpet, vacuum weekly.   Use a dust mask and HEPA vacuum.   Pollen and Outdoor Mold  Some people are allergic to trees, grass, or weed pollen, or molds. Try to keep your windows closed.  Limit time out doors when pollen count is high.   Ask you health care provider about taking  medicine during allergy season.     Animal Dander  Some people are allergic to skin flakes, urine or saliva from pets with fur or feathers. Keep pets with fur or feathers out of your home.    If you can t keep the pet outdoors, then keep the pet out of your bedroom.  Keep the bedroom door closed.  Keep pets off cloth furniture and away from stuffed toys.     Mice, Rats, and Cockroaches  Some people are allergic to the waste from these pests.   Cover food and garbage.  Clean up spills and food crumbs.  Store grease in the refrigerator.   Keep food out of the bedroom.   Indoor Mold  This can be a trigger if your home has high moisture. Fix leaking faucets, pipes, or other sources of water.   Clean moldy surfaces.  Dehumidify basement if it is damp and smelly.   Smoke, Strong Odors, and Sprays  These can reduce air quality. Stay away from strong odors and sprays, such as perfume, powder, hair spray, paints, smoke incense, paint, cleaning products, candles and new carpet.   Exercise or Sports  Some people with asthma have this trigger. Be active!  Ask your doctor about taking medicine before sports or exercise to prevent symptoms.    Warm up for 5-10 minutes before and after sports or exercise.     Other Triggers of Asthma  Cold air:  Cover your nose and mouth with a scarf.  Sometimes laughing or crying can be a trigger.  Some medicines and food can trigger asthma.
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
## 2021-05-08 ASSESSMENT — ASTHMA QUESTIONNAIRES: ACT_TOTALSCORE: 21

## 2021-05-18 ENCOUNTER — MYC MEDICAL ADVICE (OUTPATIENT)
Dept: ENDOCRINOLOGY | Facility: CLINIC | Age: 26
End: 2021-05-18

## 2021-07-02 NOTE — PROGRESS NOTES
"Outcome for 07/02/21 9:39 AM : Patient will call back, at work at the moment       tSacey Mantilla  is being evaluated via a billable video visit.            This visit was converted from an in person visit to a virtual visit due to the ongoing COVID-19 pandemic.    Start time: 0700  End time: 0734    HPI:  Stacey is a 27 yo woman here for follow up of type 1 diabetes, diagnosed at age 7.   She also sees Dr. Allan.  Stacey's diabetes is complicated by nephropathy. She also has hyperlipidemia, a history of hypothyroidism (although she has been off of levothyroxine for several years) and celiac disease.  She follows a strict gluten free diet. She continues wearing a Dexcom G6 sensor and she really likes it.  She feels the accuracy is much better.     Stacey has had major life changes.  Recently got . She also recently left a \"dreadful\" job.  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  10-6 M-F.  She had to buy insurance through [place] [employer]. She is living with a roommate who also has type 1 dm.  She will be moving to another apartment in [facility] next month.  She has been very active, doing a lot of hiking.  She has lost a little weight and would like to lose more.      Her typical routine is:   No breakfast  Earlier lunch- veggies, fruit, yogurt, string cheese.   Dinner- if cooking at home- veggies, fish.   Ordering out more recently.   Pizza [employer]. ;ess nutritious options.    Snacks at night.     Recent a1c 6.7%, which is the lowest it has been recently.  She lost her Dexcom transmitter yesterday- [place]. She hopes to get this back, as it really helps her control.  She does not feel her lows until she is in the 50's.  She forgets to activate the \"exercise\" mode on her pump, but she finds it doesn't really help too much anyway for more intense activity.  It helps if she is going for a walk.  She has another profile in her pump which she uses for hiking (1.3 units/hr).  This has been helpful, " but on longer hikes she has to continually eat fruit snacks.     She uses the T-slim pump with the integrated sensor.  She likes this. She has been having very little hypoglycemia.     Stacey tests her blood sugar 4 times daily with an overall average this month of 179 mg/dL on her sensor for the past 2 weeks. Recent glucose is as follows:              Stacey wears a t-slim pump with pump settings as follows:   Day Profile Active at the time of upload  Start Time Basal Rate Correction Factor Carb Ratio Target BG  Midnight 2.000 u/hr 1u:18 mg/dL  1u:6.0 g 100 mg/dL  Noon  1.800 u/hr 1u:17 mg/dL  1u:5.0 g 100 mg/dL  10:00 PM 2.000 u/hr 1u:18 mg/dL  1u:6.0 g 100 mg/dL  Calculated Total Daily Basal 46.0 units    For her hypothyroidism- untreated with normal TSH.      For her CKD (stage 1), she is taking enalapril. She follows with nephrology.  She understands she will need to stop this if she were to become pregnant.    She has no other concerns today.       ROS  GENERAL:few pound weight loss, no fevers, chills, malaise, night sweats.   HEENT: no dysphagia, diplopia, neck pain or tenderness, dry/scratchy eyes, URI, cough, sinus drainage, tinnitus, sinus pressure  CV: no chest pain, pressure, palpitations, skipped beats, LOC  LUNGS: no SOB, CHILDRESS, cough, sputum production, wheezing   GI: no diarrhea, constipation, abdominal pain  EXTREMITIES: no rashes, ulcers, edema  NEUROLOGY: no changes in vision, tingling or numbness in hands or feet.   MSK: no muscle aches or pains, weakness  PSYCH: mood stable    Past Medical History:   Diagnosis Date     Asthma     Currently no treatment needed     Celiac disease      Chronic kidney disease, stage I 8/3/2015     Chronic sinusitis      Diabetes mellitus type 1 (H)      Diabetic nephropathy (H)      Family history of heart disease 8/3/2015     Hypertension      Hypothyroid      Pedal edema      Psoriasis      PMH:   Type 1 diabetes- since 2003  Celiac disease- since age  13  Hypothyroidism- age 10 (no longer on levothyroxine for several years)  Hyperlipidemia- had been on a statin for a little more than a year.  Wanted to make dietary changes.       Past Surgical History:   Procedure Laterality Date     ENT SURGERY       TONSILLECTOMY, ADENOIDECTOMY, COMBINED         Family History   Problem Relation Age of Onset     Cardiovascular Father 48        MI, stents, diabetic, type 2     Diabetes Father      Coronary Artery Disease Father      Obesity Father      Gastrointestinal Disease Mother         Celiacs     Obesity Mother      Heart Disease Paternal Half-Brother 45     Cardiovascular Maternal Grandfather         Englarged heart, pacemaker, defib     Skin Cancer Maternal Grandfather      Obesity Maternal Half-Sister      Skin Cancer Maternal Grandmother      Melanoma No family hx of      Family Hx:   Dad- premature CVD, in his 40s.  Type 2 diabetes  Mom- celiac disease  Grandfather- type 2 diabetes  Half brother- Asperger's  Half sister- cervical CA  Another half brother- bipolar and schizophrenia  1/2 brother-  of presumed heart attack    History     Social History     Marital Status:      Spouse Name: N/A     Number of Children: N/A     Years of Education: N/A     Social History Main Topics     Smoking status: Never Smoker      Smokeless tobacco: Never Used     Alcohol Use: Yes      Comment: occ. use     Drug Use: No     Sexual Activity:     Partners: Male     Birth Control/ Protection: Condom     Other Topics Concern     Parent/Sibling W/ Cabg, Mi Or Angioplasty Before 65f 55m? Yes     Caffeine Concern Yes     1 cup tea per day     Sleep Concern No     Special Diet Yes     gluten free diet, low carbs     Exercise Yes     walking, ellyptical, swimming, weights     Seat Belt Yes     Social History Narrative     Social Hx:    . 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  Now working as a nanny. Enjoys a lot of  "hiking. She also goes to the gym a few days a week.  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     Current Outpatient Medications   Medication     albuterol (PROAIR HFA/PROVENTIL HFA/VENTOLIN HFA) 108 (90 Base) MCG/ACT inhaler     ASPIRIN NOT PRESCRIBED (INTENTIONAL)     blood glucose test strip     Blood Pressure Monitoring KIT     Cholecalciferol 2000 UNITS TBDP     enalapril (VASOTEC) 20 MG tablet     escitalopram (LEXAPRO) 10 MG tablet     glucagon (GLUCAGON EMERGENCY) 1 MG kit     hydrOXYzine (ATARAX) 25 MG tablet     Insulin Infusion Pump Supplies (INSULIN PUMP SYRINGE RESERVOIR) MISC     insulin lispro (HUMALOG VIAL) 100 UNIT/ML vial     Insulin Pump Accessories MISC     insulin syringe-needle U-100 (31G X 5/16\" 0.5 ML) 31G X 5/16\" 0.5 ML miscellaneous     insulin syringes, disposable, U-100 0.3 ML MISC     ipratropium - albuterol 0.5 mg/2.5 mg/3 mL (DUONEB) 0.5-2.5 (3) MG/3ML neb solution     KETOSTIX test strip     Multiple Vitamins-Minerals (MULTIVITAMIN ADULT PO)     Ondansetron HCl (ZOFRAN PO)     STATIN NOT PRESCRIBED (INTENTIONAL)     No current facility-administered medications for this visit.           Allergies   Allergen Reactions     Dogs Other (See Comments)     Sinus symptoms      Dust Mites      Sinus      Gluten Meal      Physical Exam  There were no vitals taken for this visit.   GENERAL: healthy, alert and no distress  RESP: no audible wheeze, cough, or visible cyanosis.  No visible retractions or increased work of breathing.  Able to speak fully in complete sentences.  PSYCH: mentation appears normal, affect normal/bright, judgement and insight intact, normal speech and appearance well-groomed    RESULTS  Lab Results   Component Value Date    A1C 6.7 (H) 05/07/2021    A1C 7.3 (H) 02/17/2020    A1C 7.0 (H) 01/17/2018    A1C 7.6 (H) 03/26/2015    HEMOGLOBINA1 7.0 (A) 01/28/2020    HEMOGLOBINA1 7.3 (A) 08/19/2019    HEMOGLOBINA1 6.8 (A) 05/14/2019    HEMOGLOBINA1 6.8 (A) 02/18/2019    " HEMOGLOBINA1 7.7 (A) 11/13/2018       TSH   Date Value Ref Range Status   05/07/2021 3.69 0.40 - 4.00 mU/L Final   02/17/2020 3.97 0.40 - 4.00 mU/L Final   06/07/2019 3.91 0.40 - 4.00 mU/L Final   03/04/2019 3.41 0.40 - 4.00 mU/L Final   10/10/2016 2.89 0.40 - 4.00 mU/L Final     T4 Free   Date Value Ref Range Status   12/09/2013 1.18 0.70 - 1.85 ng/dL Final       ALT   Date Value Ref Range Status   05/07/2021 23 0 - 50 U/L Final   05/17/2019 33 0 - 50 U/L Final   ]    Recent Labs   Lab Test 05/07/21  0824 06/07/19  0858 08/04/15  1443 08/04/15  1443 12/09/13  1137   CHOL 236* 195   < > 194 241*   HDL 59 55   < > 66 66   * 114*   < > 102 142*   TRIG 112 128   < > 131 165*   CHOLHDLRATIO  --   --   --  2.9 3.7    < > = values in this interval not displayed.       Lab Results   Component Value Date     05/07/2021      Lab Results   Component Value Date    POTASSIUM 3.9 05/07/2021     Lab Results   Component Value Date    CHLORIDE 104 05/07/2021     Lab Results   Component Value Date    FRANKLIN 9.3 05/07/2021     Lab Results   Component Value Date    CO2 26 05/07/2021     Lab Results   Component Value Date    BUN 11 05/07/2021     Lab Results   Component Value Date    CR 0.59 05/07/2021       GFR Estimate   Date Value Ref Range Status   05/07/2021 >90 >60 mL/min/[1.73_m2] Final     Comment:     Non  GFR Calc  Starting 12/18/2018, serum creatinine based estimated GFR (eGFR) will be   calculated using the Chronic Kidney Disease Epidemiology Collaboration   (CKD-EPI) equation.     04/30/2021 >90 >60 mL/min/[1.73_m2] Final     Comment:     Non  GFR Calc  Starting 12/18/2018, serum creatinine based estimated GFR (eGFR) will be   calculated using the Chronic Kidney Disease Epidemiology Collaboration   (CKD-EPI) equation.     05/19/2020 >90 >60 mL/min/[1.73_m2] Final     Comment:     Non  GFR Calc  Starting 12/18/2018, serum creatinine based estimated GFR (eGFR) will be    calculated using the Chronic Kidney Disease Epidemiology Collaboration   (CKD-EPI) equation.       GFR Estimate If Black   Date Value Ref Range Status   05/07/2021 >90 >60 mL/min/[1.73_m2] Final     Comment:      GFR Calc  Starting 12/18/2018, serum creatinine based estimated GFR (eGFR) will be   calculated using the Chronic Kidney Disease Epidemiology Collaboration   (CKD-EPI) equation.     04/30/2021 >90 >60 mL/min/[1.73_m2] Final     Comment:      GFR Calc  Starting 12/18/2018, serum creatinine based estimated GFR (eGFR) will be   calculated using the Chronic Kidney Disease Epidemiology Collaboration   (CKD-EPI) equation.     05/19/2020 >90 >60 mL/min/[1.73_m2] Final     Comment:      GFR Calc  Starting 12/18/2018, serum creatinine based estimated GFR (eGFR) will be   calculated using the Chronic Kidney Disease Epidemiology Collaboration   (CKD-EPI) equation.         Lab Results   Component Value Date    MICROL <5 04/30/2021     No results found for: MICROALBUMIN  No results found for: CPEPT, GADAB, ISCAB    No results found for: B12]    Most recent eye exam date: : Not Found       Assessment/Plan:     1.  Type 1 diabetes- Stacey is doing well and her glucose control is quite stable.  She tends to go a bit high in the evening.  She will work on cutting back on snacking and taking a correction if she is high at bedtime.  We made no other changes today.  Discussed heart healthy eating ideas and encouraged her to increase consumption of fruits, vegetables.  I did place a referral for a dietitian to review celiac diet and weight loss/cholesterol management.  Could consider a re-trial of low dose GLP-1 in the future, however she did not tolerate Victoza 1.2 mg in the past. No other changes today.      2.  Risk factors-     Retinopathy:  No.  Had eye exam within last year.   Nephropathy:  BP historically well controlled. No microalbuminuria.  Creatinine stable.  She is  on enalapril.  White coat hypertension.     She follows with nephrology. Ordered a home BP cuff for her to check.   Neuropathy: No.    Feet: OK, no ulcers.   Lipids:  .  Had been on pravastatin 20 mg daily.  She stopped statin in anticipation of pregnancy.  She does not want to resume statin at this time.  Will have her meet with dietitian to review heart healthy eating.  She does have a history of premature CV disease in her father.  Would consider resuming statin in the future if she has no plans for childbearing.      3.  Hypothyroidism- last TSH in target on no levothyroxine.      4.  F/U in 3 months with Dr. Allan.     43 minutes spent on the date of the encounter doing chart review, review of test results, review of continuous glucose sensor, insulin pump data, interpretation of glucose data, patient visit and documentation, counseling/coordination of care, and discussion of follow up plan for worsening hyper and hypoglycemia.  The patient understood and is satisfied with today's visit.       Laureen Goldstein PA-C, MPAS   [facility]  Department of Medicine  Division of Endocrinology and Diabetes

## 2021-07-05 ENCOUNTER — VIRTUAL VISIT (OUTPATIENT)
Dept: ENDOCRINOLOGY | Facility: CLINIC | Age: 26
End: 2021-07-05
Payer: COMMERCIAL

## 2021-07-05 DIAGNOSIS — E10.9 WELL CONTROLLED TYPE 1 DIABETES MELLITUS (H): ICD-10-CM

## 2021-07-05 DIAGNOSIS — K90.0 CELIAC DISEASE: Primary | ICD-10-CM

## 2021-07-05 PROCEDURE — 99215 OFFICE O/P EST HI 40 MIN: CPT | Mod: 95 | Performed by: PHYSICIAN ASSISTANT

## 2021-07-05 NOTE — PATIENT INSTRUCTIONS
"Nice seeing you today, Stacey!    Your overall level of glucose control is good, but you are climbing a bit too high in the evening.     Let's work on reducing snacking in the evening and checking and correcting high glucose at bedtime.      I have placed a referral for you to see a dietitian/CDE.       Celiac disease:   Please follow up as scheduled with the dietitian to review diet in celiac disease.  It is very important that you strictly adhere to a gluten free diet.      I have placed orders for some labs that can be associated with celiac disease (iron, hemoglobin, folate, vitamin d, vitamin b12).  Please schedule a time to have these labs done.     Foods that are gluten free and fine to eat include the following (table 1):  ?Rice, corn, potatoes, quinoa, millet, buckwheat, and soybeans  ?Special flours, pasta, and other products made from these foods and labeled \"gluten free\"  ?Fruits and vegetables  ?Meat and eggs  ?Wine and distilled alcoholic drinks, such as rum, tequila, vodka, and whiskey  Milk, cheese, and other dairy foods are also gluten free.                                               Heart Healthy Diet and Lifestyle    - Eat your veggies and fruits --An abundance and variety of plant foods should make up the majority of your meals. Strive for seven to 10 servings a day of veggies and fruits. Add in beans and lentils.      - Go nuts. Keep almonds, cashews, pistachios and walnuts on hand for a quick snack. Choose natural peanut butter or almond butter, rather than the kind with hydrogenated fat added. Try hummus as a dip or spread for bread.    - Pass on the butter. Try olive or canola oil as a healthy replacement for butter or margarine. Use it in cooking. Dip bread in flavored olive oil or lightly spread it on whole-grain bread for a tasty alternative to butter.     - Spice it up. Herbs and spices make food tasty and are also rich in health-promoting substances. Season your meals with herbs and " spices rather than salt.    - Go fish. Eat fish twice a week. Fresh or water-packed tuna, salmon, trout, mackerel and herring are healthy choices. Grilled fish tastes good and requires little cleanup. Avoid fried fish, unless it's sauteed in a small amount of canola oil.    - Rein in the red meat. Substitute fish and poultry for red meat. When eaten, make sure it's lean and keep portions small (about the size of a deck of cards). Try to limit sausage, waterman and other high-fat or processed meats.    - Avoid being sedentary.  Decrease the amount of time spent in daily sedentary behavior.  Prolonged sitting should be interrupted every 30 min for blood glucose benefits.     - Be active.  30 minutes of moderate-to-vigorous intensity aerobic exercise at least 5 days a week or a total of 150 minutes spread over 3 days per week.  2-3 sessions/week of resistance exercise on nonconsecutive days. Flexibility training and balance training 2-3 times/week for older adults with diabetes.     Make an appointment with a dietitian for a personalized meal plan/nutrition review.

## 2021-07-05 NOTE — LETTER
"7/5/2021       RE: Stacey Mantilla  [address] [facility] [address]     Dear Colleague,    Thank you for referring your patient, Stacey Mantilla, to the [facility] [place] at [facility]. Please see a copy of my visit note below.    Outcome for 07/02/21 9:39 AM : Patient will call back, at work at the moment       Stacey Mantilla  is being evaluated via a billable video visit.            This visit was converted from an in person visit to a virtual visit due to the ongoing COVID-19 pandemic.    Start time: 0700  End time: 0734    HPI:  Stacey is a 27 yo woman here for follow up of type 1 diabetes, diagnosed at age 7.   She also sees Dr. Allan.  Stacey's diabetes is complicated by nephropathy. She also has hyperlipidemia, a history of hypothyroidism (although she has been off of levothyroxine for several years) and celiac disease.  She follows a strict gluten free diet. She continues wearing a Dexcom G6 sensor and she really likes it.  She feels the accuracy is much better.     Stacey has had major life changes.  Recently got . She also recently left a \"dreadful\" job.  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  10-6 M-F.  She had to buy insurance through [place] [employer]. She is living with a roommate who also has type 1 dm.  She will be moving to another apartment in [facility] next month.  She has been very active, doing a lot of hiking.  She has lost a little weight and would like to lose more.      Her typical routine is:   No breakfast  Earlier lunch- veggies, fruit, yogurt, string cheese.   Dinner- if cooking at home- veggies, fish.   Ordering out more recently.   Pizza [place]. ;ess nutritious options.    Snacks at night.     Recent a1c 6.7%, which is the lowest it has been recently.  She lost her Dexcom transmitter yesterday- [place]. She hopes to get this back, as it really helps her control.  She does not feel her " "lows until she is in the 50's.  She forgets to activate the \"exercise\" mode on her pump, but she finds it doesn't really help too much anyway for more intense activity.  It helps if she is going for a walk.  She has another profile in her pump which she uses for hiking (1.3 units/hr).  This has been helpful, but on longer hikes she has to continually eat fruit snacks.     She uses the T-slim pump with the integrated sensor.  She likes this. She has been having very little hypoglycemia.     Stacey tests her blood sugar 4 times daily with an overall average this month of 179 mg/dL on her sensor for the past 2 weeks. Recent glucose is as follows:              Stacey wears a t-slim pump with pump settings as follows:   Day Profile Active at the time of upload  Start Time Basal Rate Correction Factor Carb Ratio Target BG  Midnight 2.000 u/hr 1u:18 mg/dL  1u:6.0 g 100 mg/dL  Noon  1.800 u/hr 1u:17 mg/dL  1u:5.0 g 100 mg/dL  10:00 PM 2.000 u/hr 1u:18 mg/dL  1u:6.0 g 100 mg/dL  Calculated Total Daily Basal 46.0 units    For her hypothyroidism- untreated with normal TSH.      For her CKD (stage 1), she is taking enalapril. She follows with nephrology.  She understands she will need to stop this if she were to become pregnant.    She has no other concerns today.       ROS  GENERAL:few pound weight loss, no fevers, chills, malaise, night sweats.   HEENT: no dysphagia, diplopia, neck pain or tenderness, dry/scratchy eyes, URI, cough, sinus drainage, tinnitus, sinus pressure  CV: no chest pain, pressure, palpitations, skipped beats, LOC  LUNGS: no SOB, CHILDRESS, cough, sputum production, wheezing   GI: no diarrhea, constipation, abdominal pain  EXTREMITIES: no rashes, ulcers, edema  NEUROLOGY: no changes in vision, tingling or numbness in hands or feet.   MSK: no muscle aches or pains, weakness  PSYCH: mood stable    Past Medical History:   Diagnosis Date     Asthma     Currently no treatment needed     Celiac disease      Chronic " kidney disease, stage I 8/3/2015     Chronic sinusitis      Diabetes mellitus type 1 (H)      Diabetic nephropathy (H)      Family history of heart disease 8/3/2015     Hypertension      Hypothyroid      Pedal edema      Psoriasis      PMH:   Type 1 diabetes- since   Celiac disease- since age 13  Hypothyroidism- age 10 (no longer on levothyroxine for several years)  Hyperlipidemia- had been on a statin for a little more than a year.  Wanted to make dietary changes.       Past Surgical History:   Procedure Laterality Date     ENT SURGERY       TONSILLECTOMY, ADENOIDECTOMY, COMBINED         Family History   Problem Relation Age of Onset     Cardiovascular Father 48        MI, stents, diabetic, type 2     Diabetes Father      Coronary Artery Disease Father      Obesity Father      Gastrointestinal Disease Mother         Celiacs     Obesity Mother      Heart Disease Paternal Half-Brother 45     Cardiovascular Maternal Grandfather         Englarged heart, pacemaker, defib     Skin Cancer Maternal Grandfather      Obesity Maternal Half-Sister      Skin Cancer Maternal Grandmother      Melanoma No family hx of      Family Hx:   Dad- premature CVD, in his 40s.  Type 2 diabetes  Mom- celiac disease  Grandfather- type 2 diabetes  Half brother- Asperger's  Half sister- cervical CA  Another half brother- bipolar and schizophrenia  1/2 brother-  of presumed heart attack    History     Social History     Marital Status:      Spouse Name: N/A     Number of Children: N/A     Years of Education: N/A     Social History Main Topics     Smoking status: Never Smoker      Smokeless tobacco: Never Used     Alcohol Use: Yes      Comment: occ. use     Drug Use: No     Sexual Activity:     Partners: Male     Birth Control/ Protection: Condom     Other Topics Concern     Parent/Sibling W/ Cabg, Mi Or Angioplasty Before 65f 55m? Yes     Caffeine Concern Yes     1 cup tea per day     Sleep Concern No     Special Diet Yes      "gluten free diet, low carbs     Exercise Yes     walking, ellyptical, swimming, weights     Seat Belt Yes     Social History Narrative     Social Hx:    2021. 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  Now working as a nanny. Enjoys a lot of hiking. She also goes to the gym a few days a week.  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     Current Outpatient Medications   Medication     albuterol (PROAIR HFA/PROVENTIL HFA/VENTOLIN HFA) 108 (90 Base) MCG/ACT inhaler     ASPIRIN NOT PRESCRIBED (INTENTIONAL)     blood glucose test strip     Blood Pressure Monitoring KIT     Cholecalciferol 2000 UNITS TBDP     enalapril (VASOTEC) 20 MG tablet     escitalopram (LEXAPRO) 10 MG tablet     glucagon (GLUCAGON EMERGENCY) 1 MG kit     hydrOXYzine (ATARAX) 25 MG tablet     Insulin Infusion Pump Supplies (INSULIN PUMP SYRINGE RESERVOIR) MISC     insulin lispro (HUMALOG VIAL) 100 UNIT/ML vial     Insulin Pump Accessories MISC     insulin syringe-needle U-100 (31G X 5/16\" 0.5 ML) 31G X 5/16\" 0.5 ML miscellaneous     insulin syringes, disposable, U-100 0.3 ML MISC     ipratropium - albuterol 0.5 mg/2.5 mg/3 mL (DUONEB) 0.5-2.5 (3) MG/3ML neb solution     KETOSTIX test strip     Multiple Vitamins-Minerals (MULTIVITAMIN ADULT PO)     Ondansetron HCl (ZOFRAN PO)     STATIN NOT PRESCRIBED (INTENTIONAL)     No current facility-administered medications for this visit.           Allergies   Allergen Reactions     Dogs Other (See Comments)     Sinus symptoms      Dust Mites      Sinus      Gluten Meal      Physical Exam  There were no vitals taken for this visit.   GENERAL: healthy, alert and no distress  RESP: no audible wheeze, cough, or visible cyanosis.  No visible retractions or increased work of breathing.  Able to speak fully in complete sentences.  PSYCH: mentation appears normal, affect normal/bright, judgement and insight intact, normal speech and appearance " well-groomed    RESULTS  Lab Results   Component Value Date    A1C 6.7 (H) 05/07/2021    A1C 7.3 (H) 02/17/2020    A1C 7.0 (H) 01/17/2018    A1C 7.6 (H) 03/26/2015    HEMOGLOBINA1 7.0 (A) 01/28/2020    HEMOGLOBINA1 7.3 (A) 08/19/2019    HEMOGLOBINA1 6.8 (A) 05/14/2019    HEMOGLOBINA1 6.8 (A) 02/18/2019    HEMOGLOBINA1 7.7 (A) 11/13/2018       TSH   Date Value Ref Range Status   05/07/2021 3.69 0.40 - 4.00 mU/L Final   02/17/2020 3.97 0.40 - 4.00 mU/L Final   06/07/2019 3.91 0.40 - 4.00 mU/L Final   03/04/2019 3.41 0.40 - 4.00 mU/L Final   10/10/2016 2.89 0.40 - 4.00 mU/L Final     T4 Free   Date Value Ref Range Status   12/09/2013 1.18 0.70 - 1.85 ng/dL Final       ALT   Date Value Ref Range Status   05/07/2021 23 0 - 50 U/L Final   05/17/2019 33 0 - 50 U/L Final   ]    Recent Labs   Lab Test 05/07/21  0824 06/07/19  0858 08/04/15  1443 08/04/15  1443 12/09/13  1137   CHOL 236* 195   < > 194 241*   HDL 59 55   < > 66 66   * 114*   < > 102 142*   TRIG 112 128   < > 131 165*   CHOLHDLRATIO  --   --   --  2.9 3.7    < > = values in this interval not displayed.       Lab Results   Component Value Date     05/07/2021      Lab Results   Component Value Date    POTASSIUM 3.9 05/07/2021     Lab Results   Component Value Date    CHLORIDE 104 05/07/2021     Lab Results   Component Value Date    FRANKLIN 9.3 05/07/2021     Lab Results   Component Value Date    CO2 26 05/07/2021     Lab Results   Component Value Date    BUN 11 05/07/2021     Lab Results   Component Value Date    CR 0.59 05/07/2021       GFR Estimate   Date Value Ref Range Status   05/07/2021 >90 >60 mL/min/[1.73_m2] Final     Comment:     Non  GFR Calc  Starting 12/18/2018, serum creatinine based estimated GFR (eGFR) will be   calculated using the Chronic Kidney Disease Epidemiology Collaboration   (CKD-EPI) equation.     04/30/2021 >90 >60 mL/min/[1.73_m2] Final     Comment:     Non  GFR Calc  Starting 12/18/2018, serum  creatinine based estimated GFR (eGFR) will be   calculated using the Chronic Kidney Disease Epidemiology Collaboration   (CKD-EPI) equation.     05/19/2020 >90 >60 mL/min/[1.73_m2] Final     Comment:     Non  GFR Calc  Starting 12/18/2018, serum creatinine based estimated GFR (eGFR) will be   calculated using the Chronic Kidney Disease Epidemiology Collaboration   (CKD-EPI) equation.       GFR Estimate If Black   Date Value Ref Range Status   05/07/2021 >90 >60 mL/min/[1.73_m2] Final     Comment:      GFR Calc  Starting 12/18/2018, serum creatinine based estimated GFR (eGFR) will be   calculated using the Chronic Kidney Disease Epidemiology Collaboration   (CKD-EPI) equation.     04/30/2021 >90 >60 mL/min/[1.73_m2] Final     Comment:      GFR Calc  Starting 12/18/2018, serum creatinine based estimated GFR (eGFR) will be   calculated using the Chronic Kidney Disease Epidemiology Collaboration   (CKD-EPI) equation.     05/19/2020 >90 >60 mL/min/[1.73_m2] Final     Comment:      GFR Calc  Starting 12/18/2018, serum creatinine based estimated GFR (eGFR) will be   calculated using the Chronic Kidney Disease Epidemiology Collaboration   (CKD-EPI) equation.         Lab Results   Component Value Date    MICROL <5 04/30/2021     No results found for: MICROALBUMIN  No results found for: CPEPT, GADAB, ISCAB    No results found for: B12]    Most recent eye exam date: : Not Found       Assessment/Plan:     1.  Type 1 diabetes- Stacey is doing well and her glucose control is quite stable.  She tends to go a bit high in the evening.  She will work on cutting back on snacking and taking a correction if she is high at bedtime.  We made no other changes today.  Discussed heart healthy eating ideas and encouraged her to increase consumption of fruits, vegetables.  I did place a referral for a dietitian to review celiac diet and weight loss/cholesterol management.  Could  consider a re-trial of low dose GLP-1 in the future, however she did not tolerate Victoza 1.2 mg in the past. No other changes today.      2.  Risk factors-     Retinopathy:  No.  Had eye exam within last year.   Nephropathy:  BP historically well controlled. No microalbuminuria.  Creatinine stable.  She is on enalapril.  White coat hypertension.     She follows with nephrology. Ordered a home BP cuff for her to check.   Neuropathy: No.    Feet: OK, no ulcers.   Lipids:  .  Had been on pravastatin 20 mg daily.  She stopped statin in anticipation of pregnancy.  She does not want to resume statin at this time.  Will have her meet with dietitian to review heart healthy eating.  She does have a history of premature CV disease in her father.  Would consider resuming statin in the future if she has no plans for childbearing.      3.  Hypothyroidism- last TSH in target on no levothyroxine.      4.  F/U in 3 months with Dr. Allan.     43 minutes spent on the date of the encounter doing chart review, review of test results, review of continuous glucose sensor, insulin pump data, interpretation of glucose data, patient visit and documentation, counseling/coordination of care, and discussion of follow up plan for worsening hyper and hypoglycemia.  The patient understood and is satisfied with today's visit.       Laureen Goldstein PA-C, MPAS   [facility]  Department of Medicine  Division of Endocrinology and Diabetes

## 2021-08-16 ENCOUNTER — VIRTUAL VISIT (OUTPATIENT)
Dept: EDUCATION SERVICES | Facility: CLINIC | Age: 26
End: 2021-08-16
Attending: PHYSICIAN ASSISTANT
Payer: COMMERCIAL

## 2021-08-16 DIAGNOSIS — K90.0 CELIAC DISEASE: ICD-10-CM

## 2021-08-16 DIAGNOSIS — E10.9 WELL CONTROLLED TYPE 1 DIABETES MELLITUS (H): ICD-10-CM

## 2021-08-16 PROCEDURE — 99207 PR NO CHARGE LOS: CPT | Performed by: NUTRITIONIST

## 2021-08-26 ENCOUNTER — TELEPHONE (OUTPATIENT)
Dept: ENDOCRINOLOGY | Facility: CLINIC | Age: 26
End: 2021-08-26

## 2021-08-26 DIAGNOSIS — E10.29 TYPE 1 DIABETES MELLITUS WITH OTHER KIDNEY COMPLICATION (H): Primary | ICD-10-CM

## 2021-08-26 RX ORDER — INSULIN PUMP CARTRIDGE
1 CARTRIDGE (EA) SUBCUTANEOUS
Qty: 30 EACH | Refills: 3 | Status: SHIPPED | OUTPATIENT
Start: 2021-08-26 | End: 2021-08-28

## 2021-08-26 NOTE — TELEPHONE ENCOUNTER
Patient is wantinh refills on T:Slim X2 3ML Cartridge      Thank you  [facility]

## 2021-08-31 ENCOUNTER — TELEPHONE (OUTPATIENT)
Dept: ENDOCRINOLOGY | Facility: CLINIC | Age: 26
End: 2021-08-31

## 2021-08-31 DIAGNOSIS — E10.29 TYPE 1 DIABETES MELLITUS WITH OTHER KIDNEY COMPLICATION (H): Primary | ICD-10-CM

## 2021-08-31 RX ORDER — PROCHLORPERAZINE 25 MG/1
SUPPOSITORY RECTAL
Qty: 3 EACH | Refills: 11 | Status: SHIPPED | OUTPATIENT
Start: 2021-08-31 | End: 2023-06-22

## 2021-08-31 RX ORDER — PROCHLORPERAZINE 25 MG/1
SUPPOSITORY RECTAL
Qty: 1 EACH | Refills: 3 | Status: SHIPPED | OUTPATIENT
Start: 2021-08-31 | End: 2024-02-13

## 2021-08-31 NOTE — TELEPHONE ENCOUNTER
"Please send rx for     Please send new Rx for Dexcom Transmitter(Medicare B Compliant)  Rx must be written this way:    Dexcom G6 Transmitter:  Qty:1  Refills: can have 1 additional refill  Sig \"CHANGE EVERY 90 DAYS\"    Please send new Rx for Dexcom Sensors (Medicare B Compliant)  Rx must be written this way:    Dexcom G6 Sensors  Qty: 3  Refills: Can have up to 5 additional refills  Sig \"CHANGE EVERY 10 DAYS\"    And Sure-T Infusion Set 23\"    Please call [phone] and speak to one of our Durable Medical Equipment Team members if you have any questions.      "

## 2021-09-02 ENCOUNTER — MYC MEDICAL ADVICE (OUTPATIENT)
Dept: PEDIATRICS | Facility: CLINIC | Age: 26
End: 2021-09-02

## 2021-09-02 DIAGNOSIS — R41.840 INATTENTION: Primary | ICD-10-CM

## 2021-09-02 NOTE — TELEPHONE ENCOUNTER
"Nancy Mejia: Can you please review the referral I teed up? I'm not sure I completed it correctly.    Thanks.      - Camacho \"Deni\" BEVERLY Duvall - Patient Advocate Liason (PAL)  [facility]    "

## 2021-09-08 ENCOUNTER — MYC MEDICAL ADVICE (OUTPATIENT)
Dept: ENDOCRINOLOGY | Facility: CLINIC | Age: 26
End: 2021-09-08

## 2021-09-08 DIAGNOSIS — E10.9 WELL CONTROLLED TYPE 1 DIABETES MELLITUS (H): Primary | ICD-10-CM

## 2021-09-12 RX ORDER — INSULIN GLARGINE 100 [IU]/ML
45 INJECTION, SOLUTION SUBCUTANEOUS DAILY
Qty: 60 ML | Refills: 3 | Status: SHIPPED | OUTPATIENT
Start: 2021-09-12 | End: 2022-03-14

## 2021-09-12 RX ORDER — INSULIN LISPRO 100 [IU]/ML
INJECTION, SOLUTION INTRAVENOUS; SUBCUTANEOUS
Qty: 60 ML | Refills: 3 | Status: SHIPPED | OUTPATIENT
Start: 2021-09-12 | End: 2021-09-16

## 2021-09-12 RX ORDER — FLURBIPROFEN SODIUM 0.3 MG/ML
SOLUTION/ DROPS OPHTHALMIC
Qty: 100 EACH | Refills: 11 | Status: SHIPPED | OUTPATIENT
Start: 2021-09-12 | End: 2022-07-25

## 2021-09-16 DIAGNOSIS — E10.9 WELL CONTROLLED TYPE 1 DIABETES MELLITUS (H): ICD-10-CM

## 2021-09-16 RX ORDER — INSULIN ASPART 100 [IU]/ML
INJECTION, SOLUTION INTRAVENOUS; SUBCUTANEOUS
Qty: 60 ML | Refills: 1 | Status: SHIPPED | OUTPATIENT
Start: 2021-09-16 | End: 2022-03-14

## 2021-09-16 NOTE — TELEPHONE ENCOUNTER
HUMALOG KWIKPEN 100 UNIT/ML soln  Pharm request alternative, insurance does not cover Humalog     Last Written Prescription Date:  9-12-21  Last Fill Quantity: 60 ml,   # refills: 3  Last Office Visit : 7-5-21  Future Office visit:  11-15-21    All Pharmacy Suggested Alternatives:    0 insulin aspart (NOVOLOG FLEXPEN) 100 UNIT/ML pen  0 insulin aspart (NOVOLOG FLEXPEN RELION) 100 UNIT/ML pen  0 insulin aspart (NOVOLOG RELION) 100 UNITS/ML vial       Routing refill request to provider for review/approval because:  Insulin - refilled per clinic

## 2021-09-16 NOTE — TELEPHONE ENCOUNTER
Formulary change. Humalog no longer covered. Novolog preferred.   Cari Collazo RN on 9/16/2021 at 11:57 AM

## 2021-09-26 ENCOUNTER — HEALTH MAINTENANCE LETTER (OUTPATIENT)
Age: 26
End: 2021-09-26

## 2021-10-03 NOTE — MR AVS SNAPSHOT
After Visit Summary   9/11/2018    Stacey La    MRN: [id number]           Patient Information     Date Of Birth          1995        Visit Information        Provider Department      9/11/2018 5:40 PM Sonido Reyes OD M [facility]        Care Instructions    Stacey La   1995  [id number]    Procedure      Wearing Schedule 1st Week    Wash hands with oil/perfume free soap.   Day 1    4 hours  Cleanse and disinfect contacts daily.    Day 2    6 hours  Clean________________________    Day 3    8 hours  Rinse________________________    Day 4    8 hours  Disinfect______________________    Day 5    10 hours  Rewet________________________    Day 6    10 hours          Day 7    10 hours  Use only eye drops made for contact lenses.  Return in 1-2 weeks for contact check appointment-Come in wearing your contacts.    Replacement Schedule  Replace in 1 month.  Sleeping in contacts is NOT recommended.    The Federal Drug Administration has approved extended wear of some brands of contact lenes from one day to a maximum of 7 days.  Sleeping contact lenses increases the risk of contact lens related problems such as but not limited to corneal ulceration,  infiltrates, conjunctivitis, and neovascularization.  Not all contacts are approved for overnight wear.  Make sure you are using your contacts as they are intended.    Congratulations! You have been fitted with contact lenses.  Please follow these simple steps to insure successful contact lens wear.  1.  If your lenses are uncomfortable, cause redness, pain, or blurry vision discontinue wear immediately and call the clinic.  2. Return to [facility] for contact lens checks and yearly eye exams.  3. Never wear lenses longer than the prescribed time.  Maximum wearing time of 12 hours a day.  4. Use only prescribed solutions because mixing brands or types may result in problems.  5. Never wear a torn,  Pt bladder scanned per admitting MD request, 208ml. MD notified. Pt used urinal, approx 150ml out. Urine collected and sent to lab. Pt resting comfortably, denies any needs. Call light within reach.   discolored, scratched, or chipped lens for any reason.  6. Always follow your doctor and 's recommendations.  7. Use only water-soluble cosmetics, especially mascara.  8. Always have a current pair of eyeglasses available.  9. Do not wear contacts while soaking in a hot tub or while swimming.  10. Only FDA approved extended wear contacts are suitable for sleeping.    I have read and understand all the enclosed material and have been instructed on insertion, removal, and care of my contact lenses.    X______________________________________________________________________                Follow-ups after your visit        Your next 10 appointments already scheduled     Sep 17, 2018 10:30 AM CDT   (Arrive by 10:15 AM)   RETURN DIABETES with Laureen Goldstein PA-C   [facility] Endocrinology ([facility])    [address] [place] [address]  3rd [facility] [address] [phone]            Oct 10, 2018  9:15 AM CDT   New Visit with Robby Olivas MD   [facility] ([facility])    [address]  [facility] [address] [phone]            Oct 22, 2018  7:30 AM CDT   LAB with  LAB   [facility] Lab ([facility])    [address] [place] [id number] [phone]           Please do not eat 10-12 hours before your appointment if you are coming in fasting for labs on lipids, cholesterol, or glucose (sugar). This does not apply to pregnant women. Water, hot tea and black coffee (with nothing added) are okay. Do not drink other fluids, diet soda or chew gum.            Oct 22, 2018  8:30 AM CDT   (Arrive by 8:00 AM)   Return Visit with Connor Castaneda MD   [facility] Nephrology ([facility])    [address] [place] [address]  [address]  [facility] [id number] [phone]            Nov 13, 2018  2:30 PM CST   (Arrive by 2:15 PM)    RETURN DIABETES with Bia Allan MD   [facility] Endocrinology ([facility])    [address]  [address] [facility] [address] [phone]              Who to contact     Please call your clinic at [phone] to:    Ask questions about your health    Make or cancel appointments    Discuss your medicines    Learn about your test results    Speak to your doctor            Additional Information About Your Visit        [employer] Information     [employer] gives you secure access to your electronic health record. If you see a primary care provider, you can also send messages to your care team and make appointments. If you have questions, please call your primary care clinic.  If you do not have a primary care provider, please call [phone] and they will assist you.      [employer] is an electronic gateway that provides easy, online access to your medical records. With [employer], you can request a clinic appointment, read your test results, renew a prescription or communicate with your care team.     To access your existing account, please contact your [facility] or call [phone] for assistance.        Care EveryWhere ID     This is your Care EveryWhere ID. This could be used by other organizations to access your [place] medical records  [id number]         Blood Pressure from Last 3 Encounters:   04/10/18 139/85   03/12/18 114/70   01/29/18 147/90    Weight from Last 3 Encounters:   04/10/18 136 kg (299 lb 14.4 oz)   03/12/18 132.2 kg (291 lb 7.2 oz)   01/29/18 (!) 136.9 kg (301 lb 14.4 oz)              Today, you had the following     No orders found for display       Primary Care Provider Office Phone # Fax #    LUIS FERNANDO [place] [phone] [address] [place] [address]        Equal Access to Services     CRISTY HIGGINBOTHAM : shannon Montoya, tarah joshi  keagan haskinscullen nyaan ah. Veronica [facility] [phone].    ATENCIÓN: Si glennla radha, tiene a escobar disposición servicios gratuitos de asistencia lingüística. Hardeep al [phone].    We comply with applicable federal civil rights laws and [place] laws. We do not discriminate on the basis of race, color, national origin, age, disability, sex, sexual orientation, or gender identity.            Thank you!     Thank you for choosing [facility]  for your care. Our goal is always to provide you with excellent care. Hearing back from our patients is one way we can continue to improve our services. Please take a few minutes to complete the written survey that you may receive in the mail after your visit with us. Thank you!             Your Updated Medication List - Protect others around you: Learn how to safely use, store and throw away your medicines at www.disposemymeds.org.          This list is accurate as of 9/11/18  6:22 PM.  Always use your most recent med list.                   Brand Name Dispense Instructions for use Diagnosis    * albuterol 108 (90 Base) MCG/ACT inhaler    PROAIR HFA    1 each    Inhale 2 puffs into the lungs every 6 hours as needed    Mild intermittent asthma without complication       * albuterol (2.5 MG/3ML) 0.083% neb solution     1 Box    Take 1 vial (2.5 mg) by nebulization every 4 hours as needed for shortness of breath / dyspnea or wheezing /chest tightness/cough    Uncomplicated asthma, unspecified asthma severity       ALLEGRA PO      Take by mouth daily UNSURE OF DOSAGE        blood glucose monitoring test strip    no brand specified    4 Box    Use to test blood sugar eight times daily or as directed.  Dispense Brandy Contour Next Test Strips.    Diabetes mellitus type 1 (H)       Blood Pressure Monitoring Kit     1 kit    1 kit daily    HTN (hypertension)       Cholecalciferol 2000 units Tbdp     90 tablet    Take 2,000 Units by mouth daily     Proteinuria, Vitamin D deficiency       enalapril 20 MG tablet    VASOTEC    180 tablet    Take 1 tablet (20 mg) by mouth 2 times daily    Proteinuria       escitalopram 10 MG tablet    LEXAPRO    30 tablet    Take 1 tablet (10 mg) by mouth daily    Depression with anxiety       glucagon 1 MG kit    GLUCAGON EMERGENCY    1 mg    Inject 1 mg into the muscle once for 1 dose    Well controlled type 1 diabetes mellitus (H)       humaLOG 100 UNIT/ML injection   Generic drug:  insulin lispro     120 mL    USE AS DIRECTED UP  TO  140  UNITS  DAILY  IN  INSULIN  PUMP    Well controlled type 1 diabetes mellitus (H)       hydrOXYzine 25 MG capsule    VISTARIL    30 capsule    Take 1 capsule (25 mg) by mouth daily as needed for itching    Major depressive disorder, recurrent episode, in partial remission (H)       insulin syringes (disposable) U-100 0.3 ML Misc     100 each    Use 2-3 times daily as needed    Type 1 diabetes mellitus (H)       ipratropium - albuterol 0.5 mg/2.5 mg/3 mL 0.5-2.5 (3) MG/3ML neb solution    DUONEB    30 vial    Take 1 vial (3 mLs) by nebulization every 6 hours as needed for shortness of breath / dyspnea or wheezing        levothyroxine 50 MCG tablet    SYNTHROID/LEVOTHROID    90 tablet    Take 1 tablet (50 mcg) by mouth daily    Other specified hypothyroidism       MULTIVITAMIN ADULT PO      Take 1 tablet by mouth daily        prochlorperazine 10 MG tablet    COMPAZINE    10 tablet    Take 1 tablet (10 mg) by mouth every 6 hours as needed for nausea or vomiting    Intractable vomiting       VITAMIN C PO      Take 500 mg by mouth daily        ZOFRAN PO      Take by mouth as needed for nausea or vomiting Reported on 3/17/2017        * Notice:  This list has 2 medication(s) that are the same as other medications prescribed for you. Read the directions carefully, and ask your doctor or other care provider to review them with you.

## 2021-10-28 ENCOUNTER — TELEPHONE (OUTPATIENT)
Dept: TRANSPLANT | Facility: CLINIC | Age: 26
End: 2021-10-28

## 2021-10-28 NOTE — TELEPHONE ENCOUNTER
Patient Called:  Has upcoming appt with Dr. CHARY Castaneda on 11/01/2021 at 10:30Am  Stacey is wondering if she needs lab draw prior??       Call back needed? Yes    Return Call Needed  Same as documented in contacts section  When to return call?: Same day: Route High Priority

## 2021-10-29 DIAGNOSIS — N18.1 CKD (CHRONIC KIDNEY DISEASE) STAGE 1, GFR 90 ML/MIN OR GREATER: Primary | ICD-10-CM

## 2021-10-29 DIAGNOSIS — R80.8 OTHER PROTEINURIA: ICD-10-CM

## 2021-11-15 ENCOUNTER — OFFICE VISIT (OUTPATIENT)
Dept: NEPHROLOGY | Facility: CLINIC | Age: 26
End: 2021-11-15
Attending: INTERNAL MEDICINE
Payer: COMMERCIAL

## 2021-11-15 ENCOUNTER — OFFICE VISIT (OUTPATIENT)
Dept: ENDOCRINOLOGY | Facility: CLINIC | Age: 26
End: 2021-11-15
Payer: COMMERCIAL

## 2021-11-15 ENCOUNTER — LAB (OUTPATIENT)
Dept: LAB | Facility: CLINIC | Age: 26
End: 2021-11-15
Attending: INTERNAL MEDICINE
Payer: COMMERCIAL

## 2021-11-15 VITALS
OXYGEN SATURATION: 98 % | SYSTOLIC BLOOD PRESSURE: 121 MMHG | DIASTOLIC BLOOD PRESSURE: 81 MMHG | HEART RATE: 69 BPM | WEIGHT: 284.6 LBS | BODY MASS INDEX: 42.15 KG/M2 | HEIGHT: 69 IN | TEMPERATURE: 98.2 F

## 2021-11-15 VITALS
WEIGHT: 285 LBS | HEIGHT: 69 IN | SYSTOLIC BLOOD PRESSURE: 133 MMHG | BODY MASS INDEX: 42.21 KG/M2 | DIASTOLIC BLOOD PRESSURE: 87 MMHG

## 2021-11-15 DIAGNOSIS — N18.1 CHRONIC KIDNEY DISEASE, STAGE I: ICD-10-CM

## 2021-11-15 DIAGNOSIS — R80.8 OTHER PROTEINURIA: ICD-10-CM

## 2021-11-15 DIAGNOSIS — N18.1 CKD (CHRONIC KIDNEY DISEASE) STAGE 1, GFR 90 ML/MIN OR GREATER: ICD-10-CM

## 2021-11-15 DIAGNOSIS — E10.9 WELL CONTROLLED TYPE 1 DIABETES MELLITUS (H): Primary | ICD-10-CM

## 2021-11-15 DIAGNOSIS — E55.9 VITAMIN D DEFICIENCY: Primary | ICD-10-CM

## 2021-11-15 DIAGNOSIS — E55.9 VITAMIN D DEFICIENCY: ICD-10-CM

## 2021-11-15 LAB
ALBUMIN SERPL-MCNC: 3.4 G/DL (ref 3.4–5)
ANION GAP SERPL CALCULATED.3IONS-SCNC: 10 MMOL/L (ref 3–14)
BUN SERPL-MCNC: 9 MG/DL (ref 7–30)
CALCIUM SERPL-MCNC: 9.2 MG/DL (ref 8.5–10.1)
CHLORIDE BLD-SCNC: 105 MMOL/L (ref 94–109)
CO2 SERPL-SCNC: 25 MMOL/L (ref 20–32)
CREAT SERPL-MCNC: 0.53 MG/DL (ref 0.52–1.04)
CREAT UR-MCNC: 35 MG/DL
CREAT UR-MCNC: 35 MG/DL
DEPRECATED CALCIDIOL+CALCIFEROL SERPL-MC: 21 UG/L (ref 20–75)
GFR SERPL CREATININE-BSD FRML MDRD: >90 ML/MIN/1.73M2
GLUCOSE BLD-MCNC: 249 MG/DL (ref 70–99)
HBA1C MFR BLD: 6.9 % (ref 4.3–?)
HGB BLD-MCNC: 14.5 G/DL (ref 11.7–15.7)
MICROALBUMIN UR-MCNC: 6 MG/L
MICROALBUMIN/CREAT UR: 17.14 MG/G CR (ref 0–25)
PHOSPHATE SERPL-MCNC: 3 MG/DL (ref 2.5–4.5)
POTASSIUM BLD-SCNC: 4.4 MMOL/L (ref 3.4–5.3)
PROT UR-MCNC: 0.06 G/L
PROT/CREAT 24H UR: 0.17 G/G CR (ref 0–0.2)
SODIUM SERPL-SCNC: 140 MMOL/L (ref 133–144)

## 2021-11-15 PROCEDURE — 36415 COLL VENOUS BLD VENIPUNCTURE: CPT | Performed by: PATHOLOGY

## 2021-11-15 PROCEDURE — 80069 RENAL FUNCTION PANEL: CPT | Performed by: PATHOLOGY

## 2021-11-15 PROCEDURE — 84156 ASSAY OF PROTEIN URINE: CPT | Performed by: PATHOLOGY

## 2021-11-15 PROCEDURE — 85018 HEMOGLOBIN: CPT | Performed by: PATHOLOGY

## 2021-11-15 PROCEDURE — 99214 OFFICE O/P EST MOD 30 MIN: CPT | Performed by: PHYSICIAN ASSISTANT

## 2021-11-15 PROCEDURE — 99000 SPECIMEN HANDLING OFFICE-LAB: CPT | Performed by: PATHOLOGY

## 2021-11-15 PROCEDURE — 82306 VITAMIN D 25 HYDROXY: CPT | Mod: 90 | Performed by: PATHOLOGY

## 2021-11-15 PROCEDURE — G0463 HOSPITAL OUTPT CLINIC VISIT: HCPCS

## 2021-11-15 PROCEDURE — 83036 HEMOGLOBIN GLYCOSYLATED A1C: CPT | Performed by: PHYSICIAN ASSISTANT

## 2021-11-15 PROCEDURE — 99214 OFFICE O/P EST MOD 30 MIN: CPT | Performed by: INTERNAL MEDICINE

## 2021-11-15 PROCEDURE — 82043 UR ALBUMIN QUANTITATIVE: CPT | Performed by: PATHOLOGY

## 2021-11-15 RX ORDER — IBUPROFEN 600 MG/1
TABLET ORAL
Qty: 1 KIT | Refills: 3 | Status: SHIPPED | OUTPATIENT
Start: 2021-11-15

## 2021-11-15 ASSESSMENT — MIFFLIN-ST. JEOR
SCORE: 2079.25
SCORE: 2077.38

## 2021-11-15 ASSESSMENT — PAIN SCALES - GENERAL: PAINLEVEL: NO PAIN (0)

## 2021-11-15 NOTE — LETTER
"11/15/2021    RE: Stacey Mantilla  [address]  [facility] [address]         Nephrology Clinic    Connor Castaneda MD   DOS:11/15/2021     Name: Stacey La  MRN: [id number]  Age: 26 year old  : 1995  Referring provider: Amalia Ervin       Assessment and Plan:  1.  CKD, stage 1: Secondary to biopsy proven diabetic nephropathy with preserved kidney function (baseline Cr~0.6 mg/dL, eGFR>90) and well controlled albuminuria (<30 mg/g). However, suspect significant hyperfiltration due to DM 2 and obesity. She has responded well to RAAS blockade with enalapril.     -- Will continue enalapril at 20 mg BID for management of HTN and albuminuria. Monitor closely for unintended pregnancy.    -- Continue good blood pressure and glucose control  -- Encourage weight loss  -- RTC in 6 months     2. HTN/Volume: Home SBP at goal of <120/80.  Euvolemic on exam in past. In the past, suspect minimal lower extremity edema is related to high salt intake in a setting of sodium avid kidneys and venous stasis. She did not have edema today. Suspect an element of \"white coat\" hypertension with her clinic blood pressures.  A 24 hour ambulatory BP monitoring was performed following a previous clinic visit that revealed average overall daily BP of 124/68 confirming white coat hypertension. However, she did not have a night time dipping pattern.       -- Continue low salt diet  -- Continue enalapril at 20 mg BID     3. Diabetes:  Complicated by diabetic nephropathy. No retinopathy or peripheral neuropathy. She has had ~5 episodes of DKA in the past. Recent hgb A1c was 6.7  (May, 2021).   -- Followed closely by endocrinology     4.  Vitamin D deficiency: Low vitamin D level multiple times in the past. Vit D low normal in May, 2021.   -- Continue cholecalciferol to 2000 units daily  -- Add on Vitamin D to today's lab     5.  Depression/anxiety:  In part, situational and related to stress at home, her job, separation " and passing of a loved one and her medical illness.    -- continue lexapro at 10 mg daily that was started at a previous clinic visit over a year ago.  She is no longer on Buspar and does not take hydroxyzine prn.   -- Followed closely by her PCP and psychology/psychiatry.     6.  Iron deficiency:  Not anemic (hgb 14.5). Iron studies on low side in past.  Suspect iron deficiency related to menses and possibly decreased GI absorption.    -- She would like to avoid iron supplements for now; encouraged her to increase intake of iron containing foods.    -- Will repeat hgb and iron studies at next visit     7. Acquired hypothyroidism: Patient is not being treated for hypothyroidism with Levothyroxine. Recent TSHwithin normal range.  - f/u with endocrinology      Reason For Visit: CKD follow Up    HPI:   Stacey La is a 26 year old woman with a history of Type 1 DM and celiac disease who presents for follow up regarding chronic kidney disease. She was previously cared for by her pediatric nephrologist, Dr. India Olivas. Today, the patient reports feeling well overall. Her hemoglobin A1c was 6.9 toay1. She is not actively trying for a baby at this point in time and has no immediate plans given that she is now  from her . She is taking lexapro for depression which has been stable.  She otherwise is well and has no specific medical complaints. 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       Review of Systems:   Pertinent items are noted in HPI or as below, remainder of complete ROS is negative.      Active Medications:   Current Outpatient Medications   Medication     albuterol (PROAIR HFA/PROVENTIL HFA/VENTOLIN HFA) 108 (90 Base) MCG/ACT inhaler     ASPIRIN NOT PRESCRIBED (INTENTIONAL)     blood glucose (NO BRAND SPECIFIED) test strip     Blood Pressure Monitoring KIT     Cholecalciferol 2000 UNITS TBDP     Continuous Blood Gluc Sensor (DEXCOM G6 SENSOR) MISC      "Continuous Blood Gluc Transmit (DEXCOM G6 TRANSMITTER) MISC     enalapril (VASOTEC) 20 MG tablet     escitalopram (LEXAPRO) 10 MG tablet     glucagon 1 MG kit     Glucagon, rDNA, (GLUCAGON EMERGENCY) 1 MG KIT     hydrOXYzine (ATARAX) 25 MG tablet     insulin aspart (NOVOLOG FLEXPEN) 100 UNIT/ML pen     insulin glargine (BASAGLAR KWIKPEN) 100 UNIT/ML pen     Insulin Infusion Pump Supplies (INSULIN PUMP SYRINGE RESERVOIR) MISC     Insulin Infusion Pump Supplies (SURE-T INFUSION SET 23\") MISC     insulin lispro (HUMALOG VIAL) 100 UNIT/ML vial     insulin pen needle (B-D U/F) 31G X 5 MM miscellaneous     Insulin Pump Accessories MISC     insulin syringe-needle U-100 (31G X 5/16\" 0.5 ML) 31G X 5/16\" 0.5 ML miscellaneous     insulin syringes, disposable, U-100 0.3 ML MISC     ipratropium - albuterol 0.5 mg/2.5 mg/3 mL (DUONEB) 0.5-2.5 (3) MG/3ML neb solution     KETOSTIX test strip     Multiple Vitamins-Minerals (MULTIVITAMIN ADULT PO)     Ondansetron HCl (ZOFRAN PO)     STATIN NOT PRESCRIBED (INTENTIONAL)     No current facility-administered medications for this visit.      Allergies:   Dogs  Dust mites  Gluten meal      Past Medical History:  Anxiety  Asthma  Celiac disease  Chronic kidney disease, stage I  Chronic sinusitis  Diabetes mellitus type 1  Diabetic nephropathy  Hypertension  Hypothyroidism  Pedal edema  Psoriasis  Depression     Past Surgical History:  ENT Surgery  Tonsillectomy, adenoidectomy, combined    Family History:   Father (Diabetes, Myocardial Infarct)  Mother (Celiacs)  Maternal Grandfather (Enlarged heart)    Social History:   The patient is . The patient drinks roughly two times a month. She is currently sexually active with male partners and uses condoms for birth control.The patient has no reported social history of smoking, smokeless tobacco use, or drug use.     Physical Exam:  /81   Pulse 69   Temp 98.2  F (36.8  C) (Oral)   Ht 1.74 m (5' 8.5\")   Wt 129.1 kg (284 lb 9.6 oz)  "  SpO2 98%   BMI 42.64 kg/m     GENERAL APPEARANCE: alert and no distress  EYES: nonicteric  HENT: mouth without ulcers or lesions  NECK: supple, no adenopathy  RESP: lungs clear to auscultation   CV: regular rhythm, no rub  ABDOMEN: obese, soft, nontender  : No CVA tenderness  Extremities: no significant lower extremity edema  MS: no evidence of inflammation in joints, no muscle tenderness  SKIN: no concerning rash  NEURO: mentation intact and speech normal  PSYCH: affect and mood appropriate     Laboratory:  CMP  Recent Labs   Lab Test 11/15/21  0901 05/07/21  0824 04/30/21  1620 05/19/20  1201 05/17/19  1528 05/13/19  1450 03/04/19  0854 07/26/16  1137 02/20/16  1827 01/16/16  0905 08/04/15  1443 03/26/15  0645 03/25/15  1655    136 138 138 141   < > 138   < > 139   < >  --    < > 138   POTASSIUM 4.4 3.9 3.9 4.3 3.6   < > 3.8   < > 3.6   < >  --    < > 3.8   CHLORIDE 105 104 104 104 107   < > 105   < > 109   < >  --    < > 105   CO2 25 26 27 22 25   < > 27   < > 25   < >  --    < > 25   ANIONGAP 10 5 7 12 9   < > 6   < > 5   < >  --    < > 8   * 129* 184* 180* 95   < > 72   < > 55*   < >  --    < > 73   BUN 9 11 9 9 9   < > 9   < > 8   < >  --    < > 16   CR 0.53 0.59 0.60 0.49* 0.62   < > 0.51*   < > 0.67   < >  --    < > 0.55   GFRESTIMATED >90 >90 >90 >90 >90   < > >90   < > >90  Non  GFR Calc     < >  --    < > >90  Non  GFR Calc     GFRESTBLACK  --  >90 >90 >90 >90   < > >90   < > >90  African American GFR Calc     < >  --    < > >90   GFR Calc     FRANKLIN 9.2 9.3 9.4 9.3 9.0   < > 9.0   < > 8.3*   < >  --    < > 9.0   PHOS 3.0  --  4.0 3.7  --   --  3.4   < >  --    < >  --    < >  --    PROTTOTAL  --  7.3  --   --  8.3  --   --   --  7.5  --   --   --  8.3   ALBUMIN 3.4 3.6 3.7 3.6 4.0  --  3.5   < > 3.6   < >  --    < > 4.1   BILITOTAL  --  0.4  --   --  0.2  --   --   --  0.2  --   --   --  0.4   ALKPHOS  --  80  --   --  88  --   --   --  67   --   --   --  82   AST  --  13  --   --  14  --   --   --  17  --   --   --  14   ALT  --  23  --   --  33  --   --   --  26  --  <5*  --  29    < > = values in this interval not displayed.     CBC  Recent Labs   Lab Test 11/15/21  0901 04/30/21  1620 05/19/20  1201 06/07/19  0858 05/28/19  1416 05/17/19  1528   HGB 14.5 14.4 14.0 13.9 13.6 14.1   WBC  --  12.4*  --  9.8 12.1* 9.9   RBC  --  5.20  --  4.97 4.91 5.06   HCT  --  44.3  --  42.6 42.0 42.8   MCV  --  85  --  86 86 85   MCH  --  27.7  --  28.0 27.7 27.9   MCHC  --  32.5  --  32.6 32.4 32.9   RDW  --  13.2  --  13.6 13.4 13.3   PLT  --  378  --  359 402 375     INR  No lab results found.     ABG  No lab results found.     URINE STUDIES  Recent Labs   Lab Test 05/17/19  1511 03/25/15  1810 07/22/14  1506 03/06/14  1040   COLOR Straw Yellow Straw Straw   APPEARANCE Clear Slightly Cloudy Clear Clear   URINEGLC Negative Negative Negative Negative   URINEBILI Negative Negative Negative Negative   URINEKETONE Negative Negative Negative Negative   SG 1.002* 1.021 1.012 1.004   UBLD Negative Trace* Negative Negative   URINEPH 7.0 5.5 7.0 7.0   PROTEIN Negative 10* Negative Negative   NITRITE Negative Negative Negative Negative   LEUKEST Negative Negative Negative Negative   RBCU  --  1 1 1   WBCU  --  1 0 <1     Recent Labs   Lab Test 11/15/21  0912 04/30/21  1626 05/19/20  1201 03/04/19  0900 01/17/18  1517 07/25/17  1457 01/24/17  1533 07/26/16  1135 01/16/16  0906 07/28/15  1522 01/19/15  1554 07/22/14  1506 03/06/14  0948   UTPG 0.17 Unable to calculate due to low value Unable to calculate due to low value Unable to calculate due to low value Unable to calculate due to low value Unable to calculate due to low value Unable to calculate due to low value 0.23* 0.15 Unable to calculate due to low value Unable to calculate due to low value <0.10 0.13     PTH  Recent Labs   Lab Test 04/30/21  1620 05/19/20  1201 01/17/18  1515 01/24/17  1525 01/16/16  0905  07/22/14  1519   PTHI 51 50 60 24 31 18     IRON STUDIES   Recent Labs   Lab Test 05/19/20  1201 01/17/18  1515 07/25/17  1455 07/26/16  1137 01/16/16  0905 07/22/14  1519   IRON 81 58 50 60 58 27*    417 374 423 412 372   IRONSAT 20 14* 13* 14* 14* 7*   DIPESH 26 13 26 13  --  9*     Imaging:   Not applicable to this visit.     All the above labs were reviewed by me.   MD Connor Ngo MD

## 2021-11-15 NOTE — NURSING NOTE
"Chief Complaint   Patient presents with     RECHECK     CKD Stage 1     /81   Pulse 69   Temp 98.2  F (36.8  C) (Oral)   Ht 1.74 m (5' 8.5\")   Wt 129.1 kg (284 lb 9.6 oz)   SpO2 98%   BMI 42.64 kg/m       Varsha Iniguez MA  "

## 2021-11-15 NOTE — LETTER
11/15/2021       RE: Stacey Mantilla  [address]  [facility] [address]     Dear Colleague,    Thank you for referring your patient, Stacey Mantilla, to the [facility] [place] at [facility]. Please see a copy of my visit note below.      HPI:  Stacey is a 27 yo woman here for follow up of type 1 diabetes, diagnosed at age 7.   She also sees Dr. Allan.  Stacey's diabetes is complicated by nephropathy. She also has hyperlipidemia, a history of hypothyroidism (although she has been off of levothyroxine for several years) and celiac disease.  She follows a strict gluten free diet. She continues wearing a Dexcom G6 sensor and she really likes it.      Stacey has had major life changes.  Recently got .   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 She is able to be physically active, going for walks, etc . This is much less stressful.   10-6 M-F.   She was living with a roommate who drank a lot of alcohol, but she has since moved out and she is drinking much less.  She is feeling healthier.  Her dad recently found out he has cancer and had a recent stroke.  She worries about him.  She has been very active, doing a lot of hiking and plans to go winter camping.  She has lost a little weight and would like to lose more.      Her typical routine is:   No breakfast  Earlier lunch- veggies, fruit, yogurt, string cheese.   Dinner- if cooking at home- veggies, fish.   Ordering out more recently.   Andres Silvestre. ;ess nutritious options.    Snacks at night.     A1c is 6.9% today.      Recent glucose is as follows:         Current diabetes regimen:  Basaglar- 24 units twice a day.   Humalog-1/7g carb and correction 17    Previous treatments:   Victoza- did not tolerate    For her hypothyroidism- untreated with normal TSH.      For her CKD (stage 1), she is taking enalapril. She follows with nephrology.  She understands she will need to stop  this if she were to become pregnant.    She has no other concerns today.       ROS  GENERAL: 20# weight loss over the past year, intentional.  no fevers, chills, malaise, night sweats.   HEENT: no dysphagia, diplopia, neck pain or tenderness, dry/scratchy eyes, URI, cough, sinus drainage, tinnitus, sinus pressure  CV: no chest pain, pressure, palpitations, skipped beats, LOC  LUNGS: no SOB, CHILDRESS, cough, sputum production, wheezing   GI: no diarrhea, constipation, abdominal pain  EXTREMITIES: no rashes, ulcers, edema  NEUROLOGY: no changes in vision, tingling or numbness in hands or feet.   MSK: no muscle aches or pains, weakness  PSYCH: mood stable    Past Medical History:   Diagnosis Date     Asthma     Currently no treatment needed     Celiac disease      Chronic kidney disease, stage I 8/3/2015     Chronic sinusitis      Diabetes mellitus type 1 (H)      Diabetic nephropathy (H)      Family history of heart disease 8/3/2015     Hypertension      Hypothyroid      Pedal edema      Psoriasis      PMH:   Type 1 diabetes- since 2003  Celiac disease- since age 13  Hypothyroidism- age 10 (no longer on levothyroxine for several years)  Hyperlipidemia- had been on a statin for a little more than a year.  Wanted to make dietary changes.       Past Surgical History:   Procedure Laterality Date     ENT SURGERY       TONSILLECTOMY, ADENOIDECTOMY, COMBINED         Family History   Problem Relation Age of Onset     Cardiovascular Father 48        MI, stents, diabetic, type 2     Diabetes Father      Coronary Artery Disease Father      Obesity Father      Gastrointestinal Disease Mother         Celiacs     Obesity Mother      Heart Disease Paternal Half-Brother 45     Cardiovascular Maternal Grandfather         Englarged heart, pacemaker, defib     Skin Cancer Maternal Grandfather      Obesity Maternal Half-Sister      Skin Cancer Maternal Grandmother      Melanoma No family hx of      Family Hx:   Dad- premature CVD, in his 40s.   Type 2 diabetes, stroke, cancer  Mom- celiac disease  Grandfather- type 2 diabetes  Half brother- Asperger's  Half sister- cervical CA  Another half brother- bipolar and schizophrenia  1/2 brother-  of presumed heart attack    History     Social History     Marital Status:      Spouse Name: N/A     Number of Children: N/A     Years of Education: N/A     Social History Main Topics     Smoking status: Never Smoker      Smokeless tobacco: Never Used     Alcohol Use: Yes      Comment: occ. use     Drug Use: No     Sexual Activity:     Partners: Male     Birth Control/ Protection: Condom     Other Topics Concern     Parent/Sibling W/ Cabg, Mi Or Angioplasty Before 65f 55m? Yes     Caffeine Concern Yes     1 cup tea per day     Sleep Concern No     Special Diet Yes     gluten free diet, low carbs     Exercise Yes     walking, ellyptical, swimming, weights     Seat Belt Yes     Social History Narrative     Social Hx:    . 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  Now working as a nanny. Enjoys a lot of hiking. She also goes to the gym a few days a week.  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     Current Outpatient Medications   Medication     albuterol (PROAIR HFA/PROVENTIL HFA/VENTOLIN HFA) 108 (90 Base) MCG/ACT inhaler     ASPIRIN NOT PRESCRIBED (INTENTIONAL)     blood glucose (NO BRAND SPECIFIED) test strip     Blood Pressure Monitoring KIT     Cholecalciferol 2000 UNITS TBDP     Continuous Blood Gluc Sensor (DEXCOM G6 SENSOR) MISC     Continuous Blood Gluc Transmit (DEXCOM G6 TRANSMITTER) MISC     enalapril (VASOTEC) 20 MG tablet     escitalopram (LEXAPRO) 10 MG tablet     glucagon 1 MG kit     hydrOXYzine (ATARAX) 25 MG tablet     insulin aspart (NOVOLOG FLEXPEN) 100 UNIT/ML pen     insulin glargine (BASAGLAR KWIKPEN) 100 UNIT/ML pen     Insulin Infusion Pump Supplies (INSULIN PUMP SYRINGE RESERVOIR) MISC     Insulin Infusion Pump  "Supplies (SURE-T INFUSION SET 23\") MISC     insulin lispro (HUMALOG VIAL) 100 UNIT/ML vial     insulin pen needle (B-D U/F) 31G X 5 MM miscellaneous     Insulin Pump Accessories MISC     insulin syringe-needle U-100 (31G X 5/16\" 0.5 ML) 31G X 5/16\" 0.5 ML miscellaneous     insulin syringes, disposable, U-100 0.3 ML MISC     ipratropium - albuterol 0.5 mg/2.5 mg/3 mL (DUONEB) 0.5-2.5 (3) MG/3ML neb solution     KETOSTIX test strip     Multiple Vitamins-Minerals (MULTIVITAMIN ADULT PO)     Ondansetron HCl (ZOFRAN PO)     STATIN NOT PRESCRIBED (INTENTIONAL)     No current facility-administered medications for this visit.          Allergies   Allergen Reactions     Dogs Other (See Comments)     Sinus symptoms      Dust Mites      Sinus      Gluten Meal      Physical Exam  /87   Ht 1.74 m (5' 8.5\")   Wt 129.3 kg (285 lb)   BMI 42.70 kg/m    GENERAL:  Alert and oriented X3, NAD, well dressed, answering questions appropriately, appears stated age.  HEENT: OP clear, no lymphadenopathy, no thyromegaly, non-tender, no exophthalmus, no proptosis, EOMI, no lid lag, no retraction  EXTREMITIES: no edema, +pulses, no rashes, no lesions  NEUROLOGY: + monofilament, +vibratory sensation, + DTR upper and lower extremity    RESULTS  Lab Results   Component Value Date    A1C 6.7 (H) 05/07/2021    A1C 7.3 (H) 02/17/2020    A1C 7.0 (H) 01/17/2018    A1C 7.6 (H) 03/26/2015    HEMOGLOBINA1 6.9 11/15/2021    HEMOGLOBINA1 7.0 (A) 01/28/2020    HEMOGLOBINA1 7.3 (A) 08/19/2019    HEMOGLOBINA1 6.8 (A) 05/14/2019    HEMOGLOBINA1 6.8 (A) 02/18/2019       TSH   Date Value Ref Range Status   05/07/2021 3.69 0.40 - 4.00 mU/L Final   02/17/2020 3.97 0.40 - 4.00 mU/L Final   06/07/2019 3.91 0.40 - 4.00 mU/L Final   03/04/2019 3.41 0.40 - 4.00 mU/L Final   10/10/2016 2.89 0.40 - 4.00 mU/L Final     T4 Free   Date Value Ref Range Status   12/09/2013 1.18 0.70 - 1.85 ng/dL Final       ALT   Date Value Ref Range Status   05/07/2021 23 0 - 50 U/L " Final   05/17/2019 33 0 - 50 U/L Final   ]    Recent Labs   Lab Test 05/07/21  0824 06/07/19  0858 03/04/19  0854 08/04/15  1443 12/09/13  1137   CHOL 236* 195   < > 194 241*   HDL 59 55   < > 66 66   * 114*   < > 102 142*   TRIG 112 128   < > 131 165*   CHOLHDLRATIO  --   --   --  2.9 3.7    < > = values in this interval not displayed.       Lab Results   Component Value Date     11/15/2021     05/07/2021      Lab Results   Component Value Date    POTASSIUM 4.4 11/15/2021    POTASSIUM 3.9 05/07/2021     Lab Results   Component Value Date    CHLORIDE 105 11/15/2021    CHLORIDE 104 05/07/2021     Lab Results   Component Value Date    FRANKLIN 9.2 11/15/2021    FRANKLIN 9.3 05/07/2021     Lab Results   Component Value Date    CO2 25 11/15/2021    CO2 26 05/07/2021     Lab Results   Component Value Date    BUN 9 11/15/2021    BUN 11 05/07/2021     Lab Results   Component Value Date    CR 0.53 11/15/2021    CR 0.59 05/07/2021       GFR Estimate   Date Value Ref Range Status   11/15/2021 >90 >60 mL/min/1.73m2 Final     Comment:     As of July 11, 2021, eGFR is calculated by the CKD-EPI creatinine equation, without race adjustment. eGFR can be influenced by muscle mass, exercise, and diet. The reported eGFR is an estimation only and is only applicable if the renal function is stable.   05/07/2021 >90 >60 mL/min/[1.73_m2] Final     Comment:     Non  GFR Calc  Starting 12/18/2018, serum creatinine based estimated GFR (eGFR) will be   calculated using the Chronic Kidney Disease Epidemiology Collaboration   (CKD-EPI) equation.     04/30/2021 >90 >60 mL/min/[1.73_m2] Final     Comment:     Non  GFR Calc  Starting 12/18/2018, serum creatinine based estimated GFR (eGFR) will be   calculated using the Chronic Kidney Disease Epidemiology Collaboration   (CKD-EPI) equation.     05/19/2020 >90 >60 mL/min/[1.73_m2] Final     Comment:     Non  GFR Calc  Starting 12/18/2018,  serum creatinine based estimated GFR (eGFR) will be   calculated using the Chronic Kidney Disease Epidemiology Collaboration   (CKD-EPI) equation.       GFR Estimate If Black   Date Value Ref Range Status   05/07/2021 >90 >60 mL/min/[1.73_m2] Final     Comment:      GFR Calc  Starting 12/18/2018, serum creatinine based estimated GFR (eGFR) will be   calculated using the Chronic Kidney Disease Epidemiology Collaboration   (CKD-EPI) equation.     04/30/2021 >90 >60 mL/min/[1.73_m2] Final     Comment:      GFR Calc  Starting 12/18/2018, serum creatinine based estimated GFR (eGFR) will be   calculated using the Chronic Kidney Disease Epidemiology Collaboration   (CKD-EPI) equation.     05/19/2020 >90 >60 mL/min/[1.73_m2] Final     Comment:      GFR Calc  Starting 12/18/2018, serum creatinine based estimated GFR (eGFR) will be   calculated using the Chronic Kidney Disease Epidemiology Collaboration   (CKD-EPI) equation.         Lab Results   Component Value Date    MICROL 6 11/15/2021    MICROL <5 04/30/2021     No results found for: MICROALBUMIN  No results found for: CPEPT, GADAB, ISCAB    No results found for: B12]    Most recent eye exam date: : Not Found     Assessment/Plan:     1.  Type 1 diabetes- Stacey is doing well despite being off the T[employer] control IQ pump.  A1c 6.9%. She is, however, dropping too much overnight.  I am concerned she may not be sensing her hypoglycemia as much as she has in the past based on frequent prolonged lows overnight evident on her sensor.  I have suggested she reduce her basaglar to 20 units in the evening, but she may need to lower this further if lows continue. Discussed utility of IN-pen if she decides to stay on injections.  Discussed heart healthy eating ideas and encouraged her to increase consumption of fruits, vegetables.  I did place a referral for a dietitian to review celiac diet and weight loss/cholesterol management.       Instructions given to patient:     [employer] for Inpen if you are interested.     Lower PM basaglar from 24 units to 20 units.  Continue 24 units in the AM.     If overnight lows continue, try 1/8g per meal.       2.  Risk factors-     Retinopathy:  No.  Has eye exam scheduled in December.   Nephropathy:  BP well controlled. No microalbuminuria.  Creatinine stable.  She is on enalapril.  White coat hypertension.     She follows with nephrology.   Neuropathy: No.    Feet: OK, no ulcers.   Lipids:  .  Had been on pravastatin 20 mg daily.  She stopped statin in anticipation of pregnancy.  She does not want to resume statin at this time.  Will have her meet with dietitian to review heart healthy eating.  She does have a history of premature CV disease in her father.  Would consider resuming statin in the future if she has no plans for childbearing.      3.  Hypothyroidism- last TSH in target on no levothyroxine.      4.  F/U in 3 months with Dr. Allan.     36 minutes spent on the date of the encounter doing chart review, review of test results, review of continuous glucose sensor, interpretation of glucose data, patient visit and documentation, counseling/coordination of care, and discussion of follow up plan for worsening hyper and hypoglycemia.  The patient understood and is satisfied with today's visit.       Laureen Goldstein PA-C, MPAS   [facility]  Department of Medicine  Division of Endocrinology and Diabetes              Answers for HPI/ROS submitted by the patient on 11/14/2021  General Symptoms: No  Skin Symptoms: No  HENT Symptoms: No  EYE SYMPTOMS: No  HEART SYMPTOMS: No  LUNG SYMPTOMS: No  INTESTINAL SYMPTOMS: No  URINARY SYMPTOMS: No  GYNECOLOGIC SYMPTOMS: No  BREAST SYMPTOMS: No  SKELETAL SYMPTOMS: No  BLOOD SYMPTOMS: No  NERVOUS SYSTEM SYMPTOMS: No  MENTAL HEALTH SYMPTOMS: No

## 2021-11-15 NOTE — PROGRESS NOTES
"  Nephrology Clinic    Connor Castaneda MD   DOS:11/15/2021     Name: Stacey La  MRN: [id number]  Age: 26 year old  : 1995  Referring provider: Amalia Ervin       Assessment and Plan:  1.  CKD, stage 1: Secondary to biopsy proven diabetic nephropathy with preserved kidney function (baseline Cr~0.6 mg/dL, eGFR>90) and well controlled albuminuria (<30 mg/g). However, suspect significant hyperfiltration due to DM 2 and obesity. She has responded well to RAAS blockade with enalapril.     -- Will continue enalapril at 20 mg BID for management of HTN and albuminuria. Monitor closely for unintended pregnancy.    -- Continue good blood pressure and glucose control  -- Encourage weight loss  -- RTC in 6 months     2. HTN/Volume: Home SBP at goal of <120/80.  Euvolemic on exam in past. In the past, suspect minimal lower extremity edema is related to high salt intake in a setting of sodium avid kidneys and venous stasis. She did not have edema today. Suspect an element of \"white coat\" hypertension with her clinic blood pressures.  A 24 hour ambulatory BP monitoring was performed following a previous clinic visit that revealed average overall daily BP of 124/68 confirming white coat hypertension. However, she did not have a night time dipping pattern.       -- Continue low salt diet  -- Continue enalapril at 20 mg BID     3. Diabetes:  Complicated by diabetic nephropathy. No retinopathy or peripheral neuropathy. She has had ~5 episodes of DKA in the past. Recent hgb A1c was 6.7  (May, 2021).   -- Followed closely by endocrinology     4.  Vitamin D deficiency: Low vitamin D level multiple times in the past. Vit D low normal in May, 2021.   -- Continue cholecalciferol to 2000 units daily  -- Add on Vitamin D to today's lab     5.  Depression/anxiety:  In part, situational and related to stress at home, her job, separation and passing of a loved one and her medical illness.    -- continue lexapro at 10 " mg daily that was started at a previous clinic visit over a year ago.  She is no longer on Buspar and does not take hydroxyzine prn.   -- Followed closely by her PCP and psychology/psychiatry.     6.  Iron deficiency:  Not anemic (hgb 14.5). Iron studies on low side in past.  Suspect iron deficiency related to menses and possibly decreased GI absorption.    -- She would like to avoid iron supplements for now; encouraged her to increase intake of iron containing foods.    -- Will repeat hgb and iron studies at next visit     7. Acquired hypothyroidism: Patient is not being treated for hypothyroidism with Levothyroxine. Recent TSHwithin normal range.  - f/u with endocrinology      Reason For Visit: CKD follow Up    HPI:   Stacey La is a 26 year old woman with a history of Type 1 DM and celiac disease who presents for follow up regarding chronic kidney disease. She was previously cared for by her pediatric nephrologist, Dr. India Olivas. Today, the patient reports feeling well overall. Her hemoglobin A1c was 6.9 toay1. She is not actively trying for a baby at this point in time and has no immediate plans given that she is now  from her . She is taking lexapro for depression which has been stable.  She otherwise is well and has no specific medical complaints. 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       Review of Systems:   Pertinent items are noted in HPI or as below, remainder of complete ROS is negative.      Active Medications:   Current Outpatient Medications   Medication     albuterol (PROAIR HFA/PROVENTIL HFA/VENTOLIN HFA) 108 (90 Base) MCG/ACT inhaler     ASPIRIN NOT PRESCRIBED (INTENTIONAL)     blood glucose (NO BRAND SPECIFIED) test strip     Blood Pressure Monitoring KIT     Cholecalciferol 2000 UNITS TBDP     Continuous Blood Gluc Sensor (DEXCOM G6 SENSOR) MISC     Continuous Blood Gluc Transmit (DEXCOM G6 TRANSMITTER) MISC     enalapril  "(VASOTEC) 20 MG tablet     escitalopram (LEXAPRO) 10 MG tablet     glucagon 1 MG kit     Glucagon, rDNA, (GLUCAGON EMERGENCY) 1 MG KIT     hydrOXYzine (ATARAX) 25 MG tablet     insulin aspart (NOVOLOG FLEXPEN) 100 UNIT/ML pen     insulin glargine (BASAGLAR KWIKPEN) 100 UNIT/ML pen     Insulin Infusion Pump Supplies (INSULIN PUMP SYRINGE RESERVOIR) MISC     Insulin Infusion Pump Supplies (SURE-T INFUSION SET 23\") MISC     insulin lispro (HUMALOG VIAL) 100 UNIT/ML vial     insulin pen needle (B-D U/F) 31G X 5 MM miscellaneous     Insulin Pump Accessories MISC     insulin syringe-needle U-100 (31G X 5/16\" 0.5 ML) 31G X 5/16\" 0.5 ML miscellaneous     insulin syringes, disposable, U-100 0.3 ML MISC     ipratropium - albuterol 0.5 mg/2.5 mg/3 mL (DUONEB) 0.5-2.5 (3) MG/3ML neb solution     KETOSTIX test strip     Multiple Vitamins-Minerals (MULTIVITAMIN ADULT PO)     Ondansetron HCl (ZOFRAN PO)     STATIN NOT PRESCRIBED (INTENTIONAL)     No current facility-administered medications for this visit.      Allergies:   Dogs  Dust mites  Gluten meal      Past Medical History:  Anxiety  Asthma  Celiac disease  Chronic kidney disease, stage I  Chronic sinusitis  Diabetes mellitus type 1  Diabetic nephropathy  Hypertension  Hypothyroidism  Pedal edema  Psoriasis  Depression     Past Surgical History:  ENT Surgery  Tonsillectomy, adenoidectomy, combined    Family History:   Father (Diabetes, Myocardial Infarct)  Mother (Celiacs)  Maternal Grandfather (Enlarged heart)    Social History:   The patient is . The patient drinks roughly two times a month. She is currently sexually active with male partners and uses condoms for birth control.The patient has no reported social history of smoking, smokeless tobacco use, or drug use.     Physical Exam:  /81   Pulse 69   Temp 98.2  F (36.8  C) (Oral)   Ht 1.74 m (5' 8.5\")   Wt 129.1 kg (284 lb 9.6 oz)   SpO2 98%   BMI 42.64 kg/m     GENERAL APPEARANCE: alert and no " distress  EYES: nonicteric  HENT: mouth without ulcers or lesions  NECK: supple, no adenopathy  RESP: lungs clear to auscultation   CV: regular rhythm, no rub  ABDOMEN: obese, soft, nontender  : No CVA tenderness  Extremities: no significant lower extremity edema  MS: no evidence of inflammation in joints, no muscle tenderness  SKIN: no concerning rash  NEURO: mentation intact and speech normal  PSYCH: affect and mood appropriate     Laboratory:  CMP  Recent Labs   Lab Test 11/15/21  0901 05/07/21  0824 04/30/21  1620 05/19/20  1201 05/17/19  1528 05/13/19  1450 03/04/19  0854 07/26/16  1137 02/20/16  1827 01/16/16  0905 08/04/15  1443 03/26/15  0645 03/25/15  1655    136 138 138 141   < > 138   < > 139   < >  --    < > 138   POTASSIUM 4.4 3.9 3.9 4.3 3.6   < > 3.8   < > 3.6   < >  --    < > 3.8   CHLORIDE 105 104 104 104 107   < > 105   < > 109   < >  --    < > 105   CO2 25 26 27 22 25   < > 27   < > 25   < >  --    < > 25   ANIONGAP 10 5 7 12 9   < > 6   < > 5   < >  --    < > 8   * 129* 184* 180* 95   < > 72   < > 55*   < >  --    < > 73   BUN 9 11 9 9 9   < > 9   < > 8   < >  --    < > 16   CR 0.53 0.59 0.60 0.49* 0.62   < > 0.51*   < > 0.67   < >  --    < > 0.55   GFRESTIMATED >90 >90 >90 >90 >90   < > >90   < > >90  Non  GFR Calc     < >  --    < > >90  Non  GFR Calc     GFRESTBLACK  --  >90 >90 >90 >90   < > >90   < > >90  African American GFR Calc     < >  --    < > >90   GFR Calc     FRANKLIN 9.2 9.3 9.4 9.3 9.0   < > 9.0   < > 8.3*   < >  --    < > 9.0   PHOS 3.0  --  4.0 3.7  --   --  3.4   < >  --    < >  --    < >  --    PROTTOTAL  --  7.3  --   --  8.3  --   --   --  7.5  --   --   --  8.3   ALBUMIN 3.4 3.6 3.7 3.6 4.0  --  3.5   < > 3.6   < >  --    < > 4.1   BILITOTAL  --  0.4  --   --  0.2  --   --   --  0.2  --   --   --  0.4   ALKPHOS  --  80  --   --  88  --   --   --  67  --   --   --  82   AST  --  13  --   --  14  --   --   --  17  --    --   --  14   ALT  --  23  --   --  33  --   --   --  26  --  <5*  --  29    < > = values in this interval not displayed.     CBC  Recent Labs   Lab Test 11/15/21  0901 04/30/21  1620 05/19/20  1201 06/07/19  0858 05/28/19  1416 05/17/19  1528   HGB 14.5 14.4 14.0 13.9 13.6 14.1   WBC  --  12.4*  --  9.8 12.1* 9.9   RBC  --  5.20  --  4.97 4.91 5.06   HCT  --  44.3  --  42.6 42.0 42.8   MCV  --  85  --  86 86 85   MCH  --  27.7  --  28.0 27.7 27.9   MCHC  --  32.5  --  32.6 32.4 32.9   RDW  --  13.2  --  13.6 13.4 13.3   PLT  --  378  --  359 402 375     INR  No lab results found.     ABG  No lab results found.     URINE STUDIES  Recent Labs   Lab Test 05/17/19  1511 03/25/15  1810 07/22/14  1506 03/06/14  1040   COLOR Straw Yellow Straw Straw   APPEARANCE Clear Slightly Cloudy Clear Clear   URINEGLC Negative Negative Negative Negative   URINEBILI Negative Negative Negative Negative   URINEKETONE Negative Negative Negative Negative   SG 1.002* 1.021 1.012 1.004   UBLD Negative Trace* Negative Negative   URINEPH 7.0 5.5 7.0 7.0   PROTEIN Negative 10* Negative Negative   NITRITE Negative Negative Negative Negative   LEUKEST Negative Negative Negative Negative   RBCU  --  1 1 1   WBCU  --  1 0 <1     Recent Labs   Lab Test 11/15/21  0912 04/30/21  1626 05/19/20  1201 03/04/19  0900 01/17/18  1517 07/25/17  1457 01/24/17  1533 07/26/16  1135 01/16/16  0906 07/28/15  1522 01/19/15  1554 07/22/14  1506 03/06/14  0948   UTPG 0.17 Unable to calculate due to low value Unable to calculate due to low value Unable to calculate due to low value Unable to calculate due to low value Unable to calculate due to low value Unable to calculate due to low value 0.23* 0.15 Unable to calculate due to low value Unable to calculate due to low value <0.10 0.13     PTH  Recent Labs   Lab Test 04/30/21  1620 05/19/20  1201 01/17/18  1515 01/24/17  1525 01/16/16  0905 07/22/14  1519   PTHI 51 50 60 24 31 18     IRON STUDIES   Recent Labs   Lab  Test 05/19/20  1201 01/17/18  1515 07/25/17  1455 07/26/16  1137 01/16/16  0905 07/22/14  1519   IRON 81 58 50 60 58 27*    417 374 423 412 372   IRONSAT 20 14* 13* 14* 14* 7*   DIPESH 26 13 26 13  --  9*     Imaging:   Not applicable to this visit.     All the above labs were reviewed by me.   Connor Castaneda MD

## 2021-11-15 NOTE — PATIENT INSTRUCTIONS
ShieldEffect for [place] if you are interested.     Lower PM basaglar from 24 units to 20 units.  Continue 24 units in the AM.     If overnight lows continue, try 1/8g per meal.                                           Heart Healthy Diet and Lifestyle    - Eat your veggies and fruits --  An abundance and variety of plant foods should make up the majority of your meals. Strive for seven to 10 servings a day of veggies and fruits. Add in beans and lentils.      - Go nuts. Keep almonds, cashews, pistachios and walnuts on hand for a quick snack. Choose natural peanut butter or almond butter, rather than the kind with hydrogenated fat added. Try hummus as a dip or spread for bread.    - Pass on the butter. Try olive or canola oil as a healthy replacement for butter or margarine. Use it in cooking. Dip bread in flavored olive oil or lightly spread it on whole-grain bread for a tasty alternative to butter.     - Spice it up. Herbs and spices make food tasty and are also rich in health-promoting substances. Season your meals with herbs and spices rather than salt.    - Go fish. Eat fish twice a week. Fresh or water-packed tuna, salmon, trout, mackerel and herring are healthy choices. Grilled fish tastes good and requires little cleanup. Avoid fried fish, unless it's sauteed in a small amount of canola oil.    - Rein in the red meat. Substitute fish and poultry for red meat. When eaten, make sure it's lean and keep portions small (about the size of a deck of cards). Try to limit sausage, waterman and other high-fat or processed meats.    - Avoid being sedentary.  Decrease the amount of time spent in daily sedentary behavior.  Prolonged sitting should be interrupted every 30 min for blood glucose benefits.     - Be active.  30 minutes of moderate-to-vigorous intensity aerobic exercise at least 5 days a week or a total of 150 minutes spread over 3 days per week.  2-3 sessions/week of resistance exercise on nonconsecutive  days. Flexibility training and balance training 2-3 times/week for older adults with diabetes.     Make an appointment with a dietitian for a personalized meal plan/nutrition review.

## 2021-11-15 NOTE — LETTER
"11/15/2021     RE: Stacey Mantilla  [address]  [facility] [address]     Dear Colleague,    Thank you for referring your patient, Stacey Mantilla, to the [facility] [place] at [facility]. Please see a copy of my visit note below.      Nephrology Clinic    Connor Castaneda MD   DOS:11/15/2021     Name: Stacey La  MRN: [id number]  Age: 26 year old  : 1995  Referring provider: Amalia Ervin       Assessment and Plan:  1.  CKD, stage 1: Secondary to biopsy proven diabetic nephropathy with preserved kidney function (baseline Cr~0.6 mg/dL, eGFR>90) and well controlled albuminuria (<30 mg/g). However, suspect significant hyperfiltration due to DM 2 and obesity. She has responded well to RAAS blockade with enalapril.     -- Will continue enalapril at 20 mg BID for management of HTN and albuminuria. Monitor closely for unintended pregnancy.    -- Continue good blood pressure and glucose control  -- Encourage weight loss  -- RTC in 6 months     2. HTN/Volume: Home SBP at goal of <120/80.  Euvolemic on exam in past. In the past, suspect minimal lower extremity edema is related to high salt intake in a setting of sodium avid kidneys and venous stasis. She did not have edema today. Suspect an element of \"white coat\" hypertension with her clinic blood pressures.  A 24 hour ambulatory BP monitoring was performed following a previous clinic visit that revealed average overall daily BP of 124/68 confirming white coat hypertension. However, she did not have a night time dipping pattern.       -- Continue low salt diet  -- Continue enalapril at 20 mg BID     3. Diabetes:  Complicated by diabetic nephropathy. No retinopathy or peripheral neuropathy. She has had ~5 episodes of DKA in the past. Recent hgb A1c was 6.7  (May, 2021).   -- Followed closely by endocrinology     4.  Vitamin D deficiency: Low vitamin D level multiple " times in the past. Vit D low normal in May, 2021.   -- Continue cholecalciferol to 2000 units daily  -- Add on Vitamin D to today's lab     5.  Depression/anxiety:  In part, situational and related to stress at home, her job, separation and passing of a loved one and her medical illness.    -- continue lexapro at 10 mg daily that was started at a previous clinic visit over a year ago.  She is no longer on Buspar and does not take hydroxyzine prn.   -- Followed closely by her PCP and psychology/psychiatry.     6.  Iron deficiency:  Not anemic (hgb 14.5). Iron studies on low side in past.  Suspect iron deficiency related to menses and possibly decreased GI absorption.    -- She would like to avoid iron supplements for now; encouraged her to increase intake of iron containing foods.    -- Will repeat hgb and iron studies at next visit     7. Acquired hypothyroidism: Patient is not being treated for hypothyroidism with Levothyroxine. Recent TSHwithin normal range.  - f/u with endocrinology      Reason For Visit: CKD follow Up    HPI:   Stacey La is a 26 year old woman with a history of Type 1 DM and celiac disease who presents for follow up regarding chronic kidney disease. She was previously cared for by her pediatric nephrologist, Dr. India Olivas. Today, the patient reports feeling well overall. Her hemoglobin A1c was 6.9 toay1. She is not actively trying for a baby at this point in time and has no immediate plans given that she is now  from her . She is taking lexapro for depression which has been stable.  She otherwise is well and has no specific medical complaints. 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       Review of Systems:   Pertinent items are noted in HPI or as below, remainder of complete ROS is negative.      Active Medications:   Current Outpatient Medications   Medication     albuterol (PROAIR HFA/PROVENTIL HFA/VENTOLIN HFA) 108 (90 Base)  "MCG/ACT inhaler     ASPIRIN NOT PRESCRIBED (INTENTIONAL)     blood glucose (NO BRAND SPECIFIED) test strip     Blood Pressure Monitoring KIT     Cholecalciferol 2000 UNITS TBDP     Continuous Blood Gluc Sensor (DEXCOM G6 SENSOR) MISC     Continuous Blood Gluc Transmit (DEXCOM G6 TRANSMITTER) MISC     enalapril (VASOTEC) 20 MG tablet     escitalopram (LEXAPRO) 10 MG tablet     glucagon 1 MG kit     Glucagon, rDNA, (GLUCAGON EMERGENCY) 1 MG KIT     hydrOXYzine (ATARAX) 25 MG tablet     insulin aspart (NOVOLOG FLEXPEN) 100 UNIT/ML pen     insulin glargine (BASAGLAR KWIKPEN) 100 UNIT/ML pen     Insulin Infusion Pump Supplies (INSULIN PUMP SYRINGE RESERVOIR) MISC     Insulin Infusion Pump Supplies (SURE-T INFUSION SET 23\") MISC     insulin lispro (HUMALOG VIAL) 100 UNIT/ML vial     insulin pen needle (B-D U/F) 31G X 5 MM miscellaneous     Insulin Pump Accessories MISC     insulin syringe-needle U-100 (31G X 5/16\" 0.5 ML) 31G X 5/16\" 0.5 ML miscellaneous     insulin syringes, disposable, U-100 0.3 ML MISC     ipratropium - albuterol 0.5 mg/2.5 mg/3 mL (DUONEB) 0.5-2.5 (3) MG/3ML neb solution     KETOSTIX test strip     Multiple Vitamins-Minerals (MULTIVITAMIN ADULT PO)     Ondansetron HCl (ZOFRAN PO)     STATIN NOT PRESCRIBED (INTENTIONAL)     No current facility-administered medications for this visit.      Allergies:   Dogs  Dust mites  Gluten meal      Past Medical History:  Anxiety  Asthma  Celiac disease  Chronic kidney disease, stage I  Chronic sinusitis  Diabetes mellitus type 1  Diabetic nephropathy  Hypertension  Hypothyroidism  Pedal edema  Psoriasis  Depression     Past Surgical History:  ENT Surgery  Tonsillectomy, adenoidectomy, combined    Family History:   Father (Diabetes, Myocardial Infarct)  Mother (Celiacs)  Maternal Grandfather (Enlarged heart)    Social History:   The patient is . The patient drinks roughly two times a month. She is currently sexually active with male partners and uses condoms " "for birth control.The patient has no reported social history of smoking, smokeless tobacco use, or drug use.     Physical Exam:  /81   Pulse 69   Temp 98.2  F (36.8  C) (Oral)   Ht 1.74 m (5' 8.5\")   Wt 129.1 kg (284 lb 9.6 oz)   SpO2 98%   BMI 42.64 kg/m     GENERAL APPEARANCE: alert and no distress  EYES: nonicteric  HENT: mouth without ulcers or lesions  NECK: supple, no adenopathy  RESP: lungs clear to auscultation   CV: regular rhythm, no rub  ABDOMEN: obese, soft, nontender  : No CVA tenderness  Extremities: no significant lower extremity edema  MS: no evidence of inflammation in joints, no muscle tenderness  SKIN: no concerning rash  NEURO: mentation intact and speech normal  PSYCH: affect and mood appropriate     Laboratory:  CMP  Recent Labs   Lab Test 11/15/21  0901 05/07/21  0824 04/30/21  1620 05/19/20  1201 05/17/19  1528 05/13/19  1450 03/04/19  0854 07/26/16  1137 02/20/16  1827 01/16/16  0905 08/04/15  1443 03/26/15  0645 03/25/15  1655    136 138 138 141   < > 138   < > 139   < >  --    < > 138   POTASSIUM 4.4 3.9 3.9 4.3 3.6   < > 3.8   < > 3.6   < >  --    < > 3.8   CHLORIDE 105 104 104 104 107   < > 105   < > 109   < >  --    < > 105   CO2 25 26 27 22 25   < > 27   < > 25   < >  --    < > 25   ANIONGAP 10 5 7 12 9   < > 6   < > 5   < >  --    < > 8   * 129* 184* 180* 95   < > 72   < > 55*   < >  --    < > 73   BUN 9 11 9 9 9   < > 9   < > 8   < >  --    < > 16   CR 0.53 0.59 0.60 0.49* 0.62   < > 0.51*   < > 0.67   < >  --    < > 0.55   GFRESTIMATED >90 >90 >90 >90 >90   < > >90   < > >90  Non  GFR Calc     < >  --    < > >90  Non  GFR Calc     GFRESTBLACK  --  >90 >90 >90 >90   < > >90   < > >90  African American GFR Calc     < >  --    < > >90   GFR Calc     FRANKLIN 9.2 9.3 9.4 9.3 9.0   < > 9.0   < > 8.3*   < >  --    < > 9.0   PHOS 3.0  --  4.0 3.7  --   --  3.4   < >  --    < >  --    < >  --    PROTTOTAL  --  7.3  --   " --  8.3  --   --   --  7.5  --   --   --  8.3   ALBUMIN 3.4 3.6 3.7 3.6 4.0  --  3.5   < > 3.6   < >  --    < > 4.1   BILITOTAL  --  0.4  --   --  0.2  --   --   --  0.2  --   --   --  0.4   ALKPHOS  --  80  --   --  88  --   --   --  67  --   --   --  82   AST  --  13  --   --  14  --   --   --  17  --   --   --  14   ALT  --  23  --   --  33  --   --   --  26  --  <5*  --  29    < > = values in this interval not displayed.     CBC  Recent Labs   Lab Test 11/15/21  0901 04/30/21  1620 05/19/20  1201 06/07/19  0858 05/28/19  1416 05/17/19  1528   HGB 14.5 14.4 14.0 13.9 13.6 14.1   WBC  --  12.4*  --  9.8 12.1* 9.9   RBC  --  5.20  --  4.97 4.91 5.06   HCT  --  44.3  --  42.6 42.0 42.8   MCV  --  85  --  86 86 85   MCH  --  27.7  --  28.0 27.7 27.9   MCHC  --  32.5  --  32.6 32.4 32.9   RDW  --  13.2  --  13.6 13.4 13.3   PLT  --  378  --  359 402 375     INR  No lab results found.     ABG  No lab results found.     URINE STUDIES  Recent Labs   Lab Test 05/17/19  1511 03/25/15  1810 07/22/14  1506 03/06/14  1040   COLOR Straw Yellow Straw Straw   APPEARANCE Clear Slightly Cloudy Clear Clear   URINEGLC Negative Negative Negative Negative   URINEBILI Negative Negative Negative Negative   URINEKETONE Negative Negative Negative Negative   SG 1.002* 1.021 1.012 1.004   UBLD Negative Trace* Negative Negative   URINEPH 7.0 5.5 7.0 7.0   PROTEIN Negative 10* Negative Negative   NITRITE Negative Negative Negative Negative   LEUKEST Negative Negative Negative Negative   RBCU  --  1 1 1   WBCU  --  1 0 <1     Recent Labs   Lab Test 11/15/21  0912 04/30/21  1626 05/19/20  1201 03/04/19  0900 01/17/18  1517 07/25/17  1457 01/24/17  1533 07/26/16  1135 01/16/16  0906 07/28/15  1522 01/19/15  1554 07/22/14  1506 03/06/14  0948   UTPG 0.17 Unable to calculate due to low value Unable to calculate due to low value Unable to calculate due to low value Unable to calculate due to low value Unable to calculate due to low value Unable to  calculate due to low value 0.23* 0.15 Unable to calculate due to low value Unable to calculate due to low value <0.10 0.13     PTH  Recent Labs   Lab Test 04/30/21  1620 05/19/20  1201 01/17/18  1515 01/24/17  1525 01/16/16  0905 07/22/14  1519   PTHI 51 50 60 24 31 18     IRON STUDIES   Recent Labs   Lab Test 05/19/20  1201 01/17/18  1515 07/25/17  1455 07/26/16  1137 01/16/16  0905 07/22/14  1519   IRON 81 58 50 60 58 27*    417 374 423 412 372   IRONSAT 20 14* 13* 14* 14* 7*   DIPESH 26 13 26 13  --  9*     Imaging:   Not applicable to this visit.     All the above labs were reviewed by me.   Connor Castaneda MD

## 2021-11-15 NOTE — PROGRESS NOTES
HPI:  tSacey is a 27 yo woman here for follow up of type 1 diabetes, diagnosed at age 7.   She also sees Dr. Allan.  Stacey's diabetes is complicated by nephropathy. She also has hyperlipidemia, a history of hypothyroidism (although she has been off of levothyroxine for several years) and celiac disease.  She follows a strict gluten free diet. She continues wearing a Dexcom G6 sensor and she really likes it.      Stacey has had major life changes.  Recently got .   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 She is able to be physically active, going for walks, etc . This is much less stressful.   10-6 M-F.   She was living with a roommate who drank a lot of alcohol, but she has since moved out and she is drinking much less.  She is feeling healthier.  Her dad recently found out he has cancer and had a recent stroke.  She worries about him.  She has been very active, doing a lot of hiking and plans to go winter camping.  She has lost a little weight and would like to lose more.      Her typical routine is:   No breakfast  Earlier lunch- veggies, fruit, yogurt, string cheese.   Dinner- if cooking at home- veggies, fish.   Ordering out more recently.   Pizza [place]. ;ess nutritious options.    Snacks at night.     A1c is 6.9% today.      Recent glucose is as follows:         Current diabetes regimen:  Basaglar- 24 units twice a day.   Humalog-1/7g carb and correction 17    Previous treatments:   Victoza- did not tolerate    For her hypothyroidism- untreated with normal TSH.      For her CKD (stage 1), she is taking enalapril. She follows with nephrology.  She understands she will need to stop this if she were to become pregnant.    She has no other concerns today.       ROS  GENERAL: 20# weight loss over the past year, intentional.  no fevers, chills, malaise, night sweats.   HEENT: no dysphagia, diplopia, neck pain or tenderness, dry/scratchy eyes, URI, cough, sinus drainage, tinnitus, sinus pressure  CV:  no chest pain, pressure, palpitations, skipped beats, LOC  LUNGS: no SOB, CHILDRESS, cough, sputum production, wheezing   GI: no diarrhea, constipation, abdominal pain  EXTREMITIES: no rashes, ulcers, edema  NEUROLOGY: no changes in vision, tingling or numbness in hands or feet.   MSK: no muscle aches or pains, weakness  PSYCH: mood stable    Past Medical History:   Diagnosis Date     Asthma     Currently no treatment needed     Celiac disease      Chronic kidney disease, stage I 8/3/2015     Chronic sinusitis      Diabetes mellitus type 1 (H)      Diabetic nephropathy (H)      Family history of heart disease 8/3/2015     Hypertension      Hypothyroid      Pedal edema      Psoriasis      PMH:   Type 1 diabetes- since   Celiac disease- since age 13  Hypothyroidism- age 10 (no longer on levothyroxine for several years)  Hyperlipidemia- had been on a statin for a little more than a year.  Wanted to make dietary changes.       Past Surgical History:   Procedure Laterality Date     ENT SURGERY       TONSILLECTOMY, ADENOIDECTOMY, COMBINED         Family History   Problem Relation Age of Onset     Cardiovascular Father 48        MI, stents, diabetic, type 2     Diabetes Father      Coronary Artery Disease Father      Obesity Father      Gastrointestinal Disease Mother         Celiacs     Obesity Mother      Heart Disease Paternal Half-Brother 45     Cardiovascular Maternal Grandfather         Englarged heart, pacemaker, defib     Skin Cancer Maternal Grandfather      Obesity Maternal Half-Sister      Skin Cancer Maternal Grandmother      Melanoma No family hx of      Family Hx:   Dad- premature CVD, in his 40s.  Type 2 diabetes, stroke, cancer  Mom- celiac disease  Grandfather- type 2 diabetes  Half brother- Asperger's  Half sister- cervical CA  Another half brother- bipolar and schizophrenia  1/2 brother-  of presumed heart attack    History     Social History     Marital Status:      Spouse Name: N/A     Number  "of Children: N/A     Years of Education: N/A     Social History Main Topics     Smoking status: Never Smoker      Smokeless tobacco: Never Used     Alcohol Use: Yes      Comment: occ. use     Drug Use: No     Sexual Activity:     Partners: Male     Birth Control/ Protection: Condom     Other Topics Concern     Parent/Sibling W/ Cabg, Mi Or Angioplasty Before 65f 55m? Yes     Caffeine Concern Yes     1 cup tea per day     Sleep Concern No     Special Diet Yes     gluten free diet, low carbs     Exercise Yes     walking, ellyptical, swimming, weights     Seat Belt Yes     Social History Narrative     Social Hx:    2021. 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 Enjoys a lot of hiking. She also goes to the gym a few days a week.  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     Current Outpatient Medications   Medication     albuterol (PROAIR HFA/PROVENTIL HFA/VENTOLIN HFA) 108 (90 Base) MCG/ACT inhaler     ASPIRIN NOT PRESCRIBED (INTENTIONAL)     blood glucose (NO BRAND SPECIFIED) test strip     Blood Pressure Monitoring KIT     Cholecalciferol 2000 UNITS TBDP     Continuous Blood Gluc Sensor (DEXCOM G6 SENSOR) MISC     Continuous Blood Gluc Transmit (DEXCOM G6 TRANSMITTER) MISC     enalapril (VASOTEC) 20 MG tablet     escitalopram (LEXAPRO) 10 MG tablet     glucagon 1 MG kit     hydrOXYzine (ATARAX) 25 MG tablet     insulin aspart (NOVOLOG FLEXPEN) 100 UNIT/ML pen     insulin glargine (BASAGLAR KWIKPEN) 100 UNIT/ML pen     Insulin Infusion Pump Supplies (INSULIN PUMP SYRINGE RESERVOIR) MISC     Insulin Infusion Pump Supplies (SURE-T INFUSION SET 23\") MISC     insulin lispro (HUMALOG VIAL) 100 UNIT/ML vial     insulin pen needle (B-D U/F) 31G X 5 MM miscellaneous     Insulin Pump Accessories MISC     insulin syringe-needle U-100 (31G X 5/16\" 0.5 ML) 31G X 5/16\" 0.5 ML miscellaneous     insulin syringes, disposable, U-100 0.3 ML MISC     " "ipratropium - albuterol 0.5 mg/2.5 mg/3 mL (DUONEB) 0.5-2.5 (3) MG/3ML neb solution     KETOSTIX test strip     Multiple Vitamins-Minerals (MULTIVITAMIN ADULT PO)     Ondansetron HCl (ZOFRAN PO)     STATIN NOT PRESCRIBED (INTENTIONAL)     No current facility-administered medications for this visit.          Allergies   Allergen Reactions     Dogs Other (See Comments)     Sinus symptoms      Dust Mites      Sinus      Gluten Meal      Physical Exam  /87   Ht 1.74 m (5' 8.5\")   Wt 129.3 kg (285 lb)   BMI 42.70 kg/m    GENERAL:  Alert and oriented X3, NAD, well dressed, answering questions appropriately, appears stated age.  HEENT: OP clear, no lymphadenopathy, no thyromegaly, non-tender, no exophthalmus, no proptosis, EOMI, no lid lag, no retraction  EXTREMITIES: no edema, +pulses, no rashes, no lesions  NEUROLOGY: + monofilament, +vibratory sensation, + DTR upper and lower extremity    RESULTS  Lab Results   Component Value Date    A1C 6.7 (H) 05/07/2021    A1C 7.3 (H) 02/17/2020    A1C 7.0 (H) 01/17/2018    A1C 7.6 (H) 03/26/2015    HEMOGLOBINA1 6.9 11/15/2021    HEMOGLOBINA1 7.0 (A) 01/28/2020    HEMOGLOBINA1 7.3 (A) 08/19/2019    HEMOGLOBINA1 6.8 (A) 05/14/2019    HEMOGLOBINA1 6.8 (A) 02/18/2019       TSH   Date Value Ref Range Status   05/07/2021 3.69 0.40 - 4.00 mU/L Final   02/17/2020 3.97 0.40 - 4.00 mU/L Final   06/07/2019 3.91 0.40 - 4.00 mU/L Final   03/04/2019 3.41 0.40 - 4.00 mU/L Final   10/10/2016 2.89 0.40 - 4.00 mU/L Final     T4 Free   Date Value Ref Range Status   12/09/2013 1.18 0.70 - 1.85 ng/dL Final       ALT   Date Value Ref Range Status   05/07/2021 23 0 - 50 U/L Final   05/17/2019 33 0 - 50 U/L Final   ]    Recent Labs   Lab Test 05/07/21  0824 06/07/19  0858 03/04/19  0854 08/04/15  1443 12/09/13  1137   CHOL 236* 195   < > 194 241*   HDL 59 55   < > 66 66   * 114*   < > 102 142*   TRIG 112 128   < > 131 165*   CHOLHDLRATIO  --   --   --  2.9 3.7    < > = values in this " interval not displayed.       Lab Results   Component Value Date     11/15/2021     05/07/2021      Lab Results   Component Value Date    POTASSIUM 4.4 11/15/2021    POTASSIUM 3.9 05/07/2021     Lab Results   Component Value Date    CHLORIDE 105 11/15/2021    CHLORIDE 104 05/07/2021     Lab Results   Component Value Date    FRANKLIN 9.2 11/15/2021    FRANKLIN 9.3 05/07/2021     Lab Results   Component Value Date    CO2 25 11/15/2021    CO2 26 05/07/2021     Lab Results   Component Value Date    BUN 9 11/15/2021    BUN 11 05/07/2021     Lab Results   Component Value Date    CR 0.53 11/15/2021    CR 0.59 05/07/2021       GFR Estimate   Date Value Ref Range Status   11/15/2021 >90 >60 mL/min/1.73m2 Final     Comment:     As of July 11, 2021, eGFR is calculated by the CKD-EPI creatinine equation, without race adjustment. eGFR can be influenced by muscle mass, exercise, and diet. The reported eGFR is an estimation only and is only applicable if the renal function is stable.   05/07/2021 >90 >60 mL/min/[1.73_m2] Final     Comment:     Non  GFR Calc  Starting 12/18/2018, serum creatinine based estimated GFR (eGFR) will be   calculated using the Chronic Kidney Disease Epidemiology Collaboration   (CKD-EPI) equation.     04/30/2021 >90 >60 mL/min/[1.73_m2] Final     Comment:     Non  GFR Calc  Starting 12/18/2018, serum creatinine based estimated GFR (eGFR) will be   calculated using the Chronic Kidney Disease Epidemiology Collaboration   (CKD-EPI) equation.     05/19/2020 >90 >60 mL/min/[1.73_m2] Final     Comment:     Non  GFR Calc  Starting 12/18/2018, serum creatinine based estimated GFR (eGFR) will be   calculated using the Chronic Kidney Disease Epidemiology Collaboration   (CKD-EPI) equation.       GFR Estimate If Black   Date Value Ref Range Status   05/07/2021 >90 >60 mL/min/[1.73_m2] Final     Comment:      GFR Calc  Starting 12/18/2018, serum  creatinine based estimated GFR (eGFR) will be   calculated using the Chronic Kidney Disease Epidemiology Collaboration   (CKD-EPI) equation.     04/30/2021 >90 >60 mL/min/[1.73_m2] Final     Comment:      GFR Calc  Starting 12/18/2018, serum creatinine based estimated GFR (eGFR) will be   calculated using the Chronic Kidney Disease Epidemiology Collaboration   (CKD-EPI) equation.     05/19/2020 >90 >60 mL/min/[1.73_m2] Final     Comment:      GFR Calc  Starting 12/18/2018, serum creatinine based estimated GFR (eGFR) will be   calculated using the Chronic Kidney Disease Epidemiology Collaboration   (CKD-EPI) equation.         Lab Results   Component Value Date    MICROL 6 11/15/2021    MICROL <5 04/30/2021     No results found for: MICROALBUMIN  No results found for: CPEPT, GADAB, ISCAB    No results found for: B12]    Most recent eye exam date: : Not Found     Assessment/Plan:     1.  Type 1 diabetes- Stacey is doing well despite being off the T[employer] control IQ pump.  A1c 6.9%. She is, however, dropping too much overnight.  I am concerned she may not be sensing her hypoglycemia as much as she has in the past based on frequent prolonged lows overnight evident on her sensor.  I have suggested she reduce her basaglar to 20 units in the evening, but she may need to lower this further if lows continue. Discussed utility of IN-pen if she decides to stay on injections.  Discussed heart healthy eating ideas and encouraged her to increase consumption of fruits, vegetables.  I did place a referral for a dietitian to review celiac diet and weight loss/cholesterol management.      Instructions given to patient:     [employer] for Inpen if you are interested.     Lower PM basaglar from 24 units to 20 units.  Continue 24 units in the AM.     If overnight lows continue, try 1/8g per meal.       2.  Risk factors-     Retinopathy:  No.  Has eye exam scheduled in December.   Nephropathy:  BP  well controlled. No microalbuminuria.  Creatinine stable.  She is on enalapril.  White coat hypertension.     She follows with nephrology.   Neuropathy: No.    Feet: OK, no ulcers.   Lipids:  .  Had been on pravastatin 20 mg daily.  She stopped statin in anticipation of pregnancy.  She does not want to resume statin at this time.  Will have her meet with dietitian to review heart healthy eating.  She does have a history of premature CV disease in her father.  Would consider resuming statin in the future if she has no plans for childbearing.      3.  Hypothyroidism- last TSH in target on no levothyroxine.      4.  F/U in 3 months with Dr. Allan.     36 minutes spent on the date of the encounter doing chart review, review of test results, review of continuous glucose sensor, interpretation of glucose data, patient visit and documentation, counseling/coordination of care, and discussion of follow up plan for worsening hyper and hypoglycemia.  The patient understood and is satisfied with today's visit.       Laureen Goldstein PA-C, MPAS   [facility]  Department of Medicine  Division of Endocrinology and Diabetes              Answers for HPI/ROS submitted by the patient on 11/14/2021  General Symptoms: No  Skin Symptoms: No  HENT Symptoms: No  EYE SYMPTOMS: No  HEART SYMPTOMS: No  LUNG SYMPTOMS: No  INTESTINAL SYMPTOMS: No  URINARY SYMPTOMS: No  GYNECOLOGIC SYMPTOMS: No  BREAST SYMPTOMS: No  SKELETAL SYMPTOMS: No  BLOOD SYMPTOMS: No  NERVOUS SYSTEM SYMPTOMS: No  MENTAL HEALTH SYMPTOMS: No

## 2021-12-09 ENCOUNTER — OFFICE VISIT (OUTPATIENT)
Dept: OPHTHALMOLOGY | Facility: CLINIC | Age: 26
End: 2021-12-09
Payer: COMMERCIAL

## 2021-12-09 VITALS — HEIGHT: 68 IN | BODY MASS INDEX: 40.92 KG/M2 | WEIGHT: 270 LBS

## 2021-12-09 DIAGNOSIS — H40.053 BORDERLINE GLAUCOMA OF BOTH EYES WITH OCULAR HYPERTENSION: ICD-10-CM

## 2021-12-09 DIAGNOSIS — E10.3293 MILD NONPROLIFERATIVE DIABETIC RETINOPATHY OF BOTH EYES WITHOUT MACULAR EDEMA ASSOCIATED WITH TYPE 1 DIABETES MELLITUS (H): Primary | ICD-10-CM

## 2021-12-09 DIAGNOSIS — H52.13 MYOPIA OF BOTH EYES: ICD-10-CM

## 2021-12-09 PROCEDURE — 92004 COMPRE OPH EXAM NEW PT 1/>: CPT | Performed by: OPTOMETRIST

## 2021-12-09 PROCEDURE — 92015 DETERMINE REFRACTIVE STATE: CPT | Performed by: OPTOMETRIST

## 2021-12-09 PROCEDURE — 92310 CONTACT LENS FITTING OU: CPT | Performed by: OPTOMETRIST

## 2021-12-09 PROCEDURE — 92134 CPTRZ OPH DX IMG PST SGM RTA: CPT | Performed by: OPTOMETRIST

## 2021-12-09 ASSESSMENT — VISUAL ACUITY
OD_CC: 20/25
CORRECTION_TYPE: GLASSES
OS_CC+: -1
OS_CC: J1+
VA_OR_OS_CURRENT_RX: 20/20-
OD_CC: J1+
METHOD: SNELLEN - LINEAR
OD_PH_CC: 20/20
OS_CC: 20/25

## 2021-12-09 ASSESSMENT — REFRACTION_CURRENTRX
OD_DIAMETER: 14.0
OD_BRAND: COOPER BIOFINITY BC 8.6, D 14.0
OS_DIAMETER: 14.0
OS_SPHERE: -1.50
OD_BASECURVE: 8.6
OS_BASECURVE: 8.6
OD_CYLINDER: SPHERE
OD_SPHERE: -1.50
OS_BRAND: COOPER BIOFINITY BC 8.6, D 14.0
OS_CYLINDER: SPHERE

## 2021-12-09 ASSESSMENT — REFRACTION_MANIFEST
OS_SPHERE: -1.50
OD_CYLINDER: +0.50
OD_AXIS: 105
OD_SPHERE: -1.75
OS_CYLINDER: +0.25
OS_AXIS: 055

## 2021-12-09 ASSESSMENT — TONOMETRY
IOP_METHOD: APPLANATION
OD_IOP_MMHG: 24
OD_IOP_MMHG: 30
OD_IOP_MMHG: 26
IOP_METHOD: ICARE
OS_IOP_MMHG: 26
IOP_METHOD: APPLANATION
OS_IOP_MMHG: 24
OS_IOP_MMHG: 28

## 2021-12-09 ASSESSMENT — CONF VISUAL FIELD
METHOD: COUNTING FINGERS
OS_NORMAL: 1
OD_NORMAL: 1

## 2021-12-09 ASSESSMENT — SLIT LAMP EXAM - LIDS: COMMENTS: NORMAL

## 2021-12-09 ASSESSMENT — EXTERNAL EXAM - RIGHT EYE: OD_EXAM: NORMAL

## 2021-12-09 ASSESSMENT — EXTERNAL EXAM - LEFT EYE: OS_EXAM: NORMAL

## 2021-12-09 ASSESSMENT — REFRACTION_WEARINGRX
OS_CYLINDER: SPHERE
OD_CYLINDER: SPHERE
OS_SPHERE: -1.50
OD_SPHERE: -1.25

## 2021-12-09 ASSESSMENT — CUP TO DISC RATIO
OS_RATIO: 0.2
OD_RATIO: 0.2

## 2021-12-09 ASSESSMENT — MIFFLIN-ST. JEOR: SCORE: 2003.21

## 2021-12-09 NOTE — PROGRESS NOTES
History  HPI     COMPREHENSIVE EYE EXAM     In both eyes.  Associated symptoms include Negative for eye pain, redness, tearing and discharge. Additional comments: Diabetic/ Wants to updated glasses and contacts each eye.               Comments     Patient reports that she has not used contacts for 1+ year after COVID stopped using. Would like to use them again however.   Comfort and vision seem to be good when used contacts.   Feel like she is seeing fine with present glasses and vision seem to have remained stable.   Lab Results       Component                Value               Date                       A1C                      6.7                 05/07/2021                 A1C                      7.3                 02/17/2020                 A1C                      7.0                 01/17/2018                 A1C                      7.6                 03/26/2015            Ladonna Zachery, COT COT 10:24 AM December 9, 2021                   Last edited by Ladonna Bingham COT on 12/9/2021 10:24 AM. (History)          Assessment/Plan  (E10.9854) Mild nonproliferative diabetic retinopathy of both eyes without macular edema associated with type 1 diabetes mellitus (H)  (primary encounter diagnosis)  Comment: Mild NPDR both eyes; diagnosed with Type 1 diabetes 18 years ago   Plan: OCT Retina Spectralis OU (both eyes)         Educated patient on clinical findings and the importance of continued management with primary care physician. Continue management as directed and return to clinic in 1 year for dilated exam, or sooner, as needed. Copy of chart sent to Dr. Allan.    (H40.016) Borderline glaucoma of both eyes with ocular hypertension  Comment: Secondary to thick corneas, RNFL thickness  both eyes, pachymetry: 684/657  Plan:  No treatment indicated at this time. Monitor annually.    (H52.13) Myopia of both eyes  Comment: Myopia both eyes   Plan: REFRACTION, WA CONTACT LENS FITTING COSMETIC LVL 1 -  ADULT         Dispensed spectacle prescription for full time wear. Monitor annually.   Dispensed trial lenses and finalized contact lens prescription for 2 years.    Return to clinic in 1 year for comprehensive eye exam.    Complete documentation of historical and exam elements from today's encounter can  be found in the full encounter summary report (not reduplicated in this progress  note). I personally obtained the chief complaint(s) and history of present illness. I  confirmed and edited as necessary the review of systems, past medical/surgical  history, family history, social history, and examination findings as documented by  others; and I examined the patient myself. I personally reviewed the relevant tests,  images, and reports as documented above. I formulated and edited as necessary the  assessment and plan and discussed the findings and management plan with the  patient and family.    Sonido Reyes OD, FAAO

## 2021-12-09 NOTE — NURSING NOTE
Chief Complaints and History of Present Illnesses   Patient presents with     COMPREHENSIVE EYE EXAM     Diabetic/ Wants to updated glasses and contacts each eye.      Chief Complaint(s) and History of Present Illness(es)     COMPREHENSIVE EYE EXAM     Laterality: both eyes    Associated symptoms: Negative for eye pain, redness, tearing and discharge    Comments: Diabetic/ Wants to updated glasses and contacts each eye.               Comments     Patient reports that she has not used contacts for 1+ year after COVID stopped using. Would like to use them again however.   Comfort and vision seem to be good when used contacts.   Feel like she is seeing fine with present glasses and vision seem to have remained stable.   Lab Results       Component                Value               Date                       A1C                      6.7                 05/07/2021                 A1C                      7.3                 02/17/2020                 A1C                      7.0                 01/17/2018                 A1C                      7.6                 03/26/2015            Ladonna Zachery, COT COT 10:24 AM December 9, 2021

## 2021-12-09 NOTE — Clinical Note
Thank you for referring Stacey RICHARDS Annalee for her annual eye exam.  No diabetic retinopathy noted on examination today.  Recommended repeat evaluation in 1 year.  Please contact me with any questions.  Sonido Reyes, OD on 12/9/2021 at 11:49 AM

## 2022-01-06 ENCOUNTER — VIRTUAL VISIT (OUTPATIENT)
Dept: EDUCATION SERVICES | Facility: CLINIC | Age: 27
End: 2022-01-06
Attending: PHYSICIAN ASSISTANT
Payer: COMMERCIAL

## 2022-01-06 DIAGNOSIS — E10.9 WELL CONTROLLED TYPE 1 DIABETES MELLITUS (H): ICD-10-CM

## 2022-01-06 PROCEDURE — 99207 PR NO BILLABLE SERVICE THIS VISIT: CPT | Performed by: NUTRITIONIST

## 2022-01-06 NOTE — PROGRESS NOTES
No show. Called Stacey and she was not aware that her appointment was today. Rescheduled to next week.  Maday Kwong RD, LD, CDE  1/6/2022   8:46 AM

## 2022-01-18 ENCOUNTER — MYC MEDICAL ADVICE (OUTPATIENT)
Dept: ENDOCRINOLOGY | Facility: CLINIC | Age: 27
End: 2022-01-18
Payer: COMMERCIAL

## 2022-01-18 DIAGNOSIS — E10.21 TYPE 1 DIABETES MELLITUS WITH DIABETIC NEPHROPATHY (H): Primary | ICD-10-CM

## 2022-01-18 NOTE — TELEPHONE ENCOUNTER
Novolog vial 60 units daily sent to [facility] on [facility] as requested. Jennifer Forbes RN on 1/18/2022 at 2:12 PM

## 2022-01-27 ASSESSMENT — ANXIETY QUESTIONNAIRES
5. BEING SO RESTLESS THAT IT IS HARD TO SIT STILL: NEARLY EVERY DAY
7. FEELING AFRAID AS IF SOMETHING AWFUL MIGHT HAPPEN: SEVERAL DAYS
2. NOT BEING ABLE TO STOP OR CONTROL WORRYING: SEVERAL DAYS
7. FEELING AFRAID AS IF SOMETHING AWFUL MIGHT HAPPEN: SEVERAL DAYS
GAD7 TOTAL SCORE: 11
4. TROUBLE RELAXING: SEVERAL DAYS
6. BECOMING EASILY ANNOYED OR IRRITABLE: MORE THAN HALF THE DAYS
1. FEELING NERVOUS, ANXIOUS, OR ON EDGE: MORE THAN HALF THE DAYS
GAD7 TOTAL SCORE: 11
GAD7 TOTAL SCORE: 11
3. WORRYING TOO MUCH ABOUT DIFFERENT THINGS: SEVERAL DAYS

## 2022-01-28 ASSESSMENT — ANXIETY QUESTIONNAIRES: GAD7 TOTAL SCORE: 11

## 2022-02-01 ENCOUNTER — TELEPHONE (OUTPATIENT)
Dept: BEHAVIORAL HEALTH | Facility: CLINIC | Age: 27
End: 2022-02-01
Payer: COMMERCIAL

## 2022-02-03 ENCOUNTER — VIRTUAL VISIT (OUTPATIENT)
Dept: PSYCHOLOGY | Facility: CLINIC | Age: 27
End: 2022-02-03
Attending: NURSE PRACTITIONER

## 2022-02-03 DIAGNOSIS — R41.840 INATTENTION: ICD-10-CM

## 2022-02-03 DIAGNOSIS — F41.1 GENERALIZED ANXIETY DISORDER: Primary | ICD-10-CM

## 2022-02-03 PROCEDURE — 90791 PSYCH DIAGNOSTIC EVALUATION: CPT | Mod: 95 | Performed by: PSYCHOLOGIST

## 2022-02-03 ASSESSMENT — COLUMBIA-SUICIDE SEVERITY RATING SCALE - C-SSRS
2. HAVE YOU ACTUALLY HAD ANY THOUGHTS OF KILLING YOURSELF?: NO
TOTAL  NUMBER OF ABORTED OR SELF INTERRUPTED ATTEMPTS PAST LIFETIME: NO
4. HAVE YOU HAD THESE THOUGHTS AND HAD SOME INTENTION OF ACTING ON THEM?: NO
3. HAVE YOU BEEN THINKING ABOUT HOW YOU MIGHT KILL YOURSELF?: NO
TOTAL  NUMBER OF ABORTED OR SELF INTERRUPTED ATTEMPTS PAST 3 MONTHS: NO
1. IN THE PAST MONTH, HAVE YOU WISHED YOU WERE DEAD OR WISHED YOU COULD GO TO SLEEP AND NOT WAKE UP?: YES
6. HAVE YOU EVER DONE ANYTHING, STARTED TO DO ANYTHING, OR PREPARED TO DO ANYTHING TO END YOUR LIFE?: NO
1. IN THE PAST MONTH, HAVE YOU WISHED YOU WERE DEAD OR WISHED YOU COULD GO TO SLEEP AND NOT WAKE UP?: NO
TOTAL  NUMBER OF INTERRUPTED ATTEMPTS PAST 3 MONTHS: NO
2. HAVE YOU ACTUALLY HAD ANY THOUGHTS OF KILLING YOURSELF LIFETIME?: NO
5. HAVE YOU STARTED TO WORK OUT OR WORKED OUT THE DETAILS OF HOW TO KILL YOURSELF? DO YOU INTEND TO CARRY OUT THIS PLAN?: NO
ATTEMPT LIFETIME: NO
REASONS FOR IDEATION LIFETIME: MOSTLY TO END OR STOP THE PAIN (YOU COULDN'T GO ON LIVING WITH THE PAIN OR HOW YOU WERE FEELING)
6. HAVE YOU EVER DONE ANYTHING, STARTED TO DO ANYTHING, OR PREPARED TO DO ANYTHING TO END YOUR LIFE?: NO
4. HAVE YOU HAD THESE THOUGHTS AND HAD SOME INTENTION OF ACTING ON THEM?: NO
5. HAVE YOU STARTED TO WORK OUT OR WORKED OUT THE DETAILS OF HOW TO KILL YOURSELF? DO YOU INTEND TO CARRY OUT THIS PLAN?: NO
TOTAL  NUMBER OF INTERRUPTED ATTEMPTS LIFETIME: NO
ATTEMPT PAST THREE MONTHS: NO

## 2022-02-03 ASSESSMENT — PATIENT HEALTH QUESTIONNAIRE - PHQ9: SUM OF ALL RESPONSES TO PHQ QUESTIONS 1-9: 7
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
## 2022-02-04 ENCOUNTER — DOCUMENTATION ONLY (OUTPATIENT)
Dept: PSYCHOLOGY | Facility: CLINIC | Age: 27
End: 2022-02-04
Payer: COMMERCIAL

## 2022-02-04 NOTE — PROGRESS NOTES
Name: Stacey Mantilla  MRN: [id number]  : 1995    Client completed the [place] Multiphasic Personality Inventory-2 (MMPI-2), a self-report personality inventory, as part of her evaluation. Validity scales indicate that the client responded in an open and consistent manner, resulting in a valid profile. While overreporting is possible, it is also likely that the Client has limited resources for coping with the stresses and demands of daily life. The following results should be interpreted with caution and in light of other information. Her profile reflects a moderate level of general emotional distress. Individuals with similar profiles have a highly general disposition toward negative emotionality or a vulnerability to anxiety, apprehension, dread, discomfort, distress, uncertainty, and tension. They are likely nervous and experience sleep disturbance and have issues with attention and concentration. They may feel stressed out and vulnerable to upset by disappointment and decisions that don t work out. Individuals with similar profiles present as anxious, tense and overwhelmed with a sense of exasperation with both themselves and others. They are likely troubled by a lack of cognitive control, with ruminations and preoccupations they know to be undirected, fruitless and largely irrational. They likely have concerns about their general health status and physical functioning and have a tendency to develop physical symptoms in response to stress. They report impediments to performance in the workplace including fatigue, apathy, feeling overwhelmed, indecisive and distractibility. Individuals with similar profiles experience overly busy but massively inefficient cognitive activity. They likely experience obsessiveness and are preoccupied with details. They are likely overwhelmed by the endless supply of considerations brought to bear in decision making. They likely feel less capable, less competent, less  intelligent and less adequate than others. They report impediments to performance in the workplace including tension, feeling overwhelmed, distractibility, fatigue, and indecision. Individuals with similar profiles have difficulties modulating and delaying impulses and can be sensation seeking at times. They have a tendency to sacrifice long term goals for short term satisfactions.

## 2022-02-08 ENCOUNTER — DOCUMENTATION ONLY (OUTPATIENT)
Dept: ENDOCRINOLOGY | Facility: CLINIC | Age: 27
End: 2022-02-08

## 2022-02-09 ENCOUNTER — DOCUMENTATION ONLY (OUTPATIENT)
Dept: PSYCHOLOGY | Facility: CLINIC | Age: 27
End: 2022-02-09
Payer: COMMERCIAL

## 2022-02-09 NOTE — PROGRESS NOTES
"Client Name:  Stacey Mantilla   MRN: [id number]  : 1995    López Adult ADHD Rating Scale-IV: Self and Other Reports (BAARS-IV)  The BAARS-IV assesses for symptoms of ADHD that are experienced in one's daily life. This assessment measure includes self and collateral rating scales designed to provide information regarding current and childhood symptoms of ADHD including inattention, hyperactivity, and impulsivity. Self-report scores are reported as percentiles. Scores at the 76th-83rd percentile are considered marginal, scores at the 84th-92nd percentile are considered borderline, scores at the 93rd-95th percentile are considered mild, scores at the 96th-98th percentile are considered moderate, and those at the 99th percentile are considered severe. Collateral or \"other\" rating scales are reported as number of symptoms observed in comparison to those reported by the client. Norms and percentile scores are not available for collateral reports.      Current Symptoms Scale--Self Report:   Client completed the self-report inventory of current symptoms. The results indicate that the client's Total ADHD Score was 55 which places her in the 99th percentile for overall ADHD symptoms. In addition, the client endorsed the following occur \"often\" or \"very often\": 8/9 (98th percentile) Inattention symptoms, 5/9 (97th percentile) Hyperactivity/Impulsivity symptoms, and 5/9 (94th percentile) Sluggish Cognitive Tempo symptoms. Client indicated that the reported symptoms have resulted in impaired functioning in school, home, social relationships and work. Overall, the results suggest the client is reporting moderate symptoms of inattention and moderate symptoms of hyperactivity/impulsivity at this time.      Current Symptoms Scale--Other Report:  Client's partner completed the collateral report inventory of current symptoms. Based on the collateral contact's observation of symptoms, the client demonstrates the following \"often\" " "or \"very often\": 3/9 Inattention symptoms, 1/5 Hyperactivity symptoms, 0/4 Impulsivity symptoms, and 3/9 Sluggish Cognitive Tempo symptoms. The client's Total ADHD Score was 34. The collateral contact indicated the client demonstrates impaired functioning in home and social relationships. The collateral- and self-report scores are discrepant; her partner noted fewer symptoms of ADHD at this time.      Childhood Symptoms Scale--Self-Report:  Client completed the self-report inventory of childhood symptoms. The results indicate that the client's Total ADHD Score was 55 which places her in the 98th percentile for overall ADHD symptoms in childhood. In addition, the client endorsed having experienced the following \"often\" or \"very often\": 8/9 (98th percentile) Inattention symptoms and 6/9 (95th percentile) Hyperactivity-Impulsivity symptoms. Client indicated that the reported symptoms resulted in impaired functioning in school, social relationships and home. Overall, the results suggest the Client reported experiencing moderate symptoms of inattention and mild symptoms of hyperactivity/impulsivity as a child.     Childhood Symptoms Scale--Other Report:  Client s friend completed the collateral report inventory of childhood symptoms. Based on the collateral contact's recollection of client's childhood symptoms, the client demonstrated the following \"often\" or \"very often\": 5/9 Inattention symptoms and 4/9 Hyperactivity-Impulsivity symptoms. The client's Total ADHD Score was 48. The collateral- and self-report scores are similar and suggest she experienced symptoms of inattention and hyperactivity/impulsivity as a child.     Client s mother completed the collateral report inventory of childhood symptoms. Based on the collateral contact's recollection of client's childhood symptoms, the client demonstrated the following \"often\" or \"very often\": 3/9 Inattention symptoms and 1/9 Hyperactivity-Impulsivity symptoms. The " "client's Total ADHD Score was 34. The collateral- and self-report scores are discrepant; her mother noted fewer symptoms of ADHD in childhood.         López Functional Impairment Scale: Self and Other Reports (BFIS)  The BFIS is used to assess an individuals' psychosocial impairment in major life/daily activities that may be due to a mental health disorder. This assessment measure includes self and collateral rating scales. Self-report scores are reported as percentiles. Scores at the 76th-83rd percentile are considered marginal, scores at the 84th-92nd percentile are considered borderline, scores at the 93rd-95th percentile are considered mild, scores at the 96th-98th percentile are considered moderate, and those at the 99th percentile are considered severe. Collateral or \"other\" rating scales are reported as number of symptoms observed in comparison to those reported by the client. Norms and percentile scores are not available for collateral reports.      Results indicate the client identified impairment (scores at or greater than 93rd percentile) in the following areas: home-chores, education and daily responsibilities. The client's Mean Impairment Score was 4.14 (75-84th percentile) indicating the client is not reporting impairment in functioning across domains. Client s partner completed the collateral report scale for this measure. His scores were similar (Mean Impairment Score of 5). He noted impairment in the areas of: home-family and social-friends.      López Deficits in Executive Functioning Scale (BDEFS)  The BDEFS is a measure used for evaluating dimensions of adult executive functioning in daily life. This assessment measure includes self and collateral rating scales. Self-report scores are reported as percentiles. Scores at the 76th-83rd percentile are considered marginal, scores at the 84th-92nd percentile are considered borderline, scores at the 93rd-95th percentile are considered mild, scores " "at the 96th-98th percentile are considered moderate, and those at the 99th percentile are considered severe. Collateral or \"other\" rating scales are reported as number of symptoms observed in comparison to those reported by the client. Norms and percentile scores are not available for collateral reports.      Results indicate the client's Total Executive Functioning Score was 229 (97th percentile). The ADHD-Executive Functioning Index score was 26 (93rd percentile). These scores suggest the client has mild to moderate deficits in executive functioning. She endorsed the following: self-management to time (severe); self-organization/problem-solving (mild); self-motivation (moderate); and self-regulation of emotions (mild). Client's partner completed the collateral rating scale, which indicated discrepant results. His scores were generally lower. He only noted a deficit in the area of self-management to time.    Generalized Anxiety Disorder Questionnaire (YARELIS-7)  This questionnaire is designed to screen for anxiety in adults. Based on the client's score of 12, she is reporting moderate symptoms of anxiety at this time. She endorsed the following symptoms: feeling nervous/anxious/on edge; not being able to stop or control worrying, worrying too much about different things; trouble relaxing; restlessness and becoming easily annoyed or irritable.    Patient Health Questionnaire- 9 (PHQ-9)   This questionnaire is designed to screen for depression in adults. Based on the client's score of 14, she is reporting moderate symptoms of depression at this time. Client identified the following symptoms of depression: little interest or pleasure in doing things; feeling down/depressed/hopeless; trouble falling or staying asleep or sleeping too much, feeling tired or having little energy, poor appetite or overeating; poor concentration and feeling fidgety/restless.               "

## 2022-02-10 ENCOUNTER — DOCUMENTATION ONLY (OUTPATIENT)
Dept: PSYCHOLOGY | Facility: CLINIC | Age: 27
End: 2022-02-10
Payer: COMMERCIAL

## 2022-02-10 ENCOUNTER — VIRTUAL VISIT (OUTPATIENT)
Dept: PSYCHOLOGY | Facility: CLINIC | Age: 27
End: 2022-02-10
Attending: NURSE PRACTITIONER

## 2022-02-10 DIAGNOSIS — F41.1 GENERALIZED ANXIETY DISORDER: Primary | ICD-10-CM

## 2022-02-10 PROCEDURE — 90832 PSYTX W PT 30 MINUTES: CPT | Mod: 95 | Performed by: PSYCHOLOGIST

## 2022-02-10 NOTE — PROGRESS NOTES
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 issues with attention and concentration. Based on the results of clinical interview and psychological testing, the client currently meets criteria for diagnoses of Attention-Deficit/Hyperactivity Disorder, Predominantly Inattentive Presentation and Generalized Anxiety Disorder. Client will be provided with the results of testing, diagnosis, and recommendations in her last appointment.    DSM5 Diagnoses: (Sustained by DSM5 Criteria Listed Above)    Attention-Deficit/Hyperactivity Disorder, Predominantly Inattentive Presentation (F90.0)    Generalized Anxiety Disorder (F41.1)    Psychosocial & Contextual Factors: history of trauma; medical issues (Type 1 Diabetes)     Recommendations:    1. Schedule an appointment with your physician or psychiatrist to discuss a medication evaluation. Medication can be very helpful in treating the symptoms of anxiety and ADHD. It will be important that each condition is treated, as treatment for one without the other may lead to an increase in symptoms (e.g., treating ADHD, but not anxiety, can lead to increased anxiety).    2. Access resources through websites, books, and articles such as those provided in the Coping with ADHD handout.    3. Consider working with an ADHD  or individual therapist to learn skills to assist with symptom management, as well as ways to  improve relationships, etc., that may have been impacted by your symptoms.    4. Individual therapy is recommended. Therapies focused on identifying and challenging problematic thought and behavior patterns while increasing the use of healthy coping skills has been found to be effective in treating anxiety. It will be important to set goals in this therapy and work actively toward achieving short-term successes that lead to the completion of each goal. Action-oriented therapies, such as CBT and ACT are particularly recommended for the treatment of chronic anxiety.     5. The use of behavioral strategies such as diaphragmatic breathing, guided imagery, and mindfulness is often helpful in the management of anxiety symptoms.      Silvina Peraza, Ph.D.,   Licensed Psychologist
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
## 2022-02-18 ENCOUNTER — VIRTUAL VISIT (OUTPATIENT)
Dept: PSYCHOLOGY | Facility: CLINIC | Age: 27
End: 2022-02-18

## 2022-02-18 DIAGNOSIS — F41.1 GENERALIZED ANXIETY DISORDER: Primary | ICD-10-CM

## 2022-02-18 DIAGNOSIS — F90.0 ATTENTION DEFICIT HYPERACTIVITY DISORDER, INATTENTIVE TYPE: ICD-10-CM

## 2022-02-18 PROCEDURE — 96131 PSYCL TST EVAL PHYS/QHP EA: CPT | Mod: 95 | Performed by: PSYCHOLOGIST

## 2022-02-18 PROCEDURE — 96130 PSYCL TST EVAL PHYS/QHP 1ST: CPT | Mod: 95 | Performed by: PSYCHOLOGIST

## 2022-02-18 NOTE — PROGRESS NOTES
Client Name: Stacey Mantilla Date: 2/18/22    Service Type: Individual (ADHD Evaluation feedback session)     Session Start Time: 9:00 Session End Time: 9:20      Session Length: 20 minutes      Session #: (feedback)     Attendees: Client attended alone    Telemedicine Visit: The patient's condition can be safely assessed and treated via synchronous audio and visual telemedicine encounter.      Reason for Telemedicine Visit: Services only offered telehealth    Originating Site (Patient Location): Patient's other car    Distant Site (Provider Location): Provider Remote Setting- Home Office    Consent:  The patient/guardian has verbally consented to: the potential risks and benefits of telemedicine (video visit) versus in person care; bill my insurance or make self-payment for services provided; and responsibility for payment of non-covered services.     Mode of Communication:  Video Conference via [employer]    As the provider I attest to compliance with applicable laws and regulations related to telemedicine.          DATA        Treatment Objective(s) Addressed in This Session:   Provided feedback on ADHD evaluation. Reviewed test results in depth and answered client's questions. Client diagnosed with ADHD, Predominantly Inattentive Presentation and Generalized Anxiety Disorder. Reviewed ADHD symptom management handout. This provider also completed full written report of evaluation, including integration of testing data, summary, and recommendations. Please see Documentation Only dated 2/10/22.     Progress on / Status of Treatment Objective(s) / Homework:   Completed      Intervention:  ADHD Evaluation feedback; Reviewed report (can be found in Documentation Only encounter dated 2/10/22); Client was appreciative of the feedback and expressed understanding of the diagnoses.          ASSESSMENT: Current Emotional / Mental Status (status of significant symptoms):  Risk status (Self / Other harm or suicidal  ideation)  Client denies current fears or concerns for personal safety.  Client denies current or recent suicidal ideation or behaviors.  Client denies current or recent homicidal ideation or behaviors.  Client denies current or recent self-injurious behavior or ideation.  Client denies other safety concerns.  A safety and risk management plan has not been developed at this time, however client was given the after-hours number / 911 should there be a change in any of these risk factors.     Appearance: Appropriate   Eye Contact: Good   Psychomotor Behavior: Normal   Attitude: Cooperative   Orientation: All  Speech  Rate / Production: Normal   Volume: Normal   Mood: Normal  Affect: Appropriate   Thought Content: Clear   Thought Form: Coherent Logical   Insight: Good      Medication Review:  Client is prescribed Lexapro by PCP    Medication Compliance:  Yes     Changes in Health Issues:  None reported     Chemical Use Review:  Substance Use: Chemical use reviewed, no active concerns identified      Tobacco Use: No current tobacco use.      Collateral Reports Completed:  Routed note to Care Team Member(s)     PLAN: (Homework, other)     Recommendations:  1. Schedule an appointment with your physician or psychiatrist to discuss a medication evaluation. Medication can be very helpful in treating the symptoms of anxiety and ADHD. It will be important that each condition is treated, as treatment for one without the other may lead to an increase in symptoms (e.g., treating ADHD, but not anxiety, can lead to increased anxiety).     2. Access resources through websites, books, and articles such as those provided in the Coping with ADHD handout.     3. Consider working with an ADHD  or individual therapist to learn skills to assist with symptom management, as well as ways to improve relationships, etc., that may have been impacted by your symptoms.     4. Individual therapy is recommended. Therapies focused on identifying  and challenging problematic thought and behavior patterns while increasing the use of healthy coping skills has been found to be effective in treating anxiety. It will be important to set goals in this therapy and work actively toward achieving short-term successes that lead to the completion of each goal. Action-oriented therapies, such as CBT and ACT are particularly recommended for the treatment of chronic anxiety.      5. The use of behavioral strategies such as diaphragmatic breathing, guided imagery, and mindfulness is often helpful in the management of anxiety symptoms.        Silvina Peraza, Ph.D.,   Licensed Psychologist       Psychological Testing   Billing/Services Summary       Testing Evaluation Services Base: 93324  (1st 60 mins) Add-on: 16947  (each addtl 60 mins)   Record Review and Clarify Referral Question   (9:40/10:00), (2/3/22) 20 minutes   Integration/Report Generation   (3:00/4:00), (2/4/22) - MMPI-2  (12:00/1:00), (2/9/22) - López Scales  (3:00/4:00), (2/10/22) - Report Writing 60 minutes  60 minutes  60 minutes     Interactive Feedback Session  (9:00/9:20), (2/18/22) 20 minutes   Total Time: 220 minutes (3 hours, 40 minutes)    Total Units: 1 3           Diagnosis(es): (ICD-10)  Attention-Deficit/Hyperactivity Disorder, Predominantly Inattentive Presentation (F90.0)  Generalized Anxiety Disorder (F41.1)

## 2022-03-01 ENCOUNTER — MEDICAL CORRESPONDENCE (OUTPATIENT)
Dept: HEALTH INFORMATION MANAGEMENT | Facility: CLINIC | Age: 27
End: 2022-03-01
Payer: COMMERCIAL

## 2022-03-01 ENCOUNTER — DOCUMENTATION ONLY (OUTPATIENT)
Dept: ENDOCRINOLOGY | Facility: CLINIC | Age: 27
End: 2022-03-01

## 2022-03-05 ENCOUNTER — MYC MEDICAL ADVICE (OUTPATIENT)
Dept: ENDOCRINOLOGY | Facility: CLINIC | Age: 27
End: 2022-03-05
Payer: COMMERCIAL

## 2022-03-05 DIAGNOSIS — E10.21 TYPE 1 DIABETES MELLITUS WITH DIABETIC NEPHROPATHY (H): ICD-10-CM

## 2022-03-07 ENCOUNTER — APPOINTMENT (OUTPATIENT)
Dept: PEDIATRICS | Facility: CLINIC | Age: 27
End: 2022-03-07
Payer: COMMERCIAL

## 2022-03-14 ENCOUNTER — VIRTUAL VISIT (OUTPATIENT)
Dept: PEDIATRICS | Facility: CLINIC | Age: 27
End: 2022-03-14
Payer: COMMERCIAL

## 2022-03-14 ENCOUNTER — TELEPHONE (OUTPATIENT)
Dept: PEDIATRICS | Facility: CLINIC | Age: 27
End: 2022-03-14

## 2022-03-14 DIAGNOSIS — E10.3293 MILD NONPROLIFERATIVE DIABETIC RETINOPATHY OF BOTH EYES WITHOUT MACULAR EDEMA ASSOCIATED WITH TYPE 1 DIABETES MELLITUS (H): ICD-10-CM

## 2022-03-14 DIAGNOSIS — F90.0 ADHD (ATTENTION DEFICIT HYPERACTIVITY DISORDER), INATTENTIVE TYPE: Primary | ICD-10-CM

## 2022-03-14 DIAGNOSIS — E66.01 MORBID OBESITY (H): ICD-10-CM

## 2022-03-14 PROBLEM — K90.0 CELIAC DISEASE: Status: ACTIVE | Noted: 2022-03-14

## 2022-03-14 PROCEDURE — 99214 OFFICE O/P EST MOD 30 MIN: CPT | Mod: 95 | Performed by: NURSE PRACTITIONER

## 2022-03-14 RX ORDER — DEXTROAMPHETAMINE SACCHARATE, AMPHETAMINE ASPARTATE MONOHYDRATE, DEXTROAMPHETAMINE SULFATE AND AMPHETAMINE SULFATE 2.5; 2.5; 2.5; 2.5 MG/1; MG/1; MG/1; MG/1
10 CAPSULE, EXTENDED RELEASE ORAL DAILY
Qty: 30 CAPSULE | Refills: 0 | Status: SHIPPED | OUTPATIENT
Start: 2022-05-15 | End: 2022-06-14

## 2022-03-14 RX ORDER — DEXTROAMPHETAMINE SACCHARATE, AMPHETAMINE ASPARTATE MONOHYDRATE, DEXTROAMPHETAMINE SULFATE AND AMPHETAMINE SULFATE 2.5; 2.5; 2.5; 2.5 MG/1; MG/1; MG/1; MG/1
10 CAPSULE, EXTENDED RELEASE ORAL DAILY
Qty: 30 CAPSULE | Refills: 0 | Status: SHIPPED | OUTPATIENT
Start: 2022-03-14 | End: 2022-07-25

## 2022-03-14 RX ORDER — DEXTROAMPHETAMINE SACCHARATE, AMPHETAMINE ASPARTATE MONOHYDRATE, DEXTROAMPHETAMINE SULFATE AND AMPHETAMINE SULFATE 2.5; 2.5; 2.5; 2.5 MG/1; MG/1; MG/1; MG/1
10 CAPSULE, EXTENDED RELEASE ORAL DAILY
Qty: 30 CAPSULE | Refills: 0 | Status: SHIPPED | OUTPATIENT
Start: 2022-04-14 | End: 2022-05-14

## 2022-03-14 NOTE — TELEPHONE ENCOUNTER
Patient Quality Outreach    Patient is due for the following:   Asthma  -  ACT needed and AAP  Cervical Cancer Screening - PAP Needed  Physical  - Due after 5/7/22  Immunizations  -  Covid and Influenza    NEXT STEPS:   Schedule a yearly physical and complete ACT  Patient scheduled for physical on 4/26/22 - has new insurance    Type of outreach:    Chart review performed, no outreach needed.    Questions for provider review:    None     Roseanna Nunez MA  Chart closed.

## 2022-03-14 NOTE — PROGRESS NOTES
"Stacey is a 26 year old who is being evaluated via a billable video visit.      Assessment & Plan     (F90.0) ADHD (attention deficit hyperactivity disorder), inattentive type  (primary encounter diagnosis)  Comment: New diagnosis by therapy. Has comorbid anxiety  Plan: amphetamine-dextroamphetamine (ADDERALL XR) 10         MG 24 hr capsule, amphetamine-dextroamphetamine        (ADDERALL XR) 10 MG 24 hr capsule,         amphetamine-dextroamphetamine (ADDERALL XR) 10         MG 24 hr capsule  -Start adderall. Reviewed r/b/se, and process for refills and visits  -Reviewed organizational strategies  -Evisit 6 months  -Sign CSA at next visit    (E10.8614) Mild nonproliferative diabetic retinopathy of both eyes without macular edema associated with type 1 diabetes mellitus (H)  Comment: Followed by ophtho    (E66.01) Morbid obesity (H)  Comment: With type 1 diabetes.     BMI:   Estimated body mass index is 41.05 kg/m  as calculated from the following:    Height as of 12/9/21: 1.727 m (5' 8\").    Weight as of 12/9/21: 122.5 kg (270 lb).   Weight management plan: Discussed healthy diet and exercise guidelines    See Patient Instructions    No follow-ups on file.    Nancy Mejia, LUIS FERNANDO [facility]    Zaid Ibarra is a 26 year old who presents for the following health issues   HPI     Review of Systems   Constitutional, HEENT, cardiovascular, pulmonary, gi and gu systems are negative, except as otherwise noted.      Objective           Vitals:  No vitals were obtained today due to virtual visit.    Physical Exam         Telephone visit 15 minutes  Answers for HPI/ROS submitted by the patient on 3/13/2022  How many servings of fruits and vegetables do you eat daily?: 2-3  On average, how many sweetened beverages do you drink each day (Examples: soda, juice, sweet tea, etc.  Do NOT count diet or artificially sweetened beverages)?: 0  How many minutes a day do you exercise enough to make " your heart beat faster?: 10 to 19  How many days a week do you exercise enough to make your heart beat faster?: 4  How many days per week do you miss taking your medication?: 0  What is the reason for your visit today?: Recent adhd diagnosis  When did your symptoms begin?: More than a month  How would you describe these symptoms?: Moderate  Are your symptoms:: Staying the same  Have you had these symptoms before?: Yes  Have you tried or received treatment for these symptoms before?: No

## 2022-04-25 SDOH — ECONOMIC STABILITY: TRANSPORTATION INSECURITY
IN THE PAST 12 MONTHS, HAS LACK OF TRANSPORTATION KEPT YOU FROM MEETINGS, WORK, OR FROM GETTING THINGS NEEDED FOR DAILY LIVING?: NO

## 2022-04-25 SDOH — ECONOMIC STABILITY: INCOME INSECURITY: HOW HARD IS IT FOR YOU TO PAY FOR THE VERY BASICS LIKE FOOD, HOUSING, MEDICAL CARE, AND HEATING?: SOMEWHAT HARD

## 2022-04-25 SDOH — ECONOMIC STABILITY: TRANSPORTATION INSECURITY
IN THE PAST 12 MONTHS, HAS THE LACK OF TRANSPORTATION KEPT YOU FROM MEDICAL APPOINTMENTS OR FROM GETTING MEDICATIONS?: NO

## 2022-04-25 SDOH — ECONOMIC STABILITY: INCOME INSECURITY: IN THE LAST 12 MONTHS, WAS THERE A TIME WHEN YOU WERE NOT ABLE TO PAY THE MORTGAGE OR RENT ON TIME?: YES

## 2022-04-25 SDOH — ECONOMIC STABILITY: FOOD INSECURITY: WITHIN THE PAST 12 MONTHS, YOU WORRIED THAT YOUR FOOD WOULD RUN OUT BEFORE YOU GOT MONEY TO BUY MORE.: NEVER TRUE

## 2022-04-25 SDOH — HEALTH STABILITY: PHYSICAL HEALTH: ON AVERAGE, HOW MANY MINUTES DO YOU ENGAGE IN EXERCISE AT THIS LEVEL?: 30 MIN

## 2022-04-25 SDOH — ECONOMIC STABILITY: FOOD INSECURITY: WITHIN THE PAST 12 MONTHS, THE FOOD YOU BOUGHT JUST DIDN'T LAST AND YOU DIDN'T HAVE MONEY TO GET MORE.: NEVER TRUE

## 2022-04-25 SDOH — HEALTH STABILITY: PHYSICAL HEALTH: ON AVERAGE, HOW MANY DAYS PER WEEK DO YOU ENGAGE IN MODERATE TO STRENUOUS EXERCISE (LIKE A BRISK WALK)?: 5 DAYS

## 2022-04-25 ASSESSMENT — ENCOUNTER SYMPTOMS
COUGH: 1
PARESTHESIAS: 0
PALPITATIONS: 0
CONSTIPATION: 0
JOINT SWELLING: 0
DIZZINESS: 0
NAUSEA: 0
EYE PAIN: 0
WEAKNESS: 0
ABDOMINAL PAIN: 0
DYSURIA: 0
BREAST MASS: 0
SORE THROAT: 0
HEMATURIA: 0
HEADACHES: 0
MYALGIAS: 0
FEVER: 0
NERVOUS/ANXIOUS: 0
HEARTBURN: 0
HEMATOCHEZIA: 0
SHORTNESS OF BREATH: 0
CHILLS: 0
ARTHRALGIAS: 0
FREQUENCY: 0
DIARRHEA: 0

## 2022-04-25 ASSESSMENT — LIFESTYLE VARIABLES
HOW OFTEN DO YOU HAVE SIX OR MORE DRINKS ON ONE OCCASION: LESS THAN MONTHLY
SKIP TO QUESTIONS 9-10: 0
HOW MANY STANDARD DRINKS CONTAINING ALCOHOL DO YOU HAVE ON A TYPICAL DAY: 1 OR 2
AUDIT-C TOTAL SCORE: 2
HOW OFTEN DO YOU HAVE A DRINK CONTAINING ALCOHOL: MONTHLY OR LESS

## 2022-04-25 ASSESSMENT — SOCIAL DETERMINANTS OF HEALTH (SDOH)
HOW OFTEN DO YOU GET TOGETHER WITH FRIENDS OR RELATIVES?: ONCE A WEEK
ARE YOU MARRIED, WIDOWED, DIVORCED, SEPARATED, NEVER MARRIED, OR LIVING WITH A PARTNER?: LIVING WITH PARTNER
HOW OFTEN DO YOU ATTEND CHURCH OR RELIGIOUS SERVICES?: NEVER
IN A TYPICAL WEEK, HOW MANY TIMES DO YOU TALK ON THE PHONE WITH FAMILY, FRIENDS, OR NEIGHBORS?: MORE THAN THREE TIMES A WEEK
DO YOU BELONG TO ANY CLUBS OR ORGANIZATIONS SUCH AS CHURCH GROUPS UNIONS, FRATERNAL OR ATHLETIC GROUPS, OR SCHOOL GROUPS?: NO

## 2022-04-26 ENCOUNTER — OFFICE VISIT (OUTPATIENT)
Dept: PEDIATRICS | Facility: CLINIC | Age: 27
End: 2022-04-26
Payer: COMMERCIAL

## 2022-04-26 VITALS
TEMPERATURE: 98.2 F | HEART RATE: 78 BPM | DIASTOLIC BLOOD PRESSURE: 80 MMHG | SYSTOLIC BLOOD PRESSURE: 138 MMHG | RESPIRATION RATE: 12 BRPM | WEIGHT: 288.6 LBS | BODY MASS INDEX: 43.74 KG/M2 | OXYGEN SATURATION: 99 % | HEIGHT: 68 IN

## 2022-04-26 DIAGNOSIS — Z13.220 SCREENING FOR HYPERLIPIDEMIA: ICD-10-CM

## 2022-04-26 DIAGNOSIS — D72.829 LEUKOCYTOSIS, UNSPECIFIED TYPE: ICD-10-CM

## 2022-04-26 DIAGNOSIS — E66.01 MORBID OBESITY (H): ICD-10-CM

## 2022-04-26 DIAGNOSIS — R79.89 ABNORMAL CBC: ICD-10-CM

## 2022-04-26 DIAGNOSIS — N18.1 CHRONIC KIDNEY DISEASE, STAGE I: ICD-10-CM

## 2022-04-26 DIAGNOSIS — F41.8 DEPRESSION WITH ANXIETY: ICD-10-CM

## 2022-04-26 DIAGNOSIS — K90.0 CELIAC DISEASE: ICD-10-CM

## 2022-04-26 DIAGNOSIS — Z00.00 ROUTINE GENERAL MEDICAL EXAMINATION AT A HEALTH CARE FACILITY: Primary | ICD-10-CM

## 2022-04-26 DIAGNOSIS — E10.21 TYPE 1 DIABETES MELLITUS WITH DIABETIC NEPHROPATHY (H): ICD-10-CM

## 2022-04-26 DIAGNOSIS — J32.9 SINUSITIS, UNSPECIFIED CHRONICITY, UNSPECIFIED LOCATION: ICD-10-CM

## 2022-04-26 DIAGNOSIS — E10.21 DIABETIC NEPHROPATHY ASSOCIATED WITH TYPE 1 DIABETES MELLITUS (H): ICD-10-CM

## 2022-04-26 DIAGNOSIS — J45.20 MILD INTERMITTENT ASTHMA WITHOUT COMPLICATION: ICD-10-CM

## 2022-04-26 PROCEDURE — 99213 OFFICE O/P EST LOW 20 MIN: CPT | Mod: 25 | Performed by: NURSE PRACTITIONER

## 2022-04-26 PROCEDURE — 99395 PREV VISIT EST AGE 18-39: CPT | Performed by: NURSE PRACTITIONER

## 2022-04-26 PROCEDURE — G0145 SCR C/V CYTO,THINLAYER,RESCR: HCPCS | Performed by: NURSE PRACTITIONER

## 2022-04-26 RX ORDER — ESCITALOPRAM OXALATE 10 MG/1
10 TABLET ORAL DAILY
Qty: 90 TABLET | Refills: 3 | Status: SHIPPED | OUTPATIENT
Start: 2022-04-26 | End: 2023-03-29

## 2022-04-26 RX ORDER — FLUCONAZOLE 150 MG/1
150 TABLET ORAL ONCE
Qty: 1 TABLET | Refills: 1 | Status: SHIPPED | OUTPATIENT
Start: 2022-04-26 | End: 2022-04-26

## 2022-04-26 ASSESSMENT — ENCOUNTER SYMPTOMS
FREQUENCY: 0
NERVOUS/ANXIOUS: 0
SHORTNESS OF BREATH: 0
EYE PAIN: 0
DYSURIA: 0
SORE THROAT: 0
MYALGIAS: 0
CHILLS: 0
JOINT SWELLING: 0
HEARTBURN: 0
COUGH: 1
HEMATOCHEZIA: 0
ARTHRALGIAS: 0
CONSTIPATION: 0
DIZZINESS: 0
HEADACHES: 0
DIARRHEA: 0
ABDOMINAL PAIN: 0
PARESTHESIAS: 0
BREAST MASS: 0
WEAKNESS: 0
NAUSEA: 0
HEMATURIA: 0
PALPITATIONS: 0
FEVER: 0

## 2022-04-26 ASSESSMENT — PAIN SCALES - GENERAL: PAINLEVEL: MODERATE PAIN (4)

## 2022-04-26 ASSESSMENT — ASTHMA QUESTIONNAIRES: ACT_TOTALSCORE: 22
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
## 2022-04-28 LAB
BKR LAB AP GYN ADEQUACY: NORMAL
BKR LAB AP GYN INTERPRETATION: NORMAL
BKR LAB AP HPV REFLEX: NORMAL
BKR LAB AP PREVIOUS ABNORMAL: NORMAL
PATH REPORT.COMMENTS IMP SPEC: NORMAL
PATH REPORT.COMMENTS IMP SPEC: NORMAL
PATH REPORT.RELEVANT HX SPEC: NORMAL

## 2022-04-29 DIAGNOSIS — R80.9 PROTEINURIA: ICD-10-CM

## 2022-04-29 RX ORDER — ENALAPRIL MALEATE 20 MG/1
TABLET ORAL
Qty: 180 TABLET | Refills: 1 | Status: SHIPPED | OUTPATIENT
Start: 2022-04-29 | End: 2022-10-14

## 2022-05-03 DIAGNOSIS — R80.8 OTHER PROTEINURIA: ICD-10-CM

## 2022-05-03 DIAGNOSIS — N18.1 CKD (CHRONIC KIDNEY DISEASE) STAGE 1, GFR 90 ML/MIN OR GREATER: Primary | ICD-10-CM

## 2022-07-03 ENCOUNTER — HEALTH MAINTENANCE LETTER (OUTPATIENT)
Age: 27
End: 2022-07-03

## 2022-07-14 DIAGNOSIS — E10.21 TYPE 1 DIABETES MELLITUS WITH DIABETIC NEPHROPATHY (H): Primary | ICD-10-CM

## 2022-07-24 ENCOUNTER — LAB (OUTPATIENT)
Dept: LAB | Facility: CLINIC | Age: 27
End: 2022-07-24
Payer: COMMERCIAL

## 2022-07-24 DIAGNOSIS — E10.21 TYPE 1 DIABETES MELLITUS WITH DIABETIC NEPHROPATHY (H): ICD-10-CM

## 2022-07-24 DIAGNOSIS — R80.8 OTHER PROTEINURIA: ICD-10-CM

## 2022-07-24 DIAGNOSIS — N18.1 CKD (CHRONIC KIDNEY DISEASE) STAGE 1, GFR 90 ML/MIN OR GREATER: ICD-10-CM

## 2022-07-24 LAB
ALBUMIN MFR UR ELPH: <6 MG/DL
ALBUMIN SERPL-MCNC: 3.6 G/DL (ref 3.4–5)
ANION GAP SERPL CALCULATED.3IONS-SCNC: 6 MMOL/L (ref 3–14)
BUN SERPL-MCNC: 6 MG/DL (ref 7–30)
CALCIUM SERPL-MCNC: 9.1 MG/DL (ref 8.5–10.1)
CHLORIDE BLD-SCNC: 109 MMOL/L (ref 94–109)
CHOLEST SERPL-MCNC: 198 MG/DL
CO2 SERPL-SCNC: 24 MMOL/L (ref 20–32)
CREAT SERPL-MCNC: 0.51 MG/DL (ref 0.52–1.04)
CREAT UR-MCNC: 27 MG/DL
CREAT UR-MCNC: 27.5 MG/DL
FASTING STATUS PATIENT QL REPORTED: YES
GFR SERPL CREATININE-BSD FRML MDRD: >90 ML/MIN/1.73M2
GLUCOSE BLD-MCNC: 97 MG/DL (ref 70–99)
HBA1C MFR BLD: 6.4 % (ref 0–5.6)
HDLC SERPL-MCNC: 62 MG/DL
HGB BLD-MCNC: 14 G/DL (ref 11.7–15.7)
LDLC SERPL CALC-MCNC: 121 MG/DL
MICROALBUMIN UR-MCNC: <5 MG/L
MICROALBUMIN/CREAT UR: NORMAL MG/G{CREAT}
NONHDLC SERPL-MCNC: 136 MG/DL
PHOSPHATE SERPL-MCNC: 3.1 MG/DL (ref 2.5–4.5)
POTASSIUM BLD-SCNC: 4.7 MMOL/L (ref 3.4–5.3)
PROT/CREAT 24H UR: NORMAL MG/G{CREAT}
PTH-INTACT SERPL-MCNC: 49 PG/ML (ref 15–65)
SODIUM SERPL-SCNC: 139 MMOL/L (ref 133–144)
TRIGL SERPL-MCNC: 74 MG/DL

## 2022-07-24 PROCEDURE — 80061 LIPID PANEL: CPT

## 2022-07-24 PROCEDURE — 84156 ASSAY OF PROTEIN URINE: CPT

## 2022-07-24 PROCEDURE — 85018 HEMOGLOBIN: CPT

## 2022-07-24 PROCEDURE — 82306 VITAMIN D 25 HYDROXY: CPT

## 2022-07-24 PROCEDURE — 83036 HEMOGLOBIN GLYCOSYLATED A1C: CPT

## 2022-07-24 PROCEDURE — 82043 UR ALBUMIN QUANTITATIVE: CPT

## 2022-07-24 PROCEDURE — 83970 ASSAY OF PARATHORMONE: CPT

## 2022-07-24 PROCEDURE — 80069 RENAL FUNCTION PANEL: CPT

## 2022-07-24 PROCEDURE — 36415 COLL VENOUS BLD VENIPUNCTURE: CPT

## 2022-07-25 ENCOUNTER — VIRTUAL VISIT (OUTPATIENT)
Dept: PEDIATRICS | Facility: CLINIC | Age: 27
End: 2022-07-25
Payer: COMMERCIAL

## 2022-07-25 DIAGNOSIS — F90.0 ADHD (ATTENTION DEFICIT HYPERACTIVITY DISORDER), INATTENTIVE TYPE: ICD-10-CM

## 2022-07-25 PROCEDURE — 99207 PR NO CHARGE LOS: CPT | Performed by: NURSE PRACTITIONER

## 2022-07-25 RX ORDER — DEXTROAMPHETAMINE SACCHARATE, AMPHETAMINE ASPARTATE MONOHYDRATE, DEXTROAMPHETAMINE SULFATE AND AMPHETAMINE SULFATE 2.5; 2.5; 2.5; 2.5 MG/1; MG/1; MG/1; MG/1
10 CAPSULE, EXTENDED RELEASE ORAL DAILY
Qty: 30 CAPSULE | Refills: 0 | Status: SHIPPED | OUTPATIENT
Start: 2022-07-25 | End: 2022-08-29

## 2022-07-26 ENCOUNTER — MYC MEDICAL ADVICE (OUTPATIENT)
Dept: PEDIATRICS | Facility: CLINIC | Age: 27
End: 2022-07-26

## 2022-07-26 DIAGNOSIS — F90.0 ADHD (ATTENTION DEFICIT HYPERACTIVITY DISORDER), INATTENTIVE TYPE: ICD-10-CM

## 2022-07-26 RX ORDER — DEXTROAMPHETAMINE SACCHARATE, AMPHETAMINE ASPARTATE, DEXTROAMPHETAMINE SULFATE AND AMPHETAMINE SULFATE 2.5; 2.5; 2.5; 2.5 MG/1; MG/1; MG/1; MG/1
10 TABLET ORAL DAILY
Qty: 30 TABLET | Refills: 0 | Status: SHIPPED | OUTPATIENT
Start: 2022-07-26 | End: 2022-08-29

## 2022-07-26 NOTE — PROGRESS NOTES
Outcome for 07/26/22 2:18 PM: iSquare message sent  EMILE Arita    This 27-year-old woman was seen in follow-up for her long-standing type 1 diabetes that is complicated by early nephropathy.  She also has a took thyroid hormone for a time many years ago but has had normal TSHs off replacement..  Stacey is currently using a tandem T slim pump.  The data are below.  Stacey reports that she finds the pump really easy to use.  She is comfortable with putting into the exercise mode for hiking.  She usually gets her boluses 15 minutes before she eats.  She is not having problems with hypoglycemia.  She has no concerns about her glucose control and reports this A1c is the best she is ever been.    She saw her eye doctor in the spring and was told she had a very tiny amount of background retinopathy.  She will see her eye doctor again in a year.  She denies paresthesias or any problems with her feet.  She denies chest pain or shortness of breath.  She is not interested in taking a statin.  She has made changes in her diet and is happy to see her LDL has come down.                                    Patient Active Problem List   Diagnosis     Diabetic nephropathy (H)     Family history of heart disease     Chronic kidney disease, stage I     Mild intermittent asthma without complication     Anxiety     Type 1 diabetes mellitus with diabetic nephropathy (H)     Morbid obesity (H)     Elevated WBC count     Celiac disease     Current Outpatient Medications   Medication     albuterol (PROAIR HFA/PROVENTIL HFA/VENTOLIN HFA) 108 (90 Base) MCG/ACT inhaler     amphetamine-dextroamphetamine (ADDERALL XR) 10 MG 24 hr capsule     amphetamine-dextroamphetamine (ADDERALL) 10 MG tablet     ASPIRIN NOT PRESCRIBED (INTENTIONAL)     blood glucose (NO BRAND SPECIFIED) test strip     Blood Pressure Monitoring KIT     Cholecalciferol 2000 UNITS TBDP     Continuous Blood Gluc Sensor (DEXCOM G6 SENSOR) MISC     Continuous Blood Gluc  "Transmit (DEXCOM G6 TRANSMITTER) MISC     enalapril (VASOTEC) 20 MG tablet     escitalopram (LEXAPRO) 10 MG tablet     glucagon 1 MG kit     Glucagon, rDNA, (GLUCAGON EMERGENCY) 1 MG KIT     hydrOXYzine (ATARAX) 25 MG tablet     insulin aspart (NOVOLOG VIAL) 100 UNITS/ML vial     Insulin Infusion Pump Supplies (INSULIN PUMP SYRINGE RESERVOIR) MISC     Insulin Infusion Pump Supplies (SURE-T INFUSION SET 23\") MISC     insulin lispro (HUMALOG VIAL) 100 UNIT/ML vial     Insulin Pump Accessories MISC     insulin syringe-needle U-100 (31G X 5/16\" 0.5 ML) 31G X 5/16\" 0.5 ML miscellaneous     insulin syringes, disposable, U-100 0.3 ML MISC     ipratropium - albuterol 0.5 mg/2.5 mg/3 mL (DUONEB) 0.5-2.5 (3) MG/3ML neb solution     KETOSTIX test strip     Multiple Vitamins-Minerals (MULTIVITAMIN ADULT PO)     STATIN NOT PRESCRIBED (INTENTIONAL)     No current facility-administered medications for this visit.       98.2  F (36.8  C)  as of 4/26/2022       Pulse: 78  as of 4/26/2022      BP: 138/80  as of 4/26/2022      Resp: 12  as of 4/26/2022      SpO2: 99%  as of 4/26/2022      Body Mass Index: None      Height: 1.715 m (5' 7.5\")  as of 4/26/2022      Weight: 130.9 kg (288 lb 9.6 oz)  as of 4/26/2022          So history is significant for having been .  Her new partner has type 1 diabetes.  She is working in childcare      On exam she is cheerful and in no acute distress.  Cranial nerves are grossly intact.    Recent Labs   Lab Test 07/24/22  1041 11/15/21  0912 11/15/21  0901 11/15/21  0823 05/07/21  0824 05/19/20  1201 02/17/20  1104 01/28/20  0000   A1C 6.4*  --   --   --  6.7*  --  7.3*  --    HEMOGLOBINA1  --   --   --  6.9  --   --   --  7.0*   TSH  --   --   --   --  3.69  --  3.97  --    *  --   --   --  155*  --   --   --    HDL 62  --   --   --  59  --   --   --    TRIG 74  --   --   --  112  --   --   --    CR 0.51*  --  0.53  --  0.59   < >  --   --    MICROL <5 6  --   --   --    < >  --   --     " < > = values in this interval not displayed.   Assessment and plan:    1.  Diabetes control.  Her A1c is at target and she is not having hypoglycemia.  She is doing an outstanding job of using the control IQ pump.  She is bolusing before her meals and consequently is not getting very many corrections.  She is using the exercise target appropriately.  I made no changes in her pump settings today.      2.  Diabetes complications.  She has a history of early diabetic nephropathy but her creatinine and urine albumin are normal.  She is followed by nephrology and is on enalapril.  She has no foot concerns.    3.CVD risk.  Her blood pressure is at target.  Her LDL has come down with dietary changes.  At this point I do not think she needs a statin.  We will plan to monitor again in the future.    Follow-up with Laureen Goldstein in 6 months and me in 12 months.    I spent 15 minutes with the patient on the video visit today (start time 2: 4 0 and end time 2: 5 5).  On the day of visit I spent an additional 15 minutes reviewing and interpreting her CGM and pump data, reviewing and interpreting her lab data, and doing documentation.    Bia Allan MD

## 2022-08-02 ENCOUNTER — VIRTUAL VISIT (OUTPATIENT)
Dept: ENDOCRINOLOGY | Facility: CLINIC | Age: 27
End: 2022-08-02
Payer: COMMERCIAL

## 2022-08-02 DIAGNOSIS — E10.21 TYPE 1 DIABETES MELLITUS WITH DIABETIC NEPHROPATHY (H): Primary | ICD-10-CM

## 2022-08-02 PROCEDURE — 99214 OFFICE O/P EST MOD 30 MIN: CPT | Mod: 95 | Performed by: INTERNAL MEDICINE

## 2022-08-02 NOTE — LETTER
8/2/2022       RE: Stacey Mantilla  [address]  [facility] [address]     Dear Colleague,    Thank you for referring your patient, Stacey Mantilla, to the [facility] [place] [facility]. Please see a copy of my visit note below.    Outcome for 07/26/22 2:18 PM: dough message sent  EMILE Arita    This 27-year-old woman was seen in follow-up for her long-standing type 1 diabetes that is complicated by early nephropathy.  She also has hypothyroidism.  Stacey is currently using a tandem T slim pump.  The data are below.  Stacey reports that she finds the pump really easy to use.  She is comfortable with putting into the exercise mode for hiking.  She usually gets her boluses 15 minutes before she eats.  She is not having problems with hypoglycemia.  She has no concerns about her glucose control and reports this A1c is the best she is ever been.    She saw her eye doctor in the spring and was told she had a very tiny amount of background retinopathy.  She will see her eye doctor again in a year.  She denies paresthesias or any problems with her feet.  She denies chest pain or shortness of breath.  She is not interested in taking a statin.  She has made changes in her diet and is happy to see her LDL has come down.                                    Patient Active Problem List   Diagnosis     Diabetic nephropathy (H)     Family history of heart disease     Chronic kidney disease, stage I     Mild intermittent asthma without complication     Anxiety     Type 1 diabetes mellitus with diabetic nephropathy (H)     Morbid obesity (H)     Elevated WBC count     Celiac disease     Current Outpatient Medications   Medication     albuterol (PROAIR HFA/PROVENTIL HFA/VENTOLIN HFA) 108 (90 Base) MCG/ACT inhaler     amphetamine-dextroamphetamine (ADDERALL XR) 10 MG 24 hr capsule     amphetamine-dextroamphetamine (ADDERALL) 10 MG tablet      "ASPIRIN NOT PRESCRIBED (INTENTIONAL)     blood glucose (NO BRAND SPECIFIED) test strip     Blood Pressure Monitoring KIT     Cholecalciferol 2000 UNITS TBDP     Continuous Blood Gluc Sensor (DEXCOM G6 SENSOR) MISC     Continuous Blood Gluc Transmit (DEXCOM G6 TRANSMITTER) MISC     enalapril (VASOTEC) 20 MG tablet     escitalopram (LEXAPRO) 10 MG tablet     glucagon 1 MG kit     Glucagon, rDNA, (GLUCAGON EMERGENCY) 1 MG KIT     hydrOXYzine (ATARAX) 25 MG tablet     insulin aspart (NOVOLOG VIAL) 100 UNITS/ML vial     Insulin Infusion Pump Supplies (INSULIN PUMP SYRINGE RESERVOIR) MISC     Insulin Infusion Pump Supplies (SURE-T INFUSION SET 23\") MISC     insulin lispro (HUMALOG VIAL) 100 UNIT/ML vial     Insulin Pump Accessories MISC     insulin syringe-needle U-100 (31G X 5/16\" 0.5 ML) 31G X 5/16\" 0.5 ML miscellaneous     insulin syringes, disposable, U-100 0.3 ML MISC     ipratropium - albuterol 0.5 mg/2.5 mg/3 mL (DUONEB) 0.5-2.5 (3) MG/3ML neb solution     KETOSTIX test strip     Multiple Vitamins-Minerals (MULTIVITAMIN ADULT PO)     STATIN NOT PRESCRIBED (INTENTIONAL)     No current facility-administered medications for this visit.       98.2  F (36.8  C)  as of 4/26/2022       Pulse: 78  as of 4/26/2022      BP: 138/80  as of 4/26/2022      Resp: 12  as of 4/26/2022      SpO2: 99%  as of 4/26/2022      Body Mass Index: None      Height: 1.715 m (5' 7.5\")  as of 4/26/2022      Weight: 130.9 kg (288 lb 9.6 oz)  as of 4/26/2022          So history is significant for having been .  Her new partner has type 1 diabetes.  She is working in childcare      On exam she is cheerful and in no acute distress.  Cranial nerves are grossly intact.    Recent Labs   Lab Test 07/24/22  1041 11/15/21  0912 11/15/21  0901 11/15/21  0823 05/07/21  0824 05/19/20  1201 02/17/20  1104 01/28/20  0000   A1C 6.4*  --   --   --  6.7*  --  7.3*  --    HEMOGLOBINA1  --   --   --  6.9  --   --   --  7.0*   TSH  --   --   --   --  3.69  " --  3.97  --    *  --   --   --  155*  --   --   --    HDL 62  --   --   --  59  --   --   --    TRIG 74  --   --   --  112  --   --   --    CR 0.51*  --  0.53  --  0.59   < >  --   --    MICROL <5 6  --   --   --    < >  --   --     < > = values in this interval not displayed.   Assessment and plan:    1.  Diabetes control.  Her A1c is at target and she is not having hypoglycemia.  She is doing an outstanding job of using the control IQ pump.  She is bolusing before her meals and consequently is not getting very many corrections.  She is using the exercise target appropriately.  I made no changes in her pump settings today.      2.  Diabetes complications.  She has a history of early diabetic nephropathy but her creatinine and urine albumin are normal.  She is followed by nephrology and is on enalapril.  She has no foot concerns.    3.CVD risk.  Her blood pressure is at target.  Her LDL has come down with dietary changes.  At this point I do not think she needs a statin.  We will plan to monitor again in the future.    Follow-up with Laureen Goldstein in 6 months and me in 12 months.    I spent 15 minutes with the patient on the video visit today (start time 2: 4 0 and end time 2: 5 5).  On the day of visit I spent an additional 15 minutes reviewing and interpreting her CGM and pump data, reviewing and interpreting her lab data, and doing documentation.    MD Stacey Vernon  is being evaluated via a billable video visit.      How would you like to obtain your AVS? MyChart  For the video visit, send the invitation by: Send to e-mail at: [email or web address]  Will anyone else be joining your video visit? No

## 2022-08-02 NOTE — PROGRESS NOTES
Stacey Mantilla  is being evaluated via a billable video visit.      How would you like to obtain your AVS? [email or web address]  For the video visit, send the invitation by: Send to e-mail at: [email or web address]  Will anyone else be joining your video visit? No

## 2022-08-26 DIAGNOSIS — J32.9 SINUSITIS, UNSPECIFIED CHRONICITY, UNSPECIFIED LOCATION: ICD-10-CM

## 2022-08-29 ENCOUNTER — MYC REFILL (OUTPATIENT)
Dept: PEDIATRICS | Facility: CLINIC | Age: 27
End: 2022-08-29

## 2022-08-29 DIAGNOSIS — F90.0 ADHD (ATTENTION DEFICIT HYPERACTIVITY DISORDER), INATTENTIVE TYPE: ICD-10-CM

## 2022-08-29 RX ORDER — FLUCONAZOLE 150 MG/1
150 TABLET ORAL ONCE
Qty: 1 TABLET | Refills: 1 | OUTPATIENT
Start: 2022-08-29 | End: 2022-08-29

## 2022-08-29 RX ORDER — DEXTROAMPHETAMINE SACCHARATE, AMPHETAMINE ASPARTATE MONOHYDRATE, DEXTROAMPHETAMINE SULFATE AND AMPHETAMINE SULFATE 2.5; 2.5; 2.5; 2.5 MG/1; MG/1; MG/1; MG/1
10 CAPSULE, EXTENDED RELEASE ORAL DAILY
Qty: 30 CAPSULE | Refills: 0 | Status: CANCELLED | OUTPATIENT
Start: 2022-08-29

## 2022-08-29 RX ORDER — DEXTROAMPHETAMINE SACCHARATE, AMPHETAMINE ASPARTATE, DEXTROAMPHETAMINE SULFATE AND AMPHETAMINE SULFATE 2.5; 2.5; 2.5; 2.5 MG/1; MG/1; MG/1; MG/1
10 TABLET ORAL DAILY
Qty: 30 TABLET | Refills: 0 | Status: CANCELLED | OUTPATIENT
Start: 2022-08-29

## 2022-08-29 NOTE — TELEPHONE ENCOUNTER
Routing refill request to provider for review/approval because:  Drug not active on patient's medication list      Bia CAMPBELL RN

## 2022-09-10 DIAGNOSIS — N18.1 CKD (CHRONIC KIDNEY DISEASE) STAGE 1, GFR 90 ML/MIN OR GREATER: Primary | ICD-10-CM

## 2022-09-23 ENCOUNTER — VIRTUAL VISIT (OUTPATIENT)
Dept: FAMILY MEDICINE | Facility: CLINIC | Age: 27
End: 2022-09-23
Payer: COMMERCIAL

## 2022-09-23 DIAGNOSIS — U07.1 INFECTION DUE TO 2019 NOVEL CORONAVIRUS: Primary | ICD-10-CM

## 2022-09-23 PROCEDURE — 99213 OFFICE O/P EST LOW 20 MIN: CPT | Mod: 95 | Performed by: INTERNAL MEDICINE

## 2022-09-23 ASSESSMENT — ASTHMA QUESTIONNAIRES
QUESTION_3 LAST FOUR WEEKS HOW OFTEN DID YOUR ASTHMA SYMPTOMS (WHEEZING, COUGHING, SHORTNESS OF BREATH, CHEST TIGHTNESS OR PAIN) WAKE YOU UP AT NIGHT OR EARLIER THAN USUAL IN THE MORNING: NOT AT ALL
QUESTION_4 LAST FOUR WEEKS HOW OFTEN HAVE YOU USED YOUR RESCUE INHALER OR NEBULIZER MEDICATION (SUCH AS ALBUTEROL): NOT AT ALL
ACT_TOTALSCORE: 25
QUESTION_1 LAST FOUR WEEKS HOW MUCH OF THE TIME DID YOUR ASTHMA KEEP YOU FROM GETTING AS MUCH DONE AT WORK, SCHOOL OR AT HOME: NONE OF THE TIME
QUESTION_2 LAST FOUR WEEKS HOW OFTEN HAVE YOU HAD SHORTNESS OF BREATH: NOT AT ALL
ACT_TOTALSCORE: 25
QUESTION_5 LAST FOUR WEEKS HOW WOULD YOU RATE YOUR ASTHMA CONTROL: COMPLETELY CONTROLLED

## 2022-09-23 NOTE — PROGRESS NOTES
Stacey is a 27 year old who is being evaluated via a billable video visit.      How would you like to obtain your AVS? "[employer]hart  If the video visit is dropped, the invitation should be resent by: Other e-mail: [email or web address]  Will anyone else be joining your video visit? No        Assessment & Plan     Infection due to 2019 novel coronavirus  Congestion.  Patient with morbid obesity as well as asthma.  Patient was instructed to utilize her nebulizer regularly and initiate Paxlovid.  Side effect follow-up medications were discussed.      - nirmatrelvir and ritonavir (PAXLOVID) therapy pack; Take 3 tablets by mouth 2 times daily for 5 days       See Patient Instructions    No follow-ups on file.    Ricki Kaufman MD  [facility]   Stacey is a 27 year old, presenting for the following health issues:  No chief complaint on file.      HPI 27-year-old female with a history of morbid obesity as well as a history of mild intermittent asthma.  She has had congestion sore throat and significant fatigue and recently diagnosed with COVID-19.  She has noticed a significant amount of lung congestion.  She has recently started her albuterol nebs.    Patient also has a history of diabetes.      COVID-19 Symptom Review  How many days ago did these symptoms start? 09/21/2022    Are any of the following symptoms significant for you?    New or worsening difficulty breathing? No    Worsening cough? Yes, I am coughing up mucus.    Fever or chills? Yes, I felt feverish or had chills.    Headache: YES    Sore throat: YES    Chest pain: No    Diarrhea: No    Body aches? YES    What treatments has patient tried? Acetaminophen   Does patient live in a nursing home, group home, or shelter? No  Does patient have a way to get food/medications during quarantined? Yes, I have a friend or family member who can help me.                  Review of Systems   Constitutional, HEENT, cardiovascular, pulmonary, gi and gu systems  are negative, except as otherwise noted.      Objective           Vitals:  No vitals were obtained today due to virtual visit.    Physical Exam   GENERAL: Healthy, alert and no distress  EYES: Eyes grossly normal to inspection.  No discharge or erythema, or obvious scleral/conjunctival abnormalities.  RESP: No audible wheeze, cough, or visible cyanosis.  No visible retractions or increased work of breathing.    SKIN: Visible skin clear. No significant rash, abnormal pigmentation or lesions.  NEURO: Cranial nerves grossly intact.  Mentation and speech appropriate for age.  PSYCH: Mentation appears normal, affect normal/bright, judgement and insight intact, normal speech and appearance well-groomed.    Lab on 07/24/2022   Component Date Value Ref Range Status     Sodium 07/24/2022 139  133 - 144 mmol/L Final     Potassium 07/24/2022 4.7  3.4 - 5.3 mmol/L Final     Chloride 07/24/2022 109  94 - 109 mmol/L Final     Carbon Dioxide (CO2) 07/24/2022 24  20 - 32 mmol/L Final     Anion Gap 07/24/2022 6  3 - 14 mmol/L Final     Urea Nitrogen 07/24/2022 6 (A) 7 - 30 mg/dL Final     Creatinine 07/24/2022 0.51 (A) 0.52 - 1.04 mg/dL Final     Calcium 07/24/2022 9.1  8.5 - 10.1 mg/dL Final     Glucose 07/24/2022 97  70 - 99 mg/dL Final     Albumin 07/24/2022 3.6  3.4 - 5.0 g/dL Final     Phosphorus 07/24/2022 3.1  2.5 - 4.5 mg/dL Final     GFR Estimate 07/24/2022 >90  >60 mL/min/1.73m2 Final    Effective December 21, 2021 eGFRcr in adults is calculated using the 2021 CKD-EPI creatinine equation which includes age and gender (Addi et al., NEJM, DOI: 10.1056/ZWXRzd8195349)     Hemoglobin 07/24/2022 14.0  11.7 - 15.7 g/dL Final     Total Protein Urine mg/dL 07/24/2022 <6.0  mg/dL Final    The reference ranges have not been established in urine protein. The results should be integrated into the clinical context for interpretation.     Total Protein UR MG/MG CR 07/24/2022    Final    Unable to calculate, urine creatinine or protein  is outside the detectable limits.     Creatinine Urine mg/dL 07/24/2022 27.5  mg/dL Final    The reference ranges have not been established in urine creatinine. The results should be integrated into the clinical context for interpretation.     Creatinine Urine mg/dL 07/24/2022 27  mg/dL Final     Albumin Urine mg/L 07/24/2022 <5  mg/L Final     Albumin Urine mg/g Cr 07/24/2022    Final    Unable to calculate, urine albumin or creatinine is outside the detectable limits.     Parathyroid Hormone Intact 07/24/2022 49  15 - 65 pg/mL Final     25 OH Vitamin D2 07/24/2022 <5  ug/L Final     25 OH Vitamin D3 07/24/2022 22  ug/L Final     25 OH Vit D Total 07/24/2022 <27  20 - 75 ug/L Final    Season, race, dietary intake, and treatment affect the concentration of 25-hydroxy-Vitamin D. Values may decrease during winter months and increase during summer months. Values 20-29 ug/L may indicate Vitamin D insufficiency and values <20 ug/L may indicate Vitamin D deficiency.     Hemoglobin A1C 07/24/2022 6.4 (A) 0.0 - 5.6 % Final    Normal <5.7%   Prediabetes 5.7-6.4%    Diabetes 6.5% or higher     Note: Adopted from ADA consensus guidelines.     Cholesterol 07/24/2022 198  <200 mg/dL Final     Triglycerides 07/24/2022 74  <150 mg/dL Final     Direct Measure HDL 07/24/2022 62  >=50 mg/dL Final     LDL Cholesterol Calculated 07/24/2022 121 (A) <=100 mg/dL Final     Non HDL Cholesterol 07/24/2022 136 (A) <130 mg/dL Final     Patient Fasting > 8hrs? 07/24/2022 Yes   Final               Video-Visit Details    Video Start Time: 7:30 AM    Type of service:  Video Visit    Video End Time: 7:40 AM    Originating Location (pt. Location): Home    Distant Location (provider location):  [facility]     Platform used for Video Visit: Brian

## 2022-10-11 ENCOUNTER — MYC REFILL (OUTPATIENT)
Dept: PEDIATRICS | Facility: CLINIC | Age: 27
End: 2022-10-11

## 2022-10-11 DIAGNOSIS — F90.0 ADHD (ATTENTION DEFICIT HYPERACTIVITY DISORDER), INATTENTIVE TYPE: ICD-10-CM

## 2022-10-11 DIAGNOSIS — R80.9 PROTEINURIA: ICD-10-CM

## 2022-10-11 RX ORDER — DEXTROAMPHETAMINE SACCHARATE, AMPHETAMINE ASPARTATE MONOHYDRATE, DEXTROAMPHETAMINE SULFATE AND AMPHETAMINE SULFATE 2.5; 2.5; 2.5; 2.5 MG/1; MG/1; MG/1; MG/1
10 CAPSULE, EXTENDED RELEASE ORAL DAILY
Qty: 30 CAPSULE | Refills: 0 | Status: SHIPPED | OUTPATIENT
Start: 2022-10-11 | End: 2022-11-28

## 2022-10-11 RX ORDER — DEXTROAMPHETAMINE SACCHARATE, AMPHETAMINE ASPARTATE, DEXTROAMPHETAMINE SULFATE AND AMPHETAMINE SULFATE 2.5; 2.5; 2.5; 2.5 MG/1; MG/1; MG/1; MG/1
10 TABLET ORAL DAILY
Qty: 30 TABLET | Refills: 0 | Status: SHIPPED | OUTPATIENT
Start: 2022-10-11 | End: 2022-11-28

## 2022-10-12 ENCOUNTER — MYC MEDICAL ADVICE (OUTPATIENT)
Dept: ENDOCRINOLOGY | Facility: CLINIC | Age: 27
End: 2022-10-12

## 2022-10-14 RX ORDER — ENALAPRIL MALEATE 20 MG/1
TABLET ORAL
Qty: 180 TABLET | Refills: 1 | Status: SHIPPED | OUTPATIENT
Start: 2022-10-14 | End: 2023-04-03

## 2022-11-28 ENCOUNTER — MYC REFILL (OUTPATIENT)
Dept: PEDIATRICS | Facility: CLINIC | Age: 27
End: 2022-11-28

## 2022-11-28 DIAGNOSIS — F90.0 ADHD (ATTENTION DEFICIT HYPERACTIVITY DISORDER), INATTENTIVE TYPE: ICD-10-CM

## 2022-11-29 RX ORDER — DEXTROAMPHETAMINE SACCHARATE, AMPHETAMINE ASPARTATE, DEXTROAMPHETAMINE SULFATE AND AMPHETAMINE SULFATE 2.5; 2.5; 2.5; 2.5 MG/1; MG/1; MG/1; MG/1
10 TABLET ORAL DAILY
Qty: 30 TABLET | Refills: 0 | Status: SHIPPED | OUTPATIENT
Start: 2022-11-29 | End: 2023-01-01

## 2022-11-29 RX ORDER — DEXTROAMPHETAMINE SACCHARATE, AMPHETAMINE ASPARTATE MONOHYDRATE, DEXTROAMPHETAMINE SULFATE AND AMPHETAMINE SULFATE 2.5; 2.5; 2.5; 2.5 MG/1; MG/1; MG/1; MG/1
10 CAPSULE, EXTENDED RELEASE ORAL DAILY
Qty: 30 CAPSULE | Refills: 0 | Status: SHIPPED | OUTPATIENT
Start: 2022-11-29 | End: 2023-01-01

## 2022-12-26 ENCOUNTER — VIRTUAL VISIT (OUTPATIENT)
Dept: PEDIATRICS | Facility: CLINIC | Age: 27
End: 2022-12-26
Payer: COMMERCIAL

## 2022-12-26 DIAGNOSIS — J30.81 ALLERGIC RHINITIS DUE TO ANIMALS: Primary | ICD-10-CM

## 2022-12-26 PROCEDURE — 99213 OFFICE O/P EST LOW 20 MIN: CPT | Mod: 95 | Performed by: NURSE PRACTITIONER

## 2022-12-26 RX ORDER — MONTELUKAST SODIUM 10 MG/1
10 TABLET ORAL AT BEDTIME
Qty: 90 TABLET | Refills: 3 | Status: SHIPPED | OUTPATIENT
Start: 2022-12-26 | End: 2023-04-21

## 2022-12-26 NOTE — PROGRESS NOTES
Stacey is a 27 year old who is being evaluated via a billable phone visit.        Assessment & Plan     (J30.81) Allergic rhinitis due to animals  (primary encounter diagnosis)  Comment: Already on flonase and zyrtec. Wants to try Singulair  Plan: montelukast (SINGULAIR) 10 MG tablet  -Reviewed risks  -Start singulair  -Get HEPA filter     No follow-ups on file.    Nancy Mejia, APRN CNP  [facility]    Subjective   Stacey is a 27 year old, presenting for the following health issues:  No chief complaint on file.      HPI       Review of Systems   Constitutional, HEENT, cardiovascular, pulmonary, gi and gu systems are negative, except as otherwise noted.      Objective           Vitals:  No vitals were obtained today due to virtual visit.    Physical Exam   N/A        Originating Location (pt. Location): Home    Distant Location (provider location):  Off-site    Phone visit 6 minutes

## 2023-01-18 ENCOUNTER — DOCUMENTATION ONLY (OUTPATIENT)
Dept: ENDOCRINOLOGY | Facility: CLINIC | Age: 28
End: 2023-01-18
Payer: COMMERCIAL

## 2023-02-25 ENCOUNTER — MYC REFILL (OUTPATIENT)
Dept: PEDIATRICS | Facility: CLINIC | Age: 28
End: 2023-02-25
Payer: COMMERCIAL

## 2023-02-25 DIAGNOSIS — F90.0 ADHD (ATTENTION DEFICIT HYPERACTIVITY DISORDER), INATTENTIVE TYPE: ICD-10-CM

## 2023-02-27 NOTE — TELEPHONE ENCOUNTER
Routing refill request to provider for review/approval because:  Drug not on the [facility] refill protocol   Suzanne Collins RN

## 2023-02-28 RX ORDER — DEXTROAMPHETAMINE SACCHARATE, AMPHETAMINE ASPARTATE MONOHYDRATE, DEXTROAMPHETAMINE SULFATE AND AMPHETAMINE SULFATE 2.5; 2.5; 2.5; 2.5 MG/1; MG/1; MG/1; MG/1
10 CAPSULE, EXTENDED RELEASE ORAL DAILY
Qty: 30 CAPSULE | Refills: 0 | Status: SHIPPED | OUTPATIENT
Start: 2023-02-28 | End: 2023-04-18

## 2023-02-28 RX ORDER — DEXTROAMPHETAMINE SACCHARATE, AMPHETAMINE ASPARTATE, DEXTROAMPHETAMINE SULFATE AND AMPHETAMINE SULFATE 2.5; 2.5; 2.5; 2.5 MG/1; MG/1; MG/1; MG/1
10 TABLET ORAL DAILY
Qty: 30 TABLET | Refills: 0 | Status: SHIPPED | OUTPATIENT
Start: 2023-02-28 | End: 2023-04-18

## 2023-02-28 NOTE — TELEPHONE ENCOUNTER
The pt is aware and scheduled for her upcoming appointment. Please fill when able. Thank you.   Caryn Bingham on 2/28/2023 at 8:54 AM

## 2023-02-28 NOTE — TELEPHONE ENCOUNTER
Due for in person visit end of April. Will fill once scheduled. Please have her also schedule diabetic labs prior to her endo appt. Pls have her schedule diabetic eye exam and send us records

## 2023-03-09 ENCOUNTER — MYC MEDICAL ADVICE (OUTPATIENT)
Dept: ENDOCRINOLOGY | Facility: CLINIC | Age: 28
End: 2023-03-09
Payer: COMMERCIAL

## 2023-03-09 DIAGNOSIS — E10.9 WELL CONTROLLED TYPE 1 DIABETES MELLITUS (H): Primary | ICD-10-CM

## 2023-03-18 ENCOUNTER — TRANSFERRED RECORDS (OUTPATIENT)
Dept: HEALTH INFORMATION MANAGEMENT | Facility: CLINIC | Age: 28
End: 2023-03-18

## 2023-03-27 DIAGNOSIS — F41.8 DEPRESSION WITH ANXIETY: ICD-10-CM

## 2023-03-27 DIAGNOSIS — R80.9 PROTEINURIA: ICD-10-CM

## 2023-03-27 DIAGNOSIS — E10.21 TYPE 1 DIABETES MELLITUS WITH DIABETIC NEPHROPATHY (H): ICD-10-CM

## 2023-03-27 RX ORDER — INSULIN LISPRO 100 [IU]/ML
INJECTION, SOLUTION INTRAVENOUS; SUBCUTANEOUS
Qty: 120 ML | Refills: 1 | Status: SHIPPED | OUTPATIENT
Start: 2023-03-27 | End: 2023-10-04

## 2023-03-27 RX ORDER — INSULIN ASPART 100 [IU]/ML
INJECTION, SOLUTION INTRAVENOUS; SUBCUTANEOUS
Qty: 120 ML | Refills: 3 | Status: SHIPPED | OUTPATIENT
Start: 2023-03-27 | End: 2023-03-27

## 2023-03-27 RX ORDER — INSULIN LISPRO 100 [IU]/ML
INJECTION, SOLUTION INTRAVENOUS; SUBCUTANEOUS
Qty: 120 ML | Refills: 1 | Status: SHIPPED | OUTPATIENT
Start: 2023-03-27 | End: 2023-03-27

## 2023-03-27 NOTE — TELEPHONE ENCOUNTER
insulin lispro (HUMALOG VIAL) 100 UNIT/ML vial        Last Written Prescription Date:  06/08/20  Last Fill Quantity: 150mL,   # refills: 3  Last Office Visit : 08/02/22  Future Office visit:  04/03/23    Routing refill request to provider for review/approval because:  Insulin - refilled per clinic

## 2023-03-27 NOTE — TELEPHONE ENCOUNTER
Outpatient Medication Detail     Disp Refills Start End BENITO   insulin lispro (HUMALOG VIAL) 100 UNIT/ML vial 120 mL 1 3/27/2023  No   Sig: insulin aspart (NOVOLOG VIAL) 100 UNITS/ML vial     Last Written Prescription Date:  03/07/22 Last Fill Quantity: 120mL,   # refills: 3 Last Office Visit : 08/02/22 Future Office visit:  04/03/23 Routing refill request to provider for review/approval because: Insulin - refilled per clinic Refill on 3/27/2023 Refill on 3/27/2023 Detailed Report Additional Documentation Encounter Info: Billing Info, History, Allergies, Detailed Report Orders Placed None Approved Medication Requests   Sent to pharmacy as: Insulin Lispro 100 UNIT/ML Injection Solution (HumaLOG VIAL)   Class: E-Prescribe   Earliest Fill Date: 3/27/2023   Order: [id number]   E-Prescribing Status: Receipt confirmed by pharmacy (3/27/2023  2:00 PM CDT)   Renewals    Renewal provider: Laureen Goldstein PA-C        Printout Tracking    External Result Report     Medication Administration Instructions    insulin aspart (NOVOLOG VIAL) 100 UNITS/ML vial     Last Written Prescription Date:  03/07/22 Last Fill Quantity: 120mL,   # refills: 3 Last Office Visit : 08/02/22 Future Office visit:  04/03/23 Routing refill request to provider for review/approval because: Insulin - refilled per clinic Refill on 3/27/2023 Refill on 3/27/2023 Detailed Report Additional Documentation Encounter Info: Billing Info, History, Allergies, Detailed Report Orders Placed None Approved Medication Requests     Pharmacy    [id number] [address]

## 2023-03-27 NOTE — TELEPHONE ENCOUNTER
insulin aspart (NOVOLOG VIAL) 100 UNITS/ML vial        Last Written Prescription Date:  03/07/22  Last Fill Quantity: 120mL,   # refills: 3  Last Office Visit : 08/02/22  Future Office visit:  04/03/23    Routing refill request to provider for review/approval because:  Insulin - refilled per clinic

## 2023-03-27 NOTE — PROGRESS NOTES
Outcome for 03/27/23 7:49 AM: [employer] message sent  Taylor Myhre, RMA  Outcome for 03/30/23 7:20 AM: Data obtained via [employer] website  Malissa Roper LPN     Start time:   End time:   Provider location: off site- home  Patient location: off site- home.      HPI:  Stacey is a 26 yo woman here for follow up of type 1 diabetes, diagnosed at age 7.   She also sees Dr. Allan.  Stacey's diabetes is complicated by nephropathy. She also has hyperlipidemia, a history of hypothyroidism (although she has been off of levothyroxine for several years) and celiac disease.  She follows a strict gluten free diet. She continues wearing a Dexcom G6 sensor/[employer]andem X2pump  and she really likes it.  Feeling really good.  No concerns today.      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 She is able to be physically active, going for walks, etc . This is much less stressful.   10-6 M-F.   She is feeling healthier.   She has been very active, doing a lot of hiking, bouldering and plans to go backpacking.   Her typical routine is:   No breakfast  Earlier lunch- veggies, fruit, yogurt, string cheese.   Dinner- if cooking at home- veggies, fish.   Ordering out more recently.   Pizza [place]. less nutritious options.    Snacks at night.       Recent glucose is as follows:             Device Settings   Day Profile Active at the time of upload  Start Time Basal Rate Correction Factor Carb Ratio Target BG  Midnight 2.000 u/hr 1u:18 mg/dL 1u:6.0 g 100 mg/dL  2:00 AM 2.500 u/hr 1u:17 mg/dL 1u:6.0 g 100 mg/dL  12:00 PM 1.800 u/hr 1u:17 mg/dL 1u:5.0 g 100 mg/dL  10:00 PM 2.000 u/hr 1u:18 mg/dL 1u:6.0 g 100 mg/dL  Calculated Total Daily Basal 51.0 units  Duration of Insulin: 5:00 hours  Carbohydrates: On  Max Bolus: 25 units  Sick Profile Inactive at the time of upload  Start Time Basal Rate Correction Factor Carb Ratio Target BG  Midnight 2.750 u/hr 1u:17 mg/dL 1u:5.0 g 110 mg/dL  Calculated Total Daily Basal 66.0 units  Duration of Insulin: 5:00  hours  Carbohydrates: On  Max Bolus: 25 units  Hike Profile Inactive at the time of upload  Start Time Basal Rate Correction Factor Carb Ratio Target BG  Midnight 1.000 u/hr 1u:20 mg/dL 1u:7.0 g 130 mg/dL  Calculated Total Daily Basal 24.0 units  Duration of Insulin: 5:00 hours  Carbohydrates: On  Max Bolus: 25 units      Previous treatments:   Victoza- did not tolerate    For her hypothyroidism- untreated with normal TSH.      For her CKD (stage 1), she is taking enalapril. She follows with nephrology.     She has no other concerns today.       ROS  GENERAL: weight stable.  no fevers, chills, malaise, night sweats.   HEENT: no dysphagia, diplopia, neck pain or tenderness, dry/scratchy eyes, URI, cough, sinus drainage, tinnitus, sinus pressure  CV: no chest pain, pressure, palpitations, skipped beats, LOC  LUNGS: no SOB, CHILDRESS, cough, sputum production, wheezing   GI: no diarrhea, constipation, abdominal pain  EXTREMITIES: no rashes, ulcers, edema  NEUROLOGY: no changes in vision, tingling or numbness in hands or feet.   MSK: no muscle aches or pains, weakness  PSYCH: mood stable    Past Medical History:   Diagnosis Date     Asthma     Currently no treatment needed     Celiac disease      Chronic kidney disease, stage I 8/3/2015     Chronic sinusitis      Diabetes mellitus type 1 (H)      Diabetic nephropathy (H)      Family history of heart disease 8/3/2015     Hypertension      Hypothyroid      Pedal edema      Psoriasis      PMH:   Type 1 diabetes- since 2003  Celiac disease- since age 13  Hypothyroidism- age 10 (no longer on levothyroxine for several years)  Hyperlipidemia      Past Surgical History:   Procedure Laterality Date     ENT SURGERY       TONSILLECTOMY, ADENOIDECTOMY, COMBINED         Family History   Problem Relation Age of Onset     Gastrointestinal Disease Mother         Celiacs     Obesity Mother      Depression Mother      Cardiovascular Father 48        MI, stents, diabetic, type 2      Diabetes Father      Coronary Artery Disease Father      Obesity Father      Depression Father      Substance Abuse Father      Lung Cancer Father         Smoker     Substance Abuse Sister      Bipolar Disorder Brother      Schizophrenia Brother      Skin Cancer Maternal Grandmother      Cardiovascular Maternal Grandfather         Englarged heart, pacemaker, defib     Skin Cancer Maternal Grandfather      Obesity Maternal Half-Sister      Heart Disease Paternal Half-Brother 45     Melanoma No family hx of      Family Hx:   Dad- premature CVD, in his 40s.  Type 2 diabetes, stroke, cancer  Mom- celiac disease  Grandfather- type 2 diabetes  Half brother- Asperger's  Half sister- cervical CA  Another half brother- bipolar and schizophrenia  1/2 brother-  of presumed heart attack    History     Social History     Marital Status:      Spouse Name: N/A     Number of Children: N/A     Years of Education: N/A     Social History Main Topics     Smoking status: Never Smoker      Smokeless tobacco: Never Used     Alcohol Use: Yes      Comment: occ. use     Drug Use: No     Sexual Activity:     Partners: Male     Birth Control/ Protection: Condom     Other Topics Concern     Parent/Sibling W/ Cabg, Mi Or Angioplasty Before 65f 55m? Yes     Caffeine Concern Yes     1 cup tea per day     Sleep Concern No     Special Diet Yes     gluten free diet, low carbs     Exercise Yes     walking, ellyptical, swimming, weights     Seat Belt Yes     Social History Narrative     Social Hx:    . 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  Now working as a nanny. Enjoys a lot of hiking. She also goes to the gym a few days a week.  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     Current Outpatient Medications   Medication     albuterol (PROAIR HFA/PROVENTIL HFA/VENTOLIN HFA) 108 (90 Base) MCG/ACT inhaler     amphetamine-dextroamphetamine (ADDERALL XR) 10 MG 24 hr capsule      "amphetamine-dextroamphetamine (ADDERALL) 10 MG tablet     ASPIRIN NOT PRESCRIBED (INTENTIONAL)     blood glucose (NO BRAND SPECIFIED) test strip     Blood Pressure Monitoring KIT     Cholecalciferol 2000 UNITS TBDP     Continuous Blood Gluc Sensor (DEXCOM G6 SENSOR) MISC     Continuous Blood Gluc Transmit (DEXCOM G6 TRANSMITTER) MISC     enalapril (VASOTEC) 20 MG tablet     escitalopram (LEXAPRO) 10 MG tablet     Glucagon, rDNA, (GLUCAGON EMERGENCY) 1 MG KIT     hydrOXYzine (ATARAX) 25 MG tablet     Insulin Infusion Pump Supplies (INSULIN PUMP SYRINGE RESERVOIR) MISC     Insulin Infusion Pump Supplies (SURE-T INFUSION SET 23\") MISC     insulin lispro (HUMALOG VIAL) 100 UNIT/ML vial     Insulin Pump Accessories MISC     insulin syringe-needle U-100 (31G X 5/16\" 0.5 ML) 31G X 5/16\" 0.5 ML miscellaneous     insulin syringes, disposable, U-100 0.3 ML MISC     ipratropium - albuterol 0.5 mg/2.5 mg/3 mL (DUONEB) 0.5-2.5 (3) MG/3ML neb solution     KETOSTIX test strip     montelukast (SINGULAIR) 10 MG tablet     Multiple Vitamins-Minerals (MULTIVITAMIN ADULT PO)     STATIN NOT PRESCRIBED (INTENTIONAL)     No current facility-administered medications for this visit.          Allergies   Allergen Reactions     Dogs Other (See Comments)     Sinus symptoms      Dust Mites      Sinus      Gluten Meal      Physical Exam:  There were no vitals taken for this visit.  GENERAL: healthy, alert and no distress  RESP: no audible wheeze, cough, or visible cyanosis.  No visible retractions or increased work of breathing.  Able to speak fully in complete sentences.  PSYCH: mentation appears normal, affect normal/bright, judgement and insight intact, normal speech and appearance well-groomed    RESULTS  Lab Results   Component Value Date    A1C 6.6 (H) 03/28/2023    A1C 6.4 (H) 07/24/2022    A1C 6.7 (H) 05/07/2021    A1C 7.3 (H) 02/17/2020    A1C 7.0 (H) 01/17/2018    A1C 7.6 (H) 03/26/2015    HEMOGLOBINA1 6.9 11/15/2021    HEMOGLOBINA1 " 7.0 (A) 01/28/2020    HEMOGLOBINA1 7.3 (A) 08/19/2019    HEMOGLOBINA1 6.8 (A) 05/14/2019    HEMOGLOBINA1 6.8 (A) 02/18/2019       TSH   Date Value Ref Range Status   03/28/2023 3.75 0.30 - 4.20 uIU/mL Final   05/07/2021 3.69 0.40 - 4.00 mU/L Final   02/17/2020 3.97 0.40 - 4.00 mU/L Final   06/07/2019 3.91 0.40 - 4.00 mU/L Final   03/04/2019 3.41 0.40 - 4.00 mU/L Final   10/10/2016 2.89 0.40 - 4.00 mU/L Final     T4 Free   Date Value Ref Range Status   12/09/2013 1.18 0.70 - 1.85 ng/dL Final       ALT   Date Value Ref Range Status   05/07/2021 23 0 - 50 U/L Final   05/17/2019 33 0 - 50 U/L Final   ]    Recent Labs   Lab Test 07/24/22  1041 05/07/21  0824 03/04/19  0854 08/04/15  1443   CHOL 198 236*   < > 194   HDL 62 59   < > 66   * 155*   < > 102   TRIG 74 112   < > 131   CHOLHDLRATIO  --   --   --  2.9    < > = values in this interval not displayed.       Lab Results   Component Value Date     07/24/2022     05/07/2021      Lab Results   Component Value Date    POTASSIUM 4.7 07/24/2022    POTASSIUM 3.9 05/07/2021     Lab Results   Component Value Date    CHLORIDE 109 07/24/2022    CHLORIDE 104 05/07/2021     Lab Results   Component Value Date    FRANKLIN 9.1 07/24/2022    FRANKLIN 9.3 05/07/2021     Lab Results   Component Value Date    CO2 24 07/24/2022    CO2 26 05/07/2021     Lab Results   Component Value Date    BUN 6 07/24/2022    BUN 11 05/07/2021     Lab Results   Component Value Date    CR 0.51 07/24/2022    CR 0.59 05/07/2021       GFR Estimate   Date Value Ref Range Status   07/24/2022 >90 >60 mL/min/1.73m2 Final     Comment:     Effective December 21, 2021 eGFRcr in adults is calculated using the 2021 CKD-EPI creatinine equation which includes age and gender (Addi et al., NEJM, DOI: 10.1056/EGWSyu2641668)   11/15/2021 >90 >60 mL/min/1.73m2 Final     Comment:     As of July 11, 2021, eGFR is calculated by the CKD-EPI creatinine equation, without race adjustment. eGFR can be influenced by muscle  mass, exercise, and diet. The reported eGFR is an estimation only and is only applicable if the renal function is stable.   05/07/2021 >90 >60 mL/min/[1.73_m2] Final     Comment:     Non  GFR Calc  Starting 12/18/2018, serum creatinine based estimated GFR (eGFR) will be   calculated using the Chronic Kidney Disease Epidemiology Collaboration   (CKD-EPI) equation.     04/30/2021 >90 >60 mL/min/[1.73_m2] Final     Comment:     Non  GFR Calc  Starting 12/18/2018, serum creatinine based estimated GFR (eGFR) will be   calculated using the Chronic Kidney Disease Epidemiology Collaboration   (CKD-EPI) equation.     05/19/2020 >90 >60 mL/min/[1.73_m2] Final     Comment:     Non  GFR Calc  Starting 12/18/2018, serum creatinine based estimated GFR (eGFR) will be   calculated using the Chronic Kidney Disease Epidemiology Collaboration   (CKD-EPI) equation.       GFR Estimate If Black   Date Value Ref Range Status   05/07/2021 >90 >60 mL/min/[1.73_m2] Final     Comment:      GFR Calc  Starting 12/18/2018, serum creatinine based estimated GFR (eGFR) will be   calculated using the Chronic Kidney Disease Epidemiology Collaboration   (CKD-EPI) equation.     04/30/2021 >90 >60 mL/min/[1.73_m2] Final     Comment:      GFR Calc  Starting 12/18/2018, serum creatinine based estimated GFR (eGFR) will be   calculated using the Chronic Kidney Disease Epidemiology Collaboration   (CKD-EPI) equation.     05/19/2020 >90 >60 mL/min/[1.73_m2] Final     Comment:      GFR Calc  Starting 12/18/2018, serum creatinine based estimated GFR (eGFR) will be   calculated using the Chronic Kidney Disease Epidemiology Collaboration   (CKD-EPI) equation.         Lab Results   Component Value Date    MICROL <5 07/24/2022    MICROL <5 04/30/2021     No results found for: MICROALBUMIN  No results found for: CPEPT, GADAB, ISCAB    No results found for: B12]    Most  recent eye exam date: : Not Found   Assessment/Plan:     1.  Type 1 diabetes- Stacey is doing quite well.  Glucose is under excellent control. A1c is stable at 6.6%. We made no changes today.  Instructions given to patient:      Try switching to exercise mode 90 minutes before activity to prevent lows.     Emergency issues: Here are some concerns you should contact us about.  -Vomiting: more than twice.  Please check ketones.  If positive, go to ER. Monitor glucose hourly.   -High glucose (over 300 mg/dL twice in a row) with a pump:  Twice in doubt, take it out.  Change your pump site. Check ketones.  If ketones are negative, take an insulin correction by syringe/pen and recheck glucose in 1 hour.  If glucose is not coming down, please call the clinic. If ketones are moderate or large, drink lots of water, take an insulin correction 1.5 times your usual correction by syringe/pen, and recheck ketones in 1 hour.  If ketones are still present (or you are vomiting), go to the ER.  -Hypoglycemia (low glucose):   If glucose is less than 70 mg/dL, treat with 15g carb (4 glucose tablets), recheck glucose in 15 minutes.  If low again, repeat.   If glucose is less than 54 mg/dL, treat with 30g carb, recheck glucose in 15 minutes.  If low again, repeat.  Keep glucagon in your home in case of severe hypoglycemia and train someone how to use this.    Emergency kit (please ensure you always have these with you):   Glucose tablets  Glucagon  Insulin  Syringes/needles   Extra infusion set (if on a pump)  Ketone strips    Backup insulin plan (in case of pump failure):   If your insulin pump fails, your body will not have any insulin available and your blood glucose levels can get dangerously high. This can result in diabetic ketoacidosis making you very sick (abdominal pain, confusion, vomiting, dehydration, positive urine ketones).    You have an active long acting basal insulin (Degludec: Tresiba / Detemir: Levemir / Glargine:  Lantus / Toujeo / Semglee / Basaglar) prescription available. Please pick this up from your pharmacy in case your pump fails.  Basal insulin dose:_____40_______ units once per day.    Immediately after your pump fails and until you can  the long acting insulin, use short acting insulin (Aspart: Novolog/Fiasp / Lispro: Humalog / Glulisine:Apidra) injections (pen or vial and syringe) per your correction scale every 4 hours and continue to cover carbohydrates. You can stop this 4 hourly correction after you give yourself the basal insulin dose.    You should also administer short acting insulin subcutaneously to cover carbohydrates and per your correction scale prior to meals.     Contact us for help transitioning back to your pump when this is available.    Contact information:   If you have concerns, please send me a [employer] message or call the clinic at [phone].  For more urgent concerns, please call [phone] after hours/weekends and ask to speak with the endocrinologist on call.      Please let me know if you are having low blood sugars less than 70 or over 350 mg/dL.  Do not wait until your next appointment if this is happening.    2.  Risk factors-     Retinopathy:  No.  Has eye exam scheduled next week.   Nephropathy:  BP well controlled. No microalbuminuria.  Creatinine stable.  She is on enalapril.  White coat hypertension.     She follows with nephrology.   Neuropathy: No.    Feet: OK, no ulcers.   Lipids:  LDL improved.   She stopped statin in anticipation of pregnancy, but is no longer considering.  She will recheck lipids with her PCP in the summer.  She does have a family history of premature CV disease in her father and half brother.  Would consider resuming statin in the future if she has no plans for childbearing.      3.  Hypothyroidism- last TSH in target on no levothyroxine.      4.  Celiac disease- well controlled with gluten free diet.  I have placed orders for some labs that can  be associated with celiac disease (iron, hemoglobin, folate, vitamin d, vitamin b12).       5.  F/U in 6 months with me, annually with Dr. Allan.     36 minutes spent on the date of the encounter doing chart review, review of test results, review of continuous glucose sensor, interpretation of glucose data, patient visit and documentation, counseling/coordination of care, and discussion of follow up plan for worsening hyper and hypoglycemia.  The patient understood and is satisfied with today's visit.       Laureen Goldstein PA-C, MPAS   [facility]  Department of Medicine  Division of Endocrinology and Diabetes          Answers for HPI/ROS submitted by the patient on 4/3/2023  General Symptoms: No  Skin Symptoms: No  HENT Symptoms: No  EYE SYMPTOMS: No  HEART SYMPTOMS: No  LUNG SYMPTOMS: No  INTESTINAL SYMPTOMS: No  URINARY SYMPTOMS: No  GYNECOLOGIC SYMPTOMS: No  BREAST SYMPTOMS: No  SKELETAL SYMPTOMS: No  BLOOD SYMPTOMS: No  NERVOUS SYSTEM SYMPTOMS: No  MENTAL HEALTH SYMPTOMS: No

## 2023-03-28 ENCOUNTER — LAB (OUTPATIENT)
Dept: LAB | Facility: CLINIC | Age: 28
End: 2023-03-28
Payer: COMMERCIAL

## 2023-03-28 DIAGNOSIS — E10.9 WELL CONTROLLED TYPE 1 DIABETES MELLITUS (H): ICD-10-CM

## 2023-03-28 LAB
HBA1C MFR BLD: 6.6 %
TSH SERPL DL<=0.005 MIU/L-ACNC: 3.75 UIU/ML (ref 0.3–4.2)

## 2023-03-28 PROCEDURE — 36415 COLL VENOUS BLD VENIPUNCTURE: CPT | Performed by: PATHOLOGY

## 2023-03-28 PROCEDURE — 84443 ASSAY THYROID STIM HORMONE: CPT | Performed by: PATHOLOGY

## 2023-03-28 PROCEDURE — 83036 HEMOGLOBIN GLYCOSYLATED A1C: CPT | Mod: 90 | Performed by: PATHOLOGY

## 2023-03-28 PROCEDURE — 99000 SPECIMEN HANDLING OFFICE-LAB: CPT | Performed by: PATHOLOGY

## 2023-03-29 RX ORDER — ESCITALOPRAM OXALATE 10 MG/1
10 TABLET ORAL DAILY
Qty: 90 TABLET | Refills: 0 | Status: SHIPPED | OUTPATIENT
Start: 2023-03-29 | End: 2023-06-29

## 2023-03-29 NOTE — TELEPHONE ENCOUNTER
Routing refill request to provider for review/approval because:  PHQ-9 score less than 5 in past 6 months  PHQ 5/29/2019 5/31/2020 2/3/2022   PHQ-9 Total Score 6 5 7   Q9: Thoughts of better off dead/self-harm past 2 weeks Not at all Not at all Not at all   Some encounter information is confidential and restricted. Go to Review Flowsheets activity to see all data.     Arlene Tian, Registered Nurse  [facility]

## 2023-04-03 ENCOUNTER — VIRTUAL VISIT (OUTPATIENT)
Dept: ENDOCRINOLOGY | Facility: CLINIC | Age: 28
End: 2023-04-03
Payer: COMMERCIAL

## 2023-04-03 ENCOUNTER — TELEPHONE (OUTPATIENT)
Dept: NEPHROLOGY | Facility: CLINIC | Age: 28
End: 2023-04-03

## 2023-04-03 DIAGNOSIS — R80.8 OTHER PROTEINURIA: ICD-10-CM

## 2023-04-03 DIAGNOSIS — E10.21 TYPE 1 DIABETES MELLITUS WITH DIABETIC NEPHROPATHY (H): Primary | ICD-10-CM

## 2023-04-03 DIAGNOSIS — N18.1 CKD (CHRONIC KIDNEY DISEASE) STAGE 1, GFR 90 ML/MIN OR GREATER: Primary | ICD-10-CM

## 2023-04-03 PROCEDURE — 99214 OFFICE O/P EST MOD 30 MIN: CPT | Mod: VID | Performed by: PHYSICIAN ASSISTANT

## 2023-04-03 RX ORDER — INSULIN GLARGINE-YFGN 100 [IU]/ML
40 INJECTION, SOLUTION SUBCUTANEOUS DAILY
Qty: 3 ML | Refills: 1 | Status: SHIPPED | OUTPATIENT
Start: 2023-04-03 | End: 2023-10-12

## 2023-04-03 RX ORDER — URINE ACETONE TEST STRIPS
STRIP MISCELLANEOUS
Qty: 50 STRIP | Refills: 3 | Status: SHIPPED | OUTPATIENT
Start: 2023-04-03 | End: 2024-02-13

## 2023-04-03 RX ORDER — ENALAPRIL MALEATE 20 MG/1
TABLET ORAL
Qty: 180 TABLET | Refills: 1 | Status: SHIPPED | OUTPATIENT
Start: 2023-04-03 | End: 2023-10-17

## 2023-04-03 NOTE — TELEPHONE ENCOUNTER
[facility] Call Center    Phone Message    May a detailed message be left on voicemail: yes     Reason for Call: Order(s): Return patient labs  Reason for requested: Return patient VV scheduled with Dr. Castaneda on 6/12/23, labs schedule at [facility] [place] [place] on 6/6/23  Date needed: 6/1/23  Provider name: Leonel    Action Taken: Message routed to:  [facility] ([facility] [place] [place]): Nephrology    Travel Screening: Not Applicable

## 2023-04-03 NOTE — NURSING NOTE
Is the patient currently in the state of [place]? YES    Visit mode:VIDEO    If the visit is dropped, the patient can be reconnected by: TELEPHONE VISIT: Phone number: [phone]    Will anyone else be joining the visit? NO    How would you like to obtain your AVS? MyChart    Are changes needed to the allergy or medication list? NO    Reason for visit:   Chief Complaint   Patient presents with     Video Visit     Type 1 Diabetes       Dulce Maria Faye MA

## 2023-04-03 NOTE — LETTER
4/3/2023       RE: Stacey Mantilla  [address]  [facility] [address]     Dear Colleague,    Thank you for referring your patient, Stacey Mantilla, to the [facility] ENDOCRINOLOG[facility] [place] at [facility]. Please see a copy of my visit note below.    Outcome for 03/27/23 7:49 AM: [employer] message sent  Taylor Myhre, RMA  Outcome for 03/30/23 7:20 AM: Data obtained via [employer] website  Malissa Roper LPN     Start time:   End time:   Provider location: off site- home  Patient location: off site- home.      HPI:  Stacey is a 26 yo woman here for follow up of type 1 diabetes, diagnosed at age 7.   She also sees Dr. Allan.  Stacey's diabetes is complicated by nephropathy. She also has hyperlipidemia, a history of hypothyroidism (although she has been off of levothyroxine for several years) and celiac disease.  She follows a strict gluten free diet. She continues wearing a Dexcom G6 sensor/[employer] X2pump  and she really likes it.  Feeling really good.  No concerns today.      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 She is able to be physically active, going for walks, etc . This is much less stressful.   10-6 M-F.   She is feeling healthier.   She has been very active, doing a lot of hiking, bouldering and plans to go backpacking.   Her typical routine is:   No breakfast  Earlier lunch- veggies, fruit, yogurt, string cheese.   Dinner- if cooking at home- veggies, fish.   Ordering out more recently.   Pizza Boyd. less nutritious options.    Snacks at night.       Recent glucose is as follows:             Device Settings   Day Profile Active at the time of upload  Start Time Basal Rate Correction Factor Carb Ratio Target BG  Midnight 2.000 u/hr 1u:18 mg/dL 1u:6.0 g 100 mg/dL  2:00 AM 2.500 u/hr 1u:17 mg/dL 1u:6.0 g 100 mg/dL  12:00 PM 1.800 u/hr 1u:17 mg/dL 1u:5.0 g 100 mg/dL  10:00 PM 2.000 u/hr 1u:18 mg/dL 1u:6.0 g 100 mg/dL  Calculated Total Daily  Basal 51.0 units  Duration of Insulin: 5:00 hours  Carbohydrates: On  Max Bolus: 25 units  Sick Profile Inactive at the time of upload  Start Time Basal Rate Correction Factor Carb Ratio Target BG  Midnight 2.750 u/hr 1u:17 mg/dL 1u:5.0 g 110 mg/dL  Calculated Total Daily Basal 66.0 units  Duration of Insulin: 5:00 hours  Carbohydrates: On  Max Bolus: 25 units  Hike Profile Inactive at the time of upload  Start Time Basal Rate Correction Factor Carb Ratio Target BG  Midnight 1.000 u/hr 1u:20 mg/dL 1u:7.0 g 130 mg/dL  Calculated Total Daily Basal 24.0 units  Duration of Insulin: 5:00 hours  Carbohydrates: On  Max Bolus: 25 units      Previous treatments:   Victoza- did not tolerate    For her hypothyroidism- untreated with normal TSH.      For her CKD (stage 1), she is taking enalapril. She follows with nephrology.     She has no other concerns today.       ROS  GENERAL: weight stable.  no fevers, chills, malaise, night sweats.   HEENT: no dysphagia, diplopia, neck pain or tenderness, dry/scratchy eyes, URI, cough, sinus drainage, tinnitus, sinus pressure  CV: no chest pain, pressure, palpitations, skipped beats, LOC  LUNGS: no SOB, CHILDRESS, cough, sputum production, wheezing   GI: no diarrhea, constipation, abdominal pain  EXTREMITIES: no rashes, ulcers, edema  NEUROLOGY: no changes in vision, tingling or numbness in hands or feet.   MSK: no muscle aches or pains, weakness  PSYCH: mood stable    Past Medical History:   Diagnosis Date    Asthma     Currently no treatment needed    Celiac disease     Chronic kidney disease, stage I 8/3/2015    Chronic sinusitis     Diabetes mellitus type 1 (H)     Diabetic nephropathy (H)     Family history of heart disease 8/3/2015    Hypertension     Hypothyroid     Pedal edema     Psoriasis      PMH:   Type 1 diabetes- since 2003  Celiac disease- since age 13  Hypothyroidism- age 10 (no longer on levothyroxine for several years)  Hyperlipidemia      Past Surgical History:    Procedure Laterality Date    ENT SURGERY      TONSILLECTOMY, ADENOIDECTOMY, COMBINED         Family History   Problem Relation Age of Onset    Gastrointestinal Disease Mother         Celiacs    Obesity Mother     Depression Mother     Cardiovascular Father 48        MI, stents, diabetic, type 2    Diabetes Father     Coronary Artery Disease Father     Obesity Father     Depression Father     Substance Abuse Father     Lung Cancer Father         Smoker    Substance Abuse Sister     Bipolar Disorder Brother     Schizophrenia Brother     Skin Cancer Maternal Grandmother     Cardiovascular Maternal Grandfather         Englarged heart, pacemaker, defib    Skin Cancer Maternal Grandfather     Obesity Maternal Half-Sister     Heart Disease Paternal Half-Brother 45    Melanoma No family hx of      Family Hx:   Dad- premature CVD, in his 40s.  Type 2 diabetes, stroke, cancer  Mom- celiac disease  Grandfather- type 2 diabetes  Half brother- Asperger's  Half sister- cervical CA  Another half brother- bipolar and schizophrenia  1/2 brother-  of presumed heart attack    History     Social History    Marital Status:      Spouse Name: N/A     Number of Children: N/A    Years of Education: N/A     Social History Main Topics    Smoking status: Never Smoker     Smokeless tobacco: Never Used    Alcohol Use: Yes      Comment: occ. use    Drug Use: No    Sexual Activity:     Partners: Male     Birth Control/ Protection: Condom     Other Topics Concern    Parent/Sibling W/ Cabg, Mi Or Angioplasty Before 65f 55m? Yes    Caffeine Concern Yes     1 cup tea per day    Sleep Concern No    Special Diet Yes     gluten free diet, low carbs    Exercise Yes     walking, ellyptical, swimming, weights    Seat Belt Yes     Social History Narrative     Social Hx:    . 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  Now working as a nanny. Enjoys a lot of hiking. She also goes to  "the gym a few days a week.  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     Current Outpatient Medications   Medication    albuterol (PROAIR HFA/PROVENTIL HFA/VENTOLIN HFA) 108 (90 Base) MCG/ACT inhaler    amphetamine-dextroamphetamine (ADDERALL XR) 10 MG 24 hr capsule    amphetamine-dextroamphetamine (ADDERALL) 10 MG tablet    ASPIRIN NOT PRESCRIBED (INTENTIONAL)    blood glucose (NO BRAND SPECIFIED) test strip    Blood Pressure Monitoring KIT    Cholecalciferol 2000 UNITS TBDP    Continuous Blood Gluc Sensor (DEXCOM G6 SENSOR) MISC    Continuous Blood Gluc Transmit (DEXCOM G6 TRANSMITTER) MISC    enalapril (VASOTEC) 20 MG tablet    escitalopram (LEXAPRO) 10 MG tablet    Glucagon, rDNA, (GLUCAGON EMERGENCY) 1 MG KIT    hydrOXYzine (ATARAX) 25 MG tablet    Insulin Infusion Pump Supplies (INSULIN PUMP SYRINGE RESERVOIR) MISC    Insulin Infusion Pump Supplies (SURE-T INFUSION SET 23\") MISC    insulin lispro (HUMALOG VIAL) 100 UNIT/ML vial    Insulin Pump Accessories MISC    insulin syringe-needle U-100 (31G X 5/16\" 0.5 ML) 31G X 5/16\" 0.5 ML miscellaneous    insulin syringes, disposable, U-100 0.3 ML MISC    ipratropium - albuterol 0.5 mg/2.5 mg/3 mL (DUONEB) 0.5-2.5 (3) MG/3ML neb solution    KETOSTIX test strip    montelukast (SINGULAIR) 10 MG tablet    Multiple Vitamins-Minerals (MULTIVITAMIN ADULT PO)    STATIN NOT PRESCRIBED (INTENTIONAL)     No current facility-administered medications for this visit.          Allergies   Allergen Reactions    Dogs Other (See Comments)     Sinus symptoms     Dust Mites      Sinus     Gluten Meal      Physical Exam:  There were no vitals taken for this visit.  GENERAL: healthy, alert and no distress  RESP: no audible wheeze, cough, or visible cyanosis.  No visible retractions or increased work of breathing.  Able to speak fully in complete sentences.  PSYCH: mentation appears normal, affect normal/bright, judgement and insight intact, normal speech and appearance " well-groomed    RESULTS  Lab Results   Component Value Date    A1C 6.6 (H) 03/28/2023    A1C 6.4 (H) 07/24/2022    A1C 6.7 (H) 05/07/2021    A1C 7.3 (H) 02/17/2020    A1C 7.0 (H) 01/17/2018    A1C 7.6 (H) 03/26/2015    HEMOGLOBINA1 6.9 11/15/2021    HEMOGLOBINA1 7.0 (A) 01/28/2020    HEMOGLOBINA1 7.3 (A) 08/19/2019    HEMOGLOBINA1 6.8 (A) 05/14/2019    HEMOGLOBINA1 6.8 (A) 02/18/2019       TSH   Date Value Ref Range Status   03/28/2023 3.75 0.30 - 4.20 uIU/mL Final   05/07/2021 3.69 0.40 - 4.00 mU/L Final   02/17/2020 3.97 0.40 - 4.00 mU/L Final   06/07/2019 3.91 0.40 - 4.00 mU/L Final   03/04/2019 3.41 0.40 - 4.00 mU/L Final   10/10/2016 2.89 0.40 - 4.00 mU/L Final     T4 Free   Date Value Ref Range Status   12/09/2013 1.18 0.70 - 1.85 ng/dL Final       ALT   Date Value Ref Range Status   05/07/2021 23 0 - 50 U/L Final   05/17/2019 33 0 - 50 U/L Final   ]    Recent Labs   Lab Test 07/24/22  1041 05/07/21  0824 03/04/19  0854 08/04/15  1443   CHOL 198 236*   < > 194   HDL 62 59   < > 66   * 155*   < > 102   TRIG 74 112   < > 131   CHOLHDLRATIO  --   --   --  2.9    < > = values in this interval not displayed.       Lab Results   Component Value Date     07/24/2022     05/07/2021      Lab Results   Component Value Date    POTASSIUM 4.7 07/24/2022    POTASSIUM 3.9 05/07/2021     Lab Results   Component Value Date    CHLORIDE 109 07/24/2022    CHLORIDE 104 05/07/2021     Lab Results   Component Value Date    FRANKLIN 9.1 07/24/2022    FRANKLIN 9.3 05/07/2021     Lab Results   Component Value Date    CO2 24 07/24/2022    CO2 26 05/07/2021     Lab Results   Component Value Date    BUN 6 07/24/2022    BUN 11 05/07/2021     Lab Results   Component Value Date    CR 0.51 07/24/2022    CR 0.59 05/07/2021       GFR Estimate   Date Value Ref Range Status   07/24/2022 >90 >60 mL/min/1.73m2 Final     Comment:     Effective December 21, 2021 eGFRcr in adults is calculated using the 2021 CKD-EPI creatinine equation which  includes age and gender (Addi thomas al., NEJM, DOI: 10.1056/QGWLjo1792105)   11/15/2021 >90 >60 mL/min/1.73m2 Final     Comment:     As of July 11, 2021, eGFR is calculated by the CKD-EPI creatinine equation, without race adjustment. eGFR can be influenced by muscle mass, exercise, and diet. The reported eGFR is an estimation only and is only applicable if the renal function is stable.   05/07/2021 >90 >60 mL/min/[1.73_m2] Final     Comment:     Non  GFR Calc  Starting 12/18/2018, serum creatinine based estimated GFR (eGFR) will be   calculated using the Chronic Kidney Disease Epidemiology Collaboration   (CKD-EPI) equation.     04/30/2021 >90 >60 mL/min/[1.73_m2] Final     Comment:     Non  GFR Calc  Starting 12/18/2018, serum creatinine based estimated GFR (eGFR) will be   calculated using the Chronic Kidney Disease Epidemiology Collaboration   (CKD-EPI) equation.     05/19/2020 >90 >60 mL/min/[1.73_m2] Final     Comment:     Non  GFR Calc  Starting 12/18/2018, serum creatinine based estimated GFR (eGFR) will be   calculated using the Chronic Kidney Disease Epidemiology Collaboration   (CKD-EPI) equation.       GFR Estimate If Black   Date Value Ref Range Status   05/07/2021 >90 >60 mL/min/[1.73_m2] Final     Comment:      GFR Calc  Starting 12/18/2018, serum creatinine based estimated GFR (eGFR) will be   calculated using the Chronic Kidney Disease Epidemiology Collaboration   (CKD-EPI) equation.     04/30/2021 >90 >60 mL/min/[1.73_m2] Final     Comment:      GFR Calc  Starting 12/18/2018, serum creatinine based estimated GFR (eGFR) will be   calculated using the Chronic Kidney Disease Epidemiology Collaboration   (CKD-EPI) equation.     05/19/2020 >90 >60 mL/min/[1.73_m2] Final     Comment:      GFR Calc  Starting 12/18/2018, serum creatinine based estimated GFR (eGFR) will be   calculated using the Chronic Kidney  Disease Epidemiology Collaboration   (CKD-EPI) equation.         Lab Results   Component Value Date    MICROL <5 07/24/2022    MICROL <5 04/30/2021     No results found for: MICROALBUMIN  No results found for: CPEPT, GADAB, ISCAB    No results found for: B12]    Most recent eye exam date: : Not Found   Assessment/Plan:     1.  Type 1 diabetes- Stacey is doing quite well.  Glucose is under excellent control. A1c is stable at 6.6%. We made no changes today.  Instructions given to patient:      Try switching to exercise mode 90 minutes before activity to prevent lows.     Emergency issues: Here are some concerns you should contact us about.  -Vomiting: more than twice.  Please check ketones.  If positive, go to ER. Monitor glucose hourly.   -High glucose (over 300 mg/dL twice in a row) with a pump:  Twice in doubt, take it out.  Change your pump site. Check ketones.  If ketones are negative, take an insulin correction by syringe/pen and recheck glucose in 1 hour.  If glucose is not coming down, please call the clinic. If ketones are moderate or large, drink lots of water, take an insulin correction 1.5 times your usual correction by syringe/pen, and recheck ketones in 1 hour.  If ketones are still present (or you are vomiting), go to the ER.  -Hypoglycemia (low glucose):   If glucose is less than 70 mg/dL, treat with 15g carb (4 glucose tablets), recheck glucose in 15 minutes.  If low again, repeat.   If glucose is less than 54 mg/dL, treat with 30g carb, recheck glucose in 15 minutes.  If low again, repeat.  Keep glucagon in your home in case of severe hypoglycemia and train someone how to use this.    Emergency kit (please ensure you always have these with you):   Glucose tablets  Glucagon  Insulin  Syringes/needles   Extra infusion set (if on a pump)  Ketone strips    Backup insulin plan (in case of pump failure):   If your insulin pump fails, your body will not have any insulin available and your blood glucose  levels can get dangerously high. This can result in diabetic ketoacidosis making you very sick (abdominal pain, confusion, vomiting, dehydration, positive urine ketones).    You have an active long acting basal insulin (Degludec: Tresiba / Detemir: Levemir / Glargine: Lantus / Toujeo / Semglee / Basaglar) prescription available. Please pick this up from your pharmacy in case your pump fails.  Basal insulin dose:_____40_______ units once per day.    Immediately after your pump fails and until you can  the long acting insulin, use short acting insulin (Aspart: Novolog/Fiasp / Lispro: Humalog / Glulisine:Apidra) injections (pen or vial and syringe) per your correction scale every 4 hours and continue to cover carbohydrates. You can stop this 4 hourly correction after you give yourself the basal insulin dose.    You should also administer short acting insulin subcutaneously to cover carbohydrates and per your correction scale prior to meals.     Contact us for help transitioning back to your pump when this is available.    Contact information:   If you have concerns, please send me a [employer] message or call the clinic at [phone].  For more urgent concerns, please call [phone] after hours/weekends and ask to speak with the endocrinologist on call.      Please let me know if you are having low blood sugars less than 70 or over 350 mg/dL.  Do not wait until your next appointment if this is happening.    2.  Risk factors-     Retinopathy:  No.  Has eye exam scheduled next week.   Nephropathy:  BP well controlled. No microalbuminuria.  Creatinine stable.  She is on enalapril.  White coat hypertension.     She follows with nephrology.   Neuropathy: No.    Feet: OK, no ulcers.   Lipids:  LDL improved.   She stopped statin in anticipation of pregnancy, but is no longer considering.  She will recheck lipids with her PCP in the summer.  She does have a family history of premature CV disease in her father and half  brother.  Would consider resuming statin in the future if she has no plans for childbearing.      3.  Hypothyroidism- last TSH in target on no levothyroxine.      4.  Celiac disease- well controlled with gluten free diet.  I have placed orders for some labs that can be associated with celiac disease (iron, hemoglobin, folate, vitamin d, vitamin b12).       5.  F/U in 6 months with me, annually with Dr. Allan.     36 minutes spent on the date of the encounter doing chart review, review of test results, review of continuous glucose sensor, interpretation of glucose data, patient visit and documentation, counseling/coordination of care, and discussion of follow up plan for worsening hyper and hypoglycemia.  The patient understood and is satisfied with today's visit.       Laureen Goldstein PA-C, MPAS   [facility]  Department of Medicine  Division of Endocrinology and Diabetes          Answers for HPI/ROS submitted by the patient on 4/3/2023  General Symptoms: No  Skin Symptoms: No  HENT Symptoms: No  EYE SYMPTOMS: No  HEART SYMPTOMS: No  LUNG SYMPTOMS: No  INTESTINAL SYMPTOMS: No  URINARY SYMPTOMS: No  GYNECOLOGIC SYMPTOMS: No  BREAST SYMPTOMS: No  SKELETAL SYMPTOMS: No  BLOOD SYMPTOMS: No  NERVOUS SYSTEM SYMPTOMS: No  MENTAL HEALTH SYMPTOMS: No

## 2023-04-05 ENCOUNTER — OFFICE VISIT (OUTPATIENT)
Dept: OPHTHALMOLOGY | Facility: CLINIC | Age: 28
End: 2023-04-05
Payer: COMMERCIAL

## 2023-04-05 DIAGNOSIS — E10.3293 MILD NONPROLIFERATIVE DIABETIC RETINOPATHY OF BOTH EYES WITHOUT MACULAR EDEMA ASSOCIATED WITH TYPE 1 DIABETES MELLITUS (H): Primary | ICD-10-CM

## 2023-04-05 DIAGNOSIS — H52.13 MYOPIA OF BOTH EYES: ICD-10-CM

## 2023-04-05 DIAGNOSIS — H40.053 BORDERLINE GLAUCOMA OF BOTH EYES WITH OCULAR HYPERTENSION: ICD-10-CM

## 2023-04-05 PROCEDURE — 92310 CONTACT LENS FITTING OU: CPT | Performed by: OPTOMETRIST

## 2023-04-05 PROCEDURE — 92134 CPTRZ OPH DX IMG PST SGM RTA: CPT | Performed by: OPTOMETRIST

## 2023-04-05 PROCEDURE — 92014 COMPRE OPH EXAM EST PT 1/>: CPT | Performed by: OPTOMETRIST

## 2023-04-05 PROCEDURE — 92015 DETERMINE REFRACTIVE STATE: CPT | Performed by: OPTOMETRIST

## 2023-04-05 ASSESSMENT — REFRACTION_CURRENTRX
OS_BASECURVE: 8.6
OD_SPHERE: -1.75
OS_SPHERE: -1.75
OD_DIAMETER: 14.0
OD_BASECURVE: 8.6
OS_DIAMETER: 14.0
OD_CYLINDER: SPHERE
OS_BRAND: COOPER BIOFINITY BC 8.6, D 14.0
OS_CYLINDER: SPHERE
OD_BRAND: COOPER BIOFINITY BC 8.6, D 14.0

## 2023-04-05 ASSESSMENT — TONOMETRY
OD_IOP_MMHG: 30
IOP_METHOD: TONOPEN
OD_IOP_MMHG: 20
OS_IOP_MMHG: 17
IOP_METHOD: ICARE
OS_IOP_MMHG: 30

## 2023-04-05 ASSESSMENT — SLIT LAMP EXAM - LIDS
COMMENTS: NORMAL
COMMENTS: PAPILLOMA UPPER LID

## 2023-04-05 ASSESSMENT — VISUAL ACUITY
OD_CC+: -1
CORRECTION_TYPE: GLASSES
METHOD: SNELLEN - LINEAR
OD_CC: 20/20
OS_CC: 20/20

## 2023-04-05 ASSESSMENT — REFRACTION
OS_CYLINDER: +0.25
OD_AXIS: 107
OD_SPHERE: -1.50
OS_SPHERE: -1.25
OD_CYLINDER: +0.50
OS_AXIS: 056

## 2023-04-05 ASSESSMENT — CUP TO DISC RATIO
OS_RATIO: 0.2
OD_RATIO: 0.2

## 2023-04-05 ASSESSMENT — CONF VISUAL FIELD
OS_INFERIOR_TEMPORAL_RESTRICTION: 0
METHOD: COUNTING FINGERS
OD_NORMAL: 1
OS_SUPERIOR_NASAL_RESTRICTION: 0
OS_NORMAL: 1
OS_SUPERIOR_TEMPORAL_RESTRICTION: 0
OD_SUPERIOR_TEMPORAL_RESTRICTION: 0
OS_INFERIOR_NASAL_RESTRICTION: 0
OD_INFERIOR_TEMPORAL_RESTRICTION: 0
OD_INFERIOR_NASAL_RESTRICTION: 0
OD_SUPERIOR_NASAL_RESTRICTION: 0

## 2023-04-05 ASSESSMENT — REFRACTION_WEARINGRX
OD_AXIS: 105
OS_AXIS: 055
OD_CYLINDER: +0.50
OD_SPHERE: -1.75
OS_CYLINDER: +0.25
OS_SPHERE: -1.50

## 2023-04-05 ASSESSMENT — EXTERNAL EXAM - RIGHT EYE: OD_EXAM: NORMAL

## 2023-04-05 ASSESSMENT — REFRACTION_MANIFEST
OD_AXIS: 107
OS_CYLINDER: +0.25
OD_CYLINDER: +0.50
OD_SPHERE: -2.00
OS_AXIS: 056
OS_SPHERE: -1.75

## 2023-04-05 ASSESSMENT — EXTERNAL EXAM - LEFT EYE: OS_EXAM: NORMAL

## 2023-04-05 NOTE — NURSING NOTE
Chief Complaints and History of Present Illnesses   Patient presents with     Diabetic Eye Exam     Chief Complaint(s) and History of Present Illness(es)     Diabetic Eye Exam            Vision: is stable    Associated symptoms: Negative for photophobia, double vision, flashes and floaters    Diabetes Type: Type 1 and on insulin    Treatments tried: artificial tears    Pain scale: 0/10          Comments    Patient present for diabetic eye exam. Patient has no complaints at this time.   ANGELIKA Ocampo April 5, 2023 3:00 PM

## 2023-04-05 NOTE — Clinical Note
Thank you for referring Stacey Mantilla for her annual eye exam. Mild diabetic retinopathy noted on examination today (stable to previous exam). Recommended repeat evaluation in 1 year. Please contact me with any questions. Sonido Reyes, OD on 4/10/2023 at 7:27 AM

## 2023-04-05 NOTE — PROGRESS NOTES
History  HPI     Diabetic Eye Exam    Vision is stable.  Associated symptoms include Negative for photophobia, double vision, flashes and floaters.  Diabetes characteristics include Type 1 and on insulin.  Treatments tried include artificial tears.  Pain was noted as 0/10.           Comments    Patient present for diabetic eye exam. Patient has no complaints at this time.   ANGELIKA Ocampo April 5, 2023 3:00 PM           Last edited by Laurence Forte on 4/5/2023  3:01 PM.          Assessment/Plan  (E10.7083) Mild nonproliferative diabetic retinopathy of both eyes without macular edema associated with type 1 diabetes mellitus (H)  (primary encounter diagnosis)  Comment: Mild NPDR both eyes with no CSME  Plan: OCT Retina Spectralis OU (both eyes)         Educated patient on clinical findings and the importance of continued management with primary care physician. Continue management as directed and return to clinic in 1 year for dilated exam, or sooner, as needed. Copy of chart sent to Dr. Allan and Laureen Goldstein.    (H52.13) Myopia of both eyes  Comment: Myopia both eyes   Plan: REFRACTION, TN CONTACT LENS FITTING COSMETIC LVL 1 - ADULT         Dispensed spectacle prescription for full time wear. Monitor annually.   Dispensed trial lenses and finalized contact lens prescription for 2 years.    (H40.053) Borderline glaucoma of both eyes with ocular hypertension  Comment: Secondary to thick corneas, pressures high with iCare, WNL with tonopen, RNFL thickness  both eyes, pachymetry: 684/657  Plan:  No treatment indicated at this time. Monitor annually.    Return to clinic in 1 year for comprehensive eye exam.    Complete documentation of historical and exam elements from today's encounter can  be found in the full encounter summary report (not reduplicated in this progress  note). I personally obtained the chief complaint(s) and history of present illness. I  confirmed and edited as necessary the review of  systems, past medical/surgical  history, family history, social history, and examination findings as documented by  others; and I examined the patient myself. I personally reviewed the relevant tests,  images, and reports as documented above. I formulated and edited as necessary the  assessment and plan and discussed the findings and management plan with the  patient and family.    Sonido Reyes OD, FAAO

## 2023-04-15 ENCOUNTER — LAB (OUTPATIENT)
Dept: LAB | Facility: CLINIC | Age: 28
End: 2023-04-15
Payer: COMMERCIAL

## 2023-04-15 DIAGNOSIS — E10.21 TYPE 1 DIABETES MELLITUS WITH DIABETIC NEPHROPATHY (H): ICD-10-CM

## 2023-04-15 DIAGNOSIS — R80.8 OTHER PROTEINURIA: ICD-10-CM

## 2023-04-15 DIAGNOSIS — N18.1 CKD (CHRONIC KIDNEY DISEASE) STAGE 1, GFR 90 ML/MIN OR GREATER: ICD-10-CM

## 2023-04-15 DIAGNOSIS — R79.89 ABNORMAL CBC: ICD-10-CM

## 2023-04-15 LAB
ALBUMIN MFR UR ELPH: <6 MG/DL (ref 1–14)
ALBUMIN SERPL BCG-MCNC: 4.1 G/DL (ref 3.5–5.2)
ALBUMIN UR-MCNC: NEGATIVE MG/DL
ANION GAP SERPL CALCULATED.3IONS-SCNC: 7 MMOL/L (ref 7–15)
APPEARANCE UR: CLEAR
BASOPHILS # BLD AUTO: 0.1 10E3/UL (ref 0–0.2)
BASOPHILS NFR BLD AUTO: 1 %
BILIRUB UR QL STRIP: NEGATIVE
BUN SERPL-MCNC: 10.6 MG/DL (ref 6–20)
CALCIUM SERPL-MCNC: 9.6 MG/DL (ref 8.6–10)
CHLORIDE SERPL-SCNC: 105 MMOL/L (ref 98–107)
CHOLEST SERPL-MCNC: 196 MG/DL
COLOR UR AUTO: ABNORMAL
CREAT SERPL-MCNC: 0.57 MG/DL (ref 0.51–0.95)
CREAT UR-MCNC: 19.4 MG/DL
CREAT UR-MCNC: 20.6 MG/DL
DEPRECATED HCO3 PLAS-SCNC: 28 MMOL/L (ref 22–29)
EOSINOPHIL # BLD AUTO: 0.2 10E3/UL (ref 0–0.7)
EOSINOPHIL NFR BLD AUTO: 2 %
ERYTHROCYTE [DISTWIDTH] IN BLOOD BY AUTOMATED COUNT: 12.8 % (ref 10–15)
GFR SERPL CREATININE-BSD FRML MDRD: >90 ML/MIN/1.73M2
GLUCOSE SERPL-MCNC: 112 MG/DL (ref 70–99)
GLUCOSE UR STRIP-MCNC: NEGATIVE MG/DL
HCT VFR BLD AUTO: 43.7 % (ref 35–47)
HDLC SERPL-MCNC: 62 MG/DL
HGB BLD-MCNC: 14.3 G/DL (ref 11.7–15.7)
HGB UR QL STRIP: NEGATIVE
IMM GRANULOCYTES # BLD: 0 10E3/UL
IMM GRANULOCYTES NFR BLD: 0 %
KETONES UR STRIP-MCNC: NEGATIVE MG/DL
LDLC SERPL CALC-MCNC: 123 MG/DL
LEUKOCYTE ESTERASE UR QL STRIP: NEGATIVE
LYMPHOCYTES # BLD AUTO: 2.7 10E3/UL (ref 0.8–5.3)
LYMPHOCYTES NFR BLD AUTO: 31 %
MCH RBC QN AUTO: 28.1 PG (ref 26.5–33)
MCHC RBC AUTO-ENTMCNC: 32.7 G/DL (ref 31.5–36.5)
MCV RBC AUTO: 86 FL (ref 78–100)
MICROALBUMIN UR-MCNC: <12 MG/L
MICROALBUMIN/CREAT UR: NORMAL MG/G{CREAT}
MONOCYTES # BLD AUTO: 0.8 10E3/UL (ref 0–1.3)
MONOCYTES NFR BLD AUTO: 9 %
NEUTROPHILS # BLD AUTO: 5.1 10E3/UL (ref 1.6–8.3)
NEUTROPHILS NFR BLD AUTO: 57 %
NITRATE UR QL: NEGATIVE
NONHDLC SERPL-MCNC: 134 MG/DL
NRBC # BLD AUTO: 0 10E3/UL
NRBC BLD AUTO-RTO: 0 /100
PH UR STRIP: 7.5 [PH] (ref 5–7)
PHOSPHATE SERPL-MCNC: 3.8 MG/DL (ref 2.5–4.5)
PLATELET # BLD AUTO: 310 10E3/UL (ref 150–450)
POTASSIUM SERPL-SCNC: 4.8 MMOL/L (ref 3.4–5.3)
PROT/CREAT 24H UR: NORMAL MG/G{CREAT}
PTH-INTACT SERPL-MCNC: 67 PG/ML (ref 15–65)
RBC # BLD AUTO: 5.09 10E6/UL (ref 3.8–5.2)
RBC URINE: 0 /HPF
SODIUM SERPL-SCNC: 140 MMOL/L (ref 136–145)
SP GR UR STRIP: 1 (ref 1–1.03)
SQUAMOUS EPITHELIAL: <1 /HPF
TRIGL SERPL-MCNC: 54 MG/DL
UROBILINOGEN UR STRIP-MCNC: NORMAL MG/DL
WBC # BLD AUTO: 8.9 10E3/UL (ref 4–11)
WBC URINE: <1 /HPF

## 2023-04-15 PROCEDURE — 81001 URINALYSIS AUTO W/SCOPE: CPT | Performed by: PATHOLOGY

## 2023-04-15 PROCEDURE — 80061 LIPID PANEL: CPT | Performed by: PATHOLOGY

## 2023-04-15 PROCEDURE — 82043 UR ALBUMIN QUANTITATIVE: CPT | Mod: 90 | Performed by: PATHOLOGY

## 2023-04-15 PROCEDURE — 85025 COMPLETE CBC W/AUTO DIFF WBC: CPT | Performed by: PATHOLOGY

## 2023-04-15 PROCEDURE — 83970 ASSAY OF PARATHORMONE: CPT | Performed by: PATHOLOGY

## 2023-04-15 PROCEDURE — 82570 ASSAY OF URINE CREATININE: CPT | Mod: 90 | Performed by: PATHOLOGY

## 2023-04-15 PROCEDURE — 36415 COLL VENOUS BLD VENIPUNCTURE: CPT | Performed by: PATHOLOGY

## 2023-04-15 PROCEDURE — 82306 VITAMIN D 25 HYDROXY: CPT | Mod: 90 | Performed by: PATHOLOGY

## 2023-04-15 PROCEDURE — 84156 ASSAY OF PROTEIN URINE: CPT | Performed by: PATHOLOGY

## 2023-04-15 PROCEDURE — 99000 SPECIMEN HANDLING OFFICE-LAB: CPT | Performed by: PATHOLOGY

## 2023-04-15 PROCEDURE — 80069 RENAL FUNCTION PANEL: CPT | Performed by: PATHOLOGY

## 2023-04-19 LAB
DEPRECATED CALCIDIOL+CALCIFEROL SERPL-MC: <23 UG/L (ref 20–75)
VITAMIN D2 SERPL-MCNC: <5 UG/L
VITAMIN D3 SERPL-MCNC: 18 UG/L

## 2023-04-20 ASSESSMENT — ENCOUNTER SYMPTOMS
WEAKNESS: 0
ARTHRALGIAS: 0
FREQUENCY: 0
DYSURIA: 0
NAUSEA: 0
MYALGIAS: 0
PALPITATIONS: 0
HEADACHES: 0
BREAST MASS: 0
SORE THROAT: 0
JOINT SWELLING: 0
ABDOMINAL PAIN: 0
SHORTNESS OF BREATH: 0
CHILLS: 0
FEVER: 0
HEARTBURN: 0
CONSTIPATION: 0
HEMATOCHEZIA: 0
COUGH: 0
NERVOUS/ANXIOUS: 0
EYE PAIN: 0
DIZZINESS: 0
DIARRHEA: 0
PARESTHESIAS: 0
HEMATURIA: 0

## 2023-04-20 ASSESSMENT — ASTHMA QUESTIONNAIRES
ACT_TOTALSCORE: 25
QUESTION_3 LAST FOUR WEEKS HOW OFTEN DID YOUR ASTHMA SYMPTOMS (WHEEZING, COUGHING, SHORTNESS OF BREATH, CHEST TIGHTNESS OR PAIN) WAKE YOU UP AT NIGHT OR EARLIER THAN USUAL IN THE MORNING: NOT AT ALL
ACT_TOTALSCORE: 25
QUESTION_1 LAST FOUR WEEKS HOW MUCH OF THE TIME DID YOUR ASTHMA KEEP YOU FROM GETTING AS MUCH DONE AT WORK, SCHOOL OR AT HOME: NONE OF THE TIME
QUESTION_4 LAST FOUR WEEKS HOW OFTEN HAVE YOU USED YOUR RESCUE INHALER OR NEBULIZER MEDICATION (SUCH AS ALBUTEROL): NOT AT ALL
QUESTION_2 LAST FOUR WEEKS HOW OFTEN HAVE YOU HAD SHORTNESS OF BREATH: NOT AT ALL
QUESTION_5 LAST FOUR WEEKS HOW WOULD YOU RATE YOUR ASTHMA CONTROL: COMPLETELY CONTROLLED

## 2023-04-21 ENCOUNTER — OFFICE VISIT (OUTPATIENT)
Dept: PEDIATRICS | Facility: CLINIC | Age: 28
End: 2023-04-21
Payer: COMMERCIAL

## 2023-04-21 VITALS
SYSTOLIC BLOOD PRESSURE: 136 MMHG | WEIGHT: 293 LBS | HEIGHT: 68 IN | BODY MASS INDEX: 44.41 KG/M2 | DIASTOLIC BLOOD PRESSURE: 82 MMHG | HEART RATE: 80 BPM | TEMPERATURE: 98.5 F | RESPIRATION RATE: 18 BRPM | OXYGEN SATURATION: 98 %

## 2023-04-21 DIAGNOSIS — E66.01 MORBID OBESITY (H): ICD-10-CM

## 2023-04-21 DIAGNOSIS — Z82.49 FAMILY HISTORY OF ISCHEMIC HEART DISEASE: ICD-10-CM

## 2023-04-21 DIAGNOSIS — Z00.00 HEALTHCARE MAINTENANCE: Primary | ICD-10-CM

## 2023-04-21 DIAGNOSIS — F90.0 ADHD (ATTENTION DEFICIT HYPERACTIVITY DISORDER), INATTENTIVE TYPE: ICD-10-CM

## 2023-04-21 DIAGNOSIS — J45.909 MILD ASTHMA WITHOUT COMPLICATION, UNSPECIFIED WHETHER PERSISTENT: ICD-10-CM

## 2023-04-21 DIAGNOSIS — F41.8 DEPRESSION WITH ANXIETY: ICD-10-CM

## 2023-04-21 DIAGNOSIS — J30.81 ALLERGIC RHINITIS DUE TO ANIMALS: ICD-10-CM

## 2023-04-21 PROCEDURE — 90471 IMMUNIZATION ADMIN: CPT | Performed by: NURSE PRACTITIONER

## 2023-04-21 PROCEDURE — 99395 PREV VISIT EST AGE 18-39: CPT | Mod: 25 | Performed by: NURSE PRACTITIONER

## 2023-04-21 PROCEDURE — 99213 OFFICE O/P EST LOW 20 MIN: CPT | Mod: 25 | Performed by: NURSE PRACTITIONER

## 2023-04-21 PROCEDURE — 90677 PCV20 VACCINE IM: CPT | Performed by: NURSE PRACTITIONER

## 2023-04-21 RX ORDER — DEXTROAMPHETAMINE SACCHARATE, AMPHETAMINE ASPARTATE, DEXTROAMPHETAMINE SULFATE AND AMPHETAMINE SULFATE 2.5; 2.5; 2.5; 2.5 MG/1; MG/1; MG/1; MG/1
10 TABLET ORAL DAILY
Qty: 30 TABLET | Refills: 0 | Status: CANCELLED | OUTPATIENT
Start: 2023-04-21

## 2023-04-21 RX ORDER — ESCITALOPRAM OXALATE 10 MG/1
10 TABLET ORAL DAILY
Qty: 90 TABLET | Refills: 3 | Status: CANCELLED | OUTPATIENT
Start: 2023-04-21

## 2023-04-21 RX ORDER — IPRATROPIUM BROMIDE AND ALBUTEROL SULFATE 2.5; .5 MG/3ML; MG/3ML
1 SOLUTION RESPIRATORY (INHALATION) EVERY 6 HOURS PRN
Qty: 30 ML | Refills: 11 | Status: SHIPPED | OUTPATIENT
Start: 2023-04-21 | End: 2024-07-02

## 2023-04-21 RX ORDER — MONTELUKAST SODIUM 10 MG/1
10 TABLET ORAL AT BEDTIME
Qty: 90 TABLET | Refills: 3 | Status: SHIPPED | OUTPATIENT
Start: 2023-04-21 | End: 2024-07-02

## 2023-04-21 RX ORDER — ALBUTEROL SULFATE 90 UG/1
2 AEROSOL, METERED RESPIRATORY (INHALATION) EVERY 6 HOURS
Qty: 18 G | Refills: 3 | Status: SHIPPED | OUTPATIENT
Start: 2023-04-21 | End: 2024-07-02

## 2023-04-21 RX ORDER — DEXTROAMPHETAMINE SACCHARATE, AMPHETAMINE ASPARTATE MONOHYDRATE, DEXTROAMPHETAMINE SULFATE AND AMPHETAMINE SULFATE 2.5; 2.5; 2.5; 2.5 MG/1; MG/1; MG/1; MG/1
10 CAPSULE, EXTENDED RELEASE ORAL DAILY
Qty: 30 CAPSULE | Refills: 0 | Status: CANCELLED | OUTPATIENT
Start: 2023-04-21

## 2023-04-21 ASSESSMENT — ENCOUNTER SYMPTOMS
CONSTIPATION: 0
ARTHRALGIAS: 0
SHORTNESS OF BREATH: 0
HEMATURIA: 0
PALPITATIONS: 0
COUGH: 0
FREQUENCY: 0
PARESTHESIAS: 0
SORE THROAT: 0
HEMATOCHEZIA: 0
EYE PAIN: 0
NERVOUS/ANXIOUS: 0
WEAKNESS: 0
MYALGIAS: 0
DIARRHEA: 0
FEVER: 0
CHILLS: 0
JOINT SWELLING: 0
DYSURIA: 0
ABDOMINAL PAIN: 0
DIZZINESS: 0
HEARTBURN: 0
BREAST MASS: 0
HEADACHES: 0
NAUSEA: 0

## 2023-04-21 ASSESSMENT — PAIN SCALES - GENERAL: PAINLEVEL: MILD PAIN (2)

## 2023-04-21 NOTE — PROGRESS NOTES
SUBJECTIVE:   CC: Stacey is an 27 year old who presents for preventive health visit.        View : No data to display.              Patient has been advised of split billing requirements and indicates understanding: Yes  Healthy Habits:     Getting at least 3 servings of Calcium per day:  Yes    Bi-annual eye exam:  Yes    Dental care twice a year:  NO    Sleep apnea or symptoms of sleep apnea:  None    Diet:  Gluten-free/reduced    Frequency of exercise:  2-3 days/week    Duration of exercise:  30-45 minutes    Taking medications regularly:  Yes    Medication side effects:  None    PHQ-2 Total Score: 0    Additional concerns today:  No              Today's PHQ-2 Score:       2023     9:36 AM   PHQ-2 (  [employer])   Q1: Little interest or pleasure in doing things 0   Q2: Feeling down, depressed or hopeless 0   PHQ-2 Score 0   Q1: Little interest or pleasure in doing things Not at all   Q2: Feeling down, depressed or hopeless Not at all   PHQ-2 Score 0           Social History     Tobacco Use     Smoking status: Never     Smokeless tobacco: Never   Vaping Use     Vaping status: Never Used   Substance Use Topics     Alcohol use: Yes     Comment: couple times per month will have 3 mixed drinks             2023     9:35 AM   Alcohol Use   Prescreen: >3 drinks/day or >7 drinks/week? No     Reviewed orders with patient.  Reviewed health maintenance and updated orders accordingly - Yes      Breast Cancer Screenin/7/2021     7:30 AM 2022     7:57 PM   Breast CA Risk Assessment (FHS-7)   Do you have a family history of breast, colon, or ovarian cancer? Yes No / Unknown       click delete button to remove this line now  Patient under 40 years of age: Routine Mammogram Screening not recommended.   Pertinent mammograms are reviewed under the imaging tab.    History of abnormal Pap smear: NO - age 21-29 PAP every 3 years recommended      2022     8:09 AM 2019     1:49 PM 2016    12:00  "AM   PAP / HPV   PAP Negative for Intraepithelial Lesion or Malignancy (NILM)       PAP (Historical)  NIL   NIL       Reviewed and updated as needed this visit by clinical staff   Tobacco  Allergies  Meds              Reviewed and updated as needed this visit by Provider                     Review of Systems   Constitutional: Negative for chills and fever.   HENT: Negative for congestion, ear pain, hearing loss and sore throat.    Eyes: Negative for pain and visual disturbance.   Respiratory: Negative for cough and shortness of breath.    Cardiovascular: Negative for chest pain, palpitations and peripheral edema.   Gastrointestinal: Negative for abdominal pain, constipation, diarrhea, heartburn, hematochezia and nausea.   Breasts:  Negative for tenderness, breast mass and discharge.   Genitourinary: Positive for vaginal bleeding. Negative for dysuria, frequency, genital sores, hematuria, pelvic pain, urgency and vaginal discharge.   Musculoskeletal: Negative for arthralgias, joint swelling and myalgias.   Skin: Negative for rash.   Neurological: Negative for dizziness, weakness, headaches and paresthesias.   Psychiatric/Behavioral: Negative for mood changes. The patient is not nervous/anxious.           OBJECTIVE:   /82 (BP Location: Right arm, Patient Position: Sitting, Cuff Size: Adult Large)   Pulse 80   Temp 98.5  F (36.9  C) (Tympanic)   Resp 18   Ht 1.715 m (5' 7.5\")   Wt 138.1 kg (304 lb 8 oz)   LMP 04/20/2023   SpO2 98%   BMI 46.99 kg/m    Physical Exam  GENERAL: healthy, alert and no distress  EYES: Eyes grossly normal to inspection, PERRL and conjunctivae and sclerae normal  HENT: ear canals and TM's normal, nose and mouth without ulcers or lesions  NECK: no adenopathy, no asymmetry, masses, or scars and thyroid normal to palpation  RESP: lungs clear to auscultation - no rales, rhonchi or wheezes  CV: regular rate and rhythm, normal S1 S2, no S3 or S4, no murmur, click or rub, no " "peripheral edema and peripheral pulses strong  ABDOMEN: soft, nontender, no hepatosplenomegaly, no masses and bowel sounds normal  MS: no gross musculoskeletal defects noted, no edema  SKIN: no suspicious lesions or rashes  NEURO: Normal strength and tone, mentation intact and speech normal  PSYCH: mentation appears normal, affect normal/bright    Diagnostic Test Results:  Labs reviewed in Epic    ASSESSMENT/PLAN:   (Z00.00) Healthcare maintenance  (primary encounter diagnosis)  -Vaccines discussed.     (E66.01) Morbid obesity (H)  Comment: Discussed today. Doesn't eat more than 1500 quality calories. Exercises.   Plan: Asked her to discuss GLP-1 agonist with endo    (F90.0) ADHD (attention deficit hyperactivity disorder), inattentive type  Comment: Stable    (J45.909) Mild asthma without complication, unspecified whether persistent  Comment: Stable. Rare use  Plan: albuterol (PROAIR HFA/PROVENTIL HFA/VENTOLIN         HFA) 108 (90 Base) MCG/ACT inhaler, ipratropium        - albuterol 0.5 mg/2.5 mg/3 mL (DUONEB) 0.5-2.5        (3) MG/3ML neb solution            (F41.8) Depression with anxiety  Comment: Stable. Wants to come off lexapro. Recommended slow wean. 1/2 tab x 3 months then off    (J30.81) Allergic rhinitis due to animals  Comment: Stable  Plan: montelukast (SINGULAIR) 10 MG tablet            (Z82.49) Family history of ischemic heart disease  Comment: Dad and brother with early heart dz.  Plan: CT Coronary Calcium Scan                    COUNSELING:  Reviewed preventive health counseling, as reflected in patient instructions      BMI:   Estimated body mass index is 46.99 kg/m  as calculated from the following:    Height as of this encounter: 1.715 m (5' 7.5\").    Weight as of this encounter: 138.1 kg (304 lb 8 oz).   Weight management plan: Patient referred to endocrine and/or weight management specialty      She reports that she has never smoked. She has never used smokeless tobacco.          AVTAR YOU" LUIS FERNANDO MEMBRENO CNP [facility]

## 2023-04-21 NOTE — PATIENT INSTRUCTIONS
Ask about ozempic or equivalent for weight loss. Tell her you don't eat much and exercise.      [facility] Scheduling [phone]    
0 = understands/communicates without difficulty

## 2023-06-01 ENCOUNTER — MYC REFILL (OUTPATIENT)
Dept: PEDIATRICS | Facility: CLINIC | Age: 28
End: 2023-06-01
Payer: COMMERCIAL

## 2023-06-01 DIAGNOSIS — F90.0 ADHD (ATTENTION DEFICIT HYPERACTIVITY DISORDER), INATTENTIVE TYPE: ICD-10-CM

## 2023-06-01 RX ORDER — DEXTROAMPHETAMINE SACCHARATE, AMPHETAMINE ASPARTATE, DEXTROAMPHETAMINE SULFATE AND AMPHETAMINE SULFATE 2.5; 2.5; 2.5; 2.5 MG/1; MG/1; MG/1; MG/1
10 TABLET ORAL DAILY
Qty: 30 TABLET | Refills: 0 | Status: SHIPPED | OUTPATIENT
Start: 2023-06-01 | End: 2023-07-24

## 2023-06-01 RX ORDER — DEXTROAMPHETAMINE SACCHARATE, AMPHETAMINE ASPARTATE MONOHYDRATE, DEXTROAMPHETAMINE SULFATE AND AMPHETAMINE SULFATE 2.5; 2.5; 2.5; 2.5 MG/1; MG/1; MG/1; MG/1
10 CAPSULE, EXTENDED RELEASE ORAL DAILY
Qty: 30 CAPSULE | Refills: 0 | Status: SHIPPED | OUTPATIENT
Start: 2023-06-01 | End: 2023-07-24

## 2023-06-05 ENCOUNTER — LAB (OUTPATIENT)
Dept: LAB | Facility: CLINIC | Age: 28
End: 2023-06-05
Payer: COMMERCIAL

## 2023-06-05 DIAGNOSIS — N18.1 CKD (CHRONIC KIDNEY DISEASE) STAGE 1, GFR 90 ML/MIN OR GREATER: ICD-10-CM

## 2023-06-05 DIAGNOSIS — N18.1 CKD (CHRONIC KIDNEY DISEASE) STAGE 1, GFR 90 ML/MIN OR GREATER: Primary | ICD-10-CM

## 2023-06-05 LAB
ALBUMIN SERPL BCG-MCNC: 4.3 G/DL (ref 3.5–5.2)
ANION GAP SERPL CALCULATED.3IONS-SCNC: 10 MMOL/L (ref 7–15)
BUN SERPL-MCNC: 9.6 MG/DL (ref 6–20)
CALCIUM SERPL-MCNC: 9.8 MG/DL (ref 8.6–10)
CHLORIDE SERPL-SCNC: 104 MMOL/L (ref 98–107)
CREAT SERPL-MCNC: 0.59 MG/DL (ref 0.51–0.95)
DEPRECATED HCO3 PLAS-SCNC: 25 MMOL/L (ref 22–29)
GFR SERPL CREATININE-BSD FRML MDRD: >90 ML/MIN/1.73M2
GLUCOSE SERPL-MCNC: 118 MG/DL (ref 70–99)
PHOSPHATE SERPL-MCNC: 3.7 MG/DL (ref 2.5–4.5)
POTASSIUM SERPL-SCNC: 4 MMOL/L (ref 3.4–5.3)
SODIUM SERPL-SCNC: 139 MMOL/L (ref 136–145)

## 2023-06-05 PROCEDURE — 36415 COLL VENOUS BLD VENIPUNCTURE: CPT | Performed by: PATHOLOGY

## 2023-06-05 PROCEDURE — 82043 UR ALBUMIN QUANTITATIVE: CPT | Mod: 90 | Performed by: PATHOLOGY

## 2023-06-05 PROCEDURE — 80069 RENAL FUNCTION PANEL: CPT | Performed by: PATHOLOGY

## 2023-06-05 PROCEDURE — 82570 ASSAY OF URINE CREATININE: CPT | Mod: 90 | Performed by: PATHOLOGY

## 2023-06-05 PROCEDURE — 99000 SPECIMEN HANDLING OFFICE-LAB: CPT | Performed by: PATHOLOGY

## 2023-06-06 LAB
CREAT UR-MCNC: 24.6 MG/DL
MICROALBUMIN UR-MCNC: <12 MG/L
MICROALBUMIN/CREAT UR: NORMAL MG/G{CREAT}

## 2023-06-09 NOTE — TELEPHONE ENCOUNTER
RECORDS RECEIVED FROM:    DATE RECEIVED:    NOTES STATUS DETAILS   OFFICE NOTE from referring provider  Internal A John 4-21-23   OFFICE NOTE from other cardiologists  N/A    RECORDS from hospital/ED N/A    MEDICATION LIST Internal    GENERAL CARDIO RECORDS   (ALL APPOINTMENT TYPES)     LABS (CBC,BMP,CMP, TSH) Internal    EKG (STRIPS & REPORTS) N/A    MONITORS (STRIPS & REPORTS) N/A    ECHOS (IMAGES AND REPORTS) N/A    STRESS TESTS (IMAGES AND REPORTS) N/A    cMRI (IMAGES AND REPORTS) N/A    CT/CTA (IMAGES AND REPORTS) N/A

## 2023-06-12 ENCOUNTER — VIRTUAL VISIT (OUTPATIENT)
Dept: NEPHROLOGY | Facility: CLINIC | Age: 28
End: 2023-06-12
Attending: INTERNAL MEDICINE
Payer: COMMERCIAL

## 2023-06-12 DIAGNOSIS — E10.21 TYPE 1 DIABETES MELLITUS WITH DIABETIC NEPHROPATHY (H): Primary | ICD-10-CM

## 2023-06-12 DIAGNOSIS — N18.1 CHRONIC KIDNEY DISEASE, STAGE I: ICD-10-CM

## 2023-06-12 PROCEDURE — 99443 PR PHYSICIAN TELEPHONE EVALUATION 21-30 MIN: CPT | Mod: 93 | Performed by: INTERNAL MEDICINE

## 2023-06-12 ASSESSMENT — PAIN SCALES - GENERAL: PAINLEVEL: NO PAIN (0)

## 2023-06-12 NOTE — NURSING NOTE
Is the patient currently in the state of [place]? YES    Visit mode:VIDEO    If the visit is dropped, the patient can be reconnected by: VIDEO VISIT: Text to cell phone: [phone]    Will anyone else be joining the visit? NO      How would you like to obtain your AVS? MyChart    Are changes needed to the allergy or medication list? YES: PT is not taking cholecalciferol, is weaning off escitalopram (currently taking 5mg daily), is also taking a daily probiotic    Reason for visit: RECHECK    Michael Pace

## 2023-06-12 NOTE — PROGRESS NOTES
Virtual Visit Details    Video visit was converted to a telephone visit due to technical difficulties.      Total Telephone Visit Time: 27 minutes    Connor Castaneda MD

## 2023-06-12 NOTE — PROGRESS NOTES
"  Nephrology Clinic - Telephone Visit    Connor Castaneda MD   DOS:2023     Name: Stacey La  MRN: [id number]  Age: 26 year old  : 1995  Referring provider: Amalia Ervin       Assessment and Plan:  1.  CKD, stage 1: Secondary to biopsy proven diabetic nephropathy with preserved kidney function (baseline Cr~0.6 mg/dL, eGFR>90) and well controlled albuminuria (<30 mg/g). However, suspect significant hyperfiltration due to DM 2 and obesity. She has responded well to RAAS blockade with enalapril.     -- Will continue enalapril at 20 mg BID for management of HTN and albuminuria. Monitor closely for unintended pregnancy.    -- Continue good blood pressure and glucose control  -- Encourage weight loss  -- RTC in 6 months     2. HTN/Volume: Home SBP at goal of <120/80.  Euvolemic on exam in past. In the past, suspect minimal lower extremity edema is related to high salt intake in a setting of sodium avid kidneys and venous stasis. She did not have edema today. Suspect an element of \"white coat\" hypertension with her clinic blood pressures.  A 24 hour ambulatory BP monitoring was performed following a previous clinic visit that revealed average overall daily BP of 124/68 confirming white coat hypertension. However, she did not have a night time dipping pattern.       -- Continue low salt diet  -- Continue enalapril at 20 mg BID     3. Diabetes:  Complicated by diabetic nephropathy. No retinopathy or peripheral neuropathy. She has had ~5 episodes of DKA in the past. Recent hgb A1c was 6.6  ().   -- Followed closely by endocrinology     4.  Vitamin D deficiency: Low vitamin D level multiple times in the past. Vit D low normal in May, 2021.   -- Continue cholecalciferol to 2000 units daily  -- Add on Vitamin D to today's lab     5.  Depression/anxiety:  In part, situational and related to stress at home, her job, separation and passing of a loved one and her medical illness.    -- " continue lexapro at 10 mg daily that was started at a previous clinic visit over a year ago.  She is no longer on Buspar and does not take hydroxyzine prn. Agree with reducing/tapering dose as PCP sees fit.    -- Followed closely by her PCP and psychology/psychiatry.     6.  Iron deficiency:  Not anemic (hgb 14.3). Iron studies on low side in past.  Suspect iron deficiency related to menses and possibly decreased GI absorption.    -- She would like to avoid iron supplements for now; encouraged her to increase intake of iron containing foods.    -- Will repeat hgb and iron studies at next visit     7. Acquired hypothyroidism: Patient is not being treated for hypothyroidism with Levothyroxine. Recent TSHwithin normal range.  - f/u with endocrinology      Reason For Visit: CKD follow Up    HPI:   Stacey La is a 27 year old woman with a history of Type 1 DM and celiac disease who presents for follow up regarding chronic kidney disease. She was previously cared for by her pediatric nephrologist, Dr. India Olivas. Today, the patient reports feeling well overall. Her hemoglobin A1c was 6.9 toay1. She is not actively trying for a baby at this point in time and has no immediate plans given that she is now  from her . She is taking lexapro for depression which has been stable.  However, lexapro is being weaned off.  She continues to [employer].  She otherwise is well and has no specific medical complaints. She is now working as a [employer] and has had much satisfaction with her new job.       Review of Systems:   Pertinent items are noted in HPI or as below, remainder of complete ROS is negative.      Active Medications:   Current Outpatient Medications   Medication     albuterol (PROAIR HFA/PROVENTIL HFA/VENTOLIN HFA) 108 (90 Base) MCG/ACT inhaler     amphetamine-dextroamphetamine (ADDERALL XR) 10 MG 24 hr capsule     amphetamine-dextroamphetamine (ADDERALL) 10 MG tablet     blood glucose (NO BRAND SPECIFIED)  "test strip     Blood Pressure Monitoring KIT     Continuous Blood Gluc Sensor (DEXCOM G6 SENSOR) MISC     Continuous Blood Gluc Transmit (DEXCOM G6 TRANSMITTER) MISC     enalapril (VASOTEC) 20 MG tablet     escitalopram (LEXAPRO) 10 MG tablet     Glucagon (GVOKE HYPOPEN) 0.5 MG/0.1ML pen     Glucagon, rDNA, (GLUCAGON EMERGENCY) 1 MG KIT     Insulin Glargine-yfgn 100 UNIT/ML SOLN     Insulin Infusion Pump Supplies (INSULIN PUMP SYRINGE RESERVOIR) MISC     Insulin Infusion Pump Supplies (SURE-T INFUSION SET 23\") MISC     insulin lispro (HUMALOG VIAL) 100 UNIT/ML vial     Insulin Pump Accessories MISC     insulin syringe-needle U-100 (31G X 5/16\" 0.5 ML) 31G X 5/16\" 0.5 ML miscellaneous     insulin syringes, disposable, U-100 0.3 ML MISC     ipratropium - albuterol 0.5 mg/2.5 mg/3 mL (DUONEB) 0.5-2.5 (3) MG/3ML neb solution     KETOSTIX test strip     KETOSTIX test strip     montelukast (SINGULAIR) 10 MG tablet     Multiple Vitamins-Minerals (MULTIVITAMIN ADULT PO)     ASPIRIN NOT PRESCRIBED (INTENTIONAL)     Cholecalciferol 2000 UNITS TBDP     STATIN NOT PRESCRIBED (INTENTIONAL)     No current facility-administered medications for this visit.      Allergies:   Dogs  Dust mites  Gluten meal      Past Medical History:  Anxiety  Asthma  Celiac disease  Chronic kidney disease, stage I  Chronic sinusitis  Diabetes mellitus type 1  Diabetic nephropathy  Hypertension  Hypothyroidism  Pedal edema  Psoriasis  Depression     Past Surgical History:  ENT Surgery  Tonsillectomy, adenoidectomy, combined    Family History:   Father (Diabetes, Myocardial Infarct)  Mother (Celiacs)  Maternal Grandfather (Enlarged heart)    Social History:   The patient is . The patient drinks roughly two times a month. She is currently sexually active with male partners and uses condoms for birth control.The patient has no reported social history of smoking, smokeless tobacco use, or drug use.     Physical Exam:  LMP 04/20/2023    GENERAL " APPEARANCE: alert and no distress  EYES: nonicteric  HENT: mouth without ulcers or lesions  NECK: supple, no adenopathy  RESP: lungs clear to auscultation   CV: regular rhythm, no rub  ABDOMEN: obese, soft, nontender  : No CVA tenderness  Extremities: no significant lower extremity edema  MS: no evidence of inflammation in joints, no muscle tenderness  SKIN: no concerning rash  NEURO: mentation intact and speech normal  PSYCH: affect and mood appropriate     Laboratory:  CMP  Recent Labs   Lab Test 06/05/23  1736 04/15/23  1009 07/24/22  1041 11/15/21  0901 05/07/21  0824 04/30/21  1620 05/19/20  1201 05/17/19  1528 07/26/16  1137 02/20/16  1827 01/16/16  0905 08/04/15  1443    140 139 140 136 138 138 141   < > 139   < >  --    POTASSIUM 4.0 4.8 4.7 4.4 3.9 3.9 4.3 3.6   < > 3.6   < >  --    CHLORIDE 104 105 109 105 104 104 104 107   < > 109   < >  --    CO2 25 28 24 25 26 27 22 25   < > 25   < >  --    ANIONGAP 10 7 6 10 5 7 12 9   < > 5   < >  --    * 112* 97 249* 129* 184* 180* 95   < > 55*   < >  --    BUN 9.6 10.6 6* 9 11 9 9 9   < > 8   < >  --    CR 0.59 0.57 0.51* 0.53 0.59 0.60 0.49* 0.62   < > 0.67   < >  --    GFRESTIMATED >90 >90 >90 >90 >90 >90 >90 >90   < > >90  Non  GFR Calc     < >  --    GFRESTBLACK  --   --   --   --  >90 >90 >90 >90   < > >90  African American GFR Calc     < >  --    FRANKLIN 9.8 9.6 9.1 9.2 9.3 9.4 9.3 9.0   < > 8.3*   < >  --    PHOS 3.7 3.8 3.1 3.0  --  4.0 3.7  --    < >  --    < >  --    PROTTOTAL  --   --   --   --  7.3  --   --  8.3  --  7.5  --   --    ALBUMIN 4.3 4.1 3.6 3.4 3.6 3.7 3.6 4.0   < > 3.6   < >  --    BILITOTAL  --   --   --   --  0.4  --   --  0.2  --  0.2  --   --    ALKPHOS  --   --   --   --  80  --   --  88  --  67  --   --    AST  --   --   --   --  13  --   --  14  --  17  --   --    ALT  --   --   --   --  23  --   --  33  --  26  --  <5*    < > = values in this interval not displayed.     CBC  Recent Labs   Lab Test  04/15/23  1041 07/24/22  1041 11/15/21  0901 04/30/21  1620 05/19/20  1201 06/07/19  0858 05/28/19  1416   HGB 14.3 14.0 14.5 14.4   < > 13.9 13.6   WBC 8.9  --   --  12.4*  --  9.8 12.1*   RBC 5.09  --   --  5.20  --  4.97 4.91   HCT 43.7  --   --  44.3  --  42.6 42.0   MCV 86  --   --  85  --  86 86   MCH 28.1  --   --  27.7  --  28.0 27.7   MCHC 32.7  --   --  32.5  --  32.6 32.4   RDW 12.8  --   --  13.2  --  13.6 13.4     --   --  378  --  359 402    < > = values in this interval not displayed.     INR  No lab results found.     ABG  No lab results found.     URINE STUDIES  Recent Labs   Lab Test 04/15/23  1014 05/17/19  1511   COLOR Light Yellow Straw   APPEARANCE Clear Clear   URINEGLC Negative Negative   URINEBILI Negative Negative   URINEKETONE Negative Negative   SG 1.005 1.002*   UBLD Negative Negative   URINEPH 7.5* 7.0   PROTEIN Negative Negative   NITRITE Negative Negative   LEUKEST Negative Negative   RBCU 0  --    WBCU <1  --      Recent Labs   Lab Test 11/15/21  0912 04/30/21  1626 05/19/20  1201 03/04/19  0900 01/17/18  1517 07/25/17  1457 01/24/17  1533 07/26/16  1135 01/16/16  0906 07/28/15  1522   UTPG 0.17 Unable to calculate due to low value Unable to calculate due to low value Unable to calculate due to low value Unable to calculate due to low value Unable to calculate due to low value Unable to calculate due to low value 0.23* 0.15 Unable to calculate due to low value     PTH  Recent Labs   Lab Test 04/15/23  1009 07/24/22  1041 04/30/21  1620 05/19/20  1201 01/17/18  1515 01/24/17  1525 01/16/16  0905   PTHI 67* 49 51 50 60 24 31     IRON STUDIES   Recent Labs   Lab Test 05/19/20  1201 01/17/18  1515 07/25/17  1455 07/26/16  1137 01/16/16  0905   IRON 81 58 50 60 58    417 374 423 412   IRONSAT 20 14* 13* 14* 14*   DIPESH 26 13 26 13  --      Imaging:   Not applicable to this visit.     All the above labs were reviewed by me.   Connor Castaneda MD   [phone]

## 2023-06-12 NOTE — LETTER
"2023       RE: Stacey Mantilla  [address]  [facility] [address]     Dear Colleague,    Thank you for referring your patient, Stacey Mantilla, to the [facility] [place] at [facility]. Please see a copy of my visit note below.    Virtual Visit Details    Video visit was converted to a telephone visit due to technical difficulties.      Total Telephone Visit Time: 27 minutes    Connor Castaneda MD       Nephrology Clinic - Telephone Visit    Connor Castaneda MD   DOS:2023     Name: Stacey La  MRN: [id number]  Age: 26 year old  : 1995  Referring provider: Amalia Ervin       Assessment and Plan:  1.  CKD, stage 1: Secondary to biopsy proven diabetic nephropathy with preserved kidney function (baseline Cr~0.6 mg/dL, eGFR>90) and well controlled albuminuria (<30 mg/g). However, suspect significant hyperfiltration due to DM 2 and obesity. She has responded well to RAAS blockade with enalapril.     -- Will continue enalapril at 20 mg BID for management of HTN and albuminuria. Monitor closely for unintended pregnancy.    -- Continue good blood pressure and glucose control  -- Encourage weight loss  -- RTC in 6 months     2. HTN/Volume: Home SBP at goal of <120/80.  Euvolemic on exam in past. In the past, suspect minimal lower extremity edema is related to high salt intake in a setting of sodium avid kidneys and venous stasis. She did not have edema today. Suspect an element of \"white coat\" hypertension with her clinic blood pressures.  A 24 hour ambulatory BP monitoring was performed following a previous clinic visit that revealed average overall daily BP of 124/68 confirming white coat hypertension. However, she did not have a night time dipping pattern.       -- Continue low salt diet  -- Continue enalapril at 20 mg BID     3. Diabetes:  Complicated by diabetic nephropathy. No retinopathy or " peripheral neuropathy. She has had ~5 episodes of DKA in the past. Recent hgb A1c was 6.6  (March, 2023).   -- Followed closely by endocrinology     4.  Vitamin D deficiency: Low vitamin D level multiple times in the past. Vit D low normal in May, 2021.   -- Continue cholecalciferol to 2000 units daily  -- Add on Vitamin D to today's lab     5.  Depression/anxiety:  In part, situational and related to stress at home, her job, separation and passing of a loved one and her medical illness.    -- continue lexapro at 10 mg daily that was started at a previous clinic visit over a year ago.  She is no longer on Buspar and does not take hydroxyzine prn. Agree with reducing/tapering dose as PCP sees fit.    -- Followed closely by her PCP and psychology/psychiatry.     6.  Iron deficiency:  Not anemic (hgb 14.3). Iron studies on low side in past.  Suspect iron deficiency related to menses and possibly decreased GI absorption.    -- She would like to avoid iron supplements for now; encouraged her to increase intake of iron containing foods.    -- Will repeat hgb and iron studies at next visit     7. Acquired hypothyroidism: Patient is not being treated for hypothyroidism with Levothyroxine. Recent TSHwithin normal range.  - f/u with endocrinology      Reason For Visit: CKD follow Up    HPI:   Stacey La is a 27 year old woman with a history of Type 1 DM and celiac disease who presents for follow up regarding chronic kidney disease. She was previously cared for by her pediatric nephrologist, Dr. India Olivas. Today, the patient reports feeling well overall. Her hemoglobin A1c was 6.9 toay1. She is not actively trying for a baby at this point in time and has no immediate plans given that she is now  from her . She is taking lexapro for depression which has been stable.  However, lexapro is being weaned off.  She continues to Nanny.  She otherwise is well and has no specific medical complaints. 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       Review of Systems:   Pertinent items are noted in HPI or as below, remainder of complete ROS is negative.      Active Medications:   Current Outpatient Medications   Medication     albuterol (PROAIR HFA/PROVENTIL HFA/VENTOLIN HFA) 108 (90 Base) MCG/ACT inhaler     amphetamine-dextroamphetamine (ADDERALL XR) 10 MG 24 hr capsule     amphetamine-dextroamphetamine (ADDERALL) 10 MG tablet     blood glucose (NO BRAND SPECIFIED) test strip     Blood Pressure Monitoring KIT     Continuous Blood Gluc Sensor (DEXCOM G6 SENSOR) MISC     Continuous Blood Gluc Transmit (DEXCOM G6 TRANSMITTER) MISC     enalapril (VASOTEC) 20 MG tablet     escitalopram (LEXAPRO) 10 MG tablet     Glucagon (GVOKE HYPOPEN) 0.5 MG/0.1ML pen     Glucagon, rDNA, (GLUCAGON EMERGENCY) 1 MG KIT     Insulin Glargine-yfgn 100 UNIT/ML SOLN     Insulin Infusion Pump Supplies (INSULIN PUMP SYRINGE RESERVOIR) MISC     Insulin Infusion Pump Supplies (SURE-T INFUSION SET 23\") MISC     insulin lispro (HUMALOG VIAL) 100 UNIT/ML vial     Insulin Pump Accessories MISC     insulin syringe-needle U-100 (31G X 5/16\" 0.5 ML) 31G X 5/16\" 0.5 ML miscellaneous     insulin syringes, disposable, U-100 0.3 ML MISC     ipratropium - albuterol 0.5 mg/2.5 mg/3 mL (DUONEB) 0.5-2.5 (3) MG/3ML neb solution     KETOSTIX test strip     KETOSTIX test strip     montelukast (SINGULAIR) 10 MG tablet     Multiple Vitamins-Minerals (MULTIVITAMIN ADULT PO)     ASPIRIN NOT PRESCRIBED (INTENTIONAL)     Cholecalciferol 2000 UNITS TBDP     STATIN NOT PRESCRIBED (INTENTIONAL)     No current facility-administered medications for this visit.      Allergies:   Dogs  Dust mites  Gluten meal      Past Medical History:  Anxiety  Asthma  Celiac disease  Chronic kidney disease, stage I  Chronic sinusitis  Diabetes mellitus type 1  Diabetic nephropathy  Hypertension  Hypothyroidism  Pedal edema  Psoriasis  Depression     Past Surgical " History:  ENT Surgery  Tonsillectomy, adenoidectomy, combined    Family History:   Father (Diabetes, Myocardial Infarct)  Mother (Celiacs)  Maternal Grandfather (Enlarged heart)    Social History:   The patient is . The patient drinks roughly two times a month. She is currently sexually active with male partners and uses condoms for birth control.The patient has no reported social history of smoking, smokeless tobacco use, or drug use.     Physical Exam:  LMP 04/20/2023    GENERAL APPEARANCE: alert and no distress  EYES: nonicteric  HENT: mouth without ulcers or lesions  NECK: supple, no adenopathy  RESP: lungs clear to auscultation   CV: regular rhythm, no rub  ABDOMEN: obese, soft, nontender  : No CVA tenderness  Extremities: no significant lower extremity edema  MS: no evidence of inflammation in joints, no muscle tenderness  SKIN: no concerning rash  NEURO: mentation intact and speech normal  PSYCH: affect and mood appropriate     Laboratory:  CMP  Recent Labs   Lab Test 06/05/23  1736 04/15/23  1009 07/24/22  1041 11/15/21  0901 05/07/21  0824 04/30/21  1620 05/19/20  1201 05/17/19  1528 07/26/16  1137 02/20/16  1827 01/16/16  0905 08/04/15  1443    140 139 140 136 138 138 141   < > 139   < >  --    POTASSIUM 4.0 4.8 4.7 4.4 3.9 3.9 4.3 3.6   < > 3.6   < >  --    CHLORIDE 104 105 109 105 104 104 104 107   < > 109   < >  --    CO2 25 28 24 25 26 27 22 25   < > 25   < >  --    ANIONGAP 10 7 6 10 5 7 12 9   < > 5   < >  --    * 112* 97 249* 129* 184* 180* 95   < > 55*   < >  --    BUN 9.6 10.6 6* 9 11 9 9 9   < > 8   < >  --    CR 0.59 0.57 0.51* 0.53 0.59 0.60 0.49* 0.62   < > 0.67   < >  --    GFRESTIMATED >90 >90 >90 >90 >90 >90 >90 >90   < > >90  Non  GFR Calc     < >  --    GFRESTBLACK  --   --   --   --  >90 >90 >90 >90   < > >90  African American GFR Calc     < >  --    FRANKLIN 9.8 9.6 9.1 9.2 9.3 9.4 9.3 9.0   < > 8.3*   < >  --    PHOS 3.7 3.8 3.1 3.0  --  4.0 3.7  --     < >  --    < >  --    PROTTOTAL  --   --   --   --  7.3  --   --  8.3  --  7.5  --   --    ALBUMIN 4.3 4.1 3.6 3.4 3.6 3.7 3.6 4.0   < > 3.6   < >  --    BILITOTAL  --   --   --   --  0.4  --   --  0.2  --  0.2  --   --    ALKPHOS  --   --   --   --  80  --   --  88  --  67  --   --    AST  --   --   --   --  13  --   --  14  --  17  --   --    ALT  --   --   --   --  23  --   --  33  --  26  --  <5*    < > = values in this interval not displayed.     CBC  Recent Labs   Lab Test 04/15/23  1041 07/24/22  1041 11/15/21  0901 04/30/21  1620 05/19/20  1201 06/07/19  0858 05/28/19  1416   HGB 14.3 14.0 14.5 14.4   < > 13.9 13.6   WBC 8.9  --   --  12.4*  --  9.8 12.1*   RBC 5.09  --   --  5.20  --  4.97 4.91   HCT 43.7  --   --  44.3  --  42.6 42.0   MCV 86  --   --  85  --  86 86   MCH 28.1  --   --  27.7  --  28.0 27.7   MCHC 32.7  --   --  32.5  --  32.6 32.4   RDW 12.8  --   --  13.2  --  13.6 13.4     --   --  378  --  359 402    < > = values in this interval not displayed.     INR  No lab results found.     ABG  No lab results found.     URINE STUDIES  Recent Labs   Lab Test 04/15/23  1014 05/17/19  1511   COLOR Light Yellow Straw   APPEARANCE Clear Clear   URINEGLC Negative Negative   URINEBILI Negative Negative   URINEKETONE Negative Negative   SG 1.005 1.002*   UBLD Negative Negative   URINEPH 7.5* 7.0   PROTEIN Negative Negative   NITRITE Negative Negative   LEUKEST Negative Negative   RBCU 0  --    WBCU <1  --      Recent Labs   Lab Test 11/15/21  0912 04/30/21  1626 05/19/20  1201 03/04/19  0900 01/17/18  1517 07/25/17  1457 01/24/17  1533 07/26/16  1135 01/16/16  0906 07/28/15  1522   UTPG 0.17 Unable to calculate due to low value Unable to calculate due to low value Unable to calculate due to low value Unable to calculate due to low value Unable to calculate due to low value Unable to calculate due to low value 0.23* 0.15 Unable to calculate due to low value     PTH  Recent Labs   Lab Test  04/15/23  1009 07/24/22  1041 04/30/21  1620 05/19/20  1201 01/17/18  1515 01/24/17  1525 01/16/16  0905   PTHI 67* 49 51 50 60 24 31     IRON STUDIES   Recent Labs   Lab Test 05/19/20  1201 01/17/18  1515 07/25/17  1455 07/26/16  1137 01/16/16  0905   IRON 81 58 50 60 58    417 374 423 412   IRONSAT 20 14* 13* 14* 14*   DIPESH 26 13 26 13  --      Imaging:   Not applicable to this visit.     All the above labs were reviewed by me.   Connor Castaneda MD   [phone]      Again, thank you for allowing me to participate in the care of your patient.      Sincerely,    Connor Castaneda MD

## 2023-06-21 ENCOUNTER — MYC MEDICAL ADVICE (OUTPATIENT)
Dept: ENDOCRINOLOGY | Facility: CLINIC | Age: 28
End: 2023-06-21
Payer: COMMERCIAL

## 2023-06-21 DIAGNOSIS — E10.29 TYPE 1 DIABETES MELLITUS WITH OTHER KIDNEY COMPLICATION (H): ICD-10-CM

## 2023-06-22 ENCOUNTER — TELEPHONE (OUTPATIENT)
Dept: ENDOCRINOLOGY | Facility: CLINIC | Age: 28
End: 2023-06-22

## 2023-06-22 RX ORDER — PROCHLORPERAZINE 25 MG/1
SUPPOSITORY RECTAL
Qty: 3 EACH | Refills: 11 | Status: SHIPPED | OUTPATIENT
Start: 2023-06-22 | End: 2023-10-13

## 2023-06-22 NOTE — TELEPHONE ENCOUNTER
PA Initiation    Medication: DEXCOM G6 SENSOR MISC  Insurance Company: [employer] - Phone [phone] Fax [phone]  Pharmacy Filling the Rx:    Filling Pharmacy Phone:    Filling Pharmacy Fax:    Start Date: 6/22/2023    Key: [id number]

## 2023-06-23 NOTE — TELEPHONE ENCOUNTER
Prior Authorization Approval    Medication: DEXCOM G6 SENSOR MISC  Authorization Effective Date: 5/23/2023  Authorization Expiration Date: 6/21/2024  Approved Dose/Quantity: uud   Reference #: Key: [id number]   Insurance Company: [employer] - Phone [phone] Fax [phone]  Expected CoPay:       CoPay Card Available:      Financial Assistance Needed:    Which Pharmacy is filling the prescription:    Pharmacy Notified:    Patient Notified:

## 2023-06-29 DIAGNOSIS — F41.8 DEPRESSION WITH ANXIETY: ICD-10-CM

## 2023-06-29 RX ORDER — ESCITALOPRAM OXALATE 10 MG/1
10 TABLET ORAL DAILY
Qty: 90 TABLET | Refills: 3 | Status: SHIPPED | OUTPATIENT
Start: 2023-06-29 | End: 2024-07-02

## 2023-06-29 NOTE — TELEPHONE ENCOUNTER
Routing refill request to provider for review/approval because:  Patient does not have a PHQ-9 score less than 5 on file in the past 6 months.         2/3/2022    10:20 AM   PHQ   PHQ-9 Total Score 7   Q9: Thoughts of better off dead/self-harm past 2 weeks Not at all     Marlyn CORDOVA RN   [facility]

## 2023-07-14 ENCOUNTER — PRE VISIT (OUTPATIENT)
Dept: CARDIOLOGY | Facility: CLINIC | Age: 28
End: 2023-07-14
Payer: COMMERCIAL

## 2023-07-14 ENCOUNTER — OFFICE VISIT (OUTPATIENT)
Dept: CARDIOLOGY | Facility: CLINIC | Age: 28
End: 2023-07-14
Attending: NURSE PRACTITIONER
Payer: COMMERCIAL

## 2023-07-14 VITALS
HEART RATE: 94 BPM | DIASTOLIC BLOOD PRESSURE: 84 MMHG | BODY MASS INDEX: 44.41 KG/M2 | HEIGHT: 68 IN | WEIGHT: 293 LBS | OXYGEN SATURATION: 97 % | SYSTOLIC BLOOD PRESSURE: 124 MMHG

## 2023-07-14 DIAGNOSIS — E78.5 HYPERLIPIDEMIA, UNSPECIFIED HYPERLIPIDEMIA TYPE: ICD-10-CM

## 2023-07-14 DIAGNOSIS — Z82.49 FAMILY HISTORY OF ANEURYSM: ICD-10-CM

## 2023-07-14 DIAGNOSIS — Z82.49 FAMILY HISTORY OF ISCHEMIC HEART DISEASE: ICD-10-CM

## 2023-07-14 DIAGNOSIS — R01.1 HEART MURMUR: Primary | ICD-10-CM

## 2023-07-14 PROCEDURE — G0463 HOSPITAL OUTPT CLINIC VISIT: HCPCS | Performed by: STUDENT IN AN ORGANIZED HEALTH CARE EDUCATION/TRAINING PROGRAM

## 2023-07-14 PROCEDURE — 99205 OFFICE O/P NEW HI 60 MIN: CPT | Performed by: STUDENT IN AN ORGANIZED HEALTH CARE EDUCATION/TRAINING PROGRAM

## 2023-07-14 RX ORDER — ATORVASTATIN CALCIUM 40 MG/1
40 TABLET, FILM COATED ORAL DAILY
Qty: 90 TABLET | Refills: 3 | Status: SHIPPED | OUTPATIENT
Start: 2023-07-14 | End: 2024-08-01

## 2023-07-14 ASSESSMENT — PAIN SCALES - GENERAL: PAINLEVEL: NO PAIN (0)

## 2023-07-14 NOTE — PROGRESS NOTES
Chief complaint: HLD    HPI:   Stacey Mantilla is a 28 year old female with history of fam hx ischemic heart disease + for prevention/wellness.     PMH: DM, obesity, asthma, depression, anxiety, celiac disease, ADHD    Patient reports strong history of aneurysm and had a murmur in the past.     PAST MEDICAL HISTORY:  Past Medical History:   Diagnosis Date     Asthma     Currently no treatment needed     Celiac disease      Chronic kidney disease, stage I 8/3/2015     Chronic sinusitis      Diabetes mellitus type 1 (H)      Diabetic nephropathy (H)      Family history of heart disease 8/3/2015     Hypertension      Hypothyroid      Pedal edema      Psoriasis        CURRENT MEDICATIONS:     PAST SURGICAL HISTORY:  Past Surgical History:   Procedure Laterality Date     ENT SURGERY       TONSILLECTOMY, ADENOIDECTOMY, COMBINED         ALLERGIES:     Allergies   Allergen Reactions     Dogs Other (See Comments)     Sinus symptoms      Dust Mites      Sinus      Gluten Meal        FAMILY HISTORY:  Family History   Problem Relation Age of Onset     Gastrointestinal Disease Mother         Celiacs     Obesity Mother      Depression Mother      Fibromyalgia Mother      Cardiovascular Father 48        MI, stents, diabetic, type 2     Diabetes Father      Coronary Artery Disease Father      Obesity Father      Depression Father      Substance Abuse Father      Lung Cancer Father         Smoker     Substance Abuse Sister      Bipolar Disorder Brother      Schizophrenia Brother      Myocardial Infarction Brother 50        Possibly MI     Skin Cancer Maternal Grandmother      Cardiovascular Maternal Grandfather         Englarged heart, pacemaker, defib     Skin Cancer Maternal Grandfather      Obesity Maternal Half-Sister      Heart Disease Paternal Half-Brother 45     Melanoma No family hx of      Glaucoma No family hx of      Macular Degeneration No family hx of        SOCIAL HISTORY:  Social History     Tobacco Use     Smoking  "status: Never     Smokeless tobacco: Never   Vaping Use     Vaping Use: Never used   Substance Use Topics     Alcohol use: Yes     Comment: couple times per month will have 3 mixed drinks     Drug use: Yes     Types: Marijuana       ROS:   A comprehensive 14 point review of systems is negative other than as mentioned in HPI.    Exam:  /84 (BP Location: Right arm, Patient Position: Sitting, Cuff Size: Adult Large)   Pulse 94   Ht 1.735 m (5' 8.31\")   Wt 139.3 kg (307 lb 3.2 oz)   SpO2 97%   BMI 46.29 kg/m    GENERAL APPEARANCE: healthy, alert and no distress  EYES: no icterus, no xanthelasmas  NECK: JVP not elevated  RESPIRATORY: lungs clear to auscultation - no rales, rhonchi or wheezes, no use of accessory muscles, no retractions, respirations are unlabored, normal respiratory rate  CARDIOVASCULAR: regular rhythm, normal S1 with physiologic split S2, no S3 or S4 and no murmur, click or rub.  EXTREMITIES: no edema, no bruits    Labs:  Reviewed.   LDL Cholesterol Calculated   Date Value Ref Range Status   04/15/2023 123 (H) <=100 mg/dL Final   05/07/2021 155 (H) <100 mg/dL Final     Comment:     Above desirable:  100-129 mg/dl  Borderline High:  130-159 mg/dL  High:             160-189 mg/dL  Very high:       >189 mg/dl           Assessment and Plan:     Family history of aneurysm   MRA chest abdomen and pelvis    Hyperlipidemia  - Atorvastatin 40 mg, she is aware of the risks and is not planning on pregnancy at this point, discussed the importance of stopping statin if planning on it.     Evaluate for structural heart   - Echo     Chart review time: 30 minutes  Visit time: 30 minutes  Total time spent: 60 minutes  >50% of this 30-minute visit were spent with the patient on in-person counseling and discussion regarding Aneurysm.     The patient states understanding and is agreeable with plan.     Rock Hoffman MD  Cardiology    CC  [facility]      "

## 2023-07-14 NOTE — NURSING NOTE
Chief Complaint   Patient presents with     New Patient      referred d/t fam hx ischemic heart disease + for prevention/wellness          Vitals were taken and medications reconciled.     Monique Sandhu, Visit Facilitator    9:33 AM

## 2023-07-14 NOTE — NURSING NOTE
July 14, 2023      Medication Changes: Start atorvastatin 40 mg daily      Follow Up:   -Echocardiogram when able  -MRA of chest/abdomen/pelvis when able  -Follow up with Dr. Hoffman in 3 months with fasting labs prior to visit      Patient verbalized understanding of all health information given and agreed to call with further questions or concerns.      Risa Sanabria RN

## 2023-07-14 NOTE — LETTER
7/14/2023      RE: Stacey Mantilla  [address]  [facility] [address]       Dear Colleague,    Thank you for the opportunity to participate in the care of your patient, Stacey Mantilla, at the [facility] [place] [facility]. Please see a copy of my visit note below.    Chief complaint: HLD    HPI:   Stacey Mantilla is a 28 year old female with history of fam hx ischemic heart disease + for prevention/wellness.     PMH: DM, obesity, asthma, depression, anxiety, celiac disease, ADHD    Patient reports strong history of aneurysm and had a murmur in the past.     PAST MEDICAL HISTORY:  Past Medical History:   Diagnosis Date     Asthma     Currently no treatment needed     Celiac disease      Chronic kidney disease, stage I 8/3/2015     Chronic sinusitis      Diabetes mellitus type 1 (H)      Diabetic nephropathy (H)      Family history of heart disease 8/3/2015     Hypertension      Hypothyroid      Pedal edema      Psoriasis        CURRENT MEDICATIONS:     PAST SURGICAL HISTORY:  Past Surgical History:   Procedure Laterality Date     ENT SURGERY       TONSILLECTOMY, ADENOIDECTOMY, COMBINED         ALLERGIES:     Allergies   Allergen Reactions     Dogs Other (See Comments)     Sinus symptoms      Dust Mites      Sinus      Gluten Meal        FAMILY HISTORY:  Family History   Problem Relation Age of Onset     Gastrointestinal Disease Mother         Celiacs     Obesity Mother      Depression Mother      Fibromyalgia Mother      Cardiovascular Father 48        MI, stents, diabetic, type 2     Diabetes Father      Coronary Artery Disease Father      Obesity Father      Depression Father      Substance Abuse Father      Lung Cancer Father         Smoker     Substance Abuse Sister      Bipolar Disorder Brother      Schizophrenia Brother      Myocardial Infarction Brother 50        Possibly MI     Skin Cancer Maternal Grandmother      Cardiovascular Maternal  "Grandfather         Englarged heart, pacemaker, defib     Skin Cancer Maternal Grandfather      Obesity Maternal Half-Sister      Heart Disease Paternal Half-Brother 45     Melanoma No family hx of      Glaucoma No family hx of      Macular Degeneration No family hx of        SOCIAL HISTORY:  Social History     Tobacco Use     Smoking status: Never     Smokeless tobacco: Never   Vaping Use     Vaping Use: Never used   Substance Use Topics     Alcohol use: Yes     Comment: couple times per month will have 3 mixed drinks     Drug use: Yes     Types: Marijuana       ROS:   A comprehensive 14 point review of systems is negative other than as mentioned in HPI.    Exam:  /84 (BP Location: Right arm, Patient Position: Sitting, Cuff Size: Adult Large)   Pulse 94   Ht 1.735 m (5' 8.31\")   Wt 139.3 kg (307 lb 3.2 oz)   SpO2 97%   BMI 46.29 kg/m    GENERAL APPEARANCE: healthy, alert and no distress  EYES: no icterus, no xanthelasmas  NECK: JVP not elevated  RESPIRATORY: lungs clear to auscultation - no rales, rhonchi or wheezes, no use of accessory muscles, no retractions, respirations are unlabored, normal respiratory rate  CARDIOVASCULAR: regular rhythm, normal S1 with physiologic split S2, no S3 or S4 and no murmur, click or rub.  EXTREMITIES: no edema, no bruits    Labs:  Reviewed.   LDL Cholesterol Calculated   Date Value Ref Range Status   04/15/2023 123 (H) <=100 mg/dL Final   05/07/2021 155 (H) <100 mg/dL Final     Comment:     Above desirable:  100-129 mg/dl  Borderline High:  130-159 mg/dL  High:             160-189 mg/dL  Very high:       >189 mg/dl           Assessment and Plan:     Family history of aneurysm   MRA chest abdomen and pelvis    Hyperlipidemia  - Atorvastatin 40 mg, she is aware of the risks and is not planning on pregnancy at this point, discussed the importance of stopping statin if planning on it.     Evaluate for structural heart   - Echo     Chart review time: 30 minutes  Visit time: " 30 minutes  Total time spent: 60 minutes  >50% of this 30-minute visit were spent with the patient on in-person counseling and discussion regarding Aneurysm.     The patient states understanding and is agreeable with plan.     Rock Hoffman MD  Cardiology    CC  [facility]        Please do not hesitate to contact me if you have any questions/concerns.     Sincerely,     Yasmin Oliva MD

## 2023-07-14 NOTE — PATIENT INSTRUCTIONS
July 14, 2023    Cardiology Provider You Saw During Your Visit: Dr. Hoffman      Medication Changes: Start atorvastatin 40 mg daily      Follow Up:   -Echocardiogram when able  -MRA of chest/abdomen/pelvis when able  -Follow up with Dr. Hoffman in 3 months with fasting labs prior to visit      Follow the American Heart Association Diet and Lifestyle Recommendations:  -Limit saturated fat, trans fat, sodium, red meat, sweets and sugar-sweetened beverages. If you choose to eat red meat, compare labels and select the leanest cuts available.  -Aim for at least 150 minutes of moderate physical activity or 75 minutes of vigorous physical activity - or an equal combination of both - each week.      To Reach Us:  -During business hours: [phone], press option # 1 to schedule an appointment or to leave a message for your care team.     -After hours, weekends or holidays: [phone], press option #4 and ask to speak to the on-call cardiologist. Inform them you are a patient at the [facility].        **If you have a cardiac device, please make sure you schedule an in-person device check just prior to your cardiology provider appointments**        Risa Sanabria RN  Cardiology Care Coordinator - General Cardiology  [facility]

## 2023-07-24 ENCOUNTER — MYC REFILL (OUTPATIENT)
Dept: PEDIATRICS | Facility: CLINIC | Age: 28
End: 2023-07-24
Payer: COMMERCIAL

## 2023-07-24 DIAGNOSIS — F90.0 ADHD (ATTENTION DEFICIT HYPERACTIVITY DISORDER), INATTENTIVE TYPE: ICD-10-CM

## 2023-07-24 DIAGNOSIS — F90.0 ATTENTION DEFICIT HYPERACTIVITY DISORDER (ADHD), PREDOMINANTLY INATTENTIVE TYPE: ICD-10-CM

## 2023-07-24 RX ORDER — DEXTROAMPHETAMINE SACCHARATE, AMPHETAMINE ASPARTATE, DEXTROAMPHETAMINE SULFATE AND AMPHETAMINE SULFATE 2.5; 2.5; 2.5; 2.5 MG/1; MG/1; MG/1; MG/1
10 TABLET ORAL DAILY
Qty: 30 TABLET | Refills: 0 | Status: SHIPPED | OUTPATIENT
Start: 2023-07-25 | End: 2023-10-31

## 2023-07-24 RX ORDER — DEXTROAMPHETAMINE SACCHARATE, AMPHETAMINE ASPARTATE MONOHYDRATE, DEXTROAMPHETAMINE SULFATE AND AMPHETAMINE SULFATE 2.5; 2.5; 2.5; 2.5 MG/1; MG/1; MG/1; MG/1
10 CAPSULE, EXTENDED RELEASE ORAL DAILY
Qty: 30 CAPSULE | Refills: 0 | Status: SHIPPED | OUTPATIENT
Start: 2023-07-25 | End: 2023-10-31

## 2023-07-24 NOTE — TELEPHONE ENCOUNTER
Routing refill request to provider for review/approval because:  Drug not on the [facility] refill protocol     Gutierrez BAIN RN 7/24/2023 at 10:49 AM

## 2023-09-24 ENCOUNTER — MYC MEDICAL ADVICE (OUTPATIENT)
Dept: PEDIATRICS | Facility: CLINIC | Age: 28
End: 2023-09-24
Payer: COMMERCIAL

## 2023-09-24 DIAGNOSIS — G47.9 SLEEP DISTURBANCE: ICD-10-CM

## 2023-09-24 DIAGNOSIS — E66.01 MORBID OBESITY WITH BMI OF 45.0-49.9, ADULT (H): ICD-10-CM

## 2023-09-24 DIAGNOSIS — E66.01 MORBID OBESITY (H): Primary | ICD-10-CM

## 2023-09-25 NOTE — TELEPHONE ENCOUNTER
"Covering Provider: See pt message and advise. I see a sleep study referral was signed in 2019 for sleep apnea, and the following symptoms: obesity, witnessed apnea, snoring, LEANDRO, nocturnal dyspnea, AM headaches, Elevated BP, HTN, Diabetes.    Most recent OV (04/21/2023), discussed weight. No schedule future visit.      - Camacho \"Deni\" Jadiel (he/him/his), RN - Patient Advocate Liason (PAL)  [facility]    "

## 2023-09-26 ENCOUNTER — ANCILLARY ORDERS (OUTPATIENT)
Dept: CARDIOLOGY | Facility: CLINIC | Age: 28
End: 2023-09-26

## 2023-09-26 ENCOUNTER — HOSPITAL ENCOUNTER (OUTPATIENT)
Dept: MRI IMAGING | Facility: CLINIC | Age: 28
Discharge: HOME | End: 2023-09-26
Attending: STUDENT IN AN ORGANIZED HEALTH CARE EDUCATION/TRAINING PROGRAM
Payer: COMMERCIAL

## 2023-09-26 ENCOUNTER — HOSPITAL ENCOUNTER (OUTPATIENT)
Dept: CARDIOLOGY | Facility: CLINIC | Age: 28
Discharge: HOME | End: 2023-09-26
Attending: STUDENT IN AN ORGANIZED HEALTH CARE EDUCATION/TRAINING PROGRAM
Payer: COMMERCIAL

## 2023-09-26 DIAGNOSIS — N18.1 CHRONIC KIDNEY DISEASE, STAGE I: ICD-10-CM

## 2023-09-26 DIAGNOSIS — Z82.49 FAMILY HISTORY OF ANEURYSM: ICD-10-CM

## 2023-09-26 DIAGNOSIS — Z82.49 FAMILY HISTORY OF HEART DISEASE: Primary | ICD-10-CM

## 2023-09-26 DIAGNOSIS — R01.1 HEART MURMUR: ICD-10-CM

## 2023-09-26 DIAGNOSIS — Z82.49 FAMILY HISTORY OF ANEURYSM: Primary | ICD-10-CM

## 2023-09-26 LAB — LVEF ECHO: NORMAL

## 2023-09-26 PROCEDURE — 71555 MRI ANGIO CHEST W OR W/O DYE: CPT

## 2023-09-26 PROCEDURE — 74185 MRA ABD W OR W/O CNTRST: CPT

## 2023-09-26 PROCEDURE — 255N000002 HC RX 255 OP 636: Performed by: STUDENT IN AN ORGANIZED HEALTH CARE EDUCATION/TRAINING PROGRAM

## 2023-09-26 PROCEDURE — A9585 GADOBUTROL INJECTION: HCPCS | Mod: JZ | Performed by: STUDENT IN AN ORGANIZED HEALTH CARE EDUCATION/TRAINING PROGRAM

## 2023-09-26 PROCEDURE — 255N000002 HC RX 255 OP 636: Mod: JZ | Performed by: STUDENT IN AN ORGANIZED HEALTH CARE EDUCATION/TRAINING PROGRAM

## 2023-09-26 PROCEDURE — 74185 MRA ABD W OR W/O CNTRST: CPT | Mod: 26 | Performed by: STUDENT IN AN ORGANIZED HEALTH CARE EDUCATION/TRAINING PROGRAM

## 2023-09-26 PROCEDURE — 71555 MRI ANGIO CHEST W OR W/O DYE: CPT | Mod: 26 | Performed by: STUDENT IN AN ORGANIZED HEALTH CARE EDUCATION/TRAINING PROGRAM

## 2023-09-26 PROCEDURE — 93306 TTE W/DOPPLER COMPLETE: CPT | Mod: 26 | Performed by: STUDENT IN AN ORGANIZED HEALTH CARE EDUCATION/TRAINING PROGRAM

## 2023-09-26 RX ORDER — GADOBUTROL 604.72 MG/ML
10 INJECTION INTRAVENOUS ONCE
Status: COMPLETED | OUTPATIENT
Start: 2023-09-26 | End: 2023-09-26

## 2023-09-26 RX ADMIN — HUMAN ALBUMIN MICROSPHERES AND PERFLUTREN 5 ML: 10; .22 INJECTION, SOLUTION INTRAVENOUS at 14:12

## 2023-09-26 RX ADMIN — GADOBUTROL 10 ML: 604.72 INJECTION INTRAVENOUS at 14:42

## 2023-09-26 RX ADMIN — GADOBUTROL 10 ML: 604.72 INJECTION INTRAVENOUS at 14:41

## 2023-09-27 ENCOUNTER — OFFICE VISIT (OUTPATIENT)
Dept: URGENT CARE | Facility: URGENT CARE | Age: 28
End: 2023-09-27
Payer: COMMERCIAL

## 2023-09-27 VITALS
RESPIRATION RATE: 20 BRPM | WEIGHT: 293 LBS | OXYGEN SATURATION: 98 % | TEMPERATURE: 97.7 F | HEART RATE: 76 BPM | SYSTOLIC BLOOD PRESSURE: 109 MMHG | BODY MASS INDEX: 45.21 KG/M2 | DIASTOLIC BLOOD PRESSURE: 68 MMHG

## 2023-09-27 DIAGNOSIS — J01.40 ACUTE NON-RECURRENT PANSINUSITIS: Primary | ICD-10-CM

## 2023-09-27 PROCEDURE — 99213 OFFICE O/P EST LOW 20 MIN: CPT | Performed by: PHYSICIAN ASSISTANT

## 2023-09-27 RX ORDER — ALBUTEROL SULFATE 0.83 MG/ML
2.5 SOLUTION RESPIRATORY (INHALATION) EVERY 6 HOURS PRN
Qty: 200 ML | Refills: 0 | Status: SHIPPED | OUTPATIENT
Start: 2023-09-27 | End: 2024-07-02

## 2023-09-27 RX ORDER — FLUCONAZOLE 150 MG/1
150 TABLET ORAL
Qty: 3 TABLET | Refills: 0 | Status: SHIPPED | OUTPATIENT
Start: 2023-09-27 | End: 2023-10-04

## 2023-09-27 NOTE — PROGRESS NOTES
Acute non-recurrent pansinusitis  - amoxicillin-clavulanate (AUGMENTIN) 875-125 MG tablet; Take 1 tablet by mouth 2 times daily for 10 days  - albuterol (PROVENTIL) (2.5 MG/3ML) 0.083% neb solution; Take 1 vial (2.5 mg) by nebulization every 6 hours as needed for shortness of breath, wheezing or cough  - fluconazole (DIFLUCAN) 150 MG tablet; Take 1 tablet (150 mg) by mouth every 3 days for 3 doses    Age 12 months or more  Okay to use Zarbee's   Okay to use Rx Children Tylenol if prescribed (Dose based on weight)    Age 2-12:   Okay to use Children Motrin or Tylenol over the counter.    Adults:  Okay to take acetaminophen 500 mg- 2 tabs (Total of 1000 mg) every 8 hrs   Okay to take ibuprofen 200 mg- 3 tabs (Total of 600 mg) every 6 hours        Okay to use Neti pot for sinus lavage up to three times daily for congestion and sinus pressure if present. Daily hot shower can be beneficial for congestion and body aches. Okay to use bedroom vaporizer or humidifier if symptoms are worse at night. Nightly Vicks Vapor rub and 5-10 mg of Melatonin okay to use for sleep.     Over the counter cough medication and decongestants okay if not prescribed by me during this visit. For homeopathic alternatives to cough syrup and decongestant, feel free to try Elderberry extract.    Okay to use salt water gargles, warm tea (or warm water with lemon and honey), and lozenges for any throat discomfort. Chloraseptic spray is also highly encourages for throat pain/irritation.     Patient will need to get plenty of rest and drink at least 1.5-2 liters of fluids daily for adults and 1-1.5 liters for children. If vomiting and not tolerating liquids for more than 24 hrs, please go to your nearest emergency department for IV fluids and further treatment.     Patient is not contagious after 1 week from start of symptoms. If possible, wear mask for first 7 days. Wash hands regularly and vigorously for 30 seconds often.     Patient was advised to  return to clinic for reevaluation (either UC or PCP) if symptoms do not improve in 5 days. Patient educated on red flag symptoms and asked to go directly to the ED if these symptoms present themselves.     Julian Montalvo PA-C  [facility]    Subjective   28 year old who presents to clinic today for the following health issues:    Urgent Care, Cough, Otalgia, and Sinus Problem       HPI     Acute Illness  Acute illness concerns: sinus and ear pain  Onset/Duration: 2-3 weeks  Symptoms:  Fever: Not currently   Chills/Sweats: Not currently   Headache (location?): YES  Sinus Pressure: YES  Conjunctivitis:  No  Ear Pain: YES: bilateral- some muffled hearing but this has improved- some vertigo periodically   Rhinorrhea: YES  Congestion: YES  Sore Throat: slight   Cough: YES  Wheeze: YES  Decreased Appetite: No  Nausea: No  Vomiting: No  Diarrhea: No  Dysuria/Freq.: No  Dysuria or Hematuria: No  Fatigue/Achiness: Not currently   Sick/Strep Exposure: Partner had a cold recently- home covid-19 tests have been negative  Therapies tried and outcome: Albuterol neb, Mucinex,  and Afrin.     Review of Systems   Review of Systems   See HPI    Objective    Temp: 97.7  F (36.5  C) Temp src: Temporal BP: 109/68 Pulse: 76   Resp: 20 SpO2: 98 %       Physical Exam   Physical Exam  Constitutional:       General: She is not in acute distress.     Appearance: Normal appearance. She is normal weight. She is not ill-appearing, toxic-appearing or diaphoretic.   HENT:      Head: Normocephalic and atraumatic.      Right Ear: Tympanic membrane, ear canal and external ear normal. There is no impacted cerumen.      Left Ear: Tympanic membrane, ear canal and external ear normal. There is no impacted cerumen.      Nose: Congestion and rhinorrhea present.      Right Sinus: Maxillary sinus tenderness and frontal sinus tenderness present.      Left Sinus: Maxillary sinus tenderness and frontal sinus tenderness present.       Mouth/Throat:      Mouth: Mucous membranes are moist.      Pharynx: No oropharyngeal exudate or posterior oropharyngeal erythema.   Cardiovascular:      Rate and Rhythm: Normal rate and regular rhythm.      Pulses: Normal pulses.      Heart sounds: Normal heart sounds. No murmur heard.     No friction rub. No gallop.   Pulmonary:      Effort: Pulmonary effort is normal. No respiratory distress.      Breath sounds: No stridor. No wheezing, rhonchi or rales.   Chest:      Chest wall: No tenderness.   Lymphadenopathy:      Cervical: No cervical adenopathy.   Neurological:      General: No focal deficit present.      Mental Status: She is alert and oriented to person, place, and time. Mental status is at baseline.      Gait: Gait normal.   Psychiatric:         Mood and Affect: Mood normal.         Behavior: Behavior normal.         Thought Content: Thought content normal.         Judgment: Judgment normal.         Consent (Nose)/Introductory Paragraph: The rationale for Mohs was explained to the patient and consent was obtained. The risks, benefits and alternatives to therapy were discussed in detail. Specifically, the risks of nasal deformity, changes in the flow of air through the nose, infection, scarring, bleeding, prolonged wound healing, incomplete removal, allergy to anesthesia, nerve injury and recurrence were addressed. Prior to the procedure, the treatment site was clearly identified and confirmed by the patient. All components of Universal Protocol/PAUSE Rule completed.

## 2023-09-28 ENCOUNTER — TELEPHONE (OUTPATIENT)
Dept: ENDOCRINOLOGY | Facility: CLINIC | Age: 28
End: 2023-09-28
Payer: COMMERCIAL

## 2023-09-28 NOTE — TELEPHONE ENCOUNTER
Prior Authorization Not Needed per Insurance    Medication: DEXCOM G6 SENSOR MISC  Insurance Company: [employer] [place] - Phone [phone] Fax [phone]  Expected CoPay: $    Pharmacy Filling the Rx:    Pharmacy Notified: yes  Patient Notified: no

## 2023-10-02 ENCOUNTER — TELEPHONE (OUTPATIENT)
Dept: CARDIOLOGY | Facility: CLINIC | Age: 28
End: 2023-10-02
Payer: COMMERCIAL

## 2023-10-04 ENCOUNTER — TELEPHONE (OUTPATIENT)
Dept: ENDOCRINOLOGY | Facility: CLINIC | Age: 28
End: 2023-10-04
Payer: COMMERCIAL

## 2023-10-04 DIAGNOSIS — E10.21 TYPE 1 DIABETES MELLITUS WITH DIABETIC NEPHROPATHY (H): ICD-10-CM

## 2023-10-04 DIAGNOSIS — E10.21 TYPE 1 DIABETES MELLITUS WITH DIABETIC NEPHROPATHY (H): Primary | ICD-10-CM

## 2023-10-04 RX ORDER — INSULIN LISPRO 100 [IU]/ML
INJECTION, SOLUTION INTRAVENOUS; SUBCUTANEOUS
Qty: 110 ML | Refills: 1 | Status: SHIPPED | OUTPATIENT
Start: 2023-10-04 | End: 2023-10-09

## 2023-10-04 NOTE — TELEPHONE ENCOUNTER
INSULIN LISPRO 100 UNIT/ML VL       Last Written Prescription Date:  3-27-23  Last Fill Quantity: 120ml,   # refills: 1  Last Office Visit : 4-3-23  Future Office visit:  11-6-23    Routing refill request to provider for review/approval because:  Insulin - refilled per clinic

## 2023-10-05 RX ORDER — INSULIN ASPART 100 [IU]/ML
INJECTION, SOLUTION INTRAVENOUS; SUBCUTANEOUS
Qty: 120 ML | Refills: 3 | Status: SHIPPED | OUTPATIENT
Start: 2023-10-05 | End: 2023-11-06

## 2023-10-06 DIAGNOSIS — E10.21 TYPE 1 DIABETES MELLITUS WITH DIABETIC NEPHROPATHY (H): ICD-10-CM

## 2023-10-06 RX ORDER — INSULIN LISPRO 100 [IU]/ML
INJECTION, SOLUTION INTRAVENOUS; SUBCUTANEOUS
Qty: 110 ML | Refills: 2 | OUTPATIENT
Start: 2023-10-06

## 2023-10-07 DIAGNOSIS — E10.21 TYPE 1 DIABETES MELLITUS WITH DIABETIC NEPHROPATHY (H): ICD-10-CM

## 2023-10-08 NOTE — TELEPHONE ENCOUNTER
insulin lispro (HUMALOG VIAL) 100 UNIT/ML vial     110 mL 1 10/4/2023       4/3/2023  [facility]      Laureen Goldstein PA-C  Endocrinology, Diabetes, and Metabolism      routed because: endo triage to fill

## 2023-10-09 ENCOUNTER — TELEPHONE (OUTPATIENT)
Dept: ENDOCRINOLOGY | Facility: CLINIC | Age: 28
End: 2023-10-09
Payer: COMMERCIAL

## 2023-10-09 RX ORDER — INSULIN LISPRO 100 [IU]/ML
INJECTION, SOLUTION INTRAVENOUS; SUBCUTANEOUS
Qty: 110 ML | Refills: 2 | OUTPATIENT
Start: 2023-10-09 | End: 2023-10-12

## 2023-10-09 NOTE — TELEPHONE ENCOUNTER
Per Epic encounter from 10-4-2023 Humalog not covered preferred is Novolog. Order was changed to preferred? Do you want a PA for Humalog or change again to preferred Novolog?

## 2023-10-11 ENCOUNTER — MYC MEDICAL ADVICE (OUTPATIENT)
Dept: ENDOCRINOLOGY | Facility: CLINIC | Age: 28
End: 2023-10-11
Payer: COMMERCIAL

## 2023-10-11 ENCOUNTER — MEDICAL CORRESPONDENCE (OUTPATIENT)
Dept: HEALTH INFORMATION MANAGEMENT | Facility: CLINIC | Age: 28
End: 2023-10-11
Payer: COMMERCIAL

## 2023-10-11 DIAGNOSIS — E10.21 TYPE 1 DIABETES MELLITUS WITH DIABETIC NEPHROPATHY (H): Primary | ICD-10-CM

## 2023-10-11 DIAGNOSIS — R80.9 PROTEINURIA: ICD-10-CM

## 2023-10-11 NOTE — TELEPHONE ENCOUNTER
Follow up. Humalog not covered preferred is Novolog. Does provider want pt to use order that was sent for Novolog on 10-4-2023 or PA for Humalog. What should Liaison do with Humalog order

## 2023-10-12 DIAGNOSIS — E10.21 TYPE 1 DIABETES MELLITUS WITH DIABETIC NEPHROPATHY (H): Primary | ICD-10-CM

## 2023-10-12 RX ORDER — INSULIN PEN,REUSABLE,BT LISPRO
1 INSULIN PEN (EA) SUBCUTANEOUS
Qty: 2 EACH | Refills: 1 | Status: SHIPPED | OUTPATIENT
Start: 2023-10-12 | End: 2024-09-27

## 2023-10-12 RX ORDER — ACYCLOVIR 400 MG/1
TABLET ORAL
Qty: 9 EACH | Refills: 3 | Status: SHIPPED | OUTPATIENT
Start: 2023-10-12 | End: 2023-11-06

## 2023-10-12 RX ORDER — INSULIN ASPART INJECTION 100 [IU]/ML
INJECTION, SOLUTION SUBCUTANEOUS
Qty: 60 ML | Refills: 3 | Status: SHIPPED | OUTPATIENT
Start: 2023-10-12 | End: 2023-11-06

## 2023-10-13 RX ORDER — ACYCLOVIR 400 MG/1
TABLET ORAL
Qty: 9 EACH | Refills: 3 | Status: SHIPPED | OUTPATIENT
Start: 2023-10-13 | End: 2023-12-09

## 2023-10-13 RX ORDER — INSULIN ASPART INJECTION 100 [IU]/ML
INJECTION, SOLUTION SUBCUTANEOUS
Qty: 60 ML | Refills: 3 | Status: SHIPPED | OUTPATIENT
Start: 2023-10-13 | End: 2023-12-04

## 2023-10-13 NOTE — TELEPHONE ENCOUNTER
Multiple meds pharm transfer:     Pt my chart comment:  [facility] am [facility] [address] [facility]    insulin detemir (LEVEMIR PEN) 100 UNIT/ML pen   60 mL 3 10/12/2023  Yes  Sig: Use as directed up to 60 units daily.  Prescribing Provider's NPI: [id number]  Laureen Goldstein    insulin aspart (FIASP FLEXTOUCH) 100 UNIT/ML pen-injector   60 mL 3 10/12/2023  No  Sig: Use as directed up to 60 units daily.  Prescribing Provider's NPI: [id number]  Laureen Goldstein    Continuous Blood Gluc Sensor (DEXCOM G7 SENSOR) MISC   9 each 3 10/12/2023  No  Sig: Change every 10 days.  Prescribing Provider's NPI: [id number]  Laureen Goldstein      Sending onto clinic: unable to reorder. Pop up stating orders can not be changed.  Insulin - refilled per clinic

## 2023-10-17 DIAGNOSIS — E10.21 TYPE 1 DIABETES MELLITUS WITH DIABETIC NEPHROPATHY (H): Primary | ICD-10-CM

## 2023-10-17 RX ORDER — ENALAPRIL MALEATE 20 MG/1
TABLET ORAL
Qty: 180 TABLET | Refills: 1 | Status: SHIPPED | OUTPATIENT
Start: 2023-10-17 | End: 2023-10-31

## 2023-10-30 NOTE — PROGRESS NOTES
Outcome for 10/30/23 9:01 AM: [employer] message sent  Malissa Roper LPN   Outcome for 11/02/23 11:31 AM: Replied to [employer] message  Malissa Roper LPN   Outcome for 11/02/23 11:41 AM: Left Voicemail   Malissa Roper LPN   Outcome for 11/03/23 2:11 PM: Data obtained via Dexcom website- Pt states she is no longer using tandem pump.   Mary Hoffman MA      This 28-year-old woman was seen in follow-up for her long-standing type 1 diabetes that is complicated by early nephropathy.  She also has a took thyroid hormone for a time many years ago but has had normal TSHs off replacement.  She is comanaged with Laureen Goldstein.  She was using a pump but switched to injections several months ago.  Her insurance coverage changed and she was no longer able to afford the pen.  She is currently managing her diabetes with 35 units of Levemir in the morning and 45 units in the evening.  She takes these doses at 9 AM and 9 PM.  She is planning to begin to use the InPen device but is using up her insulin pens before making the switch.  She is currently using Fiasp at a dose of 1 unit for 5 g of carb but thinks it does not really give her better coverage than NovoLog.  She like to get NovoLog when she switches to the InPen.  She continues to wear her Dexcom sensor and the data are below.  She knows that her control is not as good as when she was on the pump.  She is unhappy with this.  She reports that she has a lot of trouble with hyperglycemia in  the week before her menstrual cycle.  When she was on the pump she had different settings for these days.  Her menstrual cycle is not predictable.  She has a cycle of every 30 to 37 days.  Her menses are heavy.  She is concerned she might have PCOS and wonders if the diagnosis should be made.  She is also very concerned about her weight.  She eats a healthy diet focused on vegetables.  She has 3 meals each day along with a snack in the evening.  She does get hungry in the evening when her  "Adderall wears off but she is able to control her intake at that time.  She is very active.  She has no trouble recognizing her hypoglycemia.  She has not needed the assistance of another.    She saw her eye doctor last spring and has no concerns.  She denies paresthesias or foot ulcers.  She did see the cardiologist because of a family history of aneurysms and heart disease.  Evaluation was unremarkable.  She was put on a statin that she is currently taking.    Her only other concern today is that she is developed a trigger finger on the left ring finger.  This is not painful but she finds it annoying to have to stretch the finger out.  She is not ready to think about surgery.                      Current Outpatient Medications   Medication    albuterol (PROAIR HFA/PROVENTIL HFA/VENTOLIN HFA) 108 (90 Base) MCG/ACT inhaler    albuterol (PROVENTIL) (2.5 MG/3ML) 0.083% neb solution    amphetamine-dextroamphetamine (ADDERALL XR) 10 MG 24 hr capsule    amphetamine-dextroamphetamine (ADDERALL) 10 MG tablet    ASPIRIN NOT PRESCRIBED (INTENTIONAL)    atorvastatin (LIPITOR) 40 MG tablet    blood glucose (NO BRAND SPECIFIED) test strip    Blood Pressure Monitoring KIT    Cholecalciferol 2000 UNITS TBDP    Continuous Blood Gluc Transmit (DEXCOM G6 TRANSMITTER) MISC    enalapril (VASOTEC) 20 MG tablet    enalapril (VASOTEC) 20 MG tablet    escitalopram (LEXAPRO) 10 MG tablet    Glucagon (GVOKE HYPOPEN) 0.5 MG/0.1ML pen    Glucagon, rDNA, (GLUCAGON EMERGENCY) 1 MG KIT    Injection Device for insulin (INPEN 100-GREY-NOVOLOG-FIASP) MOLLY    insulin aspart (FIASP FLEXTOUCH) 100 UNIT/ML pen-injector    insulin detemir (LEVEMIR PEN) 100 UNIT/ML pen    Insulin Infusion Pump Supplies (INSULIN PUMP SYRINGE RESERVOIR) MISC    Insulin Infusion Pump Supplies (SURE-T INFUSION SET 23\") MISC    insulin pen needle (32G X 4 MM) 32G X 4 MM miscellaneous    Insulin Pump Accessories MISC    insulin syringe-needle U-100 (31G X 5/16\" 0.5 ML) 31G X " "5/16\" 0.5 ML miscellaneous    insulin syringes, disposable, U-100 0.3 ML MISC    ipratropium - albuterol 0.5 mg/2.5 mg/3 mL (DUONEB) 0.5-2.5 (3) MG/3ML neb solution    KETOSTIX test strip    KETOSTIX test strip    montelukast (SINGULAIR) 10 MG tablet    Multiple Vitamins-Minerals (MULTIVITAMIN ADULT PO)    Continuous Blood Gluc Sensor (DEXCOM G7 SENSOR) MISC    Continuous Blood Gluc Sensor (DEXCOM G7 SENSOR) MISC    insulin aspart (FIASP FLEXTOUCH) 100 UNIT/ML pen-injector    insulin aspart (NOVOLOG VIAL) 100 UNITS/ML vial    insulin detemir (LEVEMIR PEN) 100 UNIT/ML pen     No current facility-administered medications for this visit.                97.7  F (36.5  C)  as of 9/27/2023       Pulse: 76  as of 9/27/2023      BP: 109/68  as of 9/27/2023      Resp: 20  as of 9/27/2023      SpO2: 98%  as of 9/27/2023      Body Mass Index: None      Systolic (Patient Repo...: Not taken      Diastolic (Patient Rep...: Not taken      Height: 1.735 m (5' 8.31\")  as of 7/14/2023      Weight: 136.1 kg (300 lb)  as of 9/27/2023      Body Surface Area: 2.56 m   1.735 m (5' 8.31\")  as of 7/14/2023  136.1 kg (300 lb)  as of 9/27/2023        On exam she is in no acute distress.  Mood is upbeat.    Recent Labs   Lab Test 06/05/23  1738 06/05/23  1736 04/15/23  1037 04/15/23  1014 04/15/23  1009 03/28/23  1700 07/24/22  1041 11/15/21  0901 11/15/21  0823 05/07/21  0824 02/17/20  1104 01/28/20  0000   A1C  --   --   --   --   --  6.6* 6.4*  --   --  6.7*   < >  --    HEMOGLOBINA1  --   --   --   --   --   --   --   --  6.9  --   --  7.0*   TSH  --   --   --   --   --  3.75  --   --   --  3.69   < >  --    LDL  --   --  123*  --   --   --  121*  --   --  155*  --   --    HDL  --   --  62  --   --   --  62  --   --  59  --   --    TRIG  --   --  54  --   --   --  74  --   --  112  --   --    CR  --  0.59  --   --  0.57  --  0.51*   < >  --  0.59   < >  --    MICROL <12.0  --   --  <12.0  --   --  <5   < >  --   --    < >  --     < > = " values in this interval not displayed.   Assessment and plan:    1.  Diabetes control.  Her A1c has gone up since she started taking the injections and stopped using the pump.  She is taking more Levemir than she originally thought she would need so I will change the prescription for that today.  I will also prescribe the NovoLog cartridges for her InPen.  I made no changes in her dosing today.    2.  Diabetes complications.  she sees nephrology and has a normal GFR.  She is up-to-date with her eye visits.  She has no paresthesias.    3.morbid obesity.  I suggested that she try Mounjaro.  I warned her that it could cause nausea.  We will start at a dose of 2-1/2 mg each week and titrated the dose monthly as tolerated.  I will ask Eleno López from the pharmacy to help us with this.  I am hopeful that the weight loss she has with Mounjaro will help with her diabetes control.  We may need to adjust her prandial insulin as she loses weight.    4.possible PCOS.  She does have irregular menses but she does not have hirsutism that is troubling to her, she has no need for fertility at this time, and her menstrual cycles are not particularly troubling to her.  I suspect that weight loss will help normalize her menstrual cycle so perhaps the Mounjaro will be helpful for this PCOS problem.  I do not think we need to do anything to make the diagnosis at this time.    5.CVD risk.  She is now on a statin.  Her blood pressure is in good control.    Follow-up in 3 to 4 months with Laureen Goldstein in about 6 months with me.    I spent 25 minutes on the video visit with the patient (start time 2: 00 and end time 2: 2 5) .  On the same day of visit I spent an additional 15 minutes reviewing and interpreting her CGM data, reviewing and interpreting her lab data, ordering tests, and doing documentation.  The visit was done from my office away from clinic    Bia Allan MD

## 2023-10-31 ENCOUNTER — MYC REFILL (OUTPATIENT)
Dept: NEPHROLOGY | Facility: CLINIC | Age: 28
End: 2023-10-31
Payer: COMMERCIAL

## 2023-10-31 ENCOUNTER — MYC REFILL (OUTPATIENT)
Dept: PEDIATRICS | Facility: CLINIC | Age: 28
End: 2023-10-31
Payer: COMMERCIAL

## 2023-10-31 DIAGNOSIS — F90.0 ADHD (ATTENTION DEFICIT HYPERACTIVITY DISORDER), INATTENTIVE TYPE: ICD-10-CM

## 2023-10-31 DIAGNOSIS — R80.9 PROTEINURIA: ICD-10-CM

## 2023-10-31 RX ORDER — DEXTROAMPHETAMINE SACCHARATE, AMPHETAMINE ASPARTATE, DEXTROAMPHETAMINE SULFATE AND AMPHETAMINE SULFATE 2.5; 2.5; 2.5; 2.5 MG/1; MG/1; MG/1; MG/1
10 TABLET ORAL DAILY
Qty: 30 TABLET | Refills: 0 | Status: SHIPPED | OUTPATIENT
Start: 2023-10-31 | End: 2023-10-31

## 2023-10-31 RX ORDER — DEXTROAMPHETAMINE SACCHARATE, AMPHETAMINE ASPARTATE MONOHYDRATE, DEXTROAMPHETAMINE SULFATE AND AMPHETAMINE SULFATE 2.5; 2.5; 2.5; 2.5 MG/1; MG/1; MG/1; MG/1
10 CAPSULE, EXTENDED RELEASE ORAL DAILY
Qty: 30 CAPSULE | Refills: 0 | Status: SHIPPED | OUTPATIENT
Start: 2023-10-31 | End: 2023-10-31

## 2023-10-31 NOTE — TELEPHONE ENCOUNTER
"Called the pt.    States that they had moved over the summer and had to switch pharmacies and they were regularly out of stock of the ER.  Pt has actually been without this medication for the past few months due to both issues of available supply at pharmacy and some home-life complications (father is currently undergoing significant physical health issues).    She doesn't used the IR every day and is now just about out of it.    Pt scheduled for a VV with Nancy Mejia for next month.      - Camacho \"Deni\" Jadiel (he/him/his), RN - Patient Advocate Liason (PAL)  [facility] [place] [facility]    "

## 2023-10-31 NOTE — TELEPHONE ENCOUNTER
reviewed  ER filled 6/25/23, 4/19/23    Short acting filled 8/10/23    Last filled 4/21/23    Pt needs a visit to check in ADHD  Does she not take her meds daily? There has been a break in her meds  Raquel Mccarthy, APRN, CNP

## 2023-10-31 NOTE — TELEPHONE ENCOUNTER
"Raquel Mccarthy: Hey, sorry, but the pt just messaged (separate encounter) that she forgot to tell me on the phone that she needs these meds sent to [facility] instead, as [facility] is not in-network for her.      - Camacho \"Deni\" Jadiel (he/him/his), RN - Patient Advocate Liason (PAL)  [facility]    "

## 2023-11-01 DIAGNOSIS — R80.9 PROTEINURIA: ICD-10-CM

## 2023-11-01 RX ORDER — DEXTROAMPHETAMINE SACCHARATE, AMPHETAMINE ASPARTATE MONOHYDRATE, DEXTROAMPHETAMINE SULFATE AND AMPHETAMINE SULFATE 2.5; 2.5; 2.5; 2.5 MG/1; MG/1; MG/1; MG/1
10 CAPSULE, EXTENDED RELEASE ORAL DAILY
Qty: 30 CAPSULE | Refills: 0 | Status: SHIPPED | OUTPATIENT
Start: 2023-11-01 | End: 2023-12-04

## 2023-11-01 RX ORDER — DEXTROAMPHETAMINE SACCHARATE, AMPHETAMINE ASPARTATE, DEXTROAMPHETAMINE SULFATE AND AMPHETAMINE SULFATE 2.5; 2.5; 2.5; 2.5 MG/1; MG/1; MG/1; MG/1
10 TABLET ORAL DAILY
Qty: 30 TABLET | Refills: 0 | Status: SHIPPED | OUTPATIENT
Start: 2023-11-01 | End: 2023-12-04

## 2023-11-01 NOTE — TELEPHONE ENCOUNTER
Spoke with pharmacy at [facility], they didn't have any refills for Adderall/XR 10mg on file.

## 2023-11-01 NOTE — TELEPHONE ENCOUNTER
Cancel the 2 rx I [facility] in [facility] [place] [facility]  I will send to LUIS FERNANDO Saucedo, CNP

## 2023-11-02 ENCOUNTER — TELEPHONE (OUTPATIENT)
Dept: ENDOCRINOLOGY | Facility: CLINIC | Age: 28
End: 2023-11-02
Payer: COMMERCIAL

## 2023-11-02 NOTE — TELEPHONE ENCOUNTER
Called patient and left voicemail. Patient has an appointment on 11/6/2023. Need patient to upload their Tandem and need new dexcom sharing code device to site for provider to review prior to their appointment.    Malissa Roper LPN 11/02/23 11:39 AM

## 2023-11-03 RX ORDER — ENALAPRIL MALEATE 20 MG/1
20 TABLET ORAL 2 TIMES DAILY
Qty: 180 TABLET | Refills: 1 | Status: SHIPPED | OUTPATIENT
Start: 2023-11-03 | End: 2023-12-04

## 2023-11-03 RX ORDER — ENALAPRIL MALEATE 20 MG/1
20 TABLET ORAL 2 TIMES DAILY
Qty: 180 TABLET | Refills: 1 | Status: SHIPPED | OUTPATIENT
Start: 2023-11-03 | End: 2024-05-06

## 2023-11-03 NOTE — TELEPHONE ENCOUNTER
Spoke with patient. Pt states she is not currently using tandem pump. Dexcom data obtained. Mary Hoffman CMA on 11/3/2023 at 2:16 PM

## 2023-11-06 ENCOUNTER — VIRTUAL VISIT (OUTPATIENT)
Dept: ENDOCRINOLOGY | Facility: CLINIC | Age: 28
End: 2023-11-06
Payer: COMMERCIAL

## 2023-11-06 ENCOUNTER — TELEPHONE (OUTPATIENT)
Dept: ENDOCRINOLOGY | Facility: CLINIC | Age: 28
End: 2023-11-06

## 2023-11-06 DIAGNOSIS — E66.01 MORBID OBESITY (H): Primary | ICD-10-CM

## 2023-11-06 DIAGNOSIS — E10.21 TYPE 1 DIABETES MELLITUS WITH DIABETIC NEPHROPATHY (H): ICD-10-CM

## 2023-11-06 PROCEDURE — 99215 OFFICE O/P EST HI 40 MIN: CPT | Mod: VID | Performed by: INTERNAL MEDICINE

## 2023-11-06 RX ORDER — INSULIN ASPART 100 [IU]/ML
INJECTION, SOLUTION INTRAVENOUS; SUBCUTANEOUS
Qty: 54 ML | Refills: 3 | Status: SHIPPED | OUTPATIENT
Start: 2023-11-06

## 2023-11-06 RX ORDER — TIRZEPATIDE 2.5 MG/.5ML
2.5 INJECTION, SOLUTION SUBCUTANEOUS
Qty: 2 ML | Refills: 1 | Status: SHIPPED | OUTPATIENT
Start: 2023-11-06 | End: 2023-12-04

## 2023-11-06 ASSESSMENT — PAIN SCALES - GENERAL: PAINLEVEL: NO PAIN (0)

## 2023-11-06 NOTE — PATIENT INSTRUCTIONS
Start mounjaro 2.5 mg every 7 days.  Eleno López from [facility] will be connecting with you to advance the dose.  If you don't get to see him before you complete your first month of therapy let me know how you are doing.  If you are tolerating the medication well, I can increase the dose and he can help with the subsequent dosing.

## 2023-11-06 NOTE — NURSING NOTE
Is the patient currently in the state of [place]? YES    Visit mode:VIDEO    If the visit is dropped, the patient can be reconnected by: VIDEO VISIT: Text to cell phone:   Telephone Information:   Mobile [phone]       Will anyone else be joining the visit? NO  (If patient encounters technical issues they should call [phone]    How would you like to obtain your AVS? MyChart    Are changes needed to the allergy or medication list? No    Reason for visit: RECHECK Shelby Kocher VVF

## 2023-11-06 NOTE — LETTER
11/6/2023       RE: Stacey Mantilla  [address]  [facility] [address]     Dear Colleague,    Thank you for referring your patient, Stacey Mantilla, to the [facility] ENDOCRINOLOGY CLINIC [place] at [facility]. Please see a copy of my visit note below.    Outcome for 10/30/23 9:01 AM: Unideskt message sent  Malissa Roper LPN   Outcome for 11/02/23 11:31 AM: Replied to iWarda message  Malissa Roper LPN   Outcome for 11/02/23 11:41 AM: Left Voicemail   Malissa Roper LPN   Outcome for 11/03/23 2:11 PM: Data obtained via Dexcom website- Pt states she is no longer using tandem pump.   Mary Hoffman MA      This 28-year-old woman was seen in follow-up for her long-standing type 1 diabetes that is complicated by early nephropathy.  She also has a took thyroid hormone for a time many years ago but has had normal TSHs off replacement.  She is comanaged with Laureen Goldstein.  She was using a pump but switched to injections several months ago.  Her insurance coverage changed and she was no longer able to afford the pen.  She is currently managing her diabetes with 35 units of Levemir in the morning and 45 units in the evening.  She takes these doses at 9 AM and 9 PM.  She is planning to begin to use the InPen device but is using up her insulin pens before making the switch.  She is currently using Fiasp at a dose of 1 unit for 5 g of carb but thinks it does not really give her better coverage than NovoLog.  She like to get NovoLog when she switches to the InPen.  She continues to wear her Dexcom sensor and the data are below.  She knows that her control is not as good as when she was on the pump.  She is unhappy with this.  She reports that she has a lot of trouble with hyperglycemia in  the week before her menstrual cycle.  When she was on the pump she had different settings for these days.  Her menstrual cycle is not predictable.  She has a cycle of every 30 to 37 days.   Her menses are heavy.  She is concerned she might have PCOS and wonders if the diagnosis should be made.  She is also very concerned about her weight.  She eats a healthy diet focused on vegetables.  She has 3 meals each day along with a snack in the evening.  She does get hungry in the evening when her Adderall wears off but she is able to control her intake at that time.  She is very active.  She has no trouble recognizing her hypoglycemia.  She has not needed the assistance of another.    She saw her eye doctor last spring and has no concerns.  She denies paresthesias or foot ulcers.  She did see the cardiologist because of a family history of aneurysms and heart disease.  Evaluation was unremarkable.  She was put on a statin that she is currently taking.    Her only other concern today is that she is developed a trigger finger on the left ring finger.  This is not painful but she finds it annoying to have to stretch the finger out.  She is not ready to think about surgery.                      Current Outpatient Medications   Medication    albuterol (PROAIR HFA/PROVENTIL HFA/VENTOLIN HFA) 108 (90 Base) MCG/ACT inhaler    albuterol (PROVENTIL) (2.5 MG/3ML) 0.083% neb solution    amphetamine-dextroamphetamine (ADDERALL XR) 10 MG 24 hr capsule    amphetamine-dextroamphetamine (ADDERALL) 10 MG tablet    ASPIRIN NOT PRESCRIBED (INTENTIONAL)    atorvastatin (LIPITOR) 40 MG tablet    blood glucose (NO BRAND SPECIFIED) test strip    Blood Pressure Monitoring KIT    Cholecalciferol 2000 UNITS TBDP    Continuous Blood Gluc Transmit (DEXCOM G6 TRANSMITTER) MISC    enalapril (VASOTEC) 20 MG tablet    enalapril (VASOTEC) 20 MG tablet    escitalopram (LEXAPRO) 10 MG tablet    Glucagon (GVOKE HYPOPEN) 0.5 MG/0.1ML pen    Glucagon, rDNA, (GLUCAGON EMERGENCY) 1 MG KIT    Injection Device for insulin (INPEN 100-GREY-NOVOLOG-FIASP) MOLLY    insulin aspart (FIASP FLEXTOUCH) 100 UNIT/ML pen-injector    insulin detemir (LEVEMIR PEN)  "100 UNIT/ML pen    Insulin Infusion Pump Supplies (INSULIN PUMP SYRINGE RESERVOIR) MISC    Insulin Infusion Pump Supplies (SURE-T INFUSION SET 23\") MISC    insulin pen needle (32G X 4 MM) 32G X 4 MM miscellaneous    Insulin Pump Accessories MISC    insulin syringe-needle U-100 (31G X 5/16\" 0.5 ML) 31G X 5/16\" 0.5 ML miscellaneous    insulin syringes, disposable, U-100 0.3 ML MISC    ipratropium - albuterol 0.5 mg/2.5 mg/3 mL (DUONEB) 0.5-2.5 (3) MG/3ML neb solution    KETOSTIX test strip    KETOSTIX test strip    montelukast (SINGULAIR) 10 MG tablet    Multiple Vitamins-Minerals (MULTIVITAMIN ADULT PO)    Continuous Blood Gluc Sensor (DEXCOM G7 SENSOR) MISC    Continuous Blood Gluc Sensor (DEXCOM G7 SENSOR) MISC    insulin aspart (FIASP FLEXTOUCH) 100 UNIT/ML pen-injector    insulin aspart (NOVOLOG VIAL) 100 UNITS/ML vial    insulin detemir (LEVEMIR PEN) 100 UNIT/ML pen     No current facility-administered medications for this visit.                97.7  F (36.5  C)  as of 9/27/2023       Pulse: 76  as of 9/27/2023      BP: 109/68  as of 9/27/2023      Resp: 20  as of 9/27/2023      SpO2: 98%  as of 9/27/2023      Body Mass Index: None      Systolic (Patient Repo...: Not taken      Diastolic (Patient Rep...: Not taken      Height: 1.735 m (5' 8.31\")  as of 7/14/2023      Weight: 136.1 kg (300 lb)  as of 9/27/2023      Body Surface Area: 2.56 m   1.735 m (5' 8.31\")  as of 7/14/2023  136.1 kg (300 lb)  as of 9/27/2023        On exam she is in no acute distress.  Mood is upbeat.    Recent Labs   Lab Test 06/05/23  1738 06/05/23  1736 04/15/23  1037 04/15/23  1014 04/15/23  1009 03/28/23  1700 07/24/22  1041 11/15/21  0901 11/15/21  0823 05/07/21  0824 02/17/20  1104 01/28/20  0000   A1C  --   --   --   --   --  6.6* 6.4*  --   --  6.7*   < >  --    HEMOGLOBINA1  --   --   --   --   --   --   --   --  6.9  --   --  7.0*   TSH  --   --   --   --   --  3.75  --   --   --  3.69   < >  --    LDL  --   --  123*  --   --   -- "  121*  --   --  155*  --   --    HDL  --   --  62  --   --   --  62  --   --  59  --   --    TRIG  --   --  54  --   --   --  74  --   --  112  --   --    CR  --  0.59  --   --  0.57  --  0.51*   < >  --  0.59   < >  --    MICROL <12.0  --   --  <12.0  --   --  <5   < >  --   --    < >  --     < > = values in this interval not displayed.   Assessment and plan:    1.  Diabetes control.  Her A1c has gone up since she started taking the injections and stopped using the pump.  She is taking more Levemir than she originally thought she would need so I will change the prescription for that today.  I will also prescribe the NovoLog cartridges for her InPen.  I made no changes in her dosing today.    2.  Diabetes complications.  she sees nephrology and has a normal GFR.  She is up-to-date with her eye visits.  She has no paresthesias.    3.morbid obesity.  I suggested that she try Mounjaro.  I warned her that it could cause nausea.  We will start at a dose of 2-1/2 mg each week and titrated the dose monthly as tolerated.  I will ask Eleno López from the pharmacy to help us with this.  I am hopeful that the weight loss she has with Mounjaro will help with her diabetes control.  We may need to adjust her prandial insulin as she loses weight.    4.possible PCOS.  She does have irregular menses but she does not have hirsutism that is troubling to her, she has no need for fertility at this time, and her menstrual cycles are not particularly troubling to her.  I suspect that weight loss will help normalize her menstrual cycle so perhaps the Mounjaro will be helpful for this PCOS problem.  I do not think we need to do anything to make the diagnosis at this time.    5.CVD risk.  She is now on a statin.  Her blood pressure is in good control.    Follow-up in 3 to 4 months with Laureen Goldstein in about 6 months with me.    I spent 25 minutes on the video visit with the patient (start time 2: 00 and end time 2: 2 5) .  On the same day  of visit I spent an additional 15 minutes reviewing and interpreting her CGM data, reviewing and interpreting her lab data, ordering tests, and doing documentation.  The visit was done from my office away from clinic    Bia Allan MD      Virtual Visit Details

## 2023-11-06 NOTE — TELEPHONE ENCOUNTER
Instant Denial for Mounjaro. Diagnosis not covered for this medication. Would you like to change med?  Once denial letter comes I will update encounter

## 2023-11-06 NOTE — TELEPHONE ENCOUNTER
PA Initiation    Medication: MOUNJARO 2.5 MG/0.5ML SC SOPN  Insurance Company: [employer] - Phone [phone] Fax [phone]  Pharmacy Filling the Rx: [id number] - [place], [place] - [address] [facility] [address]  Filling Pharmacy Phone: [phone]  Filling Pharmacy Fax: [phone]  Start Date: 11/6/2023

## 2023-11-06 NOTE — TELEPHONE ENCOUNTER
PRIOR AUTHORIZATION DENIED    Medication: MOUNJARO 2.5 MG/0.5ML SC SOPN  Insurance Company: [employer] [place] - Phone [phone] Fax [phone]  Denial Date:    Denial Rational: diagnosis not covered for this medication  Appeal Information:    Patient Notified: provider to discuss with pt what they would like to do

## 2023-11-06 NOTE — TELEPHONE ENCOUNTER
Is covered for weight loss ? Or is weight loss medications excluded ? Jennifer Forbes RN on 11/6/2023 at 4:03 PM

## 2023-11-07 DIAGNOSIS — E66.01 MORBID OBESITY (H): Primary | ICD-10-CM

## 2023-11-07 DIAGNOSIS — E10.21 TYPE 1 DIABETES MELLITUS WITH DIABETIC NEPHROPATHY (H): ICD-10-CM

## 2023-11-07 RX ORDER — INSULIN DEGLUDEC 100 U/ML
80 INJECTION, SOLUTION SUBCUTANEOUS DAILY
Qty: 75 ML | Refills: 3 | Status: SHIPPED | OUTPATIENT
Start: 2023-11-07 | End: 2024-09-27

## 2023-11-07 NOTE — TELEPHONE ENCOUNTER
I ran a test claim and says PA needed for Ozempic. I could try for a PA but not sure they will cover Ozempic as it is not preferred for weight loss. You could try for Saxenda or Wegovy

## 2023-11-07 NOTE — TELEPHONE ENCOUNTER
I ran a test claim and says PA needed for Ozempic. I could try for a PA but not sure they will cover Ozempic as it is not preferred for weight loss. You could try for Saxenda or Wegovy   Do you want to go with Saxenda or wegovy for  weight loss ? Jennifer Forbes, RN on 11/7/2023 at 1:25 PM

## 2023-11-08 NOTE — TELEPHONE ENCOUNTER
The Wegovy went through to [facility] so must be a covered medication . I did  send  Stacey  a my chart message . Jennifer Forbes RN on 11/8/2023 at 9:28 AM

## 2023-11-20 ASSESSMENT — ASTHMA QUESTIONNAIRES: ACT_TOTALSCORE: 24

## 2023-11-24 ENCOUNTER — OFFICE VISIT (OUTPATIENT)
Dept: CARDIOLOGY | Facility: CLINIC | Age: 28
End: 2023-11-24
Attending: STUDENT IN AN ORGANIZED HEALTH CARE EDUCATION/TRAINING PROGRAM
Payer: COMMERCIAL

## 2023-11-24 ENCOUNTER — LAB (OUTPATIENT)
Dept: LAB | Facility: CLINIC | Age: 28
End: 2023-11-24
Payer: COMMERCIAL

## 2023-11-24 VITALS
OXYGEN SATURATION: 98 % | WEIGHT: 293 LBS | BODY MASS INDEX: 44.41 KG/M2 | SYSTOLIC BLOOD PRESSURE: 128 MMHG | HEIGHT: 68 IN | DIASTOLIC BLOOD PRESSURE: 81 MMHG | HEART RATE: 77 BPM

## 2023-11-24 DIAGNOSIS — E10.21 TYPE 1 DIABETES MELLITUS WITH DIABETIC NEPHROPATHY (H): Primary | ICD-10-CM

## 2023-11-24 DIAGNOSIS — E78.5 HYPERLIPIDEMIA, UNSPECIFIED HYPERLIPIDEMIA TYPE: ICD-10-CM

## 2023-11-24 DIAGNOSIS — Z82.49 FAMILY HISTORY OF ISCHEMIC HEART DISEASE: ICD-10-CM

## 2023-11-24 DIAGNOSIS — R01.1 HEART MURMUR: ICD-10-CM

## 2023-11-24 DIAGNOSIS — Z82.49 FAMILY HISTORY OF ANEURYSM: Primary | ICD-10-CM

## 2023-11-24 LAB
CHOLEST SERPL-MCNC: 137 MG/DL
HBA1C MFR BLD: 7.3 %
HDLC SERPL-MCNC: 59 MG/DL
LDLC SERPL CALC-MCNC: 63 MG/DL
NONHDLC SERPL-MCNC: 78 MG/DL
TRIGL SERPL-MCNC: 74 MG/DL

## 2023-11-24 PROCEDURE — 83036 HEMOGLOBIN GLYCOSYLATED A1C: CPT | Performed by: NURSE PRACTITIONER

## 2023-11-24 PROCEDURE — 99213 OFFICE O/P EST LOW 20 MIN: CPT | Performed by: STUDENT IN AN ORGANIZED HEALTH CARE EDUCATION/TRAINING PROGRAM

## 2023-11-24 PROCEDURE — 36415 COLL VENOUS BLD VENIPUNCTURE: CPT | Performed by: PATHOLOGY

## 2023-11-24 PROCEDURE — 80061 LIPID PANEL: CPT | Performed by: PATHOLOGY

## 2023-11-24 PROCEDURE — 99000 SPECIMEN HANDLING OFFICE-LAB: CPT | Performed by: PATHOLOGY

## 2023-11-24 PROCEDURE — 99215 OFFICE O/P EST HI 40 MIN: CPT | Performed by: STUDENT IN AN ORGANIZED HEALTH CARE EDUCATION/TRAINING PROGRAM

## 2023-11-24 ASSESSMENT — PAIN SCALES - GENERAL: PAINLEVEL: NO PAIN (0)

## 2023-11-24 NOTE — PATIENT INSTRUCTIONS
You were seen at the [facility] clinic today.  You saw Dr. Rock Hoffman MD  Here are your Instructions:    1. Plan to stop Atorvastatin for one week to see if side effects go away. Resume Atorvastatin 20 mg after one week, if no side effects consider increasing dose to full pill (40 mg). If side effects return go back to Atorvastatin 20 mg and send us a message.       If you have questions after this visit:  Office:  [phone]  Fax:  [phone]  Appointments:  [phone] option #1, then option #1

## 2023-11-24 NOTE — LETTER
11/24/2023      RE: Stacey Mantilla  [address]  [facility] [address]       Dear Colleague,    Thank you for the opportunity to participate in the care of your patient, Stacey Mantilla, at the [facility] HEART CLINIC [place] at [facility]. Please see a copy of my visit note below.    Chief complaint: HLD    HPI:   Stacey Mantilla is a 28 year old female with history of fam hx ischemic heart disease + for prevention/wellness.     PMH: DM, obesity, asthma, depression, anxiety, celiac disease, ADHD    PAST MEDICAL HISTORY:  Past Medical History:   Diagnosis Date    Asthma     Currently no treatment needed    Celiac disease     Chronic kidney disease, stage I 8/3/2015    Chronic sinusitis     Diabetes mellitus type 1 (H)     Diabetic nephropathy (H)     Family history of heart disease 8/3/2015    Hypertension     Hypothyroid     Pedal edema     Psoriasis        CURRENT MEDICATIONS:     PAST SURGICAL HISTORY:  Past Surgical History:   Procedure Laterality Date    ENT SURGERY      TONSILLECTOMY, ADENOIDECTOMY, COMBINED         ALLERGIES:     Allergies   Allergen Reactions    Dog Epithelium Allergy Skin Test Other (See Comments)     Sinus symptoms     Dogs Other (See Comments)     Sinus symptoms     Dust Mites      Sinus     Gluten Meal        FAMILY HISTORY:  Family History   Problem Relation Age of Onset    Gastrointestinal Disease Mother         Celiacs    Obesity Mother     Depression Mother     Fibromyalgia Mother     Cardiovascular Father 48        MI, stents, diabetic, type 2    Diabetes Father     Coronary Artery Disease Father     Obesity Father     Depression Father     Substance Abuse Father     Lung Cancer Father         Smoker    Substance Abuse Sister     Bipolar Disorder Brother     Schizophrenia Brother     Myocardial Infarction Brother 50        Possibly MI    Skin Cancer Maternal Grandmother     Cardiovascular Maternal Grandfather         Englarged heart,  "pacemaker, defib    Skin Cancer Maternal Grandfather     Obesity Maternal Half-Sister     Heart Disease Paternal Half-Brother 45    Melanoma No family hx of     Glaucoma No family hx of     Macular Degeneration No family hx of        SOCIAL HISTORY:  Social History     Tobacco Use    Smoking status: Never    Smokeless tobacco: Never   Vaping Use    Vaping Use: Never used   Substance Use Topics    Alcohol use: Yes     Comment: couple times per month will have 3 mixed drinks    Drug use: Yes     Types: Marijuana       ROS:   A comprehensive 14 point review of systems is negative other than as mentioned in HPI.    Exam:  /81 (BP Location: Left arm, Patient Position: Sitting, Cuff Size: Adult Large)   Pulse 77   Ht 1.735 m (5' 8.31\")   Wt 138.9 kg (306 lb 3.2 oz)   SpO2 98%   BMI 46.14 kg/m    GENERAL APPEARANCE: healthy, alert and no distress  EYES: no icterus, no xanthelasmas  NECK: JVP not elevated  RESPIRATORY: lungs clear to auscultation - no rales, rhonchi or wheezes, no use of accessory muscles, no retractions, respirations are unlabored, normal respiratory rate  CARDIOVASCULAR: regular rhythm, normal S1 with physiologic split S2, no S3 or S4 and no murmur, click or rub.  EXTREMITIES: no edema, no bruits    Labs:  Reviewed.   LDL Cholesterol Calculated   Date Value Ref Range Status   04/15/2023 123 (H) <=100 mg/dL Final   05/07/2021 155 (H) <100 mg/dL Final     Comment:     Above desirable:  100-129 mg/dl  Borderline High:  130-159 mg/dL  High:             160-189 mg/dL  Very high:       >189 mg/dl         Assessment and Plan:     Family history of aneurysm   MRA chest abdomen and pelvis within normal limits.     Hyperlipidemia with history of diabetes but having side effects including muscle aches.   - Still do a trial stopping atorvastatin and trying lower dose, if does not tolerate will consider rosuvastatin followed by PCSK9.      Chart review time: 20 minutes  Visit time: 20 minutes  Total time " spent: 40 minutes  >50% of this 20-minute visit were spent with the patient on in-person counseling and discussion regarding CAD.     The patient states understanding and is agreeable with plan.     Rock Hoffman MD  Cardiology    CC  [facility]

## 2023-11-24 NOTE — NURSING NOTE
Chief Complaint   Patient presents with    Follow Up     Dr. Hoffman 3 month follow-up       Vitals were taken, medications reviewed.    Caryn Lopes, EMT   8:36 AM

## 2023-11-24 NOTE — PROGRESS NOTES
Chief complaint: HLD    HPI:   Stacey Mantilla is a 28 year old female with history of fam hx ischemic heart disease + for prevention/wellness.     PMH: DM, obesity, asthma, depression, anxiety, celiac disease, ADHD    PAST MEDICAL HISTORY:  Past Medical History:   Diagnosis Date    Asthma     Currently no treatment needed    Celiac disease     Chronic kidney disease, stage I 8/3/2015    Chronic sinusitis     Diabetes mellitus type 1 (H)     Diabetic nephropathy (H)     Family history of heart disease 8/3/2015    Hypertension     Hypothyroid     Pedal edema     Psoriasis        CURRENT MEDICATIONS:     PAST SURGICAL HISTORY:  Past Surgical History:   Procedure Laterality Date    ENT SURGERY      TONSILLECTOMY, ADENOIDECTOMY, COMBINED         ALLERGIES:     Allergies   Allergen Reactions    Dog Epithelium Allergy Skin Test Other (See Comments)     Sinus symptoms     Dogs Other (See Comments)     Sinus symptoms     Dust Mites      Sinus     Gluten Meal        FAMILY HISTORY:  Family History   Problem Relation Age of Onset    Gastrointestinal Disease Mother         Celiacs    Obesity Mother     Depression Mother     Fibromyalgia Mother     Cardiovascular Father 48        MI, stents, diabetic, type 2    Diabetes Father     Coronary Artery Disease Father     Obesity Father     Depression Father     Substance Abuse Father     Lung Cancer Father         Smoker    Substance Abuse Sister     Bipolar Disorder Brother     Schizophrenia Brother     Myocardial Infarction Brother 50        Possibly MI    Skin Cancer Maternal Grandmother     Cardiovascular Maternal Grandfather         Englarged heart, pacemaker, defib    Skin Cancer Maternal Grandfather     Obesity Maternal Half-Sister     Heart Disease Paternal Half-Brother 45    Melanoma No family hx of     Glaucoma No family hx of     Macular Degeneration No family hx of        SOCIAL HISTORY:  Social History     Tobacco Use    Smoking status: Never    Smokeless tobacco: Never  "  Vaping Use    Vaping Use: Never used   Substance Use Topics    Alcohol use: Yes     Comment: couple times per month will have 3 mixed drinks    Drug use: Yes     Types: Marijuana       ROS:   A comprehensive 14 point review of systems is negative other than as mentioned in HPI.    Exam:  /81 (BP Location: Left arm, Patient Position: Sitting, Cuff Size: Adult Large)   Pulse 77   Ht 1.735 m (5' 8.31\")   Wt 138.9 kg (306 lb 3.2 oz)   SpO2 98%   BMI 46.14 kg/m    GENERAL APPEARANCE: healthy, alert and no distress  EYES: no icterus, no xanthelasmas  NECK: JVP not elevated  RESPIRATORY: lungs clear to auscultation - no rales, rhonchi or wheezes, no use of accessory muscles, no retractions, respirations are unlabored, normal respiratory rate  CARDIOVASCULAR: regular rhythm, normal S1 with physiologic split S2, no S3 or S4 and no murmur, click or rub.  EXTREMITIES: no edema, no bruits    Labs:  Reviewed.   LDL Cholesterol Calculated   Date Value Ref Range Status   04/15/2023 123 (H) <=100 mg/dL Final   05/07/2021 155 (H) <100 mg/dL Final     Comment:     Above desirable:  100-129 mg/dl  Borderline High:  130-159 mg/dL  High:             160-189 mg/dL  Very high:       >189 mg/dl         Assessment and Plan:     Family history of aneurysm   MRA chest abdomen and pelvis within normal limits.     Hyperlipidemia with history of diabetes but having side effects including muscle aches.   - Still do a trial stopping atorvastatin and trying lower dose, if does not tolerate will consider rosuvastatin followed by PCSK9.      Chart review time: 20 minutes  Visit time: 20 minutes  Total time spent: 40 minutes  >50% of this 20-minute visit were spent with the patient on in-person counseling and discussion regarding CAD.     The patient states understanding and is agreeable with plan.     Rock Hoffman MD  Cardiology    CC  [facility]      "

## 2023-11-28 DIAGNOSIS — E10.21 TYPE 1 DIABETES MELLITUS WITH DIABETIC NEPHROPATHY (H): Primary | ICD-10-CM

## 2023-12-02 NOTE — TELEPHONE ENCOUNTER
DIAGNOSIS: Trigger finger left ring finger and bilateral carpal tunnel   APPOINTMENT DATE:  12/11/2023    NOTES STATUS DETAILS   OFFICE NOTE from referring provider Internal 11/06/2023 - Bia Allan MD - [facility] Endo   MEDICATION LIST Internal

## 2023-12-04 ENCOUNTER — MYC MEDICAL ADVICE (OUTPATIENT)
Dept: PEDIATRICS | Facility: CLINIC | Age: 28
End: 2023-12-04

## 2023-12-04 ENCOUNTER — VIRTUAL VISIT (OUTPATIENT)
Dept: PEDIATRICS | Facility: CLINIC | Age: 28
End: 2023-12-04
Payer: COMMERCIAL

## 2023-12-04 DIAGNOSIS — F90.0 ADHD (ATTENTION DEFICIT HYPERACTIVITY DISORDER), INATTENTIVE TYPE: Primary | ICD-10-CM

## 2023-12-04 PROCEDURE — 99214 OFFICE O/P EST MOD 30 MIN: CPT | Mod: 95 | Performed by: NURSE PRACTITIONER

## 2023-12-04 RX ORDER — DEXTROAMPHETAMINE SACCHARATE, AMPHETAMINE ASPARTATE MONOHYDRATE, DEXTROAMPHETAMINE SULFATE AND AMPHETAMINE SULFATE 3.75; 3.75; 3.75; 3.75 MG/1; MG/1; MG/1; MG/1
15 CAPSULE, EXTENDED RELEASE ORAL DAILY
Qty: 30 CAPSULE | Refills: 0 | Status: SHIPPED | OUTPATIENT
Start: 2023-12-04 | End: 2024-01-09

## 2023-12-04 RX ORDER — DEXTROAMPHETAMINE SACCHARATE, AMPHETAMINE ASPARTATE, DEXTROAMPHETAMINE SULFATE AND AMPHETAMINE SULFATE 2.5; 2.5; 2.5; 2.5 MG/1; MG/1; MG/1; MG/1
10 TABLET ORAL DAILY
Qty: 90 TABLET | Refills: 0 | Status: SHIPPED | OUTPATIENT
Start: 2023-12-04 | End: 2024-04-21

## 2023-12-04 NOTE — PROGRESS NOTES
"Stacey is a 28 year old who is being evaluated via a billable telephone visit.        Distant Location (provider location):  Off-site    Assessment & Plan   (F90.0) ADHD (attention deficit hyperactivity disorder), inattentive type  (primary encounter diagnosis)  Comment: Feels she has built a tolerance.   Plan:   -Increase XR to 15mg. May increase to 20 in 1 month if not effective. Continue 10mg IR in the afternoon prn      BMI:   Estimated body mass index is 46.14 kg/m  as calculated from the following:    Height as of 11/24/23: 1.735 m (5' 8.31\").    Weight as of 11/24/23: 138.9 kg (306 lb 3.2 oz).   Weight management plan: Discussed healthy diet and exercise guidelines    See Patient Instructions    LUIS FERNANDO RIVAS [facility]    Subjective   Stacey is a 28 year old, presenting for the following health issues:  No chief complaint on file.    HPI       Review of Systems   Psych otherwise negative    Objective           Vitals:  No vitals were obtained today due to virtual visit.    Physical Exam   PSYCH: Bright affect. Appropriate mentation and speech.       Phone call duration: 5 minutes      "

## 2023-12-06 DIAGNOSIS — E10.21 TYPE 1 DIABETES MELLITUS WITH DIABETIC NEPHROPATHY (H): ICD-10-CM

## 2023-12-09 RX ORDER — ACYCLOVIR 400 MG/1
TABLET ORAL
Qty: 9 EACH | Refills: 0 | Status: SHIPPED | OUTPATIENT
Start: 2023-12-09

## 2023-12-09 NOTE — TELEPHONE ENCOUNTER
: Dexcom G7 Sensor Miscellaneous       Last Written Prescription Date:  not on active med list  Last Fill Quantity: ,   # refills:   Last Office Visit : 11-6-23  Future Office visit:  2-13-24  Last disp from pharm 10-13-23    Routing refill request to provider for review/approval because:  Drug not active on patient's medication list

## 2023-12-11 ENCOUNTER — OFFICE VISIT (OUTPATIENT)
Dept: ORTHOPEDICS | Facility: CLINIC | Age: 28
End: 2023-12-11
Attending: INTERNAL MEDICINE
Payer: COMMERCIAL

## 2023-12-11 ENCOUNTER — PRE VISIT (OUTPATIENT)
Dept: ORTHOPEDICS | Facility: CLINIC | Age: 28
End: 2023-12-11

## 2023-12-11 DIAGNOSIS — M65.30 TRIGGER FINGER, ACQUIRED: ICD-10-CM

## 2023-12-11 DIAGNOSIS — G56.23 ULNAR NEUROPATHY OF BOTH UPPER EXTREMITIES: ICD-10-CM

## 2023-12-11 DIAGNOSIS — E10.21 TYPE 1 DIABETES MELLITUS WITH DIABETIC NEPHROPATHY (H): Primary | ICD-10-CM

## 2023-12-11 DIAGNOSIS — G56.03 BILATERAL CARPAL TUNNEL SYNDROME: ICD-10-CM

## 2023-12-11 PROCEDURE — 20550 NJX 1 TENDON SHEATH/LIGAMENT: CPT | Mod: F2 | Performed by: PHYSICIAN ASSISTANT

## 2023-12-11 PROCEDURE — 99204 OFFICE O/P NEW MOD 45 MIN: CPT | Mod: 25 | Performed by: PHYSICIAN ASSISTANT

## 2023-12-11 RX ORDER — LIDOCAINE HYDROCHLORIDE 10 MG/ML
1 INJECTION, SOLUTION EPIDURAL; INFILTRATION; INTRACAUDAL; PERINEURAL
Status: SHIPPED | OUTPATIENT
Start: 2023-12-11

## 2023-12-11 RX ORDER — DEXAMETHASONE SODIUM PHOSPHATE 4 MG/ML
4 INJECTION, SOLUTION INTRA-ARTICULAR; INTRALESIONAL; INTRAMUSCULAR; INTRAVENOUS; SOFT TISSUE
Status: SHIPPED | OUTPATIENT
Start: 2023-12-11

## 2023-12-11 RX ADMIN — DEXAMETHASONE SODIUM PHOSPHATE 4 MG: 4 INJECTION, SOLUTION INTRA-ARTICULAR; INTRALESIONAL; INTRAMUSCULAR; INTRAVENOUS; SOFT TISSUE at 13:04

## 2023-12-11 RX ADMIN — LIDOCAINE HYDROCHLORIDE 1 ML: 10 INJECTION, SOLUTION EPIDURAL; INFILTRATION; INTRACAUDAL; PERINEURAL at 13:04

## 2023-12-11 NOTE — PROGRESS NOTES
Date of Service: Dec 11, 2023    Chief Complaint:   Chief Complaint   Patient presents with    Consult     Trigger finger of left ring finger and bilateral carpal tunnel        Occupation:     History of Present Illness: Stacey Mantilla is a 28 year old, right handed female who presents today for further evaluation of bilateral hand numbness and tingling. Numbness and tingling effects the thumb, index, long (middle), ring, and small. Symptoms first began for many years. There was not an inciting event. Symptoms DO wake the patient up at night. Symptoms made worse during the following activities: Knitting, angel climbing, typing. Symptoms are present intermittently. The following modalities have been tried: night splinting, intermittently.     Also reports triggering of the left ring finger.  He first noticed this in August 2023.  The finger does occasionally get stuck in a flexed position.    She has a history of type 1 diabetes.  She is currently on a break from a insulin pump.  She states her blood sugars have historically been fairly well-controlled.      Review of Systems: A 14-point review of systems was obtained on intake and reviewed.      Past Medical History:   Diagnosis Date    Asthma     Currently no treatment needed    Celiac disease     Chronic kidney disease, stage I 8/3/2015    Chronic sinusitis     Diabetes mellitus type 1 (H)     Diabetic nephropathy (H)     Family history of heart disease 8/3/2015    Hypertension     Hypothyroid     Pedal edema     Psoriasis        Past Surgical History:   Procedure Laterality Date    ENT SURGERY      TONSILLECTOMY, ADENOIDECTOMY, COMBINED           Current Outpatient Medications:     albuterol (PROAIR HFA/PROVENTIL HFA/VENTOLIN HFA) 108 (90 Base) MCG/ACT inhaler, Inhale 2 puffs into the lungs every 6 hours, Disp: 18 g, Rfl: 3    albuterol (PROVENTIL) (2.5 MG/3ML) 0.083% neb solution, Take 1 vial (2.5 mg) by nebulization every 6 hours as needed  for shortness of breath, wheezing or cough, Disp: 200 mL, Rfl: 0    amphetamine-dextroamphetamine (ADDERALL XR) 15 MG 24 hr capsule, Take 1 capsule (15 mg) by mouth daily, Disp: 30 capsule, Rfl: 0    amphetamine-dextroamphetamine (ADDERALL) 10 MG tablet, Take 1 tablet (10 mg) by mouth daily, Disp: 90 tablet, Rfl: 0    ASPIRIN NOT PRESCRIBED (INTENTIONAL), continuous prn for other Antiplatelet medication not prescribed intentionally due to (Patient not taking: Reported on 11/24/2023), Disp: , Rfl:     atorvastatin (LIPITOR) 40 MG tablet, Take 1 tablet (40 mg) by mouth daily, Disp: 90 tablet, Rfl: 3    blood glucose (NO BRAND SPECIFIED) test strip, Use to test blood sugar five times daily or as directed.  Dispense Brandy Contour Next Test Strips., Disp: 200 strip, Rfl: 11    Cholecalciferol 2000 UNITS TBDP, Take 2,000 Units by mouth daily, Disp: 90 tablet, Rfl: 11    Continuous Blood Gluc Sensor (DEXCOM G7 SENSOR) MISC, Use as directed to monitor blood glucose. Change every 10 days., Disp: 9 each, Rfl: 0    Continuous Blood Gluc Transmit (DEXCOM G6 TRANSMITTER) MISC, Change every 3 months., Disp: 1 each, Rfl: 3    enalapril (VASOTEC) 20 MG tablet, Take 1 tablet (20 mg) by mouth 2 times daily, Disp: 180 tablet, Rfl: 1    escitalopram (LEXAPRO) 10 MG tablet, TAKE 1 TABLET (10 MG) BY MOUTH DAILY., Disp: 90 tablet, Rfl: 3    Glucagon, rDNA, (GLUCAGON EMERGENCY) 1 MG KIT, Use as directed in case of low blood glucose. (Patient not taking: Reported on 11/24/2023), Disp: 1 kit, Rfl: 3    Injection Device for insulin (INPEN 814-[id number]-[id number]) MOLLY, 1 each 5 times daily, Disp: 2 each, Rfl: 1    insulin degludec (TRESIBA FLEXTOUCH) 100 UNIT/ML pen, Inject 80 Units Subcutaneous daily, Disp: 75 mL, Rfl: 3    Insulin Infusion Pump Supplies (INSULIN PUMP SYRINGE RESERVOIR) MISC, Change every other day as directed (Patient not taking: Reported on 11/24/2023), Disp: 45 each, Rfl: 3    Insulin Infusion Pump Supplies (SURE-T  "INFUSION SET 23\") MISC, Change every 2-3 days, Disp: 30 each, Rfl: 3    insulin pen needle (32G X 4 MM) 32G X 4 MM miscellaneous, Use 6 pen needles daily as directed for insulin administration., Disp: 600 each, Rfl: 3    Insulin Pump Accessories MISC, Infusion set per patient preference.  Change infusion set every other day, Disp: 45 each, Rfl: 3    insulin syringe-needle U-100 (31G X 5/16\" 0.5 ML) 31G X 5/16\" 0.5 ML miscellaneous, Use 5 syringes daily or as directed., Disp: 100 each, Rfl: 3    insulin syringes, disposable, U-100 0.3 ML MISC, Use 2-3 times daily as needed, Disp: 100 each, Rfl: 11    ipratropium - albuterol 0.5 mg/2.5 mg/3 mL (DUONEB) 0.5-2.5 (3) MG/3ML neb solution, Take 1 vial (3 mLs) by nebulization every 6 hours as needed for shortness of breath or wheezing, Disp: 30 mL, Rfl: 11    KETOSTIX test strip, Use as directed in case of high glucose, vomiting or illness. (Patient not taking: Reported on 11/24/2023), Disp: 50 strip, Rfl: 3    KETOSTIX test strip, Use as directed in case of high blood sugars or illness. (Patient not taking: Reported on 11/24/2023), Disp: 25 each, Rfl: 3    montelukast (SINGULAIR) 10 MG tablet, Take 1 tablet (10 mg) by mouth At Bedtime, Disp: 90 tablet, Rfl: 3    Multiple Vitamins-Minerals (MULTIVITAMIN ADULT PO), Take 1 tablet by mouth daily (Patient not taking: Reported on 11/24/2023), Disp: , Rfl:     NOVOLOG PENFILL 100 UNIT/ML soln, Take as directed up to 60 units per day, Disp: 54 mL, Rfl: 3    Allergies   Allergen Reactions    Dog Epithelium Allergy Skin Test Other (See Comments)     Sinus symptoms     Dogs Other (See Comments)     Sinus symptoms     Dust Mites      Sinus     Gluten Meal        Social History     Tobacco Use    Smoking status: Never    Smokeless tobacco: Never   Vaping Use    Vaping Use: Never used   Substance Use Topics    Alcohol use: Yes     Comment: couple times per month will have 3 mixed drinks    Drug use: Yes     Types: Marijuana       Family " History   Problem Relation Age of Onset    Gastrointestinal Disease Mother         Celiacs    Obesity Mother     Depression Mother     Fibromyalgia Mother     Cardiovascular Father 48        MI, stents, diabetic, type 2    Diabetes Father     Coronary Artery Disease Father     Obesity Father     Depression Father     Substance Abuse Father     Lung Cancer Father         Smoker    Substance Abuse Sister     Bipolar Disorder Brother     Schizophrenia Brother     Myocardial Infarction Brother 50        Possibly MI    Skin Cancer Maternal Grandmother     Cardiovascular Maternal Grandfather         Englarged heart, pacemaker, defib    Skin Cancer Maternal Grandfather     Obesity Maternal Half-Sister     Heart Disease Paternal Half-Brother 45    Melanoma No family hx of     Glaucoma No family hx of     Macular Degeneration No family hx of        Physical examination:  Well-developed, well-nourished and in no acute distress.  Alert and oriented to surroundings.  On examination of the right upper extremity:     Skin clean, dry and intact  Sensation:  Median: intact  Radial: intact  Ulnar: intact  Thumb opposition strength: intact  Interosseous strength: intact  Thenar atrophy: Not Present  Interosseous atrophy: Not Present  Tinel's over carpal tunnel: Present  Tinel's over cubital tunnel: Not Present  Phalen's: Positive  Carpal tunnel compression: Positive  Elbow flexion compression: postive    Two point discrimination (mm):   Median: 5 mm  Ulnar: 6 mm    On examination of the left upper extremity:     Skin clean, dry and intact  Sensation:  Median: intact  Radial: intact  Ulnar: intact  Thumb opposition strength: intact  Interosseous strength: intact  Thenar atrophy: Not Present  Interosseous atrophy: Not Present  Tinel's over carpal tunnel: Present  Tinel's over cubital tunnel: Not Present  Phalen's: Positive  Carpal tunnel compression: Positive  Elbow flexion compression: Positive    Two point discrimination (mm):    Median: 5 mm  Ulnar: 6 mm     Assessment: 28 year old female with:  Bilateral carpal tunnel syndrome.   Lateral ulnar neuropathy  Left ring trigger finger.  Type 1 diabetes, controlled on insulin with last A1c 7.3.    Plan:     - Discussed treatment options for trigger finger including continued observation, corticosteroid injection, and surgical release.  We did discuss that sometimes these can be resolved with 1 or 2 steroid injections.  Discussed the risk of hyperglycemia following steroid injection.  Patient like to trial steroid injection for the trigger finger today.  See seizure note below.  - Discussed treatment options for her bilateral upper extremity neuropathy we discussed that she likely has some component of median neuropathy as well as possibly some ulnar neuropathy.  Commended obtaining an EMG for further evaluation.  We did briefly discussed treatment options for carpal tunnel syndrome including continued night splinting, steroid injection, and surgery.  Patient would like to avoid further steroid injections if possible is interested in surgical release.  In the interim she will continue with night splinting for the wrist as well as elbow extension splinting.  Did review some nerve gliding exercises as well.  - Follow-up after EMG with Dr. Reyes to discuss results.    Procedure:   After written informed consent was obtained, the patient's left ring finger was prepped with chloraprep.  2 mL of a 1: 1 mixture of 4 mg/ml dexamethasone and 1% lidocaine was injected into the left ring grout the level of the A1 pulley.  Patient tolerated the procedure well.  There were no immediate complications.    Total time spent on date of encounter: 30 minutes.   Activities performed: Chart review, Face to face counseling, Coordination of care, and Documentation    LEON FLORES PA-C  Orthopaedic Surgery

## 2023-12-11 NOTE — LETTER
12/11/2023         RE: Stacey Mantilla  [address]  [facility] [address]        Dear Colleague,    Thank you for referring your patient, Stacey Mantilla, to the [facility] [place]. Please see a copy of my visit note below.    Date of Service: Dec 11, 2023    Chief Complaint:   Chief Complaint   Patient presents with    Consult     Trigger finger of left ring finger and bilateral carpal tunnel        Occupation:     History of Present Illness: Stacey Mantilla is a 28 year old, right handed female who presents today for further evaluation of bilateral hand numbness and tingling. Numbness and tingling effects the thumb, index, long (middle), ring, and small. Symptoms first began for many years. There was not an inciting event. Symptoms DO wake the patient up at night. Symptoms made worse during the following activities: Knitting, angel climbing, typing. Symptoms are present intermittently. The following modalities have been tried: night splinting, intermittently.     Also reports triggering of the left ring finger.  He first noticed this in August 2023.  The finger does occasionally get stuck in a flexed position.    She has a history of type 1 diabetes.  She is currently on a break from a insulin pump.  She states her blood sugars have historically been fairly well-controlled.      Review of Systems: A 14-point review of systems was obtained on intake and reviewed.      Past Medical History:   Diagnosis Date    Asthma     Currently no treatment needed    Celiac disease     Chronic kidney disease, stage I 8/3/2015    Chronic sinusitis     Diabetes mellitus type 1 (H)     Diabetic nephropathy (H)     Family history of heart disease 8/3/2015    Hypertension     Hypothyroid     Pedal edema     Psoriasis        Past Surgical History:   Procedure Laterality Date    ENT SURGERY      TONSILLECTOMY, ADENOIDECTOMY, COMBINED           Current Outpatient Medications:      albuterol (PROAIR HFA/PROVENTIL HFA/VENTOLIN HFA) 108 (90 Base) MCG/ACT inhaler, Inhale 2 puffs into the lungs every 6 hours, Disp: 18 g, Rfl: 3    albuterol (PROVENTIL) (2.5 MG/3ML) 0.083% neb solution, Take 1 vial (2.5 mg) by nebulization every 6 hours as needed for shortness of breath, wheezing or cough, Disp: 200 mL, Rfl: 0    amphetamine-dextroamphetamine (ADDERALL XR) 15 MG 24 hr capsule, Take 1 capsule (15 mg) by mouth daily, Disp: 30 capsule, Rfl: 0    amphetamine-dextroamphetamine (ADDERALL) 10 MG tablet, Take 1 tablet (10 mg) by mouth daily, Disp: 90 tablet, Rfl: 0    ASPIRIN NOT PRESCRIBED (INTENTIONAL), continuous prn for other Antiplatelet medication not prescribed intentionally due to (Patient not taking: Reported on 11/24/2023), Disp: , Rfl:     atorvastatin (LIPITOR) 40 MG tablet, Take 1 tablet (40 mg) by mouth daily, Disp: 90 tablet, Rfl: 3    blood glucose (NO BRAND SPECIFIED) test strip, Use to test blood sugar five times daily or as directed.  Dispense Recommend Contour Next Test Strips., Disp: 200 strip, Rfl: 11    Cholecalciferol 2000 UNITS TBDP, Take 2,000 Units by mouth daily, Disp: 90 tablet, Rfl: 11    Continuous Blood Gluc Sensor (DEXCOM G7 SENSOR) MISC, Use as directed to monitor blood glucose. Change every 10 days., Disp: 9 each, Rfl: 0    Continuous Blood Gluc Transmit (DEXCOM G6 TRANSMITTER) MISC, Change every 3 months., Disp: 1 each, Rfl: 3    enalapril (VASOTEC) 20 MG tablet, Take 1 tablet (20 mg) by mouth 2 times daily, Disp: 180 tablet, Rfl: 1    escitalopram (LEXAPRO) 10 MG tablet, TAKE 1 TABLET (10 MG) BY MOUTH DAILY., Disp: 90 tablet, Rfl: 3    Glucagon, rDNA, (GLUCAGON EMERGENCY) 1 MG KIT, Use as directed in case of low blood glucose. (Patient not taking: Reported on 11/24/2023), Disp: 1 kit, Rfl: 3    Injection Device for insulin (INPEN [id number]-[id number]) MOLLY, 1 each 5 times daily, Disp: 2 each, Rfl: 1    insulin degludec (TRESIBA FLEXTOUCH) 100 UNIT/ML pen, Inject 80 Units  "Subcutaneous daily, Disp: 75 mL, Rfl: 3    Insulin Infusion Pump Supplies (INSULIN PUMP SYRINGE RESERVOIR) MISC, Change every other day as directed (Patient not taking: Reported on 11/24/2023), Disp: 45 each, Rfl: 3    Insulin Infusion Pump Supplies (SURE-T INFUSION SET 23\") MISC, Change every 2-3 days, Disp: 30 each, Rfl: 3    insulin pen needle (32G X 4 MM) 32G X 4 MM miscellaneous, Use 6 pen needles daily as directed for insulin administration., Disp: 600 each, Rfl: 3    Insulin Pump Accessories MISC, Infusion set per patient preference.  Change infusion set every other day, Disp: 45 each, Rfl: 3    insulin syringe-needle U-100 (31G X 5/16\" 0.5 ML) 31G X 5/16\" 0.5 ML miscellaneous, Use 5 syringes daily or as directed., Disp: 100 each, Rfl: 3    insulin syringes, disposable, U-100 0.3 ML MISC, Use 2-3 times daily as needed, Disp: 100 each, Rfl: 11    ipratropium - albuterol 0.5 mg/2.5 mg/3 mL (DUONEB) 0.5-2.5 (3) MG/3ML neb solution, Take 1 vial (3 mLs) by nebulization every 6 hours as needed for shortness of breath or wheezing, Disp: 30 mL, Rfl: 11    KETOSTIX test strip, Use as directed in case of high glucose, vomiting or illness. (Patient not taking: Reported on 11/24/2023), Disp: 50 strip, Rfl: 3    KETOSTIX test strip, Use as directed in case of high blood sugars or illness. (Patient not taking: Reported on 11/24/2023), Disp: 25 each, Rfl: 3    montelukast (SINGULAIR) 10 MG tablet, Take 1 tablet (10 mg) by mouth At Bedtime, Disp: 90 tablet, Rfl: 3    Multiple Vitamins-Minerals (MULTIVITAMIN ADULT PO), Take 1 tablet by mouth daily (Patient not taking: Reported on 11/24/2023), Disp: , Rfl:     NOVOLOG PENFILL 100 UNIT/ML soln, Take as directed up to 60 units per day, Disp: 54 mL, Rfl: 3    Allergies   Allergen Reactions    Dog Epithelium Allergy Skin Test Other (See Comments)     Sinus symptoms     Dogs Other (See Comments)     Sinus symptoms     Dust Mites      Sinus     Gluten Meal        Social History "     Tobacco Use    Smoking status: Never    Smokeless tobacco: Never   Vaping Use    Vaping Use: Never used   Substance Use Topics    Alcohol use: Yes     Comment: couple times per month will have 3 mixed drinks    Drug use: Yes     Types: Marijuana       Family History   Problem Relation Age of Onset    Gastrointestinal Disease Mother         Celiacs    Obesity Mother     Depression Mother     Fibromyalgia Mother     Cardiovascular Father 48        MI, stents, diabetic, type 2    Diabetes Father     Coronary Artery Disease Father     Obesity Father     Depression Father     Substance Abuse Father     Lung Cancer Father         Smoker    Substance Abuse Sister     Bipolar Disorder Brother     Schizophrenia Brother     Myocardial Infarction Brother 50        Possibly MI    Skin Cancer Maternal Grandmother     Cardiovascular Maternal Grandfather         Englarged heart, pacemaker, defib    Skin Cancer Maternal Grandfather     Obesity Maternal Half-Sister     Heart Disease Paternal Half-Brother 45    Melanoma No family hx of     Glaucoma No family hx of     Macular Degeneration No family hx of        Physical examination:  Well-developed, well-nourished and in no acute distress.  Alert and oriented to surroundings.  On examination of the right upper extremity:     Skin clean, dry and intact  Sensation:  Median: intact  Radial: intact  Ulnar: intact  Thumb opposition strength: intact  Interosseous strength: intact  Thenar atrophy: Not Present  Interosseous atrophy: Not Present  Tinel's over carpal tunnel: Present  Tinel's over cubital tunnel: Not Present  Phalen's: Positive  Carpal tunnel compression: Positive  Elbow flexion compression: postive    Two point discrimination (mm):   Median: 5 mm  Ulnar: 6 mm    On examination of the left upper extremity:     Skin clean, dry and intact  Sensation:  Median: intact  Radial: intact  Ulnar: intact  Thumb opposition strength: intact  Interosseous strength: intact  Thenar  atrophy: Not Present  Interosseous atrophy: Not Present  Tinel's over carpal tunnel: Present  Tinel's over cubital tunnel: Not Present  Phalen's: Positive  Carpal tunnel compression: Positive  Elbow flexion compression: Positive    Two point discrimination (mm):   Median: 5 mm  Ulnar: 6 mm     Assessment: 28 year old female with:  Bilateral carpal tunnel syndrome.   Lateral ulnar neuropathy  Left ring trigger finger.  Type 1 diabetes, controlled on insulin with last A1c 7.3.    Plan:     - Discussed treatment options for trigger finger including continued observation, corticosteroid injection, and surgical release.  We did discuss that sometimes these can be resolved with 1 or 2 steroid injections.  Discussed the risk of hyperglycemia following steroid injection.  Patient like to trial steroid injection for the trigger finger today.  See seizure note below.  - Discussed treatment options for her bilateral upper extremity neuropathy we discussed that she likely has some component of median neuropathy as well as possibly some ulnar neuropathy.  Commended obtaining an EMG for further evaluation.  We did briefly discussed treatment options for carpal tunnel syndrome including continued night splinting, steroid injection, and surgery.  Patient would like to avoid further steroid injections if possible is interested in surgical release.  In the interim she will continue with night splinting for the wrist as well as elbow extension splinting.  Did review some nerve gliding exercises as well.  - Follow-up after EMG with Dr. Reyes to discuss results.    Procedure:   After written informed consent was obtained, the patient's left ring finger was prepped with chloraprep.  2 mL of a 1: 1 mixture of 4 mg/ml dexamethasone and 1% lidocaine was injected into the left ring grout the level of the A1 pulley.  Patient tolerated the procedure well.  There were no immediate complications.    Total time spent on date of encounter: 30  minutes.   Activities performed: Chart review, Face to face counseling, Coordination of care, and Documentation    ELHAM FLORES PA-C  Orthopaedic Surgery     Hand / Upper Extremity Injection/Arthrocentesis: L ring A1    Date/Time: 12/11/2023 1:04 PM    Performed by: Elham Flores PA-C  Authorized by: Gray Reyes MD    Indications:  Pain  Needle Size:  25 G  Guidance: landmark    Condition: trigger finger    Location:  Ring finger    Site:  L ring A1  Medications:  4 mg dexAMETHasone 4 MG/ML; 1 mL lidocaine (PF) 1 %  Outcome:  Tolerated well, no immediate complications  Procedure discussed: discussed risks, benefits, and alternatives    Consent Given by:  Patient  Timeout: timeout called immediately prior to procedure    Prep: patient was prepped and draped in usual sterile fashion

## 2023-12-11 NOTE — NURSING NOTE
[facility] [address] [address]  [phone]  Dept: [phone]  ______________________________________________________________________________    Patient: Stacey Mantilla   : 1995   MRN: [id number]   2023    INVASIVE PROCEDURE SAFETY CHECKLIST    Date: 2023   Procedure:Left ring trigger finger injection  Patient Name: Stacey Mantilla  MRN: [id number]  YOB: 1995    Action: Complete sections as appropriate. Any discrepancy results in a HARD COPY until resolved.     PRE PROCEDURE:  Patient ID verified with 2 identifiers (name and  or MRN): Yes  Procedure and site verified with patient/designee (when able): Yes  Accurate consent documentation in medical record: Yes  H&P (or appropriate assessment) documented in medical record: NA  H&P must be up to 20 days prior to procedure and updates within 24 hours of procedure as applicable: NA  Relevant diagnostic and radiology test results appropriately labeled and displayed as applicable: Yes  Procedure site(s) marked with provider initials: NA    TIMEOUT:  Time-Out performed immediately prior to starting procedure, including verbal and active participation of all team members addressing the following:Yes  * Correct patient identify  * Confirmed that the correct side and site are marked  * An accurate procedure consent form  * Agreement on the procedure to be done  * Correct patient position  * Relevant images and results are properly labeled and appropriately displayed  * The need to administer antibiotics or fluids for irrigation purposes during the procedure as applicable   * Safety precautions based on patient history or medication use    DURING PROCEDURE: Verification of correct person, site, and procedures any time the responsibility for care of the patient is transferred to another member of the care team.       The following medications were given:          Prior to injection, verified patient identity using patient's name and date of birth.  Due to injection administration, patient instructed to remain in clinic for 15 minutes  afterwards, and to report any adverse reaction to me immediately.    Trigger point injection was performed.   Medication Name: Lidocaine NDC 66052-477-94  Drug Amount Wasted:  Yes: 4 mg/ml   Vial/Syringe: Single dose vial  Expiration Date:  5/1/27    Medication Name Dexamethosone NDC 2580983    Scribed by Nieves Johnson ATC for Dr. Reyes on December 11, 2023 at 1:00 pm based on the provider's statements to me.     Nieves Johnson ATC

## 2023-12-11 NOTE — PROGRESS NOTES
Hand / Upper Extremity Injection/Arthrocentesis: L ring A1    Date/Time: 12/11/2023 1:04 PM    Performed by: Elham Draper PA-C  Authorized by: Gray Reyes MD    Indications:  Pain  Needle Size:  25 G  Guidance: landmark    Condition: trigger finger    Location:  Ring finger    Site:  L ring A1  Medications:  4 mg dexAMETHasone 4 MG/ML; 1 mL lidocaine (PF) 1 %  Outcome:  Tolerated well, no immediate complications  Procedure discussed: discussed risks, benefits, and alternatives    Consent Given by:  Patient  Timeout: timeout called immediately prior to procedure    Prep: patient was prepped and draped in usual sterile fashion

## 2023-12-11 NOTE — NURSING NOTE
Reason For Visit:   Chief Complaint   Patient presents with    Consult     Trigger finger of left ring finger and bilateral carpal tunnel        Primary MD: Nancy Lopez  Ref. MD: Bia Allan    Age: 28 year old    ?  No      There were no vitals taken for this visit.      Pain Assessment  Patient Currently in Pain: Yes  0-10 Pain Scale: 2  Primary Pain Location: Finger (Comment which one) (left ring)               QuickDASH Assessment      12/11/2023     9:10 AM   [facility]   1. Open a tight or new jar No difficulty   2. Do heavy household chores (e.g., wash walls, floors) No difficulty   3. Carry a shopping bag or briefcase Mild difficulty   4. Wash your back No difficulty   5. Use a knife to cut food No difficulty   6. Recreational activities in which you take some force or impact through your arm, shoulder or hand (e.g., golf, hammering, tennis, etc.) Moderate difficulty   7. During the past week, to what extent has your arm, shoulder or hand problem interfered with your normal social activities with family, friends, neighbours or groups Not at all   8. During the past week, were you limited in your work or other regular daily activities as a result of your arm, shoulder or hand problem Slightly limited   9. Arm, shoulder or hand pain Moderate   10.Tingling (pins and needles) in your arm,shoulder or hand Moderate   11. During the past week, how much difficulty have you had sleeping because of the pain in your arm, shoulder or hand Moderate difficulty   Quickdash Ability Score 22.73              Allergies   Allergen Reactions    Dog Epithelium Allergy Skin Test Other (See Comments)     Sinus symptoms     Dogs Other (See Comments)     Sinus symptoms     Dust Mites      Sinus     Gluten Meal        Abel Elizabeth, ATC

## 2023-12-21 ASSESSMENT — SLEEP AND FATIGUE QUESTIONNAIRES

## 2023-12-22 ENCOUNTER — VIRTUAL VISIT (OUTPATIENT)
Dept: SLEEP MEDICINE | Facility: CLINIC | Age: 28
End: 2023-12-22
Attending: NURSE PRACTITIONER
Payer: COMMERCIAL

## 2023-12-22 VITALS
WEIGHT: 290 LBS | BODY MASS INDEX: 43.95 KG/M2 | HEIGHT: 68 IN | DIASTOLIC BLOOD PRESSURE: 80 MMHG | SYSTOLIC BLOOD PRESSURE: 124 MMHG

## 2023-12-22 DIAGNOSIS — R53.83 TIREDNESS: ICD-10-CM

## 2023-12-22 DIAGNOSIS — G47.00 INSOMNIA, UNSPECIFIED TYPE: ICD-10-CM

## 2023-12-22 DIAGNOSIS — E66.01 MORBID OBESITY WITH BMI OF 45.0-49.9, ADULT (H): ICD-10-CM

## 2023-12-22 DIAGNOSIS — G47.9 SLEEP DISTURBANCE: Primary | ICD-10-CM

## 2023-12-22 DIAGNOSIS — R06.83 SNORING: ICD-10-CM

## 2023-12-22 PROCEDURE — 99205 OFFICE O/P NEW HI 60 MIN: CPT | Mod: VID | Performed by: NURSE PRACTITIONER

## 2023-12-22 ASSESSMENT — PAIN SCALES - GENERAL: PAINLEVEL: NO PAIN (0)

## 2023-12-22 NOTE — PATIENT INSTRUCTIONS
"          MY TREATMENT INFORMATION FOR SLEEP APNEA-  Stacey Mantilla    DOCTOR : LUIS FERNANDO Umaña CNP    Am I having a sleep study at a sleep center?  --->Due to normal delays, you will be contacted within 2-4 weeks to schedule    Am I having a home sleep study?  --->Watch the video for the device you are using:    -/drop off device-   https://www.[employer].com/watch?v=yGGFBdELGhk    -Disposable device sent out require phone/computer application-   https://www.[employer].com/watch?v=BCce_vbiwxE      Frequently asked questions:  1. What is Obstructive Sleep Apnea (LEANDRO)? LEANDRO is the most common type of sleep apnea. Apnea means, \"without breath.\"  Apnea is most often caused by narrowing or collapse of the upper airway as muscles relax during sleep.   Almost everyone has occasional apneas. Most people with sleep apnea have had brief interruptions at night frequently for many years.  The severity of sleep apnea is related to how frequent and severe the events are.   2. What are the consequences of LEANDRO? Symptoms include: feeling sleepy during the day, snoring loudly, gasping or stopping of breathing, trouble sleeping, and occasionally morning headaches or heartburn at night.  Sleepiness can be serious and even increase the risk of falling asleep while driving. Other health consequences may include development of high blood pressure and other cardiovascular disease in persons who are susceptible. Untreated LEANDRO  can contribute to heart disease, stroke and diabetes.   3. What are the treatment options? In most situations, sleep apnea is a lifelong disease that must be managed with daily therapy. Medications are not effective for sleep apnea and surgery is generally not considered until other therapies have been tried. Your treatment is your choice . Continuous Positive Airway (CPAP) works right away and is the therapy that is effective in nearly everyone. An oral device to hold your jaw forward is usually the next most " reliable option. Other options include postioning devices (to keep you off your back), weight loss, and surgery including a tongue pacing device. There is more detail about some of these options below.  4. Are my sleep studies covered by insurance? Although we will request verification of coverage, we advise you also check in advance of the study to ensure there is coverage.    Important tips for those choosing CPAP and similar devices  For new devices, sign up for device LEFTY to monitor your device for your followup visits  We encourage you to utilize the [employer] lefty or website (myAir web (resmed.com) ) to monitor your therapy progress and share the data with your healthcare team when you discuss your sleep apnea.                                                    Know your equipment:  CPAP is continuous positive airway pressure that prevents obstructive sleep apnea by keeping the throat from collapsing while you are sleeping. In most cases, the device is  smart  and can slowly self-adjusts if your throat collapses and keeps a record every day of how well you are treated-this information is available to you and your care team.  BPAP is bilevel positive airway pressure that keeps your throat open and also assists each breath with a pressure boost to maintain adequate breathing.  Special kinds of BPAP are used in patients who have inadequate breathing from lung or heart disease. In most cases, the device is  smart  and can slowly self-adjusts to assist breathing. Like CPAP, the device keeps a record of how well you are treated.  Your mask is your connection to the device. You get to choose what feels most comfortable and the staff will help to make sure if fits. Here: are some examples of the different masks that are available: Magnetic mask aids may assist with use but there are safety issues that should be addressed when considering with magnets* ( see end of discussion).       Key points to remember on your  journey with sleep apnea:  Sleep study.  PAP devices often need to be adjusted during a sleep study to show that they are effective and adjusted right.  Good tips to remember: Try wearing just the mask during a quiet time during the day so your body adapts to wearing it. A humidifier is recommended for comfort in most cases to prevent drying of your nose and throat. Allergy medication from your provider may help you if you are having nasal congestion.  Getting settled-in. It takes more than one night for most of us to get used to wearing a mask. Try wearing just the mask during a quiet time during the day so your body adapts to wearing it. A humidifier is recommended for comfort in most cases. Our team will work with you carefully on the first day and will be in contact within 4 days and again at 2 and 4 weeks for advice and remote device adjustments. Your therapy is evaluated by the device each day.   Use it every night. The more you are able to sleep naturally for 7-8 hours, the more likely you will have good sleep and to prevent health risks or symptoms from sleep apnea. Even if you use it 4 hours it helps. Occasionally all of us are unable to use a medical therapy, in sleep apnea, it is not dangerous to miss one night.   Communicate. Call our skilled team on the number provided on the first day if your visit for problems that make it difficult to wear the device. Over 2 out of 3 patients can learn to wear the device long-term with help from our team. Remember to call our team or your sleep providers if you are unable to wear the device as we may have other solutions for those who cannot adapt to mask CPAP therapy. It is recommended that you sleep your sleep provider within the first 3 months and yearly after that if you are not having problems.   Use it for your health. We encourage use of CPAP masks during daytime quiet periods to allow your face and brain to adapt to the sensation of CPAP so that it will be a  more natural sensation to awaken to at night or during naps. This can be very useful during the first few weeks or months of adapting to CPAP though it does not help medically to wear CPAP during wakefulness and  should not be used as a strategy just to meet guidelines.  Take care of your equipment. Make sure you clean your mask and tubing using directions every day and that your filter and mask are replaced as recommended or if they are not working.     *Masks with magnets:  Updated Contraindications  Masks with magnetic components are contraindicated for use by patients where they, or anyone in close physical contact while using the mask, have the following:   Active medical implants that interact with magnets (i.e., pacemakers, implantable cardioverter defibrillators (ICD), neurostimulators, cerebrospinal fluid (CSF) shunts, insulin/infusion pumps)   Metallic implants/objects containing ferromagnetic material (i.e., aneurysm clips/flow disruption devices, embolic coils, stents, valves, electrodes, implants to restore hearing or balance with implanted magnets, ocular implants, metallic splinters in the eye)  Updated Warning  Keep the mask magnets at a safe distance of at least 6 inches (150 mm) away from implants or medical devices that may be adversely affected by magnetic interference. This warning applies to you or anyone in close physical contact with your mask. The magnets are in the frame and lower headgear clips, with a magnetic field strength of up to 400mT. When worn, they connect to secure the mask but may inadvertently detach while asleep.  Implants/medical devices, including those listed within contraindications, may be adversely affected if they change function under external magnetic fields or contain ferromagnetic materials that attract/repel to magnetic fields (some metallic implants, e.g., contact lenses with metal, dental implants, metallic cranial plates, screws, filiberto hole covers, and bone  substitute devices). Consult your physician and  of your implant / other medical device for information on the potential adverse effects of magnetic fields.    BESIDES CPAP, WHAT OTHER THERAPIES ARE THERE?    Positioning Device  Positioning devices are generally used when sleep apnea is mild and only occurs on your back.This example shows a pillow that straps around the waist. It may be appropriate for those whose sleep study shows milder sleep apnea that occurs primarily when lying flat on one's back. Preliminary studies have shown benefit but effectiveness at home may need to be verified by a home sleep test. These devices are generally not covered by medical insurance.  Examples of devices that maintain sleeping on the back to prevent snoring and mild sleep apnea.    Belt type body positioner  http://copygram/    Electronic reminder  http://nightshifttherapy.com/            Oral Appliance  What is oral appliance therapy?  An oral appliance device fits on your teeth at night like a retainer used after having braces. The device is made by a specialized dentist and requires several visits over 1-2 months before a manufactured device is made to fit your teeth and is adjusted to prevent your sleep apnea. Once an oral device is working properly, snoring should be improved. A home sleep test may be recommended at that time if to determine whether the sleep apnea is adequately treated.       Some things to remember:  -Oral devices are often, but not always, covered by your medical insurance. Be sure to check with your insurance provider.   -If you are referred for oral therapy, you will be given a list of specialized dentists to consider or you may choose to visit the Web site of the American Academy of Dental Sleep Medicine  -Oral devices are less likely to work if you have severe sleep apnea or are extremely overweight.     More detailed information  An oral appliance is a small acrylic device that fits  over the upper and lower teeth  (similar to a retainer or a mouth guard). This device slightly moves jaw forward, which moves the base of the tongue forward, opens the airway, improves breathing for effective treat snoring and obstructive sleep apnea in perhaps 7 out of 10 people .  The best working devices are custom-made by a dental device  after a mold is made of the teeth 1, 2, 3.  When is an oral appliance indicated?  Oral appliance therapy is recommended as a first-line treatment for patients with primary snoring, mild sleep apnea, and for patients with moderate sleep apnea who prefer appliance therapy to use of CPAP4, 5. Severity of sleep apnea is determined by sleep testing and is based on the number of respiratory events per hour of sleep.   How successful is oral appliance therapy?  The success rate of oral appliance therapy in patients with mild sleep apnea is 75-80% while in patients with moderate sleep apnea it is 50-70%. The chance of success in patients with severe sleep apnea is 40-50%. The research also shows that oral appliances have a beneficial effect on the cardiovascular health of LEANDRO patients at the same magnitude as CPAP therapy7.  Oral appliances should be a second-line treatment in cases of severe sleep apnea, but if not completely successful then a combination therapy utilizing CPAP plus oral appliance therapy may be effective. Oral appliances tend to be effective in a broad range of patients although studies show that the patients who have the highest success are females, younger patients, those with milder disease, and less severe obesity. 3, 6.   Finding a dentist that practices dental sleep medicine  Specific training is available through the American Academy of Dental Sleep Medicine for dentists interested in working in the field of sleep. To find a dentist who is educated in the field of sleep and the use of oral appliances, near you, visit the Web site of the American  Academy of Dental Sleep Medicine.    References  1. Jodi et al. Objectively measured vs self-reported compliance during oral appliance therapy for sleep-disordered breathing. Chest 2013; 144(5): 4946-8977.  2. Lianet et al. Objective measurement of compliance during oral appliance therapy for sleep-disordered breathing. Thorax 2013; 68(1): 91-96.  3. Israel, et al. Mandibular advancement devices in 620 men and women with LEANDRO and snoring: tolerability and predictors of treatment success. Chest 2004; 125: 4300-2548.  4. Terrence et al. Oral appliances for snoring and LEANDRO: a review. Sleep 2006; 29: 244-262.  5. Nadja et al. Oral appliance treatment for LEANDRO: an update. J Clin Sleep Med 2014; 10(2): 215-227.  6. Meagan et al. Predictors of OSAH treatment outcome. J Dent Res 2007; 86: 4928-6358.      Weight Loss:    Weight loss is a long-term strategy that may improve sleep apnea in some patients.    Weight management is a personal decision and the decision should be based on your interest and the potential benefits.  If you are interested in exploring weight loss strategies, the following discussion covers the impact on weight loss on sleep apnea and the approaches that may be successful.    Being overweight does not necessarily mean you will have health consequences.  Those who have BMI over 35 or over 27 with existing medical conditions carries greater risk.   Weight loss decreases severity of sleep apnea in most people with obesity. For those with mild obesity who have developed snoring with weight gain, even 15-30 pound weight loss can improve and occasionally eliminate sleep apnea.  Structured and life-long dietary and health habits are necessary to lose weight and keep healthier weight levels.     Though there may be significant health benefits from weight loss, long-term weight loss is very difficult to achieve- studies show success with dietary management in less than 10% of people. In  addition, substantial weight loss may require years of dietary control and may be difficult if patients have severe obesity. In these cases, surgical management may be considered.  Finally, older individuals who have tolerated obesity without health complications may be less likely to benefit from weight loss strategies.      Your BMI is Body mass index is 44.09 kg/m .    What is BMI?  Body mass index (BMI) is one way to tell whether you are at a healthy weight, overweight, or obese. It measures your weight in relation to your height.  A BMI of 18.5 to 24.9 is in the healthy range. A person with a BMI of 25 to 29.9 is considered overweight, and someone with a BMI of 30 or greater is considered obese.  Another way to find out if you are at risk for health problems caused by overweight and obesity is to measure your waist. If you are a woman and your waist is more than 35 inches, or if you are a man and your waist is more than 40 inches, your risk of disease may be higher.  More than two-thirds of American adults are considered overweight or obese. Being overweight or obese increases the risk for further weight gain.  Excess weight may lead to heart disease and diabetes. Creating and following plans for healthy eating and physical activity may help you improve your health.    Methods for maintaining or losing weight.  Weight control is part of healthy lifestyle and includes exercise, emotional health, and healthy eating habits.  Careful eating habits lifelong is the mainstay of weight control.  Though there are significant health benefits from weight loss, long-term weight loss with diet alone may be very difficult to achieve- studies show long-term success with dietary management in less than 10% of people. Attaining a healthy weight may be especially difficult to achieve in those with severe obesity. In some cases, medications, devices and surgical management might be considered.    What can you do?  If you are  overweight or obese and are interested in methods for weight loss, you should discuss this with your provider. In addition, we recommend that you review healthy life styles and methods for weight loss available through the National Institutes of Health patient information sites:   http://win.niddk.nih.gov/publications/index.htm    Surgery:    Surgery for obstructive sleep apnea is considered generally only when other therapies fail to work. Surgery may be discussed with you if you are having a difficult time tolerating CPAP and or when there is an abnormal structure that requires surgical correction.  Nose and throat surgeries often enlarge the airway to prevent collapse.  Most of these surgeries create pain for 1-2 weeks and up to half of the most common surgeries are not effective throughout life.  You should carefully discuss the benefits and drawbacks to surgery with your sleep provider and surgeon to determine if it is the best solution for you.   More information  Surgery for LEANDRO is directed at areas that are responsible for narrowing or complete obstruction of the airway during sleep.  There are a wide range of procedures available to enlarge and/or stabilize the airway to prevent blockage of breathing in the three major areas where it can occur: the palate, tongue, and nasal regions.  Successful surgical treatment depends on the accurate identification of the factors responsible for obstructive sleep apnea in each person.  A personalized approach is required because there is no single treatment that works well for everyone.  Because of anatomic variation, consultation with an examination by a sleep surgeon is a critical first step in determining what surgical options are best for each patient.  In some cases, examination during sedation may be recommended in order to guide the selection of procedures.  Patients will be counseled about risks and benefits as well as the typical recovery course after surgery.  Surgery is typically not a cure for a person s LEANDRO.  However, surgery will often significantly improve one s LEANDRO severity (termed  success rate ).  Even in the absence of a cure, surgery will decrease the cardiovascular risk associated with OSA7; improve overall quality of life8 (sleepiness, functionality, sleep quality, etc).      Palate Procedures:  Patients with LEANDRO often have narrowing of their airway in the region of their tonsils and uvula.  The goals of palate procedures are to widen the airway in this region as well as to help the tissues resist collapse.  Modern palate procedure techniques focus on tissue conservation and soft tissue rearrangement, rather than tissue removal.  Often the uvula is preserved in this procedure. Residual sleep apnea is common in patient after pharyngoplasty with an average reduction in sleep apnea events of 33%2.      Tongue Procedures:  ExamWhile patients are awake, the muscles that surround the throat are active and keep this region open for breathing. These muscles relax during sleep, allowing the tongue and other structures to collapse and block breathing.  There are several different tongue procedures available.  Selection of a tongue base procedure depends on characteristics seen on physical exam.  Generally, procedures are aimed at removing bulky tissues in this area or preventing the back of the tongue from falling back during sleep.  Success rates for tongue surgery range from 50-62%3.    Hypoglossal Nerve Stimulation:  Hypoglossal nerve stimulation has recently received approval from the [place] Food and Drug Administration for the treatment of obstructive sleep apnea.  This is based on research showing that the system was safe and effective in treating sleep apnea6.  Results showed that the median AHI score decreased 68%, from 29.3 to 9.0. This therapy uses an implant system that senses breathing patterns and delivers mild stimulation to airway muscles, which  keeps the airway open during sleep.  The system consists of three fully implanted components: a small generator (similar in size to a pacemaker), a breathing sensor, and a stimulation lead.  Using a small handheld remote, a patient turns the therapy on before bed and off upon awakening.    Candidates for this device must be greater than 18 years of age, have moderate to severe LEANDRO (AHI between 15-65), BMI less than 35, have tried CPAP/oral appliance for at least 8 weeks without success, and have appropriate upper airway anatomy (determined by a sleep endoscopy performed by Dr. Jarad Gutierrez).    Hypoglossal Nerve Stimulation Pathway:    The sleep surgeon s office will work with the patient through the insurance prior-authorization process (including communications and appeals).    Nasal Procedures:  Nasal obstruction can interfere with nasal breathing during the day and night.  Studies have shown that relief of nasal obstruction can improve the ability of some patients to tolerate positive airway pressure therapy for obstructive sleep apnea1.  Treatment options include medications such as nasal saline, topical corticosteroid and antihistamine sprays, and oral medications such as antihistamines or decongestants. Non-surgical treatments can include external nasal dilators for selected patients. If these are not successful by themselves, surgery can improve the nasal airway either alone or in combination with these other options.      Combination Procedures:  Combination of surgical procedures and other treatments may be recommended, particularly if patients have more than one area of narrowing or persistent positional disease.  The success rate of combination surgery ranges from 66-80%2,3.    References  Nelson BLACK. The Role of the Nose in Snoring and Obstructive Sleep Apnoea: An Update.  Eur Arch Otorhinolaryngol. 2011; 268: 1365-73.   Tarik SM; Jessica JA; Noni MCFARLAND; Pallanch JF; Fanta SOUSA; Kevin EDOUARD; Becca BARROSO.  Surgical modifications of the upper airway for obstructive sleep apnea in adults: a systematic review and meta-analysis. SLEEP 2010;33(10):4676-6751. Rae ALVARADO. Hypopharyngeal surgery in obstructive sleep apnea: an evidence-based medicine review.  Arch Otolaryngol Head Neck Surg. 2006 Feb;132(2):206-13.  Stevenson YH1, Freddy Y, Matthew SWETA. The efficacy of anatomically based multilevel surgery for obstructive sleep apnea. Otolaryngol Head Neck Surg. 2003 Oct;129(4):327-35.  Kezirian E, Goldberg A. Hypopharyngeal Surgery in Obstructive Sleep Apnea: An Evidence-Based Medicine Review. Arch Otolaryngol Head Neck Surg. 2006 Feb;132(2):206-13.  Darcy REYNOSO et al. Upper-Airway Stimulation for Obstructive Sleep Apnea.  N Engl J Med. 2014 Jan 9;370(2):139-49.  Marielena Y et al. Increased Incidence of Cardiovascular Disease in Middle-aged Men with Obstructive Sleep Apnea. Am J Respir Crit Care Med; 2002 166: 159-165  Jesus EM et al. Studying Life Effects and Effectiveness of Palatopharyngoplasty (SLEEP) study: Subjective Outcomes of Isolated Uvulopalatopharyngoplasty. Otolaryngol Head Neck Surg. 2011; 144: 623-631.        WHAT IF I ONLY HAVE SNORING?    Mandibular advancement devices, lateral sleep positioning, long-term weight loss and treatment of nasal allergies have been shown to improve snoring.  Exercising tongue muscles with a game (https://Bloomspot.Hivelocity/us/lefty/soundly-reduce-snoring/eb6928625579) or stimulating the tongue during the day with a device (https://doi.org/10.3390/ebp43568570) have improved snoring in some individuals.    Remember to Drive Safe... Drive Alive     Sleep health profoundly affects your health, mood, and your safety.  Thirty three percent of the population (one in three of us) is not getting enough sleep and many have a sleep disorder. Not getting enough sleep or having an untreated / undertreated sleep condition may make us sleepy without even knowing it. In fact, our driving could be dramatically  impaired due to our sleep health. As your provider, here are some things I would like you to know about driving:     Here are some warning signs for impairment and dangerous drowsy driving:              -Having been awake more than 16 hours               -Looking tired               -Eyelid drooping              -Head nodding (it could be too late at this point)              -Driving for more than 30 minutes     Some things you could do to make the driving safer if you are experiencing some drowsiness:              -Stop driving and rest              -Call for transportation              -Make sure your sleep disorder is adequately treated     Some things that have been shown NOT to work when experiencing drowsiness while driving:              -Turning on the radio              -Opening windows              -Eating any  distracting  /  entertaining  foods (e.g., sunflower seeds, candy, or any other)              -Talking on the phone      Your decision may not only impact your life, but also the life of others. Please, remember to drive safe for yourself and all of us.

## 2023-12-22 NOTE — PROGRESS NOTES
Virtual Visit Details    Type of service:  Video Visit     Originating Location (pt. Location): Other  outside at home    Distant Location (provider location):  Off-site  Platform used for Video Visit: [facility]      Outpatient Sleep Medicine Consultation:      Name: Stacey Mantilla MRN# [id number]   Age: 28 year old YOB: 1995     Date of Consultation: December 22, 2023  Consultation is requested by: Ashley Jessica, LUIS FERNANDO CNP  [address] [place] [address] Ashley Jessica  Primary care provider: Nancy Lopez       Reason for Sleep Consult:     Stacey Mantilla is sent by Ashley Jessica for a sleep consultation regarding possible LEANDRO.    Patient s Reason for visit  Stacey Mantilla main reason for visit: I dont think i sleep deeply. I sleep 8+ hrs a night and still feel tired  Patient states problem(s) started: Its been most of my life  Stacey Mantilla's goals for this visit: I would like to schedule a sleep study. I am concerned that i may have sleep apnea           Assessment and Plan:     Summary Sleep Diagnoses:  1. Sleep disturbance  2. Snoring  3. Tiredness  4. Insomnia, unspecified type  5. Morbid obesity with BMI of 45.0-49.9, adult (H)  - Adult Sleep Eval & Management  Referral  - HST-Home Sleep Apnea Test - Noxturnal Returnable; Future      Comorbid Diagnoses:  1.  HTN  2.  DM 1 with diabetic nephropathy  3.  Asthma  4.  CKD stage I  5.  ADHD  6.  Anxiety  7.  Celiac disease      Summary Recommendations:  1.  Recommend further evaluation with home sleep apnea testing (HST) for possible sleep disordered breathing.  She has a high pretest probability of LEANDRO with symptoms of snoring, difficulty staying asleep, and poorly restful sleep.  Her ESS score is 4 which is within normal limits for daytime sleepiness, however, she does take Adderall XR for ADHD which may be off setting or underestimating her daytime somnolence.  Her STOP-BANG score is 5 today  which suggest a high risk of LEANDRO.  Her symptoms are consistent with possible obstructive sleep apnea.    2.  We discussed the pathophysiology of obstructive sleep apnea and the risks associated with untreated LEANDRO.  We also discussed the pros and cons of in lab polysomnography versus home sleep testing.  The patient has elected to undergo home sleep testing (HST) to further evaluate her symptoms of possible obstructive sleep apnea.    3.  All potential therapeutic options including positive airway pressure, mandibular advancing oral appliances, positional therapy, and surgical options were discussed. Also counseled about impact of weight loss on LEANDRO.     4.  Follow-up in approximately 2 weeks after the sleep study to review the results and to determine next steps.    Orders Placed This Encounter   Procedures    HST-Home Sleep Apnea Test - Noxturnal Returnable         Summary Counseling:    Sleep Testing Reviewed  Obstructive Sleep Apnea Reviewed  Complications of Untreated Sleep Apnea Reviewed  Previous recent chart notes, lab, imaging, and cardiology results reviewed    Medical Decision-making:   Educational materials provided in instructions    Total time spent reviewing medical records, history and physical examination, review of previous testing and interpretation as well as documentation on this date: 60 minutes    CC: Ashley Jessica          History of Present Illness:     Stacey Mantilla is a 28-year-old female with a PMH pertinent for HTN, DM 1 with diabetic nephropathy, asthma, CKD stage I, ADHD, anxiety, celiac disease, and morbid obesity who presents today with symptoms of snoring, difficulty staying asleep, and poor sleep for several years.  She has a family history of sleep apnea as well.  She was referred by her internal medicine provider for further evaluation of possible sleep disordered breathing.    Past Sleep Evaluations: Yes, PSG/MSLT at age 10 yrs old - possible dx of RLS    SLEEP-WAKE  SCHEDULE:     Work/School Days: Patient goes to school/work: Yes   Usually gets into bed at 9  Takes patient about 20 minutes to fall asleep  Has trouble falling asleep 1-2 nights per week  Wakes up in the middle of the night At least once pet night 2 times.  Wakes up due to External stimuli (bed partner, pets, noise, etc);Use the bathroom;Uncertain  She has trouble falling back asleep 1 times a week.   It usually takes 5 min to get back to sleep  Patient is usually up at 8  Uses alarm: Yes    Weekends/Non-work Days/All Other Days:  Usually gets into bed at 10   Takes patient about 20 min to fall asleep  Patient is usually up at 9  Uses alarm: No    Sleep Need  Patient gets  8 hrs sleep on average   Patient thinks she needs about Im not sure sleep    Stacey Mantilla prefers to sleep in this position(s): Side;Stomach   Patient states they do the following activities in bed: Read;Use phone, computer, or tablet    Naps  Patient takes a purposeful nap 0 times a week and naps are usually I dont take naps in duration  She feels better after a nap: No  She dozes off unintentionally 0 days per week  Patient has had a driving accident or near-miss due to sleepiness/drowsiness: No      SLEEP DISRUPTIONS:    Breathing/Snoring  Patient snores:Yes  Other people complain about her snoring: Yes  Patient has been told she stops breathing in her sleep:No  She has issues with the following: Stuffy nose when you wake up    Movement:  Patient gets pain, discomfort, with an urge to move:  No - denies RLS  It happens when she is resting:  No  It happens more at night:  No  Patient has been told she kicks her legs at night:  Yes     Behaviors in Sleep:  Stacey Mantilla has experienced the following behaviors while sleeping: Recurring Nightmares;Sleep-talking;Teeth grinding;Night terrors (screaming,yelling or acting afraid but not recalling event);See or hear things that are not really there upon awakening or just falling asleep - very  occasional, may see a giant spider on ceiling at times.  She has experienced sudden muscle weakness during the day: No      Is there anything else you would like your sleep provider to know:        CAFFEINE AND OTHER SUBSTANCES:    Patient consumes caffeinated beverages per day:  1  Last caffeine use is usually: 12pm  List of any prescribed or over the counter stimulants that patient takes: Adderall instant release and extended release  List of any prescribed or over the counter sleep medication patient takes: Melatonin 3mg  List of previous sleep medications that patient has tried:    Patient drinks alcohol to help them sleep: No  Patient drinks alcohol near bedtime: No    Alcohol use: None  Nicotine/tobacco use: None  Recreational drug use: Occasional marijuana      Family History:  Patient has a family member been diagnosed with a sleep disorder: Yes  Father and sister sonja sleep apnea - CPAP         SCALES:    EPWORTH SLEEPINESS SCALE         12/21/2023    10:39 AM    Barry Sleepiness Scale ( RAMAN Pimentel  [id number]<br>ESS - [place]/English - Final version - 21 Nov 07 - [facility] [place].)   Sitting and reading Slight chance of dozing   Watching TV Slight chance of dozing   Sitting, inactive in a public place (e.g. a theatre or a meeting) Would never doze   As a passenger in a car for an hour without a break Would never doze   Lying down to rest in the afternoon when circumstances permit Moderate chance of dozing   Sitting and talking to someone Would never doze   Sitting quietly after a lunch without alcohol Would never doze   In a car, while stopped for a few minutes in traffic Would never doze   Barry Score (MC) 4   Barry Score (Sleep) 4         INSOMNIA SEVERITY INDEX (CELINA)          12/21/2023    10:30 AM   Insomnia Severity Index (CELINA)   Difficulty falling asleep 1   Difficulty staying asleep 2   Problems waking up too early 0   How SATISFIED/DISSATISFIED are you with your CURRENT sleep pattern? 2    How NOTICEABLE to others do you think your sleep problem is in terms of impairing the quality of your life? 1   How WORRIED/DISTRESSED are you about your current sleep problem? 1   To what extent do you consider your sleep problem to INTERFERE with your daily functioning (e.g. daytime fatigue, mood, ability to function at work/daily chores, concentration, memory, mood, etc.) CURRENTLY? 2   CELINA Total Score 9       Guidelines for Scoring/Interpretation:  Total score categories:  0-7 = No clinically significant insomnia   8-14 = Subthreshold insomnia   15-21 = Clinical insomnia (moderate severity)  22-28 = Clinical insomnia (severe)  Used via courtesy of www.[place]ata[employer].va.gov with permission from Robby Jeffery PhD., [facility]      STOP BANG score: 5        12/22/2023     1:11 PM   STOP BANG Questionnaire (  2008, the American Society of Anesthesiologists, Inc. [employer]   B/P Clinic: 124/80   BMI Clinic: 44.09         GAD7        1/27/2022     2:30 PM   YARELIS-7    1. Feeling nervous, anxious, or on edge 2   2. Not being able to stop or control worrying 1   3. Worrying too much about different things 1   4. Trouble relaxing 1   5. Being so restless that it is hard to sit still 3   6. Becoming easily annoyed or irritable 2   7. Feeling afraid, as if something awful might happen 1   YARELIS-7 Total Score 11         CAGE-AID        1/27/2022     2:33 PM   CAGE-AID Flowsheet   Have you ever felt you should Cut down on your drinking or drug use? 0   Have people Annoyed you by criticizing your drinking or drug use? 0   Have you ever felt bad or Guilty about your drinking or drug use? 0   Have you ever had a drink or used drugs first thing in the morning to steady your nerves or to get rid of a hangover? (Eye opener) 0   CAGE-AID SCORE 0   Have you ever felt you should Cut down on your drinking or drug use? No   Have people Annoyed you by criticizing your drinking or drug use? No   Have you ever  "felt bad or Guilty about your drinking or drug use? No   Have you ever had a drink or used drugs first thing in the morning to steady your nerves or to get rid of a hangover? (Eye opener) No   CAGE-AID SCORE 0 (A total score of 2 or greater is considered clinically significant)       CAGE-AID reprinted with permission from the [place] Medical Journal, MARISSA Hahn. and DIANA Giles, \"Conjoint screening questionnaires for alcohol and drug abuse\" [place] Medical Journal 94: 135-140, 1995.      PATIENT HEALTH QUESTIONNAIRE-9 (PHQ - 9)        2/3/2022    10:20 AM   PHQ-9 ([employer])   1.  Little interest or pleasure in doing things 0   2.  Feeling down, depressed, or hopeless 0   3.  Trouble falling or staying asleep, or sleeping too much 1   4.  Feeling tired or having little energy 1   5.  Poor appetite or overeating 1   6.  Feeling bad about yourself - or that you are a failure or have let yourself or your family down 0   7.  Trouble concentrating on things, such as reading the newspaper or watching television 2   8.  Moving or speaking so slowly that other people could have noticed. Or the opposite - being so fidgety or restless that you have been moving around a lot more than usual 2   9.  Thoughts that you would be better off dead, or of hurting yourself in some way 0   PHQ-9 Total Score 7   If you checked off any problems, how difficult have these problems made it for you to do your work, take care of things at home, or get along with other people? Somewhat difficult   6.  Feeling bad about yourself 0   7.  Trouble concentrating 2   8.  Moving slowly or restless 2   9.  Suicidal or self-harm thoughts 0   Difficulty at work, home, or with people Somewhat difficult       Developed by Karla Dixon, Jasmyn Ag, Kobe Jones and colleagues, with an educational jose luis from [employer] No permission required to reproduce, translate, display or distribute.        Allergies:    Allergies   Allergen " "Reactions    Dog Epithelium Allergy Skin Test Other (See Comments)     Sinus symptoms     Dogs Other (See Comments)     Sinus symptoms     Dust Mites      Sinus     Gluten Meal        Medications:    Current Outpatient Medications   Medication Sig Dispense Refill    albuterol (PROAIR HFA/PROVENTIL HFA/VENTOLIN HFA) 108 (90 Base) MCG/ACT inhaler Inhale 2 puffs into the lungs every 6 hours 18 g 3    atorvastatin (LIPITOR) 40 MG tablet Take 1 tablet (40 mg) by mouth daily 90 tablet 3    blood glucose (NO BRAND SPECIFIED) test strip Use to test blood sugar five times daily or as directed.  Dispense DataRose Contour Next Test Strips. 200 strip 11    Cholecalciferol 2000 UNITS TBDP Take 2,000 Units by mouth daily 90 tablet 11    Continuous Blood Gluc Sensor (DEXCOM G7 SENSOR) MISC Use as directed to monitor blood glucose. Change every 10 days. 9 each 0    Continuous Blood Gluc Transmit (DEXCOM G6 TRANSMITTER) MISC Change every 3 months. 1 each 3    enalapril (VASOTEC) 20 MG tablet Take 1 tablet (20 mg) by mouth 2 times daily 180 tablet 1    escitalopram (LEXAPRO) 10 MG tablet TAKE 1 TABLET (10 MG) BY MOUTH DAILY. 90 tablet 3    Glucagon, rDNA, (GLUCAGON EMERGENCY) 1 MG KIT Use as directed in case of low blood glucose. 1 kit 3    Injection Device for insulin (INPEN 100-GREY-NOVOLOG-FIASP) MOLLY 1 each 5 times daily 2 each 1    insulin degludec (TRESIBA FLEXTOUCH) 100 UNIT/ML pen Inject 80 Units Subcutaneous daily 75 mL 3    Insulin Infusion Pump Supplies (INSULIN PUMP SYRINGE RESERVOIR) MISC Change every other day as directed 45 each 3    Insulin Infusion Pump Supplies (SURE-T INFUSION SET 23\") MISC Change every 2-3 days 30 each 3    insulin pen needle (32G X 4 MM) 32G X 4 MM miscellaneous Use 6 pen needles daily as directed for insulin administration. 600 each 3    Insulin Pump Accessories MISC Infusion set per patient preference.  Change infusion set every other day 45 each 3    insulin syringe-needle U-100 (31G X 5/16\" 0.5 " "ML) 31G X 5/16\" 0.5 ML miscellaneous Use 5 syringes daily or as directed. 100 each 3    insulin syringes, disposable, U-100 0.3 ML MISC Use 2-3 times daily as needed 100 each 11    ipratropium - albuterol 0.5 mg/2.5 mg/3 mL (DUONEB) 0.5-2.5 (3) MG/3ML neb solution Take 1 vial (3 mLs) by nebulization every 6 hours as needed for shortness of breath or wheezing 30 mL 11    KETOSTIX test strip Use as directed in case of high glucose, vomiting or illness. 50 strip 3    montelukast (SINGULAIR) 10 MG tablet Take 1 tablet (10 mg) by mouth At Bedtime 90 tablet 3    Multiple Vitamins-Minerals (MULTIVITAMIN ADULT PO) Take 1 tablet by mouth daily      NOVOLOG PENFILL 100 UNIT/ML soln Take as directed up to 60 units per day 54 mL 3    albuterol (PROVENTIL) (2.5 MG/3ML) 0.083% neb solution Take 1 vial (2.5 mg) by nebulization every 6 hours as needed for shortness of breath, wheezing or cough 200 mL 0    amphetamine-dextroamphetamine (ADDERALL XR) 15 MG 24 hr capsule Take 1 capsule (15 mg) by mouth daily 30 capsule 0    amphetamine-dextroamphetamine (ADDERALL) 10 MG tablet Take 1 tablet (10 mg) by mouth daily 90 tablet 0    ASPIRIN NOT PRESCRIBED (INTENTIONAL) continuous prn for other Antiplatelet medication not prescribed intentionally due to  (Patient not taking: Reported on 11/24/2023)         Problem List:  Patient Active Problem List    Diagnosis Date Noted    Attention deficit hyperactivity disorder (ADHD), predominantly inattentive type 07/24/2023     Priority: Medium     10mg XR 30 per month  10mg IR 30 per month      Celiac disease 03/14/2022     Priority: Medium     Diagnosed around age 11      Elevated WBC count 06/13/2019     Priority: Medium     Normal retic% but slightly elevated absolute retic.  Normal smear.  CBC returned to normal  Negative HIV  Normal cortisol.    Likely related to inflammation, alcohol use. Will follow yearly      Morbid obesity (H) 05/28/2019     Priority: Medium    Type 1 diabetes mellitus " with diabetic nephropathy (H) 12/05/2016     Priority: Medium    Anxiety 06/17/2016     Priority: Medium    Mild intermittent asthma without complication 03/04/2016     Priority: Medium    Family history of heart disease 08/03/2015     Priority: Medium    Chronic kidney disease, stage I 08/03/2015     Priority: Medium    Diabetic nephropathy (H) 03/06/2014     Priority: Medium        Past Medical/Surgical History:  Past Medical History:   Diagnosis Date    Asthma     Currently no treatment needed    Celiac disease     Chronic kidney disease, stage I 8/3/2015    Chronic sinusitis     Diabetes mellitus type 1 (H)     Diabetic nephropathy (H)     Family history of heart disease 8/3/2015    Hypertension     Hypothyroid     Pedal edema     Psoriasis      Past Surgical History:   Procedure Laterality Date    ENT SURGERY      TONSILLECTOMY, ADENOIDECTOMY, COMBINED         Social History:  Social History     Socioeconomic History    Marital status:      Spouse name: Not on file    Number of children: Not on file    Years of education: Not on file    Highest education level: Not on file   Occupational History    Not on file   Tobacco Use    Smoking status: Never     Passive exposure: Past    Smokeless tobacco: Never   Vaping Use    Vaping Use: Never used   Substance and Sexual Activity    Alcohol use: Yes     Comment: couple times per month will have 3 mixed drinks    Drug use: Yes     Types: Marijuana    Sexual activity: Yes     Partners: Male   Other Topics Concern    Parent/sibling w/ CABG, MI or angioplasty before 65F 55M? Yes     Service Not Asked    Blood Transfusions Not Asked    Caffeine Concern Yes     Comment: 1 cup tea per day    Occupational Exposure Not Asked    Hobby Hazards Not Asked    Sleep Concern No    Stress Concern Not Asked    Weight Concern Not Asked    Special Diet Yes     Comment: gluten free diet, low carbs    Back Care Not Asked    Exercise Yes     Comment: walking, ellyptical,  swimming, weights    Bike Helmet Not Asked    Seat Belt Yes    Self-Exams Not Asked   Social History Narrative    Not on file     Social Determinants of Health     Financial Resource Strain: Low Risk  (12/3/2023)    Financial Resource Strain     Within the past 12 months, have you or your family members you live with been unable to get utilities (heat, electricity) when it was really needed?: No   Food Insecurity: Low Risk  (12/3/2023)    Food Insecurity     Within the past 12 months, did you worry that your food would run out before you got money to buy more?: No     Within the past 12 months, did the food you bought just not last and you didn t have money to get more?: No   Transportation Needs: Low Risk  (12/3/2023)    Transportation Needs     Within the past 12 months, has lack of transportation kept you from medical appointments, getting your medicines, non-medical meetings or appointments, work, or from getting things that you need?: No   Physical Activity: Sufficiently Active (4/25/2022)    Exercise Vital Sign     Days of Exercise per Week: 5 days     Minutes of Exercise per Session: 30 min   Stress: Stress Concern Present (4/25/2022)    [place] [employer] - Occupational Stress Questionnaire     Feeling of Stress : To some extent   Social Connections: Moderately Isolated (4/25/2022)    Social Connection and Isolation Panel [NHANES]     Frequency of Communication with Friends and Family: More than three times a week     Frequency of Social Gatherings with Friends and Family: Once a week     Attends Faith Services: Never     Active Member of Clubs or Organizations: No     Attends Club or Organization Meetings: Not on file     Marital Status: Living with partner   Interpersonal Safety: Not on file   Housing Stability: Low Risk  (12/3/2023)    Housing Stability     Do you have housing? : Yes     Are you worried about losing your housing?: No       Family History:  Family History  "  Problem Relation Age of Onset    Gastrointestinal Disease Mother         Celiacs    Obesity Mother     Depression Mother     Fibromyalgia Mother     Cardiovascular Father 48        MI, stents, diabetic, type 2    Diabetes Father     Coronary Artery Disease Father     Obesity Father     Depression Father     Substance Abuse Father     Lung Cancer Father         Smoker    Substance Abuse Sister     Bipolar Disorder Brother     Schizophrenia Brother     Myocardial Infarction Brother 50        Possibly MI    Skin Cancer Maternal Grandmother     Cardiovascular Maternal Grandfather         Englarged heart, pacemaker, defib    Skin Cancer Maternal Grandfather     Obesity Maternal Half-Sister     Heart Disease Paternal Half-Brother 45    Melanoma No family hx of     Glaucoma No family hx of     Macular Degeneration No family hx of        Review of Systems:  A complete review of systems reviewed by me is negative with the exeption of what has been mentioned in the history of present illness.  In the last TWO WEEKS have you experienced any of the following symptoms?  Fevers: No  Night Sweats: No  Weight Gain: No  Pain at Night: No  Double Vision: No  Changes in Vision: No  Difficulty Breathing through Nose: No  Sore Throat in Morning: No  Dry Mouth in the Morning: No  Shortness of Breath Lying Flat: No  Shortness of Breath With Activity: No  Awakening with Shortness of Breath: No  Increased Cough: No  Heart Racing at Night: No  Swelling in Feet or Legs: No  Diarrhea at Night: No  Heartburn at Night: No  Urinating More than Once at Night: No  Losing Control of Urine at Night: No  Joint Pains at Night: Yes  Headaches in Morning: No  Weakness in Arms or Legs: No  Depressed Mood: No  Anxiety: Yes     Physical Examination:  Vitals: /80   Ht 1.727 m (5' 8\")   Wt 131.5 kg (290 lb)   BMI 44.09 kg/m    BMI= Body mass index is 44.09 kg/m .           GENERAL APPEARANCE: healthy, alert, no distress, and cooperative  EYES: Eyes " "grossly normal to inspection and wearing eyeglasses  RESP: Unlabored, easy breathing with normal conversational speech  CV: color normal  NEURO: Alert and oriented x 3, normal strength and tone, mentation intact, and speech normal  PSYCH: mentation appears normal and affect normal/bright  Mallampati Class: Unable to examine  Tonsillar Stage: Unable to examine.         Data: All pertinent previous laboratory data reviewed     Recent Labs   Lab Test 06/05/23  1736 04/15/23  1009    140   POTASSIUM 4.0 4.8   CHLORIDE 104 105   CO2 25 28   ANIONGAP 10 7   * 112*   BUN 9.6 10.6   CR 0.59 0.57   FRANKLIN 9.8 9.6       Recent Labs   Lab Test 04/15/23  1041   WBC 8.9   RBC 5.09   HGB 14.3   HCT 43.7   MCV 86   MCH 28.1   MCHC 32.7   RDW 12.8          Recent Labs   Lab Test 06/05/23  1736 11/15/21  0901 05/07/21  0824   PROTTOTAL  --   --  7.3   ALBUMIN 4.3   < > 3.6   BILITOTAL  --   --  0.4   ALKPHOS  --   --  80   AST  --   --  13   ALT  --   --  23    < > = values in this interval not displayed.       TSH   Date Value   03/28/2023 3.75 uIU/mL   05/07/2021 3.69 mU/L   02/17/2020 3.97 mU/L       No results found for: \"UAMP\", \"UBARB\", \"BENZODIAZEUR\", \"UCANN\", \"UCOC\", \"OPIT\", \"UPCP\"    Iron Saturation Index   Date/Time Value Ref Range Status   05/19/2020 12:01 PM 20 15 - 46 % Final     Ferritin   Date/Time Value Ref Range Status   05/19/2020 12:01 PM 26 12 - 150 ng/mL Final       No results found for: \"PH\", \"PHARTERIAL\", \"PO2\", \"SK0XZLVXVUH\", \"SAT\", \"PCO2\", \"HCO3\", \"BASEEXCESS\", \"ADRIAN\", \"BEB\"    @LABRCNTIPR(phv:4,pco2v:4,po2v:4,hco3v:4,og:4,o2per:4)@    Echocardiology: No results found for this or any previous visit (from the past 4320 hour(s)).  9/26/2023 Echocardiogram Complete  Interpretation Summary  Left ventricular size, wall motion and function are normal. The ejection fraction is 55-60%.  Global right ventricular function is normal.  No significant valvular abnormalities were noted.    Chest x-ray: No " "results found for this or any previous visit from the past 365 days.      Chest CT: No results found for this or any previous visit from the past 365 days.      PFT: Most Recent [facility] Pulmonary Function Testing    No results found for: \"20001\"  No results found for: \"20002\"  No results found for: \"20003\"  No results found for: \"20015\"  No results found for: \"20016\"  No results found for: \"20027\"  No results found for: \"20028\"  No results found for: \"20029\"  No results found for: \"20079\"  No results found for: \"20080\"  No results found for: \"20081\"  No results found for: \"20335\"  No results found for: \"20105\"  No results found for: \"20053\"  No results found for: \"20054\"  No results found for: \"20055\"      LUIS FERNANDO Umaña CNP 12/22/2023           "

## 2023-12-22 NOTE — NURSING NOTE
Has patient had flu shot for current/most recent flu season? If so, when? Yes: 9/22/2023      Is the patient currently in the state of [place]? YES    Visit mode:VIDEO    If the visit is dropped, the patient can be reconnected by: VIDEO VISIT: Text to cell phone:   Telephone Information:   Mobile [phone]       Will anyone else be joining the visit? NO  (If patient encounters technical issues they should call [phone]    How would you like to obtain your AVS? MyChart    Are changes needed to the allergy or medication list? No    Reason for visit: Consult    Leatha WHEELER

## 2024-01-04 ENCOUNTER — VIRTUAL VISIT (OUTPATIENT)
Dept: PHARMACY | Facility: CLINIC | Age: 29
End: 2024-01-04
Attending: INTERNAL MEDICINE
Payer: COMMERCIAL

## 2024-01-04 ENCOUNTER — TELEPHONE (OUTPATIENT)
Dept: ENDOCRINOLOGY | Facility: CLINIC | Age: 29
End: 2024-01-04

## 2024-01-04 DIAGNOSIS — E13.65 OTHER SPECIFIED DIABETES MELLITUS WITH HYPERGLYCEMIA, WITH LONG-TERM CURRENT USE OF INSULIN (H): Primary | ICD-10-CM

## 2024-01-04 DIAGNOSIS — E13.65 OTHER SPECIFIED DIABETES MELLITUS WITH HYPERGLYCEMIA, WITH LONG-TERM CURRENT USE OF INSULIN (H): ICD-10-CM

## 2024-01-04 DIAGNOSIS — Z79.4 OTHER SPECIFIED DIABETES MELLITUS WITH HYPERGLYCEMIA, WITH LONG-TERM CURRENT USE OF INSULIN (H): Primary | ICD-10-CM

## 2024-01-04 DIAGNOSIS — Z79.4 OTHER SPECIFIED DIABETES MELLITUS WITH HYPERGLYCEMIA, WITH LONG-TERM CURRENT USE OF INSULIN (H): ICD-10-CM

## 2024-01-04 DIAGNOSIS — F41.9 ANXIETY: ICD-10-CM

## 2024-01-04 DIAGNOSIS — E11.9 WELL CONTROLLED DIABETES MELLITUS (H): ICD-10-CM

## 2024-01-04 DIAGNOSIS — F90.0 ATTENTION DEFICIT HYPERACTIVITY DISORDER (ADHD), PREDOMINANTLY INATTENTIVE TYPE: ICD-10-CM

## 2024-01-04 DIAGNOSIS — E66.01 MORBID OBESITY (H): ICD-10-CM

## 2024-01-04 DIAGNOSIS — E10.21 TYPE 1 DIABETES MELLITUS WITH DIABETIC NEPHROPATHY (H): Primary | ICD-10-CM

## 2024-01-04 DIAGNOSIS — E78.2 MIXED HYPERLIPIDEMIA: ICD-10-CM

## 2024-01-04 DIAGNOSIS — J45.20 MILD INTERMITTENT ASTHMA WITHOUT COMPLICATION: ICD-10-CM

## 2024-01-04 PROCEDURE — 99605 MTMS BY PHARM NP 15 MIN: CPT | Mod: 93

## 2024-01-04 RX ORDER — TIRZEPATIDE 2.5 MG/.5ML
2.5 INJECTION, SOLUTION SUBCUTANEOUS
Qty: 2 ML | Refills: 3 | Status: SHIPPED | OUTPATIENT
Start: 2024-01-04 | End: 2024-03-29

## 2024-01-04 RX ORDER — TIRZEPATIDE 2.5 MG/.5ML
2.5 INJECTION, SOLUTION SUBCUTANEOUS
Qty: 2 ML | Refills: 3 | OUTPATIENT
Start: 2024-01-04 | End: 2024-01-04

## 2024-01-04 NOTE — TELEPHONE ENCOUNTER
PA Initiation    Medication: MOUNJARO 2.5 MG/0.5ML SC SOPN  Insurance Company: [employer] [place] - Phone [phone] Fax [phone]  Pharmacy Filling the Rx: [id number] - [place], [place] - [address]  Filling Pharmacy Phone: [phone]  Filling Pharmacy Fax: [phone]  Start Date: 1/4/2024

## 2024-01-04 NOTE — TELEPHONE ENCOUNTER
Please submit prior authorization for Mounjaro for this patient.  Sent me a teams message when you get to the [facility].  Although patient has diagnosis of type 1 diabetes, she has evidence of insulin resistance and possible PCOS so we will label the diagnosis code is a an unspecified diabetes E13.65.  Tried and failed Victoza in the past.    Eleno López, PharmD, [facility]  Endocrine & Diabetes [facility] Pharmacist  [address] [place] [id number]

## 2024-01-04 NOTE — PROGRESS NOTES
Medication Therapy Management (MTM) Encounter    ASSESSMENT:                            Medication Adherence/Access: See below for considerations    Type 1 Diabetes/BMI > 44: A1C above goal of < 7%.  Patient referred to me to help troubleshoot cost of GLP-1 agonist to help with weight loss.  Unfortunately, these are excluded from her plan.  A T2D GLP-1 agonist may be an option if we use a more generic diagnosis code with history of insulin resistance and possible PCOS.  Will follow patient to titrate if approved.    Hyperlipidemia: Controlled on high intensity statin    ADHD/Mood: Appears stable.    Asthma: Last ACT above goal of greater than 20.  Continue current therapy.    PLAN:                            Weight management medications are excluded on this patient's plan and are not eligible for appeal. Our only other option at this time is to try for Mounjaro with a more generic diagnosis code given history of insulin resistance and possible PCOS.    Will help patient titrate Mounjaro and adjust insulin if approved.    Endocrine Team & Next Follow-Up:  2/13/2024 with Laureen Goldstein PA-C  7/1/2024 with Dr. Allan    SUBJECTIVE/OBJECTIVE:                          Stacey Mantilla is a 28 year old female called for an initial visit. She was referred to me from Dr. Allan.      Reason for visit: Medication Therapy Management (MTM).      Allergies/ADRs: Reviewed in chart  Past Medical History: Reviewed in chart  Social History     Tobacco Use    Smoking status: Never     Passive exposure: Past    Smokeless tobacco: Never   Vaping Use    Vaping Use: Never used   Substance Use Topics    Alcohol use: Yes     Comment: couple times per month will have 3 mixed drinks    Drug use: Yes     Types: Marijuana        Medication Adherence/Access: Adherence is reflected well in the dispense report.  [facility] plan.      Type 1 Diabetes:   Diabetes Medication(s)       Diabetic Other       Glucagon, rDNA,  (GLUCAGON EMERGENCY) 1 MG KIT Use as directed in case of low blood glucose.       Insulin       insulin degludec (TRESIBA FLEXTOUCH) 100 UNIT/ML pen Inject 80 Units Subcutaneous daily     NOVOLOG PENFILL 100 UNIT/ML soln Take as directed up to 60 units per day       Incretin Mimetic Agents       tirzepatide (MOUNJARO) 2.5 MG/0.5ML pen Inject 2.5 mg Subcutaneous every 7 days        Hx Victoza -- caused a lot of stomach cramping    Blood sugar monitoring: Using Dexcom --no access to data today.  Statin: Yes: Lipitor 40 mg daily   ACEi/ARB: Yes: Enalapril 20 mg twice daily .   Urine Albumin:   Lab Results   Component Value Date    [facility]  06/05/2023      Comment:      Unable to calculate, urine albumin and/or urine creatinine is outside detectable limits.  Microalbuminuria is defined as an albumin:creatinine ratio of 17 to 299 for males and 25 to 299 for females. A ratio of albumin:creatinine of 300 or higher is indicative of overt proteinuria.  Due to biologic variability, positive results should be confirmed by a second, first-morning random or 24-hour timed urine specimen. If there is discrepancy, a third specimen is recommended. When 2 out of 3 results are in the microalbuminuria range, this is evidence for incipient nephropathy and warrants increased efforts at glucose control, blood pressure control, and institution of therapy with an angiotensin-converting-enzyme (ACE) inhibitor (if the patient can tolerate it).      [facility]  04/15/2023      Comment:      Unable to calculate, urine albumin and/or urine creatinine is outside detectable limits.  Microalbuminuria is defined as an albumin:creatinine ratio of 17 to 299 for males and 25 to 299 for females. A ratio of albumin:creatinine of 300 or higher is indicative of overt proteinuria.  Due to biologic variability, positive results should be confirmed by a second, first-morning random or 24-hour timed urine specimen. If there is discrepancy, a third specimen is  "recommended. When 2 out of 3 results are in the microalbuminuria range, this is evidence for incipient nephropathy and warrants increased efforts at glucose control, blood pressure control, and institution of therapy with an angiotensin-converting-enzyme (ACE) inhibitor (if the patient can tolerate it).      [facility]  07/24/2022      Comment:      Unable to calculate, urine albumin or creatinine is outside the detectable limits.      Lab Results   Component Value Date    A1C 7.3 11/24/2023    A1C 6.6 03/28/2023    A1C 6.4 07/24/2022    A1C 6.7 05/07/2021    A1C 7.3 02/17/2020    A1C 7.0 01/17/2018    A1C 7.6 03/26/2015     Lab Results   Component Value Date    GFRESTIMATED >90 06/05/2023    GFRESTIMATED >90 04/15/2023    GFRESTIMATED >90 07/24/2022     Most Recent Immunizations   Administered Date(s) Administered    COVID-19 12+ (2023-24) (Pfizer) 10/20/2023    COVID-19 Bivalent 12+ (Pfizer) 12/14/2022    COVID-19 Monovalent 18+ (Moderna) 02/21/2022    DTAP (<7y) 09/04/2010    Flu, Unspecified 09/27/2012    HPV Quadrivalent 12/02/2008    HepB, Unspecified 03/21/1994    Hepatitis B, Adult 05/07/2021    Hepatitis B, Peds 12/02/2008    Hib, Unspecified 10/05/1994    Historical DTP/aP 10/05/1994    Influenza (IIV3) PF 12/18/2008    Influenza Vaccine >6 months,quad, PF 12/14/2022    Influenza Vaccine, 6+MO IM (QUADRIVALENT W/PRESERVATIVES) 09/22/2023    Influenza,INJ,MDCK,PF,Quad >6mo(Flucelvax) 12/14/2022    MMR 07/07/1998    Meningococcal ACWY (Menactra ) 08/30/2011    OPV, trivalent, live 07/07/1998    Pneumococcal 20 valent Conjugate (Prevnar 20) 04/21/2023    Pneumococcal 23 valent 09/30/2020    TDAP (Adacel,Boostrix) 08/30/2011    TDAP Vaccine (Adacel) 05/19/2016    Td (Adult), Adsorbed 10/12/2005    Varicella 11/15/1996      Estimated body mass index is 44.09 kg/m  as calculated from the following:    Height as of 12/22/23: 5' 8\" (1.727 m).    Weight as of 12/22/23: 290 lb (131.5 kg).     Hyperlipidemia: Current " "medications:   Lipitor 40 mg daily  No reports of side effects.     Recent Labs   Lab Test 11/24/23  0824 04/15/23  1037   CHOL 137 196   HDL 59 62   LDL 63 123*   TRIG 74 54       ADHD/Mood: Current medications:   Lexapro 10 mg daily  Adderall XR 15 mg daily  Adderall IR 10 mg daily  No reports of side effects. Follows specialist.    Asthma: Current medications:   Duoneb as needed   Singulair 10 mg daily  Albuterol HFA as needed   No reports of side effects.       9/23/2022     7:21 AM 4/20/2023     9:40 AM 11/20/2023    10:44 AM   ACT Total Scores   ACT TOTAL SCORE (Goal Greater than or Equal to 20) 25 25 24    24   In the past 12 months, how many times did you visit the emergency room for your asthma without being admitted to the hospital? 0 0 0   In the past 12 months, how many times were you hospitalized overnight because of your asthma? 0 0 0         BP Readings from Last 1 Encounters:   12/22/23 124/80     Pulse Readings from Last 1 Encounters:   11/24/23 77     Wt Readings from Last 1 Encounters:   12/22/23 290 lb (131.5 kg)     Ht Readings from Last 1 Encounters:   12/22/23 5' 8\" (1.727 m)     Temp Readings from Last 1 Encounters:   09/27/23 97.7  F (36.5  C) (Temporal)       ----------------  I spent 15 minutes with this patient today. Dr. Allan was provided the recommendations above via routed note and is the authorizing prescriber for this visit through the pharmacist collaborative practice agreement. A copy of the visit note was provided to the patient's provider(s).    The patient was given to the patient a summary of these recommendations.     Eleno López, PharmD, [facility]  Endocrine & Diabetes [facility] Pharmacist  [address] [place] [address]  Direct Voicemail: [phone]    Telemedicine Visit Details  Type of service:  Telephone visit  Start Time: 1PM  End Time: 1:15PM  Originating Location (pt. Location): Home  Provider has received verbal consent for a visit from the patient? 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

## 2024-01-04 NOTE — LETTER
"1/15/2024    INSURER: Payor: [employer] / Plan: [employer] [place] / Product Type: Indemnity /   ATTN: Appeals Department  Re: Prior Authorization Request  Patient: Stacey Mantilla  Policy ID#:  [id number]  : 1995    To Whom it May Concern:    I am writing to formally request a prior authorization of coverage for my patient, Stacey Mantilla, for treatment using Mounjaro under diagnosis code E11.9 given evidence of insulin resistance and possible PCOS. have evidence of insulin resistance and possible PCOS, so we are treating her diabetes with a lens of type 2 at this time.    The therapy involves utilization of the dual GLP-1/GIP agonist Mounjaro 2.5 mg weekly for 4 weeks and titrating up to a max dose of 15 mg/week as tolerated over a period of 6 months.    Lab Results   Component Value Date    A1C 7.6 2023       Current diabetes therapy:  Novolog  Tresiba    Historical diabetes therapy and contraindications:  Byetta  Victoza     Mounjaro is superior to other GLP-1 agonists in the literature and has \"very high\" efficacy for glucose lowering according to ADA 2023 clinical practice guidelines.  Patient has already failed Victoza, which is NOT listed in the \"very high\" efficacy for glucose lowering according to ADA 2023. Given patient's A1c is not at ADA goal of < 7% with current therapy, it is an unacceptable clinical risk for the member for the patient to not be treated with Mounjaro.     Across the five phase 3 SURPASS studies, mean reductions in A1C with Mounjaro ranged from 1.8% to 2.1% for the 5-mg dose, 1.7% to 2.4% for the 10-mg dose, and 1.7% to 2.4% for the 15-mg dose vs 0.1% to 1.9% for comparators (Ozempic). p<0.05 for Mounjaro 5 mg vs study comparators (Ozempic), adjusted for multiplicity.    Across the five phase 3 SURPASS studies, mean reductions in body weight ranged from 12 lb to 17 lb for the 5-mg dose, 15 lb to 21 lb for the 10-mg dose, and 17 lb to 25 lb for the 15-mg dose. For comparators, the " "change in weight ranged from a mean gain of 4 lb to a mean reduction of 13 lb. p<0.05 for superiority vs. study comparators, adjusted for multiplicity.  This patient has a BMI of 49.59 and would greatly benefit from the superior weight loss benefits of Mounjaro.  However, the main rationale for why this patient should be on Mounjaro is to help their diabetes.    Estimated body mass index is 49.59 kg/m  as calculated from the following:    Height as of 1/11/24: 5' 7\" (1.702 m).    Weight as of 1/11/24: 316 lb 9.6 oz (143.6 kg).      I have included medical records pertaining to the patient s medical history, current condition, and treatment plan.  In addition, the following billing codes will be used for therapy and follow-up: E11.9.    Declining coverage of this medication would cause great patient harm including risk for hospitalization with increased insulin use.  It is clear from the evidence above the patient has benefited from GLP-1 agonist in the past including achieving glycemic goals and reducing daily insulin need which decreases the risk of hypoglycemia.  Denial of coverage will subsequently be reported to the [place] [employer] and Board of [place] for causing patient harm.    I would request this submission be evaluated on an individual basis by an endocrinologist or weight  who is current in the practice and standards of care. I firmly believe that this therapy is clinically appropriate and that Stacey Mantilla would benefit from improved clinical outcomes and quality of life if allowed the opportunity to receive this treatment.  I urge you to follow the aforementioned evidence presented to keep the best interest of our patient in mind.    Please contact my direct line at [phone] if you require additional information to ensure the prompt approval for coverage.    Please send your written decision to me at this address:  [facility] " [address] [id number] [phone]  Dept: [phone]  E-mail: [email or web address]  Direct Line: [phone]    Eleno López, PharmD, [facility]  Endocrine & Diabetes [place] Pharmacist  [address] [place] [address], [place] [address]    On behalf of Bia Allan MD        [facility]

## 2024-01-05 NOTE — TELEPHONE ENCOUNTER
PRIOR AUTHORIZATION DENIED    Medication: MOUNJARO 2.5 MG/0.5ML SC SOPN  Insurance Company: [employer] [place] - Phone [phone] Fax [phone]  Denial Date: 1/5/2024  Denial Reason(s):   Appeal Information:   Patient Notified: clinic to discuss with pt what they would like to do

## 2024-01-09 ENCOUNTER — MYC REFILL (OUTPATIENT)
Dept: PEDIATRICS | Facility: CLINIC | Age: 29
End: 2024-01-09
Payer: COMMERCIAL

## 2024-01-09 DIAGNOSIS — F90.0 ADHD (ATTENTION DEFICIT HYPERACTIVITY DISORDER), INATTENTIVE TYPE: ICD-10-CM

## 2024-01-09 RX ORDER — DEXTROAMPHETAMINE SACCHARATE, AMPHETAMINE ASPARTATE, DEXTROAMPHETAMINE SULFATE AND AMPHETAMINE SULFATE 2.5; 2.5; 2.5; 2.5 MG/1; MG/1; MG/1; MG/1
10 TABLET ORAL DAILY
Qty: 90 TABLET | Refills: 0 | OUTPATIENT
Start: 2024-01-09

## 2024-01-09 RX ORDER — DEXTROAMPHETAMINE SACCHARATE, AMPHETAMINE ASPARTATE MONOHYDRATE, DEXTROAMPHETAMINE SULFATE AND AMPHETAMINE SULFATE 3.75; 3.75; 3.75; 3.75 MG/1; MG/1; MG/1; MG/1
15 CAPSULE, EXTENDED RELEASE ORAL DAILY
Qty: 90 CAPSULE | Refills: 0 | Status: SHIPPED | OUTPATIENT
Start: 2024-01-09 | End: 2024-04-21

## 2024-01-09 NOTE — TELEPHONE ENCOUNTER
Working on appeal letter -- will be completed tomorrow.    Eleno López, PharmD, [facility]  Endocrine & Diabetes [facility] Pharmacist  [address] [place] [address]

## 2024-01-09 NOTE — TELEPHONE ENCOUNTER
Eleno,    Following up on how you would like to proceed with this patient    Hiro Jenkins [facility]  Specialty Pharmacy Clinic Liaison Eve  [facility] Carlos [email or web address]   [email or web address]  Phone: # [phone]  Fax: [phone]

## 2024-01-11 ENCOUNTER — ANCILLARY PROCEDURE (OUTPATIENT)
Dept: GENERAL RADIOLOGY | Facility: CLINIC | Age: 29
End: 2024-01-11
Attending: PHYSICIAN ASSISTANT
Payer: COMMERCIAL

## 2024-01-11 ENCOUNTER — NURSE TRIAGE (OUTPATIENT)
Dept: PEDIATRICS | Facility: CLINIC | Age: 29
End: 2024-01-11

## 2024-01-11 ENCOUNTER — OFFICE VISIT (OUTPATIENT)
Dept: PEDIATRICS | Facility: CLINIC | Age: 29
End: 2024-01-11
Payer: COMMERCIAL

## 2024-01-11 VITALS
BODY MASS INDEX: 45.99 KG/M2 | HEART RATE: 75 BPM | RESPIRATION RATE: 12 BRPM | OXYGEN SATURATION: 100 % | SYSTOLIC BLOOD PRESSURE: 112 MMHG | DIASTOLIC BLOOD PRESSURE: 78 MMHG | TEMPERATURE: 98.6 F | WEIGHT: 293 LBS | HEIGHT: 67 IN

## 2024-01-11 DIAGNOSIS — R01.1 HEART MURMUR: ICD-10-CM

## 2024-01-11 DIAGNOSIS — Z82.49 FAMILY HISTORY OF ISCHEMIC HEART DISEASE: ICD-10-CM

## 2024-01-11 DIAGNOSIS — R05.1 ACUTE COUGH: ICD-10-CM

## 2024-01-11 DIAGNOSIS — Z82.49 FAMILY HISTORY OF ANEURYSM: ICD-10-CM

## 2024-01-11 DIAGNOSIS — U09.9 POST-COVID-19 CONDITION: Primary | ICD-10-CM

## 2024-01-11 DIAGNOSIS — U09.9 PERSISTENT FATIGUE AFTER COVID-19: ICD-10-CM

## 2024-01-11 DIAGNOSIS — U09.9 POST-COVID-19 CONDITION: ICD-10-CM

## 2024-01-11 DIAGNOSIS — R53.83 PERSISTENT FATIGUE AFTER COVID-19: ICD-10-CM

## 2024-01-11 LAB
BASOPHILS # BLD AUTO: 0 10E3/UL (ref 0–0.2)
BASOPHILS NFR BLD AUTO: 0 %
EOSINOPHIL # BLD AUTO: 0.1 10E3/UL (ref 0–0.7)
EOSINOPHIL NFR BLD AUTO: 1 %
ERYTHROCYTE [DISTWIDTH] IN BLOOD BY AUTOMATED COUNT: 12.7 % (ref 10–15)
HCT VFR BLD AUTO: 44.4 % (ref 35–47)
HGB BLD-MCNC: 13.9 G/DL (ref 11.7–15.7)
IMM GRANULOCYTES # BLD: 0 10E3/UL
IMM GRANULOCYTES NFR BLD: 0 %
LYMPHOCYTES # BLD AUTO: 3.2 10E3/UL (ref 0.8–5.3)
LYMPHOCYTES NFR BLD AUTO: 28 %
MCH RBC QN AUTO: 27 PG (ref 26.5–33)
MCHC RBC AUTO-ENTMCNC: 31.3 G/DL (ref 31.5–36.5)
MCV RBC AUTO: 86 FL (ref 78–100)
MONOCYTES # BLD AUTO: 0.6 10E3/UL (ref 0–1.3)
MONOCYTES NFR BLD AUTO: 6 %
NEUTROPHILS # BLD AUTO: 7.5 10E3/UL (ref 1.6–8.3)
NEUTROPHILS NFR BLD AUTO: 65 %
PLATELET # BLD AUTO: 381 10E3/UL (ref 150–450)
RBC # BLD AUTO: 5.15 10E6/UL (ref 3.8–5.2)
WBC # BLD AUTO: 11.5 10E3/UL (ref 4–11)

## 2024-01-11 PROCEDURE — 80053 COMPREHEN METABOLIC PANEL: CPT | Performed by: PHYSICIAN ASSISTANT

## 2024-01-11 PROCEDURE — 85025 COMPLETE CBC W/AUTO DIFF WBC: CPT | Performed by: PHYSICIAN ASSISTANT

## 2024-01-11 PROCEDURE — 99214 OFFICE O/P EST MOD 30 MIN: CPT | Performed by: PHYSICIAN ASSISTANT

## 2024-01-11 PROCEDURE — 71046 X-RAY EXAM CHEST 2 VIEWS: CPT | Mod: TC | Performed by: RADIOLOGY

## 2024-01-11 PROCEDURE — 80061 LIPID PANEL: CPT | Performed by: PHYSICIAN ASSISTANT

## 2024-01-11 PROCEDURE — 36415 COLL VENOUS BLD VENIPUNCTURE: CPT | Performed by: PHYSICIAN ASSISTANT

## 2024-01-11 ASSESSMENT — PAIN SCALES - GENERAL: PAINLEVEL: MODERATE PAIN (4)

## 2024-01-11 ASSESSMENT — ENCOUNTER SYMPTOMS: COUGH: 1

## 2024-01-11 NOTE — PATIENT INSTRUCTIONS
We will start with the labs and chest XR today.      We cannot refer you to the post COVID clinic until it has been 30 days since positive test.  Please contact us if symptoms continue.  We will be in touch regarding labs as well.

## 2024-01-11 NOTE — PROGRESS NOTES
Assessment & Plan     Post-COVID-19 condition    - Comprehensive metabolic panel (BMP + Alb, Alk Phos, ALT, AST, Total. Bili, TP); Future  - CBC with platelets and differential; Future  - XR Chest 2 Views; Future  - Comprehensive metabolic panel (BMP + Alb, Alk Phos, ALT, AST, Total. Bili, TP)  - CBC with platelets and differential    Persistent fatigue after COVID-19    - Comprehensive metabolic panel (BMP + Alb, Alk Phos, ALT, AST, Total. Bili, TP); Future  - CBC with platelets and differential; Future  - Comprehensive metabolic panel (BMP + Alb, Alk Phos, ALT, AST, Total. Bili, TP)  - CBC with platelets and differential    Acute cough    - XR Chest 2 Views; Future    Family history of ischemic heart disease    - Lipid panel reflex to direct LDL Fasting    Heart murmur    - Lipid panel reflex to direct LDL Fasting    Family history of aneurysm    - Lipid panel reflex to direct LDL Fasting      XR today is unremarkable.  Patient's exam is within normal limits as well.  Patient plan per below:    Patient Plan:  We will start with the labs and chest XR today.      We cannot refer you to the post COVID clinic until it has been 30 days since positive test.  Please contact us if symptoms continue.  We will be in touch regarding labs as well.          Zaid Ibarra is a 28 year old, presenting for the following health issues:  Cough (SOB)      1/11/2024     1:42 PM   Additional Questions   Roomed by Antonietta   Accompanied by Self         1/11/2024     1:42 PM   Patient Reported Additional Medications   Patient reports taking the following new medications no       Cough    History of Present Illness       Reason for visit:  Lingering symptoms after covid  Symptom onset:  3-4 weeks ago  Symptoms include:  Exhaustion, cough, congestion  Symptom intensity:  Moderate  Symptom progression:  Staying the same  Had these symptoms before:  Yes  Has tried/received treatment for these symptoms:  No  What makes it worse:   "Exercise, lack of rest  What makes it better:  No    She eats 4 or more servings of fruits and vegetables daily.She consumes 0 sweetened beverage(s) daily.She exercises with enough effort to increase her heart rate 20 to 29 minutes per day.  She exercises with enough effort to increase her heart rate 3 or less days per week.   She is taking medications regularly.       Patient is tired and still having a mild cough after having COVID recently.        Review of Systems   Respiratory:  Positive for cough.       Constitutional, HEENT, cardiovascular, pulmonary, gi and gu systems are negative, except as otherwise noted.      Objective    /78 (BP Location: Right arm, Patient Position: Sitting, Cuff Size: Adult Large)   Pulse 75   Temp 98.6  F (37  C) (Tympanic)   Resp 12   Ht 1.702 m (5' 7\")   Wt 143.6 kg (316 lb 9.6 oz)   LMP 12/11/2023   SpO2 100%   BMI 49.59 kg/m    Body mass index is 49.59 kg/m .  Physical Exam   GENERAL: alert and no distress  EYES: Eyes grossly normal to inspection, PERRL and conjunctivae and sclerae normal  HENT: ear canals and TM's normal, nose and mouth without ulcers or lesions  NECK: no adenopathy, no asymmetry, masses, or scars  RESP: lungs clear to auscultation - no rales, rhonchi or wheezes  CV: regular rate and rhythm, normal S1 S2, no S3 or S4, no murmur, click or rub, no peripheral edema  MS: no gross musculoskeletal defects noted, no edema      Ynes May PA-C               "

## 2024-01-11 NOTE — TELEPHONE ENCOUNTER
Apologies -- a little behind right now -- this is on my slate to do Monday 1/15 when I'm back.    Eleno López, PharmD, [facility]  Endocrine & Diabetes [facility] Pharmacist  [address] [place] [address]

## 2024-01-11 NOTE — TELEPHONE ENCOUNTER
S-(situation): Patient calling regarding ongoing covid symptoms; cough, SOB w/ exertion, low grade fever.     B-(background): Patient originally tested positive for Covid 12/23/23. Patient was prescribed paxlovid and completed course. Symtpoms improved at that time and patient tested negative for covid. Symptoms returned 1/2/24 and patient tested positive for covid again. Patient tested negative for Covid 1/7/24 but symptoms have continued. Patient has been using nebulizer and mucinex at home.     A-(assessment): Patient experiencing low grade fever 99.5-100.5 since 1/2/24. Patient has cough, non-productive. Patient notes chest feels a little tight, feels like phlegm is in her throat, not deep in chest. Denies any chest pain. Patient having SOB w/ exertion. Denies wheezing. Patient notes fatigue.     R-(recommendations): Per protocol, RN advised visit today. Scheduled for OV 1/12/24 for further evaluation. Patient was instructed to wear a mask to the clinic. Patient agreed with plan.       Reason for Disposition   MILD difficulty breathing (e.g., minimal/no SOB at rest, SOB with walking, pulse <100) and still present when not coughing    Additional Information   Negative: MODERATE difficulty breathing (e.g., speaks in phrases, SOB even at rest, pulse 100-120) and still present when not coughing   Negative: Chest pain present when not coughing   Negative: Passed out (i.e., fainted, collapsed and was not responding)   Negative: Patient sounds very sick or weak to the triager   Negative: Previous asthma attacks and this feels like asthma attack   Negative: Dry cough (non-productive; no sputum or minimal clear sputum) and within 14 days of COVID-19 Exposure   Negative: Bluish (or gray) lips or face   Negative: SEVERE difficulty breathing (e.g., struggling for each breath, speaks in single words)   Negative: Rapid onset of cough and has hives   Negative: Coughing started suddenly after medicine, an allergic food or bee  sting   Negative: Difficulty breathing after exposure to flames, smoke, or fumes   Negative: Sounds like a life-threatening emergency to the triager    Protocols used: Mumtaz BAIN RN 1/11/2024 at 11:20 AM

## 2024-01-12 LAB
ALBUMIN SERPL BCG-MCNC: 4.1 G/DL (ref 3.5–5.2)
ALP SERPL-CCNC: 87 U/L (ref 40–150)
ALT SERPL W P-5'-P-CCNC: 27 U/L (ref 0–50)
ANION GAP SERPL CALCULATED.3IONS-SCNC: 8 MMOL/L (ref 7–15)
AST SERPL W P-5'-P-CCNC: 20 U/L (ref 0–45)
BILIRUB SERPL-MCNC: 0.2 MG/DL
BUN SERPL-MCNC: 8.6 MG/DL (ref 6–20)
CALCIUM SERPL-MCNC: 9.3 MG/DL (ref 8.6–10)
CHLORIDE SERPL-SCNC: 106 MMOL/L (ref 98–107)
CHOLEST SERPL-MCNC: 169 MG/DL
CREAT SERPL-MCNC: 0.74 MG/DL (ref 0.51–0.95)
DEPRECATED HCO3 PLAS-SCNC: 26 MMOL/L (ref 22–29)
EGFRCR SERPLBLD CKD-EPI 2021: >90 ML/MIN/1.73M2
FASTING STATUS PATIENT QL REPORTED: YES
GLUCOSE SERPL-MCNC: 143 MG/DL (ref 70–99)
HDLC SERPL-MCNC: 66 MG/DL
LDLC SERPL CALC-MCNC: 89 MG/DL
NONHDLC SERPL-MCNC: 103 MG/DL
POTASSIUM SERPL-SCNC: 4.5 MMOL/L (ref 3.4–5.3)
PROT SERPL-MCNC: 7.2 G/DL (ref 6.4–8.3)
SODIUM SERPL-SCNC: 140 MMOL/L (ref 135–145)
TRIGL SERPL-MCNC: 68 MG/DL

## 2024-01-12 NOTE — RESULT ENCOUNTER NOTE
Gretel Ibarra ,    The results from your recent lab work are within normal limits.  The white blood cell count is mildly elevated, which can happen when you are fighting an illness.  Please be sure to followup if you are not improving as expected.  Please seek more immediate care if your symptoms change or worsen in any way.  We can consider the COVID clinic as well.          Thank you for choosing [facility] for your health care needs,      Ynes May PA-C

## 2024-01-15 NOTE — TELEPHONE ENCOUNTER
Appeal letter complete -- please route to plan and LMK decision. No need to attach chart notes    Eleno López, PharmD, [facility]  Endocrine & Diabetes [facility] Pharmacist  [address] [place] [phone]

## 2024-01-15 NOTE — TELEPHONE ENCOUNTER
Medication Appeal Initiation    Medication: MOUNJARO 2.5 MG/0.5ML SC SOPN  Appeal Start Date:  1/15/2024  Insurance Company: [employer]  Insurance Phone: [phone]  Insurance Fax: [phone]  Comments:

## 2024-01-22 NOTE — TELEPHONE ENCOUNTER
Called insurance ([phone]) and spoke to Jimmie DIEZ was told they have not received Appeal request asked me to resend to Fax [phone]   Appeal resent to plan   Call reference # [id number]

## 2024-01-29 ASSESSMENT — SLEEP AND FATIGUE QUESTIONNAIRES
HOW LIKELY ARE YOU TO NOD OFF OR FALL ASLEEP WHILE SITTING INACTIVE IN A PUBLIC PLACE: WOULD NEVER DOZE
HOW LIKELY ARE YOU TO NOD OFF OR FALL ASLEEP IN A CAR, WHILE STOPPED FOR A FEW MINUTES IN TRAFFIC: WOULD NEVER DOZE
HOW LIKELY ARE YOU TO NOD OFF OR FALL ASLEEP WHEN YOU ARE A PASSENGER IN A CAR FOR AN HOUR WITHOUT A BREAK: SLIGHT CHANCE OF DOZING
HOW LIKELY ARE YOU TO NOD OFF OR FALL ASLEEP WHILE SITTING QUIETLY AFTER LUNCH WITHOUT ALCOHOL: SLIGHT CHANCE OF DOZING
HOW LIKELY ARE YOU TO NOD OFF OR FALL ASLEEP WHILE WATCHING TV: SLIGHT CHANCE OF DOZING
HOW LIKELY ARE YOU TO NOD OFF OR FALL ASLEEP WHILE LYING DOWN TO REST IN THE AFTERNOON WHEN CIRCUMSTANCES PERMIT: MODERATE CHANCE OF DOZING
HOW LIKELY ARE YOU TO NOD OFF OR FALL ASLEEP WHILE SITTING AND READING: SLIGHT CHANCE OF DOZING
HOW LIKELY ARE YOU TO NOD OFF OR FALL ASLEEP WHILE SITTING AND TALKING TO SOMEONE: WOULD NEVER DOZE

## 2024-01-29 NOTE — NURSING NOTE
HST pick-up, HST drop-off, and follow-up appointment with provider have all been scheduled.  Shannon Chatterjee, CMA

## 2024-01-30 NOTE — TELEPHONE ENCOUNTER
"Subjective: \"I am having difficult time getting any urine to come out.\"    Falls reported: none    Medication changes: none    PT had spouse call MD office and on-call doctor recommended he go to [facility] ER to get bladder checked out so they will be going this afternoon    Plan for next visit: Continue gait pattern training working on step thru pattern with rolling walker, reinstruct supine/sitting knee ROM/strength exercises and add standing exercises as tolerated, review tub/shower transfer as needed as reviewed verbally/demo with spouse on today's visit, and patient spouse education for medications, post-op care instructions, and home safety" MEDICATION APPEAL DENIED    Medication: MOUNJARO 2.5 MG/0.5ML SC SOPN  Insurance Company: [employer]  Denial Date: 1/29/2024  Denial Reason(s):    Second Level Appeal Information:    Patient Notified: clinic to discuss with pt what they would like to do  Central Prior Authorization Team ONLY: Second level appeals will be managed by the clinic staff and provider. Please contact the ealth Prior Authorization Team if additional information about the denial is needed.      CALLED INSURANCE SPOKE TO JAYLA WAYNE WAS TOLD THE APPEAL WAS DENIED 1-  SHE IS RESENDING ME DENIAL LETTER    WHAT WOULD PROVIDER LIKE TO DO    CALL REFERENCE # [id number]

## 2024-02-01 NOTE — PROGRESS NOTES
Outcome for 24 9:38 AM: Data uploaded on Dexcom  Mary Hoffman MA  Outcome for 24 9:38 AM: [employer] message sent  Mary Hoffman MA  Outcome for 24 12:37 PM: Data obtained via Dexcom website. Inpen report sent via [employer].   Mary Hoffman MA    Start time: 939  End time: 956  Provider location: off site- home  Patient location: off site- home.    HPI:  Stacey is a very pleasant 27 yo woman here for follow up of type 1 diabetes, diagnosed at age 7.   She also sees Dr. Allan.  Stacey's diabetes is complicated by nephropathy. She also has hyperlipidemia, a history of hypothyroidism (although she has been off of levothyroxine for several years) and celiac disease.  She follows a strict gluten free diet. She continues wearing a Dexcom G7 sensor and previously wore a Tandem X2pump.  She is currently taking a break from her pump.  She has had a stressful few months.  COVID, good friend  unexpectedly (from sleep apnea?), started a new job, moving to grandfather's [facility] in [place] with her dog. This will be the first time living alone in a long time and she is looking forward to it.  Not as physically active- was rock climbing, but now has bad trigger finger.      Dr. Allan was trying to get her on Mounjaro, but it has not been covered by her insurance.  Was on metformin for a while. Was on victoza- was sick on that.     Current treatment:   75 units long acting.     Novolog per INpen below:                       Previous treatments:   Victoza- did not tolerate  Metformin- diarrhea.     For her hypothyroidism- untreated with normal TSH.      For her CKD (stage 1), she is taking enalapril. She follows with nephrology.     She has no other concerns today.     Past Medical History:   Diagnosis Date    Asthma     Currently no treatment needed    Celiac disease     Chronic kidney disease, stage I 8/3/2015    Chronic sinusitis     Diabetes mellitus type 1 (H)     Diabetic nephropathy (H)      Family history of heart disease 8/3/2015    Hypertension     Hypothyroid     Pedal edema     Psoriasis      PMH:   Type 1 diabetes- since   Celiac disease- since age 13  Hypothyroidism- age 10 (no longer on levothyroxine for several years)  Hyperlipidemia      Past Surgical History:   Procedure Laterality Date    ENT SURGERY      TONSILLECTOMY, ADENOIDECTOMY, COMBINED         Family History   Problem Relation Age of Onset    Gastrointestinal Disease Mother         Celiacs    Obesity Mother     Depression Mother     Fibromyalgia Mother     Cardiovascular Father 48        MI, stents, diabetic, type 2    Diabetes Father     Coronary Artery Disease Father     Obesity Father     Depression Father     Substance Abuse Father     Lung Cancer Father         Smoker    Substance Abuse Sister     Bipolar Disorder Brother     Schizophrenia Brother     Myocardial Infarction Brother 50        Possibly MI    Skin Cancer Maternal Grandmother     Cardiovascular Maternal Grandfather         Englarged heart, pacemaker, defib    Skin Cancer Maternal Grandfather     Obesity Maternal Half-Sister     Heart Disease Paternal Half-Brother 45    Melanoma No family hx of     Glaucoma No family hx of     Macular Degeneration No family hx of      Family Hx:   Dad- premature CVD, in his 40s.  Type 2 diabetes, stroke, cancer  Mom- celiac disease  Grandfather- type 2 diabetes  Half brother- Asperger's  Half sister- cervical CA  Another half brother- bipolar and schizophrenia  1/2 brother-  of presumed heart attack    History     Social History    Marital Status:      Spouse Name: N/A     Number of Children: N/A    Years of Education: N/A     Social History Main Topics    Smoking status: Never Smoker     Smokeless tobacco: Never Used    Alcohol Use: Yes      Comment: occ. use    Drug Use: No    Sexual Activity:     Partners: Male     Birth Control/ Protection: Condom     Other Topics Concern    Parent/Sibling W/ Cabg, Mi Or  "Angioplasty Before 65f 55m? Yes    Caffeine Concern Yes     1 cup tea per day    Sleep Concern No    Special Diet Yes     gluten free diet, low carbs    Exercise Yes     walking, ellyptical, swimming, weights    Seat Belt Yes     Social History Narrative     Social Hx:    2021. 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 Enjoys a lot of hiking. She also goes to the gym a few days a week.  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     Current Outpatient Medications   Medication    albuterol (PROAIR HFA/PROVENTIL HFA/VENTOLIN HFA) 108 (90 Base) MCG/ACT inhaler    albuterol (PROVENTIL) (2.5 MG/3ML) 0.083% neb solution    amphetamine-dextroamphetamine (ADDERALL XR) 15 MG 24 hr capsule    amphetamine-dextroamphetamine (ADDERALL) 10 MG tablet    ASPIRIN NOT PRESCRIBED (INTENTIONAL)    atorvastatin (LIPITOR) 40 MG tablet    blood glucose (NO BRAND SPECIFIED) test strip    Cholecalciferol 2000 UNITS TBDP    Continuous Blood Gluc Sensor (DEXCOM G7 SENSOR) MISC    Continuous Blood Gluc Transmit (DEXCOM G6 TRANSMITTER) MISC    enalapril (VASOTEC) 20 MG tablet    escitalopram (LEXAPRO) 10 MG tablet    Glucagon, rDNA, (GLUCAGON EMERGENCY) 1 MG KIT    Injection Device for insulin (INPEN 100-GREY-NOVOLOG-FIASP) MOLLY    insulin degludec (TRESIBA FLEXTOUCH) 100 UNIT/ML pen    Insulin Infusion Pump Supplies (INSULIN PUMP SYRINGE RESERVOIR) MISC    Insulin Infusion Pump Supplies (SURE-T INFUSION SET 23\") MISC    insulin pen needle (32G X 4 MM) 32G X 4 MM miscellaneous    Insulin Pump Accessories MISC    insulin syringe-needle U-100 (31G X 5/16\" 0.5 ML) 31G X 5/16\" 0.5 ML miscellaneous    insulin syringes, disposable, U-100 0.3 ML MISC    ipratropium - albuterol 0.5 mg/2.5 mg/3 mL (DUONEB) 0.5-2.5 (3) MG/3ML neb solution    KETOSTIX test strip    montelukast (SINGULAIR) 10 MG tablet    Multiple " Vitamins-Minerals (MULTIVITAMIN ADULT PO)    NOVOLOG PENFILL 100 UNIT/ML soln    tirzepatide (MOUNJARO) 2.5 MG/0.5ML pen     Current Facility-Administered Medications   Medication    dexAMETHasone (DECADRON) injection 4 mg    lidocaine (PF) (XYLOCAINE) 1 % injection 1 mL          Allergies   Allergen Reactions    Dog Epithelium Allergy Skin Test Other (See Comments)     Sinus symptoms     Dogs Other (See Comments)     Sinus symptoms     Dust Mites      Sinus     Gluten Meal      Physical Exam:  [facility] 12/11/2023   GENERAL: healthy, alert and no distress  RESP: no audible wheeze, cough, or visible cyanosis.  No visible retractions or increased work of breathing.  Able to speak fully in complete sentences.  PSYCH: mentation appears normal, affect normal/bright, judgement and insight intact, normal speech and appearance well-groomed    RESULTS  Lab Results   Component Value Date    A1C 7.3 (H) 11/24/2023    A1C 6.6 (H) 03/28/2023    A1C 6.4 (H) 07/24/2022    A1C 6.7 (H) 05/07/2021    A1C 7.3 (H) 02/17/2020    A1C 7.0 (H) 01/17/2018    A1C 7.6 (H) 03/26/2015    HEMOGLOBINA1 6.9 11/15/2021    HEMOGLOBINA1 7.0 (A) 01/28/2020    HEMOGLOBINA1 7.3 (A) 08/19/2019    HEMOGLOBINA1 6.8 (A) 05/14/2019    HEMOGLOBINA1 6.8 (A) 02/18/2019       TSH   Date Value Ref Range Status   03/28/2023 3.75 0.30 - 4.20 uIU/mL Final   05/07/2021 3.69 0.40 - 4.00 mU/L Final   02/17/2020 3.97 0.40 - 4.00 mU/L Final   06/07/2019 3.91 0.40 - 4.00 mU/L Final   03/04/2019 3.41 0.40 - 4.00 mU/L Final   10/10/2016 2.89 0.40 - 4.00 mU/L Final     T4 Free   Date Value Ref Range Status   12/09/2013 1.18 0.70 - 1.85 ng/dL Final       ALT   Date Value Ref Range Status   01/11/2024 27 0 - 50 U/L Final     Comment:     Reference intervals for this test were updated on 6/12/2023 to more accurately reflect our healthy population. There may be differences in the flagging of prior results with similar values performed with this method. Interpretation of those prior  results can be made in the context of the updated reference intervals.     05/07/2021 23 0 - 50 U/L Final   05/17/2019 33 0 - 50 U/L Final   ]    Recent Labs   Lab Test 01/11/24  1454 11/24/23  0824   CHOL 169 137   HDL 66 59   LDL 89 63   TRIG 68 74       Lab Results   Component Value Date     01/11/2024     05/07/2021      Lab Results   Component Value Date    POTASSIUM 4.5 01/11/2024    POTASSIUM 4.7 07/24/2022    POTASSIUM 3.9 05/07/2021     Lab Results   Component Value Date    CHLORIDE 106 01/11/2024    CHLORIDE 109 07/24/2022    CHLORIDE 104 05/07/2021     Lab Results   Component Value Date    FRANKLIN 9.3 01/11/2024    FRANKLIN 9.3 05/07/2021     Lab Results   Component Value Date    CO2 26 01/11/2024    CO2 24 07/24/2022    CO2 26 05/07/2021     Lab Results   Component Value Date    BUN 8.6 01/11/2024    BUN 6 07/24/2022    BUN 11 05/07/2021     Lab Results   Component Value Date    CR 0.74 01/11/2024    CR 0.59 05/07/2021       GFR Estimate   Date Value Ref Range Status   01/11/2024 >90 >60 mL/min/1.73m2 Final   06/05/2023 >90 >60 mL/min/1.73m2 Final     Comment:     eGFR calculated using 2021 CKD-EPI equation.   04/15/2023 >90 >60 mL/min/1.73m2 Final     Comment:     eGFR calculated using 2021 CKD-EPI equation.   05/07/2021 >90 >60 mL/min/[1.73_m2] Final     Comment:     Non  GFR Calc  Starting 12/18/2018, serum creatinine based estimated GFR (eGFR) will be   calculated using the Chronic Kidney Disease Epidemiology Collaboration   (CKD-EPI) equation.     04/30/2021 >90 >60 mL/min/[1.73_m2] Final     Comment:     Non  GFR Calc  Starting 12/18/2018, serum creatinine based estimated GFR (eGFR) will be   calculated using the Chronic Kidney Disease Epidemiology Collaboration   (CKD-EPI) equation.     05/19/2020 >90 >60 mL/min/[1.73_m2] Final     Comment:     Non  GFR Calc  Starting 12/18/2018, serum creatinine based estimated GFR (eGFR) will be   calculated  "using the Chronic Kidney Disease Epidemiology Collaboration   (CKD-EPI) equation.       GFR Estimate If Black   Date Value Ref Range Status   05/07/2021 >90 >60 mL/min/[1.73_m2] Final     Comment:      GFR Calc  Starting 12/18/2018, serum creatinine based estimated GFR (eGFR) will be   calculated using the Chronic Kidney Disease Epidemiology Collaboration   (CKD-EPI) equation.     04/30/2021 >90 >60 mL/min/[1.73_m2] Final     Comment:      GFR Calc  Starting 12/18/2018, serum creatinine based estimated GFR (eGFR) will be   calculated using the Chronic Kidney Disease Epidemiology Collaboration   (CKD-EPI) equation.     05/19/2020 >90 >60 mL/min/[1.73_m2] Final     Comment:      GFR Calc  Starting 12/18/2018, serum creatinine based estimated GFR (eGFR) will be   calculated using the Chronic Kidney Disease Epidemiology Collaboration   (CKD-EPI) equation.         Lab Results   Component Value Date    MICROL <12.0 06/05/2023    MICROL <5 07/24/2022    MICROL <5 04/30/2021     No results found for: \"MICROALBUMIN\"  No results found for: \"CPEPT\", \"GADAB\", \"ISCAB\"    No results found for: \"B12\"]    Most recent eye exam date: : Not Found       1.  Type 1 diabetes- Stacey is doing quite well.  Glucose is under fairly good control. A1c is 7.3%.  She thinks [place] physical [place].  Will lower long acting insulin to 70 units and tighten carb ratio to 1/4.5g (was 5.1) due to post-prandial hyperglycemia.  She is taking a break from her pump.  Could consider using Omnipod with U200 (or additional basal insulin) off label in the future, if she is interested. Could also consider carefully using off label SGLT-2 inhibitor to help reduce insulin requirements and weight loss/renal protection.   NO other changes today.      2.  Risk factors-     Retinopathy:  No.  Had recent eye exam.   Nephropathy:  BP well controlled. No microalbuminuria.  Creatinine " stable.  She is on enalapril.  White coat hypertension.     She follows with nephrology.   Neuropathy: No.    Feet: OK, no ulcers.   Lipids:  LDL improved.   Back on statin.  She does have a family history of premature CV disease in her father and half brother.  Would consider resuming statin in the future if she has no plans for childbearing.      3.  Hypothyroidism- last TSH in target on no levothyroxine.      4.  Celiac disease- well controlled with gluten free diet.  I have placed orders for some labs that can be associated with celiac disease (iron, hemoglobin, folate, vitamin d, vitamin b12).       5.  F/U in 6 months with me, annually with Dr. Allan.     36 minutes spent on the date of the encounter doing chart review, review of test results, review of continuous glucose sensor, interpretation of glucose data, patient visit and documentation, counseling/coordination of care, and discussion of follow up plan for worsening hyper and hypoglycemia.  The patient understood and is satisfied with today's visit.       Laureen Goldstein PA-C, MPAS   [facility]  Department of Medicine  Division of Endocrinology and Diabetes

## 2024-02-05 NOTE — TELEPHONE ENCOUNTER
I'll figure out next steps when I'm back from vacation 2/13    Eleno López, PharmD, [facility]  Endocrine & Diabetes [place] Pharmacist  [address] [place] [id number]

## 2024-02-12 ENCOUNTER — OFFICE VISIT (OUTPATIENT)
Dept: NEUROLOGY | Facility: CLINIC | Age: 29
End: 2024-02-12
Attending: PHYSICIAN ASSISTANT
Payer: COMMERCIAL

## 2024-02-12 DIAGNOSIS — E10.21 TYPE 1 DIABETES MELLITUS WITH DIABETIC NEPHROPATHY (H): ICD-10-CM

## 2024-02-12 DIAGNOSIS — G56.03 BILATERAL CARPAL TUNNEL SYNDROME: ICD-10-CM

## 2024-02-12 DIAGNOSIS — G56.23 ULNAR NEUROPATHY OF BOTH UPPER EXTREMITIES: ICD-10-CM

## 2024-02-12 PROCEDURE — 95910 NRV CNDJ TEST 7-8 STUDIES: CPT | Performed by: PSYCHIATRY & NEUROLOGY

## 2024-02-12 PROCEDURE — 95885 MUSC TST DONE W/NERV TST LIM: CPT | Performed by: PSYCHIATRY & NEUROLOGY

## 2024-02-12 NOTE — PROGRESS NOTES
[facility]  Electrodiagnostic Laboratory                 Department of Neurology                                                                                                         Test Date:  2024    Patient: Stacey Mantilla : 1995 Physician: Indio Meng MD   Sex: Female AGE: 28 year Ref Phys: Elham Bonny VILLALOBOS   ID#: [id number]   Technician: alvin galvez     History and Examination:  28 year old with about 1 -2 years of numbness and pain in her hands. Both sides are affected. This study is being performed to investigate for focal neuropathy.     Techniques:  Motor and sensory conduction studies were done with surface recording electrodes.  EMG was done with a concentric needle electrode.     Results:  Nerve conduction studies:  1. Bilateral median-D2 sensory responses show severely slowed CV and normal amplitudes.   2. Bilateral ulnar-D5 sensory responses are normal.   3. Bilateral median-ulnar palmar interlatency differences are prolonged.   4. Bilateral median-APB motor responses show moderate (left) or severely (right) prolonged DL, normal amplitudes and normal CV.   5. Bilateral ulnar-ADM motor responses are normal.     Needle EMG of selected left and right upper limb muscles was performed as tabulated below. No abnormal spontaneous activity was observed in the sampled muscles. Motor unit potential morphology and recruitment patterns were normal.     Interpretation:  This is an abnormal study. 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 Despite the severity of the median neuropathies, the pathophysiology of the lesions at this point is focal demyelination without axon loss. Clinical correlation is recommended.       Indio Meng MD  Department of Neurology    Nerve Conduction  Studies  Motor Sites      Latency Amplitude Neg. Amp Diff Segment Distance Velocity Neg. Dur Neg Area Diff Temperature Comment   Site (ms) Norm (mV) Norm (%)  cm m/s Norm (ms) (%) ( C)    Left Median (APB) Motor   Wrist 5.0  < 4.4 6.4  > 5.0  Wrist-APB 7.1   5.3  31.7 moved G1   Elbow 8.8 - 6.2  > 5.0 -3 Elbow-Wrist 20.9 55  > 48 7.5 -10 31.7    Right Median (APB) Motor   Wrist 6.1  < 4.4 5.8  > 5.0  Wrist-APB 6.8   5.2  30 moved G1   Elbow 10.4 - 5.5  > 5.0 -5 Elbow-Wrist 21.7 50  > 48 5.2 -8 29.8    Left Ulnar (ADM) Motor   Wrist 2.3  < 3.5 10.5  > 5.0  Wrist-ADM 8   4.4  31.1    Bel Elbow 5.7 - 9.6 - -9 Bel Elbow-Wrist 19.6 58  > 48 4.7 0 30.8    Abv Elbow 7.5 - 9.1 - -5 Abv Elbow-Bel Elbow 10.1 56  > 48 5.0 0 30.8    Right Ulnar (ADM) Motor   Wrist 2.9  < 3.5 10.1  > 5.0  Wrist-ADM 8   4.4  30.2    Bel Elbow 6.5 - 8.6 - -15 Bel Elbow-Wrist 22 61  > 48 4.6 -6 30.2    Abv Elbow 8.2 - 8.5 - -1 Abv Elbow-Bel Elbow 10.2 60  > 48 4.9 -1 30.2      Sensory Sites      Onset Lat Peak Lat Amp (O-P) Amp (P-P) Segment Distance Velocity Temperature Comment   Site ms (ms)  V Norm ( V)  cm m/s Norm ( C)    Left Median Sensory   Wrist-Dig II 4.3 5.7 15  > 10 25 Wrist-Dig II 13.8 32  > 48 31    Right Median Sensory   Wrist-Dig II 5.8 7.3 10  > 10 15 Wrist-Dig II 13.7 24  > 48 30.8    Left Median-Ulnar Palmar Sensory        Median   Palm-Wrist 3.0 3.8 12 - 19 Palm-Wrist 8.1 27 - 31         Ulnar   Palm-Wrist 1.03 1.63 18 - 13 Palm-Wrist 8 78 - 31.2    Right Median-Ulnar Palmar Sensory        Median   Palm-Wrist 3.9 5.3 5 - 10 Palm-Wrist 8 21 - 31.3         Ulnar   Palm-Wrist 1.35 1.93 8 - 15 Palm-Wrist 8 59 - 31    Left Ulnar Sensory   Wrist-Dig V 2.3 3.1 28  > 8 51 Wrist-Dig V 12.1 53  > 48 31    Right Ulnar Sensory   Wrist-Dig V 2.3 3.2 33  > 8 35 Wrist-Dig V 12.5 54  > 48 30.2      Inter-Nerve Comparisons     Nerve 1 Value 1 Nerve 2 Value 2 Parameter Result Normal   Sensory Sites   R Median Palm-Wrist 5.3 ms R Ulnar Palm-Wrist  1.93 ms Peak Lat Diff 3.4 ms <0.40   L Median Palm-Wrist 3.8 ms L Ulnar Palm-Wrist 1.63 ms Peak Lat Diff 2.2 ms <0.40       Electromyography     Side Muscle Ins Act Fibs/PSW Fasc HF Amp Dur Poly Recrt Int Pat   Right Pronator Teres Nml None Nml 0 Nml Nml 0 Nml Nml   Right FDI Nml None Nml 0 Nml Nml 0 Nml Nml   Left FDI Nml None Nml 0 Nml Nml 0 Nml Nml   Left APB Nml None Nml 0 Nml Nml 0 Nml Nml         NCS Waveforms:    Motor                Sensory

## 2024-02-12 NOTE — LETTER
2024       RE: Stacey Mantilla  [address]  [facility] [address]     Dear Colleague,    Thank you for referring your patient, Stacey Mantilla, to the [facility] EMG CLINIC [place] at [facility]. Please see a copy of my visit note below.                            [facility]  Electrodiagnostic Laboratory                 Department of Neurology                                                                                                         Test Date:  2024    Patient: Stacey Mantilla : 1995 Physician: Indio Meng MD   Sex: Female AGE: 28 year Ref Phys: Elham Draper PA-C   ID#: [id number]   Technician: alvin galvez     History and Examination:  28 year old with about 1 -2 years of numbness and pain in her hands. Both sides are affected. This study is being performed to investigate for focal neuropathy.     Techniques:  Motor and sensory conduction studies were done with surface recording electrodes.  EMG was done with a concentric needle electrode.     Results:  Nerve conduction studies:  1. Bilateral median-D2 sensory responses show severely slowed CV and normal amplitudes.   2. Bilateral ulnar-D5 sensory responses are normal.   3. Bilateral median-ulnar palmar interlatency differences are prolonged.   4. Bilateral median-APB motor responses show moderate (left) or severely (right) prolonged DL, normal amplitudes and normal CV.   5. Bilateral ulnar-ADM motor responses are normal.     Needle EMG of selected left and right upper limb muscles was performed as tabulated below. No abnormal spontaneous activity was observed in the sampled muscles. Motor unit potential morphology and recruitment patterns were normal.     Interpretation:  This is an abnormal study. There is electrophysiologic evidence of (1) a moderate to severe right-sided and (2) a moderate left-sided median neuropathy at the wrist, as can be seen in the  clinical context of bilateral carpal tunnel syndrome. Despite the severity of the median neuropathies, the pathophysiology of the lesions at this point is focal demyelination without axon loss. Clinical correlation is recommended.       Indio Meng MD  Department of Neurology    Nerve Conduction Studies  Motor Sites      Latency Amplitude Neg. Amp Diff Segment Distance Velocity Neg. Dur Neg Area Diff Temperature Comment   Site (ms) Norm (mV) Norm (%)  cm m/s Norm (ms) (%) ( C)    Left Median (APB) Motor   Wrist 5.0  < 4.4 6.4  > 5.0  Wrist-APB 7.1   5.3  31.7 moved G1   Elbow 8.8 - 6.2  > 5.0 -3 Elbow-Wrist 20.9 55  > 48 7.5 -10 31.7    Right Median (APB) Motor   Wrist 6.1  < 4.4 5.8  > 5.0  Wrist-APB 6.8   5.2  30 moved G1   Elbow 10.4 - 5.5  > 5.0 -5 Elbow-Wrist 21.7 50  > 48 5.2 -8 29.8    Left Ulnar (ADM) Motor   Wrist 2.3  < 3.5 10.5  > 5.0  Wrist-ADM 8   4.4  31.1    Bel Elbow 5.7 - 9.6 - -9 Bel Elbow-Wrist 19.6 58  > 48 4.7 0 30.8    Abv Elbow 7.5 - 9.1 - -5 Abv Elbow-Bel Elbow 10.1 56  > 48 5.0 0 30.8    Right Ulnar (ADM) Motor   Wrist 2.9  < 3.5 10.1  > 5.0  Wrist-ADM 8   4.4  30.2    Bel Elbow 6.5 - 8.6 - -15 Bel Elbow-Wrist 22 61  > 48 4.6 -6 30.2    Abv Elbow 8.2 - 8.5 - -1 Abv Elbow-Bel Elbow 10.2 60  > 48 4.9 -1 30.2      Sensory Sites      Onset Lat Peak Lat Amp (O-P) Amp (P-P) Segment Distance Velocity Temperature Comment   Site ms (ms)  V Norm ( V)  cm m/s Norm ( C)    Left Median Sensory   Wrist-Dig II 4.3 5.7 15  > 10 25 Wrist-Dig II 13.8 32  > 48 31    Right Median Sensory   Wrist-Dig II 5.8 7.3 10  > 10 15 Wrist-Dig II 13.7 24  > 48 30.8    Left Median-Ulnar Palmar Sensory        Median   Palm-Wrist 3.0 3.8 12 - 19 Palm-Wrist 8.1 27 - 31         Ulnar   Palm-Wrist 1.03 1.63 18 - 13 Palm-Wrist 8 78 - 31.2    Right Median-Ulnar Palmar Sensory        Median   Palm-Wrist 3.9 5.3 5 - 10 Palm-Wrist 8 21 - 31.3         Ulnar   Palm-Wrist 1.35 1.93 8 - 15 Palm-Wrist 8 59 - 31    Left Ulnar Sensory    Wrist-Dig V 2.3 3.1 28  > 8 51 Wrist-Dig V 12.1 53  > 48 31    Right Ulnar Sensory   Wrist-Dig V 2.3 3.2 33  > 8 35 Wrist-Dig V 12.5 54  > 48 30.2      Inter-Nerve Comparisons     Nerve 1 Value 1 Nerve 2 Value 2 Parameter Result Normal   Sensory Sites   R Median Palm-Wrist 5.3 ms R Ulnar Palm-Wrist 1.93 ms Peak Lat Diff 3.4 ms <0.40   L Median Palm-Wrist 3.8 ms L Ulnar Palm-Wrist 1.63 ms Peak Lat Diff 2.2 ms <0.40       Electromyography     Side Muscle Ins Act Fibs/PSW Fasc HF Amp Dur Poly Recrt Int Pat   Right Pronator Teres Nml None Nml 0 Nml Nml 0 Nml Nml   Right FDI Nml None Nml 0 Nml Nml 0 Nml Nml   Left FDI Nml None Nml 0 Nml Nml 0 Nml Nml   Left APB Nml None Nml 0 Nml Nml 0 Nml Nml         NCS Waveforms:    Motor                Sensory                                Again, thank you for allowing me to participate in the care of your patient.      Sincerely,    Indio Meng MD

## 2024-02-13 ENCOUNTER — VIRTUAL VISIT (OUTPATIENT)
Dept: ENDOCRINOLOGY | Facility: CLINIC | Age: 29
End: 2024-02-13
Payer: COMMERCIAL

## 2024-02-13 DIAGNOSIS — K90.0 CELIAC DISEASE: Primary | ICD-10-CM

## 2024-02-13 DIAGNOSIS — E10.21 TYPE 1 DIABETES MELLITUS WITH DIABETIC NEPHROPATHY (H): ICD-10-CM

## 2024-02-13 PROCEDURE — 99214 OFFICE O/P EST MOD 30 MIN: CPT | Mod: 95 | Performed by: PHYSICIAN ASSISTANT

## 2024-02-13 RX ORDER — URINE ACETONE TEST STRIPS
STRIP MISCELLANEOUS
Qty: 50 STRIP | Refills: 3 | Status: SHIPPED | OUTPATIENT
Start: 2024-02-13 | End: 2024-02-14

## 2024-02-13 RX ORDER — ACYCLOVIR 400 MG/1
TABLET ORAL
Qty: 9 EACH | Refills: 3 | Status: SHIPPED | OUTPATIENT
Start: 2024-02-13

## 2024-02-13 NOTE — PATIENT INSTRUCTIONS
Change carb ratio to 1/4.5g (5.1)    Please let me know if you are having low blood sugars less than 70 or over 250 mg/dL.      If you have concerns, please send me a [employer] message M-Th, call the clinic at [phone], or call [phone] after hours/weekends and ask to speak with the endocrinologist on call.

## 2024-02-13 NOTE — NURSING NOTE
Is the patient currently in the state of [place]? YES    Visit mode:VIDEO    If the visit is dropped, the patient can be reconnected by: VIDEO VISIT: Text to cell phone:   Telephone Information:   Mobile [phone]       Will anyone else be joining the visit? NO  (If patient encounters technical issues they should call [phone]    How would you like to obtain your AVS? MyChart    Are changes needed to the allergy or medication list? No, Pt declined allergy review, Pt stated no changes to allergies, Pt declined med review, and Pt stated no med changes    Reason for visit: RECHJOSHUA WHEELER

## 2024-02-13 NOTE — LETTER
2024       RE: Stacey Mantilla  [address]  [facility] [address]     Dear Colleague,    Thank you for referring your patient, Stacey Mantilla, to the [facility] [place] [facility]. Please see a copy of my visit note below.    Outcome for 24 9:38 AM: Data uploaded on Dexcom  Mary Hoffman MA  Outcome for 24 9:38 AM: [place] message sent  Mary Hoffman MA  Outcome for 24 12:37 PM: Data obtained via Dexcom website. Inpen report sent via GameMaki.   Mary Hoffman MA    Start time: 939  End time: 956  Provider location: off site- home  Patient location: off site- home.    HPI:  Stacey is a very pleasant 27 yo woman here for follow up of type 1 diabetes, diagnosed at age 7.   She also sees Dr. Allan.  Stacey's diabetes is complicated by nephropathy. She also has hyperlipidemia, a history of hypothyroidism (although she has been off of levothyroxine for several years) and celiac disease.  She follows a strict gluten free diet. She continues wearing a Dexcom G7 sensor and previously wore a Tandem X2pump.  She is currently taking a break from her pump.  She has had a stressful few months.  COVID, good friend  unexpectedly (from sleep apnea?), started a new job, moving to [facility]'s [facility] in [place] with her dog. This will be the first time living alone in a long time and she is looking forward to it.  Not as physically active- was rock climbing, but now has bad trigger finger.      Dr. Allan was trying to get her on Mounjaro, but it has not been covered by her insurance.  Was on metformin for a while. Was on victoza- was sick on that.     Current treatment:   75 units long acting.     Novolog per INpen below:                       Previous treatments:   Victoza- did not tolerate  Metformin- diarrhea.     For her hypothyroidism- untreated with normal TSH.      For her CKD (stage 1), she is  taking enalapril. She follows with nephrology.     She has no other concerns today.     Past Medical History:   Diagnosis Date    Asthma     Currently no treatment needed    Celiac disease     Chronic kidney disease, stage I 8/3/2015    Chronic sinusitis     Diabetes mellitus type 1 (H)     Diabetic nephropathy (H)     Family history of heart disease 8/3/2015    Hypertension     Hypothyroid     Pedal edema     Psoriasis      PMH:   Type 1 diabetes- since   Celiac disease- since age 13  Hypothyroidism- age 10 (no longer on levothyroxine for several years)  Hyperlipidemia      Past Surgical History:   Procedure Laterality Date    ENT SURGERY      TONSILLECTOMY, ADENOIDECTOMY, COMBINED         Family History   Problem Relation Age of Onset    Gastrointestinal Disease Mother         Celiacs    Obesity Mother     Depression Mother     Fibromyalgia Mother     Cardiovascular Father 48        MI, stents, diabetic, type 2    Diabetes Father     Coronary Artery Disease Father     Obesity Father     Depression Father     Substance Abuse Father     Lung Cancer Father         Smoker    Substance Abuse Sister     Bipolar Disorder Brother     Schizophrenia Brother     Myocardial Infarction Brother 50        Possibly MI    Skin Cancer Maternal Grandmother     Cardiovascular Maternal Grandfather         Englarged heart, pacemaker, defib    Skin Cancer Maternal Grandfather     Obesity Maternal Half-Sister     Heart Disease Paternal Half-Brother 45    Melanoma No family hx of     Glaucoma No family hx of     Macular Degeneration No family hx of      Family Hx:   Dad- premature CVD, in his 40s.  Type 2 diabetes, stroke, cancer  Mom- celiac disease  Grandfather- type 2 diabetes  Half brother- Asperger's  Half sister- cervical CA  Another half brother- bipolar and schizophrenia  1/2 brother-  of presumed heart attack    History     Social History    Marital Status:      Spouse Name: N/A     Number of Children: N/A    Years  "of Education: N/A     Social History Main Topics    Smoking status: Never Smoker     Smokeless tobacco: Never Used    Alcohol Use: Yes      Comment: occ. use    Drug Use: No    Sexual Activity:     Partners: Male     Birth Control/ Protection: Condom     Other Topics Concern    Parent/Sibling W/ Cabg, Mi Or Angioplasty Before 65f 55m? Yes    Caffeine Concern Yes     1 cup tea per day    Sleep Concern No    Special Diet Yes     gluten free diet, low carbs    Exercise Yes     walking, ellyptical, swimming, weights    Seat Belt Yes     Social History Narrative     Social Hx:    2021. 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 Enjoys a lot of hiking. She also goes to the gym a few days a week.  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     Current Outpatient Medications   Medication    albuterol (PROAIR HFA/PROVENTIL HFA/VENTOLIN HFA) 108 (90 Base) MCG/ACT inhaler    albuterol (PROVENTIL) (2.5 MG/3ML) 0.083% neb solution    amphetamine-dextroamphetamine (ADDERALL XR) 15 MG 24 hr capsule    amphetamine-dextroamphetamine (ADDERALL) 10 MG tablet    ASPIRIN NOT PRESCRIBED (INTENTIONAL)    atorvastatin (LIPITOR) 40 MG tablet    blood glucose (NO BRAND SPECIFIED) test strip    Cholecalciferol 2000 UNITS TBDP    Continuous Blood Gluc Sensor (DEXCOM G7 SENSOR) MISC    Continuous Blood Gluc Transmit (DEXCOM G6 TRANSMITTER) MISC    enalapril (VASOTEC) 20 MG tablet    escitalopram (LEXAPRO) 10 MG tablet    Glucagon, rDNA, (GLUCAGON EMERGENCY) 1 MG KIT    Injection Device for insulin (INPEN 100-GREY-NOVOLOG-FIASP) MOLLY    insulin degludec (TRESIBA FLEXTOUCH) 100 UNIT/ML pen    Insulin Infusion Pump Supplies (INSULIN PUMP SYRINGE RESERVOIR) MISC    Insulin Infusion Pump Supplies (SURE-T INFUSION SET 23\") MISC    insulin pen needle (32G X 4 MM) 32G X 4 MM miscellaneous    Insulin Pump " "Accessories MISC    insulin syringe-needle U-100 (31G X 5/16\" 0.5 ML) 31G X 5/16\" 0.5 ML miscellaneous    insulin syringes, disposable, U-100 0.3 ML MISC    ipratropium - albuterol 0.5 mg/2.5 mg/3 mL (DUONEB) 0.5-2.5 (3) MG/3ML neb solution    KETOSTIX test strip    montelukast (SINGULAIR) 10 MG tablet    Multiple Vitamins-Minerals (MULTIVITAMIN ADULT PO)    NOVOLOG PENFILL 100 UNIT/ML soln    tirzepatide (MOUNJARO) 2.5 MG/0.5ML pen     Current Facility-Administered Medications   Medication    dexAMETHasone (DECADRON) injection 4 mg    lidocaine (PF) (XYLOCAINE) 1 % injection 1 mL          Allergies   Allergen Reactions    Dog Epithelium Allergy Skin Test Other (See Comments)     Sinus symptoms     Dogs Other (See Comments)     Sinus symptoms     Dust Mites      Sinus     Gluten Meal      Physical Exam:  [facility] 12/11/2023   GENERAL: healthy, alert and no distress  RESP: no audible wheeze, cough, or visible cyanosis.  No visible retractions or increased work of breathing.  Able to speak fully in complete sentences.  PSYCH: mentation appears normal, affect normal/bright, judgement and insight intact, normal speech and appearance well-groomed    RESULTS  Lab Results   Component Value Date    A1C 7.3 (H) 11/24/2023    A1C 6.6 (H) 03/28/2023    A1C 6.4 (H) 07/24/2022    A1C 6.7 (H) 05/07/2021    A1C 7.3 (H) 02/17/2020    A1C 7.0 (H) 01/17/2018    A1C 7.6 (H) 03/26/2015    HEMOGLOBINA1 6.9 11/15/2021    HEMOGLOBINA1 7.0 (A) 01/28/2020    HEMOGLOBINA1 7.3 (A) 08/19/2019    HEMOGLOBINA1 6.8 (A) 05/14/2019    HEMOGLOBINA1 6.8 (A) 02/18/2019       TSH   Date Value Ref Range Status   03/28/2023 3.75 0.30 - 4.20 uIU/mL Final   05/07/2021 3.69 0.40 - 4.00 mU/L Final   02/17/2020 3.97 0.40 - 4.00 mU/L Final   06/07/2019 3.91 0.40 - 4.00 mU/L Final   03/04/2019 3.41 0.40 - 4.00 mU/L Final   10/10/2016 2.89 0.40 - 4.00 mU/L Final     T4 Free   Date Value Ref Range Status   12/09/2013 1.18 0.70 - 1.85 ng/dL Final       ALT   Date Value " Ref Range Status   01/11/2024 27 0 - 50 U/L Final     Comment:     Reference intervals for this test were updated on 6/12/2023 to more accurately reflect our healthy population. There may be differences in the flagging of prior results with similar values performed with this method. Interpretation of those prior results can be made in the context of the updated reference intervals.     05/07/2021 23 0 - 50 U/L Final   05/17/2019 33 0 - 50 U/L Final   ]    Recent Labs   Lab Test 01/11/24  1454 11/24/23  0824   CHOL 169 137   HDL 66 59   LDL 89 63   TRIG 68 74       Lab Results   Component Value Date     01/11/2024     05/07/2021      Lab Results   Component Value Date    POTASSIUM 4.5 01/11/2024    POTASSIUM 4.7 07/24/2022    POTASSIUM 3.9 05/07/2021     Lab Results   Component Value Date    CHLORIDE 106 01/11/2024    CHLORIDE 109 07/24/2022    CHLORIDE 104 05/07/2021     Lab Results   Component Value Date    FRANKLIN 9.3 01/11/2024    FRANKLIN 9.3 05/07/2021     Lab Results   Component Value Date    CO2 26 01/11/2024    CO2 24 07/24/2022    CO2 26 05/07/2021     Lab Results   Component Value Date    BUN 8.6 01/11/2024    BUN 6 07/24/2022    BUN 11 05/07/2021     Lab Results   Component Value Date    CR 0.74 01/11/2024    CR 0.59 05/07/2021       GFR Estimate   Date Value Ref Range Status   01/11/2024 >90 >60 mL/min/1.73m2 Final   06/05/2023 >90 >60 mL/min/1.73m2 Final     Comment:     eGFR calculated using 2021 CKD-EPI equation.   04/15/2023 >90 >60 mL/min/1.73m2 Final     Comment:     eGFR calculated using 2021 CKD-EPI equation.   05/07/2021 >90 >60 mL/min/[1.73_m2] Final     Comment:     Non  GFR Calc  Starting 12/18/2018, serum creatinine based estimated GFR (eGFR) will be   calculated using the Chronic Kidney Disease Epidemiology Collaboration   (CKD-EPI) equation.     04/30/2021 >90 >60 mL/min/[1.73_m2] Final     Comment:     Non  GFR Calc  Starting 12/18/2018, serum creatinine  "based estimated GFR (eGFR) will be   calculated using the Chronic Kidney Disease Epidemiology Collaboration   (CKD-EPI) equation.     05/19/2020 >90 >60 mL/min/[1.73_m2] Final     Comment:     Non  GFR Calc  Starting 12/18/2018, serum creatinine based estimated GFR (eGFR) will be   calculated using the Chronic Kidney Disease Epidemiology Collaboration   (CKD-EPI) equation.       GFR Estimate If Black   Date Value Ref Range Status   05/07/2021 >90 >60 mL/min/[1.73_m2] Final     Comment:      GFR Calc  Starting 12/18/2018, serum creatinine based estimated GFR (eGFR) will be   calculated using the Chronic Kidney Disease Epidemiology Collaboration   (CKD-EPI) equation.     04/30/2021 >90 >60 mL/min/[1.73_m2] Final     Comment:      GFR Calc  Starting 12/18/2018, serum creatinine based estimated GFR (eGFR) will be   calculated using the Chronic Kidney Disease Epidemiology Collaboration   (CKD-EPI) equation.     05/19/2020 >90 >60 mL/min/[1.73_m2] Final     Comment:      GFR Calc  Starting 12/18/2018, serum creatinine based estimated GFR (eGFR) will be   calculated using the Chronic Kidney Disease Epidemiology Collaboration   (CKD-EPI) equation.         Lab Results   Component Value Date    MICROL <12.0 06/05/2023    MICROL <5 07/24/2022    MICROL <5 04/30/2021     No results found for: \"MICROALBUMIN\"  No results found for: \"CPEPT\", \"GADAB\", \"ISCAB\"    No results found for: \"B12\"]    Most recent eye exam date: : Not Found       1.  Type 1 diabetes- Stacey is doing quite well.  Glucose is under fairly good control. A1c is 7.3%.  [place].  Will lower long acting insulin to 70 units and tighten carb ratio to 1/4.5g (was 5.1) due to post-prandial hyperglycemia.  She is taking a break from her pump.  Could consider using Omnipod with U200 (or additional basal insulin) off label in the future, if she " is interested. Could also consider carefully using off label SGLT-2 inhibitor to help reduce insulin requirements and weight loss/renal protection.   NO other changes today.      2.  Risk factors-     Retinopathy:  No.  Had recent eye exam.   Nephropathy:  BP well controlled. No microalbuminuria.  Creatinine stable.  She is on enalapril.  White coat hypertension.     She follows with nephrology.   Neuropathy: No.    Feet: OK, no ulcers.   Lipids:  LDL improved.   Back on statin.  She does have a family history of premature CV disease in her father and half brother.  Would consider resuming statin in the future if she has no plans for childbearing.      3.  Hypothyroidism- last TSH in target on no levothyroxine.      4.  Celiac disease- well controlled with gluten free diet.  I have placed orders for some labs that can be associated with celiac disease (iron, hemoglobin, folate, vitamin d, vitamin b12).       5.  F/U in 6 months with me, annually with Dr. Allan.     36 minutes spent on the date of the encounter doing chart review, review of test results, review of continuous glucose sensor, interpretation of glucose data, patient visit and documentation, counseling/coordination of care, and discussion of follow up plan for worsening hyper and hypoglycemia.  The patient understood and is satisfied with today's visit.       Laureen Goldstein PA-C, MPAS   [facility]  Department of Medicine  Division of Endocrinology and Diabetes

## 2024-02-14 ENCOUNTER — TELEPHONE (OUTPATIENT)
Dept: ENDOCRINOLOGY | Facility: CLINIC | Age: 29
End: 2024-02-14
Payer: COMMERCIAL

## 2024-02-14 DIAGNOSIS — E10.21 TYPE 1 DIABETES MELLITUS WITH DIABETIC NEPHROPATHY (H): ICD-10-CM

## 2024-02-14 RX ORDER — URINE ACETONE TEST STRIPS
STRIP MISCELLANEOUS
Qty: 50 STRIP | Refills: 3 | Status: SHIPPED | OUTPATIENT
Start: 2024-02-14

## 2024-02-14 NOTE — TELEPHONE ENCOUNTER
Health Call Center    Phone Message    May a detailed message be left on voicemail: yes     Reason for Call: Medication Question or concern regarding medication   Prescription Clarification  Name of Medication: KETOSTIX test strip   Prescribing Provider: Dr. Goldstein    Pharmacy:   [id number] - [place], [place] - [address]      What on the order needs clarification? Need more definition on how much or often patient will be needing to use medication. Please call to inform pharmacy  thank you       Action Taken: Message routed to:  [facility] ([facility]):      Travel Screening: Not Applicable

## 2024-02-15 RX ORDER — ACYCLOVIR 400 MG/1
TABLET ORAL
Start: 2024-02-15

## 2024-02-15 NOTE — TELEPHONE ENCOUNTER
Sensor    9 each 3 2/13/2024 2/13/2024  [facility] [place]    Laureen Goldstein PA-C  Endocrinology, Diabetes, and Metabolism

## 2024-02-19 ENCOUNTER — TELEPHONE (OUTPATIENT)
Dept: ORTHOPEDICS | Facility: CLINIC | Age: 29
End: 2024-02-19

## 2024-02-19 ENCOUNTER — TELEPHONE (OUTPATIENT)
Dept: CARDIOLOGY | Facility: CLINIC | Age: 29
End: 2024-02-19

## 2024-02-19 ENCOUNTER — OFFICE VISIT (OUTPATIENT)
Dept: ORTHOPEDICS | Facility: CLINIC | Age: 29
End: 2024-02-19
Payer: COMMERCIAL

## 2024-02-19 DIAGNOSIS — G56.02 LEFT CARPAL TUNNEL SYNDROME: ICD-10-CM

## 2024-02-19 DIAGNOSIS — M65.342 TRIGGER FINGER, LEFT RING FINGER: Primary | ICD-10-CM

## 2024-02-19 PROCEDURE — 99204 OFFICE O/P NEW MOD 45 MIN: CPT | Performed by: STUDENT IN AN ORGANIZED HEALTH CARE EDUCATION/TRAINING PROGRAM

## 2024-02-19 NOTE — NURSING NOTE
Teaching Flowsheet   Relevant Diagnosis: Left ring trigger finger.  Bilateral carpal tunnel syndrome. Type 1 DM with DM nephropathy.    Teaching Topic: Left open carpal tunnel release.  Left ring trigger finger release.    [facility] under local anesthesia with Dr Gray Reyes.       Person(s) involved in teaching:   Patient     Motivation Level:  Asks Questions: Yes  Eager to Learn: Yes  Cooperative: Yes  Receptive (willing/able to accept information): Yes  Any cultural factors/Adventist beliefs that may influence understanding or compliance? No    Patient demonstrates understanding of the following:  Reason for the appointment, diagnosis and treatment plan: Yes  Knowledge of proper use of medications and conditions for which they are ordered (with special attention to potential side effects or drug interactions): Yes  Which situations necessitate calling provider and whom to contact: Yes    Teaching Concerns Addressed:   Proper use and care of  (medical equip, care aids, etc.): Yes  Nutritional needs and diet plan: Yes  Pain management techniques: Yes  Wound Care: Yes  How and/when to access community resources: Yes     Instructional Materials Used/Given: Preoperative surgery packet, antibacterial Chlorhexidine soap. Stop Light Tool reviewed, after-hours number provided, patient verbalized understanding, had no immediate questions. Daniella Stone RN

## 2024-02-19 NOTE — TELEPHONE ENCOUNTER
Patient Contacted for the patient to call back and reschedule the following:    Appointment type: Return Cardiology  Provider: Dr. Hoffman (or MELODY)  Return date: 3/8/2024 (pt declined, asked for later appt. Let pt know Dr. Hoffman doesn't have much availability at this moment and I added her to a waitlist in  and [facility]. Also offered MELODY, but pt declined.)  Specialty phone number: [phone] option 1  Additional appointment(s) needed: lab prior  Additonal Notes: 2/19 Provider unavailable. Pt couldn't do 3/8 appts offered. Offered MELODY, but pt declined. Pt is ok waiting to see Dr. Hoffman- added to waitlist. Pt is moving in 3 months. MJ

## 2024-02-19 NOTE — NURSING NOTE
Reason For Visit:   Chief Complaint   Patient presents with    Consult     Trigger finger left ring finger and bilateral carpal tunnel and Type 1 diabetes mellitus with diabetic nephropathy/Dr. Bia Allan/       Primary MD: Nancy Lopez  Ref. MD: Bia Allan.    Age: 28 year old    ?  No      Pain Assessment  Patient Currently in Pain: Yes  Patient's Stated Pain Goal: 2    Hand Dominance Evaluation  Hand Dominance: Right          QuickDASH Assessment      2/16/2024     6:37 PM   QuickDASH Main   1. Open a tight or new jar Moderate difficulty   2. Do heavy household chores (e.g., wash walls, floors) Mild difficulty   3. Carry a shopping bag or briefcase Mild difficulty   4. Wash your back Mild difficulty   5. Use a knife to cut food Mild difficulty   6. Recreational activities in which you take some force or impact through your arm, shoulder or hand (e.g., golf, hammering, tennis, etc.) Moderate difficulty   7. During the past week, to what extent has your arm, shoulder or hand problem interfered with your normal social activities with family, friends, neighbours or groups Slightly   8. During the past week, were you limited in your work or other regular daily activities as a result of your arm, shoulder or hand problem Moderately limited   9. Arm, shoulder or hand pain Moderate   10.Tingling (pins and needles) in your arm,shoulder or hand Severe   11. During the past week, how much difficulty have you had sleeping because of the pain in your arm, shoulder or hand Moderate difficulty   Quickdash Ability Score 40.91              Allergies   Allergen Reactions    Dog Epithelium Allergy Skin Test Other (See Comments)     Sinus symptoms     Dogs Other (See Comments)     Sinus symptoms     Dust Mites      Sinus     Gluten Meal        Jane France

## 2024-02-19 NOTE — PROGRESS NOTES
Orthopaedic Surgery [facility] H&P Note:  Date: Feb 19, 2024    Patient Name: Stacey Mantilla  MRN: [id number]    CHIEF COMPLAINT: Bilateral carpal tunnel syndrome, left ring trigger finger    Dominant Hand:Right  Occupation:  for [employer]      HPI:  Ms. Stacey Mantilla is a 28 year old female right hand dominant who presents with bilateral hand numbness and tingling, right worse than left.  Symptoms ongoing for years.  Affects the index, middle finger, thumb primarily.  Symptoms are constant and are aggravated by most tasks.  Pain wakes her up at night.  She has tried braces but they have not been successful.    The patient also does have triggering of the left ring finger.  Ongoing since August.  Patient did previously have an injection which did not provide lasting relief.  Also caused her sugars to go into the 300s.      PAST MEDICAL HISTORY:  Past Medical History:   Diagnosis Date    Asthma     Currently no treatment needed    Celiac disease     Chronic kidney disease, stage I 8/3/2015    Chronic sinusitis     Diabetes mellitus type 1 (H)     Diabetic nephropathy (H)     Family history of heart disease 8/3/2015    Hypertension     Hypothyroid     Pedal edema     Psoriasis        PAST SURGICAL HISTORY:  Past Surgical History:   Procedure Laterality Date    ENT SURGERY      TONSILLECTOMY, ADENOIDECTOMY, COMBINED         MEDICATIONS:  Current Outpatient Medications   Medication    albuterol (PROAIR HFA/PROVENTIL HFA/VENTOLIN HFA) 108 (90 Base) MCG/ACT inhaler    albuterol (PROVENTIL) (2.5 MG/3ML) 0.083% neb solution    amphetamine-dextroamphetamine (ADDERALL XR) 15 MG 24 hr capsule    amphetamine-dextroamphetamine (ADDERALL) 10 MG tablet    ASPIRIN NOT PRESCRIBED (INTENTIONAL)    atorvastatin (LIPITOR) 40 MG tablet    blood glucose (NO BRAND SPECIFIED) test strip    Cholecalciferol 2000 UNITS TBDP    Continuous Blood Gluc Sensor (DEXCOM G7 SENSOR) MISC     "Continuous Blood Gluc Sensor (DEXCOM G7 SENSOR) MISC    enalapril (VASOTEC) 20 MG tablet    escitalopram (LEXAPRO) 10 MG tablet    Glucagon, rDNA, (GLUCAGON EMERGENCY) 1 MG KIT    Injection Device for insulin (INPEN 100-GREY-NOVOLOG-FIASP) MOLLY    insulin degludec (TRESIBA FLEXTOUCH) 100 UNIT/ML pen    Insulin Infusion Pump Supplies (INSULIN PUMP SYRINGE RESERVOIR) MISC    Insulin Infusion Pump Supplies (SURE-T INFUSION SET 23\") MISC    insulin pen needle (32G X 4 MM) 32G X 4 MM miscellaneous    Insulin Pump Accessories MISC    insulin syringe-needle U-100 (31G X 5/16\" 0.5 ML) 31G X 5/16\" 0.5 ML miscellaneous    insulin syringes, disposable, U-100 0.3 ML MISC    ipratropium - albuterol 0.5 mg/2.5 mg/3 mL (DUONEB) 0.5-2.5 (3) MG/3ML neb solution    KETOSTIX test strip    montelukast (SINGULAIR) 10 MG tablet    Multiple Vitamins-Minerals (MULTIVITAMIN ADULT PO)    NOVOLOG PENFILL 100 UNIT/ML soln    tirzepatide (MOUNJARO) 2.5 MG/0.5ML pen     Current Facility-Administered Medications   Medication    dexAMETHasone (DECADRON) injection 4 mg    lidocaine (PF) (XYLOCAINE) 1 % injection 1 mL       ALLERGIES:     Allergies   Allergen Reactions    Dog Epithelium Allergy Skin Test Other (See Comments)     Sinus symptoms     Dogs Other (See Comments)     Sinus symptoms     Dust Mites      Sinus     Gluten Meal        FAMILY HISTORY:  No pertinent family history    SOCIAL HISTORY:  Social History     Tobacco Use    Smoking status: Never     Passive exposure: Past    Smokeless tobacco: Never   Vaping Use    Vaping Use: Never used   Substance Use Topics    Alcohol use: Yes     Comment: couple times per month will have 3 mixed drinks    Drug use: Yes     Types: Marijuana       The patient's past medical, family, and social history was reviewed and confirmed.    REVIEW OF SYMPTOMS:      General: Negative   Eyes: Negative   Ear, Nose and Throat: Negative   Respiratory: Negative   Cardiovascular: Negative   Gastrointestinal: Negative "   Genito-urinary: Negative   Musculoskeletal: Negative  Neurological: Negative   Psychological: Negative  HEME: Negative   ENDO: Negative   SKIN: Negative    VITALS:  There were no vitals filed for this visit.    EXAM:  General: NAD, A&Ox3  HEENT: NC/AT  CV: RRR by peripheral pulse  Pulmonary: Non-labored breathing on RA  BUE:  Skin intact, no thenar atrophy, no deformity  Sensation is intact to light touch in the median, radial, ulnar nerve distributions bilaterally no deficits  2-point discrimination 5 mm median and ulnar bilaterally  Positive Tinel's and Durkan's compression test at bilateral carpal tunnels  Tenderness palpation at the left ring A1 pulley, visible triggering  Intact EPL, FPL, APB, hand intrinsics  Able to make full composite fist  Warm and well-perfused, capillary refill less than 2 seconds    LABS:  Hemoglobin A1c 7.3 11/24/2023    EMG/NCS 2/12/24  Results:  Nerve conduction studies:  1. Bilateral median-D2 sensory responses show severely slowed CV and normal amplitudes.   2. Bilateral ulnar-D5 sensory responses are normal.   3. Bilateral median-ulnar palmar interlatency differences are prolonged.   4. Bilateral median-APB motor responses show moderate (left) or severely (right) prolonged DL, normal amplitudes and normal CV.   5. Bilateral ulnar-ADM motor responses are normal.      Needle EMG of selected left and right upper limb muscles was performed as tabulated below. No abnormal spontaneous activity was observed in the sampled muscles. Motor unit potential morphology and recruitment patterns were normal.      Interpretation:  This is an abnormal study. 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 Despite the severity of the median neuropathies, the pathophysiology of the lesions at this point is focal demyelination without  axon loss. Clinical correlation is recommended.       I have personally reviewed the above images and labs.         IMPRESSION AND RECOMMENDATIONS:  Ms. Stacey Mantilla is a 28 year old female right hand dominant with bilateral carpal tunnel syndrome and left ring trigger finger.    Symptoms refractory to conservative management.  The patient is not interested in another steroid injection due to effect on blood sugars.    I therefore have recommended surgery in the form of a carpal tunnel release as well as a left ring trigger finger release.  She wants to do her left first so that she can do both at the same time.    The indications for surgery were discussed with the patient. The benefits, risks, and alternatives of operative management were discussed in detail with the patient. The patient understands that the risks of surgery include, but are not limited to: infection, bleeding, injury to nearby structures (such as nerves, blood vessels, and tendons), wound healing problems, temporary weakness in , pillar pain, need for additional surgery, stiffness, scarring, need for rehabilitation, and anesthetic complications.  Patient expressed understanding and elected to proceed with surgery. All questions were answered to the patient's satisfaction.    Case request placed, local only.      Gray Reyes MD    Hand, Upper Extremity & Microvascular Surgery  Department of Orthopaedic Surgery  [facility]

## 2024-02-19 NOTE — TELEPHONE ENCOUNTER
Patient has been scheduled for surgery. Details are below.    Date of Surgery: 04/04/24    Approximate Arrival Time: SURGERY CENTER WILL CALL 3/4 DAYS PRIOR TO CONFIRM A TIME   Surgeon:  DR. SUSAN LAW     Procedure: RELEASE, LEFT CARPAL TUNNEL - Left  RELEASE, LEFT RING TRIGGER FINGER - Left    Location: [facility]-[address] [address]  Surgery Consult: NA  PreOp Physical: NA  PostOp: 04/15/24  Packet Mailed/MyChart Sent: YES  Added to Kirk: YES

## 2024-02-19 NOTE — LETTER
2/19/2024         RE: Stacey Mantilla  [address]  [facility] [address]        Dear Colleague,    Thank you for referring your patient, Stacey Mantilla, to the [facility] [place]. Please see a copy of my visit note below.    Orthopaedic Surgery Hand and Upper Extremity Clinic H&P Note:  Date: Feb 19, 2024    Patient Name: Stacey Mantilla  MRN: [id number]    CHIEF COMPLAINT: Bilateral carpal tunnel syndrome, left ring trigger finger    Dominant Hand:Right  Occupation:  for [employer]      HPI:  Ms. Stacey Mantilla is a 28 year old female right hand dominant who presents with bilateral hand numbness and tingling, right worse than left.  Symptoms ongoing for years.  Affects the index, middle finger, thumb primarily.  Symptoms are constant and are aggravated by most tasks.  Pain wakes her up at night.  She has tried braces but they have not been successful.    The patient also does have triggering of the left ring finger.  Ongoing since August.  Patient did previously have an injection which did not provide lasting relief.  Also caused her sugars to go into the 300s.      PAST MEDICAL HISTORY:  Past Medical History:   Diagnosis Date    Asthma     Currently no treatment needed    Celiac disease     Chronic kidney disease, stage I 8/3/2015    Chronic sinusitis     Diabetes mellitus type 1 (H)     Diabetic nephropathy (H)     Family history of heart disease 8/3/2015    Hypertension     Hypothyroid     Pedal edema     Psoriasis        PAST SURGICAL HISTORY:  Past Surgical History:   Procedure Laterality Date    ENT SURGERY      TONSILLECTOMY, ADENOIDECTOMY, COMBINED         MEDICATIONS:  Current Outpatient Medications   Medication    albuterol (PROAIR HFA/PROVENTIL HFA/VENTOLIN HFA) 108 (90 Base) MCG/ACT inhaler    albuterol (PROVENTIL) (2.5 MG/3ML) 0.083% neb solution    amphetamine-dextroamphetamine (ADDERALL XR) 15 MG 24 hr capsule     "amphetamine-dextroamphetamine (ADDERALL) 10 MG tablet    ASPIRIN NOT PRESCRIBED (INTENTIONAL)    atorvastatin (LIPITOR) 40 MG tablet    blood glucose (NO BRAND SPECIFIED) test strip    Cholecalciferol 2000 UNITS TBDP    Continuous Blood Gluc Sensor (DEXCOM G7 SENSOR) MISC    Continuous Blood Gluc Sensor (DEXCOM G7 SENSOR) MISC    enalapril (VASOTEC) 20 MG tablet    escitalopram (LEXAPRO) 10 MG tablet    Glucagon, rDNA, (GLUCAGON EMERGENCY) 1 MG KIT    Injection Device for insulin (INPEN 100-GREY-NOVOLOG-FIASP) MOLLY    insulin degludec (TRESIBA FLEXTOUCH) 100 UNIT/ML pen    Insulin Infusion Pump Supplies (INSULIN PUMP SYRINGE RESERVOIR) MISC    Insulin Infusion Pump Supplies (SURE-T INFUSION SET 23\") MISC    insulin pen needle (32G X 4 MM) 32G X 4 MM miscellaneous    Insulin Pump Accessories MISC    insulin syringe-needle U-100 (31G X 5/16\" 0.5 ML) 31G X 5/16\" 0.5 ML miscellaneous    insulin syringes, disposable, U-100 0.3 ML MISC    ipratropium - albuterol 0.5 mg/2.5 mg/3 mL (DUONEB) 0.5-2.5 (3) MG/3ML neb solution    KETOSTIX test strip    montelukast (SINGULAIR) 10 MG tablet    Multiple Vitamins-Minerals (MULTIVITAMIN ADULT PO)    NOVOLOG PENFILL 100 UNIT/ML soln    tirzepatide (MOUNJARO) 2.5 MG/0.5ML pen     Current Facility-Administered Medications   Medication    dexAMETHasone (DECADRON) injection 4 mg    lidocaine (PF) (XYLOCAINE) 1 % injection 1 mL       ALLERGIES:     Allergies   Allergen Reactions    Dog Epithelium Allergy Skin Test Other (See Comments)     Sinus symptoms     Dogs Other (See Comments)     Sinus symptoms     Dust Mites      Sinus     Gluten Meal        FAMILY HISTORY:  No pertinent family history    SOCIAL HISTORY:  Social History     Tobacco Use    Smoking status: Never     Passive exposure: Past    Smokeless tobacco: Never   Vaping Use    Vaping Use: Never used   Substance Use Topics    Alcohol use: Yes     Comment: couple times per month will have 3 mixed drinks    Drug use: Yes     " Types: Marijuana       The patient's past medical, family, and social history was reviewed and confirmed.    REVIEW OF SYMPTOMS:      General: Negative   Eyes: Negative   Ear, Nose and Throat: Negative   Respiratory: Negative   Cardiovascular: Negative   Gastrointestinal: Negative   Genito-urinary: Negative   Musculoskeletal: Negative  Neurological: Negative   Psychological: Negative  HEME: Negative   ENDO: Negative   SKIN: Negative    VITALS:  There were no vitals filed for this visit.    EXAM:  General: NAD, A&Ox3  HEENT: NC/AT  CV: RRR by peripheral pulse  Pulmonary: Non-labored breathing on RA  BUE:  Skin intact, no thenar atrophy, no deformity  Sensation is intact to light touch in the median, radial, ulnar nerve distributions bilaterally no deficits  2-point discrimination 5 mm median and ulnar bilaterally  Positive Tinel's and Durkan's compression test at bilateral carpal tunnels  Tenderness palpation at the left ring A1 pulley, visible triggering  Intact EPL, FPL, APB, hand intrinsics  Able to make full composite fist  Warm and well-perfused, capillary refill less than 2 seconds    LABS:  Hemoglobin A1c 7.3 11/24/2023    EMG/NCS 2/12/24  Results:  Nerve conduction studies:  1. Bilateral median-D2 sensory responses show severely slowed CV and normal amplitudes.   2. Bilateral ulnar-D5 sensory responses are normal.   3. Bilateral median-ulnar palmar interlatency differences are prolonged.   4. Bilateral median-APB motor responses show moderate (left) or severely (right) prolonged DL, normal amplitudes and normal CV.   5. Bilateral ulnar-ADM motor responses are normal.      Needle EMG of selected left and right upper limb muscles was performed as tabulated below. No abnormal spontaneous activity was observed in the sampled muscles. Motor unit potential morphology and recruitment patterns were normal.      Interpretation:  This is an abnormal study. There is electrophysiologic evidence of (1) a moderate to  severe right-sided and (2) a moderate left-sided median neuropathy at the wrist, as can be seen in the clinical context of bilateral carpal tunnel syndrome. Despite the severity of the median neuropathies, the pathophysiology of the lesions at this point is focal demyelination without axon loss. Clinical correlation is recommended.       I have personally reviewed the above images and labs.         IMPRESSION AND RECOMMENDATIONS:  Ms. Stacey Mantilla is a 28 year old female right hand dominant with bilateral carpal tunnel syndrome and left ring trigger finger.    Symptoms refractory to conservative management.  The patient is not interested in another steroid injection due to effect on blood sugars.    I therefore have recommended surgery in the form of a carpal tunnel release as well as a left ring trigger finger release.  She wants to do her left first so that she can do both at the same time.    The indications for surgery were discussed with the patient. The benefits, risks, and alternatives of operative management were discussed in detail with the patient. The patient understands that the risks of surgery include, but are not limited to: infection, bleeding, injury to nearby structures (such as nerves, blood vessels, and tendons), wound healing problems, temporary weakness in , pillar pain, need for additional surgery, stiffness, scarring, need for rehabilitation, and anesthetic complications.  Patient expressed understanding and elected to proceed with surgery. All questions were answered to the patient's satisfaction.    Case request placed, local only.      Gray Reyes MD    Hand, Upper Extremity & Microvascular Surgery  Department of Orthopaedic Surgery  [facility]

## 2024-02-27 ENCOUNTER — OFFICE VISIT (OUTPATIENT)
Dept: SLEEP MEDICINE | Facility: CLINIC | Age: 29
End: 2024-02-27
Attending: NURSE PRACTITIONER
Payer: COMMERCIAL

## 2024-02-27 DIAGNOSIS — G47.9 SLEEP DISTURBANCE: ICD-10-CM

## 2024-02-27 DIAGNOSIS — R53.83 TIREDNESS: ICD-10-CM

## 2024-02-27 DIAGNOSIS — E66.01 MORBID OBESITY WITH BMI OF 45.0-49.9, ADULT (H): ICD-10-CM

## 2024-02-27 DIAGNOSIS — R06.83 SNORING: ICD-10-CM

## 2024-02-27 DIAGNOSIS — G47.00 INSOMNIA, UNSPECIFIED TYPE: ICD-10-CM

## 2024-02-27 PROCEDURE — G0399 HOME SLEEP TEST/TYPE 3 PORTA: HCPCS | Performed by: INTERNAL MEDICINE

## 2024-02-27 ASSESSMENT — SLEEP AND FATIGUE QUESTIONNAIRES
HOW LIKELY ARE YOU TO NOD OFF OR FALL ASLEEP WHEN YOU ARE A PASSENGER IN A CAR FOR AN HOUR WITHOUT A BREAK: SLIGHT CHANCE OF DOZING
HOW LIKELY ARE YOU TO NOD OFF OR FALL ASLEEP WHILE SITTING AND READING: SLIGHT CHANCE OF DOZING
HOW LIKELY ARE YOU TO NOD OFF OR FALL ASLEEP WHILE SITTING QUIETLY AFTER LUNCH WITHOUT ALCOHOL: SLIGHT CHANCE OF DOZING
HOW LIKELY ARE YOU TO NOD OFF OR FALL ASLEEP WHILE LYING DOWN TO REST IN THE AFTERNOON WHEN CIRCUMSTANCES PERMIT: MODERATE CHANCE OF DOZING
HOW LIKELY ARE YOU TO NOD OFF OR FALL ASLEEP WHILE WATCHING TV: SLIGHT CHANCE OF DOZING
HOW LIKELY ARE YOU TO NOD OFF OR FALL ASLEEP WHILE SITTING INACTIVE IN A PUBLIC PLACE: WOULD NEVER DOZE
HOW LIKELY ARE YOU TO NOD OFF OR FALL ASLEEP WHILE SITTING AND TALKING TO SOMEONE: WOULD NEVER DOZE
HOW LIKELY ARE YOU TO NOD OFF OR FALL ASLEEP IN A CAR, WHILE STOPPED FOR A FEW MINUTES IN TRAFFIC: WOULD NEVER DOZE

## 2024-02-27 NOTE — PROGRESS NOTES
Pt is completing a home sleep test. Pt was instructed on how to put on the Noxturnal T3 device and associated equipment before going to bed and given the opportunity to practice putting it on before leaving the sleep center. Pt was reminded to bring the home sleep test kit back to the center tomorrow, at agreed upon time for download and reporting.   Neck circumference: 52 CM / 220.5 inches.  Amalia Webb CMA, HST Specialist  [place] [facility]

## 2024-02-28 ENCOUNTER — DOCUMENTATION ONLY (OUTPATIENT)
Dept: SLEEP MEDICINE | Facility: CLINIC | Age: 29
End: 2024-02-28
Attending: NURSE PRACTITIONER
Payer: COMMERCIAL

## 2024-02-28 NOTE — PROGRESS NOTES
This HSAT was performed using a Noxturnal T3 device which recorded snore, sound, movement activity, body position, nasal pressure, oronasal thermal airflow, pulse, oximetry and both chest and abdominal respiratory effort. HSAT data was restricted to the time patient states they were in bed.     HSAT was scored using 1B 4% hypopnea rule.     AHI: 4.1.  Snoring was reported as moderate.  Time with SpO2 below 89% was 47.9 minutes.   Overall signal quality was good     Pt will follow up with sleep provider to determine appropriate therapy.     Ordering Provider, Gilles Baltazar APRN CNP C. Oyugi, BA, [facility] CenterGT, RST System Clinical Specialist/ 2/28/2024

## 2024-02-28 NOTE — PROGRESS NOTES
HST POST-STUDY QUESTIONNAIRE    What time did you go to bed?  21:00  How long do you think it took to fall asleep?  20 min  What time did you wake up to start the day?  07:30  Did you get up during the night at all?  no  If you woke up, do you remember approximately what time(s)?   Did you have any difficulty with the equipment?  Yes I had the pulse ox upside down and didn't realize it until morning.  Did you us any type of treatment with this study?  None  Was the head of the bed elevated? No  Did you sleep in a recliner?  No  Did you stop using CPAP at least 3 days before this test?    Any other information you'd like us to know?

## 2024-02-29 NOTE — PROCEDURES
"HOME SLEEP STUDY INTERPRETATION    Patient: Stacey Mantilla  MRN: [id number]  YOB: 1995  Study Date: 2/27/2024  Referring Provider: Nancy Lopez; LUIS FERNANDO CNP  Ordering Provider: Gilles GOULD CNP     Indications for Home Study: Stacey Mantilla is a 28 year old female who presents with symptoms suggestive of obstructive sleep apnea.    Estimated body mass index is 49.59 kg/m  as calculated from the following:    Height as of 1/11/24: 1.702 m (5' 7\").    Weight as of 1/11/24: 143.6 kg (316 lb 9.6 oz).  Total score - [place]: 6 (2/27/2024 12:11 PM)  Total Score: 6 (2/27/2024 12:11 PM)    Data: A full night home sleep study was performed recording the standard physiologic parameters including body position, movement, sound, nasal pressure, thermal oral airflow, chest and abdominal movements with respiratory inductance plethysmography, and oxygen saturation by pulse oximetry. Pulse rate was estimated by oximetry recording. This study was considered adequate based on > 4 hours of quality oximetry and respiratory recording. As specified by the AASM Manual for the Scoring of Sleep and Associated events, version 2.3, Rule VIII.D 1B, 4% oxygen desaturation scoring for hypopneas is used as a standard of care on all home sleep apnea testing.    Analysis Time:  543.8 minutes    Respiration:   Sleep Associated Hypoxemia: sustained hypoxemia was present. Baseline oxygen saturation was 93%.  Time with saturation less than or equal to 88% was 23.8 minutes. The lowest oxygen saturation was 84%.   Snoring: Snoring was present.  Respiratory events: The home study revealed a presence of 0 obstructive apneas and 2 mixed and central apneas. There were 35 hypopneas resulting in a combined apnea/hypopnea index [AHI] of 4.1 events per hour.  AHI was 2.4 per hour supine, 7.4 per hour prone, 1.7 per hour on left side, and 1.9 per hour on right side.   Pattern: Excluding events noted above, respiratory rate and " pattern was Normal.    Position: Percent of time spent: supine - 27.5%, prone - 37.1%, on left - 6.5%, on right - 28.9%.    Heart Rate: By pulse oximetry borderline tachycardia    Assessment:   Patient did not rule in for obstructive sleep apnea.  Sleep associated hypoxemia was present.  Borderline tachycardia    Recommendations:  Consider  Consider repeat in lab NPSG . Otherwise consider initiating nasal canula 2L/min.  Suggest optimizing sleep hygiene and avoiding sleep deprivation.  Weight management.    Diagnosis Code(s): Hypoxemia G47.36, Snoring R06.83    Bebeto Barron DO, February 29, 2024   Diplomate, American Board of Internal Medicine, Sleep Medicine

## 2024-03-19 ENCOUNTER — VIRTUAL VISIT (OUTPATIENT)
Dept: SLEEP MEDICINE | Facility: CLINIC | Age: 29
End: 2024-03-19
Payer: COMMERCIAL

## 2024-03-19 VITALS — HEART RATE: 78 BPM | HEIGHT: 68 IN | BODY MASS INDEX: 44.41 KG/M2 | WEIGHT: 293 LBS

## 2024-03-19 DIAGNOSIS — R53.83 TIREDNESS: ICD-10-CM

## 2024-03-19 DIAGNOSIS — G47.36 HYPOXEMIA ASSOCIATED WITH SLEEP: Primary | ICD-10-CM

## 2024-03-19 DIAGNOSIS — G47.00 INSOMNIA, UNSPECIFIED TYPE: ICD-10-CM

## 2024-03-19 DIAGNOSIS — R06.83 SNORING: ICD-10-CM

## 2024-03-19 DIAGNOSIS — G47.9 SLEEP DISTURBANCE: ICD-10-CM

## 2024-03-19 DIAGNOSIS — E66.01 MORBID OBESITY WITH BMI OF 45.0-49.9, ADULT (H): ICD-10-CM

## 2024-03-19 PROCEDURE — 99213 OFFICE O/P EST LOW 20 MIN: CPT | Mod: 95 | Performed by: NURSE PRACTITIONER

## 2024-03-19 ASSESSMENT — PAIN SCALES - GENERAL: PAINLEVEL: MODERATE PAIN (5)

## 2024-03-19 NOTE — PATIENT INSTRUCTIONS
"          MY TREATMENT INFORMATION FOR SLEEP APNEA-  Stacey Mantilla    DOCTOR : LUIS FERNANDO Umaña CNP    Am I having a sleep study at a sleep center?  --->Due to normal delays, you will be contacted within 2-4 weeks to schedule    Am I having a home sleep study?  --->Watch the video for the device you are using:    -/drop off device-   https://www.Exoprise.com/watch?v=yGGFBdELGhk    -Disposable device sent out require phone/computer application-   https://www.Exoprise.com/watch?v=BCce_vbiwxE      Frequently asked questions:  1. What is Obstructive Sleep Apnea (LEANDRO)? LEANDRO is the most common type of sleep apnea. Apnea means, \"without breath.\"  Apnea is most often caused by narrowing or collapse of the upper airway as muscles relax during sleep.   Almost everyone has occasional apneas. Most people with sleep apnea have had brief interruptions at night frequently for many years.  The severity of sleep apnea is related to how frequent and severe the events are.   2. What are the consequences of LEANDRO? Symptoms include: feeling sleepy during the day, snoring loudly, gasping or stopping of breathing, trouble sleeping, and occasionally morning headaches or heartburn at night.  Sleepiness can be serious and even increase the risk of falling asleep while driving. Other health consequences may include development of high blood pressure and other cardiovascular disease in persons who are susceptible. Untreated LEANDRO  can contribute to heart disease, stroke and diabetes.   3. What are the treatment options? In most situations, sleep apnea is a lifelong disease that must be managed with daily therapy. Medications are not effective for sleep apnea and surgery is generally not considered until other therapies have been tried. Your treatment is your choice . Continuous Positive Airway (CPAP) works right away and is the therapy that is effective in nearly everyone. An oral device to hold your jaw forward is usually the next most " reliable option. Other options include postioning devices (to keep you off your back), weight loss, and surgery including a tongue pacing device. There is more detail about some of these options below.  4. Are my sleep studies covered by insurance? Although we will request verification of coverage, we advise you also check in advance of the study to ensure there is coverage.    Important tips for those choosing CPAP and similar devices  REMEMBER-IF YOU RECEIVE A CALL FROM  [phone]-->IT IS TO SETUP A DEVICE  For new devices, sign up for device LEFTY to monitor your device for your followup visits  We encourage you to utilize the myAir by [employer] lefty or website ("[employer]" web (resmed.com) ) to monitor your therapy progress and share the data with your healthcare team when you discuss your sleep apnea.                                                    Know your equipment:  CPAP is continuous positive airway pressure that prevents obstructive sleep apnea by keeping the throat from collapsing while you are sleeping. In most cases, the device is  smart  and can slowly self-adjusts if your throat collapses and keeps a record every day of how well you are treated-this information is available to you and your care team.  BPAP is bilevel positive airway pressure that keeps your throat open and also assists each breath with a pressure boost to maintain adequate breathing.  Special kinds of BPAP are used in patients who have inadequate breathing from lung or heart disease. In most cases, the device is  smart  and can slowly self-adjusts to assist breathing. Like CPAP, the device keeps a record of how well you are treated.  Your mask is your connection to the device. You get to choose what feels most comfortable and the staff will help to make sure if fits. Here: are some examples of the different masks that are available: Magnetic mask aids may assist with use but there are safety issues that should be addressed when considering  with magnets* ( see end of discussion).       Key points to remember on your journey with sleep apnea:  Sleep study.  PAP devices often need to be adjusted during a sleep study to show that they are effective and adjusted right.  Good tips to remember: Try wearing just the mask during a quiet time during the day so your body adapts to wearing it. A humidifier is recommended for comfort in most cases to prevent drying of your nose and throat. Allergy medication from your provider may help you if you are having nasal congestion.  Getting settled-in. It takes more than one night for most of us to get used to wearing a mask. Try wearing just the mask during a quiet time during the day so your body adapts to wearing it. A humidifier is recommended for comfort in most cases. Our team will work with you carefully on the first day and will be in contact within 4 days and again at 2 and 4 weeks for advice and remote device adjustments. Your therapy is evaluated by the device each day.   Use it every night. The more you are able to sleep naturally for 7-8 hours, the more likely you will have good sleep and to prevent health risks or symptoms from sleep apnea. Even if you use it 4 hours it helps. Occasionally all of us are unable to use a medical therapy, in sleep apnea, it is not dangerous to miss one night.   Communicate. Call our skilled team on the number provided on the first day if your visit for problems that make it difficult to wear the device. Over 2 out of 3 patients can learn to wear the device long-term with help from our team. Remember to call our team or your sleep providers if you are unable to wear the device as we may have other solutions for those who cannot adapt to mask CPAP therapy. It is recommended that you sleep your sleep provider within the first 3 months and yearly after that if you are not having problems.   Use it for your health. We encourage use of CPAP masks during daytime quiet periods to  allow your face and brain to adapt to the sensation of CPAP so that it will be a more natural sensation to awaken to at night or during naps. This can be very useful during the first few weeks or months of adapting to CPAP though it does not help medically to wear CPAP during wakefulness and  should not be used as a strategy just to meet guidelines.  Take care of your equipment. Make sure you clean your mask and tubing using directions every day and that your filter and mask are replaced as recommended or if they are not working.     *Masks with magnets:  Updated Contraindications  Masks with magnetic components are contraindicated for use by patients where they, or anyone in close physical contact while using the mask, have the following:   Active medical implants that interact with magnets (i.e., pacemakers, implantable cardioverter defibrillators (ICD), neurostimulators, cerebrospinal fluid (CSF) shunts, insulin/infusion pumps)   Metallic implants/objects containing ferromagnetic material (i.e., aneurysm clips/flow disruption devices, embolic coils, stents, valves, electrodes, implants to restore hearing or balance with implanted magnets, ocular implants, metallic splinters in the eye)  Updated Warning  Keep the mask magnets at a safe distance of at least 6 inches (150 mm) away from implants or medical devices that may be adversely affected by magnetic interference. This warning applies to you or anyone in close physical contact with your mask. The magnets are in the frame and lower headgear clips, with a magnetic field strength of up to 400mT. When worn, they connect to secure the mask but may inadvertently detach while asleep.  Implants/medical devices, including those listed within contraindications, may be adversely affected if they change function under external magnetic fields or contain ferromagnetic materials that attract/repel to magnetic fields (some metallic implants, e.g., contact lenses with metal,  dental implants, metallic cranial plates, screws, filiberto hole covers, and bone substitute devices). Consult your physician and  of your implant / other medical device for information on the potential adverse effects of magnetic fields.    BESIDES CPAP, WHAT OTHER THERAPIES ARE THERE?    Positioning Device  Positioning devices are generally used when sleep apnea is mild and only occurs on your back.This example shows a pillow that straps around the waist. It may be appropriate for those whose sleep study shows milder sleep apnea that occurs primarily when lying flat on one's back. Preliminary studies have shown benefit but effectiveness at home may need to be verified by a home sleep test. These devices are generally not covered by medical insurance.  Examples of devices that maintain sleeping on the back to prevent snoring and mild sleep apnea.    Belt type body positioner  http://[employer] Music/    Electronic reminder  http://nightshifttherapy.Qoniac/            Oral Appliance  What is oral appliance therapy?  An oral appliance device fits on your teeth at night like a retainer used after having braces. The device is made by a specialized dentist and requires several visits over 1-2 months before a manufactured device is made to fit your teeth and is adjusted to prevent your sleep apnea. Once an oral device is working properly, snoring should be improved. A home sleep test may be recommended at that time if to determine whether the sleep apnea is adequately treated.       Some things to remember:  -Oral devices are often, but not always, covered by your medical insurance. Be sure to check with your insurance provider.   -If you are referred for oral therapy, you will be given a list of specialized dentists to consider or you may choose to visit the Web site of the American Academy of Dental Sleep Medicine  -Oral devices are less likely to work if you have severe sleep apnea or are extremely overweight.      More detailed information  An oral appliance is a small acrylic device that fits over the upper and lower teeth  (similar to a retainer or a mouth guard). This device slightly moves jaw forward, which moves the base of the tongue forward, opens the airway, improves breathing for effective treat snoring and obstructive sleep apnea in perhaps 7 out of 10 people .  The best working devices are custom-made by a dental device  after a mold is made of the teeth 1, 2, 3.  When is an oral appliance indicated?  Oral appliance therapy is recommended as a first-line treatment for patients with primary snoring, mild sleep apnea, and for patients with moderate sleep apnea who prefer appliance therapy to use of CPAP4, 5. Severity of sleep apnea is determined by sleep testing and is based on the number of respiratory events per hour of sleep.   How successful is oral appliance therapy?  The success rate of oral appliance therapy in patients with mild sleep apnea is 75-80% while in patients with moderate sleep apnea it is 50-70%. The chance of success in patients with severe sleep apnea is 40-50%. The research also shows that oral appliances have a beneficial effect on the cardiovascular health of LEANDRO patients at the same magnitude as CPAP therapy7.  Oral appliances should be a second-line treatment in cases of severe sleep apnea, but if not completely successful then a combination therapy utilizing CPAP plus oral appliance therapy may be effective. Oral appliances tend to be effective in a broad range of patients although studies show that the patients who have the highest success are females, younger patients, those with milder disease, and less severe obesity. 3, 6.   Finding a dentist that practices dental sleep medicine  Specific training is available through the American Academy of Dental Sleep Medicine for dentists interested in working in the field of sleep. To find a dentist who is educated in the field of  sleep and the use of oral appliances, near you, visit the Web site of the American Academy of Dental Sleep Medicine.    References  1. Jodi, et al. Objectively measured vs self-reported compliance during oral appliance therapy for sleep-disordered breathing. Chest 2013; 144(5): 0409-6553.  2. Lianet et al. Objective measurement of compliance during oral appliance therapy for sleep-disordered breathing. Thorax 2013; 68(1): 91-96.  3. Israel et al. Mandibular advancement devices in 620 men and women with LEANDRO and snoring: tolerability and predictors of treatment success. Chest 2004; 125: 6648-0921.  4. Terrence, et al. Oral appliances for snoring and LEANDRO: a review. Sleep 2006; 29: 244-262.  5. Nadja et al. Oral appliance treatment for LEANDRO: an update. J Clin Sleep Med 2014; 10(2): 215-227.  6. Meagan et al. Predictors of OSAH treatment outcome. J Dent Res 2007; 86: 6746-2456.      Weight Loss:   Your Body mass index is 45.61 kg/m .    Being overweight does not necessarily mean you will have health consequences.  Those who have BMI over 35 or over 27 with existing medical conditions carries greater risk.   Weight loss decreases severity of sleep apnea in most people with obesity. For those with mild obesity who have developed snoring with weight gain, even 15-30 pound weight loss can improve and occasionally milder eliminate sleep apnea.  Structured and life-long dietary and health habits are necessary to lose weight and keep healthier weight levels.     The Comprehensive Weight loss program offers all aspects of weight loss strategies including two Non-Surgical Weight Loss Programs: Medical Weight Management and our 24 Week Healthy Lifestyle Program:    Medical Weight Management: You will meet with a Medical Weight Management Provider, as well as a Registered Dietician. The program may include medication therapy, dietary education, recommended exercise and physical therapy programs, monthly  support group meetings, and possible psychological counseling. Follow up visits with the provider or dietician are scheduled based on your progress and needs.    24 Week Healthy Lifestyle Program: This unique program is designed to give you the support of weekly appointments and activities thru a 24-week period. It may include all of the components of the basic program (above), with the addition of 11 individual Health  Visits, 24-week access to the [employer] website for over 700 online classes, and monthly support group meetings. This program has an out-of-pocket expense of $499 to cover the items that can not be billed to insurance (health coaches and [employer] access), and is non-refundable/non-transferable (you may be able to use a Health Savings Account; ask your HSA provider). There may be an optional meal replacement plan prescribed as well.   Surgical management achieves meaningful long-term weight loss and improvement in health risks in most patients with more severe obesity.      Sleep Apnea Surgery:    Surgery for obstructive sleep apnea is considered generally only when other therapies fail to work. Surgery may be discussed with you if you are having a difficult time tolerating CPAP and or when there is an abnormal structure that requires surgical correction.  Nose and throat surgeries often enlarge the airway to prevent collapse.  Most of these surgeries create pain for 1-2 weeks and up to half of the most common surgeries are not effective throughout life.  You should carefully discuss the benefits and drawbacks to surgery with your sleep provider and surgeon to determine if it is the best solution for you.   More information  Surgery for LEANDRO is directed at areas that are responsible for narrowing or complete obstruction of the airway during sleep.  There are a wide range of procedures available to enlarge and/or stabilize the airway to prevent blockage of breathing in the three major areas where  it can occur: the palate, tongue, and nasal regions.  Successful surgical treatment depends on the accurate identification of the factors responsible for obstructive sleep apnea in each person.  A personalized approach is required because there is no single treatment that works well for everyone.  Because of anatomic variation, consultation with an examination by a sleep surgeon is a critical first step in determining what surgical options are best for each patient.  In some cases, examination during sedation may be recommended in order to guide the selection of procedures.  Patients will be counseled about risks and benefits as well as the typical recovery course after surgery. Surgery is typically not a cure for a person s LEANDRO.  However, surgery will often significantly improve one s LEANDRO severity (termed  success rate ).  Even in the absence of a cure, surgery will decrease the cardiovascular risk associated with OSA7; improve overall quality of life8 (sleepiness, functionality, sleep quality, etc).      Palate Procedures:  Patients with LEANDRO often have narrowing of their airway in the region of their tonsils and uvula.  The goals of palate procedures are to widen the airway in this region as well as to help the tissues resist collapse.  Modern palate procedure techniques focus on tissue conservation and soft tissue rearrangement, rather than tissue removal.  Often the uvula is preserved in this procedure. Residual sleep apnea is common in patient after pharyngoplasty with an average reduction in sleep apnea events of 33%2.      Tongue Procedures:  ExamWhile patients are awake, the muscles that surround the throat are active and keep this region open for breathing. These muscles relax during sleep, allowing the tongue and other structures to collapse and block breathing.  There are several different tongue procedures available.  Selection of a tongue base procedure depends on characteristics seen on physical exam.   Generally, procedures are aimed at removing bulky tissues in this area or preventing the back of the tongue from falling back during sleep.  Success rates for tongue surgery range from 50-62%3.    Hypoglossal Nerve Stimulation:  Hypoglossal nerve stimulation has recently received approval from the [place] Food and Drug Administration for the treatment of obstructive sleep apnea.  This is based on research showing that the system was safe and effective in treating sleep apnea6.  Results showed that the median AHI score decreased 68%, from 29.3 to 9.0. This therapy uses an implant system that senses breathing patterns and delivers mild stimulation to airway muscles, which keeps the airway open during sleep.  The system consists of three fully implanted components: a small generator (similar in size to a pacemaker), a breathing sensor, and a stimulation lead.  Using a small handheld remote, a patient turns the therapy on before bed and off upon awakening.    Candidates for this device must be greater than 18 years of age, have moderate to severe obstructive sleep apnea with less than 25% central events  (AHI between 15-65), BMI less than 35, have tried CPAP/oral appliance for at least 8 weeks without success, and have appropriate upper airway anatomy (determined by a sleep endoscopy performed by Dr. Jarad Gutierrez or Dr. Bryan Martinez).    Nasal Procedures:  Nasal obstruction can interfere with nasal breathing during the day and night.  Studies have shown that relief of nasal obstruction can improve the ability of some patients to tolerate positive airway pressure therapy for obstructive sleep apnea1.  Treatment options include medications such as nasal saline, topical corticosteroid and antihistamine sprays, and oral medications such as antihistamines or decongestants. Non-surgical treatments can include external nasal dilators for selected patients. If these are not successful by themselves, surgery can improve  the nasal airway either alone or in combination with these other options.        Combination Procedures:  Combination of surgical procedures and other treatments may be recommended, particularly if patients have more than one area of narrowing or persistent positional disease.  The success rate of combination surgery ranges from 66-80%2,3.    References  Nelson BLACK. The Role of the Nose in Snoring and Obstructive Sleep Apnoea: An Update.  Eur Arch Otorhinolaryngol. 2011; 268: 1365-73.   Tarik SM; Jessica JA; Noni JR; Pallanch JF; Fanta MB; Kevin SG; Becca BARROSO. Surgical modifications of the upper airway for obstructive sleep apnea in adults: a systematic review and meta-analysis. SLEEP 2010;33(10):4255-6406. Rea ALVARADO. Hypopharyngeal surgery in obstructive sleep apnea: an evidence-based medicine review.  Arch Otolaryngol Head Neck Surg. 2006 Feb;132(2):206-13.  Stevenson YH1, Freddy Y, Matthew SWETA. The efficacy of anatomically based multilevel surgery for obstructive sleep apnea. Otolaryngol Head Neck Surg. 2003 Oct;129(4):327-35.  Rae ALVARADO, Goldberg A. Hypopharyngeal Surgery in Obstructive Sleep Apnea: An Evidence-Based Medicine Review. Arch Otolaryngol Head Neck Surg. 2006 Feb;132(2):206-13.  Darcy REYNOSO et al. Upper-Airway Stimulation for Obstructive Sleep Apnea.  N Engl J Med. 2014 Jan 9;370(2):139-49.  Marielena Y et al. Increased Incidence of Cardiovascular Disease in Middle-aged Men with Obstructive Sleep Apnea. Am J Respir Crit Care Med; 2002 166: 159-165  Jesus EM et al. Studying Life Effects and Effectiveness of Palatopharyngoplasty (SLEEP) study: Subjective Outcomes of Isolated Uvulopalatopharyngoplasty. Otolaryngol Head Neck Surg. 2011; 144: 623-631.        WHAT IF I ONLY HAVE SNORING?    Mandibular advancement devices, lateral sleep positioning, long-term weight loss and treatment of nasal allergies have been shown to improve snoring.  Exercising tongue muscles with a game  (https://cityguru.MedSolutions.Wilocity/us/lefty/soundly-reduce-snoring/gh5860907377) or stimulating the tongue during the day with a device (https://doi.org/10.3390/jxz84718641) have improved snoring in some individuals.  https://www.Smeam.com.Wilocity/  https://www.sleepfoundation.org/best-anti-snoring-mouthpieces-and-mouthguards    Remember to Drive Safe... Drive Alive     Sleep health profoundly affects your health, mood, and your safety.  Thirty three percent of the population (one in three of us) is not getting enough sleep and many have a sleep disorder. Not getting enough sleep or having an untreated / undertreated sleep condition may make us sleepy without even knowing it. In fact, our driving could be dramatically impaired due to our sleep health. As your provider, here are some things I would like you to know about driving:     Here are some warning signs for impairment and dangerous drowsy driving:              -Having been awake more than 16 hours               -Looking tired               -Eyelid drooping              -Head nodding (it could be too late at this point)              -Driving for more than 30 minutes     Some things you could do to make the driving safer if you are experiencing some drowsiness:              -Stop driving and rest              -Call for transportation              -Make sure your sleep disorder is adequately treated     Some things that have been shown NOT to work when experiencing drowsiness while driving:              -Turning on the radio              -Opening windows              -Eating any  distracting  /  entertaining  foods (e.g., sunflower seeds, candy, or any other)              -Talking on the phone      Your decision may not only impact your life, but also the life of others. Please, remember to drive safe for yourself and all of us.

## 2024-03-19 NOTE — NURSING NOTE
Is the patient currently in the state of [place]? YES    Visit mode:VIDEO    If the visit is dropped, the patient can be reconnected by: VIDEO VISIT: Text to cell phone:   Telephone Information:   Mobile [phone]       Will anyone else be joining the visit? NO  (If patient encounters technical issues they should call [phone])    How would you like to obtain your AVS? MyChart    Are changes needed to the allergy or medication list? Pt stated no changes to allergies and Pt stated no med changes    Reason for visit: RECHECK  Has patient had flu shot for current/most recent flu season? If so, when? Yes: 09/22/2023    Pati WHEELER

## 2024-03-19 NOTE — PROGRESS NOTES
"Virtual Visit Details    Type of service:  Video Visit     Originating Location (pt. Location): Home    Distant Location (provider location):  Off-site  Platform used for Video Visit: Soldsie      Home Sleep Apnea Testing Results Visit:    Chief Complaint   Patient presents with    RECHECK    Study Results     HST       Stacey Mantilla is a 28 year old female with a PMH pertinent for HTN, DM 1 with diabetic nephropathy, asthma, CKD stage I, ADHD, anxiety, celiac disease, and morbid obesity who returns to [facility] after having had Home Sleep Apnea Testing.  She presented with symptoms suggestive of obstructive sleep apnea.    Estimated body mass index is 45.61 kg/m  as calculated from the following:    Height as of this encounter: 1.727 m (5' 8\").    Weight as of this encounter: 136.1 kg (300 lb).    Total score - [place]: 6 (2/27/2024 12:11 PM)   Total Score: 6 (2/27/2024 12:11 PM)      Home Sleep Apnea Testing - 2/27/2024: Weight: 316 lbs    Analysis Time:  543.8 minutes     Respiration:   Sleep Associated Hypoxemia: sustained hypoxemia was present. Baseline oxygen saturation was 93%.  Time with saturation less than or equal to 88% was 23.8 minutes. The lowest oxygen saturation was 84%.   Snoring: Snoring was present.  Snoring was reported as moderate.   Respiratory events: The home study revealed a presence of 0 obstructive apneas and 2 mixed and central apneas. There were 35 hypopneas resulting in a combined apnea/hypopnea index [AHI] of 4.1 events per hour.  AHI was 2.4 per hour supine, 7.4 per hour prone, 1.7 per hour on left side, and 1.9 per hour on right side.   Pattern: Excluding events noted above, respiratory rate and pattern was Normal.     Position: Percent of time spent: supine - 27.5%, prone - 37.1%, on left - 6.5%, on right - 28.9%.     Heart Rate: By pulse oximetry borderline tachycardia     Assessment:   Patient did not rule in for obstructive sleep apnea.  Sleep associated " "hypoxemia was present.  Borderline tachycardia      Overall signal quality was good    Signal quality of Oxymeter 86.5% Fair  Nasal Cannula 100.0% Good  RIP belts 100.0% Good.     Stacey Mantilla reports that she slept Fair .     Home Sleep Apnea Testing was reviewed in detail today with Stacey and a copy given to her for her records.    Past medical/surgical history, family history, social history, medications and allergies were reviewed.    Patient Active Problem List   Diagnosis    Diabetic nephropathy (H)    Family history of heart disease    Chronic kidney disease, stage I    Mild intermittent asthma without complication    Anxiety    Type 1 diabetes mellitus with diabetic nephropathy (H)    Morbid obesity (H)    Elevated WBC count    Celiac disease    Attention deficit hyperactivity disorder (ADHD), predominantly inattentive type    Trigger finger, left ring finger    Left carpal tunnel syndrome     Current Outpatient Medications   Medication    albuterol (PROAIR HFA/PROVENTIL HFA/VENTOLIN HFA) 108 (90 Base) MCG/ACT inhaler    albuterol (PROVENTIL) (2.5 MG/3ML) 0.083% neb solution    amphetamine-dextroamphetamine (ADDERALL XR) 15 MG 24 hr capsule    amphetamine-dextroamphetamine (ADDERALL) 10 MG tablet    ASPIRIN NOT PRESCRIBED (INTENTIONAL)    atorvastatin (LIPITOR) 40 MG tablet    blood glucose (NO BRAND SPECIFIED) test strip    Continuous Blood Gluc Sensor (DEXCOM G7 SENSOR) MISC    Continuous Blood Gluc Sensor (DEXCOM G7 SENSOR) MISC    enalapril (VASOTEC) 20 MG tablet    escitalopram (LEXAPRO) 10 MG tablet    Glucagon, rDNA, (GLUCAGON EMERGENCY) 1 MG KIT    Injection Device for insulin (INPEN 100-GREY-NOVOLOG-FIASP) MOLLY    insulin degludec (TRESIBA FLEXTOUCH) 100 UNIT/ML pen    Insulin Infusion Pump Supplies (INSULIN PUMP SYRINGE RESERVOIR) MISC    Insulin Infusion Pump Supplies (SURE-T INFUSION SET 23\") MISC    insulin pen needle (32G X 4 MM) 32G X 4 MM miscellaneous    Insulin Pump Accessories MISC    " "insulin syringe-needle U-100 (31G X 5/16\" 0.5 ML) 31G X 5/16\" 0.5 ML miscellaneous    ipratropium - albuterol 0.5 mg/2.5 mg/3 mL (DUONEB) 0.5-2.5 (3) MG/3ML neb solution    KETOSTIX test strip    montelukast (SINGULAIR) 10 MG tablet    Multiple Vitamins-Minerals (MULTIVITAMIN ADULT PO)    NOVOLOG PENFILL 100 UNIT/ML soln    tirzepatide (MOUNJARO) 2.5 MG/0.5ML pen    Cholecalciferol 2000 UNITS TBDP    insulin syringes, disposable, U-100 0.3 ML MISC     Current Facility-Administered Medications   Medication    dexAMETHasone (DECADRON) injection 4 mg    lidocaine (PF) (XYLOCAINE) 1 % injection 1 mL       Pulse 78   Ht 1.727 m (5' 8\")   Wt 136.1 kg (300 lb)   BMI 45.61 kg/m      Impression/Plan:  1. Sleep disturbance  2. Snoring  3. Hypoxemia associated with sleep  4. Tiredness  5. Insomnia, unspecified type  6. Morbid obesity with BMI of 45.0-49.9, adult (H)  - Negative for clinically significant LEANDRO on HST  - Sleep associated hypoxemia was present  - Comprehensive Sleep Study; Future    Discussed with patient and she would like to pursue reevaluation with diagnostic in-lab polysomnography (PSG) with TCM for possible sleep disordered breathing, hypoxemia evaluation, and hypoventilation.  Patient reports she had the oximeter upside down in terms of placement the night of the study which may have contributed to abnormal reading of oxygen during the night.    Follow-up in approximately 2 weeks after the sleep study to review results and to determine next steps.    23 minutes spent with patient with >50% spent in counseling, chart review/documentation, and coordination of care on the date of the encounter.      LUIS FERNANDO Umaña CNP  Sleep Medicine      CC:  Nancy Lopez,     This note was written with the assistance of the Dragon voice-dictation technology software. The final document, although reviewed, may contain errors. For corrections, please contact the office.    "

## 2024-03-29 ENCOUNTER — THERAPY VISIT (OUTPATIENT)
Dept: SLEEP MEDICINE | Facility: CLINIC | Age: 29
End: 2024-03-29
Attending: NURSE PRACTITIONER
Payer: COMMERCIAL

## 2024-03-29 DIAGNOSIS — G47.9 SLEEP DISTURBANCE: ICD-10-CM

## 2024-03-29 DIAGNOSIS — R06.83 SNORING: ICD-10-CM

## 2024-03-29 DIAGNOSIS — R53.83 TIREDNESS: ICD-10-CM

## 2024-03-29 DIAGNOSIS — E66.01 MORBID OBESITY WITH BMI OF 45.0-49.9, ADULT (H): ICD-10-CM

## 2024-03-29 DIAGNOSIS — G47.36 HYPOXEMIA ASSOCIATED WITH SLEEP: ICD-10-CM

## 2024-03-29 DIAGNOSIS — G47.00 INSOMNIA, UNSPECIFIED TYPE: ICD-10-CM

## 2024-03-29 ASSESSMENT — SLEEP AND FATIGUE QUESTIONNAIRES
HOW LIKELY ARE YOU TO NOD OFF OR FALL ASLEEP IN A CAR, WHILE STOPPED FOR A FEW MINUTES IN TRAFFIC: WOULD NEVER DOZE
HOW LIKELY ARE YOU TO NOD OFF OR FALL ASLEEP WHILE SITTING INACTIVE IN A PUBLIC PLACE: WOULD NEVER DOZE
HOW LIKELY ARE YOU TO NOD OFF OR FALL ASLEEP WHILE LYING DOWN TO REST IN THE AFTERNOON WHEN CIRCUMSTANCES PERMIT: MODERATE CHANCE OF DOZING
HOW LIKELY ARE YOU TO NOD OFF OR FALL ASLEEP WHILE WATCHING TV: SLIGHT CHANCE OF DOZING
HOW LIKELY ARE YOU TO NOD OFF OR FALL ASLEEP WHILE SITTING QUIETLY AFTER LUNCH WITHOUT ALCOHOL: SLIGHT CHANCE OF DOZING
HOW LIKELY ARE YOU TO NOD OFF OR FALL ASLEEP WHILE SITTING AND READING: SLIGHT CHANCE OF DOZING
HOW LIKELY ARE YOU TO NOD OFF OR FALL ASLEEP WHEN YOU ARE A PASSENGER IN A CAR FOR AN HOUR WITHOUT A BREAK: SLIGHT CHANCE OF DOZING
HOW LIKELY ARE YOU TO NOD OFF OR FALL ASLEEP WHILE SITTING AND TALKING TO SOMEONE: WOULD NEVER DOZE

## 2024-03-30 NOTE — PATIENT INSTRUCTIONS
[place] [facility]    1. Your sleep study will be reviewed by a sleep physician within the next few days.     2. Please follow up in the sleep clinic as scheduled, or, make an appointment with your sleep provider to be seen within two weeks to discuss the results of the sleep study.    3. If you have any questions or problems with your treatment plan, please contact your sleep clinic provider at [phone] to further manage your condition.    4. Please review your attached medication list, and, at your follow-up appointment advise your sleep clinic provider about any changes.    5. Go to [email or web address] for more information about your sleep problems.    Jennifer Valdes, RPSGT  March 30, 2024

## 2024-04-04 ENCOUNTER — HOSPITAL ENCOUNTER (OUTPATIENT)
Facility: AMBULATORY SURGERY CENTER | Age: 29
Discharge: HOME | End: 2024-04-04
Attending: STUDENT IN AN ORGANIZED HEALTH CARE EDUCATION/TRAINING PROGRAM | Admitting: STUDENT IN AN ORGANIZED HEALTH CARE EDUCATION/TRAINING PROGRAM
Payer: COMMERCIAL

## 2024-04-04 VITALS
HEART RATE: 87 BPM | BODY MASS INDEX: 44.41 KG/M2 | TEMPERATURE: 97.1 F | DIASTOLIC BLOOD PRESSURE: 57 MMHG | RESPIRATION RATE: 16 BRPM | HEIGHT: 68 IN | WEIGHT: 293 LBS | OXYGEN SATURATION: 99 % | SYSTOLIC BLOOD PRESSURE: 130 MMHG

## 2024-04-04 DIAGNOSIS — G56.02 LEFT CARPAL TUNNEL SYNDROME: Primary | ICD-10-CM

## 2024-04-04 DIAGNOSIS — M65.342 TRIGGER FINGER, LEFT RING FINGER: ICD-10-CM

## 2024-04-04 PROCEDURE — 26055 INCISE FINGER TENDON SHEATH: CPT | Mod: F3

## 2024-04-04 PROCEDURE — 64721 CARPAL TUNNEL SURGERY: CPT | Mod: LT

## 2024-04-04 RX ORDER — MAGNESIUM HYDROXIDE 1200 MG/15ML
LIQUID ORAL PRN
Status: DISCONTINUED | OUTPATIENT
Start: 2024-04-04 | End: 2024-04-04 | Stop reason: HOSPADM

## 2024-04-04 RX ORDER — LIDOCAINE HYDROCHLORIDE AND EPINEPHRINE 10; 10 MG/ML; UG/ML
20 INJECTION, SOLUTION INFILTRATION; PERINEURAL ONCE
Status: COMPLETED | OUTPATIENT
Start: 2024-04-04 | End: 2024-04-04

## 2024-04-04 RX ORDER — LIDOCAINE HYDROCHLORIDE AND EPINEPHRINE 10; 10 MG/ML; UG/ML
INJECTION, SOLUTION INFILTRATION; PERINEURAL PRN
Status: DISCONTINUED | OUTPATIENT
Start: 2024-04-04 | End: 2024-04-04 | Stop reason: HOSPADM

## 2024-04-04 RX ORDER — OXYCODONE HYDROCHLORIDE 5 MG/1
5 TABLET ORAL EVERY 6 HOURS PRN
Qty: 3 TABLET | Refills: 0 | Status: SHIPPED | OUTPATIENT
Start: 2024-04-04 | End: 2024-04-07

## 2024-04-04 RX ADMIN — LIDOCAINE HYDROCHLORIDE AND EPINEPHRINE 20 ML: 10; 10 INJECTION, SOLUTION INFILTRATION; PERINEURAL at 08:54

## 2024-04-04 NOTE — OP NOTE
Patient: Stacey Mantilla  : 1995  MRN: [id number]    DATE OF OPERATION: 2024      OPERATIVE REPORT       PREOPERATIVE DIAGNOSIS:  1) Left carpal tunnel syndrome  2) Left ring trigger finger     POSTOPERATIVE DIAGNOSIS:  1) Left carpal tunnel syndrome  2) Left ring trigger finger    PROCEDURE:  1) Left open carpal tunnel release  2) Left ring trigger finger release    SURGEON:  Gray Reyes MD     ASSISTANT(S):  Eloy Rojas MD    ANESTHESIA:  Local: 28cc 1% lidocaine 1:100,000 epinephrine     IMPLANTS:   None    ESTIMATED BLOOD LOSS:  3cc    DVT PROPHYLAXIS:  None    TOURNIQUET TIME:  25 minutes    SPECIMENS REMOVED:  None    INTRAOPERATIVE FINDINGS:  Compressed median nerve at the carpal tunnel. Recurrent motor branch extraligamentous and radial.  Tenosynovitis at the flexor tendons of the ring finger.    COMPLICATIONS:  None    DISPOSITION:  Stable to PACU.     INDICATIONS:  Stacey Mantilla is a 28 year old female with a history of left hand numbness and tingling in the median nerve distribution refractory to conservative measures. Electrodiagnostic studies demonstrated evidence of carpal tunnel syndrome.  The patient also developed a left ring trigger finger.  Refractory to injections, which also cause blood sugars to elevate significantly.    Indications for surgery were discussed with the patient in detail. After discussing risks, benefits and alternatives to procedure, the patient elected to proceed with surgical intervention.     The patient understood that risks include, but are not limited to: Bleeding, infection, damage to surrounding structures (such as nerve, vessel or tendon), persistent symptoms or numbness, need for additional procedures, pillar pain, stiffness, need for therapy, and anesthetic complications. The patient expressed understanding and agreement and wished to proceed.     Consent was obtained for the procedure.     DESCRIPTION OF PROCEDURE IN DETAIL:  The patient was seen  in the preoperative care unit. Patient identity, consent, procedure to be performed, and operative site were verified with the patient. The left upper extremity was marked.     The patient was brought to the operating room and placed supine on the operating room table. The left upper extremity was placed on an armboard. The skin over the carpal tunnel was cleansed with chlorhexidine and local anesthetic (10cc of 1% lidocaine 1:100,000 epinephrine) was infiltrated in the skin and subcutaneous tissues over the carpal tunnel.  An additional 8 cc was administered in the skin directly over the A1 pulley of the ring finger.  This was supplemented by an additional 10 cc intraoperatively.     The left upper extremity was prepped and draped in the usual sterile fashion. A timeout was performed confirming the correct patient, procedure, and operative site. All were in agreement.    The limb was exsanguinated and the tourniquet inflated to 200 mmHg.  A standard longitudinal 2.5cm incision was made in the skin directly over the carpal tunnel.  Blunt dissection was carried down through the skin and subcutaneous tissues to the superficial palmar fascia.  Hemostasis was achieved with bipolar cautery.  The superficial palmar fascia was divided sharply exposing the transverse carpal ligament. The transverse carpal ligament was then divided sharply and released in its entirety, exposing the median nerve which appeared pale and compressed. Distally, the superficial palmar fascia was divided with scissors under direct visualization, protecting the superficial palmar arch below. Attention was then turned proximally, and the remaining transverse carpal ligament and antebrachial fascia were released under direct visualization. A full median nerve release was performed. The median nerve had excellent excursion at the end of the case. The recurrent motor branch was noted to be extraligamentous and radial.    Attention was then turned to the  ring finger. Incision was first made on the volar aspect of left ring finger in a transverse fashion in the palmar crease over the A1 pulley. Blunt dissection was taken down to the level of the flexor sheath, identifying and protecting the neurovascular bundle on either side of the sheath.    The A1 pulley in its entirety was visualized. This was sharply incised with a [place] blade, protecting the neurovascular bundle on either side and the tendon below. Proximally the A0 pulley was also divided under direct visualization. A complete release was performed and visualized. The tenosynovitis on the tendons was gently debrided with tenotomy scissors.    The patient was asked to make a fist several times and no further triggering was noted of the finger.    The wounds were copiously irrigated. The incisions were then closed with interrupted, buried 4-0 Monocryl sutures. A steri-strip was applied. A sterile, bulky soft dressing was placed.     All needle and sponge counts were correct at the end of the procedure. There were no complications.     The patient was taken to the postoperative recovery unit in stable condition.     I was present and scrubbed for the entire procedure.     POSTOPERATIVE PLAN:  The patient will be discharged home. The patient was instructed to be non-weight bearing. The patient was instructed on finger range of motion exercises. The dressing will remain in place until follow-up. The patient was instructed to keep it clean and dry. The patient will return to clinic in 10 days for a wound check.  Will discuss scheduling her right carpal tunnel release at that time.    Gray Reyes MD     Hand, Upper Extremity & Microvascular Surgery  Department of Orthopedic Surgery  [facility]

## 2024-04-04 NOTE — BRIEF OP NOTE
[facility] [place]    Brief Operative Note    Pre-operative diagnosis: Trigger finger, left ring finger [M65.342]  Left carpal tunnel syndrome [G56.02]  Post-operative diagnosis Same as pre-operative diagnosis    Procedure: RELEASE, LEFT CARPAL TUNNEL, Left - Wrist  RELEASE, LEFT RING TRIGGER FINGER, Left - Hand    Surgeon: Surgeon(s) and Role:     * Gray Reyes MD - Primary     * Eloy Rojas MD  Anesthesia: Local   Estimated Blood Loss: 3mL    Drains: None  Specimens: * No specimens in log *  Findings:   None.  Complications: None.  Implants: * No implants in log *        Post op plan  SDS  CCWB, AAT  ADAT  Rest, ice, OTC meds, short course narcotics for pain  Dc home per PACU criteria  F/u as scheduled with Dr. Reyes

## 2024-04-04 NOTE — DISCHARGE INSTRUCTIONS
Procedure Performed: Left carpal tunnel release, left ring finger trigger finger release  Attending Surgeon: Gray Reyes MD  Date: 4/4/2024    DIAGNOSIS  1. Left carpal tunnel syndrome    2. Trigger finger, left ring finger        MEDICATIONS   Resume all home medications as directed unless otherwise instructed during this hospitalization. If there is any question, double check with your primary care provider.  Start new discharge medications as directed.    Take 1 tablet of 650 mg Tylenol (acetaminophen) Arthritis Strength (extended release) and 1 tablet of Aleve (naproxen) 220 mg in the morning with breakfast and in the evening with dinner.     For breakthrough pain use narcotic pain medication as prescribed.    Do not drive or operate machinery while taking narcotic pain medications.   If you are taking other Tylenol containing medicines at home, be sure NOT to exceed 4 gram's (4000 milligrams) of Tylenol per day.   If you are taking pain medications, be sure to take Colace (docusate sodium) as well to prevent constipation. If constipated, try adding another cathartic or enema.  If nausea and vomiting, call the hospital or seek medical attention.    ACTIVITY   Weight bearing: Non-weight bearing to arm.    DIET  Resume same diet prior to your hospital admission.    WOUND   Leave dressing on until you are seen in clinic for your follow up visit.   Watch for signs and symptoms of infection of your wounds including; pain, redness, swelling, drainage or fever.  If you notice any of these symptoms please call or seek medical attention.    Keep wound clean, dry, and intact.  Do not submerge wounds in water until they are healed. No baths, soaking, swimming, or prolonged water exposure for 4 weeks after surgery.    RETURN   Follow-up with Orthopedic Clinic as directed.     Future Appointments   Date Time Provider Department Center   4/11/2024  8:20 AM Sonido Reyes OD [facility]   4/15/2024  9:00 AM Bonny  Elham Lainez PA-C [facility] [place]   4/26/2024  2:00 PM Troy Spencer MD [facility] [place]   6/25/2024  3:00 PM Giovanny, Gilles G, APRN CNP [facility] [place]   7/1/2024  4:30 PM [facility]   7/2/2024 11:30 AM Nancy Lopez APRN CNP [facility]       Call the [facility] at [phone] during business hours for any symptoms such as:    * Fevers with Temperature greater than 101.5 degrees.   * Pus drainage from wound site.   * Severe pain, not controlled by medication.   * Persistent nausea, vomiting and inablility to tolerate fluids.    If you are receiving care in [place], you may call the Orthopedic clinic at [phone].    FOR URGENT PROBLEMS ONLY, after hours or on weekends call the hospital  at [phone] and ask to speak with the orthopedic resident on call.    [facility]  Home Care Following Your Procedure  Call a doctor if you have signs of infection (fever, growing tenderness at the surgery site, a large amount of drainage or bleeding, severe pain, foul-smelling drainage, redness, swelling).             Tylenol/Acetaminophen Consumption    If you feel your pain relief is insufficient, you may take Tylenol/Acetaminophen in addition to your narcotic pain medication.   Be careful not to exceed 4,000 mg of Tylenol/Acetaminophen in a 24 hour period from all sources.  If you are taking extra strength Tylenol/acetaminophen (500 mg), the maximum dose is 8 tablets in 24 hours.  If you are taking regular strength acetaminophen (325 mg), the maximum dose is 12 tablets in 24 hours.      Your doctor is:       Dr. Gray Reyes, Orthopaedics: [phone]             Or dial [phone] and ask for the resident on call for:  Orthopaedics  For emergency care, call the:  [facility]: [phone] (TTY for hearing impaired: [phone])

## 2024-04-10 LAB — SLPCOMP: NORMAL

## 2024-04-11 ENCOUNTER — OFFICE VISIT (OUTPATIENT)
Dept: OPHTHALMOLOGY | Facility: CLINIC | Age: 29
End: 2024-04-11
Payer: COMMERCIAL

## 2024-04-11 DIAGNOSIS — E10.3293 MILD NONPROLIFERATIVE DIABETIC RETINOPATHY OF BOTH EYES WITHOUT MACULAR EDEMA ASSOCIATED WITH TYPE 1 DIABETES MELLITUS (H): Primary | ICD-10-CM

## 2024-04-11 DIAGNOSIS — H52.13 MYOPIA OF BOTH EYES: ICD-10-CM

## 2024-04-11 DIAGNOSIS — H40.053 BILATERAL OCULAR HYPERTENSION: ICD-10-CM

## 2024-04-11 PROCEDURE — 92015 DETERMINE REFRACTIVE STATE: CPT | Performed by: OPTOMETRIST

## 2024-04-11 PROCEDURE — 92014 COMPRE OPH EXAM EST PT 1/>: CPT | Performed by: OPTOMETRIST

## 2024-04-11 ASSESSMENT — CONF VISUAL FIELD
OS_SUPERIOR_NASAL_RESTRICTION: 0
OS_SUPERIOR_TEMPORAL_RESTRICTION: 0
OD_SUPERIOR_TEMPORAL_RESTRICTION: 0
OS_INFERIOR_TEMPORAL_RESTRICTION: 0
OS_NORMAL: 1
OD_INFERIOR_TEMPORAL_RESTRICTION: 0
OS_INFERIOR_NASAL_RESTRICTION: 0
OD_SUPERIOR_NASAL_RESTRICTION: 0
OD_NORMAL: 1
OD_INFERIOR_NASAL_RESTRICTION: 0
METHOD: COUNTING FINGERS

## 2024-04-11 ASSESSMENT — REFRACTION_WEARINGRX
OD_SPHERE: -1.75
OS_AXIS: 056
OS_SPHERE: -1.50
SPECS_TYPE: SVL
OD_AXIS: 107
OS_CYLINDER: +0.25
OD_CYLINDER: +0.50

## 2024-04-11 ASSESSMENT — TONOMETRY
IOP_METHOD: TONOPEN
OS_IOP_MMHG: 20
OD_IOP_MMHG: 22

## 2024-04-11 ASSESSMENT — EXTERNAL EXAM - LEFT EYE: OS_EXAM: NORMAL

## 2024-04-11 ASSESSMENT — VISUAL ACUITY
OS_CC+: -1
METHOD: SNELLEN - LINEAR
CORRECTION_TYPE: GLASSES
OD_CC: 20/20
OS_CC: 20/20

## 2024-04-11 ASSESSMENT — REFRACTION_MANIFEST
OS_SPHERE: -1.75
OS_AXIS: 051
OD_CYLINDER: +0.50
OD_AXIS: 110
OS_CYLINDER: +0.50
OD_SPHERE: -1.50

## 2024-04-11 ASSESSMENT — SLIT LAMP EXAM - LIDS
COMMENTS: NORMAL
COMMENTS: NORMAL

## 2024-04-11 ASSESSMENT — CUP TO DISC RATIO
OS_RATIO: 0.2
OD_RATIO: 0.2

## 2024-04-11 ASSESSMENT — EXTERNAL EXAM - RIGHT EYE: OD_EXAM: NORMAL

## 2024-04-11 NOTE — PROGRESS NOTES
History  HPI       Diabetic Eye Exam    Associated symptoms include Negative for flashes and floaters.  Diabetes characteristics include Type 1.  Treatments tried include no treatments.  Pain was noted as 0/10. Additional comments: Here for Diabetic eye exam             Comments    Pt states vision is largely unchanged since last year.   She feels her glasses make near vision blurry so she takes them off to read.   Denies eye pain or discomfort.  Wears Soft CL 1-2 times per week.  No new concerns.     DM2  LBS : 88 at 8:20am  Lab Results       Component                Value               Date                       A1C                      7.3                 11/24/2023                 A1C                      6.6                 03/28/2023                 A1C                      6.4                 07/24/2022                 A1C                      6.7                 05/07/2021                 A1C                      7.3                 02/17/2020                 A1C                      7.0                 01/17/2018                 A1C                      7.6                 03/26/2015               Jon Brown 8:20 AM April 11, 2024            Last edited by Jon Brown on 4/11/2024  8:21 AM.          Assessment/Plan  (E10.3293) Mild nonproliferative diabetic retinopathy of both eyes without macular edema associated with type 1 diabetes mellitus (H)  (primary encounter diagnosis)  Comment: Mild NPDR both eyes with no CSME  Plan:  Educated patient on clinical findings and the importance of continued management with primary care physician. Continue management as directed and return to clinic in 1 year for dilated exam, or sooner, as needed. Copy of chart sent to Dr. Allan and Laureen Goldstein.     (H52.13) Myopia of both eyes  Comment: Myopia both eyes   Plan: REFRACTION              Dispensed spectacle prescription for full time wear. Monitor annually.              Contact lens prescription valid for 1 more year. 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 Monitor annually.     Return to clinic in 1 year for comprehensive eye exam.    Complete documentation of historical and exam elements from today's encounter can  be found in the full encounter summary report (not reduplicated in this progress  note). I personally obtained the chief complaint(s) and history of present illness. I  confirmed and edited as necessary the review of systems, past medical/surgical  history, family history, social history, and examination findings as documented by  others; and I examined the patient myself. I personally reviewed the relevant tests,  images, and reports as documented above. I formulated and edited as necessary the  assessment and plan and discussed the findings and management plan with the  patient and family.    Sonido Reyes, VERONIQUE, FAAO

## 2024-04-11 NOTE — NURSING NOTE
Chief Complaints and History of Present Illnesses   Patient presents with    Diabetic Eye Exam     Here for Diabetic eye exam     Chief Complaint(s) and History of Present Illness(es)       Diabetic Eye Exam              Associated symptoms: Negative for flashes and floaters    Diabetes Type: Type 1    Treatments tried: no treatments    Pain scale: 0/10    Comments: Here for Diabetic eye exam              Comments    Pt states vision is largely unchanged since last year.   She feels her glasses make near vision blurry so she takes them off to read.   Denies eye pain or discomfort.  Wears Soft CL 1-2 times per week.  No new concerns.     DM2  LBS : 88 at 8:20am  Lab Results       Component                Value               Date                       A1C                      7.3                 11/24/2023                 A1C                      6.6                 03/28/2023                 A1C                      6.4                 07/24/2022                 A1C                      6.7                 05/07/2021                 A1C                      7.3                 02/17/2020                 A1C                      7.0                 01/17/2018                 A1C                      7.6                 03/26/2015               Jon Kevin 8:20 AM April 11, 2024

## 2024-04-11 NOTE — Clinical Note
Thank you for referring Stacey RICHARDS Annalee for her annual eye exam. No diabetic retinopathy noted on examination today. Recommended repeat evaluation in 1 year. Please contact me with any questions. Sonido Reyes, OD on 4/11/2024 at 9:33 AM

## 2024-04-15 ENCOUNTER — OFFICE VISIT (OUTPATIENT)
Dept: ORTHOPEDICS | Facility: CLINIC | Age: 29
End: 2024-04-15
Payer: COMMERCIAL

## 2024-04-15 DIAGNOSIS — G56.03 BILATERAL CARPAL TUNNEL SYNDROME: Primary | ICD-10-CM

## 2024-04-15 PROCEDURE — 99024 POSTOP FOLLOW-UP VISIT: CPT | Performed by: PHYSICIAN ASSISTANT

## 2024-04-15 NOTE — NURSING NOTE
Reason For Visit:   Chief Complaint   Patient presents with    Surgical Followup     Post op 1) Left open carpal tunnel release 2) Left ring trigger finger release DOS 4/4/24         Primary MD: Nancy Lopez  Ref. MD: asher    Age: 28 year old    ?  No      LMP 03/14/2024       Pain Assessment  Patient Currently in Pain: Yes  0-10 Pain Scale: 1 (only with overuse)  Primary Pain Location: Wrist (left and ring finger)  Pain Descriptors: Intermittent, Numbness, Aching  Alleviating Factors: Ice    Hand Dominance Evaluation  Hand Dominance: Right          QuickDASH Assessment      4/14/2024     6:48 PM   QuickDASH Main   1. Open a tight or new jar Severe difficulty   2. Do heavy household chores (e.g., wash walls, floors) Moderate difficulty   3. Carry a shopping bag or briefcase No difficulty   4. Wash your back Moderate difficulty   5. Use a knife to cut food Mild difficulty   6. Recreational activities in which you take some force or impact through your arm, shoulder or hand (e.g., golf, hammering, tennis, etc.) Mild difficulty   7. During the past week, to what extent has your arm, shoulder or hand problem interfered with your normal social activities with family, friends, neighbours or groups Slightly   8. During the past week, were you limited in your work or other regular daily activities as a result of your arm, shoulder or hand problem Moderately limited   9. Arm, shoulder or hand pain Moderate   10.Tingling (pins and needles) in your arm,shoulder or hand None   11. During the past week, how much difficulty have you had sleeping because of the pain in your arm, shoulder or hand Mild difficulty   Quickdash Ability Score 34.09              Allergies   Allergen Reactions    Dog Epithelium (Canis Lupus Familiaris) Other (See Comments)     Sinus symptoms     Dogs Other (See Comments)     Sinus symptoms     Dust Mites      Sinus     Gluten Meal        Marlyn Chan, ATC

## 2024-04-15 NOTE — LETTER
4/15/2024         RE: Stacey Mantilla  [address]  [facility] [address]        Dear Colleague,    Thank you for referring your patient, Stacey Mantilla, to the [facility] [place]. Please see a copy of my visit note below.    Date of Service: Apr 15, 2024    Chief Complaint: Post operative follow up.     Date of Surgery: 4/4/24    Procedure Performed:   1) Left open carpal tunnel release  2) Left ring trigger finger release     Interval events: Stacey Mantilla is a 28 year old female who presents today for a postoperative follow up.  She is doing well.  Pain well-controlled without need for any medications.  Numbness and tingling is improved.  Some soreness to the ring finger, but she has been working on gentle range of motion.    Patient like to schedule the right carpal tunnel release at the end of the summer early fall.    The past medical history was reviewed updated in the EMR. This includes medications, surgeries, social history, and review of systems.    Physical examination:  Well-developed, well-nourished and in no acute distress.  Alert and oriented to surroundings.  On examination of the  right hand, incision is well-healed. There is no erythema, drainage, or dehiscence. Swelling is Mild.  Moderate ecchymosis over the volar forearm.  Sensation is intact in median, radial and ulnar nerve distributions. Patient can actively flex and extend all digits and thumb. The patient is able to make a near full composite fist. Fingers are warm and well-perfused.  Thumb opposition intact.    Assessment: 28 year old female with bilateral carpal tunnel syndrome s/p left open carpal tunnel release, left ring trigger finger release,progressing appropriately.      Plan:   Patient should continue to wash wound with soap and water daily and pat dry. No immersing the wound in water for prolonged periods such as tub baths or dishwashing without gloves until wound has healed. Do not lift anything  heavier than a coffee cup or do forceful gripping with the operative hand until the wound has healed as this may split the wound open. This will typically take 1-2 more weeks. May use the hand for light activities like eating, shaving, typing, and brushing your teeth. After the wound has completley healed, may progress activity gradually according to comfort level, but heavy lifting (> 10 pounds),  forceful gripping, and weight bearing directly on the palm (such as pushups) are discouraged for the first 6 weeks after surgery to give the area time to heal. Painful activities should be avoided. The patient will follow-up at 6 weeks postop if they have any questions or concerns regarding their continued progress. However, if they are back to full activity, are satisfied with the outcome of surgery, and have no remaining questions at that time, this visit may be deferred.     Patient would like to proceed with scheduling her right open carpal tunnel release later this summer or fall.  Case request placed, local only.    LEON FLORES PA-C  Orthopaedic Surgery

## 2024-04-15 NOTE — PROGRESS NOTES
Date of Service: Apr 15, 2024    Chief Complaint: Post operative follow up.     Date of Surgery: 4/4/24    Procedure Performed:   1) Left open carpal tunnel release  2) Left ring trigger finger release     Interval events: Stacey Mantilla is a 28 year old female who presents today for a postoperative follow up.  She is doing well.  Pain well-controlled without need for any medications.  Numbness and tingling is improved.  Some soreness to the ring finger, but she has been working on gentle range of motion.    Patient like to schedule the right carpal tunnel release at the end of the summer early fall.    The past medical history was reviewed updated in the EMR. This includes medications, surgeries, social history, and review of systems.    Physical examination:  Well-developed, well-nourished and in no acute distress.  Alert and oriented to surroundings.  On examination of the  right hand, incision is well-healed. There is no erythema, drainage, or dehiscence. Swelling is Mild.  Moderate ecchymosis over the volar forearm.  Sensation is intact in median, radial and ulnar nerve distributions. Patient can actively flex and extend all digits and thumb. The patient is able to make a near full composite fist. Fingers are warm and well-perfused.  Thumb opposition intact.    Assessment: 28 year old female with bilateral carpal tunnel syndrome s/p left open carpal tunnel release, left ring trigger finger release,progressing appropriately.      Plan:   Patient should continue to wash wound with soap and water daily and pat dry. No immersing the wound in water for prolonged periods such as tub baths or dishwashing without gloves until wound has healed. Do not lift anything heavier than a coffee cup or do forceful gripping with the operative hand until the wound has healed as this may split the wound open. This will typically take 1-2 more weeks. May use the hand for light activities like eating, shaving, typing, and brushing  your teeth. After the wound has completley healed, may progress activity gradually according to comfort level, but heavy lifting (> 10 pounds),  forceful gripping, and weight bearing directly on the palm (such as pushups) are discouraged for the first 6 weeks after surgery to give the area time to heal. Painful activities should be avoided. The patient will follow-up at 6 weeks postop if they have any questions or concerns regarding their continued progress. However, if they are back to full activity, are satisfied with the outcome of surgery, and have no remaining questions at that time, this visit may be deferred.     Patient would like to proceed with scheduling her right open carpal tunnel release later this summer or fall.  Case request placed, local only.    LEON FLORES PA-C  Orthopaedic Surgery

## 2024-04-16 ENCOUNTER — TELEPHONE (OUTPATIENT)
Dept: ORTHOPEDICS | Facility: CLINIC | Age: 29
End: 2024-04-16

## 2024-04-16 ENCOUNTER — PREP FOR PROCEDURE (OUTPATIENT)
Dept: ORTHOPEDICS | Facility: CLINIC | Age: 29
End: 2024-04-16
Payer: COMMERCIAL

## 2024-04-16 PROBLEM — G56.03 BILATERAL CARPAL TUNNEL SYNDROME: Status: ACTIVE | Noted: 2024-04-15

## 2024-04-16 NOTE — TELEPHONE ENCOUNTER
Patient has been scheduled for surgery. Details are below.    Date of Surgery: 08/29/24    Approximate Arrival Time: SURGERY CENTER WILL CALL 3/4 DAYS PRIOR TO CONFIRM A TIME   Surgeon:  DR. SUSAN LAW     Procedure: RELEASE CARPAL TUNNEL, RIGHT  Location: [facility] [address]  Surgery Consult: NA  PreOp Physical: NA  PostOp: 09/09/24  Packet Mailed/MyChart Sent: YES  Added to [facility]: YES    Spoke to: JUAN

## 2024-04-23 ENCOUNTER — TELEPHONE (OUTPATIENT)
Dept: PEDIATRICS | Facility: CLINIC | Age: 29
End: 2024-04-23

## 2024-04-23 NOTE — TELEPHONE ENCOUNTER
Prior Authorization Retail Medication Request    Medication/Dose: amphetamine-dextroamphetamine (ADDERALL XR) 15 MG 24 hr capsule  Diagnosis and ICD code (if different than what is on RX):  ADHD (attention deficit hyperactivity disorder), inattentive type [F90.0]   New/renewal/insurance change PA/secondary ins. PA:  Previously Tried and Failed:    Rationale:      Insurance   Primary:   Insurance ID:      Secondary (if applicable):  Insurance ID:      Pharmacy Information (if different than what is on RX)  Name:  [facility]  Phone:  [phone]  Fax: [phone]

## 2024-05-03 DIAGNOSIS — E10.21 TYPE 1 DIABETES MELLITUS WITH DIABETIC NEPHROPATHY (H): Primary | ICD-10-CM

## 2024-05-03 RX ORDER — INSULIN ASPART 100 [IU]/ML
INJECTION, SOLUTION INTRAVENOUS; SUBCUTANEOUS
Qty: 150 ML | Refills: 3 | Status: SHIPPED | OUTPATIENT
Start: 2024-05-03

## 2024-05-05 ENCOUNTER — MYC REFILL (OUTPATIENT)
Dept: NEPHROLOGY | Facility: CLINIC | Age: 29
End: 2024-05-05
Payer: COMMERCIAL

## 2024-05-05 DIAGNOSIS — R80.9 PROTEINURIA: ICD-10-CM

## 2024-05-05 RX ORDER — ENALAPRIL MALEATE 20 MG/1
20 TABLET ORAL 2 TIMES DAILY
Qty: 180 TABLET | Refills: 1 | Status: CANCELLED | OUTPATIENT
Start: 2024-05-05

## 2024-05-06 RX ORDER — ENALAPRIL MALEATE 20 MG/1
20 TABLET ORAL 2 TIMES DAILY
Qty: 180 TABLET | Refills: 0 | Status: SHIPPED | OUTPATIENT
Start: 2024-05-06 | End: 2024-07-31

## 2024-05-07 NOTE — TELEPHONE ENCOUNTER
Prior Authorization Not Needed per Insurance    Medication: AMPHETAMINE-DEXTROAMPHET ER 15 MG PO CP24  Insurance Company: [employer] - Phone [phone] Fax [phone]  Expected CoPay: $    Pharmacy Filling the Rx: [id number] - [place], [place] - [address]  Pharmacy Notified: YES      Patient picked up on 4/26/24    Patient Notified: Instructed pharmacy to notify patient once order is ready.

## 2024-06-11 ENCOUNTER — IMMUNIZATION (OUTPATIENT)
Dept: FAMILY MEDICINE | Facility: CLINIC | Age: 29
End: 2024-06-11
Payer: COMMERCIAL

## 2024-06-11 DIAGNOSIS — Z23 HIGH PRIORITY FOR 2019-NCOV VACCINE: Primary | ICD-10-CM

## 2024-06-11 PROCEDURE — 91320 SARSCV2 VAC 30MCG TRS-SUC IM: CPT

## 2024-06-11 PROCEDURE — 90480 ADMN SARSCOV2 VAC 1/ONLY CMP: CPT

## 2024-06-11 PROCEDURE — 99207 PR NO CHARGE LOS: CPT

## 2024-06-18 NOTE — PROGRESS NOTES
Outcome for 06/18/24 9:03 AM: Data uploaded on Dexcom  Mary Hoffman MA  Outcome for 06/18/24 9:03 AM: [employer] message sent  Mary Hoffman MA  Outcome for 06/27/24 2:44 PM: Left Voicemail   Mary Hoffman MA  Outcome for 06/28/24 9:26 AM: Data obtained via Dexcom website  Malissa Roper LPN     This 29-year-old woman was seen in follow-up for her long-standing type 1 diabetes that is complicated by early nephropathy.  She also has a took thyroid hormone for a time many years ago but has had normal TSHs off replacement.  She is comanaged with Laureen Goldstein.  She uses a tandem control IQ pump.  The data are below.  Now that it summer, she is much more active.  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  Even if she puts her pump into exercise mode, she still continues to get low when she is active.  She is able to detect this and treat it but it is annoying to her.  Aside from this problem, she thinks her blood sugars are doing well.    She is up-to-date with her eye visits and has mild diabetic retinopathy.  She has no paresthesias or foot ulcers.  She is followed by nephrology for her CKD.  She is due to see them soon.  She has celiac disease and is avoiding gluten.  She has not had any nutritional labs done in some time.    Overall she feels well.  She denies chest pain or shortness of breath.  She is very interested in using SGLT2 inhibitors but her insurance will not cover them for weight loss.                        Patient Active Problem List   Diagnosis    Diabetic nephropathy (H)    Family history of heart disease    Chronic kidney disease, stage I    Mild intermittent asthma without complication    Anxiety    Type 1 diabetes mellitus with diabetic nephropathy (H)    Morbid obesity (H)    Elevated WBC count    Celiac disease    Attention deficit hyperactivity disorder (ADHD), predominantly inattentive type    Trigger finger, left ring finger    Left carpal  "tunnel syndrome    Bilateral carpal tunnel syndrome    LEANDRO (obstructive sleep apnea)                      97.1  F (36.2  C)  as of 4/4/2024       Pulse: -- 87  as of 4/4/2024     BP: --  130/57  as of 4/4/2024      pt did not provide a bp      Resp: -- 16  as of 4/4/2024     SpO2: -- 99%  as of 4/4/2024     Body Mass Index:  46.99 kg/m  Abnormal   1.702 m (5' 7\")  as of 6/25/2024  136.1 kg (300 lb)  as of 6/25/2024       On exam she is in no acute distress.    Recent Labs   Lab Test 01/11/24  1454 11/24/23  0824 06/05/23  1738 06/05/23  1736 04/15/23  1037 04/15/23  1014 04/15/23  1009 03/28/23  1700 11/15/21  0901 11/15/21  0823 05/07/21  0824 02/17/20  1104 01/28/20  0000   A1C  --  7.3*  --   --   --   --   --  6.6*   < >  --  6.7*   < >  --    HEMOGLOBINA1  --   --   --   --   --   --   --   --   --  6.9  --   --  7.0*   TSH  --   --   --   --   --   --   --  3.75  --   --  3.69   < >  --    LDL 89 63  --   --    < >  --   --   --    < >  --  155*  --   --    HDL 66 59  --   --    < >  --   --   --    < >  --  59  --   --    TRIG 68 74  --   --    < >  --   --   --    < >  --  112  --   --    CR 0.74  --   --  0.59  --   --    < >  --    < >  --  0.59   < >  --    MICROL  --   --  <12.0  --   --  <12.0  --   --    < >  --   --    < >  --     < > = values in this interval not displayed.       Assessment and plan:    1.  Diabetes control.  Overall she is doing pretty well.  I will check an A1c.  I suggested she establish a new profile in her pump that has all of the same settings as her standard but that the correction is changed to 1 for 200.  This will help her avoid the lows from the correction to the pump will give her during activity.  We talked about ways she needs to remember to turn that off when she is done with activity.    2.  Diabetes complications.  Her renal disease is followed by a nephrologist.  She is seeing her eye doctor.  Her feet are fine.    3.CVD risk.  She is on her statin and her lipids " are well-controlled.    4.celiac disease.  I will check her iron studies and a vitamin D to ensure she is not having malabsorption.    Follow-up with Laureen Goldstein in 6 months and me in 12 months.    I spent 15 minutes on the video visit with the patient today (start time 4: 3 0 and then 4: 4 5).  On the same day of visit I spent an additional 10 minutes reviewing and interpreting her pump and CGM data, reviewing her labs, ordering tests, and doing documentation.    Bia Allan MD

## 2024-06-24 DIAGNOSIS — M65.342 TRIGGER FINGER, LEFT RING FINGER: Primary | ICD-10-CM

## 2024-06-24 DIAGNOSIS — G56.03 BILATERAL CARPAL TUNNEL SYNDROME: ICD-10-CM

## 2024-06-25 ENCOUNTER — VIRTUAL VISIT (OUTPATIENT)
Dept: SLEEP MEDICINE | Facility: CLINIC | Age: 29
End: 2024-06-25
Payer: COMMERCIAL

## 2024-06-25 VITALS — BODY MASS INDEX: 45.99 KG/M2 | HEIGHT: 67 IN | WEIGHT: 293 LBS

## 2024-06-25 DIAGNOSIS — G47.33 OSA (OBSTRUCTIVE SLEEP APNEA): Primary | ICD-10-CM

## 2024-06-25 PROCEDURE — 99213 OFFICE O/P EST LOW 20 MIN: CPT | Mod: 95 | Performed by: NURSE PRACTITIONER

## 2024-06-25 PROCEDURE — G2211 COMPLEX E/M VISIT ADD ON: HCPCS | Mod: 95 | Performed by: NURSE PRACTITIONER

## 2024-06-25 ASSESSMENT — SLEEP AND FATIGUE QUESTIONNAIRES
HOW LIKELY ARE YOU TO NOD OFF OR FALL ASLEEP WHEN YOU ARE A PASSENGER IN A CAR FOR AN HOUR WITHOUT A BREAK: SLIGHT CHANCE OF DOZING
HOW LIKELY ARE YOU TO NOD OFF OR FALL ASLEEP WHILE LYING DOWN TO REST IN THE AFTERNOON WHEN CIRCUMSTANCES PERMIT: MODERATE CHANCE OF DOZING
HOW LIKELY ARE YOU TO NOD OFF OR FALL ASLEEP WHILE SITTING AND READING: SLIGHT CHANCE OF DOZING
HOW LIKELY ARE YOU TO NOD OFF OR FALL ASLEEP WHILE SITTING AND TALKING TO SOMEONE: WOULD NEVER DOZE
HOW LIKELY ARE YOU TO NOD OFF OR FALL ASLEEP IN A CAR, WHILE STOPPED FOR A FEW MINUTES IN TRAFFIC: WOULD NEVER DOZE
HOW LIKELY ARE YOU TO NOD OFF OR FALL ASLEEP WHILE SITTING INACTIVE IN A PUBLIC PLACE: WOULD NEVER DOZE
HOW LIKELY ARE YOU TO NOD OFF OR FALL ASLEEP WHILE WATCHING TV: SLIGHT CHANCE OF DOZING
HOW LIKELY ARE YOU TO NOD OFF OR FALL ASLEEP WHILE SITTING QUIETLY AFTER LUNCH WITHOUT ALCOHOL: WOULD NEVER DOZE

## 2024-06-25 ASSESSMENT — PAIN SCALES - GENERAL: PAINLEVEL: NO PAIN (0)

## 2024-06-25 NOTE — PATIENT INSTRUCTIONS
SLEEP DENTAL PROVIDERS LIST:    [facility] [place] (Medicare)  Provider: Gray Bernstein DDS   [address] [place] [address]  Phone: [phone]  Fax: [phone]    [facility]  Providers: Lio Cheng DDS, Mira Damico DDS, Indio Ty DDS  Fax [phone]  Locations:   [phone]  -[place]  [address]  [facility]  [address]  -[place]  [address] [place]   [facility]  [address]   [phone]  [facility]  [address]  [address]  -[place]  [address] (Medicare)  Providers: Jose A Rivera DDS, FAМАРИНА, Deisy Deluna DDS, MS, Kalpana Hua DDS, Elina Barajas DDS  [address], [address], [place] [address]  Phone: [phone]  Fax: [phone]    [facility]  Provider: Rylee Flowers DDS, PhD    [address] [place] [address]  Phone: [phone]  Fax: [phone]    [address] [place] [address]  Phone: [phone]  Fax: [phone]      [facility]  Provider: Cristhian Butts DDS  Address: [address], [place] [address]  Phone: [phone]      [facility]   Provider: Perez Wyatt DDS  [facility]  [address] [place] [address]  Appointments: [phone]    [facility]   Provider: Farzad Menjivar DDS   [facility]   [address]   [place] [address]   Appointments: [phone]   Fax: [phone]     [facility]   Provider: José Barksdale DDS, MS - [DME Medicare]  [address], [address]   [place] [address]   Appointments: [phone]   Fax: [phone]     [facility]  Provider: Ricki Hernandez DMD, MSD - [DME  Medicare]  [address], [address]  [place] [address]  Appointments [phone]  Fax: [phone]    [facility]   [facility]   Provider: Joe Chiu DDS, [facility]  [address] [place] [address] [address]  [place] [address]   Appointments: [phone]  Fax: [phone]     [facility]   Providers: Camacho Philip, JOSIANE, Farzad Menjivar DDS   [address]   [place] [address]   Appointments: [phone]     [facility]   Providers: Eben Adrian DDS, Indio Lyons DDS  [address], [address]   [place] [address]   Appointments: [phone]   Fax: [phone]     Provider: Stanley Martinez DDS  [address]  [address]  [phone]

## 2024-06-25 NOTE — PROGRESS NOTES
Virtual Visit Details    Type of service:  Video Visit     Originating Location (pt. Location): Other Work    Distant Location (provider location):  Off-site  Platform used for Video Visit: [facility]      Sleep Study Follow-Up Visit:    Date on this visit: 6/25/2024    Stacey Mantilla is a 29 year old female with a PMH pertinent for HTN, DM 1 with diabetic nephropathy, asthma, CKD stage I, ADHD, anxiety, celiac disease, and morbid obesity presents today for follow-up of her sleep study done on 3/29/2024 at the [facility] for possible sleep apnea.            These findings were reviewed with patient.     Past medical/surgical history, family history, social history, medications and allergies were reviewed.      Problem List:  Patient Active Problem List    Diagnosis Date Noted    Bilateral carpal tunnel syndrome 04/15/2024     Priority: Medium    Trigger finger, left ring finger 02/19/2024     Priority: Medium    Left carpal tunnel syndrome 02/19/2024     Priority: Medium    Attention deficit hyperactivity disorder (ADHD), predominantly inattentive type 07/24/2023     Priority: Medium     10mg XR 30 per month  10mg IR 30 per month      Celiac disease 03/14/2022     Priority: Medium     Diagnosed around age 11      Elevated WBC count 06/13/2019     Priority: Medium     Normal retic% but slightly elevated absolute retic.  Normal smear.  CBC returned to normal  Negative HIV  Normal cortisol.    Likely related to inflammation, alcohol use. Will follow yearly      Morbid obesity (H) 05/28/2019     Priority: Medium    Type 1 diabetes mellitus with diabetic nephropathy (H) 12/05/2016     Priority: Medium    Anxiety 06/17/2016     Priority: Medium    Mild intermittent asthma without complication 03/04/2016     Priority: Medium    Family history of heart disease 08/03/2015     Priority: Medium    Chronic kidney disease, stage I 08/03/2015     Priority: Medium    Diabetic nephropathy (H) 03/06/2014      Priority: Medium      Current Outpatient Medications   Medication Sig Dispense Refill    albuterol (PROAIR HFA/PROVENTIL HFA/VENTOLIN HFA) 108 (90 Base) MCG/ACT inhaler Inhale 2 puffs into the lungs every 6 hours 18 g 3    albuterol (PROVENTIL) (2.5 MG/3ML) 0.083% neb solution Take 1 vial (2.5 mg) by nebulization every 6 hours as needed for shortness of breath, wheezing or cough 200 mL 0    amphetamine-dextroamphetamine (ADDERALL XR) 15 MG 24 hr capsule Take 1 capsule (15 mg) by mouth daily 90 capsule 0    amphetamine-dextroamphetamine (ADDERALL) 10 MG tablet Take 1 tablet (10 mg) by mouth daily 90 tablet 0    ASPIRIN NOT PRESCRIBED (INTENTIONAL) continuous prn for other Antiplatelet medication not prescribed intentionally due to **      atorvastatin (LIPITOR) 40 MG tablet Take 1 tablet (40 mg) by mouth daily 90 tablet 3    blood glucose (NO BRAND SPECIFIED) test strip Use to test blood sugar five times daily or as directed.  Dispense Brandy Contour Next Test Strips. 200 strip 11    Continuous Blood Gluc Sensor (DEXCOM G7 SENSOR) MISC Change every 10 days. 9 each 3    Continuous Blood Gluc Sensor (DEXCOM G7 SENSOR) MISC Use as directed to monitor blood glucose. Change every 10 days. 9 each 0    enalapril (VASOTEC) 20 MG tablet Take 1 tablet (20 mg) by mouth 2 times daily 180 tablet 0    escitalopram (LEXAPRO) 10 MG tablet TAKE 1 TABLET (10 MG) BY MOUTH DAILY. 90 tablet 3    Glucagon, rDNA, (GLUCAGON EMERGENCY) 1 MG KIT Use as directed in case of low blood glucose. 1 kit 3    Injection Device for insulin (INPEN 100-GREY-NOVOLOG-FIASP) MOLLY 1 each 5 times daily 2 each 1    insulin aspart (NOVOLOG VIAL) 100 UNITS/ML vial Use as directed, up to 150 units daily in insulin pump 150 mL 3    insulin degludec (TRESIBA FLEXTOUCH) 100 UNIT/ML pen Inject 80 Units Subcutaneous daily 75 mL 3    Insulin Infusion Pump Supplies (INSULIN PUMP SYRINGE RESERVOIR) MISC Change every other day as directed 45 each 3    Insulin Infusion Pump  "Supplies (SURE-T INFUSION SET 23\") MISC Change every 2-3 days 30 each 3    insulin pen needle (32G X 4 MM) 32G X 4 MM miscellaneous Use 6 pen needles daily as directed for insulin administration. 600 each 3    Insulin Pump Accessories MISC Infusion set per patient preference.  Change infusion set every other day 45 each 3    insulin syringe-needle U-100 (31G X 5/16\" 0.5 ML) 31G X 5/16\" 0.5 ML miscellaneous Use 5 syringes daily or as directed. 100 each 3    insulin syringes, disposable, U-100 0.3 ML MISC Use 2-3 times daily as needed (Patient not taking: Reported on 1/11/2024) 100 each 11    ipratropium - albuterol 0.5 mg/2.5 mg/3 mL (DUONEB) 0.5-2.5 (3) MG/3ML neb solution Take 1 vial (3 mLs) by nebulization every 6 hours as needed for shortness of breath or wheezing 30 mL 11    KETOSTIX test strip Use as directed in case of high glucose, vomiting or illness.once weekly 50 strip 3    montelukast (SINGULAIR) 10 MG tablet Take 1 tablet (10 mg) by mouth At Bedtime 90 tablet 3    Multiple Vitamins-Minerals (MULTIVITAMIN ADULT PO) Take 1 tablet by mouth daily      NOVOLOG PENFILL 100 UNIT/ML soln Take as directed up to 60 units per day 54 mL 3     Current Facility-Administered Medications   Medication Dose Route Frequency Provider Last Rate Last Admin    dexAMETHasone (DECADRON) injection 4 mg  4 mg   Gray Reyes MD   4 mg at 12/11/23 1304    lidocaine (PF) (XYLOCAINE) 1 % injection 1 mL  1 mL   Gray Reyes MD   1 mL at 12/11/23 1304     Ht 1.702 m (5' 7\")   Wt 136.1 kg (300 lb)   BMI 46.99 kg/m      Impression/Plan:  Mild Obstructive Sleep Apnea.   - Sleep associated hypoxemia was not present.  - Sleep Dental Referral; Future    Treatment options discussed today including  oral appliance therapy.    Elected treatment with oral appliance therapy.  Sleep dental referral order was placed for mandibular advancement device (MAD) to treat mild LEANDRO.  Information was provided in the AVS regarding sleep dental providers list " to obtain the MAD.  We also discussed consideration for efficacy testing once the patient has adequately adjusted/titrated the mandibular advancement device.    She will follow up with me as needed or in approximately 3 months after adequately adjusting/titrating mandibular advancement device to discuss efficacy testing with MAD.  This is not required given the mildness severity of LEANDRO, however, may be helpful in determining effectiveness of treatment.    22 minutes spent with patient, all of which were spent face-to-face counseling, consulting, chart review/documentation, and coordinating plan of care on the date of the encounter.      LUIS FERNANDO Umaña CNP  Sleep Medicine      CC: Nancy Lopez     This note was written with the assistance of the Dragon voice-dictation technology software. The final document, although reviewed, may contain errors. For corrections, please contact the office.

## 2024-06-25 NOTE — NURSING NOTE
Patient declined individual allergy and medication review by support staff because - pt confirmed no changes from last visit.       Has patient had flu shot for current/most recent flu season? If so, when? Yes: 9/22/23        Is the patient currently in the state of [place]? YES    Visit mode:VIDEO    If the visit is dropped, the patient can be reconnected by: VIDEO VISIT: Text to cell phone:   Telephone Information:   Mobile [phone]       Will anyone else be joining the visit? NO  (If patient encounters technical issues they should call [phone])    How would you like to obtain your AVS? MyChart    Are changes needed to the allergy or medication list? No    Are refills needed on medications prescribed by this physician? NO    Reason for visit: RECHECK    Leatha WHEELER

## 2024-06-27 ENCOUNTER — TELEPHONE (OUTPATIENT)
Dept: ENDOCRINOLOGY | Facility: CLINIC | Age: 29
End: 2024-06-27
Payer: COMMERCIAL

## 2024-06-27 NOTE — TELEPHONE ENCOUNTER
Called patient and left voicemail. Patient has an appointment on  7/1/24 . Need patient to upload their Inpen device to site for provider to review prior to their appointment.  Mary Hoffman MA

## 2024-06-30 ENCOUNTER — HEALTH MAINTENANCE LETTER (OUTPATIENT)
Age: 29
End: 2024-06-30

## 2024-07-01 ENCOUNTER — VIRTUAL VISIT (OUTPATIENT)
Dept: ENDOCRINOLOGY | Facility: CLINIC | Age: 29
End: 2024-07-01
Payer: COMMERCIAL

## 2024-07-01 VITALS — WEIGHT: 290 LBS | BODY MASS INDEX: 45.42 KG/M2

## 2024-07-01 DIAGNOSIS — E10.21 TYPE 1 DIABETES MELLITUS WITH DIABETIC NEPHROPATHY (H): ICD-10-CM

## 2024-07-01 DIAGNOSIS — K90.0 CELIAC DISEASE: Primary | ICD-10-CM

## 2024-07-01 PROCEDURE — G2211 COMPLEX E/M VISIT ADD ON: HCPCS | Mod: 95 | Performed by: INTERNAL MEDICINE

## 2024-07-01 PROCEDURE — 99213 OFFICE O/P EST LOW 20 MIN: CPT | Mod: 95 | Performed by: INTERNAL MEDICINE

## 2024-07-01 SDOH — HEALTH STABILITY: PHYSICAL HEALTH: ON AVERAGE, HOW MANY DAYS PER WEEK DO YOU ENGAGE IN MODERATE TO STRENUOUS EXERCISE (LIKE A BRISK WALK)?: 3 DAYS

## 2024-07-01 SDOH — HEALTH STABILITY: PHYSICAL HEALTH: ON AVERAGE, HOW MANY MINUTES DO YOU ENGAGE IN EXERCISE AT THIS LEVEL?: 30 MIN

## 2024-07-01 ASSESSMENT — ASTHMA QUESTIONNAIRES
QUESTION_3 LAST FOUR WEEKS HOW OFTEN DID YOUR ASTHMA SYMPTOMS (WHEEZING, COUGHING, SHORTNESS OF BREATH, CHEST TIGHTNESS OR PAIN) WAKE YOU UP AT NIGHT OR EARLIER THAN USUAL IN THE MORNING: NOT AT ALL
ACT_TOTALSCORE: 24
QUESTION_2 LAST FOUR WEEKS HOW OFTEN HAVE YOU HAD SHORTNESS OF BREATH: ONCE OR TWICE A WEEK
ACT_TOTALSCORE: 24
QUESTION_4 LAST FOUR WEEKS HOW OFTEN HAVE YOU USED YOUR RESCUE INHALER OR NEBULIZER MEDICATION (SUCH AS ALBUTEROL): NOT AT ALL
QUESTION_5 LAST FOUR WEEKS HOW WOULD YOU RATE YOUR ASTHMA CONTROL: COMPLETELY CONTROLLED
QUESTION_1 LAST FOUR WEEKS HOW MUCH OF THE TIME DID YOUR ASTHMA KEEP YOU FROM GETTING AS MUCH DONE AT WORK, SCHOOL OR AT HOME: NONE OF THE TIME

## 2024-07-01 ASSESSMENT — SOCIAL DETERMINANTS OF HEALTH (SDOH): HOW OFTEN DO YOU GET TOGETHER WITH FRIENDS OR RELATIVES?: ONCE A WEEK

## 2024-07-01 NOTE — PATIENT INSTRUCTIONS
Do not forget to establish a new profile on your pump.  You can call this exercise.  It should have the same basal rate and carb ratio as your standard setting if you should change the correction to 1: 200  You may also want to change the target to 150.    Have your labs done.

## 2024-07-01 NOTE — LETTER
7/1/2024       RE: Stacey Mantilla  [address]  [facility] [place] [address]     Dear Colleague,    Thank you for referring your patient, Stacey Mantilla, to the [facility] [place] at [facility]. Please see a copy of my visit note below.    Outcome for 06/18/24 9:03 AM: Data uploaded on Dexcom  Mary Hoffman MA  Outcome for 06/18/24 9:03 AM: Resource [employer] message sent  Mary Hoffman MA  Outcome for 06/27/24 2:44 PM: Left Voicemail   Mary Hoffman MA  Outcome for 06/28/24 9:26 AM: Data obtained via Dexcom website  Malissa Roper LPN     This 29-year-old woman was seen in follow-up for her long-standing type 1 diabetes that is complicated by early nephropathy.  She also has a took thyroid hormone for a time many years ago but has had normal TSHs off replacement.  She is comanaged with Laureen Goldstein.  She uses a tandem control IQ pump.  The data are below.  Now that it summer, she is much more active.  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  Even if she puts her pump into exercise mode, she still continues to get low when she is active.  She is able to detect this and treat it but it is annoying to her.  Aside from this problem, she thinks her blood sugars are doing well.    She is up-to-date with her eye visits and has mild diabetic retinopathy.  She has no paresthesias or foot ulcers.  She is followed by nephrology for her CKD.  She is due to see them soon.  She has celiac disease and is avoiding gluten.  She has not had any nutritional labs done in some time.    Overall she feels well.  She denies chest pain or shortness of breath.  She is very interested in using SGLT2 inhibitors but her insurance will not cover them for weight loss.                        Patient Active Problem List   Diagnosis    Diabetic nephropathy (H)    Family history of heart disease    Chronic kidney disease, stage I     "Mild intermittent asthma without complication    Anxiety    Type 1 diabetes mellitus with diabetic nephropathy (H)    Morbid obesity (H)    Elevated WBC count    Celiac disease    Attention deficit hyperactivity disorder (ADHD), predominantly inattentive type    Trigger finger, left ring finger    Left carpal tunnel syndrome    Bilateral carpal tunnel syndrome    LEANDRO (obstructive sleep apnea)                      97.1  F (36.2  C)  as of 4/4/2024       Pulse: -- 87  as of 4/4/2024     BP: --  130/57  as of 4/4/2024      pt did not provide a bp      Resp: -- 16  as of 4/4/2024     SpO2: -- 99%  as of 4/4/2024     Body Mass Index:  46.99 kg/m  Abnormal   1.702 m (5' 7\")  as of 6/25/2024  136.1 kg (300 lb)  as of 6/25/2024       On exam she is in no acute distress.    Recent Labs   Lab Test 01/11/24  1454 11/24/23  0824 06/05/23  1738 06/05/23  1736 04/15/23  1037 04/15/23  1014 04/15/23  1009 03/28/23  1700 11/15/21  0901 11/15/21  0823 05/07/21  0824 02/17/20  1104 01/28/20  0000   A1C  --  7.3*  --   --   --   --   --  6.6*   < >  --  6.7*   < >  --    HEMOGLOBINA1  --   --   --   --   --   --   --   --   --  6.9  --   --  7.0*   TSH  --   --   --   --   --   --   --  3.75  --   --  3.69   < >  --    LDL 89 63  --   --    < >  --   --   --    < >  --  155*  --   --    HDL 66 59  --   --    < >  --   --   --    < >  --  59  --   --    TRIG 68 74  --   --    < >  --   --   --    < >  --  112  --   --    CR 0.74  --   --  0.59  --   --    < >  --    < >  --  0.59   < >  --    MICROL  --   --  <12.0  --   --  <12.0  --   --    < >  --   --    < >  --     < > = values in this interval not displayed.       Assessment and plan:    1.  Diabetes control.  Overall she is doing pretty well.  I will check an A1c.  I suggested she establish a new profile in her pump that has all of the same settings as her standard but that the correction is changed to 1 for 200.  This will help her avoid the lows from the correction to the pump " will give her during activity.  We talked about ways she needs to remember to turn that off when she is done with activity.    2.  Diabetes complications.  Her renal disease is followed by a nephrologist.  She is seeing her eye doctor.  Her feet are fine.    3.CVD risk.  She is on her statin and her lipids are well-controlled.    4.celiac disease.  I will check her iron studies and a vitamin D to ensure she is not having malabsorption.    Follow-up with Laureen Goldstein in 6 months and me in 12 months.    I spent 15 minutes on the video visit with the patient today (start time 4: 3 0 and then 4: 4 5).  On the same day of visit I spent an additional 10 minutes reviewing and interpreting her pump and CGM data, reviewing her labs, ordering tests, and doing documentation.    Bia Allan MD

## 2024-07-01 NOTE — NURSING NOTE
Is the patient currently in the state of [place]? YES    Visit mode:VIDEO    If the visit is dropped, the patient can be reconnected by: VIDEO VISIT: Text to cell phone:   Telephone Information:   Mobile [phone]       Will anyone else be joining the visit? NO  (If patient encounters technical issues they should call [phone]    How would you like to obtain your AVS? MyChart    Are changes needed to the allergy or medication list? No    Are refills needed on medications prescribed by this physician? NO    Reason for visit: RECHECK and Video Visit    Bia WHEELER

## 2024-07-02 ENCOUNTER — OFFICE VISIT (OUTPATIENT)
Dept: PEDIATRICS | Facility: CLINIC | Age: 29
End: 2024-07-02
Attending: NURSE PRACTITIONER
Payer: COMMERCIAL

## 2024-07-02 VITALS
OXYGEN SATURATION: 98 % | DIASTOLIC BLOOD PRESSURE: 81 MMHG | TEMPERATURE: 98.2 F | HEART RATE: 78 BPM | WEIGHT: 293 LBS | SYSTOLIC BLOOD PRESSURE: 121 MMHG | RESPIRATION RATE: 16 BRPM | BODY MASS INDEX: 44.41 KG/M2 | HEIGHT: 68 IN

## 2024-07-02 DIAGNOSIS — Z00.00 HEALTHCARE MAINTENANCE: Primary | ICD-10-CM

## 2024-07-02 DIAGNOSIS — J30.1 ALLERGIC RHINITIS DUE TO POLLEN, UNSPECIFIED SEASONALITY: ICD-10-CM

## 2024-07-02 DIAGNOSIS — R80.9 PROTEINURIA, UNSPECIFIED TYPE: ICD-10-CM

## 2024-07-02 DIAGNOSIS — J45.909 MILD ASTHMA WITHOUT COMPLICATION, UNSPECIFIED WHETHER PERSISTENT: ICD-10-CM

## 2024-07-02 DIAGNOSIS — J01.40 ACUTE NON-RECURRENT PANSINUSITIS: ICD-10-CM

## 2024-07-02 DIAGNOSIS — K90.0 CELIAC DISEASE: ICD-10-CM

## 2024-07-02 DIAGNOSIS — J30.81 ALLERGIC RHINITIS DUE TO ANIMALS: ICD-10-CM

## 2024-07-02 DIAGNOSIS — F41.8 DEPRESSION WITH ANXIETY: ICD-10-CM

## 2024-07-02 DIAGNOSIS — N18.1 CHRONIC KIDNEY DISEASE, STAGE I: ICD-10-CM

## 2024-07-02 DIAGNOSIS — E10.21 TYPE 1 DIABETES MELLITUS WITH DIABETIC NEPHROPATHY (H): ICD-10-CM

## 2024-07-02 DIAGNOSIS — E78.5 HYPERLIPIDEMIA, UNSPECIFIED HYPERLIPIDEMIA TYPE: ICD-10-CM

## 2024-07-02 LAB
FOLATE SERPL-MCNC: 7.8 NG/ML (ref 4.6–34.8)
HBA1C MFR BLD: 6.5 % (ref 0–5.6)
IRON BINDING CAPACITY (ROCHE): 312 UG/DL (ref 240–430)
IRON SATN MFR SERPL: 28 % (ref 15–46)
IRON SERPL-MCNC: 88 UG/DL (ref 37–145)
TSH SERPL DL<=0.005 MIU/L-ACNC: 2.27 UIU/ML (ref 0.3–4.2)
VIT B12 SERPL-MCNC: 746 PG/ML (ref 232–1245)
VIT D+METAB SERPL-MCNC: 27 NG/ML (ref 20–50)

## 2024-07-02 PROCEDURE — 82607 VITAMIN B-12: CPT | Performed by: NURSE PRACTITIONER

## 2024-07-02 PROCEDURE — 82306 VITAMIN D 25 HYDROXY: CPT | Mod: 59 | Performed by: NURSE PRACTITIONER

## 2024-07-02 PROCEDURE — 84630 ASSAY OF ZINC: CPT | Mod: 90 | Performed by: NURSE PRACTITIONER

## 2024-07-02 PROCEDURE — 99000 SPECIMEN HANDLING OFFICE-LAB: CPT | Performed by: NURSE PRACTITIONER

## 2024-07-02 PROCEDURE — 99214 OFFICE O/P EST MOD 30 MIN: CPT | Mod: 25 | Performed by: NURSE PRACTITIONER

## 2024-07-02 PROCEDURE — 82306 VITAMIN D 25 HYDROXY: CPT | Performed by: NURSE PRACTITIONER

## 2024-07-02 PROCEDURE — 99395 PREV VISIT EST AGE 18-39: CPT | Performed by: NURSE PRACTITIONER

## 2024-07-02 PROCEDURE — 82746 ASSAY OF FOLIC ACID SERUM: CPT | Performed by: NURSE PRACTITIONER

## 2024-07-02 PROCEDURE — 83550 IRON BINDING TEST: CPT | Performed by: NURSE PRACTITIONER

## 2024-07-02 PROCEDURE — 84590 ASSAY OF VITAMIN A: CPT | Mod: 90 | Performed by: NURSE PRACTITIONER

## 2024-07-02 PROCEDURE — 84443 ASSAY THYROID STIM HORMONE: CPT | Performed by: NURSE PRACTITIONER

## 2024-07-02 PROCEDURE — 84446 ASSAY OF VITAMIN E: CPT | Mod: 90 | Performed by: NURSE PRACTITIONER

## 2024-07-02 PROCEDURE — 82525 ASSAY OF COPPER: CPT | Mod: 90 | Performed by: NURSE PRACTITIONER

## 2024-07-02 PROCEDURE — 83036 HEMOGLOBIN GLYCOSYLATED A1C: CPT | Performed by: NURSE PRACTITIONER

## 2024-07-02 PROCEDURE — 83540 ASSAY OF IRON: CPT | Performed by: NURSE PRACTITIONER

## 2024-07-02 PROCEDURE — 36415 COLL VENOUS BLD VENIPUNCTURE: CPT | Performed by: NURSE PRACTITIONER

## 2024-07-02 RX ORDER — ATORVASTATIN CALCIUM 40 MG/1
40 TABLET, FILM COATED ORAL DAILY
Qty: 90 TABLET | Refills: 3 | Status: CANCELLED | OUTPATIENT
Start: 2024-07-02

## 2024-07-02 RX ORDER — IPRATROPIUM BROMIDE AND ALBUTEROL SULFATE 2.5; .5 MG/3ML; MG/3ML
1 SOLUTION RESPIRATORY (INHALATION) EVERY 6 HOURS PRN
Qty: 30 ML | Refills: 11 | Status: SHIPPED | OUTPATIENT
Start: 2024-07-02 | End: 2024-09-27

## 2024-07-02 RX ORDER — ALBUTEROL SULFATE 0.83 MG/ML
2.5 SOLUTION RESPIRATORY (INHALATION) EVERY 6 HOURS PRN
Qty: 200 ML | Refills: 0 | Status: SHIPPED | OUTPATIENT
Start: 2024-07-02 | End: 2024-09-27

## 2024-07-02 RX ORDER — ALPRAZOLAM 0.5 MG
0.5 TABLET ORAL 3 TIMES DAILY PRN
Qty: 5 TABLET | Refills: 0 | Status: SHIPPED | OUTPATIENT
Start: 2024-07-02

## 2024-07-02 RX ORDER — ALBUTEROL SULFATE 90 UG/1
2 AEROSOL, METERED RESPIRATORY (INHALATION) EVERY 6 HOURS
Qty: 18 G | Refills: 3 | Status: SHIPPED | OUTPATIENT
Start: 2024-07-02

## 2024-07-02 RX ORDER — BUSPIRONE HYDROCHLORIDE 5 MG/1
TABLET ORAL
Qty: 180 TABLET | Refills: 0 | Status: SHIPPED | OUTPATIENT
Start: 2024-07-02 | End: 2024-07-02

## 2024-07-02 RX ORDER — ESCITALOPRAM OXALATE 5 MG/1
5 TABLET ORAL DAILY
Qty: 90 TABLET | Refills: 3 | Status: SHIPPED | OUTPATIENT
Start: 2024-07-02 | End: 2025-06-27

## 2024-07-02 RX ORDER — MONTELUKAST SODIUM 10 MG/1
10 TABLET ORAL AT BEDTIME
Qty: 90 TABLET | Refills: 3 | Status: SHIPPED | OUTPATIENT
Start: 2024-07-02

## 2024-07-02 RX ORDER — FLUTICASONE PROPIONATE 50 MCG
1 SPRAY, SUSPENSION (ML) NASAL DAILY
Qty: 16 G | Refills: 11 | Status: SHIPPED | OUTPATIENT
Start: 2024-07-02

## 2024-07-02 RX ORDER — ENALAPRIL MALEATE 20 MG/1
20 TABLET ORAL 2 TIMES DAILY
Qty: 180 TABLET | Refills: 0 | Status: CANCELLED | OUTPATIENT
Start: 2024-07-02

## 2024-07-02 ASSESSMENT — PAIN SCALES - GENERAL: PAINLEVEL: NO PAIN (0)

## 2024-07-02 NOTE — TELEPHONE ENCOUNTER
The instruction for this med is the says, same thing. Is this what you ment?    If not cancel this rx and resend. Thank you.

## 2024-07-02 NOTE — PROGRESS NOTES
"Preventive Care Visit  [facility] LUIS FERNANDO GARZON CNP, Family Medicine  Jul 2, 2024      Assessment & Plan     Healthcare maintenance    Type 1 diabetes mellitus with diabetic nephropathy (H)  Managed by endo. Well controlled  - Vitamin B12  - Iron and iron binding capacity  - 25 Hydroxyvitamin D2 and D3  - Folate  - TSH with free T4 reflex  - Hemoglobin A1c    Chronic kidney disease, stage I  Stable. Managed by nephrology    Mild asthma without complication, unspecified whether persistent  Stable. Uses with URI.  - albuterol (PROAIR HFA/PROVENTIL HFA/VENTOLIN HFA) 108 (90 Base) MCG/ACT inhaler; Inhale 2 puffs into the lungs every 6 hours  - ipratropium - albuterol 0.5 mg/2.5 mg/3 mL (DUONEB) 0.5-2.5 (3) MG/3ML neb solution; Take 1 vial (3 mLs) by nebulization every 6 hours as needed for shortness of breath or wheezing    Hyperlipidemia, unspecified hyperlipidemia type  Managed by endo    Proteinuria, unspecified type  Stable. Managed by nephrology    Depression with anxiety  Self weaned then had a \"breakdown.\" Now back on 5mg for the last 4 months and doing better but still not great anxiety wise. Feels she has sexual side effects so wanting to try a different agent. No SI/HI  -Continue lower dose lexapro and add buspar. Reviewed risk of serotonin syndrome symtpoms with lexapro, buspar, and adderall.   - escitalopram (LEXAPRO) 5 MG tablet; Take 1 tablet (5 mg) by mouth daily for 360 days  - ALPRAZolam (XANAX) 0.5 MG tablet; Take 1 tablet (0.5 mg) by mouth 3 times daily as needed for anxiety  -Already in therapy  -Follow-up 1 month    Allergic rhinitis due to animals  Currently on hold as she is wondering if this impacted her mental health. No SI/HI  - montelukast (SINGULAIR) 10 MG tablet; Take 1 tablet (10 mg) by mouth at bedtime    Allergic rhinitis due to pollen, unspecified seasonality  Stable  - fluticasone (FLONASE) 50 MCG/ACT nasal spray; Spray 1 spray into both nostrils " "daily    Celiac disease  Has never had dexa or vitamin testing.   - Vitamin A; Future  - Vitamin E; Future  - Copper level; Future  - DX Bone Density; Future  - Zinc; Future  - TSH with free T4 reflex  - Vitamin D Deficiency (D3 Only)  - Vitamin A  - Vitamin E  - Copper level  - Zinc            BMI  Estimated body mass index is 46.42 kg/m  as calculated from the following:    Height as of this encounter: 1.721 m (5' 7.76\").    Weight as of this encounter: 137.5 kg (303 lb 1.6 oz).   Weight management plan: Discussed healthy diet and exercise guidelines    Counseling  Appropriate preventive services were discussed with this patient, including applicable screening as appropriate for fall prevention, nutrition, physical activity, Tobacco-use cessation, weight loss and cognition.  Checklist reviewing preventive services available has been given to the patient.  Reviewed patient's diet, addressing concerns and/or questions.   She is at risk for lack of exercise and has been provided with information to increase physical activity for the benefit of her well-being.           Subjective   =  Stacey is a 29 year old, presenting for the following:  Physical        7/2/2024    11:18 AM   Additional Questions   Roomed by EMILE Mcgrath   Accompanied by NA         7/2/2024    11:18 AM   Patient Reported Additional Medications   Patient reports taking the following new medications No        Health Care Directive  Patient does not have a Health Care Directive or Living Will: Discussed advance care planning with patient; however, patient declined at this time.    Healthy Habits:     Getting at least 3 servings of Calcium per day:  Yes    Bi-annual eye exam:  Yes    Dental care twice a year:  Yes    Sleep apnea or symptoms of sleep apnea:  Sleep apnea    Diet:  Gluten-free/reduced and Other (lactose free)    Frequency of exercise:  4-5 days/week    Duration of exercise:  Greater than 60 minutes    Taking medications regularly:  " Yes    Barriers to taking medications:  None    Medication side effects:  None    Additional concerns today:  No              7/1/2024   General Health   How would you rate your overall physical health? (!) FAIR   Feel stress (tense, anxious, or unable to sleep) To some extent      (!) STRESS CONCERN      7/1/2024   Nutrition   Three or more servings of calcium each day? Yes   Diet: Gluten-free/reduced   How many servings of fruit and vegetables per day? (!) 2-3   How many sweetened beverages each day? 0-1            7/1/2024   Exercise   Days per week of moderate/strenous exercise 3 days   Average minutes spent exercising at this level 30 min            7/1/2024   Social Factors   Frequency of gathering with friends or relatives Once a week   Worry food won't last until get money to buy more No   Food not last or not have enough money for food? No   Do you have housing? (Housing is defined as stable permanent housing and does not include staying ouside in a car, in a tent, in an abandoned building, in an overnight shelter, or couch-surfing.) Yes   Are you worried about losing your housing? No   Lack of transportation? No   Unable to get utilities (heat,electricity)? No            7/1/2024   Dental   Dentist two times every year? Yes            7/1/2024   TB Screening   Were you born outside of the [place]? No                  7/1/2024   Substance Use   Alcohol more than 3/day or more than 7/wk No   Do you use any other substances recreationally? (!) CANNABIS PRODUCTS        Social History     Tobacco Use    Smoking status: Never     Passive exposure: Past    Smokeless tobacco: Never   Vaping Use    Vaping status: Never Used   Substance Use Topics    Alcohol use: Not Currently     Comment: couple times per month will have 3 mixed drinks    Drug use: Yes     Types: Marijuana           5/7/2021   LAST FHS-7 RESULTS   1st degree relative breast or ovarian cancer No   Any relative bilateral breast cancer No   Any male have  "breast cancer No   Any ONE woman have BOTH breast AND ovarian cancer No   Any woman with breast cancer before 50yrs No   2 or more relatives with breast AND/OR ovarian cancer No   2 or more relatives with breast AND/OR bowel cancer No                   7/1/2024   STI Screening   New sexual partner(s) since last STI/HIV test? No            4/26/2022     8:09 AM 5/28/2019     1:49 PM 5/19/2016    12:00 AM   PAP / HPV   PAP Negative for Intraepithelial Lesion or Malignancy (NILM)      PAP (Historical)  NIL  NIL            7/1/2024   Contraception/Family Planning   Questions about contraception or family planning No           Reviewed and updated as needed this visit by Provider                          Review of Systems  Constitutional, HEENT, cardiovascular, pulmonary, gi and gu systems are negative, except as otherwise noted.     Objective    Exam  /81 (BP Location: Right arm, Patient Position: Sitting, Cuff Size: Adult Large)   Pulse 78   Temp 98.2  F (36.8  C) (Oral)   Resp 16   Ht 1.721 m (5' 7.76\")   Wt 137.5 kg (303 lb 1.6 oz)   LMP 06/23/2024 (Approximate)   SpO2 98%   BMI 46.42 kg/m     Estimated body mass index is 46.42 kg/m  as calculated from the following:    Height as of this encounter: 1.721 m (5' 7.76\").    Weight as of this encounter: 137.5 kg (303 lb 1.6 oz).    Physical Exam  GENERAL: alert and no distress  EYES: Eyes grossly normal to inspection, PERRL and conjunctivae and sclerae normal  HENT: ear canals and TM's normal, nose and mouth without ulcers or lesions  NECK: no adenopathy, no asymmetry, masses, or scars  RESP: lungs clear to auscultation - no rales, rhonchi or wheezes  CV: regular rate and rhythm, normal S1 S2, no S3 or S4, no murmur, click or rub, no peripheral edema  ABDOMEN: soft, nontender, no hepatosplenomegaly, no masses and bowel sounds normal  MS: no gross musculoskeletal defects noted, no edema  SKIN: no suspicious lesions or rashes  NEURO: Normal strength and tone, " mentation intact and speech normal  PSYCH: mentation appears normal, affect normal/bright        Signed Electronically by: LUIS FERNANDO RIVAS CNP

## 2024-07-03 RX ORDER — BUSPIRONE HYDROCHLORIDE 5 MG/1
TABLET ORAL
Qty: 346 TABLET | Refills: 0 | Status: SHIPPED | OUTPATIENT
Start: 2024-07-03 | End: 2024-09-26

## 2024-07-04 LAB
COPPER SERPL-MCNC: 109.9 UG/DL
ZINC SERPL-MCNC: 63 UG/DL

## 2024-07-05 LAB
A-TOCOPHEROL VIT E SERPL-MCNC: 13.1 MG/L
ANNOTATION COMMENT IMP: NORMAL
BETA+GAMMA TOCOPHEROL SERPL-MCNC: 1 MG/L
RETINYL PALMITATE SERPL-MCNC: 0.06 MG/L
VIT A SERPL-MCNC: 0.55 MG/L

## 2024-07-06 LAB
DEPRECATED CALCIDIOL+CALCIFEROL SERPL-MC: <33 UG/L (ref 20–75)
VITAMIN D2 SERPL-MCNC: <5 UG/L
VITAMIN D3 SERPL-MCNC: 28 UG/L

## 2024-07-09 NOTE — PROGRESS NOTES
OCCUPATIONAL THERAPY EVALUATION  Type of Visit: Evaluation       Fall Risk Screen:  Fall screen completed by: OT  Have you fallen 2 or more times in the past year?: No  Have you fallen and had an injury in the past year?: No  Is patient a fall risk?: No    Subjective      Presenting condition or subjective complaint: I had ring finger trigger finger release and carpal tunnel release on my left hand in April. The tendon to my ring finger is still rather painful, inflamed, and has limited range of motion  Date of onset: 04/04/24    Relevant medical history:     Dates & types of surgery: Ring finger trigger finger release and open carpal tunnel release 4/4/24    Prior diagnostic imaging/testing results:       Prior therapy history for the same diagnosis, illness or injury: No      Living Environment  Social support: With a significant other or spouse   Type of home: House; 2-story   Stairs to enter the home: Yes 3 Is there a railing: No     Ramp: No   Stairs inside the home: Yes 12 Is there a railing: No     Help at home: None  Equipment owned:       Employment: Yes Youth   Hobbies/Interests: Camping, hiking, bouldering    Patient goals for therapy:  items without pain, lay my hand flat, eventually get back to rock climbing    Pain assessment: See objective evaluation for additional pain details     Objective   Right hand dominant  Patient reports symptoms of pain and stiffness/loss of motion    Pain Level (Scale 0-10)   7/12/24   At Rest L: 1/10   With Use L: 2-7     Pain Description  Date 7/12/24   Location Left hand - Ring finger flexor tendon, A1 pulley scar area, CT    Pain Quality Throbbing, pulling   Frequency intermittent or daily     Pain is worst  daytime   Exacerbated by  Extending fingers is most stiff/painful,    Relieved by rest   Progression Not progressing since 8 weeks post surgery     Sensation   Left -  resolved [place] parasthesias, intermittent UN parasthesias with position  Right  - [place] distribution intermittently - positional and sleep    Scar   Sensitivity: CT: non sensitive to touch, mild tenderness to palpation.  A1 pulley: moderate tenderness to pressure  Quality:  CT: moderately adhered A1 pulley: thick scar tissue proximal to incision and throughout.    Edema (Circumference measured in cm)   7/12/24 7/12/24   Ring Right Left   P1 6.3 6.6   PIP 5.8 5.7   P2 5.0 5.3     ROM  Ring Finger 7/12/24 7/12/24   AROM (PROM) Right Left   MCP /78 -10/65   PIP /107 0/95   DIP /75 0/63   SOTO       ROM  Pain Report: - none  + mild    ++ moderate    +++ severe   Wrist 7/12/24 7/12/24   AROM (PROM) Right Left   Extension 52 45   Flexion 75 64   RD 25 20   UD 40 35     Stage of Stenosing Tenosynovitis (SST)     7/12/24   L Ring Finger Stage 1   Stage 1:  Normal  Stage 2:  Uneven motion of tendon  Stage 3:  Triggering, clicking, catching  Stage 4:  Locking in extension or flexion; unlocked by active motion  Stage 5:  Locking in extension or flexion; unlocked by passive motion  Stage 6:  Finger locked in extension or flexion    Strength   (Measured in pounds)  Pain Report:  - none  + mild    ++ moderate    +++ severe    7/12/24 7/12/24   Trials Right Left   1  2  3 64 50   Average 64 50     Lat Pinch 7/12/24 7/12/24   Trials Right Left   1  2  3 19 18+ thenars   Average 19 18     3 Pt Pinch 7/12/24 7/12/24   Trials Right Left   1  2  3 21 16+ wrist   Average 21 16     Palpation  Pain Report:  - none  + mild    ++ moderate    +++ severe   L Ring Finger 7/12/24   A1 Pulley ++   A3 Pulley/PIP ++   CMC NT   FA Flexors NT   FA Extensors NT   CT Scar +   Thenars  +   Hypothenars -      Assessment & Plan   CLINICAL IMPRESSIONS  Medical Diagnosis: L RF Trigger Release, L CTR    Treatment Diagnosis: L RF Trigger Release, L CTR    Impression/Assessment: Pt is a 29 year old female presenting to Occupational Therapy due to left ring finger stiffness and pain and left wrist pain s/p CTR and A1 pulley release.  The  following significant findings have been identified: Impaired ROM, Impaired strength, Pain, and edema .  These identified deficits interfere with their ability to perform self care tasks, work tasks, recreational activities, household chores, and meal planning and preparation as compared to previous level of function.     Clinical Decision Making (Complexity):  Assessment of Occupational Performance: 5 or more Performance Deficits  Occupational Performance Limitations: hygiene and grooming, home establishment and management, meal preparation and cleanup, work, and leisure activities  Clinical Decision Making (Complexity): Low complexity    PLAN OF CARE  Treatment Interventions:  Modalities:  US and Paraffin  Therapeutic Exercise:  AROM, PROM, Tendon Gliding, Blocking, and Extensor Tracking  Neuromuscular re-education:  Nerve Gliding and Kinesiotaping  Manual Techniques:  Joint mobilization, Scar mobilization, Friction massage, and Myofascial release  Orthotic Fabrication:  Hand based and Forearm based  Self Care:  Self Care Tasks    Long Term Goals   OT Goal 1  Goal Identifier: Household chores  Goal Description: Patient will be able to bear weight through flat left hand without difficulty  Rationale: In order to maximize safety and independence with ADL/IADLs  Target Date: 09/06/24      Frequency of Treatment: 1 x Week  Duration of Treatment: 8 Weeks     Education Assessment: Learner/Method: Patient     Risks and benefits of evaluation/treatment have been explained.   Patient/Family/caregiver agrees with Plan of Care.     Evaluation Time:    OT Eval, Low Complexity Minutes (21183): 20    Signing Clinician: Kika Cid OT

## 2024-07-12 ENCOUNTER — THERAPY VISIT (OUTPATIENT)
Dept: OCCUPATIONAL THERAPY | Facility: CLINIC | Age: 29
End: 2024-07-12
Payer: COMMERCIAL

## 2024-07-12 DIAGNOSIS — M79.645 PAIN OF FINGER OF LEFT HAND: ICD-10-CM

## 2024-07-12 DIAGNOSIS — G56.03 BILATERAL CARPAL TUNNEL SYNDROME: ICD-10-CM

## 2024-07-12 DIAGNOSIS — G56.02 LEFT CARPAL TUNNEL SYNDROME: Primary | ICD-10-CM

## 2024-07-12 DIAGNOSIS — M65.342 TRIGGER FINGER, LEFT RING FINGER: ICD-10-CM

## 2024-07-12 PROCEDURE — 97110 THERAPEUTIC EXERCISES: CPT | Mod: GO | Performed by: OCCUPATIONAL THERAPIST

## 2024-07-12 PROCEDURE — 97165 OT EVAL LOW COMPLEX 30 MIN: CPT | Mod: GO | Performed by: OCCUPATIONAL THERAPIST

## 2024-07-12 PROCEDURE — 97760 ORTHOTIC MGMT&TRAING 1ST ENC: CPT | Mod: GO | Performed by: OCCUPATIONAL THERAPIST

## 2024-07-19 ENCOUNTER — THERAPY VISIT (OUTPATIENT)
Dept: OCCUPATIONAL THERAPY | Facility: CLINIC | Age: 29
End: 2024-07-19
Payer: COMMERCIAL

## 2024-07-19 DIAGNOSIS — G56.02 LEFT CARPAL TUNNEL SYNDROME: ICD-10-CM

## 2024-07-19 DIAGNOSIS — M79.645 PAIN OF FINGER OF LEFT HAND: ICD-10-CM

## 2024-07-19 DIAGNOSIS — M65.342 TRIGGER FINGER, LEFT RING FINGER: Primary | ICD-10-CM

## 2024-07-19 PROCEDURE — 97110 THERAPEUTIC EXERCISES: CPT | Mod: GO | Performed by: OCCUPATIONAL THERAPIST

## 2024-07-19 PROCEDURE — 97140 MANUAL THERAPY 1/> REGIONS: CPT | Mod: GO | Performed by: OCCUPATIONAL THERAPIST

## 2024-07-19 PROCEDURE — 97035 APP MDLTY 1+ULTRASOUND EA 15: CPT | Mod: GO | Performed by: OCCUPATIONAL THERAPIST

## 2024-07-24 ENCOUNTER — TELEPHONE (OUTPATIENT)
Dept: ENDOCRINOLOGY | Facility: CLINIC | Age: 29
End: 2024-07-24
Payer: COMMERCIAL

## 2024-07-24 NOTE — TELEPHONE ENCOUNTER
Sent [employer] (1st Attempt) and Patient Contacted for the patient to call back and schedule the following:    Appointment type: Return diabetes  Provider: Laureen Diane  Return date: 6 and 12 months  Specialty phone number: [phone]  Additional appointment(s) needed: Follow-up with Maria R Goldstein in 6 months and me in 12 months.   Additonal Notes: Spoke with patient who was unable to schedule and will call back

## 2024-07-29 ENCOUNTER — MYC REFILL (OUTPATIENT)
Dept: PEDIATRICS | Facility: CLINIC | Age: 29
End: 2024-07-29

## 2024-07-29 ENCOUNTER — THERAPY VISIT (OUTPATIENT)
Dept: OCCUPATIONAL THERAPY | Facility: CLINIC | Age: 29
End: 2024-07-29
Payer: COMMERCIAL

## 2024-07-29 DIAGNOSIS — M79.645 PAIN OF FINGER OF LEFT HAND: ICD-10-CM

## 2024-07-29 DIAGNOSIS — F90.0 ADHD (ATTENTION DEFICIT HYPERACTIVITY DISORDER), INATTENTIVE TYPE: ICD-10-CM

## 2024-07-29 DIAGNOSIS — M65.342 TRIGGER FINGER, LEFT RING FINGER: Primary | ICD-10-CM

## 2024-07-29 DIAGNOSIS — G56.02 LEFT CARPAL TUNNEL SYNDROME: ICD-10-CM

## 2024-07-29 PROCEDURE — 97140 MANUAL THERAPY 1/> REGIONS: CPT | Mod: GO | Performed by: OCCUPATIONAL THERAPIST

## 2024-07-29 PROCEDURE — 97110 THERAPEUTIC EXERCISES: CPT | Mod: GO | Performed by: OCCUPATIONAL THERAPIST

## 2024-07-29 PROCEDURE — 97035 APP MDLTY 1+ULTRASOUND EA 15: CPT | Mod: GO | Performed by: OCCUPATIONAL THERAPIST

## 2024-07-29 RX ORDER — DEXTROAMPHETAMINE SACCHARATE, AMPHETAMINE ASPARTATE MONOHYDRATE, DEXTROAMPHETAMINE SULFATE AND AMPHETAMINE SULFATE 3.75; 3.75; 3.75; 3.75 MG/1; MG/1; MG/1; MG/1
15 CAPSULE, EXTENDED RELEASE ORAL DAILY
Qty: 90 CAPSULE | Refills: 0 | Status: SHIPPED | OUTPATIENT
Start: 2024-07-29

## 2024-07-31 DIAGNOSIS — R80.9 PROTEINURIA, UNSPECIFIED TYPE: Primary | ICD-10-CM

## 2024-07-31 DIAGNOSIS — R80.9 PROTEINURIA: ICD-10-CM

## 2024-07-31 RX ORDER — ENALAPRIL MALEATE 20 MG/1
20 TABLET ORAL 2 TIMES DAILY
Qty: 60 TABLET | Refills: 0 | Status: SHIPPED | OUTPATIENT
Start: 2024-07-31 | End: 2024-09-01

## 2024-08-01 ENCOUNTER — MYC REFILL (OUTPATIENT)
Dept: CARDIOLOGY | Facility: CLINIC | Age: 29
End: 2024-08-01
Payer: COMMERCIAL

## 2024-08-01 DIAGNOSIS — E78.5 HYPERLIPIDEMIA, UNSPECIFIED HYPERLIPIDEMIA TYPE: ICD-10-CM

## 2024-08-01 RX ORDER — ATORVASTATIN CALCIUM 40 MG/1
40 TABLET, FILM COATED ORAL DAILY
Qty: 90 TABLET | Refills: 0 | Status: SHIPPED | OUTPATIENT
Start: 2024-08-01

## 2024-08-01 NOTE — TELEPHONE ENCOUNTER
Noted patient was last seen by Dr Hoffman in November with plan to follow-up in 3 months. Marlyn refill sent until pt sees new cardiologist. Pt notified via [employer].       Requested Prescriptions   Pending Prescriptions Disp Refills    atorvastatin (LIPITOR) 40 MG tablet 90 tablet 0     Sig: Take 1 tablet (40 mg) by mouth daily       Antihyperlipidemic agents Passed - 8/1/2024 11:30 AM        Passed - LDL on file in the past 12 months        Passed - Medication is active on med list        Passed - Recent (12 mo) or future (90 days) visit within the authorizing provider's specialty     The patient must have completed an in-person or virtual visit within the past 12 months or has a future visit scheduled within the next 90 days with the authorizing provider s specialty.  Urgent care and e-visits do not quality as an office visit for this protocol.          Passed - Patient is age 18 years or older        Passed - No active pregnancy on record        Passed - No positive pregnancy test in past 12 mos

## 2024-08-09 ENCOUNTER — LAB (OUTPATIENT)
Dept: LAB | Facility: CLINIC | Age: 29
End: 2024-08-09
Payer: COMMERCIAL

## 2024-08-09 ENCOUNTER — THERAPY VISIT (OUTPATIENT)
Dept: OCCUPATIONAL THERAPY | Facility: CLINIC | Age: 29
End: 2024-08-09
Payer: COMMERCIAL

## 2024-08-09 ENCOUNTER — OFFICE VISIT (OUTPATIENT)
Dept: NEPHROLOGY | Facility: CLINIC | Age: 29
End: 2024-08-09
Attending: INTERNAL MEDICINE
Payer: COMMERCIAL

## 2024-08-09 VITALS
DIASTOLIC BLOOD PRESSURE: 76 MMHG | HEART RATE: 82 BPM | WEIGHT: 293 LBS | TEMPERATURE: 98.5 F | OXYGEN SATURATION: 97 % | BODY MASS INDEX: 46.54 KG/M2 | SYSTOLIC BLOOD PRESSURE: 110 MMHG

## 2024-08-09 DIAGNOSIS — G56.02 LEFT CARPAL TUNNEL SYNDROME: ICD-10-CM

## 2024-08-09 DIAGNOSIS — R80.9 PROTEINURIA, UNSPECIFIED TYPE: ICD-10-CM

## 2024-08-09 DIAGNOSIS — M65.342 TRIGGER FINGER, LEFT RING FINGER: Primary | ICD-10-CM

## 2024-08-09 DIAGNOSIS — N18.1 CHRONIC KIDNEY DISEASE, STAGE I: Primary | ICD-10-CM

## 2024-08-09 DIAGNOSIS — M79.645 PAIN OF FINGER OF LEFT HAND: ICD-10-CM

## 2024-08-09 DIAGNOSIS — E10.21 TYPE 1 DIABETES MELLITUS WITH DIABETIC NEPHROPATHY (H): ICD-10-CM

## 2024-08-09 LAB
ALBUMIN MFR UR ELPH: <6 MG/DL
ALBUMIN SERPL BCG-MCNC: 4.2 G/DL (ref 3.5–5.2)
ANION GAP SERPL CALCULATED.3IONS-SCNC: 10 MMOL/L (ref 7–15)
BUN SERPL-MCNC: 8 MG/DL (ref 6–20)
CALCIUM SERPL-MCNC: 9.7 MG/DL (ref 8.8–10.4)
CHLORIDE SERPL-SCNC: 106 MMOL/L (ref 98–107)
CREAT SERPL-MCNC: 0.61 MG/DL (ref 0.51–0.95)
CREAT UR-MCNC: 31.3 MG/DL
CREAT UR-MCNC: 32 MG/DL
EGFRCR SERPLBLD CKD-EPI 2021: >90 ML/MIN/1.73M2
GLUCOSE SERPL-MCNC: 118 MG/DL (ref 70–99)
HCO3 SERPL-SCNC: 24 MMOL/L (ref 22–29)
HGB BLD-MCNC: 14.6 G/DL (ref 11.7–15.7)
MICROALBUMIN UR-MCNC: <12 MG/L
MICROALBUMIN/CREAT UR: NORMAL MG/G{CREAT}
PHOSPHATE SERPL-MCNC: 2.8 MG/DL (ref 2.5–4.5)
POTASSIUM SERPL-SCNC: 4.8 MMOL/L (ref 3.4–5.3)
PROT/CREAT 24H UR: NORMAL MG/G{CREAT}
SODIUM SERPL-SCNC: 140 MMOL/L (ref 135–145)

## 2024-08-09 PROCEDURE — 80069 RENAL FUNCTION PANEL: CPT | Performed by: PATHOLOGY

## 2024-08-09 PROCEDURE — 85018 HEMOGLOBIN: CPT | Performed by: PATHOLOGY

## 2024-08-09 PROCEDURE — 99000 SPECIMEN HANDLING OFFICE-LAB: CPT | Performed by: PATHOLOGY

## 2024-08-09 PROCEDURE — 99213 OFFICE O/P EST LOW 20 MIN: CPT | Performed by: INTERNAL MEDICINE

## 2024-08-09 PROCEDURE — 97035 APP MDLTY 1+ULTRASOUND EA 15: CPT | Mod: GO | Performed by: OCCUPATIONAL THERAPIST

## 2024-08-09 PROCEDURE — 97110 THERAPEUTIC EXERCISES: CPT | Mod: GO | Performed by: OCCUPATIONAL THERAPIST

## 2024-08-09 PROCEDURE — 36415 COLL VENOUS BLD VENIPUNCTURE: CPT | Performed by: PATHOLOGY

## 2024-08-09 PROCEDURE — 84156 ASSAY OF PROTEIN URINE: CPT | Performed by: PATHOLOGY

## 2024-08-09 PROCEDURE — 99214 OFFICE O/P EST MOD 30 MIN: CPT | Mod: GC | Performed by: INTERNAL MEDICINE

## 2024-08-09 PROCEDURE — 82570 ASSAY OF URINE CREATININE: CPT | Performed by: NURSE PRACTITIONER

## 2024-08-09 PROCEDURE — 97140 MANUAL THERAPY 1/> REGIONS: CPT | Mod: GO | Performed by: OCCUPATIONAL THERAPIST

## 2024-08-09 ASSESSMENT — PAIN SCALES - GENERAL: PAINLEVEL: NO PAIN (0)

## 2024-08-09 NOTE — NURSING NOTE
Chief Complaint   Patient presents with    RECHECK     6 month follow up.      Vitals:    08/09/24 1120 08/09/24 1121 08/09/24 1122 08/09/24 1124   BP: 116/77 108/76 107/75 110/76   BP Location: Left arm Left arm Left arm    Patient Position: Sitting Sitting Sitting    Cuff Size: Adult Regular Adult Large Adult Large    Pulse: 82      Temp: 98.5  F (36.9  C)      TempSrc: Oral      SpO2: 97%      Weight: 137.8 kg (303 lb 14.4 oz)          BP Readings from Last 3 Encounters:   08/09/24 110/76   07/02/24 121/81   04/04/24 130/57       /76   Pulse 82   Temp 98.5  F (36.9  C) (Oral)   Wt 137.8 kg (303 lb 14.4 oz)   LMP 06/23/2024 (Approximate)   SpO2 97%   BMI 46.54 kg/m       Rachael Rivera

## 2024-08-09 NOTE — PROGRESS NOTES
"  Nephrology Clinic     DOS:2024     Name: Stacey La  MRN: [id number]  Age: 26 year old  : 1995  Referring provider: Amalia Ervin    Follow up     Assessment and Plan:  1.  CKD, stage 1: Secondary to biopsy proven diabetic nephropathy with preserved kidney function (baseline Cr~0.6 mg/dL, eGFR>90) and well controlled albuminuria (<30 mg/g). However, suspect significant hyperfiltration due to DM 2 and obesity. She has responded well to RAAS blockade with enalapril.     -- No changes today  -- Will continue enalapril at 20 mg BID for management of HTN and albuminuria. Monitor closely for unintended pregnancy.    -- Continue good blood pressure and glucose control  -- Encourage weight loss  -- RTC in 1 year     2. HTN/Volume: Home SBP at goal of <120/80.  Euvolemic on exam in past. In the past, suspect minimal lower extremity edema is related to high salt intake in a setting of sodium avid kidneys and venous stasis. She did not have edema today. Suspect an element of \"white coat\" hypertension with her clinic blood pressures.  A 24 hour ambulatory BP monitoring was performed following a previous clinic visit that revealed average overall daily BP of 124/68 confirming white coat hypertension. However, she did not have a night time dipping pattern.       -- Continue low salt diet  -- Continue enalapril at 20 mg BID     3. Diabetes:  Complicated by diabetic nephropathy. No retinopathy or peripheral neuropathy. She has had ~5 episodes of DKA in the past. Recent hgb A1c was 6.5  ().   -- Followed closely by endocrinology     4.  Vitamin D deficiency: Low vitamin D level multiple times in the past. Vit D low normal in 2024.  -- Continue cholecalciferol to 2000 units daily     5.  Depression/anxiety:  In part, situational and related to stress at home, her job, separation and passing of a loved one and her medical illness.    -- continue lexapro at 10 mg daily that was started at a " previous clinic visit over a year ago.  She is no longer on Buspar and does not take hydroxyzine prn. Agree with reducing/tapering dose as PCP sees fit.    -- Followed closely by her PCP and psychology/psychiatry.     6.  Iron deficiency:  Not anemic (hgb 14.3). Iron studies on low side in past.  Suspect iron deficiency related to menses and possibly decreased GI absorption.    -- She would like to avoid iron supplements for now; encouraged her to increase intake of iron containing foods.    -- Will repeat hgb and iron studies at next visit     7. Acquired hypothyroidism: Patient is not being treated for hypothyroidism with Levothyroxine. Recent TSHwithin normal range.  - f/u with endocrinology      Reason For Visit: CKD follow Up    HPI:   Stacey La is a 27 year old woman with a history of Type 1 DM and celiac disease who presents for follow up regarding chronic kidney disease. She was previously cared for by her pediatric nephrologist, Dr. India Olivas. Today, the patient reports feeling well overall. Her hemoglobin A1c was 6.9 toay1. She is not actively trying for a baby at this point in time and has no immediate plans given that she is now  from her . She is taking lexapro for depression which has been stable.  However, lexapro is being weaned off.  She continues to [employer].  She otherwise is well and has no specific medical complaints. She is now working as a [employer] and has had much satisfaction with her new job.      Interval events: no changes. Compliant with meds. No medical events, hospitalizations. Overall doing great.     Review of Systems:   Pertinent items are noted in HPI or as below, remainder of complete ROS is negative.      Active Medications:   Current Outpatient Medications   Medication Sig Dispense Refill    albuterol (PROAIR HFA/PROVENTIL HFA/VENTOLIN HFA) 108 (90 Base) MCG/ACT inhaler Inhale 2 puffs into the lungs every 6 hours 18 g 3    albuterol (PROVENTIL) (2.5 MG/3ML)  0.083% neb solution Take 1 vial (2.5 mg) by nebulization every 6 hours as needed for shortness of breath, wheezing or cough 200 mL 0    ALPRAZolam (XANAX) 0.5 MG tablet Take 1 tablet (0.5 mg) by mouth 3 times daily as needed for anxiety 5 tablet 0    amphetamine-dextroamphetamine (ADDERALL XR) 15 MG 24 hr capsule Take 1 capsule (15 mg) by mouth daily 90 capsule 0    amphetamine-dextroamphetamine (ADDERALL) 10 MG tablet Take 1 tablet (10 mg) by mouth daily 90 tablet 0    atorvastatin (LIPITOR) 40 MG tablet Take 1 tablet (40 mg) by mouth daily 90 tablet 0    blood glucose (NO BRAND SPECIFIED) test strip Use to test blood sugar five times daily or as directed.  Dispense Brandy Contour Next Test Strips. 200 strip 11    busPIRone (BUSPAR) 5 MG tablet Take 1 tablet (5 mg) by mouth 2 times daily for 7 days, THEN 2 tablets (10 mg) 2 times daily for 83 days. 346 tablet 0    Continuous Blood Gluc Sensor (DEXCOM G7 SENSOR) MISC Change every 10 days. 9 each 3    Continuous Blood Gluc Sensor (DEXCOM G7 SENSOR) MISC Use as directed to monitor blood glucose. Change every 10 days. 9 each 0    enalapril (VASOTEC) 20 MG tablet Take 1 tablet (20 mg) by mouth 2 times daily 60 tablet 0    escitalopram (LEXAPRO) 5 MG tablet Take 1 tablet (5 mg) by mouth daily for 360 days 90 tablet 3    fluticasone (FLONASE) 50 MCG/ACT nasal spray Spray 1 spray into both nostrils daily 16 g 11    Glucagon, rDNA, (GLUCAGON EMERGENCY) 1 MG KIT Use as directed in case of low blood glucose. 1 kit 3    Injection Device for insulin (INPEN 100-GREY-NOVOLOG-FIASP) MOLLY 1 each 5 times daily 2 each 1    insulin aspart (NOVOLOG VIAL) 100 UNITS/ML vial Use as directed, up to 150 units daily in insulin pump 150 mL 3    insulin degludec (TRESIBA FLEXTOUCH) 100 UNIT/ML pen Inject 80 Units Subcutaneous daily 75 mL 3    Insulin Infusion Pump Supplies (INSULIN PUMP SYRINGE RESERVOIR) MISC Change every other day as directed 45 each 3    Insulin Infusion Pump Supplies  "(SURE-T INFUSION SET 23\") MISC Change every 2-3 days 30 each 3    insulin pen needle (32G X 4 MM) 32G X 4 MM miscellaneous Use 6 pen needles daily as directed for insulin administration. 600 each 3    Insulin Pump Accessories MISC Infusion set per patient preference.  Change infusion set every other day 45 each 3    insulin syringe-needle U-100 (31G X 5/16\" 0.5 ML) 31G X 5/16\" 0.5 ML miscellaneous Use 5 syringes daily or as directed. 100 each 3    insulin syringes, disposable, U-100 0.3 ML MISC Use 2-3 times daily as needed 100 each 11    ipratropium - albuterol 0.5 mg/2.5 mg/3 mL (DUONEB) 0.5-2.5 (3) MG/3ML neb solution Take 1 vial (3 mLs) by nebulization every 6 hours as needed for shortness of breath or wheezing 30 mL 11    KETOSTIX test strip Use as directed in case of high glucose, vomiting or illness.once weekly 50 strip 3    montelukast (SINGULAIR) 10 MG tablet Take 1 tablet (10 mg) by mouth at bedtime 90 tablet 3    Multiple Vitamins-Minerals (MULTIVITAMIN ADULT PO) Take 1 tablet by mouth daily      NOVOLOG PENFILL 100 UNIT/ML soln Take as directed up to 60 units per day 54 mL 3     Current Facility-Administered Medications   Medication Dose Route Frequency Provider Last Rate Last Admin    dexAMETHasone (DECADRON) injection 4 mg  4 mg   Gray Reyes MD   4 mg at 12/11/23 1304    lidocaine (PF) (XYLOCAINE) 1 % injection 1 mL  1 mL   Gray Reyes MD   1 mL at 12/11/23 1304      Allergies:   Dogs  Dust mites  Gluten meal      Past Medical History:  Anxiety  Asthma  Celiac disease  Chronic kidney disease, stage I  Chronic sinusitis  Diabetes mellitus type 1  Diabetic nephropathy  Hypertension  Hypothyroidism  Pedal edema  Psoriasis  Depression     Past Surgical History:  ENT Surgery  Tonsillectomy, adenoidectomy, combined    Family History:   Father (Diabetes, Myocardial Infarct)  Mother (Celiacs)  Maternal Grandfather (Enlarged heart)    Social History:   The patient is . The patient drinks roughly two " times a month. She is currently sexually active with male partners and uses condoms for birth control.The patient has no reported social history of smoking, smokeless tobacco use, or drug use.     Physical Exam:  LMP 06/23/2024 (Approximate)    GENERAL APPEARANCE: alert and no distress  EYES: nonicteric  HENT: mouth without ulcers or lesions  NECK: supple, no adenopathy  RESP: lungs clear to auscultation   CV: regular rhythm, no rub  ABDOMEN: obese, soft, nontender  : No CVA tenderness  Extremities: no significant lower extremity edema  MS: no evidence of inflammation in joints, no muscle tenderness  SKIN: no concerning rash  NEURO: mentation intact and speech normal  PSYCH: affect and mood appropriate     Laboratory:  CMP  Recent Labs   Lab Test 01/11/24  1454 06/05/23  1736 04/15/23  1009 07/24/22  1041 11/15/21  0901 11/15/21  0901 05/07/21  0824 04/30/21  1620 05/19/20  1201 05/17/19  1528    139 140 139   < > 140 136 138 138 141   POTASSIUM 4.5 4.0 4.8 4.7   < > 4.4 3.9 3.9 4.3 3.6   CHLORIDE 106 104 105 109   < > 105 104 104 104 107   CO2 26 25 28 24   < > 25 26 27 22 25   ANIONGAP 8 10 7 6   < > 10 5 7 12 9   * 118* 112* 97   < > 249* 129* 184* 180* 95   BUN 8.6 9.6 10.6 6*   < > 9 11 9 9 9   CR 0.74 0.59 0.57 0.51*   < > 0.53 0.59 0.60 0.49* 0.62   GFRESTIMATED >90 >90 >90 >90   < > >90 >90 >90 >90 >90   GFRESTBLACK  --   --   --   --   --   --  >90 >90 >90 >90   FRANKLIN 9.3 9.8 9.6 9.1   < > 9.2 9.3 9.4 9.3 9.0   PHOS  --  3.7 3.8 3.1  --  3.0  --  4.0 3.7  --    PROTTOTAL 7.2  --   --   --   --   --  7.3  --   --  8.3   ALBUMIN 4.1 4.3 4.1 3.6   < > 3.4 3.6 3.7 3.6 4.0   BILITOTAL 0.2  --   --   --   --   --  0.4  --   --  0.2   ALKPHOS 87  --   --   --   --   --  80  --   --  88   AST 20  --   --   --   --   --  13  --   --  14   ALT 27  --   --   --   --   --  23  --   --  33    < > = values in this interval not displayed.     CBC  Recent Labs   Lab Test 01/11/24  1454 04/15/23  1046  07/24/22  1041 11/15/21  0901 04/30/21  1620 05/19/20  1201 06/07/19  0858   HGB 13.9 14.3 14.0 14.5 14.4   < > 13.9   WBC 11.5* 8.9  --   --  12.4*  --  9.8   RBC 5.15 5.09  --   --  5.20  --  4.97   HCT 44.4 43.7  --   --  44.3  --  42.6   MCV 86 86  --   --  85  --  86   MCH 27.0 28.1  --   --  27.7  --  28.0   MCHC 31.3* 32.7  --   --  32.5  --  32.6   RDW 12.7 12.8  --   --  13.2  --  13.6    310  --   --  378  --  359    < > = values in this interval not displayed.     INR  No lab results found.     ABG  No lab results found.     URINE STUDIES  Recent Labs   Lab Test 04/15/23  1014 05/17/19  1511   COLOR Light Yellow Straw   APPEARANCE Clear Clear   URINEGLC Negative Negative   URINEBILI Negative Negative   URINEKETONE Negative Negative   SG 1.005 1.002*   UBLD Negative Negative   URINEPH 7.5* 7.0   PROTEIN Negative Negative   NITRITE Negative Negative   LEUKEST Negative Negative   RBCU 0  --    WBCU <1  --      Recent Labs   Lab Test 11/15/21  0912 04/30/21  1626 05/19/20  1201 03/04/19  0900 01/17/18  1517 07/25/17  1457 01/24/17  1533 07/26/16  1135   UTPG 0.17 Unable to calculate due to low value Unable to calculate due to low value Unable to calculate due to low value Unable to calculate due to low value Unable to calculate due to low value Unable to calculate due to low value 0.23*     PTH  Recent Labs   Lab Test 04/15/23  1009 07/24/22  1041 04/30/21  1620 05/19/20  1201 01/17/18  1515 01/24/17  1525   PTHI 67* 49 51 50 60 24     IRON STUDIES   Recent Labs   Lab Test 07/02/24  1224 05/19/20  1201 01/17/18  1515 07/25/17  1455 07/26/16  1137   IRON 88 81 58 50 60    415 417 374 423   IRONSAT 28 20 14* 13* 14*   DIPESH  --  26 13 26 13     Imaging:   Not applicable to this visit.     All the above labs were reviewed by me.

## 2024-08-09 NOTE — LETTER
"2024       RE: Stacey Mantilla  [address]  [facility] [address]     Dear Colleague,    Thank you for referring your patient, Stacey Mantilla, to the [facility] [place] at [facility]. Please see a copy of my visit note below.      Nephrology Clinic     DOS:2024     Name: Stacey La  MRN: [id number]  Age: 26 year old  : 1995  Referring provider: Amalia Ervin    Follow up     Assessment and Plan:  1.  CKD, stage 1: Secondary to biopsy proven diabetic nephropathy with preserved kidney function (baseline Cr~0.6 mg/dL, eGFR>90) and well controlled albuminuria (<30 mg/g). However, suspect significant hyperfiltration due to DM 2 and obesity. She has responded well to RAAS blockade with enalapril.     -- No changes today  -- Will continue enalapril at 20 mg BID for management of HTN and albuminuria. Monitor closely for unintended pregnancy.    -- Continue good blood pressure and glucose control  -- Encourage weight loss  -- RTC in 1 year     2. HTN/Volume: Home SBP at goal of <120/80.  Euvolemic on exam in past. In the past, suspect minimal lower extremity edema is related to high salt intake in a setting of sodium avid kidneys and venous stasis. She did not have edema today. Suspect an element of \"white coat\" hypertension with her clinic blood pressures.  A 24 hour ambulatory BP monitoring was performed following a previous clinic visit that revealed average overall daily BP of 124/68 confirming white coat hypertension. However, she did not have a night time dipping pattern.       -- Continue low salt diet  -- Continue enalapril at 20 mg BID     3. Diabetes:  Complicated by diabetic nephropathy. No retinopathy or peripheral neuropathy. She has had ~5 episodes of DKA in the past. Recent hgb A1c was 6.5  ().   -- Followed closely by endocrinology     4.  Vitamin D deficiency: Low vitamin D level multiple times in " the past. Vit D low normal in July 2024.  -- Continue cholecalciferol to 2000 units daily     5.  Depression/anxiety:  In part, situational and related to stress at home, her job, separation and passing of a loved one and her medical illness.    -- continue lexapro at 10 mg daily that was started at a previous clinic visit over a year ago.  She is no longer on Buspar and does not take hydroxyzine prn. Agree with reducing/tapering dose as PCP sees fit.    -- Followed closely by her PCP and psychology/psychiatry.     6.  Iron deficiency:  Not anemic (hgb 14.3). Iron studies on low side in past.  Suspect iron deficiency related to menses and possibly decreased GI absorption.    -- She would like to avoid iron supplements for now; encouraged her to increase intake of iron containing foods.    -- Will repeat hgb and iron studies at next visit     7. Acquired hypothyroidism: Patient is not being treated for hypothyroidism with Levothyroxine. Recent TSHwithin normal range.  - f/u with endocrinology      Reason For Visit: CKD follow Up    HPI:   Stacey La is a 27 year old woman with a history of Type 1 DM and celiac disease who presents for follow up regarding chronic kidney disease. She was previously cared for by her pediatric nephrologist, Dr. India Olivas. Today, the patient reports feeling well overall. Her hemoglobin A1c was 6.9 toay1. She is not actively trying for a baby at this point in time and has no immediate plans given that she is now  from her . She is taking lexapro for depression which has been stable.  However, lexapro is being weaned off.  She continues to [employer].  She otherwise is well and has no specific medical complaints. She is now working as a [employer] and has had much satisfaction with her new job.      Interval events: no changes. Compliant with meds. No medical events, hospitalizations. Overall doing great.     Review of Systems:   Pertinent items are noted in HPI or as below,  remainder of complete ROS is negative.      Active Medications:   Current Outpatient Medications   Medication Sig Dispense Refill     albuterol (PROAIR HFA/PROVENTIL HFA/VENTOLIN HFA) 108 (90 Base) MCG/ACT inhaler Inhale 2 puffs into the lungs every 6 hours 18 g 3     albuterol (PROVENTIL) (2.5 MG/3ML) 0.083% neb solution Take 1 vial (2.5 mg) by nebulization every 6 hours as needed for shortness of breath, wheezing or cough 200 mL 0     ALPRAZolam (XANAX) 0.5 MG tablet Take 1 tablet (0.5 mg) by mouth 3 times daily as needed for anxiety 5 tablet 0     amphetamine-dextroamphetamine (ADDERALL XR) 15 MG 24 hr capsule Take 1 capsule (15 mg) by mouth daily 90 capsule 0     amphetamine-dextroamphetamine (ADDERALL) 10 MG tablet Take 1 tablet (10 mg) by mouth daily 90 tablet 0     atorvastatin (LIPITOR) 40 MG tablet Take 1 tablet (40 mg) by mouth daily 90 tablet 0     blood glucose (NO BRAND SPECIFIED) test strip Use to test blood sugar five times daily or as directed.  Dispense Brandy Contour Next Test Strips. 200 strip 11     busPIRone (BUSPAR) 5 MG tablet Take 1 tablet (5 mg) by mouth 2 times daily for 7 days, THEN 2 tablets (10 mg) 2 times daily for 83 days. 346 tablet 0     Continuous Blood Gluc Sensor (DEXCOM G7 SENSOR) MISC Change every 10 days. 9 each 3     Continuous Blood Gluc Sensor (DEXCOM G7 SENSOR) MISC Use as directed to monitor blood glucose. Change every 10 days. 9 each 0     enalapril (VASOTEC) 20 MG tablet Take 1 tablet (20 mg) by mouth 2 times daily 60 tablet 0     escitalopram (LEXAPRO) 5 MG tablet Take 1 tablet (5 mg) by mouth daily for 360 days 90 tablet 3     fluticasone (FLONASE) 50 MCG/ACT nasal spray Spray 1 spray into both nostrils daily 16 g 11     Glucagon, rDNA, (GLUCAGON EMERGENCY) 1 MG KIT Use as directed in case of low blood glucose. 1 kit 3     Injection Device for insulin (INPEN 100-GREY-NOVOLOG-FIASP) MOLLY 1 each 5 times daily 2 each 1     insulin aspart (NOVOLOG VIAL) 100 UNITS/ML vial  "Use as directed, up to 150 units daily in insulin pump 150 mL 3     insulin degludec (TRESIBA FLEXTOUCH) 100 UNIT/ML pen Inject 80 Units Subcutaneous daily 75 mL 3     Insulin Infusion Pump Supplies (INSULIN PUMP SYRINGE RESERVOIR) MISC Change every other day as directed 45 each 3     Insulin Infusion Pump Supplies (SURE-T INFUSION SET 23\") MISC Change every 2-3 days 30 each 3     insulin pen needle (32G X 4 MM) 32G X 4 MM miscellaneous Use 6 pen needles daily as directed for insulin administration. 600 each 3     Insulin Pump Accessories MISC Infusion set per patient preference.  Change infusion set every other day 45 each 3     insulin syringe-needle U-100 (31G X 5/16\" 0.5 ML) 31G X 5/16\" 0.5 ML miscellaneous Use 5 syringes daily or as directed. 100 each 3     insulin syringes, disposable, U-100 0.3 ML MISC Use 2-3 times daily as needed 100 each 11     ipratropium - albuterol 0.5 mg/2.5 mg/3 mL (DUONEB) 0.5-2.5 (3) MG/3ML neb solution Take 1 vial (3 mLs) by nebulization every 6 hours as needed for shortness of breath or wheezing 30 mL 11     KETOSTIX test strip Use as directed in case of high glucose, vomiting or illness.once weekly 50 strip 3     montelukast (SINGULAIR) 10 MG tablet Take 1 tablet (10 mg) by mouth at bedtime 90 tablet 3     Multiple Vitamins-Minerals (MULTIVITAMIN ADULT PO) Take 1 tablet by mouth daily       NOVOLOG PENFILL 100 UNIT/ML soln Take as directed up to 60 units per day 54 mL 3     Current Facility-Administered Medications   Medication Dose Route Frequency Provider Last Rate Last Admin     dexAMETHasone (DECADRON) injection 4 mg  4 mg   Gray Reyes MD   4 mg at 12/11/23 1304     lidocaine (PF) (XYLOCAINE) 1 % injection 1 mL  1 mL   Gray Reeys MD   1 mL at 12/11/23 1304      Allergies:   Dogs  Dust mites  Gluten meal      Past Medical History:  Anxiety  Asthma  Celiac disease  Chronic kidney disease, stage I  Chronic sinusitis  Diabetes mellitus type 1  Diabetic " nephropathy  Hypertension  Hypothyroidism  Pedal edema  Psoriasis  Depression     Past Surgical History:  ENT Surgery  Tonsillectomy, adenoidectomy, combined    Family History:   Father (Diabetes, Myocardial Infarct)  Mother (Celiacs)  Maternal Grandfather (Enlarged heart)    Social History:   The patient is . The patient drinks roughly two times a month. She is currently sexually active with male partners and uses condoms for birth control.The patient has no reported social history of smoking, smokeless tobacco use, or drug use.     Physical Exam:  LMP 06/23/2024 (Approximate)    GENERAL APPEARANCE: alert and no distress  EYES: nonicteric  HENT: mouth without ulcers or lesions  NECK: supple, no adenopathy  RESP: lungs clear to auscultation   CV: regular rhythm, no rub  ABDOMEN: obese, soft, nontender  : No CVA tenderness  Extremities: no significant lower extremity edema  MS: no evidence of inflammation in joints, no muscle tenderness  SKIN: no concerning rash  NEURO: mentation intact and speech normal  PSYCH: affect and mood appropriate     Laboratory:  CMP  Recent Labs   Lab Test 01/11/24  1454 06/05/23  1736 04/15/23  1009 07/24/22  1041 11/15/21  0901 11/15/21  0901 05/07/21  0824 04/30/21  1620 05/19/20  1201 05/17/19  1528    139 140 139   < > 140 136 138 138 141   POTASSIUM 4.5 4.0 4.8 4.7   < > 4.4 3.9 3.9 4.3 3.6   CHLORIDE 106 104 105 109   < > 105 104 104 104 107   CO2 26 25 28 24   < > 25 26 27 22 25   ANIONGAP 8 10 7 6   < > 10 5 7 12 9   * 118* 112* 97   < > 249* 129* 184* 180* 95   BUN 8.6 9.6 10.6 6*   < > 9 11 9 9 9   CR 0.74 0.59 0.57 0.51*   < > 0.53 0.59 0.60 0.49* 0.62   GFRESTIMATED >90 >90 >90 >90   < > >90 >90 >90 >90 >90   GFRESTBLACK  --   --   --   --   --   --  >90 >90 >90 >90   FRANKLIN 9.3 9.8 9.6 9.1   < > 9.2 9.3 9.4 9.3 9.0   PHOS  --  3.7 3.8 3.1  --  3.0  --  4.0 3.7  --    PROTTOTAL 7.2  --   --   --   --   --  7.3  --   --  8.3   ALBUMIN 4.1 4.3 4.1 3.6   < >  3.4 3.6 3.7 3.6 4.0   BILITOTAL 0.2  --   --   --   --   --  0.4  --   --  0.2   ALKPHOS 87  --   --   --   --   --  80  --   --  88   AST 20  --   --   --   --   --  13  --   --  14   ALT 27  --   --   --   --   --  23  --   --  33    < > = values in this interval not displayed.     CBC  Recent Labs   Lab Test 01/11/24  1454 04/15/23  1041 07/24/22  1041 11/15/21  0901 04/30/21  1620 05/19/20  1201 06/07/19  0858   HGB 13.9 14.3 14.0 14.5 14.4   < > 13.9   WBC 11.5* 8.9  --   --  12.4*  --  9.8   RBC 5.15 5.09  --   --  5.20  --  4.97   HCT 44.4 43.7  --   --  44.3  --  42.6   MCV 86 86  --   --  85  --  86   MCH 27.0 28.1  --   --  27.7  --  28.0   MCHC 31.3* 32.7  --   --  32.5  --  32.6   RDW 12.7 12.8  --   --  13.2  --  13.6    310  --   --  378  --  359    < > = values in this interval not displayed.     INR  No lab results found.     ABG  No lab results found.     URINE STUDIES  Recent Labs   Lab Test 04/15/23  1014 05/17/19  1511   COLOR Light Yellow Straw   APPEARANCE Clear Clear   URINEGLC Negative Negative   URINEBILI Negative Negative   URINEKETONE Negative Negative   SG 1.005 1.002*   UBLD Negative Negative   URINEPH 7.5* 7.0   PROTEIN Negative Negative   NITRITE Negative Negative   LEUKEST Negative Negative   RBCU 0  --    WBCU <1  --      Recent Labs   Lab Test 11/15/21  0912 04/30/21  1626 05/19/20  1201 03/04/19  0900 01/17/18  1517 07/25/17  1457 01/24/17  1533 07/26/16  1135   UTPG 0.17 Unable to calculate due to low value Unable to calculate due to low value Unable to calculate due to low value Unable to calculate due to low value Unable to calculate due to low value Unable to calculate due to low value 0.23*     PTH  Recent Labs   Lab Test 04/15/23  1009 07/24/22  1041 04/30/21  1620 05/19/20  1201 01/17/18  1515 01/24/17  1525   PTHI 67* 49 51 50 60 24     IRON STUDIES   Recent Labs   Lab Test 07/02/24  1224 05/19/20  1201 01/17/18  1515 07/25/17  1455 07/26/16  1137   IRON 88 81 58 50  60    415 [phone]   IRONSAT 28 20 14* 13* 14*   DIPESH  --  26 13 26 13     Imaging:   Not applicable to this visit.     All the above labs were reviewed by me.       Attestation signed by Connor Castaneda MD at 8/27/2024  1:30 PM:  This patient has been evaluated by me, Connor Castaneda MD, on 8/9/2024.  I discussed with the fellow, Dr. Jason, and agree with the findings and plan in this note.  I have reviewed medications, vital signs and labs.       Connor Castaneda MD      Again, thank you for allowing me to participate in the care of your patient.      Sincerely,    Manisha Bobby MD

## 2024-08-14 ENCOUNTER — MYC MEDICAL ADVICE (OUTPATIENT)
Dept: PEDIATRICS | Facility: CLINIC | Age: 29
End: 2024-08-14
Payer: COMMERCIAL

## 2024-08-14 DIAGNOSIS — N62 LARGE BREASTS: Primary | ICD-10-CM

## 2024-08-16 ENCOUNTER — THERAPY VISIT (OUTPATIENT)
Dept: OCCUPATIONAL THERAPY | Facility: CLINIC | Age: 29
End: 2024-08-16
Payer: COMMERCIAL

## 2024-08-16 DIAGNOSIS — M79.645 PAIN OF FINGER OF LEFT HAND: ICD-10-CM

## 2024-08-16 DIAGNOSIS — G56.02 LEFT CARPAL TUNNEL SYNDROME: ICD-10-CM

## 2024-08-16 DIAGNOSIS — M65.342 TRIGGER FINGER, LEFT RING FINGER: Primary | ICD-10-CM

## 2024-08-16 PROCEDURE — 97035 APP MDLTY 1+ULTRASOUND EA 15: CPT | Mod: GO | Performed by: OCCUPATIONAL THERAPIST

## 2024-08-16 PROCEDURE — 97140 MANUAL THERAPY 1/> REGIONS: CPT | Mod: GO | Performed by: OCCUPATIONAL THERAPIST

## 2024-08-16 PROCEDURE — 97110 THERAPEUTIC EXERCISES: CPT | Mod: GO | Performed by: OCCUPATIONAL THERAPIST

## 2024-08-23 ENCOUNTER — THERAPY VISIT (OUTPATIENT)
Dept: OCCUPATIONAL THERAPY | Facility: CLINIC | Age: 29
End: 2024-08-23
Payer: COMMERCIAL

## 2024-08-23 DIAGNOSIS — G56.02 LEFT CARPAL TUNNEL SYNDROME: ICD-10-CM

## 2024-08-23 DIAGNOSIS — M79.645 PAIN OF FINGER OF LEFT HAND: ICD-10-CM

## 2024-08-23 DIAGNOSIS — M65.342 TRIGGER FINGER, LEFT RING FINGER: Primary | ICD-10-CM

## 2024-08-23 PROCEDURE — 97140 MANUAL THERAPY 1/> REGIONS: CPT | Mod: GO | Performed by: OCCUPATIONAL THERAPIST

## 2024-08-23 PROCEDURE — 97110 THERAPEUTIC EXERCISES: CPT | Mod: GO | Performed by: OCCUPATIONAL THERAPIST

## 2024-08-23 PROCEDURE — 97035 APP MDLTY 1+ULTRASOUND EA 15: CPT | Mod: GO | Performed by: OCCUPATIONAL THERAPIST

## 2024-09-01 ENCOUNTER — MYC REFILL (OUTPATIENT)
Dept: NEPHROLOGY | Facility: CLINIC | Age: 29
End: 2024-09-01
Payer: COMMERCIAL

## 2024-09-01 DIAGNOSIS — R80.9 PROTEINURIA: ICD-10-CM

## 2024-09-03 DIAGNOSIS — E66.01 MORBID OBESITY (H): Primary | ICD-10-CM

## 2024-09-03 RX ORDER — METFORMIN HCL 500 MG
TABLET, EXTENDED RELEASE 24 HR ORAL
Qty: 360 TABLET | Refills: 3 | Status: SHIPPED | OUTPATIENT
Start: 2024-09-03 | End: 2025-09-24

## 2024-09-03 RX ORDER — ENALAPRIL MALEATE 20 MG/1
20 TABLET ORAL 2 TIMES DAILY
Qty: 180 TABLET | Refills: 3 | Status: SHIPPED | OUTPATIENT
Start: 2024-09-03

## 2024-09-03 NOTE — TELEPHONE ENCOUNTER
Nephrology Note: Medication Refill Request    Medication Refill Request:     Medication/Dose/Frequency: enalapril  Preferred [facility]acy: [facility]  Provider:   Dr. Castaneda                        SITUATION/BACKROUND:                 Last office visit: August 2024        Future office visit: not scheduled as of yet     ASSESSMENT:     Neph Assessments:    Recent Labs:  CBC Results:  Recent Labs   Lab Test 08/09/24  1040 01/11/24  1454   WBC  --  11.5*   RBC  --  5.15   HGB 14.6 13.9   HCT  --  44.4   MCV  --  86   MCH  --  27.0   MCHC  --  31.3*   RDW  --  12.7   PLT  --  381     Last Renal Panel:  Sodium   Date Value Ref Range Status   08/09/2024 140 135 - 145 mmol/L Final   05/07/2021 136 133 - 144 mmol/L Final     Potassium   Date Value Ref Range Status   08/09/2024 4.8 3.4 - 5.3 mmol/L Final   07/24/2022 4.7 3.4 - 5.3 mmol/L Final   05/07/2021 3.9 3.4 - 5.3 mmol/L Final     Chloride   Date Value Ref Range Status   08/09/2024 106 98 - 107 mmol/L Final   07/24/2022 109 94 - 109 mmol/L Final   05/07/2021 104 94 - 109 mmol/L Final     Carbon Dioxide   Date Value Ref Range Status   05/07/2021 26 20 - 32 mmol/L Final     Carbon Dioxide (CO2)   Date Value Ref Range Status   08/09/2024 24 22 - 29 mmol/L Final   07/24/2022 24 20 - 32 mmol/L Final     Anion Gap   Date Value Ref Range Status   08/09/2024 10 7 - 15 mmol/L Final   07/24/2022 6 3 - 14 mmol/L Final   05/07/2021 5 3 - 14 mmol/L Final     Glucose   Date Value Ref Range Status   08/09/2024 118 (H) 70 - 99 mg/dL Final   07/24/2022 97 70 - 99 mg/dL Final   05/07/2021 129 (H) 70 - 99 mg/dL Final     Comment:     Fasting specimen     Urea Nitrogen   Date Value Ref Range Status   08/09/2024 8.0 6.0 - 20.0 mg/dL Final   07/24/2022 6 (L) 7 - 30 mg/dL Final   05/07/2021 11 7 - 30 mg/dL Final     Creatinine   Date Value Ref Range Status   08/09/2024 0.61 0.51 - 0.95 mg/dL Final   05/07/2021 0.59 0.52 - 1.04 mg/dL Final     GFR Estimate   Date Value Ref Range Status    08/09/2024 >90 >60 mL/min/1.73m2 Final     Comment:     eGFR calculated using 2021 CKD-EPI equation.   05/07/2021 >90 >60 mL/min/[1.73_m2] Final     Comment:     Non  GFR Calc  Starting 12/18/2018, serum creatinine based estimated GFR (eGFR) will be   calculated using the Chronic Kidney Disease Epidemiology Collaboration   (CKD-EPI) equation.       Calcium   Date Value Ref Range Status   08/09/2024 9.7 8.8 - 10.4 mg/dL Final     Comment:     Reference intervals for this test were updated on 7/16/2024 to reflect our healthy population more accurately. There may be differences in the flagging of prior results with similar values performed with this method. Those prior results can be interpreted in the context of the updated reference intervals.   05/07/2021 9.3 8.5 - 10.1 mg/dL Final     Phosphorus   Date Value Ref Range Status   08/09/2024 2.8 2.5 - 4.5 mg/dL Final   04/30/2021 4.0 2.5 - 4.5 mg/dL Final     Albumin   Date Value Ref Range Status   08/09/2024 4.2 3.5 - 5.2 g/dL Final   07/24/2022 3.6 3.4 - 5.0 g/dL Final   05/07/2021 3.6 3.4 - 5.0 g/dL Final       Current Medication List:   Current Outpatient Medications (Antihypertensive, Cardiovascular, Diuretics, Beta blockers, Calcium blockers, Anticoagulants)   Medication Sig    enalapril (VASOTEC) 20 MG tablet Take 1 tablet (20 mg) by mouth 2 times daily.     Current Outpatient Medications (Other)   Medication Sig    albuterol (PROAIR HFA/PROVENTIL HFA/VENTOLIN HFA) 108 (90 Base) MCG/ACT inhaler Inhale 2 puffs into the lungs every 6 hours    albuterol (PROVENTIL) (2.5 MG/3ML) 0.083% neb solution Take 1 vial (2.5 mg) by nebulization every 6 hours as needed for shortness of breath, wheezing or cough (Patient not taking: Reported on 8/9/2024)    ALPRAZolam (XANAX) 0.5 MG tablet Take 1 tablet (0.5 mg) by mouth 3 times daily as needed for anxiety    amphetamine-dextroamphetamine (ADDERALL XR) 15 MG 24 hr capsule Take 1 capsule (15 mg) by mouth daily  "   amphetamine-dextroamphetamine (ADDERALL) 10 MG tablet Take 1 tablet (10 mg) by mouth daily    atorvastatin (LIPITOR) 40 MG tablet Take 1 tablet (40 mg) by mouth daily    blood glucose (NO BRAND SPECIFIED) test strip Use to test blood sugar five times daily or as directed.  Dispense Brandy Contour Next Test Strips.    busPIRone (BUSPAR) 5 MG tablet Take 1 tablet (5 mg) by mouth 2 times daily for 7 days, THEN 2 tablets (10 mg) 2 times daily for 83 days.    Continuous Blood Gluc Sensor (DEXCOM G7 SENSOR) MISC Change every 10 days.    Continuous Blood Gluc Sensor (DEXCOM G7 SENSOR) MISC Use as directed to monitor blood glucose. Change every 10 days.    escitalopram (LEXAPRO) 5 MG tablet Take 1 tablet (5 mg) by mouth daily for 360 days    fluticasone (FLONASE) 50 MCG/ACT nasal spray Spray 1 spray into both nostrils daily    Glucagon, rDNA, (GLUCAGON EMERGENCY) 1 MG KIT Use as directed in case of low blood glucose.    Injection Device for insulin (INPEN 100-GREY-NOVOLOG-FIASP) MOLLY 1 each 5 times daily (Patient not taking: Reported on 8/9/2024)    insulin aspart (NOVOLOG VIAL) 100 UNITS/ML vial Use as directed, up to 150 units daily in insulin pump    insulin degludec (TRESIBA FLEXTOUCH) 100 UNIT/ML pen Inject 80 Units Subcutaneous daily (Patient not taking: Reported on 8/9/2024)    Insulin Infusion Pump Supplies (INSULIN PUMP SYRINGE RESERVOIR) MISC Change every other day as directed    Insulin Infusion Pump Supplies (SURE-T INFUSION SET 23\") MISC Change every 2-3 days    insulin pen needle (32G X 4 MM) 32G X 4 MM miscellaneous Use 6 pen needles daily as directed for insulin administration. (Patient not taking: Reported on 8/9/2024)    Insulin Pump Accessories MISC Infusion set per patient preference.  Change infusion set every other day    insulin syringe-needle U-100 (31G X 5/16\" 0.5 ML) 31G X 5/16\" 0.5 ML miscellaneous Use 5 syringes daily or as directed. (Patient not taking: Reported on 8/9/2024)    insulin " syringes, disposable, U-100 0.3 ML MISC Use 2-3 times daily as needed (Patient not taking: Reported on 8/9/2024)    ipratropium - albuterol 0.5 mg/2.5 mg/3 mL (DUONEB) 0.5-2.5 (3) MG/3ML neb solution Take 1 vial (3 mLs) by nebulization every 6 hours as needed for shortness of breath or wheezing (Patient not taking: Reported on 8/9/2024)    KETOSTIX test strip Use as directed in case of high glucose, vomiting or illness.once weekly    montelukast (SINGULAIR) 10 MG tablet Take 1 tablet (10 mg) by mouth at bedtime    Multiple Vitamins-Minerals (MULTIVITAMIN ADULT PO) Take 1 tablet by mouth daily    NOVOLOG PENFILL 100 UNIT/ML soln Take as directed up to 60 units per day       Has patient had kidney transplant in the prior 1 year: no.   Has patient been seen the last 12 months: Yes.  Associated labs reviewed for medication: Yes    PLAN:     Medication refilled per protocol: Yes    DANY RODRIGUEZ RN

## 2024-09-05 ENCOUNTER — THERAPY VISIT (OUTPATIENT)
Dept: OCCUPATIONAL THERAPY | Facility: CLINIC | Age: 29
End: 2024-09-05
Payer: COMMERCIAL

## 2024-09-05 ENCOUNTER — TELEPHONE (OUTPATIENT)
Dept: NEPHROLOGY | Facility: CLINIC | Age: 29
End: 2024-09-05

## 2024-09-05 DIAGNOSIS — M79.645 PAIN OF FINGER OF LEFT HAND: ICD-10-CM

## 2024-09-05 DIAGNOSIS — G56.02 LEFT CARPAL TUNNEL SYNDROME: ICD-10-CM

## 2024-09-05 DIAGNOSIS — M65.342 TRIGGER FINGER, LEFT RING FINGER: Primary | ICD-10-CM

## 2024-09-05 PROCEDURE — 97140 MANUAL THERAPY 1/> REGIONS: CPT | Mod: GO | Performed by: OCCUPATIONAL THERAPIST

## 2024-09-05 PROCEDURE — 97035 APP MDLTY 1+ULTRASOUND EA 15: CPT | Mod: GO | Performed by: OCCUPATIONAL THERAPIST

## 2024-09-05 NOTE — TELEPHONE ENCOUNTER
Patient Contacted for the patient to call back and schedule the following:    Appointment type: Return Nephrology  Provider: Dr. Eren Bobby  Return date: 8/7/25  Specialty phone number: [phone]  Additional appointment(s) needed: Labs prior  Additonal Notes: 1 yr follow up

## 2024-09-10 ENCOUNTER — THERAPY VISIT (OUTPATIENT)
Dept: OCCUPATIONAL THERAPY | Facility: CLINIC | Age: 29
End: 2024-09-10
Payer: COMMERCIAL

## 2024-09-10 DIAGNOSIS — M79.645 PAIN OF FINGER OF LEFT HAND: ICD-10-CM

## 2024-09-10 DIAGNOSIS — G56.02 LEFT CARPAL TUNNEL SYNDROME: ICD-10-CM

## 2024-09-10 DIAGNOSIS — M65.342 TRIGGER FINGER, LEFT RING FINGER: Primary | ICD-10-CM

## 2024-09-10 PROCEDURE — 97140 MANUAL THERAPY 1/> REGIONS: CPT | Mod: GO | Performed by: OCCUPATIONAL THERAPIST

## 2024-09-10 PROCEDURE — 97035 APP MDLTY 1+ULTRASOUND EA 15: CPT | Mod: GO | Performed by: OCCUPATIONAL THERAPIST

## 2024-09-10 PROCEDURE — 97110 THERAPEUTIC EXERCISES: CPT | Mod: GO | Performed by: OCCUPATIONAL THERAPIST

## 2024-09-10 NOTE — PROGRESS NOTES
09/10/24 0500   Appointment Info   Treating Provider Kika Cid, OTR/L CHT   Total/Authorized Visits 14 (POC)   Visits Used 8   Medical Diagnosis L RF Trigger Release, L CTR   OT Tx Diagnosis L RF Trigger Release, L CTR   Other pertinent information R CTR scheduled for 11/21/24   Progress Note/Certification   Onset of Illness/Injury or Date of Surgery 04/04/24   Therapy Frequency 1 x Week for 3 weeks decreasing to 2 x Month   Predicted Duration 6 additional visits (total 14)   Progress Note Due Date 11/10/24   Progress Note Completed Date 09/10/24       Present No   OT Goal 1   Goal Identifier Household chores   Goal Description Patient will be able to bear weight through flat left hand without difficulty   Rationale In order to maximize safety and independence with ADL/IADLs   Goal Progress able to fully extend finger but still painful/difficult to fully weight bear   Target Date 11/10/24   Subjective Report   Subjective Report The hand has been a little sore, but continues to improve. I am just concerned about getting this hand as healed as possible before I do my right CTR in November.   Objective Measures   Objective Measures Hand Obj Measures;Objective Measure 1;Objective Measure 2;Objective Measure 3;Objective Measure 4   Hand Objective Measures ROM;Strength   ROM Hand ROM;Wrist ROM   Strength    Objective Measure 1   Objective Measure Pain   Details Rest: 0/10 Use: 0-3/10   Objective Measure 2   Objective Measure RF P1   Details 6.4   Objective Measure 3   Objective Measure RF PIP   Details 5.7   Objective Measure 4   Objective Measure RF P2   Details 5.0   Wrist ROM    Extension 55   Flexion 74   Radial Deviation (RD) NT   Ulnar Deviation (UD) NT   Hand ROM    Ring MP 0/75   Ring PIP 0/95   Ring DIP 0/68    (measured in pounds)    Average Strength L: 67-   OT Modalities   OT Modalities Ultrasound    Ultrasound   Ultrasound, Minutes (60105) 8 Minutes   Ultrasound  -Type (does not include 3-5 min prep/cleanup time) Continuous   Intensity 1.1   Duration (does not include the 3-5 min set up/clean up time) 8 min   Frequency 3 MHz   Location L A1 pulley scar and CT scar   Positioning sitting   Patient Response/Progress good   Treatment Interventions (OT)   Interventions Therapeutic Procedure/Exercise;Manual Therapy;Orthotics   Therapeutic Procedure/Exercise   Therapeutic Procedure: strength, endurance, ROM, flexibillity minutes (71258) 8   Therapeutic Procedures Ther Proc 2;Ther Proc 3;Ther Proc 4   Ther Proc 1 Composite Wrist and finger Extension stretc   Ther Proc 1 - Details 10 reps, HEP   Ther Proc 2 Extensor tracking on table   Ther Proc 2 - Details HEP   Ther Proc 3 Door frame pull backs   Ther Proc 3 - Details HEP   Skilled Intervention PROM extension post US and manual   Patient Response/Progress good   Ther Proc 4 Finger PROM   Ther Proc 4 - Details prolonged finger and wrist extension post stretch   Manual Therapy   Manual Therapy Minutes (14988) 8   Manual Therapy 1 STM/Scar mobs   Manual Therapy 1 - Details L RF A1 pulley, thenars, hypothenars   Skilled Intervention Manual and tool based with dot and amanueling tool   Patient Response/Progress good   Orthotics   Orthotic Management, Subsequent session minutes (75782) 5 Minutes   HAND Splinting Forearm   ForeArm Splint Desciption Resting hand splint   Wear Schedule Night   Off for: Hygiene;Exercise;Driving   Skilled Intervention Adjusted splint for less finger extension and more wrist neutral   Patient Response/Progress good   Education   Learner/Method Patient   Plan   Home program IRLANDA resting hand splint for night to achieve more extension through finger and wrist. STM/Scar mobs to scar and thenars/hypothenars with ball, composite wrist and finger extension stretch, extensor tracking on table   Plan for next session Check splint to see if more finger extension is possible. US, Scar mobs/STM, stretches. Future  visits: progress ROM stretching   Total Session Time   Timed Code Treatment Minutes 29   Total Treatment Time (sum of timed and untimed services) 29       PLAN  Continue therapy per current plan of care.    Beginning/End Dates of Progress Note Reporting Period:  07/12/24 to 09/10/2024    Referring Provider:  Gray Reyes

## 2024-09-24 ENCOUNTER — THERAPY VISIT (OUTPATIENT)
Dept: OCCUPATIONAL THERAPY | Facility: CLINIC | Age: 29
End: 2024-09-24
Payer: COMMERCIAL

## 2024-09-24 DIAGNOSIS — G56.02 LEFT CARPAL TUNNEL SYNDROME: ICD-10-CM

## 2024-09-24 DIAGNOSIS — M65.342 TRIGGER FINGER, LEFT RING FINGER: Primary | ICD-10-CM

## 2024-09-24 DIAGNOSIS — M79.645 PAIN OF FINGER OF LEFT HAND: ICD-10-CM

## 2024-09-24 PROCEDURE — 97035 APP MDLTY 1+ULTRASOUND EA 15: CPT | Mod: GO | Performed by: OCCUPATIONAL THERAPIST

## 2024-09-24 PROCEDURE — 97110 THERAPEUTIC EXERCISES: CPT | Mod: GO | Performed by: OCCUPATIONAL THERAPIST

## 2024-09-24 PROCEDURE — 97140 MANUAL THERAPY 1/> REGIONS: CPT | Mod: GO | Performed by: OCCUPATIONAL THERAPIST

## 2024-09-27 ENCOUNTER — E-VISIT (OUTPATIENT)
Dept: PEDIATRICS | Facility: CLINIC | Age: 29
End: 2024-09-27
Payer: COMMERCIAL

## 2024-09-27 DIAGNOSIS — F41.9 ANXIETY: Primary | ICD-10-CM

## 2024-09-27 PROCEDURE — 99421 OL DIG E/M SVC 5-10 MIN: CPT | Performed by: NURSE PRACTITIONER

## 2024-09-27 RX ORDER — BUSPIRONE HYDROCHLORIDE 15 MG/1
15 TABLET ORAL 2 TIMES DAILY
Qty: 180 TABLET | Refills: 3 | Status: SHIPPED | OUTPATIENT
Start: 2024-09-27

## 2024-09-27 ASSESSMENT — ANXIETY QUESTIONNAIRES
7. FEELING AFRAID AS IF SOMETHING AWFUL MIGHT HAPPEN: SEVERAL DAYS
GAD7 TOTAL SCORE: 10
8. IF YOU CHECKED OFF ANY PROBLEMS, HOW DIFFICULT HAVE THESE MADE IT FOR YOU TO DO YOUR WORK, TAKE CARE OF THINGS AT HOME, OR GET ALONG WITH OTHER PEOPLE?: SOMEWHAT DIFFICULT

## 2024-09-27 ASSESSMENT — PATIENT HEALTH QUESTIONNAIRE - PHQ9
SUM OF ALL RESPONSES TO PHQ QUESTIONS 1-9: 4
SUM OF ALL RESPONSES TO PHQ QUESTIONS 1-9: 4
10. IF YOU CHECKED OFF ANY PROBLEMS, HOW DIFFICULT HAVE THESE PROBLEMS MADE IT FOR YOU TO DO YOUR WORK, TAKE CARE OF THINGS AT HOME, OR GET ALONG WITH OTHER PEOPLE: NOT DIFFICULT AT ALL

## 2024-09-28 ASSESSMENT — PATIENT HEALTH QUESTIONNAIRE - PHQ9: SUM OF ALL RESPONSES TO PHQ QUESTIONS 1-9: 4

## 2024-10-01 ENCOUNTER — THERAPY VISIT (OUTPATIENT)
Dept: OCCUPATIONAL THERAPY | Facility: CLINIC | Age: 29
End: 2024-10-01
Payer: COMMERCIAL

## 2024-10-01 DIAGNOSIS — M65.342 TRIGGER FINGER, LEFT RING FINGER: Primary | ICD-10-CM

## 2024-10-01 DIAGNOSIS — G56.02 LEFT CARPAL TUNNEL SYNDROME: ICD-10-CM

## 2024-10-01 DIAGNOSIS — M79.645 PAIN OF FINGER OF LEFT HAND: ICD-10-CM

## 2024-10-01 PROCEDURE — 97140 MANUAL THERAPY 1/> REGIONS: CPT | Mod: GO | Performed by: OCCUPATIONAL THERAPIST

## 2024-10-01 PROCEDURE — 97110 THERAPEUTIC EXERCISES: CPT | Mod: GO | Performed by: OCCUPATIONAL THERAPIST

## 2024-10-01 PROCEDURE — 97035 APP MDLTY 1+ULTRASOUND EA 15: CPT | Mod: GO | Performed by: OCCUPATIONAL THERAPIST

## 2024-10-21 ENCOUNTER — THERAPY VISIT (OUTPATIENT)
Dept: OCCUPATIONAL THERAPY | Facility: CLINIC | Age: 29
End: 2024-10-21
Payer: COMMERCIAL

## 2024-10-21 DIAGNOSIS — M79.645 PAIN OF FINGER OF LEFT HAND: ICD-10-CM

## 2024-10-21 DIAGNOSIS — G56.02 LEFT CARPAL TUNNEL SYNDROME: ICD-10-CM

## 2024-10-21 DIAGNOSIS — M65.342 TRIGGER FINGER, LEFT RING FINGER: Primary | ICD-10-CM

## 2024-10-21 PROCEDURE — 97140 MANUAL THERAPY 1/> REGIONS: CPT | Mod: GO | Performed by: OCCUPATIONAL THERAPIST

## 2024-10-21 PROCEDURE — 97035 APP MDLTY 1+ULTRASOUND EA 15: CPT | Mod: GO | Performed by: OCCUPATIONAL THERAPIST

## 2024-10-21 PROCEDURE — 97110 THERAPEUTIC EXERCISES: CPT | Mod: GO | Performed by: OCCUPATIONAL THERAPIST

## 2024-10-28 ENCOUNTER — MYC REFILL (OUTPATIENT)
Dept: PEDIATRICS | Facility: CLINIC | Age: 29
End: 2024-10-28
Payer: COMMERCIAL

## 2024-10-28 DIAGNOSIS — E78.5 HYPERLIPIDEMIA, UNSPECIFIED HYPERLIPIDEMIA TYPE: ICD-10-CM

## 2024-10-28 RX ORDER — ATORVASTATIN CALCIUM 40 MG/1
40 TABLET, FILM COATED ORAL DAILY
Qty: 90 TABLET | Refills: 1 | Status: SHIPPED | OUTPATIENT
Start: 2024-10-28

## 2024-10-29 ENCOUNTER — THERAPY VISIT (OUTPATIENT)
Dept: OCCUPATIONAL THERAPY | Facility: CLINIC | Age: 29
End: 2024-10-29
Payer: COMMERCIAL

## 2024-10-29 DIAGNOSIS — M79.645 PAIN OF FINGER OF LEFT HAND: ICD-10-CM

## 2024-10-29 DIAGNOSIS — G56.02 LEFT CARPAL TUNNEL SYNDROME: ICD-10-CM

## 2024-10-29 DIAGNOSIS — M65.342 TRIGGER FINGER, LEFT RING FINGER: Primary | ICD-10-CM

## 2024-10-29 PROCEDURE — 97140 MANUAL THERAPY 1/> REGIONS: CPT | Mod: GO | Performed by: OCCUPATIONAL THERAPIST

## 2024-10-29 PROCEDURE — 97035 APP MDLTY 1+ULTRASOUND EA 15: CPT | Mod: GO | Performed by: OCCUPATIONAL THERAPIST

## 2024-10-29 PROCEDURE — 97110 THERAPEUTIC EXERCISES: CPT | Mod: GO | Performed by: OCCUPATIONAL THERAPIST

## 2024-11-04 ENCOUNTER — MYC REFILL (OUTPATIENT)
Dept: NEPHROLOGY | Facility: CLINIC | Age: 29
End: 2024-11-04
Payer: COMMERCIAL

## 2024-11-04 ENCOUNTER — MYC REFILL (OUTPATIENT)
Dept: PEDIATRICS | Facility: CLINIC | Age: 29
End: 2024-11-04
Payer: COMMERCIAL

## 2024-11-04 DIAGNOSIS — F90.0 ADHD (ATTENTION DEFICIT HYPERACTIVITY DISORDER), INATTENTIVE TYPE: ICD-10-CM

## 2024-11-04 DIAGNOSIS — R80.9 PROTEINURIA: ICD-10-CM

## 2024-11-04 RX ORDER — DEXTROAMPHETAMINE SACCHARATE, AMPHETAMINE ASPARTATE MONOHYDRATE, DEXTROAMPHETAMINE SULFATE AND AMPHETAMINE SULFATE 3.75; 3.75; 3.75; 3.75 MG/1; MG/1; MG/1; MG/1
15 CAPSULE, EXTENDED RELEASE ORAL DAILY
Qty: 90 CAPSULE | Refills: 0 | Status: SHIPPED | OUTPATIENT
Start: 2024-11-04

## 2024-11-04 RX ORDER — ENALAPRIL MALEATE 20 MG/1
20 TABLET ORAL 2 TIMES DAILY
Qty: 180 TABLET | Refills: 3 | OUTPATIENT
Start: 2024-11-04

## 2024-11-05 ENCOUNTER — THERAPY VISIT (OUTPATIENT)
Dept: OCCUPATIONAL THERAPY | Facility: CLINIC | Age: 29
End: 2024-11-05
Payer: COMMERCIAL

## 2024-11-05 DIAGNOSIS — M79.645 PAIN OF FINGER OF LEFT HAND: ICD-10-CM

## 2024-11-05 DIAGNOSIS — G56.02 LEFT CARPAL TUNNEL SYNDROME: ICD-10-CM

## 2024-11-05 DIAGNOSIS — M65.342 TRIGGER FINGER, LEFT RING FINGER: Primary | ICD-10-CM

## 2024-11-05 PROCEDURE — 97110 THERAPEUTIC EXERCISES: CPT | Mod: GO | Performed by: OCCUPATIONAL THERAPIST

## 2024-11-05 PROCEDURE — 97035 APP MDLTY 1+ULTRASOUND EA 15: CPT | Mod: GO | Performed by: OCCUPATIONAL THERAPIST

## 2024-11-05 PROCEDURE — 97140 MANUAL THERAPY 1/> REGIONS: CPT | Mod: GO | Performed by: OCCUPATIONAL THERAPIST

## 2024-11-12 ENCOUNTER — THERAPY VISIT (OUTPATIENT)
Dept: OCCUPATIONAL THERAPY | Facility: CLINIC | Age: 29
End: 2024-11-12
Payer: COMMERCIAL

## 2024-11-12 DIAGNOSIS — G56.02 LEFT CARPAL TUNNEL SYNDROME: ICD-10-CM

## 2024-11-12 DIAGNOSIS — M79.645 PAIN OF FINGER OF LEFT HAND: ICD-10-CM

## 2024-11-12 DIAGNOSIS — M65.342 TRIGGER FINGER, LEFT RING FINGER: Primary | ICD-10-CM

## 2024-11-12 PROCEDURE — 97112 NEUROMUSCULAR REEDUCATION: CPT | Mod: GO | Performed by: OCCUPATIONAL THERAPIST

## 2024-11-12 PROCEDURE — 97140 MANUAL THERAPY 1/> REGIONS: CPT | Mod: GO | Performed by: OCCUPATIONAL THERAPIST

## 2024-11-12 PROCEDURE — 97035 APP MDLTY 1+ULTRASOUND EA 15: CPT | Mod: GO | Performed by: OCCUPATIONAL THERAPIST

## 2024-11-12 NOTE — PROGRESS NOTES
11/12/24 0500   Appointment Info   Treating Provider Kika Cid, OTR/L CHT   Total/Authorized Visits 14 (POC)   Visits Used 14   Medical Diagnosis L RF Trigger Release, L CTR   OT Tx Diagnosis L RF Trigger Release, L CTR   Other pertinent information R CTR scheduled for 11/21/24   Progress Note/Certification   Onset of Illness/Injury or Date of Surgery 04/04/24   Therapy Frequency 1 x Week for 3 weeks decreasing to 2 x Month   Predicted Duration 6 additional visits (total 14)   Progress Note Completed Date 11/12/24       Present No   OT Goal 1   Goal Identifier Household chores   Goal Description Patient will be able to bear weight through flat left hand without difficulty   Rationale In order to maximize safety and independence with ADL/IADLs   Target Date 11/10/24   Date Met 11/12/24   Subjective Report   Subjective Report Overall the hand has been doing good. I still get some nerve pain here (CT scar) but its very intermittent.   Objective Measures   Objective Measures Hand Obj Measures;Objective Measure 1;Objective Measure 2;Objective Measure 3;Objective Measure 4   Hand Objective Measures ROM;Strength   ROM Hand ROM;Wrist ROM   Strength    Objective Measure 1   Objective Measure Pain   Details Rest: 0/10. Use: 1-2/10   Objective Measure 2   Objective Measure RF P1   Details 6.2   Objective Measure 3   Objective Measure RF PIP   Details 5.7   Objective Measure 4   Objective Measure RF P2   Details 5.0   Wrist ROM    Extension 60   Flexion 80   Radial Deviation (RD) NT   Ulnar Deviation (UD) NT   Hand ROM    Ring MP /80   Ring PIP /99   Ring DIP /75    (measured in pounds)    Average Strength 68   OT Modalities   OT Modalities Ultrasound    Ultrasound   Ultrasound, Minutes (24878) 8 Minutes   Ultrasound -Type (does not include 3-5 min prep/cleanup time) Continuous   Intensity 1.1   Duration (does not include the 3-5 min set up/clean up time) 8 min   Frequency 3 MHz    Location L A1 pulley scar and CT scar   Positioning sitting   Patient Response/Progress good   Treatment Interventions (OT)   Interventions Therapeutic Procedure/Exercise;Manual Therapy;Neuromuscular Re-education   Neuromuscular Re-education   Neuromuscular Re-ed Minutes (52573) 8   Skilled Intervention Instruction in MN glides for HEP   Patient Response/Progress good   Neuro Re-ed 1 Distal MN glides   Neuro Re-ed 1 - Details 5 reps, HEP 5 second hold   Therapeutic Procedure/Exercise   Therapeutic Procedures Ther Proc 2;Ther Proc 3;Ther Proc 4   Ther Proc 1 Composite Wrist and finger Extension stretc   Ther Proc 1 - Details HEP   Ther Proc 2 Extensor tracking on table   Ther Proc 2 - Details HEP   Ther Proc 3 Door frame pull backs   Ther Proc 3 - Details HEP   Ther Proc 4 Finger PROM   Ther Proc 4 - Details HEP   Manual Therapy   Manual Therapy Minutes (37837) 10   Manual Therapy 1 STM/Scar mobs   Manual Therapy 1 - Details L RF A1 pulley, thenars, hypothenars   Skilled Intervention Manual and tool based with guasha and embossing tool   Patient Response/Progress good   Orthotics   HAND Splinting Forearm   ForeArm Splint Desciption Resting hand splint   Education   Learner/Method Patient   Plan   Home program IRLANDA resting hand splint for night to achieve more extension through finger and wrist. STM/Scar mobs to scar and thenars/hypothenars with ball, composite wrist and finger extension stretch, extensor tracking on table   Updates to plan of care Distal MN glides   Total Session Time   Timed Code Treatment Minutes 26   Total Treatment Time (sum of timed and untimed services) 26       DISCHARGE  Reason for Discharge: Patient has met all goals.    Equipment Issued: None    Discharge Plan: Patient to continue home program.    Referring Provider:  Gray Reyes

## 2024-11-18 ENCOUNTER — MYC MEDICAL ADVICE (OUTPATIENT)
Dept: SURGERY | Facility: AMBULATORY SURGERY CENTER | Age: 29
End: 2024-11-18
Payer: COMMERCIAL

## 2024-11-18 NOTE — TELEPHONE ENCOUNTER
FUTURE VISIT INFORMATION      FUTURE VISIT INFORMATION:  Date: 2/14/25  Time: 8:00am  Location: [facility] [place]  REFERRAL INFORMATION:  Referring provider:  Nancy Lopez APRN CNP   Referring providers clinic:  [facility]   Reason for visit/diagnosis  Large breasts     RECORDS REQUESTED FROM:       Clinic name Comments Records Status Imaging Status   [facility]  Ov/referral 8/14/24 epic    Imaging XR Chest done 1/11/24  MRA Chest done 9/26/23 epic pac

## 2024-12-02 ENCOUNTER — TELEPHONE (OUTPATIENT)
Dept: PLASTIC SURGERY | Facility: CLINIC | Age: 29
End: 2024-12-02

## 2024-12-02 ENCOUNTER — OFFICE VISIT (OUTPATIENT)
Dept: ORTHOPEDICS | Facility: CLINIC | Age: 29
End: 2024-12-02
Payer: COMMERCIAL

## 2024-12-02 DIAGNOSIS — G56.01 RIGHT CARPAL TUNNEL SYNDROME: Primary | ICD-10-CM

## 2024-12-02 PROCEDURE — 99024 POSTOP FOLLOW-UP VISIT: CPT | Performed by: STUDENT IN AN ORGANIZED HEALTH CARE EDUCATION/TRAINING PROGRAM

## 2024-12-02 NOTE — PROGRESS NOTES
Orthopaedic Surgery Hand Clinic Progress Note    Patient Name: Stacey Mantilla  MRN: [id number]  : 1995  Date: Dec 2, 2024     Date of Surgery: 2024 -right carpal tunnel release  2024 -left carpal tunnel release, left ring trigger finger release      Subjective:  Ms. Stacey Mantilla is a 29 year old female who returns 11 days status post right open carpal tunnel release.  She is doing well.  Numbness and tingling have improved.  Did have some numbness in the right thumb tip a few days after surgery but this has resolved.  Also felt like she might have had early symptoms of right ring trigger finger with some tenderness at the A1 pulley.  However it is not catching now.    Objective:  There were no vitals taken for this visit.    General: alert, appropriate, NAD  HEENT: NC/AT  CV: RRR by pulse  Pulm: Unlabored on RA  MSK:  Incisions clean, dry, intact  No erythema, drainage, evidence of infection  Mild tenderness at the A1 pulley of the right ring finger, no visible triggering or catching, no crepitus  Able to make a full fist  Intact EPL, FPL, hand intrinsics  Sensation intact to light touch all distributions  Warm well-perfused, capillary refill less than 2 seconds    Assessment/Plan:  29-year-old female status post right open carpal tunnel release 2024    Patient is progressing appropriately.  New Steri-Strips applied.  Okay to shower.  No soaking, submerging, prolonged water exposure x 4 weeks.  No heavy lifting for an additional 1 to 2 weeks until wound is fully healed.    Patient was counseled on wrist and finger range of motion exercises.    Will continue to monitor right ring finger.  If she develops any progression of symptoms of trigger finger, I have asked her to let me know and we will start hand therapy.  She did not respond well previously to injections due to her diabetes.    All questions answered.  The patient voiced understanding and agreement.  She will follow-up with me as  needed.    Gray Reyes MD    Hand & Upper Extremity Surgery  Department of Orthopedic Surgery  [facility]

## 2024-12-02 NOTE — TELEPHONE ENCOUNTER
Called patient to remind them of appointment with Pati Carpenter tomorrow at 8 AM. Patient did not have any questions.    Leonard Rodriguez, EMT

## 2024-12-02 NOTE — TELEPHONE ENCOUNTER
FUTURE VISIT INFORMATION      FUTURE VISIT INFORMATION:  Date: 12/3/24  Time: 8:00am  [place] [facility] [place]  REFERRAL INFORMATION:  Referring provider:  Nancy Lopez APRN CNP   Referring providers clinic:  [facility]   Reason for visit/diagnosis  Large breasts      RECORDS REQUESTED FROM:         Clinic name Comments Records Status Imaging Status   [facility]  Ov/referral 8/14/24 epic     Imaging XR Chest done 1/11/24  MRA Chest done 9/26/23 epic pac

## 2024-12-02 NOTE — LETTER
2024      Stacey Mantilla  [address]  [facility] [address]      Dear Colleague,    Thank you for referring your patient, Stacey Mantilla, to the [facility] [place]. Please see a copy of my visit note below.    Orthopaedic Surgery Hand Clinic Progress Note    Patient Name: Stacey Mantilla  MRN: [id number]  : 1995  Date: Dec 2, 2024     Date of Surgery: 2024 -right carpal tunnel release  2024 -left carpal tunnel release, left ring trigger finger release      Subjective:  Ms. Stacey Mantilla is a 29 year old female who returns 11 days status post right open carpal tunnel release.  She is doing well.  Numbness and tingling have improved.  Did have some numbness in the right thumb tip a few days after surgery but this has resolved.  Also felt like she might have had early symptoms of right ring trigger finger with some tenderness at the A1 pulley.  However it is not catching now.    Objective:  There were no vitals taken for this visit.    General: alert, appropriate, NAD  HEENT: NC/AT  CV: RRR by pulse  Pulm: Unlabored on RA  MSK:  Incisions clean, dry, intact  No erythema, drainage, evidence of infection  Mild tenderness at the A1 pulley of the right ring finger, no visible triggering or catching, no crepitus  Able to make a full fist  Intact EPL, FPL, hand intrinsics  Sensation intact to light touch all distributions  Warm well-perfused, capillary refill less than 2 seconds    Assessment/Plan:  29-year-old female status post right open carpal tunnel release 2024    Patient is progressing appropriately.  New Steri-Strips applied.  Okay to shower.  No soaking, submerging, prolonged water exposure x 4 weeks.  No heavy lifting for an additional 1 to 2 weeks until wound is fully healed.    Patient was counseled on wrist and finger range of motion exercises.    Will continue to monitor right ring finger.  If she develops any progression of symptoms of trigger finger, I  have asked her to let me know and we will start hand therapy.  She did not respond well previously to injections due to her diabetes.    All questions answered.  The patient voiced understanding and agreement.  She will follow-up with me as needed.    Gray Reyes MD    Hand & Upper Extremity Surgery  Department of Orthopedic Surgery  [facility]      Again, thank you for allowing me to participate in the care of your patient.        Sincerely,        Gray Reyes MD

## 2024-12-02 NOTE — NURSING NOTE
Reason For Visit:   Chief Complaint   Patient presents with    Surgical Followup     Post op right open carpal tunnel release  DOS:  11/21/24       Primary MD: Nancy Lopez  Ref. MD: Lizbeth    Age: 29 year old    ?  No      There were no vitals taken for this visit.      Pain Assessment  Patient Currently in Pain: Yes  0-10 Pain Scale: 4 (No pain at rest)  Primary Pain Location: Wrist (right)  Pain Descriptors: Intermittent, Sharp    Hand Dominance Evaluation  Hand Dominance: Right          QuickDASH Assessment      12/2/2024     7:39 AM   QuickDASH Main   1. Open a tight or new jar Moderate difficulty   2. Do heavy household chores (e.g., wash walls, floors) Moderate difficulty   3. Carry a shopping bag or briefcase Mild difficulty   4. Wash your back No difficulty   5. Use a knife to cut food Moderate difficulty   6. Recreational activities in which you take some force or impact through your arm, shoulder or hand (e.g., golf, hammering, tennis, etc.) Unable   7. During the past week, to what extent has your arm, shoulder or hand problem interfered with your normal social activities with family, friends, neighbours or groups Moderately   8. During the past week, were you limited in your work or other regular daily activities as a result of your arm, shoulder or hand problem Slightly limited   9. Arm, shoulder or hand pain Mild   10.Tingling (pins and needles) in your arm,shoulder or hand None   11. During the past week, how much difficulty have you had sleeping because of the pain in your arm, shoulder or hand Mild difficulty   Quickdash Ability Score 36.36          Current Outpatient Medications   Medication Sig Dispense Refill    albuterol (PROAIR HFA/PROVENTIL HFA/VENTOLIN HFA) 108 (90 Base) MCG/ACT inhaler Inhale 2 puffs into the lungs every 6 hours 18 g 3    ALPRAZolam (XANAX) 0.5 MG tablet Take 1 tablet (0.5 mg) by mouth 3 times daily as needed for anxiety 5 tablet 0     "amphetamine-dextroamphetamine (ADDERALL XR) 15 MG 24 hr capsule Take 1 capsule (15 mg) by mouth daily. 90 capsule 0    amphetamine-dextroamphetamine (ADDERALL) 10 MG tablet Take 1 tablet (10 mg) by mouth daily. 90 tablet 0    atorvastatin (LIPITOR) 40 MG tablet Take 1 tablet (40 mg) by mouth daily. 90 tablet 1    blood glucose (NO BRAND SPECIFIED) test strip Use to test blood sugar five times daily or as directed.  Dispense Brandy Contour Next Test Strips. 200 strip 11    busPIRone (BUSPAR) 15 MG tablet Take 1 tablet (15 mg) by mouth 2 times daily. 180 tablet 3    Continuous Blood Gluc Sensor (DEXCOM G7 SENSOR) MISC Change every 10 days. 9 each 3    Continuous Blood Gluc Sensor (DEXCOM G7 SENSOR) MISC Use as directed to monitor blood glucose. Change every 10 days. 9 each 0    enalapril (VASOTEC) 20 MG tablet Take 1 tablet (20 mg) by mouth 2 times daily. 180 tablet 3    escitalopram (LEXAPRO) 5 MG tablet Take 1 tablet (5 mg) by mouth daily for 360 days 90 tablet 3    fluticasone (FLONASE) 50 MCG/ACT nasal spray Spray 1 spray into both nostrils daily 16 g 11    Glucagon, rDNA, (GLUCAGON EMERGENCY) 1 MG KIT Use as directed in case of low blood glucose. 1 kit 3    insulin aspart (NOVOLOG VIAL) 100 UNITS/ML vial Use as directed, up to 150 units daily in insulin pump 150 mL 3    Insulin Infusion Pump Supplies (INSULIN PUMP SYRINGE RESERVOIR) MISC Change every other day as directed 45 each 3    Insulin Infusion Pump Supplies (SURE-T INFUSION SET 23\") MISC Change every 2-3 days 30 each 3    Insulin Pump Accessories MISC Infusion set per patient preference.  Change infusion set every other day 45 each 3    KETOSTIX test strip Use as directed in case of high glucose, vomiting or illness.once weekly 50 strip 3    metFORMIN (GLUCOPHAGE XR) 500 MG 24 hr tablet Take 1 tablet (500 mg) by mouth daily for 7 days, THEN 2 tablets (1,000 mg) daily for 7 days, THEN 3 tablets (1,500 mg) daily for 7 days, THEN 4 tablets (2,000 mg) daily. " 360 tablet 3    montelukast (SINGULAIR) 10 MG tablet Take 1 tablet (10 mg) by mouth at bedtime 90 tablet 3    Multiple Vitamins-Minerals (MULTIVITAMIN ADULT PO) Take 1 tablet by mouth daily      NOVOLOG PENFILL 100 UNIT/ML soln Take as directed up to 60 units per day 54 mL 3       Allergies   Allergen Reactions    Dog Epithelium (Canis Lupus Familiaris) Other (See Comments)     Sinus symptoms     Dogs Other (See Comments)     Sinus symptoms     Dust Mites      Sinus     Gluten Meal        ESEQUIEL GREEN, ATC

## 2024-12-02 NOTE — PROGRESS NOTES
PLASTIC SURGERY HISTORY AND PHYSICAL    Chief Complaint:Hypertrophy of breasts   Referring Provider: Nancy Wolfe      HPI:  Stacey villasenor is a 29 year old (they/them, ok with she/her) who presents with symptomatic macromastia. They currently wear a 40E (but unsure), would prefer to be a B. They complain of back pain, neck pain and shoulder pain for > 10years. Hard to do activities such as running and rock climbing. They have tried the following without successful resolution of pain symptoms: massage, stretching, wearing multiple bras, buying specialty bras. Denies family history of breast cancer. Had a personal history of rash under the breast with some spots in her 20s and had an ultrasound at that time. No follow up needed since then.    Recent mammogram: no  Rashes: yes, in the warmer months weekly for at least 5 years. Has been given steroid cream in the past.     Avoids tylenol due to it messing with her dexcom monitoring. And avoids NSAIDs due to stage I kidney disease-has normal function to kidneys at this time. Is taking aleve currently for post op carpal tunnel and that has been ok.    Has identified as agender/non binary for 2 years. Is out to close friends/family and at work. Has had therapist for 3 years that is supportive. Their partner has had top surgery in the past.     PMH:   Past Medical History:   Diagnosis Date    Asthma     Currently no treatment needed    Celiac disease     Chronic kidney disease, stage I 08/03/2015    Chronic sinusitis     Depressive disorder     Diabetes mellitus type 1 (H)     Diabetic nephropathy (H)     Family history of heart disease 08/03/2015    Hypertension     Hypothyroid     Pedal edema     Psoriasis    Denies hypothyroid- no meds needed  ADHD  anxiety    PSH:   Past Surgical History:   Procedure Laterality Date    BIOPSY      ENT SURGERY      RELEASE CARPAL TUNNEL Left 04/04/2024    RELEASE CARPAL TUNNEL Right 11/21/2024    RELEASE TRIGGER  FINGER Left 04/04/2024    SOFT TISSUE SURGERY      TONSILLECTOMY, ADENOIDECTOMY, COMBINED         FH:   Family History   Problem Relation Age of Onset    Gastrointestinal Disease Mother         Celiacs    Obesity Mother     Depression Mother     Fibromyalgia Mother     Cancer Mother         MDS    Cardiovascular Father 48        MI, stents, diabetic, type 2    Diabetes Father     Coronary Artery Disease Father     Obesity Father     Depression Father     Substance Abuse Father     Lung Cancer Father         Smoker    Other Cancer Father         Lung cancer and MDS (blood cancer)    Substance Abuse Sister     Bipolar Disorder Brother     Schizophrenia Brother     Myocardial Infarction Brother 50        Possibly MI    Substance Abuse Brother     Skin Cancer Maternal Grandmother     Cardiovascular Maternal Grandfather         Englarged heart, pacemaker, defib    Skin Cancer Maternal Grandfather     Obesity Maternal Half-Sister     Heart Disease Paternal Half-Brother 45    Melanoma No family hx of     Glaucoma No family hx of     Macular Degeneration No family hx of         SH:   Social History     Tobacco Use    Smoking status: Never     Passive exposure: Past    Smokeless tobacco: Never   Vaping Use    Vaping status: Never Used   Substance Use Topics    Alcohol use: Not Currently     Comment: couple times per month will have 3 mixed drinks    Drug use: Yes     Types: Marijuana      Work/school: non profit.    MEDS:     Current Outpatient Medications:     albuterol (PROAIR HFA/PROVENTIL HFA/VENTOLIN HFA) 108 (90 Base) MCG/ACT inhaler, Inhale 2 puffs into the lungs every 6 hours, Disp: 18 g, Rfl: 3    ALPRAZolam (XANAX) 0.5 MG tablet, Take 1 tablet (0.5 mg) by mouth 3 times daily as needed for anxiety, Disp: 5 tablet, Rfl: 0    amphetamine-dextroamphetamine (ADDERALL XR) 15 MG 24 hr capsule, Take 1 capsule (15 mg) by mouth daily., Disp: 90 capsule, Rfl: 0    amphetamine-dextroamphetamine (ADDERALL) 10 MG tablet, Take 1  "tablet (10 mg) by mouth daily., Disp: 90 tablet, Rfl: 0    atorvastatin (LIPITOR) 40 MG tablet, Take 1 tablet (40 mg) by mouth daily., Disp: 90 tablet, Rfl: 1    blood glucose (NO BRAND SPECIFIED) test strip, Use to test blood sugar five times daily or as directed.  Dispense Brandy Contour Next Test Strips., Disp: 200 strip, Rfl: 11    busPIRone (BUSPAR) 15 MG tablet, Take 1 tablet (15 mg) by mouth 2 times daily., Disp: 180 tablet, Rfl: 3    Continuous Blood Gluc Sensor (DEXCOM G7 SENSOR) MISC, Change every 10 days., Disp: 9 each, Rfl: 3    Continuous Blood Gluc Sensor (DEXCOM G7 SENSOR) MISC, Use as directed to monitor blood glucose. Change every 10 days., Disp: 9 each, Rfl: 0    enalapril (VASOTEC) 20 MG tablet, Take 1 tablet (20 mg) by mouth 2 times daily., Disp: 180 tablet, Rfl: 3    escitalopram (LEXAPRO) 5 MG tablet, Take 1 tablet (5 mg) by mouth daily for 360 days, Disp: 90 tablet, Rfl: 3    fluticasone (FLONASE) 50 MCG/ACT nasal spray, Spray 1 spray into both nostrils daily, Disp: 16 g, Rfl: 11    Glucagon, rDNA, (GLUCAGON EMERGENCY) 1 MG KIT, Use as directed in case of low blood glucose., Disp: 1 kit, Rfl: 3    insulin aspart (NOVOLOG VIAL) 100 UNITS/ML vial, Use as directed, up to 150 units daily in insulin pump, Disp: 150 mL, Rfl: 3    Insulin Infusion Pump Supplies (INSULIN PUMP SYRINGE RESERVOIR) MISC, Change every other day as directed, Disp: 45 each, Rfl: 3    Insulin Infusion Pump Supplies (SURE-T INFUSION SET 23\") MISC, Change every 2-3 days, Disp: 30 each, Rfl: 3    Insulin Pump Accessories MISC, Infusion set per patient preference.  Change infusion set every other day, Disp: 45 each, Rfl: 3    KETOSTIX test strip, Use as directed in case of high glucose, vomiting or illness.once weekly, Disp: 50 strip, Rfl: 3    metFORMIN (GLUCOPHAGE XR) 500 MG 24 hr tablet, Take 1 tablet (500 mg) by mouth daily for 7 days, THEN 2 tablets (1,000 mg) daily for 7 days, THEN 3 tablets (1,500 mg) daily for 7 days, THEN " "4 tablets (2,000 mg) daily., Disp: 360 tablet, Rfl: 3    Multiple Vitamins-Minerals (MULTIVITAMIN ADULT PO), Take 1 tablet by mouth daily, Disp: , Rfl:     NOVOLOG PENFILL 100 UNIT/ML soln, Take as directed up to 60 units per day, Disp: 54 mL, Rfl: 3    Current Facility-Administered Medications:     dexAMETHasone (DECADRON) injection 4 mg, 4 mg, , , Gray Reyes MD, 4 mg at 12/11/23 1304    lidocaine (PF) (XYLOCAINE) 1 % injection 1 mL, 1 mL, , , Gray Reyes MD, 1 mL at 12/11/23 1304       ALLERGIES:     Allergies   Allergen Reactions    Dog Epithelium (Canis Lupus Familiaris) Other (See Comments)     Sinus symptoms     Dogs Other (See Comments)     Sinus symptoms     Dust Mites      Sinus     Gluten Meal         ROS: Denies chest pain, shortness of breath, MI, CVA, DVT, PE, and bleeding disorders.     PHYSICAL EXAMINATION:   /77 (BP Location: Left arm, Patient Position: Sitting, Cuff Size: Adult Large)   Pulse 92   Ht 1.727 m (5' 8\")   Wt 142.5 kg (314 lb 1.6 oz)   SpO2 99%   BMI 47.76 kg/m     BMI: Body mass index is 47.76 kg/m .  Body surface area is 2.61 meters squared.   General: NAD  Chest:bilateral macromastia with grade III ptosis. Nipple to notch 35cmL, 34cmR.   +mild shoulder grooving    1820g     ASSESSMENT:  29 year old agender person PMH anxiety, ADHD, DM I, celiac, HTN, and gender dysphoria who presents with symptomatic macromastia desiring breast reduction for gender affirmation.      PLAN:   Stacey is seeking breast reduction for gender affirmation and for relief of symptomatic macromastia. I think a removal of ~1000g would be needed to get them to their goal of a B cup. We did discuss the role of weight loss to help lower the schnur and also reduce some post operative risks. They do understand this and have tried weight loss in the past and not been too successful.     They are interested in pursuing gender affirming breast reduction to better align with their gender identity. I think this " is very reasonable. We discussed the procedure today. I will get them set up with our gender care team and they will need another consult with the surgeon. We did discuss that this would be a wait, which they understand. In the meantime they should work on discussing this with their PCP and getting a letter of support from therapist.     We look forward to helping them in the future.     Pati Carpenter PA-C  Plastic and Reconstructive Surgery     55 minutes spent on the date of the encounter doing chart review, history and physical, documentation and further activity as noted above.

## 2024-12-03 ENCOUNTER — PRE VISIT (OUTPATIENT)
Dept: PLASTIC SURGERY | Facility: CLINIC | Age: 29
End: 2024-12-03

## 2024-12-03 ENCOUNTER — OFFICE VISIT (OUTPATIENT)
Dept: PLASTIC SURGERY | Facility: CLINIC | Age: 29
End: 2024-12-03
Attending: NURSE PRACTITIONER
Payer: COMMERCIAL

## 2024-12-03 VITALS
HEART RATE: 92 BPM | HEIGHT: 68 IN | BODY MASS INDEX: 44.41 KG/M2 | DIASTOLIC BLOOD PRESSURE: 77 MMHG | OXYGEN SATURATION: 99 % | WEIGHT: 293 LBS | SYSTOLIC BLOOD PRESSURE: 117 MMHG

## 2024-12-03 DIAGNOSIS — F64.0 GENDER DYSPHORIA IN ADULT: ICD-10-CM

## 2024-12-03 DIAGNOSIS — N62 LARGE BREASTS: Primary | ICD-10-CM

## 2024-12-03 ASSESSMENT — PAIN SCALES - GENERAL: PAINLEVEL_OUTOF10: NO PAIN (1)

## 2024-12-03 NOTE — LETTER
12/3/2024       RE: Stacey Mantilla  [address]  [facility] [address]     Dear Colleague,    Thank you for referring your patient, Stacey Mantilla, to the [facility] [place] at [facility]. Please see a copy of my visit note below.    PLASTIC SURGERY HISTORY AND PHYSICAL    Chief Complaint:Hypertrophy of breasts   Referring Provider: Nancy Wolfe      HPI:  Stacey villasenor is a 29 year old (they/them, ok with she/her) who presents with symptomatic macromastia. They currently wear a 40E (but unsure), would prefer to be a B. They complain of back pain, neck pain and shoulder pain for > 10years. Hard to do activities such as running and rock climbing. They have tried the following without successful resolution of pain symptoms: massage, stretching, wearing multiple bras, buying specialty bras. Denies family history of breast cancer. Had a personal history of rash under the breast with some spots in her 20s and had an ultrasound at that time. No follow up needed since then.    Recent mammogram: no  Rashes: yes, in the warmer months weekly for at least 5 years. Has been given steroid cream in the past.     Avoids tylenol due to it messing with her dexcom monitoring. And avoids NSAIDs due to stage I kidney disease-has normal function to kidneys at this time. Is taking aleve currently for post op carpal tunnel and that has been ok.    Has identified as agender/non binary for 2 years. Is out to close friends/family and at work. Has had therapist for 3 years that is supportive. Their partner has had top surgery in the past.     PMH:   Past Medical History:   Diagnosis Date     Asthma     Currently no treatment needed     Celiac disease      Chronic kidney disease, stage I 08/03/2015     Chronic sinusitis      Depressive disorder      Diabetes mellitus type 1 (H)      Diabetic nephropathy (H)      Family history  of heart disease 08/03/2015     Hypertension      Hypothyroid      Pedal edema      Psoriasis    Denies hypothyroid- no meds needed  ADHD  anxiety    PSH:   Past Surgical History:   Procedure Laterality Date     BIOPSY       ENT SURGERY       RELEASE CARPAL TUNNEL Left 04/04/2024     RELEASE CARPAL TUNNEL Right 11/21/2024     RELEASE TRIGGER FINGER Left 04/04/2024     SOFT TISSUE SURGERY       TONSILLECTOMY, ADENOIDECTOMY, COMBINED         FH:   Family History   Problem Relation Age of Onset     Gastrointestinal Disease Mother         Celiacs     Obesity Mother      Depression Mother      Fibromyalgia Mother      Cancer Mother         MDS     Cardiovascular Father 48        MI, stents, diabetic, type 2     Diabetes Father      Coronary Artery Disease Father      Obesity Father      Depression Father      Substance Abuse Father      Lung Cancer Father         Smoker     Other Cancer Father         Lung cancer and MDS (blood cancer)     Substance Abuse Sister      Bipolar Disorder Brother      Schizophrenia Brother      Myocardial Infarction Brother 50        Possibly MI     Substance Abuse Brother      Skin Cancer Maternal Grandmother      Cardiovascular Maternal Grandfather         Englarged heart, pacemaker, defib     Skin Cancer Maternal Grandfather      Obesity Maternal Half-Sister      Heart Disease Paternal Half-Brother 45     Melanoma No family hx of      Glaucoma No family hx of      Macular Degeneration No family hx of         SH:   Social History     Tobacco Use     Smoking status: Never     Passive exposure: Past     Smokeless tobacco: Never   Vaping Use     Vaping status: Never Used   Substance Use Topics     Alcohol use: Not Currently     Comment: couple times per month will have 3 mixed drinks     Drug use: Yes     Types: Marijuana      Work/school: non profit.    MEDS:     Current Outpatient Medications:      albuterol (PROAIR HFA/PROVENTIL HFA/VENTOLIN HFA) 108 (90 Base) MCG/ACT inhaler, Inhale 2  "puffs into the lungs every 6 hours, Disp: 18 g, Rfl: 3     ALPRAZolam (XANAX) 0.5 MG tablet, Take 1 tablet (0.5 mg) by mouth 3 times daily as needed for anxiety, Disp: 5 tablet, Rfl: 0     amphetamine-dextroamphetamine (ADDERALL XR) 15 MG 24 hr capsule, Take 1 capsule (15 mg) by mouth daily., Disp: 90 capsule, Rfl: 0     amphetamine-dextroamphetamine (ADDERALL) 10 MG tablet, Take 1 tablet (10 mg) by mouth daily., Disp: 90 tablet, Rfl: 0     atorvastatin (LIPITOR) 40 MG tablet, Take 1 tablet (40 mg) by mouth daily., Disp: 90 tablet, Rfl: 1     blood glucose (NO BRAND SPECIFIED) test strip, Use to test blood sugar five times daily or as directed.  Dispense Brandy Contour Next Test Strips., Disp: 200 strip, Rfl: 11     busPIRone (BUSPAR) 15 MG tablet, Take 1 tablet (15 mg) by mouth 2 times daily., Disp: 180 tablet, Rfl: 3     Continuous Blood Gluc Sensor (DEXCOM G7 SENSOR) MISC, Change every 10 days., Disp: 9 each, Rfl: 3     Continuous Blood Gluc Sensor (DEXCOM G7 SENSOR) MISC, Use as directed to monitor blood glucose. Change every 10 days., Disp: 9 each, Rfl: 0     enalapril (VASOTEC) 20 MG tablet, Take 1 tablet (20 mg) by mouth 2 times daily., Disp: 180 tablet, Rfl: 3     escitalopram (LEXAPRO) 5 MG tablet, Take 1 tablet (5 mg) by mouth daily for 360 days, Disp: 90 tablet, Rfl: 3     fluticasone (FLONASE) 50 MCG/ACT nasal spray, Spray 1 spray into both nostrils daily, Disp: 16 g, Rfl: 11     Glucagon, rDNA, (GLUCAGON EMERGENCY) 1 MG KIT, Use as directed in case of low blood glucose., Disp: 1 kit, Rfl: 3     insulin aspart (NOVOLOG VIAL) 100 UNITS/ML vial, Use as directed, up to 150 units daily in insulin pump, Disp: 150 mL, Rfl: 3     Insulin Infusion Pump Supplies (INSULIN PUMP SYRINGE RESERVOIR) MISC, Change every other day as directed, Disp: 45 each, Rfl: 3     Insulin Infusion Pump Supplies (SURE-T INFUSION SET 23\") MISC, Change every 2-3 days, Disp: 30 each, Rfl: 3     Insulin Pump Accessories MISC, Infusion set " "per patient preference.  Change infusion set every other day, Disp: 45 each, Rfl: 3     KETOSTIX test strip, Use as directed in case of high glucose, vomiting or illness.once weekly, Disp: 50 strip, Rfl: 3     metFORMIN (GLUCOPHAGE XR) 500 MG 24 hr tablet, Take 1 tablet (500 mg) by mouth daily for 7 days, THEN 2 tablets (1,000 mg) daily for 7 days, THEN 3 tablets (1,500 mg) daily for 7 days, THEN 4 tablets (2,000 mg) daily., Disp: 360 tablet, Rfl: 3     Multiple Vitamins-Minerals (MULTIVITAMIN ADULT PO), Take 1 tablet by mouth daily, Disp: , Rfl:      NOVOLOG PENFILL 100 UNIT/ML soln, Take as directed up to 60 units per day, Disp: 54 mL, Rfl: 3    Current Facility-Administered Medications:      dexAMETHasone (DECADRON) injection 4 mg, 4 mg, , , Gray Reyes MD, 4 mg at 12/11/23 1304     lidocaine (PF) (XYLOCAINE) 1 % injection 1 mL, 1 mL, , , Gray Reyes MD, 1 mL at 12/11/23 1304       ALLERGIES:     Allergies   Allergen Reactions     Dog Epithelium (Canis Lupus Familiaris) Other (See Comments)     Sinus symptoms      Dogs Other (See Comments)     Sinus symptoms      Dust Mites      Sinus      Gluten Meal         ROS: Denies chest pain, shortness of breath, MI, CVA, DVT, PE, and bleeding disorders.     PHYSICAL EXAMINATION:   /77 (BP Location: Left arm, Patient Position: Sitting, Cuff Size: Adult Large)   Pulse 92   Ht 1.727 m (5' 8\")   Wt 142.5 kg (314 lb 1.6 oz)   SpO2 99%   BMI 47.76 kg/m     BMI: Body mass index is 47.76 kg/m .  Body surface area is 2.61 meters squared.   General: NAD  Chest:bilateral macromastia with grade III ptosis. Nipple to notch 35cmL, 34cmR.   +mild shoulder grooving    1820g     ASSESSMENT:  29 year old agender person PMH anxiety, ADHD, DM I, celiac, HTN, and gender dysphoria who presents with symptomatic macromastia desiring breast reduction for gender affirmation.      PLAN:   Stacey is seeking breast reduction for gender affirmation and for relief of symptomatic macromastia. " I think a removal of ~1000g would be needed to get them to their goal of a B cup. We did discuss the role of weight loss to help lower the schnur and also reduce some post operative risks. They do understand this and have tried weight loss in the past and not been too successful.     They are interested in pursuing gender affirming breast reduction to better align with their gender identity. I think this is very reasonable. We discussed the procedure today. I will get them set up with our gender care team and they will need another consult with the surgeon. We did discuss that this would be a wait, which they understand. In the meantime they should work on discussing this with their PCP and getting a letter of support from therapist.     We look forward to helping them in the future.     Pati Carpenter PA-C  Plastic and Reconstructive Surgery     55 minutes spent on the date of the encounter doing chart review, history and physical, documentation and further activity as noted above.      Again, thank you for allowing me to participate in the care of your patient.      Sincerely,    Pati Carpenter PA-C

## 2024-12-03 NOTE — NURSING NOTE
"Chief Complaint   Patient presents with    Consult     Breast reduction.       Vitals:    12/03/24 0804   BP: 117/77   BP Location: Left arm   Patient Position: Sitting   Cuff Size: Adult Large   Pulse: 92   SpO2: 99%   Weight: 142.5 kg (314 lb 1.6 oz)   Height: 1.727 m (5' 8\")       Body mass index is 47.76 kg/m .      Leonard Rodriguez, EMT    "

## 2024-12-05 ENCOUNTER — TELEPHONE (OUTPATIENT)
Dept: PLASTIC SURGERY | Facility: CLINIC | Age: 29
End: 2024-12-05
Payer: COMMERCIAL

## 2024-12-05 DIAGNOSIS — F64.0 GENDER DYSPHORIA IN ADULT: Primary | ICD-10-CM

## 2024-12-05 NOTE — TELEPHONE ENCOUNTER
[facility] :  Care Coordination Note     SITUATION   Stacey Mantilla is a 29 year old adult who is receiving support for:  Gender affirming breast reduction    BACKGROUND     Patient was seen for breast reduction by Pati Carpenter on 12/3/24. Recommended to gender care program as this will be a gender affirming surgery. Scheduled with Dr Zapata as they are looking more for a B cup, nipple-sparing reduction    Pt denies nicotine  Pt is not using any HRT  Pronouns and gender identity is updated in MyChart    ASSESSMENT     Surgery              [facility] Assessment  [facility] ([facility]) Enrollment: Potential  Patient has a therapist: Yes  Letter of support #1: Requested  Surgery being considered: Yes  Mastectomy: Yes (breast reduction to B cup)      PLAN       Follow-up plan:  Patient is scheduled with Dr Zapata and confirmed they could get a LOS from current therapist before consult date.       Dawna Meehan RN

## 2024-12-09 ENCOUNTER — IMMUNIZATION (OUTPATIENT)
Dept: FAMILY MEDICINE | Facility: CLINIC | Age: 29
End: 2024-12-09
Payer: COMMERCIAL

## 2024-12-09 DIAGNOSIS — Z23 ENCOUNTER FOR IMMUNIZATION: Primary | ICD-10-CM

## 2024-12-09 PROCEDURE — 90480 ADMN SARSCOV2 VAC 1/ONLY CMP: CPT

## 2024-12-09 PROCEDURE — 99207 PR NO CHARGE NURSE ONLY: CPT

## 2024-12-09 PROCEDURE — 91320 SARSCV2 VAC 30MCG TRS-SUC IM: CPT

## 2024-12-09 PROCEDURE — 90471 IMMUNIZATION ADMIN: CPT

## 2024-12-09 PROCEDURE — 90656 IIV3 VACC NO PRSV 0.5 ML IM: CPT

## 2024-12-09 NOTE — PROGRESS NOTES
Prior to immunization administration, verified patients identity using patient s name and date of birth. Please see Immunization Activity for additional information.     Is the patient's temperature normal (100.5 or less)? Yes     Patient MEETS CRITERIA. PROCEED with vaccine administration.          12/9/2024   COVID   Have you had myocarditis or pericarditis (inflammation of or around the heart muscle) after getting a COVID-19 vaccine? No   Have you had a serious reaction to a COVID vaccine or something in a COVID vaccine, like polyethylene glycol (PEG) or polysorbate? No   Have you had multisystem inflammatory syndrome from COVID-19 in the past 90 days? No   Have you received a bone marrow transplant within the previous 3 months? No            Patient MEETS CRITERIA. PROCEED with vaccine administration.            12/9/2024   INFLUENZA   Would you like to receive the flu shot or the nasal flu vaccine today? Flu Shot   Have you had a serious reaction to a flu vaccine or something in a flu vaccine? No   Have you had Guillain-Gorman syndrome within 6 weeks of getting a vaccine? No   Have you received a bone marrow transplant within the previous 6 months? No            Patient MEETS CRITERIA. PROCEED with vaccine administration.        Patient instructed to remain in clinic for 15 minutes afterwards, and to report any adverse reactions.      Link to Ancillary Visit Immunization Standing Orders SmartSet     Screening performed by Breanna Fregoso MA on 12/9/2024 at 2:28 PM.

## 2024-12-23 NOTE — PROGRESS NOTES
Outcome for 12/23/24 9:41 AM: Data uploaded on Dexcom  Mary Hoffman MA  Outcome for 12/23/24 9:41 AM: CreationFlow message sent  Mary Hoffman MA  Outcome for 01/02/25 1:38 PM: Per patient, will upload device before appointment- Pt states she has been using tandem pump.   Mary Hoffman MA  Outcome for 01/03/25 8:07 AM: Data obtained via Dexcom and Tandem website  Mary Hoffman MA        HPI:  Mika is a very pleasant 29 yo woman here for follow up of type 1 diabetes, diagnosed at age 7.   She also sees Dr. Allan.  Mika's diabetes is complicated by nephropathy. She also has hyperlipidemia, a history of hypothyroidism (although she has been off of levothyroxine for several years) and celiac disease.  She follows a strict gluten free diet. She continues wearing a Dexcom G7 sensor and Tandem X2pump.  Dr. Allan was trying to get her on Mounjaro, but it has not been covered by her insurance.  Has a high deductible.     Second carpal tunnel surgery a couple of months ago.  Healing well.  Took up crocheting- this sometimes bothers her.     Over the holidays, missed metformin for a week and a half.  Noticed glucose ran quite a bit higher.     Thinking about getting breast reduction surgery.  Something she has wanted for a long time. Needs to figure out insurance coverage.     Pump settings:     mika shannon  YOB: 1995  Last upload date: Jan 2, 2025, 8:51 PM  t:slim X2 (#[id number])      Pump Profile settings - Norm  Active at upload    Time           Basal Rate (units/hr)  12:00 AM       2.200  Total Daily Basal: 52.800 units    Time           Correction Factor (units:mg/dL)  12:00 AM       1:18    Time           Carb Ratio (units:grams)  12:00 AM       1:4.8    Time           Target BG (mg/dL)  12:00 AM       100    Insulin Duration: 5 hr  Carbohydrates: On        Pump Profile settings - Active    Time           Basal Rate (units/hr)  12:00 AM       1.900  Total Daily Basal: 45.600  units    Time           Correction Factor (units:mg/dL)  12:00 AM       1:22    Time           Carb Ratio (units:grams)  12:00 AM       1:4.0    Time           Target BG (mg/dL)  12:00 AM       150    Insulin Duration: 5 hr  Carbohydrates: On    Recent glucose:                         Previous treatments:   Victoza- did not tolerate  Metformin- diarrhea.     For her hypothyroidism- untreated with normal TSH.      For her CKD (stage 1), she is taking enalapril. She follows with nephrology.     She has no other concerns today.     Past Medical History:   Diagnosis Date    Asthma     Currently no treatment needed    Celiac disease     Chronic kidney disease, stage I 08/03/2015    Chronic sinusitis     Depressive disorder     Diabetes mellitus type 1 (H)     Diabetic nephropathy (H)     Family history of heart disease 08/03/2015    Hypertension     Hypothyroid     Pedal edema     Psoriasis      PMH:   Type 1 diabetes- since 2003  Celiac disease- since age 13  Hypothyroidism- age 10 (no longer on levothyroxine for several years)  Hyperlipidemia      Past Surgical History:   Procedure Laterality Date    BIOPSY      ENT SURGERY      RELEASE CARPAL TUNNEL Left 04/04/2024    Procedure: RELEASE, LEFT CARPAL TUNNEL;  Surgeon: Gray Reyes MD;  Location: [facility] OR    RELEASE CARPAL TUNNEL Right 11/21/2024    Procedure: RIGHT OPEN CARPAL TUNNEL RELEASE;  Surgeon: Gray Reyes MD;  Location: [facility] OR    RELEASE TRIGGER FINGER Left 04/04/2024    Procedure: RELEASE, LEFT RING TRIGGER FINGER;  Surgeon: Gray Reyes MD;  Location: [facility] OR    SOFT TISSUE SURGERY      Carpal tunnel and trigger finger    TONSILLECTOMY, ADENOIDECTOMY, COMBINED         Family History   Problem Relation Age of Onset    Gastrointestinal Disease Mother         Celiacs    Obesity Mother     Depression Mother     Fibromyalgia Mother     Cancer Mother         MDS    Cardiovascular Father 48        MI, stents, diabetic, type 2    Diabetes Father     Coronary Artery  Disease Father     Obesity Father     Depression Father     Substance Abuse Father     Lung Cancer Father         Smoker    Other Cancer Father         Lung cancer and MDS (blood cancer)    Substance Abuse Sister     Bipolar Disorder Brother     Schizophrenia Brother     Myocardial Infarction Brother 50        Possibly MI    Substance Abuse Brother     Skin Cancer Maternal Grandmother     Cardiovascular Maternal Grandfather         Englarged heart, pacemaker, defib    Skin Cancer Maternal Grandfather     Obesity Maternal Half-Sister     Heart Disease Paternal Half-Brother 45    Melanoma No family hx of     Glaucoma No family hx of     Macular Degeneration No family hx of      Family Hx:   Dad- premature CVD, in his 40s.  Type 2 diabetes, stroke, cancer  Mom- celiac disease  Grandfather- type 2 diabetes  Half brother- Asperger's  Half sister- cervical CA  Another half brother- bipolar and schizophrenia  1/2 brother-  of presumed heart attack    History     Social History    Marital Status:      Spouse Name: N/A     Number of Children: N/A    Years of Education: N/A     Social History Main Topics    Smoking status: Never Smoker     Smokeless tobacco: Never Used    Alcohol Use: Yes      Comment: occ. use    Drug Use: No    Sexual Activity:     Partners: Male     Birth Control/ Protection: Condom     Other Topics Concern    Parent/Sibling W/ Cabg, Mi Or Angioplasty Before 65f 55m? Yes    Caffeine Concern Yes     1 cup tea per day    Sleep Concern No    Special Diet Yes     gluten free diet, low carbs    Exercise Yes     walking, ellyptical, swimming, weights    Seat Belt Yes     Social History Narrative     Social Hx:    . 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 Enjoys a lot of hiking. She also goes to the gym a few days a week.  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     Current Outpatient Medications   Medication Sig Dispense Refill    albuterol (PROAIR HFA/PROVENTIL HFA/VENTOLIN HFA) 108 (90 Base) MCG/ACT inhaler Inhale 2 puffs into the lungs every 6 hours 18 g 3    ALPRAZolam (XANAX) 0.5 MG tablet Take 1 tablet (0.5 mg) by mouth 3 times daily as needed for anxiety 5 tablet 0    amphetamine-dextroamphetamine (ADDERALL XR) 15 MG 24 hr capsule Take 1 capsule (15 mg) by mouth daily. 90 capsule 0    amphetamine-dextroamphetamine (ADDERALL) 10 MG tablet Take 1 tablet (10 mg) by mouth daily. 90 tablet 0    atorvastatin (LIPITOR) 40 MG tablet Take 1 tablet (40 mg) by mouth daily. 90 tablet 1    blood glucose (NO BRAND SPECIFIED) test strip Use to test blood sugar five times daily or as directed.  Dispense Tunepresto Contour Next Test Strips. 200 strip 11    busPIRone (BUSPAR) 15 MG tablet Take 1 tablet (15 mg) by mouth 2 times daily. 180 tablet 3    Continuous Blood Gluc Sensor (DEXCOM G7 SENSOR) MISC Use as directed to monitor blood glucose. Change every 10 days. 9 each 0    Continuous Glucose Sensor (DEXCOM G7 SENSOR) MISC Change every 10 days. 9 each 2    enalapril (VASOTEC) 20 MG tablet Take 1 tablet (20 mg) by mouth 2 times daily. 180 tablet 3    escitalopram (LEXAPRO) 5 MG tablet Take 1 tablet (5 mg) by mouth daily for 360 days 90 tablet 3    fluticasone (FLONASE) 50 MCG/ACT nasal spray Spray 1 spray into both nostrils daily 16 g 11    Glucagon, rDNA, (GLUCAGON EMERGENCY) 1 MG KIT Use as directed in case of low blood glucose. 1 kit 3    insulin aspart (NOVOLOG VIAL) 100 UNITS/ML vial Use as directed, up to 150 units daily in insulin pump 150 mL 3    Insulin Infusion Pump Supplies (INSULIN PUMP SYRINGE RESERVOIR) MISC Change every other day as directed 45 each 3    Insulin Infusion Pump Supplies (SURE-T INFUSION SET 23\") MISC Change every 2-3 days 30 each 3    Insulin Pump Accessories MISC Infusion set per patient preference.  Change infusion set every other day 45 each " 3    KETOSTIX test strip Use as directed in case of high glucose, vomiting or illness.once weekly 50 strip 3    metFORMIN (GLUCOPHAGE XR) 500 MG 24 hr tablet Take 1 tablet (500 mg) by mouth daily for 7 days, THEN 2 tablets (1,000 mg) daily for 7 days, THEN 3 tablets (1,500 mg) daily for 7 days, THEN 4 tablets (2,000 mg) daily. 360 tablet 3    Multiple Vitamins-Minerals (MULTIVITAMIN ADULT PO) Take 1 tablet by mouth daily      NOVOLOG PENFILL 100 UNIT/ML soln Take as directed up to 60 units per day 54 mL 3     Current Facility-Administered Medications   Medication Dose Route Frequency Provider Last Rate Last Admin    dexAMETHasone (DECADRON) injection 4 mg  4 mg   Gray Reyes MD   4 mg at 12/11/23 1304    lidocaine (PF) (XYLOCAINE) 1 % injection 1 mL  1 mL   Gray Reyes MD   1 mL at 12/11/23 1304          Allergies   Allergen Reactions    Dog Epithelium (Canis Lupus Familiaris) Other (See Comments)     Sinus symptoms     Dogs Other (See Comments)     Sinus symptoms     Dust Mites      Sinus     Gluten Meal      Physical Exam:  There were no vitals taken for this visit.  GENERAL: healthy, alert and no distress  RESP: no audible wheeze, cough, or visible cyanosis.  No visible retractions or increased work of breathing.  Able to speak fully in complete sentences.  PSYCH: mentation appears normal, affect normal/bright, judgement and insight intact, normal speech and appearance well-groomed    RESULTS  Lab Results   Component Value Date    A1C 6.5 (H) 07/02/2024    A1C 7.3 (H) 11/24/2023    A1C 6.6 (H) 03/28/2023    A1C 6.4 (H) 07/24/2022    A1C 6.7 (H) 05/07/2021    A1C 7.3 (H) 02/17/2020    A1C 7.0 (H) 01/17/2018    A1C 7.6 (H) 03/26/2015    HEMOGLOBINA1 6.9 11/15/2021    HEMOGLOBINA1 7.0 (A) 01/28/2020    HEMOGLOBINA1 7.3 (A) 08/19/2019    HEMOGLOBINA1 6.8 (A) 05/14/2019    HEMOGLOBINA1 6.8 (A) 02/18/2019       TSH   Date Value Ref Range Status   07/02/2024 2.27 0.30 - 4.20 uIU/mL Final   03/28/2023 3.75 0.30 - 4.20  uIU/mL Final   05/07/2021 3.69 0.40 - 4.00 mU/L Final   02/17/2020 3.97 0.40 - 4.00 mU/L Final   06/07/2019 3.91 0.40 - 4.00 mU/L Final   03/04/2019 3.41 0.40 - 4.00 mU/L Final   10/10/2016 2.89 0.40 - 4.00 mU/L Final     T4 Free   Date Value Ref Range Status   12/09/2013 1.18 0.70 - 1.85 ng/dL Final       ALT   Date Value Ref Range Status   01/11/2024 27 0 - 50 U/L Final     Comment:     Reference intervals for this test were updated on 6/12/2023 to more accurately reflect our healthy population. There may be differences in the flagging of prior results with similar values performed with this method. Interpretation of those prior results can be made in the context of the updated reference intervals.     05/07/2021 23 0 - 50 U/L Final   05/17/2019 33 0 - 50 U/L Final   ]    Recent Labs   Lab Test 01/11/24  1454 11/24/23  0824   CHOL 169 137   HDL 66 59   LDL 89 63   TRIG 68 74       Lab Results   Component Value Date     08/09/2024     05/07/2021      Lab Results   Component Value Date    POTASSIUM 4.8 08/09/2024    POTASSIUM 4.7 07/24/2022    POTASSIUM 3.9 05/07/2021     Lab Results   Component Value Date    CHLORIDE 106 08/09/2024    CHLORIDE 109 07/24/2022    CHLORIDE 104 05/07/2021     Lab Results   Component Value Date    FRANKLIN 9.7 08/09/2024    FRANKLIN 9.3 05/07/2021     Lab Results   Component Value Date    CO2 24 08/09/2024    CO2 24 07/24/2022    CO2 26 05/07/2021     Lab Results   Component Value Date    BUN 8.0 08/09/2024    BUN 6 07/24/2022    BUN 11 05/07/2021     Lab Results   Component Value Date    CR 0.61 08/09/2024    CR 0.59 05/07/2021       GFR Estimate   Date Value Ref Range Status   08/09/2024 >90 >60 mL/min/1.73m2 Final     Comment:     eGFR calculated using 2021 CKD-EPI equation.   01/11/2024 >90 >60 mL/min/1.73m2 Final   06/05/2023 >90 >60 mL/min/1.73m2 Final     Comment:     eGFR calculated using 2021 CKD-EPI equation.   05/07/2021 >90 >60 mL/min/[1.73_m2] Final     Comment:     Non  " GFR Calc  Starting 12/18/2018, serum creatinine based estimated GFR (eGFR) will be   calculated using the Chronic Kidney Disease Epidemiology Collaboration   (CKD-EPI) equation.     04/30/2021 >90 >60 mL/min/[1.73_m2] Final     Comment:     Non  GFR Calc  Starting 12/18/2018, serum creatinine based estimated GFR (eGFR) will be   calculated using the Chronic Kidney Disease Epidemiology Collaboration   (CKD-EPI) equation.     05/19/2020 >90 >60 mL/min/[1.73_m2] Final     Comment:     Non  GFR Calc  Starting 12/18/2018, serum creatinine based estimated GFR (eGFR) will be   calculated using the Chronic Kidney Disease Epidemiology Collaboration   (CKD-EPI) equation.       GFR Estimate If Black   Date Value Ref Range Status   05/07/2021 >90 >60 mL/min/[1.73_m2] Final     Comment:      GFR Calc  Starting 12/18/2018, serum creatinine based estimated GFR (eGFR) will be   calculated using the Chronic Kidney Disease Epidemiology Collaboration   (CKD-EPI) equation.     04/30/2021 >90 >60 mL/min/[1.73_m2] Final     Comment:      GFR Calc  Starting 12/18/2018, serum creatinine based estimated GFR (eGFR) will be   calculated using the Chronic Kidney Disease Epidemiology Collaboration   (CKD-EPI) equation.     05/19/2020 >90 >60 mL/min/[1.73_m2] Final     Comment:      GFR Calc  Starting 12/18/2018, serum creatinine based estimated GFR (eGFR) will be   calculated using the Chronic Kidney Disease Epidemiology Collaboration   (CKD-EPI) equation.         Lab Results   Component Value Date    MICROL <12.0 08/09/2024    MICROL <5 07/24/2022    MICROL <5 04/30/2021     No results found for: \"MICROALBUMIN\"  No results found for: \"CPEPT\", \"GADAB\", \"ISCAB\"    Vitamin B12   Date Value Ref Range Status   07/02/2024 746 232 - 1,245 pg/mL Final   ]    No results found for: \"TTG\", \"TTGG\"      Most recent eye exam date: : Not Found     FIB-4 " Calculation: 0.3 at 1/11/2024  2:54 PM  Calculated from:  SGOT/AST: 20 U/L at 1/11/2024  2:54 PM  SGPT/ALT: 27 U/L at 1/11/2024  2:54 PM  Platelets: 381 10e3/uL at 1/11/2024  2:54 PM  Age: 28 years  A value of FIB-4 below 1.30 is considered as low risk for advanced fibrosis; a value of FIB-4 over 2.67 is considered as high risk for advanced fibrosis; and FIB-4 values between 1.30 and 2.67 are considered as intermediate risk of advanced fibrosis.      Assessment/Plan:      1.  Type 1 diabetes- Stacey is doing quite well.  Glucose is under fairly good control until recently, when she ran out of metformin.  Now she is back on and glucose is better.  Briefly discussed carefully using off label SGLT-2 inhibitor to help reduce insulin requirements and weight loss/renal protection.   She is open to learning more.  I will message her nephrologist about this, but may be cost-prohibitive. NO other changes today.  We made the following plan today (instructions given to patient):      Emergency issues: Here are some concerns you should contact us about.  -Vomiting: more than twice.  Please check ketones.  If positive, go to ER. Monitor glucose hourly.   -High glucose (over 300 mg/dL twice in a row) with a pump:  Twice in doubt, take it out.  Change your pump site. Check ketones.  If ketones are negative, take an insulin correction by syringe/pen and recheck glucose in 1 hour.  If glucose is not coming down, please call the clinic. If ketones are moderate or large, drink lots of water, take an insulin correction 1.5 times your usual correction by syringe/pen, and recheck ketones in 1 hour.  If ketones are still present (or you are vomiting), go to the ER.  -Hypoglycemia (low glucose):   If glucose is less than 70 mg/dL, treat with 15g carb (4 glucose tablets), recheck glucose in 15 minutes.  If low again, repeat.   If glucose is less than 54 mg/dL, treat with 30g carb, recheck glucose in 15 minutes.  If low again, repeat.  Keep  glucagon in your home in case of severe hypoglycemia and train someone how to use this.    Emergency kit (please ensure you always have these with you):   Glucose tablets  Glucagon  Insulin  Syringes/needles   Extra infusion set (if on a pump)  Ketone strips    Backup insulin plan (in case of pump failure):   If your insulin pump fails, your body will not have any insulin available and your blood glucose levels can get dangerously high. This can result in diabetic ketoacidosis making you very sick (abdominal pain, confusion, vomiting, dehydration, positive urine ketones).    You have an active long acting basal insulin (Degludec: Tresiba / Detemir: Levemir / Glargine: Lantus / Toujeo / Semglee / Basaglar) prescription available. Please pick this up from your pharmacy in case your pump fails.  Basal insulin dose:______60______ units once per day.    Immediately after your pump fails and until you can  the long acting insulin, use short acting insulin (Aspart: Novolog/Fiasp / Lispro: Humalog / Glulisine:Apidra) injections (pen or vial and syringe) per your correction scale every 4 hours and continue to cover carbohydrates. You can stop this 4 hourly correction after you give yourself the basal insulin dose.    You should also administer short acting insulin subcutaneously to cover carbohydrates and per your correction scale prior to meals.     Contact us for help transitioning back to your pump when this is available.    Contact information:   If you have concerns, please send me a [facility] Hotline message or call the clinic at [phone].  For more urgent concerns, please call [phone] after hours/weekends and ask to speak with the endocrinologist on call.      Please let me know if you are having low blood sugars less than 70 or over 350 mg/dL.  Do not wait until your next appointment if this is happening.        2.  Risk factors-     Retinopathy:  No.  Had recent eye exam.   Nephropathy:  BP well controlled. No  microalbuminuria.  Creatinine stable.  She is on enalapril.  White coat hypertension.     She follows with nephrology.   Neuropathy: No.    Feet: OK, no ulcers.   Lipids:  LDL improved.   Back on statin.  She does have a family history of premature CV disease in her father and half brother.  Now back on statin, as she has no plans for childbearing.      3.  Hypothyroidism- last TSH in target on no levothyroxine.      4.  Celiac disease- well controlled with gluten free diet. Will check antibodies.     5.  F/U in 6 months with Dr. Allan, 1 year with me.     31 minutes spent on the date of the encounter doing chart review, review of test results, review of continuous glucose sensor, interpretation of glucose data, patient visit and documentation, counseling/coordination of care, and discussion of follow up plan for worsening hyper and hypoglycemia.  The patient understood and is satisfied with today's visit.       Laureen Goldstein PA-C, MPAS   [facility]  Department of Medicine  Division of Endocrinology and Diabetes

## 2025-01-02 ENCOUNTER — TELEPHONE (OUTPATIENT)
Dept: ENDOCRINOLOGY | Facility: CLINIC | Age: 30
End: 2025-01-02
Payer: COMMERCIAL

## 2025-01-02 NOTE — TELEPHONE ENCOUNTER
Spoke with patient. Pt states she has been using tandem pump. Pt will upload tandem tonight. Mary Hoffman CMA on 1/2/2025 at 1:39 PM

## 2025-01-05 ENCOUNTER — HEALTH MAINTENANCE LETTER (OUTPATIENT)
Age: 30
End: 2025-01-05

## 2025-01-06 ENCOUNTER — VIRTUAL VISIT (OUTPATIENT)
Dept: ENDOCRINOLOGY | Facility: CLINIC | Age: 30
End: 2025-01-06
Payer: COMMERCIAL

## 2025-01-06 DIAGNOSIS — E10.21 TYPE 1 DIABETES MELLITUS WITH DIABETIC NEPHROPATHY (H): Primary | ICD-10-CM

## 2025-01-06 PROCEDURE — 98006 SYNCH AUDIO-VIDEO EST MOD 30: CPT | Performed by: PHYSICIAN ASSISTANT

## 2025-01-06 NOTE — NURSING NOTE
Current patient location: Home    Is the patient currently in the state of [place]? YES    Visit mode:VIDEO    If the visit is dropped, the patient can be reconnected by:VIDEO VISIT: Text to cell phone:   Telephone Information:   Mobile [phone]       Will anyone else be joining the visit? NO  (If patient encounters technical issues they should call [phone] :[phone])    Are changes needed to the allergy or medication list? Patient completed e-check in today prior to appointment, so VF did not review e-check in information again with patient.    Are refills needed on medications prescribed by this physician? Discuss with provider    Rooming Documentation:  Not applicable     Reason for visit: RECHECK    Leonora WHEELER

## 2025-01-06 NOTE — Clinical Note
Gretel Bobby,  I see Stacey for her type 1 diabetes.  Just curious about your thoughts about using an SGLT-2 inhibitor- hoping to lower her insulin requirements (off label in type 1, but I do this often with close monitoring). Thanks,  Laureen

## 2025-01-06 NOTE — PATIENT INSTRUCTIONS
Emergency issues: Here are some concerns you should contact us about.  -Vomiting: more than twice.  Please check ketones.  If positive, go to ER. Monitor glucose hourly.   -High glucose (over 300 mg/dL twice in a row): Please check ketones.  If ketones are negative, take an insulin correction and recheck glucose in 1 hour.  If glucose is not coming down, please call the clinic. If ketones are moderate or large, drink lots of water, take an insulin correction 1.5 times your usual correction, and recheck ketones in 1 hour.  If ketones are still present (or you are vomiting), go to the ER.  -High glucose (over 300 mg/dL twice in a row) with a pump:  Twice in doubt, take it out.  Change your pump site. Check ketones.  If ketones are negative, take an insulin correction by syringe/pen and recheck glucose in 1 hour.  If glucose is not coming down, please call the clinic. If ketones are moderate or large, drink lots of water, take an insulin correction 1.5 times your usual correction by syringe/pen, and recheck ketones in 1 hour.  If ketones are still present (or you are vomiting), go to the ER.  -Hypoglycemia (low glucose):   If glucose is less than 70 mg/dL, treat with 15g carb (4 glucose tablets), recheck glucose in 15 minutes.  If low again, repeat.   If glucose is less than 54 mg/dL, treat with 30g carb, recheck glucose in 15 minutes.  If low again, repeat.  Keep glucagon in your home in case of severe hypoglycemia and train someone how to use this.    Emergency kit (please ensure you always have these with you):   Glucose tablets  Glucagon  Insulin  Syringes/needles   Extra infusion set (if on a pump)  Ketone strips    Backup insulin plan (in case of pump failure):   If your insulin pump fails, your body will not have any insulin available and your blood glucose levels can get dangerously high. This can result in diabetic ketoacidosis making you very sick (abdominal pain, confusion, vomiting, dehydration, positive  urine ketones).    You have an active long acting basal insulin (Degludec: Tresiba / Detemir: Levemir / Glargine: Lantus / Toujeo / Semglee / Basaglar) prescription available. Please pick this up from your pharmacy in case your pump fails.  Basal insulin dose:_____60_______ units once per day.    Immediately after your pump fails and until you can  the long acting insulin, use short acting insulin (Aspart: Novolog/Fiasp / Lispro: Humalog / Glulisine:Apidra) injections (pen or vial and syringe) per your correction scale every 4 hours and continue to cover carbohydrates. You can stop this 4 hourly correction after you give yourself the basal insulin dose.    You should also administer short acting insulin subcutaneously to cover carbohydrates and per your correction scale prior to meals.     Contact us for help transitioning back to your pump when this is available.    Contact information:   If you have concerns, please send me a [employer] message or call the clinic at [phone].  For more urgent concerns, please call [phone] after hours/weekends and ask to speak with the endocrinologist on call.      Please let me know if you are having low blood sugars less than 70 or over 350 mg/dL.  Do not wait until your next appointment if this is happening.

## 2025-01-06 NOTE — LETTER
1/6/2025       RE: Mika Mantilla  [address]  [facility] [address]     Dear Colleague,    Thank you for referring your patient, Mika Mantilla, to the [facility] [place] [facility]. Please see a copy of my visit note below.    Outcome for 12/23/24 9:41 AM: Data uploaded on Dexcom  Mary Hoffman MA  Outcome for 12/23/24 9:41 AM: Zoom[employer] message sent  Mary Hoffman MA  Outcome for 01/02/25 1:38 PM: Per patient, will upload device before appointment- Pt states she has been using tandem pump.   Mary Hoffman MA  Outcome for 01/03/25 8:07 AM: Data obtained via Dexcom and Tandem website  Mary Hoffman MA        HPI:  Mika is a very pleasant 27 yo woman here for follow up of type 1 diabetes, diagnosed at age 7.   She also sees Dr. Allan.  Mika's diabetes is complicated by nephropathy. She also has hyperlipidemia, a history of hypothyroidism (although she has been off of levothyroxine for several years) and celiac disease.  She follows a strict gluten free diet. She continues wearing a Dexcom G7 sensor and Tandem X2pump.  Dr. Allan was trying to get her on Mounjaro, but it has not been covered by her insurance.  Has a high deductible.     Second carpal tunnel surgery a couple of months ago.  Healing well.  Took up crocheting- this sometimes bothers her.     Over the holidays, missed metformin for a week and a half.  Noticed glucose ran quite a bit higher.     Thinking about getting breast reduction surgery.  Something she has wanted for a long time. Needs to figure out insurance coverage.     Pump settings:     mika mantilla  YOB: 1995  Last upload date: Jan 2, 2025, 8:51 PM  t:slim X2 (#[id number])      Pump Profile settings - Norm  Active at upload    Time           Basal Rate (units/hr)  12:00 AM       2.200  Total Daily Basal: 52.800 units    Time           Correction Factor (units:mg/dL)  12:00 AM        1:18    Time           Carb Ratio (units:grams)  12:00 AM       1:4.8    Time           Target BG (mg/dL)  12:00 AM       100    Insulin Duration: 5 hr  Carbohydrates: On        Pump Profile settings - Active    Time           Basal Rate (units/hr)  12:00 AM       1.900  Total Daily Basal: 45.600 units    Time           Correction Factor (units:mg/dL)  12:00 AM       1:22    Time           Carb Ratio (units:grams)  12:00 AM       1:4.0    Time           Target BG (mg/dL)  12:00 AM       150    Insulin Duration: 5 hr  Carbohydrates: On    Recent glucose:                         Previous treatments:   Victoza- did not tolerate  Metformin- diarrhea.     For her hypothyroidism- untreated with normal TSH.      For her CKD (stage 1), she is taking enalapril. She follows with nephrology.     She has no other concerns today.     Past Medical History:   Diagnosis Date     Asthma     Currently no treatment needed     Celiac disease      Chronic kidney disease, stage I 08/03/2015     Chronic sinusitis      Depressive disorder      Diabetes mellitus type 1 (H)      Diabetic nephropathy (H)      Family history of heart disease 08/03/2015     Hypertension      Hypothyroid      Pedal edema      Psoriasis      PMH:   Type 1 diabetes- since 2003  Celiac disease- since age 13  Hypothyroidism- age 10 (no longer on levothyroxine for several years)  Hyperlipidemia      Past Surgical History:   Procedure Laterality Date     BIOPSY       ENT SURGERY       RELEASE CARPAL TUNNEL Left 04/04/2024    Procedure: RELEASE, LEFT CARPAL TUNNEL;  Surgeon: Gray Reyes MD;  Location: [facility] OR     RELEASE CARPAL TUNNEL Right 11/21/2024    Procedure: RIGHT OPEN CARPAL TUNNEL RELEASE;  Surgeon: Gray Reyes MD;  Location: [facility] OR     RELEASE TRIGGER FINGER Left 04/04/2024    Procedure: RELEASE, LEFT RING TRIGGER FINGER;  Surgeon: Gray Reyes MD;  Location: [facility] OR     SOFT TISSUE SURGERY      Carpal tunnel and trigger finger     TONSILLECTOMY,  ADENOIDECTOMY, COMBINED         Family History   Problem Relation Age of Onset     Gastrointestinal Disease Mother         Celiacs     Obesity Mother      Depression Mother      Fibromyalgia Mother      Cancer Mother         MDS     Cardiovascular Father 48        MI, stents, diabetic, type 2     Diabetes Father      Coronary Artery Disease Father      Obesity Father      Depression Father      Substance Abuse Father      Lung Cancer Father         Smoker     Other Cancer Father         Lung cancer and MDS (blood cancer)     Substance Abuse Sister      Bipolar Disorder Brother      Schizophrenia Brother      Myocardial Infarction Brother 50        Possibly MI     Substance Abuse Brother      Skin Cancer Maternal Grandmother      Cardiovascular Maternal Grandfather         Englarged heart, pacemaker, defib     Skin Cancer Maternal Grandfather      Obesity Maternal Half-Sister      Heart Disease Paternal Half-Brother 45     Melanoma No family hx of      Glaucoma No family hx of      Macular Degeneration No family hx of      Family Hx:   Dad- premature CVD, in his 40s.  Type 2 diabetes, stroke, cancer  Mom- celiac disease  Grandfather- type 2 diabetes  Half brother- Asperger's  Half sister- cervical CA  Another half brother- bipolar and schizophrenia  1/2 brother-  of presumed heart attack    History     Social History     Marital Status:      Spouse Name: N/A     Number of Children: N/A     Years of Education: N/A     Social History Main Topics     Smoking status: Never Smoker      Smokeless tobacco: Never Used     Alcohol Use: Yes      Comment: occ. use     Drug Use: No     Sexual Activity:     Partners: Male     Birth Control/ Protection: Condom     Other Topics Concern     Parent/Sibling W/ Cabg, Mi Or Angioplasty Before 65f 55m? Yes     Caffeine Concern Yes     1 cup tea per day     Sleep Concern No     Special Diet Yes     gluten free diet, low carbs     Exercise Yes     walking, ellyptical, swimming,  weights     Seat Belt Yes     Social History Narrative     Social Hx:    2021. 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 Enjoys a lot of hiking. She also goes to the gym a few days a week.  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     Current Outpatient Medications   Medication Sig Dispense Refill     albuterol (PROAIR HFA/PROVENTIL HFA/VENTOLIN HFA) 108 (90 Base) MCG/ACT inhaler Inhale 2 puffs into the lungs every 6 hours 18 g 3     ALPRAZolam (XANAX) 0.5 MG tablet Take 1 tablet (0.5 mg) by mouth 3 times daily as needed for anxiety 5 tablet 0     amphetamine-dextroamphetamine (ADDERALL XR) 15 MG 24 hr capsule Take 1 capsule (15 mg) by mouth daily. 90 capsule 0     amphetamine-dextroamphetamine (ADDERALL) 10 MG tablet Take 1 tablet (10 mg) by mouth daily. 90 tablet 0     atorvastatin (LIPITOR) 40 MG tablet Take 1 tablet (40 mg) by mouth daily. 90 tablet 1     blood glucose (NO BRAND SPECIFIED) test strip Use to test blood sugar five times daily or as directed.  Dispense Brandy Contour Next Test Strips. 200 strip 11     busPIRone (BUSPAR) 15 MG tablet Take 1 tablet (15 mg) by mouth 2 times daily. 180 tablet 3     Continuous Blood Gluc Sensor (DEXCOM G7 SENSOR) MISC Use as directed to monitor blood glucose. Change every 10 days. 9 each 0     Continuous Glucose Sensor (DEXCOM G7 SENSOR) MISC Change every 10 days. 9 each 2     enalapril (VASOTEC) 20 MG tablet Take 1 tablet (20 mg) by mouth 2 times daily. 180 tablet 3     escitalopram (LEXAPRO) 5 MG tablet Take 1 tablet (5 mg) by mouth daily for 360 days 90 tablet 3     fluticasone (FLONASE) 50 MCG/ACT nasal spray Spray 1 spray into both nostrils daily 16 g 11     Glucagon, rDNA, (GLUCAGON EMERGENCY) 1 MG KIT Use as directed in case of low blood glucose. 1 kit 3     insulin aspart (NOVOLOG VIAL) 100 UNITS/ML vial Use as  "directed, up to 150 units daily in insulin pump 150 mL 3     Insulin Infusion Pump Supplies (INSULIN PUMP SYRINGE RESERVOIR) MISC Change every other day as directed 45 each 3     Insulin Infusion Pump Supplies (SURE-T INFUSION SET 23\") MISC Change every 2-3 days 30 each 3     Insulin Pump Accessories MISC Infusion set per patient preference.  Change infusion set every other day 45 each 3     KETOSTIX test strip Use as directed in case of high glucose, vomiting or illness.once weekly 50 strip 3     metFORMIN (GLUCOPHAGE XR) 500 MG 24 hr tablet Take 1 tablet (500 mg) by mouth daily for 7 days, THEN 2 tablets (1,000 mg) daily for 7 days, THEN 3 tablets (1,500 mg) daily for 7 days, THEN 4 tablets (2,000 mg) daily. 360 tablet 3     Multiple Vitamins-Minerals (MULTIVITAMIN ADULT PO) Take 1 tablet by mouth daily       NOVOLOG PENFILL 100 UNIT/ML soln Take as directed up to 60 units per day 54 mL 3     Current Facility-Administered Medications   Medication Dose Route Frequency Provider Last Rate Last Admin     dexAMETHasone (DECADRON) injection 4 mg  4 mg   Gray Reyes MD   4 mg at 12/11/23 1304     lidocaine (PF) (XYLOCAINE) 1 % injection 1 mL  1 mL   Gray Reyes MD   1 mL at 12/11/23 1304          Allergies   Allergen Reactions     Dog Epithelium (Canis Lupus Familiaris) Other (See Comments)     Sinus symptoms      Dogs Other (See Comments)     Sinus symptoms      Dust Mites      Sinus      Gluten Meal      Physical Exam:  There were no vitals taken for this visit.  GENERAL: healthy, alert and no distress  RESP: no audible wheeze, cough, or visible cyanosis.  No visible retractions or increased work of breathing.  Able to speak fully in complete sentences.  PSYCH: mentation appears normal, affect normal/bright, judgement and insight intact, normal speech and appearance well-groomed    RESULTS  Lab Results   Component Value Date    A1C 6.5 (H) 07/02/2024    A1C 7.3 (H) 11/24/2023    A1C 6.6 (H) 03/28/2023    A1C 6.4 (H) " 07/24/2022    A1C 6.7 (H) 05/07/2021    A1C 7.3 (H) 02/17/2020    A1C 7.0 (H) 01/17/2018    A1C 7.6 (H) 03/26/2015    HEMOGLOBINA1 6.9 11/15/2021    HEMOGLOBINA1 7.0 (A) 01/28/2020    HEMOGLOBINA1 7.3 (A) 08/19/2019    HEMOGLOBINA1 6.8 (A) 05/14/2019    HEMOGLOBINA1 6.8 (A) 02/18/2019       TSH   Date Value Ref Range Status   07/02/2024 2.27 0.30 - 4.20 uIU/mL Final   03/28/2023 3.75 0.30 - 4.20 uIU/mL Final   05/07/2021 3.69 0.40 - 4.00 mU/L Final   02/17/2020 3.97 0.40 - 4.00 mU/L Final   06/07/2019 3.91 0.40 - 4.00 mU/L Final   03/04/2019 3.41 0.40 - 4.00 mU/L Final   10/10/2016 2.89 0.40 - 4.00 mU/L Final     T4 Free   Date Value Ref Range Status   12/09/2013 1.18 0.70 - 1.85 ng/dL Final       ALT   Date Value Ref Range Status   01/11/2024 27 0 - 50 U/L Final     Comment:     Reference intervals for this test were updated on 6/12/2023 to more accurately reflect our healthy population. There may be differences in the flagging of prior results with similar values performed with this method. Interpretation of those prior results can be made in the context of the updated reference intervals.     05/07/2021 23 0 - 50 U/L Final   05/17/2019 33 0 - 50 U/L Final   ]    Recent Labs   Lab Test 01/11/24  1454 11/24/23  0824   CHOL 169 137   HDL 66 59   LDL 89 63   TRIG 68 74       Lab Results   Component Value Date     08/09/2024     05/07/2021      Lab Results   Component Value Date    POTASSIUM 4.8 08/09/2024    POTASSIUM 4.7 07/24/2022    POTASSIUM 3.9 05/07/2021     Lab Results   Component Value Date    CHLORIDE 106 08/09/2024    CHLORIDE 109 07/24/2022    CHLORIDE 104 05/07/2021     Lab Results   Component Value Date    FRANKLIN 9.7 08/09/2024    FRANKLIN 9.3 05/07/2021     Lab Results   Component Value Date    CO2 24 08/09/2024    CO2 24 07/24/2022    CO2 26 05/07/2021     Lab Results   Component Value Date    BUN 8.0 08/09/2024    BUN 6 07/24/2022    BUN 11 05/07/2021     Lab Results   Component Value Date    CR  0.61 08/09/2024    CR 0.59 05/07/2021       GFR Estimate   Date Value Ref Range Status   08/09/2024 >90 >60 mL/min/1.73m2 Final     Comment:     eGFR calculated using 2021 CKD-EPI equation.   01/11/2024 >90 >60 mL/min/1.73m2 Final   06/05/2023 >90 >60 mL/min/1.73m2 Final     Comment:     eGFR calculated using 2021 CKD-EPI equation.   05/07/2021 >90 >60 mL/min/[1.73_m2] Final     Comment:     Non  GFR Calc  Starting 12/18/2018, serum creatinine based estimated GFR (eGFR) will be   calculated using the Chronic Kidney Disease Epidemiology Collaboration   (CKD-EPI) equation.     04/30/2021 >90 >60 mL/min/[1.73_m2] Final     Comment:     Non  GFR Calc  Starting 12/18/2018, serum creatinine based estimated GFR (eGFR) will be   calculated using the Chronic Kidney Disease Epidemiology Collaboration   (CKD-EPI) equation.     05/19/2020 >90 >60 mL/min/[1.73_m2] Final     Comment:     Non  GFR Calc  Starting 12/18/2018, serum creatinine based estimated GFR (eGFR) will be   calculated using the Chronic Kidney Disease Epidemiology Collaboration   (CKD-EPI) equation.       GFR Estimate If Black   Date Value Ref Range Status   05/07/2021 >90 >60 mL/min/[1.73_m2] Final     Comment:      GFR Calc  Starting 12/18/2018, serum creatinine based estimated GFR (eGFR) will be   calculated using the Chronic Kidney Disease Epidemiology Collaboration   (CKD-EPI) equation.     04/30/2021 >90 >60 mL/min/[1.73_m2] Final     Comment:      GFR Calc  Starting 12/18/2018, serum creatinine based estimated GFR (eGFR) will be   calculated using the Chronic Kidney Disease Epidemiology Collaboration   (CKD-EPI) equation.     05/19/2020 >90 >60 mL/min/[1.73_m2] Final     Comment:      GFR Calc  Starting 12/18/2018, serum creatinine based estimated GFR (eGFR) will be   calculated using the Chronic Kidney Disease Epidemiology Collaboration   (CKD-EPI) equation.    "      Lab Results   Component Value Date    MICROL <12.0 08/09/2024    MICROL <5 07/24/2022    MICROL <5 04/30/2021     No results found for: \"MICROALBUMIN\"  No results found for: \"CPEPT\", \"GADAB\", \"ISCAB\"    Vitamin B12   Date Value Ref Range Status   07/02/2024 746 232 - 1,245 pg/mL Final   ]    No results found for: \"TTG\", \"TTGG\"      Most recent eye exam date: : Not Found     FIB-4 Calculation: 0.3 at 1/11/2024  2:54 PM  Calculated from:  SGOT/AST: 20 U/L at 1/11/2024  2:54 PM  SGPT/ALT: 27 U/L at 1/11/2024  2:54 PM  Platelets: 381 10e3/uL at 1/11/2024  2:54 PM  Age: 28 years  A value of FIB-4 below 1.30 is considered as low risk for advanced fibrosis; a value of FIB-4 over 2.67 is considered as high risk for advanced fibrosis; and FIB-4 values between 1.30 and 2.67 are considered as intermediate risk of advanced fibrosis.      Assessment/Plan:      1.  Type 1 diabetes- Stacey is doing quite well.  Glucose is under fairly good control until recently, when she ran out of metformin.  Now she is back on and glucose is better.  Briefly discussed carefully using off label SGLT-2 inhibitor to help reduce insulin requirements and weight loss/renal protection.   She is open to learning more.  I will message her nephrologist about this, but may be cost-prohibitive. NO other changes today.  We made the following plan today (instructions given to patient):      Emergency issues: Here are some concerns you should contact us about.  -Vomiting: more than twice.  Please check ketones.  If positive, go to ER. Monitor glucose hourly.   -High glucose (over 300 mg/dL twice in a row) with a pump:  Twice in doubt, take it out.  Change your pump site. Check ketones.  If ketones are negative, take an insulin correction by syringe/pen and recheck glucose in 1 hour.  If glucose is not coming down, please call the clinic. If ketones are moderate or large, drink lots of water, take an insulin correction 1.5 times your usual correction by " syringe/pen, and recheck ketones in 1 hour.  If ketones are still present (or you are vomiting), go to the ER.  -Hypoglycemia (low glucose):   If glucose is less than 70 mg/dL, treat with 15g carb (4 glucose tablets), recheck glucose in 15 minutes.  If low again, repeat.   If glucose is less than 54 mg/dL, treat with 30g carb, recheck glucose in 15 minutes.  If low again, repeat.  Keep glucagon in your home in case of severe hypoglycemia and train someone how to use this.    Emergency kit (please ensure you always have these with you):   Glucose tablets  Glucagon  Insulin  Syringes/needles   Extra infusion set (if on a pump)  Ketone strips    Backup insulin plan (in case of pump failure):   If your insulin pump fails, your body will not have any insulin available and your blood glucose levels can get dangerously high. This can result in diabetic ketoacidosis making you very sick (abdominal pain, confusion, vomiting, dehydration, positive urine ketones).    You have an active long acting basal insulin (Degludec: Tresiba / Detemir: Levemir / Glargine: Lantus / Toujeo / Semglee / Basaglar) prescription available. Please pick this up from your pharmacy in case your pump fails.  Basal insulin dose:______60______ units once per day.    Immediately after your pump fails and until you can  the long acting insulin, use short acting insulin (Aspart: Novolog/Fiasp / Lispro: Humalog / Glulisine:Apidra) injections (pen or vial and syringe) per your correction scale every 4 hours and continue to cover carbohydrates. You can stop this 4 hourly correction after you give yourself the basal insulin dose.    You should also administer short acting insulin subcutaneously to cover carbohydrates and per your correction scale prior to meals.     Contact us for help transitioning back to your pump when this is available.    Contact information:   If you have concerns, please send me a [employer] message or call the clinic at  [phone].  For more urgent concerns, please call [phone] after hours/weekends and ask to speak with the endocrinologist on call.      Please let me know if you are having low blood sugars less than 70 or over 350 mg/dL.  Do not wait until your next appointment if this is happening.        2.  Risk factors-     Retinopathy:  No.  Had recent eye exam.   Nephropathy:  BP well controlled. No microalbuminuria.  Creatinine stable.  She is on enalapril.  White coat hypertension.     She follows with nephrology.   Neuropathy: No.    Feet: OK, no ulcers.   Lipids:  LDL improved.   Back on statin.  She does have a family history of premature CV disease in her father and half brother.  Now back on statin, as she has no plans for childbearing.      3.  Hypothyroidism- last TSH in target on no levothyroxine.      4.  Celiac disease- well controlled with gluten free diet. Will check antibodies.     5.  F/U in 6 months with Dr. Allan, 1 year with me.     31 minutes spent on the date of the encounter doing chart review, review of test results, review of continuous glucose sensor, interpretation of glucose data, patient visit and documentation, counseling/coordination of care, and discussion of follow up plan for worsening hyper and hypoglycemia.  The patient understood and is satisfied with today's visit.       Laureen Goldstein PA-C, MPAS   [facility]  Department of Medicine  Division of Endocrinology and Diabetes      Again, thank you for allowing me to participate in the care of your patient.      Sincerely,    Laureen Goldstein PA-C

## 2025-01-28 ENCOUNTER — MYC REFILL (OUTPATIENT)
Dept: PEDIATRICS | Facility: CLINIC | Age: 30
End: 2025-01-28
Payer: COMMERCIAL

## 2025-01-28 DIAGNOSIS — F41.8 DEPRESSION WITH ANXIETY: ICD-10-CM

## 2025-01-29 RX ORDER — ALPRAZOLAM 0.5 MG
0.5 TABLET ORAL 3 TIMES DAILY PRN
Qty: 5 TABLET | Refills: 0 | Status: SHIPPED | OUTPATIENT
Start: 2025-01-29

## 2025-01-29 NOTE — TELEPHONE ENCOUNTER
This writer spoke with patient scheduled with [facility] for next Thursday @1:30pm. ( Dog having surgery on Monday)      CLARITZA Mitchell MA

## 2025-02-04 ASSESSMENT — ANXIETY QUESTIONNAIRES
GAD7 TOTAL SCORE: 18
IF YOU CHECKED OFF ANY PROBLEMS ON THIS QUESTIONNAIRE, HOW DIFFICULT HAVE THESE PROBLEMS MADE IT FOR YOU TO DO YOUR WORK, TAKE CARE OF THINGS AT HOME, OR GET ALONG WITH OTHER PEOPLE: EXTREMELY DIFFICULT
5. BEING SO RESTLESS THAT IT IS HARD TO SIT STILL: MORE THAN HALF THE DAYS
GAD7 TOTAL SCORE: 18
GAD7 TOTAL SCORE: 18
2. NOT BEING ABLE TO STOP OR CONTROL WORRYING: NEARLY EVERY DAY
3. WORRYING TOO MUCH ABOUT DIFFERENT THINGS: NEARLY EVERY DAY
4. TROUBLE RELAXING: NEARLY EVERY DAY
7. FEELING AFRAID AS IF SOMETHING AWFUL MIGHT HAPPEN: NEARLY EVERY DAY
6. BECOMING EASILY ANNOYED OR IRRITABLE: SEVERAL DAYS
7. FEELING AFRAID AS IF SOMETHING AWFUL MIGHT HAPPEN: NEARLY EVERY DAY
8. IF YOU CHECKED OFF ANY PROBLEMS, HOW DIFFICULT HAVE THESE MADE IT FOR YOU TO DO YOUR WORK, TAKE CARE OF THINGS AT HOME, OR GET ALONG WITH OTHER PEOPLE?: EXTREMELY DIFFICULT
1. FEELING NERVOUS, ANXIOUS, OR ON EDGE: NEARLY EVERY DAY

## 2025-02-04 ASSESSMENT — PATIENT HEALTH QUESTIONNAIRE - PHQ9
SUM OF ALL RESPONSES TO PHQ QUESTIONS 1-9: 13
SUM OF ALL RESPONSES TO PHQ QUESTIONS 1-9: 13
10. IF YOU CHECKED OFF ANY PROBLEMS, HOW DIFFICULT HAVE THESE PROBLEMS MADE IT FOR YOU TO DO YOUR WORK, TAKE CARE OF THINGS AT HOME, OR GET ALONG WITH OTHER PEOPLE: VERY DIFFICULT

## 2025-02-04 ASSESSMENT — ASTHMA QUESTIONNAIRES
QUESTION_1 LAST FOUR WEEKS HOW MUCH OF THE TIME DID YOUR ASTHMA KEEP YOU FROM GETTING AS MUCH DONE AT WORK, SCHOOL OR AT HOME: NONE OF THE TIME
QUESTION_2 LAST FOUR WEEKS HOW OFTEN HAVE YOU HAD SHORTNESS OF BREATH: NOT AT ALL
ACT_TOTALSCORE: 25
QUESTION_5 LAST FOUR WEEKS HOW WOULD YOU RATE YOUR ASTHMA CONTROL: COMPLETELY CONTROLLED
QUESTION_3 LAST FOUR WEEKS HOW OFTEN DID YOUR ASTHMA SYMPTOMS (WHEEZING, COUGHING, SHORTNESS OF BREATH, CHEST TIGHTNESS OR PAIN) WAKE YOU UP AT NIGHT OR EARLIER THAN USUAL IN THE MORNING: NOT AT ALL
ACT_TOTALSCORE: 25
QUESTION_4 LAST FOUR WEEKS HOW OFTEN HAVE YOU USED YOUR RESCUE INHALER OR NEBULIZER MEDICATION (SUCH AS ALBUTEROL): NOT AT ALL

## 2025-02-05 ENCOUNTER — VIRTUAL VISIT (OUTPATIENT)
Dept: PEDIATRICS | Facility: CLINIC | Age: 30
End: 2025-02-05
Payer: COMMERCIAL

## 2025-02-05 DIAGNOSIS — F90.0 ADHD (ATTENTION DEFICIT HYPERACTIVITY DISORDER), INATTENTIVE TYPE: ICD-10-CM

## 2025-02-05 DIAGNOSIS — R45.851 SUICIDAL IDEATION: Primary | ICD-10-CM

## 2025-02-05 DIAGNOSIS — F41.8 DEPRESSION WITH ANXIETY: ICD-10-CM

## 2025-02-05 DIAGNOSIS — E66.01 MORBID OBESITY (H): ICD-10-CM

## 2025-02-05 DIAGNOSIS — E10.21 TYPE 1 DIABETES MELLITUS WITH DIABETIC NEPHROPATHY (H): ICD-10-CM

## 2025-02-05 PROCEDURE — 98006 SYNCH AUDIO-VIDEO EST MOD 30: CPT | Performed by: NURSE PRACTITIONER

## 2025-02-05 RX ORDER — ALPRAZOLAM 0.5 MG
0.5 TABLET ORAL 3 TIMES DAILY PRN
Qty: 5 TABLET | Refills: 0 | Status: SHIPPED | OUTPATIENT
Start: 2025-02-05

## 2025-02-05 RX ORDER — BUPROPION HYDROCHLORIDE 150 MG/1
150 TABLET ORAL EVERY MORNING
Qty: 90 TABLET | Refills: 0 | Status: SHIPPED | OUTPATIENT
Start: 2025-02-05

## 2025-02-05 RX ORDER — HYDROXYZINE HYDROCHLORIDE 25 MG/1
12.5 TABLET, FILM COATED ORAL 3 TIMES DAILY PRN
Qty: 30 TABLET | Refills: 0 | Status: SHIPPED | OUTPATIENT
Start: 2025-02-05 | End: 2025-03-07

## 2025-02-05 NOTE — PROGRESS NOTES
"Stacey is a 29 year old who is being evaluated via a billable video visit.    How would you like to obtain your AVS? MyChart  If the video visit is dropped, the invitation should be resent by: Text to cell phone: [phone]  Will anyone else be joining your video visit? No      Assessment & Plan     Depression with anxiety  Suicidal ideation  Not well controlled dt a variety of stressors. Meeting with therapist regularly  (1-2x per week). SI is passive, denies any active plans and safety plan established.  PLAN  -continue buspar for now (consider off this in the future since unsure how effective this is for her)  -start wellbutrin, proper use and s/e reviewed  -prn xanax filled but informed her this needs to last her for 1 mos until she follows-up with PCP, can use hydroxyzine as well but not together (she reports she gets drowsiness after taking so will do low dose)  -referral to mental health per pt request, may not end up needing if mood is better  - ALPRAZolam (XANAX) 0.5 MG tablet; Take 1 tablet (0.5 mg) by mouth 3 times daily as needed for anxiety. NEEDS APPOINTMENT FOR MORE FILLS  - buPROPion (WELLBUTRIN XL) 150 MG 24 hr tablet; Take 1 tablet (150 mg) by mouth every morning.  - hydrOXYzine HCl (ATARAX) 25 MG tablet; Take 0.5 tablets (12.5 mg) by mouth 3 times daily as needed for anxiety.  - Adult Mental Health  Referral; Future    ADHD (attention deficit hyperactivity disorder), inattentive type  Wellbutrin could also help with ADHD, she is on stimulant.  - Adult Mental Health  Referral; Future    Morbid obesity (H)  Wellbutrin could also help with weight.    Type 1 diabetes mellitus with diabetic nephropathy (H)  Follows with endocrine. She reports a possible seizure \"along time ago\" dt blood sugar problems but no seizure disorder so ok to do wellbutrin.  Lab Results   Component Value Date    A1C 6.5 07/02/2024    A1C 7.3 11/24/2023    A1C 6.6 03/28/2023    A1C 6.4 07/24/2022    A1C 6.7 " "05/07/2021    A1C 7.3 02/17/2020    A1C 7.0 01/17/2018    A1C 7.6 03/26/2015       The longitudinal plan of care for the diagnosis(es)/condition(s) as documented were addressed during this visit. Due to the added complexity in care, I will continue to support Stacey in the subsequent management and with ongoing continuity of care in partnership with PCP        BMI  Estimated body mass index is 47.76 kg/m  as calculated from the following:    Height as of 12/3/24: 1.727 m (5' 8\").    Weight as of 12/3/24: 142.5 kg (314 lb 1.6 oz).       Depression Screening Follow Up        2/4/2025     3:47 PM   PHQ   PHQ-9 Total Score 13    Q9: Thoughts of better off dead/self-harm past 2 weeks Several days   F/U: Thoughts of suicide or self-harm Yes   F/U: Self harm-plan No   F/U: Self-harm action No   F/U: Safety concerns No       Patient-reported         2/4/2025     3:47 PM   Last PHQ-9   1.  Little interest or pleasure in doing things 2   2.  Feeling down, depressed, or hopeless 2   3.  Trouble falling or staying asleep, or sleeping too much 3   4.  Feeling tired or having little energy 1   5.  Poor appetite or overeating 1   6.  Feeling bad about yourself 0   7.  Trouble concentrating 3   8.  Moving slowly or restless 0   Q9: Thoughts of better off dead/self-harm past 2 weeks 1   PHQ-9 Total Score 13    In the past two weeks have you had thoughts of suicide or self harm? Yes   Do you have concerns about your personal safety or the safety of others? No   In the past 2 weeks have you thought about a plan or had intention to harm yourself? No   In the past 2 weeks have you acted on these thoughts in any way? No       Patient-reported                No data to display                      Follow Up Actions Taken  Patient to follow up with PCP.  Clinic staff to schedule appointment if able.  Mental Health Referral placed    Discussed the following ways the patient can remain in a safe environment:  be around others    There are no " "Patient Instructions on file for this visit.    Subjective   Stacey is a 29 year old, presenting for the following health issues:  Follow Up        2/5/2025    10:03 AM   Additional Questions   Roomed by stanislaw VÁZQUEZ   Accompanied by Self         2/5/2025    10:03 AM   Patient Reported Additional Medications   Patient reports taking the following new medications No     History of Present Illness       Mental Health Follow-up:  Patient presents to follow-up on Depression & Anxiety.Patient's depression since last visit has been:  Worse  The patient is not having other symptoms associated with depression.  Patient's anxiety since last visit has been:  Worse  The patient is having other symptoms associated with anxiety.  Any significant life events: financial concerns, grief or loss, health concerns and other  Patient is feeling anxious or having panic attacks.  Patient has no concerns about alcohol or drug use.    Reason for visit:  Mental Health  Symptom onset:  Today  Symptoms include:  NA  : NA.  Symptom progression:  Worsening  : NA.  Prior treatment description:  NA  What makes it worse:  NA  What makes it better:  NA    She eats 2-3 servings of fruits and vegetables daily.She consumes 1 sweetened beverage(s) daily.She exercises with enough effort to increase her heart rate 20 to 29 minutes per day.  She exercises with enough effort to increase her heart rate 4 days per week.   She is taking medications regularly.           From 1/28  \"Hi, requesting a renewal because i have been in a constant state of panic/anxiety for a couple of weeks due to many difficult life events/triggers. I am having a very difficult time. I think a refferal to a psychologist/ psychiatrist wouldnt be a bad idea. I dont think my anxiety/depression is under control at the moment. I felt like the buspar made an improvment, but i have since been feeling a backslide, I am currently taking 10mg 2xday of buspar. The 15mg made me feel dizzy/tired " "after taking \"  -PCP filled 1/29/25 alprazolam #5 tabs    Last visit with PCP 7/2/24  \"Depression with anxiety  Self weaned then had a \"breakdown.\" Now back on 5mg for the last 4 months and doing better but still not great anxiety wise. Feels she has sexual side effects so wanting to try a different agent. No SI/HI  -Continue lower dose lexapro and add buspar. Reviewed risk of serotonin syndrome symtpoms with lexapro, buspar, and adderall.   - escitalopram (LEXAPRO) 5 MG tablet; Take 1 tablet (5 mg) by mouth daily for 360 days  - ALPRAZolam (XANAX) 0.5 MG tablet; Take 1 tablet (0.5 mg) by mouth 3 times daily as needed for anxiety  -Already in therapy  -Follow-up 1 month    Evisit and switched to Buspar in July  Decreased to 10 mg BID a couple weeks ago dt s/e, dizzy/tired  Sexual s/e with lexapro  SI has been ongoing, not new  No active thoughts of self harm  Would reach out to her therapist if this changed  1-2x per week with therapist-Jeffrey Valdes [facility]    A lot of stressors: finances, dog sick/surgery, current news/events          Objective    Vitals - Patient Reported  Height (Patient Reported): 172.7 cm (5' 8\")  Temperature (Patient Reported): 98.2  F (36.8  C)  Pulse (Patient Reported): 80  Pain Score: No Pain (0)        Physical Exam   GENERAL: alert and no distress  EYES: Eyes grossly normal to inspection.  No discharge or erythema, or obvious scleral/conjunctival abnormalities.  RESP: No audible wheeze, cough, or visible cyanosis.    SKIN: Visible skin clear. No significant rash, abnormal pigmentation or lesions.  NEURO: Cranial nerves grossly intact.  Mentation and speech appropriate for age.  PSYCH: Appropriate affect, tone, and pace of words          Video-Visit Details    Type of service:  Video Visit   Originating Location (pt. Location): Home    Distant Location (provider location):  On-site  Platform used for Video Visit: Brian  Signed Electronically by: Raquel Mccarthy NP    "

## 2025-02-05 NOTE — Clinical Note
Mood not great... a lot of stressors. Following up with you in 1 mos. Added wellbutrin. She is on buspar and could consider DCing in future but didn't want to rock the boat (don't think it's doing much for her and learned may not work dt benzo use). I gave her #5 xanax but told her this has to last her 1 mos and set expectations at follow-up. Thanks!

## 2025-02-14 ENCOUNTER — PRE VISIT (OUTPATIENT)
Dept: PLASTIC SURGERY | Facility: CLINIC | Age: 30
End: 2025-02-14

## 2025-02-22 ENCOUNTER — MYC REFILL (OUTPATIENT)
Dept: PEDIATRICS | Facility: CLINIC | Age: 30
End: 2025-02-22
Payer: COMMERCIAL

## 2025-02-22 DIAGNOSIS — F90.0 ADHD (ATTENTION DEFICIT HYPERACTIVITY DISORDER), INATTENTIVE TYPE: ICD-10-CM

## 2025-02-24 RX ORDER — DEXTROAMPHETAMINE SACCHARATE, AMPHETAMINE ASPARTATE MONOHYDRATE, DEXTROAMPHETAMINE SULFATE AND AMPHETAMINE SULFATE 3.75; 3.75; 3.75; 3.75 MG/1; MG/1; MG/1; MG/1
15 CAPSULE, EXTENDED RELEASE ORAL DAILY
Qty: 90 CAPSULE | Refills: 0 | Status: SHIPPED | OUTPATIENT
Start: 2025-02-24

## 2025-03-02 ASSESSMENT — ANXIETY QUESTIONNAIRES
1. FEELING NERVOUS, ANXIOUS, OR ON EDGE: MORE THAN HALF THE DAYS
8. IF YOU CHECKED OFF ANY PROBLEMS, HOW DIFFICULT HAVE THESE MADE IT FOR YOU TO DO YOUR WORK, TAKE CARE OF THINGS AT HOME, OR GET ALONG WITH OTHER PEOPLE?: SOMEWHAT DIFFICULT
6. BECOMING EASILY ANNOYED OR IRRITABLE: SEVERAL DAYS
GAD7 TOTAL SCORE: 8
7. FEELING AFRAID AS IF SOMETHING AWFUL MIGHT HAPPEN: SEVERAL DAYS
5. BEING SO RESTLESS THAT IT IS HARD TO SIT STILL: SEVERAL DAYS
4. TROUBLE RELAXING: SEVERAL DAYS
2. NOT BEING ABLE TO STOP OR CONTROL WORRYING: SEVERAL DAYS
IF YOU CHECKED OFF ANY PROBLEMS ON THIS QUESTIONNAIRE, HOW DIFFICULT HAVE THESE PROBLEMS MADE IT FOR YOU TO DO YOUR WORK, TAKE CARE OF THINGS AT HOME, OR GET ALONG WITH OTHER PEOPLE: SOMEWHAT DIFFICULT
3. WORRYING TOO MUCH ABOUT DIFFERENT THINGS: SEVERAL DAYS
GAD7 TOTAL SCORE: 8
7. FEELING AFRAID AS IF SOMETHING AWFUL MIGHT HAPPEN: SEVERAL DAYS
GAD7 TOTAL SCORE: 8

## 2025-03-02 ASSESSMENT — PATIENT HEALTH QUESTIONNAIRE - PHQ9
SUM OF ALL RESPONSES TO PHQ QUESTIONS 1-9: 6
10. IF YOU CHECKED OFF ANY PROBLEMS, HOW DIFFICULT HAVE THESE PROBLEMS MADE IT FOR YOU TO DO YOUR WORK, TAKE CARE OF THINGS AT HOME, OR GET ALONG WITH OTHER PEOPLE: SOMEWHAT DIFFICULT
SUM OF ALL RESPONSES TO PHQ QUESTIONS 1-9: 6

## 2025-03-03 ENCOUNTER — VIRTUAL VISIT (OUTPATIENT)
Dept: PEDIATRICS | Facility: CLINIC | Age: 30
End: 2025-03-03
Payer: COMMERCIAL

## 2025-03-03 DIAGNOSIS — F41.8 DEPRESSION WITH ANXIETY: ICD-10-CM

## 2025-03-03 DIAGNOSIS — F41.9 ANXIETY: ICD-10-CM

## 2025-03-03 PROCEDURE — 98006 SYNCH AUDIO-VIDEO EST MOD 30: CPT | Performed by: NURSE PRACTITIONER

## 2025-03-03 PROCEDURE — 96127 BRIEF EMOTIONAL/BEHAV ASSMT: CPT | Mod: 95 | Performed by: NURSE PRACTITIONER

## 2025-03-03 RX ORDER — BUPROPION HYDROCHLORIDE 300 MG/1
300 TABLET ORAL EVERY MORNING
Qty: 90 TABLET | Refills: 3 | Status: SHIPPED | OUTPATIENT
Start: 2025-03-03

## 2025-03-03 RX ORDER — BUSPIRONE HYDROCHLORIDE 15 MG/1
7.5 TABLET ORAL 2 TIMES DAILY
COMMUNITY
Start: 2025-03-03

## 2025-03-03 NOTE — PROGRESS NOTES
"Stacey is a 29 year old who is being evaluated via a billable video visit.          Assessment & Plan     Depression with anxiety  Anxiety  Improved but still poor control. No SI/HI. Does feel the Wellbutrin has helped. Did discuss seizure, and it sounds like she may not have had an actual seizure  - busPIRone (BUSPAR) 15 MG tablet; Take 0.5 tablets (7.5 mg) by mouth 2 times daily.  - buPROPion (WELLBUTRIN XL) 300 MG 24 hr tablet; Take 1 tablet (300 mg) by mouth every morning.  -I'm comfortable, for now, increasing Wellbutrin to 300 and we did discuss the risk of seizure.   -Continue buspar, adderall  -Continue increased therapy  -Send me mychart 2 weeks    BMI  Estimated body mass index is 47.76 kg/m  as calculated from the following:    Height as of 12/3/24: 1.727 m (5' 8\").    Weight as of 12/3/24: 142.5 kg (314 lb 1.6 oz).   Weight management plan: Discussed healthy diet and exercise guidelines          Subjective   Still having some depressed moments, not as frequently    Anxiety is mostly better. Still can take a sharp turn and spiral. Happens twice a week still.    Has used xanax twice and hydroxyzine twice a week. Hydroxyzine just puts her to sleep.    Unsure if improvement is wellbutrin or just time    Never had a confirmed seizure. Had a very low blood sugar-60s by the time she woke up. Got up to get orange juice. Had heavy ETOH the night before. Had pooped her pants in her sleep.       Stacey is a 29 year old, presenting for the following health issues:  No chief complaint on file.    HPI              Review of Systems  Constitutional, HEENT, cardiovascular, pulmonary, GI, , musculoskeletal, neuro, skin, endocrine and psych systems are negative, except as otherwise noted.      Objective           Vitals:  No vitals were obtained today due to virtual visit.    Physical Exam   GENERAL: alert and no distress  EYES: Eyes grossly normal to inspection.  No discharge or erythema, or obvious scleral/conjunctival " abnormalities.  RESP: No audible wheeze, cough, or visible cyanosis.    SKIN: Visible skin clear. No significant rash, abnormal pigmentation or lesions.  NEURO: Cranial nerves grossly intact.  Mentation and speech appropriate for age.  PSYCH: Appropriate affect, tone, and pace of words          Video-Visit Details    Type of service:  Video Visit   Originating Location (pt. Location): Home    Distant Location (provider location):  Off-site  Platform used for Video Visit: Brian  Signed Electronically by: LUIS FERNANDO RIVAS CNP  The longitudinal plan of care for the diagnosis(es)/condition(s) as documented were addressed during this visit. Due to the added complexity in care, I will continue to support Stacey in the subsequent management and with ongoing continuity of care.

## 2025-03-20 ENCOUNTER — E-VISIT (OUTPATIENT)
Dept: PEDIATRICS | Facility: CLINIC | Age: 30
End: 2025-03-20
Payer: COMMERCIAL

## 2025-03-20 DIAGNOSIS — M79.672 LEFT FOOT PAIN: Primary | ICD-10-CM

## 2025-04-03 ENCOUNTER — OFFICE VISIT (OUTPATIENT)
Dept: PODIATRY | Facility: CLINIC | Age: 30
End: 2025-04-03
Attending: NURSE PRACTITIONER
Payer: COMMERCIAL

## 2025-04-03 ENCOUNTER — E-VISIT (OUTPATIENT)
Dept: PEDIATRICS | Facility: CLINIC | Age: 30
End: 2025-04-03
Payer: COMMERCIAL

## 2025-04-03 VITALS — WEIGHT: 293 LBS | OXYGEN SATURATION: 100 % | HEIGHT: 68 IN | BODY MASS INDEX: 44.41 KG/M2

## 2025-04-03 DIAGNOSIS — E10.21 TYPE 1 DIABETES MELLITUS WITH DIABETIC NEPHROPATHY (H): ICD-10-CM

## 2025-04-03 DIAGNOSIS — M72.2 PLANTAR FASCIAL FIBROMATOSIS: Primary | ICD-10-CM

## 2025-04-03 DIAGNOSIS — M65.342 TRIGGER FINGER, LEFT RING FINGER: Primary | ICD-10-CM

## 2025-04-03 NOTE — PROGRESS NOTES
S:  Patient seen today in consult from Nancy Lopez and complains of lump on right foot.  Points to the medial band of the plantar fascia.  No trauma.  Has been here for 2 weeks.   admits any pain.  Admits lumps on her hands and has history of trigger finger, carpal tunnel syndrome.  She is on her feet at times at work.  Has noted no bruising erythema weakness or numbness.  She has type 1 diabetes mellitus.    ROS:  see above       Allergies   Allergen Reactions    Dog Epithelium (Canis Lupus Familiaris) Other (See Comments)     Sinus symptoms     Dogs Other (See Comments)     Sinus symptoms     Dust Mites      Sinus     Gluten Meal        Current Outpatient Medications   Medication Sig Dispense Refill    albuterol (PROAIR HFA/PROVENTIL HFA/VENTOLIN HFA) 108 (90 Base) MCG/ACT inhaler Inhale 2 puffs into the lungs every 6 hours 18 g 3    ALPRAZolam (XANAX) 0.5 MG tablet Take 1 tablet (0.5 mg) by mouth 3 times daily as needed for anxiety. NEEDS APPOINTMENT FOR MORE FILLS 5 tablet 0    amphetamine-dextroamphetamine (ADDERALL XR) 15 MG 24 hr capsule Take 1 capsule (15 mg) by mouth daily. 90 capsule 0    amphetamine-dextroamphetamine (ADDERALL) 10 MG tablet Take 1 tablet (10 mg) by mouth daily. 90 tablet 0    atorvastatin (LIPITOR) 40 MG tablet Take 1 tablet (40 mg) by mouth daily. 90 tablet 1    blood glucose (NO BRAND SPECIFIED) test strip Use to test blood sugar five times daily or as directed.  Dispense Brandy Contour Next Test Strips. 200 strip 11    buPROPion (WELLBUTRIN XL) 300 MG 24 hr tablet Take 1 tablet (300 mg) by mouth every morning. 90 tablet 3    busPIRone (BUSPAR) 15 MG tablet Take 0.5 tablets (7.5 mg) by mouth 2 times daily.      Continuous Blood Gluc Sensor (DEXCOM G7 SENSOR) MISC Use as directed to monitor blood glucose. Change every 10 days. 9 each 0    Continuous Glucose Sensor (DEXCOM G7 SENSOR) MISC Change every 10 days. 9 each 2    enalapril (VASOTEC) 20 MG tablet Take 1 tablet (20 mg) by mouth  "2 times daily. 180 tablet 3    fluticasone (FLONASE) 50 MCG/ACT nasal spray Spray 1 spray into both nostrils daily 16 g 11    Glucagon, rDNA, (GLUCAGON EMERGENCY) 1 MG KIT Use as directed in case of low blood glucose. 1 kit 3    insulin aspart (NOVOLOG VIAL) 100 UNITS/ML vial Use as directed, up to 150 units daily in insulin pump 150 mL 3    Insulin Infusion Pump Supplies (INSULIN PUMP SYRINGE RESERVOIR) MISC Change every other day as directed 45 each 3    Insulin Infusion Pump Supplies (SURE-T INFUSION SET 23\") MISC Change every 2-3 days 30 each 3    Insulin Pump Accessories MISC Infusion set per patient preference.  Change infusion set every other day 45 each 3    KETOSTIX test strip Use as directed in case of high glucose, vomiting or illness.once weekly 50 strip 3    metFORMIN (GLUCOPHAGE XR) 500 MG 24 hr tablet Take 1 tablet (500 mg) by mouth daily for 7 days, THEN 2 tablets (1,000 mg) daily for 7 days, THEN 3 tablets (1,500 mg) daily for 7 days, THEN 4 tablets (2,000 mg) daily. 360 tablet 3    Multiple Vitamins-Minerals (MULTIVITAMIN ADULT PO) Take 1 tablet by mouth daily      NOVOLOG PENFILL 100 UNIT/ML soln Take as directed up to 60 units per day 54 mL 3       Patient Active Problem List   Diagnosis    Diabetic nephropathy (H)    Family history of heart disease    Chronic kidney disease, stage I    Mild intermittent asthma without complication    Anxiety    Type 1 diabetes mellitus with diabetic nephropathy (H)    Morbid obesity (H)    Elevated WBC count    Celiac disease    Attention deficit hyperactivity disorder (ADHD), predominantly inattentive type    Trigger finger, left ring finger    Left carpal tunnel syndrome    Bilateral carpal tunnel syndrome    LEANDRO (obstructive sleep apnea)    Pain of finger of left hand       Past Medical History:   Diagnosis Date    Asthma     Currently no treatment needed    Celiac disease     Chronic kidney disease, stage I 08/03/2015    Chronic sinusitis     Depressive " disorder     Diabetes mellitus type 1 (H)     Diabetic nephropathy (H)     Family history of heart disease 08/03/2015    Hypertension     Hypothyroid     Pedal edema     Psoriasis        Past Surgical History:   Procedure Laterality Date    BIOPSY      ENT SURGERY      RELEASE CARPAL TUNNEL Left 04/04/2024    Procedure: RELEASE, LEFT CARPAL TUNNEL;  Surgeon: Gray Reyes MD;  Location: [facility] OR    RELEASE CARPAL TUNNEL Right 11/21/2024    Procedure: RIGHT OPEN CARPAL TUNNEL RELEASE;  Surgeon: Gray Reyes MD;  Location: [facility] OR    RELEASE TRIGGER FINGER Left 04/04/2024    Procedure: RELEASE, LEFT RING TRIGGER FINGER;  Surgeon: Gray Reyes MD;  Location: [facility] OR    SOFT TISSUE SURGERY      Carpal tunnel and trigger finger    TONSILLECTOMY, ADENOIDECTOMY, COMBINED         Family History   Problem Relation Age of Onset    Gastrointestinal Disease Mother         Celiacs    Obesity Mother     Depression Mother     Fibromyalgia Mother     Cancer Mother         MDS    Cardiovascular Father 48        MI, stents, diabetic, type 2    Diabetes Father     Coronary Artery Disease Father     Obesity Father     Depression Father     Substance Abuse Father     Lung Cancer Father         Smoker    Other Cancer Father         Lung cancer and MDS (blood cancer)    Substance Abuse Sister     Bipolar Disorder Brother     Schizophrenia Brother     Myocardial Infarction Brother 50        Possibly MI    Substance Abuse Brother     Skin Cancer Maternal Grandmother     Cardiovascular Maternal Grandfather         Englarged heart, pacemaker, defib    Skin Cancer Maternal Grandfather     Obesity Maternal Half-Sister     Heart Disease Paternal Half-Brother 45    Melanoma No family hx of     Glaucoma No family hx of     Macular Degeneration No family hx of        Social History     Tobacco Use    Smoking status: Never     Passive exposure: Past    Smokeless tobacco: Never   Substance Use Topics    Alcohol use: Not Currently     Comment: couple times  "per month will have 3 mixed drinks         Exam:    Vitals: Ht 1.727 m (5' 8\")   Wt 136.1 kg (300 lb)   LMP  (LMP Unknown)   SpO2 100%   BMI 45.61 kg/m    BMI: Body mass index is 45.61 kg/m .  Height: 5' 8\"    Constitutional/ general:  Pt is in no apparent distress, appears well-nourished.  Cooperative with history and physical exam.     Psych:  The patient answered questions appropriately.  Normal affect.  Seems to have reasonable expectations, in terms of treatment.     Lungs:  Non labored breathing, non labored speech. No cough.  No audible wheezing. Even, quiet breathing.       Vascular:  Pedal pulses are palpable bilaterally for both the DP and PT arteries.  CFT < 3 sec.  No edema.  Pedal hair growth noted.     Neuro:  Alert and oriented x 3. Coordinated gait.  Light touch sensation is intact.    Derm: Normal texture and turgor.  No erythema, ecchymosis, or cyanosis.  No open lesions.     Musculoskeletal:     Normal arch with weightbearing.  No forefoot or rear foot deformities noted.  MS 5/5 all compartments.  Normal ROM all fore foot and rearfoot joints.  No equinus.  No pain with stressing muscle compartments.  right foot mass firmly attached to the medial band of the plantar fascia.  No pain or numbness with palpation.  Nonpulsitile.  No masses on the contralateral foot.        A:  left Plantar Fibromatosis       Type 1 diabetes mellitus    P:   Discussed cause of this with patient.  Will watch for now and protect with shoes. Discussed orthotics and injections.  Patient would like to try orthotic.  We placed an order to offload plantar fibromatosis.  Will start physical therapy for this.  Also discussed verapamil gel.  She will call if she would like to do this.  Thank you for allowing me participate in the care of this patient.        Naldo Cueva, NEGIN, FACFAS      "

## 2025-04-03 NOTE — LETTER
4/3/2025      Stacey Mantilla  [address]  [facility] [address]      Dear Colleague,    Thank you for referring your patient, Stacey Mantilla, to the [facility]. Please see a copy of my visit note below.    S:  Patient seen today in consult from Nancy Lopez and complains of lump on right foot.  Points to the medial band of the plantar fascia.  No trauma.  Has been here for 2 weeks.   admits any pain.  Admits lumps on her hands and has history of trigger finger, carpal tunnel syndrome.  She is on her feet at times at work.  Has noted no bruising erythema weakness or numbness.  She has type 1 diabetes mellitus.    ROS:  see above       Allergies   Allergen Reactions     Dog Epithelium (Canis Lupus Familiaris) Other (See Comments)     Sinus symptoms      Dogs Other (See Comments)     Sinus symptoms      Dust Mites      Sinus      Gluten Meal        Current Outpatient Medications   Medication Sig Dispense Refill     albuterol (PROAIR HFA/PROVENTIL HFA/VENTOLIN HFA) 108 (90 Base) MCG/ACT inhaler Inhale 2 puffs into the lungs every 6 hours 18 g 3     ALPRAZolam (XANAX) 0.5 MG tablet Take 1 tablet (0.5 mg) by mouth 3 times daily as needed for anxiety. NEEDS APPOINTMENT FOR MORE FILLS 5 tablet 0     amphetamine-dextroamphetamine (ADDERALL XR) 15 MG 24 hr capsule Take 1 capsule (15 mg) by mouth daily. 90 capsule 0     amphetamine-dextroamphetamine (ADDERALL) 10 MG tablet Take 1 tablet (10 mg) by mouth daily. 90 tablet 0     atorvastatin (LIPITOR) 40 MG tablet Take 1 tablet (40 mg) by mouth daily. 90 tablet 1     blood glucose (NO BRAND SPECIFIED) test strip Use to test blood sugar five times daily or as directed.  Dispense Brandy Contour Next Test Strips. 200 strip 11     buPROPion (WELLBUTRIN XL) 300 MG 24 hr tablet Take 1 tablet (300 mg) by mouth every morning. 90 tablet 3     busPIRone (BUSPAR) 15 MG tablet Take 0.5 tablets (7.5 mg) by mouth 2 times daily.       Continuous Blood Gluc Sensor  "(DEXCOM G7 SENSOR) MISC Use as directed to monitor blood glucose. Change every 10 days. 9 each 0     Continuous Glucose Sensor (DEXCOM G7 SENSOR) MISC Change every 10 days. 9 each 2     enalapril (VASOTEC) 20 MG tablet Take 1 tablet (20 mg) by mouth 2 times daily. 180 tablet 3     fluticasone (FLONASE) 50 MCG/ACT nasal spray Spray 1 spray into both nostrils daily 16 g 11     Glucagon, rDNA, (GLUCAGON EMERGENCY) 1 MG KIT Use as directed in case of low blood glucose. 1 kit 3     insulin aspart (NOVOLOG VIAL) 100 UNITS/ML vial Use as directed, up to 150 units daily in insulin pump 150 mL 3     Insulin Infusion Pump Supplies (INSULIN PUMP SYRINGE RESERVOIR) MISC Change every other day as directed 45 each 3     Insulin Infusion Pump Supplies (SURE-T INFUSION SET 23\") MISC Change every 2-3 days 30 each 3     Insulin Pump Accessories MISC Infusion set per patient preference.  Change infusion set every other day 45 each 3     KETOSTIX test strip Use as directed in case of high glucose, vomiting or illness.once weekly 50 strip 3     metFORMIN (GLUCOPHAGE XR) 500 MG 24 hr tablet Take 1 tablet (500 mg) by mouth daily for 7 days, THEN 2 tablets (1,000 mg) daily for 7 days, THEN 3 tablets (1,500 mg) daily for 7 days, THEN 4 tablets (2,000 mg) daily. 360 tablet 3     Multiple Vitamins-Minerals (MULTIVITAMIN ADULT PO) Take 1 tablet by mouth daily       NOVOLOG PENFILL 100 UNIT/ML soln Take as directed up to 60 units per day 54 mL 3       Patient Active Problem List   Diagnosis     Diabetic nephropathy (H)     Family history of heart disease     Chronic kidney disease, stage I     Mild intermittent asthma without complication     Anxiety     Type 1 diabetes mellitus with diabetic nephropathy (H)     Morbid obesity (H)     Elevated WBC count     Celiac disease     Attention deficit hyperactivity disorder (ADHD), predominantly inattentive type     Trigger finger, left ring finger     Left carpal tunnel syndrome     Bilateral carpal " tunnel syndrome     LEANDRO (obstructive sleep apnea)     Pain of finger of left hand       Past Medical History:   Diagnosis Date     Asthma     Currently no treatment needed     Celiac disease      Chronic kidney disease, stage I 08/03/2015     Chronic sinusitis      Depressive disorder      Diabetes mellitus type 1 (H)      Diabetic nephropathy (H)      Family history of heart disease 08/03/2015     Hypertension      Hypothyroid      Pedal edema      Psoriasis        Past Surgical History:   Procedure Laterality Date     BIOPSY       ENT SURGERY       RELEASE CARPAL TUNNEL Left 04/04/2024    Procedure: RELEASE, LEFT CARPAL TUNNEL;  Surgeon: Gray Reyes MD;  Location: [facility] OR     RELEASE CARPAL TUNNEL Right 11/21/2024    Procedure: RIGHT OPEN CARPAL TUNNEL RELEASE;  Surgeon: Gray Reyes MD;  Location: [facility] OR     RELEASE TRIGGER FINGER Left 04/04/2024    Procedure: RELEASE, LEFT RING TRIGGER FINGER;  Surgeon: Gray Reyes MD;  Location: [facility] OR     SOFT TISSUE SURGERY      Carpal tunnel and trigger finger     TONSILLECTOMY, ADENOIDECTOMY, COMBINED         Family History   Problem Relation Age of Onset     Gastrointestinal Disease Mother         Celiacs     Obesity Mother      Depression Mother      Fibromyalgia Mother      Cancer Mother         MDS     Cardiovascular Father 48        MI, stents, diabetic, type 2     Diabetes Father      Coronary Artery Disease Father      Obesity Father      Depression Father      Substance Abuse Father      Lung Cancer Father         Smoker     Other Cancer Father         Lung cancer and MDS (blood cancer)     Substance Abuse Sister      Bipolar Disorder Brother      Schizophrenia Brother      Myocardial Infarction Brother 50        Possibly MI     Substance Abuse Brother      Skin Cancer Maternal Grandmother      Cardiovascular Maternal Grandfather         Englarged heart, pacemaker, defib     Skin Cancer Maternal Grandfather      Obesity Maternal Half-Sister      Heart Disease  "Paternal Half-Brother 45     Melanoma No family hx of      Glaucoma No family hx of      Macular Degeneration No family hx of        Social History     Tobacco Use     Smoking status: Never     Passive exposure: Past     Smokeless tobacco: Never   Substance Use Topics     Alcohol use: Not Currently     Comment: couple times per month will have 3 mixed drinks         Exam:    Vitals: Ht 1.727 m (5' 8\")   Wt 136.1 kg (300 lb)   LMP  (LMP Unknown)   SpO2 100%   BMI 45.61 kg/m    BMI: Body mass index is 45.61 kg/m .  Height: 5' 8\"    Constitutional/ general:  Pt is in no apparent distress, appears well-nourished.  Cooperative with history and physical exam.     Psych:  The patient answered questions appropriately.  Normal affect.  Seems to have reasonable expectations, in terms of treatment.     Lungs:  Non labored breathing, non labored speech. No cough.  No audible wheezing. Even, quiet breathing.       Vascular:  Pedal pulses are palpable bilaterally for both the DP and PT arteries.  CFT < 3 sec.  No edema.  Pedal hair growth noted.     Neuro:  Alert and oriented x 3. Coordinated gait.  Light touch sensation is intact.    Derm: Normal texture and turgor.  No erythema, ecchymosis, or cyanosis.  No open lesions.     Musculoskeletal:     Normal arch with weightbearing.  No forefoot or rear foot deformities noted.  MS 5/5 all compartments.  Normal ROM all fore foot and rearfoot joints.  No equinus.  No pain with stressing muscle compartments.  right foot mass firmly attached to the medial band of the plantar fascia.  No pain or numbness with palpation.  Nonpulsitile.  No masses on the contralateral foot.        A:  left Plantar Fibromatosis       Type 1 diabetes mellitus    P:   Discussed cause of this with patient.  Will watch for now and protect with shoes. Discussed orthotics and injections.  Patient would like to try orthotic.  We placed an order to offload plantar fibromatosis.  Will start physical therapy for " this.  Also discussed verapamil gel.  She will call if she would like to do this.  Thank you for allowing me participate in the care of this patient.        Naldo Cueva DPM, [facility]        Again, thank you for allowing me to participate in the care of your patient.        Sincerely,        Naldo Cueva DPM    Electronically signed

## 2025-04-03 NOTE — PATIENT INSTRUCTIONS
We wish you continued good healing. If you have any questions or concerns, please do not hesitate to contact us at  [phone]    [email or web address] (secure e-mail communication and access to your chart) to send a message or to make an appointment.    Please remember to call and schedule a follow up appointment if one was recommended at your earliest convenience.     PODIATRY CLINIC HOURS  TELEPHONE NUMBER    Dr. Naldo GARLANDPDENNIS FACFAS        Clinics:  [facility]   [place]  Tuesday 1PM-6PM  [place]  Wednesday 745AM-330PM  [place]  Monday 2nd,4th  830AM-4PM  Thursday/Friday 745AM-230PM     [place] APPOINTMENTS  [phone]    [place] APPOINTMENTS  [phone]          If you need a medication refill, please contact us you may need lab work and/or a follow up visit prior to your refill (i.e. Antifungal medications).  If MRI needed please call Imaging at [phone]   HOW DO I GET MY KNEE SCOOTER? Knee scooters can be picked up at ANY Medical Supply stores with your knee scooter Prescription.  OR  Bring your signed prescription to an [facility] showroom.   Set up an appointment for your custom Orthotics. Call any Orthotics locations call [phone]            emotional support and preop teaching provided to pt and family.

## 2025-04-11 PROBLEM — M79.671 ACUTE FOOT PAIN, RIGHT: Status: ACTIVE | Noted: 2025-04-11

## 2025-04-14 ENCOUNTER — VIRTUAL VISIT (OUTPATIENT)
Dept: PEDIATRICS | Facility: CLINIC | Age: 30
End: 2025-04-14
Payer: COMMERCIAL

## 2025-04-14 DIAGNOSIS — Z86.69 HISTORY OF CARPAL TUNNEL SYNDROME: ICD-10-CM

## 2025-04-14 DIAGNOSIS — D72.829 LEUKOCYTOSIS, UNSPECIFIED TYPE: Primary | ICD-10-CM

## 2025-04-14 DIAGNOSIS — M65.30 TRIGGER FINGER, ACQUIRED: ICD-10-CM

## 2025-04-14 PROCEDURE — 98006 SYNCH AUDIO-VIDEO EST MOD 30: CPT | Performed by: NURSE PRACTITIONER

## 2025-04-14 NOTE — PROGRESS NOTES
Stacey is a 29 year old who is being evaluated via a billable video visit.          Assessment & Plan     Leukocytosis, unspecified type  Chronic, mild, and intermittent without constitutional sx. Suspect combination of T1DM and obesity.   - CBC with platelets and differential; Future  - ESR: Erythrocyte sedimentation rate; Future  - CRP, inflammation; Future  - Rheumatoid factor; Future  - Cyclic Citrullinated Peptide Antibody IgG; Future  - Hepatitis C Screen Reflex to HCV RNA Quant and Genotype; Future  - Hepatitis B Surface Antibody; Future  - Hepatitis B surface antigen; Future  - Hepatitis B core antibody; Future    History of carpal tunnel syndrome  Carpal tunnel, trigger finger, hip tendonitis, plantar fibroma. Pt wondering if any of these issues are connected. I spoke with podiatry who said he does not think these represent rheumatoid nodules or other autoimmune condition. Given her hx T1DM and celiac, reasonable to rule out other autoimmune conditions. Will not do BELKYS as I expect it to be + with T1DM and celiac. Discussed with her that I expect a mild elevation in CRP given body weight.  - ESR: Erythrocyte sedimentation rate; Future  - CRP, inflammation; Future  - Rheumatoid factor; Future  - Cyclic Citrullinated Peptide Antibody IgG; Future    Trigger finger, acquired                Subjective   Trigger fingers  Carpal tunnel  Fibroma  Elevated white  Stacey is a 29 year old, presenting for the following health issues:  No chief complaint on file.      Via the [employer] Maintenance questionnaire, the patient has reported the following services have been completed -Eye Exam: [place] 2024-05-01, this information has not been sent to the abstraction team.  HPI                  Objective           Vitals:  No vitals were obtained today due to virtual visit.    Physical Exam   GENERAL: alert and no distress  EYES: Eyes grossly normal to inspection.  No discharge or erythema, or obvious scleral/conjunctival  abnormalities.  RESP: No audible wheeze, cough, or visible cyanosis.    SKIN: Visible skin clear. No significant rash, abnormal pigmentation or lesions.  NEURO: Cranial nerves grossly intact.  Mentation and speech appropriate for age.  PSYCH: Appropriate affect, tone, and pace of words          Video-Visit Details    Type of service:  Video Visit   Originating Location (pt. Location): Home    Distant Location (provider location):  Off-site  Platform used for Video Visit: Brian  Signed Electronically by: LUIS FERNANDO RIVAS CNP  The longitudinal plan of care for the diagnosis(es)/condition(s) as documented were addressed during this visit. Due to the added complexity in care, I will continue to support Stacey in the subsequent management and with ongoing continuity of care.

## 2025-04-24 ENCOUNTER — OFFICE VISIT (OUTPATIENT)
Dept: OPHTHALMOLOGY | Facility: CLINIC | Age: 30
End: 2025-04-24
Payer: COMMERCIAL

## 2025-04-24 ENCOUNTER — THERAPY VISIT (OUTPATIENT)
Dept: PHYSICAL THERAPY | Facility: CLINIC | Age: 30
End: 2025-04-24
Attending: PODIATRIST
Payer: COMMERCIAL

## 2025-04-24 DIAGNOSIS — E78.5 HYPERLIPIDEMIA, UNSPECIFIED HYPERLIPIDEMIA TYPE: ICD-10-CM

## 2025-04-24 DIAGNOSIS — M79.671 ACUTE FOOT PAIN, RIGHT: Primary | ICD-10-CM

## 2025-04-24 DIAGNOSIS — E10.3293 MILD NONPROLIFERATIVE DIABETIC RETINOPATHY OF BOTH EYES WITHOUT MACULAR EDEMA ASSOCIATED WITH TYPE 1 DIABETES MELLITUS (H): Primary | ICD-10-CM

## 2025-04-24 DIAGNOSIS — H40.053 BILATERAL OCULAR HYPERTENSION: ICD-10-CM

## 2025-04-24 DIAGNOSIS — H52.13 MYOPIA OF BOTH EYES: ICD-10-CM

## 2025-04-24 RX ORDER — ATORVASTATIN CALCIUM 40 MG/1
40 TABLET, FILM COATED ORAL DAILY
Qty: 90 TABLET | Refills: 0 | Status: SHIPPED | OUTPATIENT
Start: 2025-04-24

## 2025-04-24 ASSESSMENT — REFRACTION_CURRENTRX
OS_DIAMETER: 14.0
OS_BRAND: COOPER BIOFINITY BC 8.6, D 14.0
OS_CYLINDER: SPHERE
OD_SPHERE: -1.50
OD_BASECURVE: 8.6
OD_BRAND: COOPER BIOFINITY BC 8.6, D 14.0
OS_SPHERE: -1.50
OD_CYLINDER: SPHERE
OS_BASECURVE: 8.6
OD_DIAMETER: 14.0

## 2025-04-24 ASSESSMENT — TONOMETRY
OD_IOP_MMHG: 21
IOP_METHOD: TONOPEN
OS_IOP_MMHG: 24
OD_IOP_MMHG: 26
OS_IOP_MMHG: 28
IOP_METHOD: ICARE

## 2025-04-24 ASSESSMENT — REFRACTION_WEARINGRX
OS_SPHERE: -1.75
OS_AXIS: 051
SPECS_TYPE: SVL
OD_AXIS: 110
OD_SPHERE: -1.50
OD_CYLINDER: +0.50
OS_CYLINDER: +0.50

## 2025-04-24 ASSESSMENT — VISUAL ACUITY
OD_CC: 20/20
METHOD: SNELLEN - LINEAR
CORRECTION_TYPE: GLASSES
OS_CC: 20/20
OD_CC+: -1

## 2025-04-24 ASSESSMENT — EXTERNAL EXAM - LEFT EYE: OS_EXAM: NORMAL

## 2025-04-24 ASSESSMENT — CUP TO DISC RATIO
OS_RATIO: 0.2
OD_RATIO: 0.2

## 2025-04-24 ASSESSMENT — CONF VISUAL FIELD
OD_INFERIOR_TEMPORAL_RESTRICTION: 0
OD_NORMAL: 1
OS_NORMAL: 1
OS_INFERIOR_TEMPORAL_RESTRICTION: 0
OD_SUPERIOR_NASAL_RESTRICTION: 0
OS_INFERIOR_NASAL_RESTRICTION: 0
OD_SUPERIOR_TEMPORAL_RESTRICTION: 0
OD_INFERIOR_NASAL_RESTRICTION: 0
OS_SUPERIOR_NASAL_RESTRICTION: 0
OS_SUPERIOR_TEMPORAL_RESTRICTION: 0
METHOD: COUNTING FINGERS

## 2025-04-24 ASSESSMENT — REFRACTION_MANIFEST
OS_SPHERE: -1.75
OS_CYLINDER: +0.50
OD_AXIS: 123
OD_SPHERE: -1.75
OD_CYLINDER: +0.50
OS_AXIS: 050

## 2025-04-24 ASSESSMENT — SLIT LAMP EXAM - LIDS
COMMENTS: NORMAL
COMMENTS: NORMAL

## 2025-04-24 ASSESSMENT — EXTERNAL EXAM - RIGHT EYE: OD_EXAM: NORMAL

## 2025-04-24 NOTE — NURSING NOTE
Chief Complaints and History of Present Illnesses   Patient presents with    Diabetic Eye Exam     Here for diabetic eye exam.     Chief Complaint(s) and History of Present Illness(es)       Diabetic Eye Exam              Associated symptoms: blurred vision    Diabetes Type: Type 1    Comments: Here for diabetic eye exam.              Comments    Distance vision may have become blurrier since last visit.   Has had headaches over the last week, possibly related to beginning Zoloft.   With these headache they feel some pressure behind left eye   Stable floaters, noticing when looking at bright lights.     DM1  LBS : 167 at 3:07PM  Lab Results       Component                Value               Date                       A1C                      6.5                 07/02/2024                 A1C                      7.3                 11/24/2023                 A1C                      6.6                 03/28/2023                 A1C                      6.4                 07/24/2022                 A1C                      6.7                 05/07/2021                 A1C                      7.3                 02/17/2020                 A1C                      7.0                 01/17/2018                 A1C                      7.6                 03/26/2015               Jon Ho 3:07 PM April 24, 2025

## 2025-04-24 NOTE — PROGRESS NOTES
History  HPI       Diabetic Eye Exam    Associated symptoms include blurred vision.  Diabetes characteristics include Type 1. Additional comments: Here for diabetic eye exam.             Comments    Distance vision may have become blurrier since last visit.   Has had headaches over the last week, possibly related to beginning Zoloft.   With these headache they feel some pressure behind left eye   Stable floaters, noticing when looking at bright lights.     DM1  LBS : 167 at 3:07PM  Lab Results       Component                Value               Date                       A1C                      6.5                 07/02/2024                 A1C                      7.3                 11/24/2023                 A1C                      6.6                 03/28/2023                 A1C                      6.4                 07/24/2022                 A1C                      6.7                 05/07/2021                 A1C                      7.3                 02/17/2020                 A1C                      7.0                 01/17/2018                 A1C                      7.6                 03/26/2015               Jon Brown 3:07 PM April 24, 2025            Last edited by Jon Brown on 4/24/2025  3:08 PM.          Assessment/Plan  (E10.3293) Mild nonproliferative diabetic retinopathy of both eyes without macular edema associated with type 1 diabetes mellitus (H)  (primary encounter diagnosis)  Comment:  Mild NPDR both eyes, no CSME  Plan:    Educated patient on clinical findings and the importance of continued management with primary care physician. Continue management as directed and return to clinic in 1 year for dilated exam, or sooner, as needed. Copy of chart sent to Dr. Allan and Laureen Goldstein.    (H52.13) Myopia of both eyes  Comment: Myopia both eyes   Plan: REFRACTION, [place] CONTACT LENS FITTING COSMETIC LVL 1 - ADULT               Dispensed spectacle prescription for full time wear. Monitor  annually.              Dispensed trial lenses and finalized contact lens prescription for 2 years.    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 Monitor annually.     Return to clinic in 1 year for comprehensive eye exam.    Complete documentation of historical and exam elements from today's encounter can  be found in the full encounter summary report (not reduplicated in this progress  note). I personally obtained the chief complaint(s) and history of present illness. I  confirmed and edited as necessary the review of systems, past medical/surgical  history, family history, social history, and examination findings as documented by  others; and I examined the patient myself. I personally reviewed the relevant tests,  images, and reports as documented above. I formulated and edited as necessary the  assessment and plan and discussed the findings and management plan with the  patient and family.    Sonido Reyes, VERONIQUE, FAAO

## 2025-04-28 NOTE — PROGRESS NOTES
OCCUPATIONAL THERAPY EVALUATION  Type of Visit: Evaluation       Fall Risk Screen:  Fall screen completed by: PT  Have you fallen 2 or more times in the past year?: No  Have you fallen and had an injury in the past year?: No  Is patient receiving Physical Therapy Services?: No    Subjective        Presenting condition or subjective complaint: Trigger finger  Date of onset: 04/03/25 (MD jacome)    Relevant medical history: Asthma; Diabetes; Kidney disease   Dates & types of surgery: Carpal tunnel release(left) and trigger finger release(left ring) march 2024, carpal tunnel release (right) november 2025    Prior diagnostic imaging/testing results:       Prior therapy history for the same diagnosis, illness or injury: No      Living Environment  Social support: With a significant other or spouse   Type of home: House; 2-story; Basement   Stairs to enter the home: Yes 4 Is there a railing: No     Ramp: No   Stairs inside the home: Yes 12 Is there a railing: No     Help at home: None  Equipment owned:       Employment: Yes   Hobbies/Interests: Traditional lacrosse, hiking, camping, bouldering    Patient goals for therapy: Reduce pain and triggering in hand    Pain assessment: See objective evaluation for additional pain details     Objective   Right hand dominant  Patient reports symptoms of pain, stiffness/loss of motion, weakness/loss of strength, and edema    Pain Level (Scale 0-10)   5/1/2025   At Rest LF: 0/10  RF: 0/10   With Use LF: 1-5/10  RF: 1-5/10     Pain Description  Date 5/1/2025   Location Left Long and Ring Finger - volar PIP and A1 pulley   Pain Quality Dull   Frequency intermittent or daily     Pain is worst  daytime   Exacerbated by  Gripping, prolonged pinching, overuse of left hand   Relieved by Ice, stretching   Progression Gradually worsening     Sensation   WNL throughout all nerve distributions; per patient report - occasional UN irritation per patient report due to position    Scar: L  Ring:  Sensitivity: Non tender to palpation Quality:  Mild to moderately adhered    Edema (Circumference measured in cm)   5/1/2025 5/1/2025   Side Right Left   P1 LF: 6.7  RF: 6.3 LF: 6.7  RF: 6.4   PIP LF: 6.4  RF: 5.8 LF: 6.3  RF: 5.8   P2 LF: 5.7  RF: 5.0 LF: 5.5  RF: 5.1     ROM  Long Finger 5/1/2025 5/1/2025   AROM (PROM) Right Left   MCP /80 /76   PIP /104 /101   DIP /75 /77   SOTO       ROM  Ring Finger 5/1/2025 5/1/2025   AROM (PROM) Right Left   MCP /71 /70   PIP /108 /104   DIP /74 /69   SOTO       Stage of Stenosing Tenosynovitis (SST)     5/1/2025   Left Finger LF: 3-4  RF: 1-2   Stage 1:  Normal  Stage 2:  Uneven motion of tendon  Stage 3:  Triggering, clicking, catching  Stage 4:  Locking in extension or flexion; unlocked by active motion  Stage 5:  Locking in extension or flexion; unlocked by passive motion  Stage 6:  Finger locked in extension or flexion    Strength   (Measured in pounds)  Pain Report:  - none  + mild    ++ moderate    +++ severe    5/1/2025 5/1/2025   Trials Right Left   1  2  3 70 67   Average 70 67     Lat Pinch 5/1/2025 5/1/2025   Trials Right Left   1  2  3 17 17   Average 17 17     3 Pt Pinch 5/1/2025 5/1/2025   Trials Right Left   1  2  3 19 18   Average 19 18     Palpation  Pain Report:  - none  + mild    ++ moderate    +++ severe   L Finger 5/1/2025   A1 Pulley LF: +  RF: - tight   A3 Pulley/PIP LF: -  RF: -     Assessment & Plan   CLINICAL IMPRESSIONS  Medical Diagnosis: L Long Finger Trigger    Treatment Diagnosis: L Hand Pain and Stiffness    Impression/Assessment: Pt is a 29 year old adult presenting to Occupational Therapy due to L long finger triggering and hand pain.  The following significant findings have been identified: Impaired ROM, Impaired strength, Pain, and triggering .  These identified deficits interfere with their ability to perform self care tasks, work tasks, recreational activities, household chores, meal planning and preparation, and sleeping  as  compared to previous level of function.     Clinical Decision Making (Complexity):  Assessment of Occupational Performance: 5 or more Performance Deficits  Occupational Performance Limitations: dressing, hygiene and grooming, driving and community mobility, home establishment and management, meal preparation and cleanup, work, and volunteer activities  Clinical Decision Making (Complexity): Low complexity    PLAN OF CARE  Treatment Interventions:  Modalities:  US and Laser Light  Therapeutic Exercise:  AROM, AAROM, PROM, Tendon Gliding, Blocking, Isometrics, and Stabilization  Neuromuscular re-education:  Kinesiotaping  Manual Techniques:  Scar mobilization, Friction massage, Myofascial release, and Manual edema mobilization  Orthotic Fabrication:  Finger based  Self Care:  Self Care Tasks    Long Term Goals   OT Goal 1  Goal Identifier: Household Chores  Goal Description: Patient will be able to  and carry shopping bag in left hand for > 2 min without pain  Rationale: In order to maximize safety and independence with ADL/IADLs  Target Date: 06/26/25      Frequency of Treatment: 1 x Week  Duration of Treatment: 8 Weeks     Education Assessment: Learner/Method: Patient     Risks and benefits of evaluation/treatment have been explained.   Patient/Family/caregiver agrees with Plan of Care.     Evaluation Time:    OT Eval, Low Complexity Minutes (47081): 25    Signing Clinician: Kika Cid OT

## 2025-04-29 ENCOUNTER — MYC REFILL (OUTPATIENT)
Dept: ENDOCRINOLOGY | Facility: CLINIC | Age: 30
End: 2025-04-29
Payer: COMMERCIAL

## 2025-04-29 DIAGNOSIS — E10.21 TYPE 1 DIABETES MELLITUS WITH DIABETIC NEPHROPATHY (H): ICD-10-CM

## 2025-04-30 RX ORDER — INSULIN ASPART 100 [IU]/ML
INJECTION, SOLUTION INTRAVENOUS; SUBCUTANEOUS
Qty: 150 ML | Refills: 3 | Status: SHIPPED | OUTPATIENT
Start: 2025-04-30

## 2025-05-01 ENCOUNTER — HOSPITAL ENCOUNTER (OUTPATIENT)
Dept: CT IMAGING | Facility: HOSPITAL | Age: 30
Discharge: HOME | End: 2025-05-01
Payer: COMMERCIAL

## 2025-05-01 ENCOUNTER — OFFICE VISIT (OUTPATIENT)
Dept: URGENT CARE | Facility: URGENT CARE | Age: 30
End: 2025-05-01
Payer: COMMERCIAL

## 2025-05-01 ENCOUNTER — THERAPY VISIT (OUTPATIENT)
Dept: OCCUPATIONAL THERAPY | Facility: CLINIC | Age: 30
End: 2025-05-01
Attending: STUDENT IN AN ORGANIZED HEALTH CARE EDUCATION/TRAINING PROGRAM
Payer: COMMERCIAL

## 2025-05-01 VITALS
BODY MASS INDEX: 46.8 KG/M2 | WEIGHT: 293 LBS | OXYGEN SATURATION: 98 % | DIASTOLIC BLOOD PRESSURE: 87 MMHG | RESPIRATION RATE: 16 BRPM | SYSTOLIC BLOOD PRESSURE: 139 MMHG | TEMPERATURE: 98.3 F | HEART RATE: 89 BPM

## 2025-05-01 DIAGNOSIS — R51.9 ACUTE NONINTRACTABLE HEADACHE, UNSPECIFIED HEADACHE TYPE: Primary | ICD-10-CM

## 2025-05-01 DIAGNOSIS — R51.9 ACUTE NONINTRACTABLE HEADACHE, UNSPECIFIED HEADACHE TYPE: ICD-10-CM

## 2025-05-01 DIAGNOSIS — M65.342 TRIGGER FINGER, LEFT RING FINGER: ICD-10-CM

## 2025-05-01 LAB
BASOPHILS # BLD AUTO: 0 10E3/UL (ref 0–0.2)
BASOPHILS NFR BLD AUTO: 0 %
CREAT SERPL-MCNC: 0.66 MG/DL (ref 0.51–1.17)
CRP SERPL-MCNC: 3.9 MG/L
EGFRCR SERPLBLD CKD-EPI 2021: >90 ML/MIN/1.73M2
EOSINOPHIL # BLD AUTO: 0.3 10E3/UL (ref 0–0.7)
EOSINOPHIL NFR BLD AUTO: 2 %
ERYTHROCYTE [DISTWIDTH] IN BLOOD BY AUTOMATED COUNT: 13.1 % (ref 10–15)
ERYTHROCYTE [SEDIMENTATION RATE] IN BLOOD BY WESTERGREN METHOD: 11 MM/HR (ref 0–20)
EST. AVERAGE GLUCOSE BLD GHB EST-MCNC: 143 MG/DL
HBA1C MFR BLD: 6.6 % (ref 0–5.6)
HCT VFR BLD AUTO: 43.1 % (ref 35–53)
HGB BLD-MCNC: 14.1 G/DL (ref 11.7–17.7)
IMM GRANULOCYTES # BLD: 0 10E3/UL
IMM GRANULOCYTES NFR BLD: 0 %
LYMPHOCYTES # BLD AUTO: 3.2 10E3/UL (ref 0.8–5.3)
LYMPHOCYTES NFR BLD AUTO: 30 %
MCH RBC QN AUTO: 27.3 PG (ref 26.5–33)
MCHC RBC AUTO-ENTMCNC: 32.7 G/DL (ref 31.5–36.5)
MCV RBC AUTO: 83 FL (ref 78–100)
MONOCYTES # BLD AUTO: 0.7 10E3/UL (ref 0–1.3)
MONOCYTES NFR BLD AUTO: 7 %
NEUTROPHILS # BLD AUTO: 6.4 10E3/UL (ref 1.6–8.3)
NEUTROPHILS NFR BLD AUTO: 60 %
PLATELET # BLD AUTO: 389 10E3/UL (ref 150–450)
POTASSIUM SERPL-SCNC: 4.5 MMOL/L (ref 3.4–5.3)
RBC # BLD AUTO: 5.17 10E6/UL (ref 3.8–5.9)
TSH SERPL DL<=0.005 MIU/L-ACNC: 2.83 UIU/ML (ref 0.3–4.2)
WBC # BLD AUTO: 10.6 10E3/UL (ref 4–11)

## 2025-05-01 PROCEDURE — 84443 ASSAY THYROID STIM HORMONE: CPT | Performed by: PHYSICIAN ASSISTANT

## 2025-05-01 PROCEDURE — 84132 ASSAY OF SERUM POTASSIUM: CPT | Performed by: PHYSICIAN ASSISTANT

## 2025-05-01 PROCEDURE — 82565 ASSAY OF CREATININE: CPT | Performed by: PHYSICIAN ASSISTANT

## 2025-05-01 PROCEDURE — 86364 TISS TRNSGLTMNASE EA IG CLAS: CPT | Performed by: PHYSICIAN ASSISTANT

## 2025-05-01 PROCEDURE — 99000 SPECIMEN HANDLING OFFICE-LAB: CPT | Performed by: PATHOLOGY

## 2025-05-01 PROCEDURE — 70450 CT HEAD/BRAIN W/O DYE: CPT

## 2025-05-01 RX ORDER — CYCLOBENZAPRINE HCL 5 MG
5 TABLET ORAL 3 TIMES DAILY PRN
Qty: 15 TABLET | Refills: 0 | Status: SHIPPED | OUTPATIENT
Start: 2025-05-01

## 2025-05-01 NOTE — PATIENT INSTRUCTIONS
CT and blood work today are normal and reassuring. We will try a muscle relaxant to see if this is helpful for your symptoms. I have also placed a referral for you to follow-up with your PCP for ongoing management.

## 2025-05-01 NOTE — PROGRESS NOTES
Patient presents with:  Ear Problem: Started today, headache x 2wks (more of a tense headache), now having some eye pressure on Lt side, some tugging pressure in Lt eye      Clinical Decision Making:      ICD-10-CM    1. Acute nonintractable headache, unspecified headache type  R51.9 CBC with platelets and differential     CRP, inflammation     ESR: Erythrocyte sedimentation rate     CT Head w/o Contrast     CBC with platelets and differential     CRP, inflammation     ESR: Erythrocyte sedimentation rate     cyclobenzaprine (FLEXERIL) 5 MG tablet     PRIMARY CARE FOLLOW-UP SCHEDULING        Ddx include but is not limited to temporal arteritis, optic neuritis, brain tumor, stroke, AOM, migraine, tension headache, sinus infection. CT negative for mass, hemorrhage, and infarct. She is neurologically intact. No evidence of AOM on physical exam. No nasal congestion or recent URI that would make sinus infection less likely. CBC normal so less concern for infection. ESR is normal so this rules out temporal arteritis. CRP pending. Patient states she just had a comprehensive eye exam last week where they took pictures of her optic nerve and dilated her eyes so I have less concern for optic neuritis. She does have a history of getting tension headaches though this one feels different, I do think her symptoms may still be related to tension headaches as she states she has felt very tense in her neck and shoulders, muscle relaxants have been helpful for her in the past for this so I have sent in a prescription for Flexeril, benefits and risks of medication discussed with patient. Also placed referral for her to follow-up with her PCP for ongoing management and evaluation. Patient instructions as below. Discussed red flag symptoms for when to return.       Patient Instructions   CT and blood work today are normal and reassuring. We will try a muscle relaxant to see if this is helpful for your symptoms. I have also placed a  referral for you to follow-up with your PCP for ongoing management.     HPI:  Stacey Mantilla is a 29 year old adult who presents today with concerns of headache. Symptoms started 2 weeks ago. Pain is in her left frontal head and behind her eye. Starting to get left ear pain now. Does get tension headaches but this feels different. Has taken tylenol and naproxen with minimal relief. Pain isn't intense its rather the duration of the pain that is concerning her. She denies any visual changes. No fevers. She has not had any nausea or vomiting. Some increased light sensitivity though she has been adjusting some of her antidepressant medications so she thinks it may have been due to that.     History obtained from the patient.    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)  2016-06: Anxiety  2016-03: Mild intermittent asthma without complication  2015-08: Family history of heart disease  2015-08: Chronic kidney disease, stage I  2015-03: Dehydration  2015-03: Gastroenteritis  2014-03: Diabetic nephropathy (H)  2013-12: Type 1 diabetes mellitus (H)      Past Medical History:   Diagnosis Date    Asthma     Currently no treatment needed    Celiac disease     Chronic kidney disease, stage I 08/03/2015    Chronic sinusitis     Depressive disorder     Diabetes mellitus type 1 (H)     Diabetic nephropathy (H)     Family history of heart disease 08/03/2015    Hypertension     Hypothyroid     Pedal edema     Psoriasis        Social History     Tobacco Use    Smoking status: Never     Passive  exposure: Past    Smokeless tobacco: Never   Substance Use Topics    Alcohol use: Not Currently     Comment: couple times per month will have 3 mixed drinks       ROS is negative other than what is noted in HPI.       Vitals:    05/01/25 1246   BP: 139/87   Pulse: 89   Resp: 16   Temp: 98.3  F (36.8  C)   TempSrc: Oral   SpO2: 98%   Weight: (!) 139.6 kg (307 lb 12.8 oz)       Physical Exam  Constitutional:       General: She is not in acute distress.     Appearance: She is not diaphoretic.   HENT:      Head: Normocephalic and atraumatic.      Right Ear: Tympanic membrane and external ear normal.      Left Ear: Tympanic membrane and external ear normal.      Mouth/Throat:      Pharynx: No oropharyngeal exudate or posterior oropharyngeal erythema.   Eyes:      Extraocular Movements: Extraocular movements intact.      Conjunctiva/sclera: Conjunctivae normal.      Pupils: Pupils are equal, round, and reactive to light.   Cardiovascular:      Rate and Rhythm: Normal rate and regular rhythm.      Heart sounds: No murmur heard.  Pulmonary:      Effort: Pulmonary effort is normal. No respiratory distress.   Musculoskeletal:         General: No tenderness. Normal range of motion.   Skin:     General: Skin is warm.      Findings: No erythema.   Neurological:      General: No focal deficit present.      Mental Status: She is alert.      Cranial Nerves: Cranial nerves 2-12 are intact. No cranial nerve deficit.      Sensory: No sensory deficit.      Motor: No weakness.      Coordination: Coordination is intact. Romberg sign negative. Coordination normal.      Gait: Gait normal.   Psychiatric:         Mood and Affect: Mood normal.         Thought Content: Thought content normal.         Judgment: Judgment normal.         Results:  Results for orders placed or performed during the hospital encounter of 05/01/25   CT Head w/o Contrast     Status: None    Narrative    EXAM: CT HEAD W/O CONTRAST  LOCATION: [facility]  "HOSPITAL  DATE: 5/1/2025    INDICATION:  Acute nonintractable headache, unspecified headache type  COMPARISON: None.  TECHNIQUE: Routine CT Head without IV contrast. Multiplanar reformats. Dose reduction techniques were used.    FINDINGS:  INTRACRANIAL CONTENTS: No intracranial hemorrhage, extraaxial collection, or mass effect.  No CT evidence of acute infarct. Normal parenchymal attenuation. Normal ventricles and sulci.     VISUALIZED ORBITS/SINUSES/MASTOIDS: No intraorbital abnormality. No paranasal sinus mucosal disease. No middle ear or mastoid effusion.    BONES/SOFT TISSUES: No acute abnormality.      Impression    IMPRESSION:  1.  No acute intracranial process.   Results for orders placed or performed in visit on 05/01/25   ESR: Erythrocyte sedimentation rate     Status: Normal   Result Value Ref Range    Erythrocyte Sedimentation Rate 11 0 - 20 mm/hr    Narrative    The generation of reference intervals for this test is currently based on binary male or female sex. If the electronic health record information indicates another gender identity or if Legal Sex is recorded as \"Unknown\", both male and female reference intervals are provided where applicable, and should be considered according to the individual's appropriate clinical context.   CBC with platelets and differential     Status: None   Result Value Ref Range    WBC Count 10.6 4.0 - 11.0 10e3/uL    RBC Count 5.17 3.80 - 5.90 10e6/uL    Hemoglobin 14.1 11.7 - 17.7 g/dL    Hematocrit 43.1 35.0 - 53.0 %    MCV 83 78 - 100 fL    MCH 27.3 26.5 - 33.0 pg    MCHC 32.7 31.5 - 36.5 g/dL    RDW 13.1 10.0 - 15.0 %    Platelet Count 389 150 - 450 10e3/uL    % Neutrophils 60 %    % Lymphocytes 30 %    % Monocytes 7 %    % Eosinophils 2 %    % Basophils 0 %    % Immature Granulocytes 0 %    Absolute Neutrophils 6.4 1.6 - 8.3 10e3/uL    Absolute Lymphocytes 3.2 0.8 - 5.3 10e3/uL    Absolute Monocytes 0.7 0.0 - 1.3 10e3/uL    Absolute Eosinophils 0.3 0.0 - 0.7 10e3/uL " "   Absolute Basophils 0.0 0.0 - 0.2 10e3/uL    Absolute Immature Granulocytes 0.0 <=0.4 10e3/uL    Narrative    The generation of reference intervals for this test is currently based on binary male or female sex. If the electronic health record information indicates another gender identity or if Legal Sex is recorded as \"Unknown\", both male and female reference intervals are provided where applicable, and should be considered according to the individual's appropriate clinical context.   CBC with platelets and differential     Status: None    Narrative    The following orders were created for panel order CBC with platelets and differential.  Procedure                               Abnormality         Status                     ---------                               -----------         ------                     CBC with platelets and ...[[id number]]                      Final result                 Please view results for these tests on the individual orders.       I have personally ordered and preliminarily reviewed the following CT scan, I have noted no evidence of mass, infarct, or hemorrhage.       At the end of the encounter, I discussed results, diagnosis, medications. Discussed red flags for immediate return to clinic/ER, as well as indications for follow up if no improvement. Patient understood and agreed to plan. Patient was stable for discharge.    LUIS FERNANDO [facility]   "

## 2025-05-05 ENCOUNTER — PATIENT OUTREACH (OUTPATIENT)
Dept: CARE COORDINATION | Facility: CLINIC | Age: 30
End: 2025-05-05

## 2025-05-08 ENCOUNTER — THERAPY VISIT (OUTPATIENT)
Dept: OCCUPATIONAL THERAPY | Facility: CLINIC | Age: 30
End: 2025-05-08
Attending: STUDENT IN AN ORGANIZED HEALTH CARE EDUCATION/TRAINING PROGRAM
Payer: COMMERCIAL

## 2025-05-08 DIAGNOSIS — M79.645 PAIN OF FINGER OF LEFT HAND: ICD-10-CM

## 2025-05-08 DIAGNOSIS — M65.342 TRIGGER FINGER, LEFT RING FINGER: Primary | ICD-10-CM

## 2025-05-12 ENCOUNTER — THERAPY VISIT (OUTPATIENT)
Dept: PHYSICAL THERAPY | Facility: CLINIC | Age: 30
End: 2025-05-12
Payer: COMMERCIAL

## 2025-05-12 DIAGNOSIS — M79.671 ACUTE FOOT PAIN, RIGHT: Primary | ICD-10-CM

## 2025-05-12 PROCEDURE — 97140 MANUAL THERAPY 1/> REGIONS: CPT | Mod: GP | Performed by: PHYSICAL THERAPIST

## 2025-05-13 ENCOUNTER — THERAPY VISIT (OUTPATIENT)
Dept: OCCUPATIONAL THERAPY | Facility: CLINIC | Age: 30
End: 2025-05-13
Payer: COMMERCIAL

## 2025-05-13 DIAGNOSIS — M65.342 TRIGGER FINGER, LEFT RING FINGER: Primary | ICD-10-CM

## 2025-05-13 DIAGNOSIS — M79.645 PAIN OF FINGER OF LEFT HAND: ICD-10-CM

## 2025-05-13 PROCEDURE — 97140 MANUAL THERAPY 1/> REGIONS: CPT | Mod: GO | Performed by: OCCUPATIONAL THERAPIST

## 2025-05-13 PROCEDURE — 97110 THERAPEUTIC EXERCISES: CPT | Mod: GO | Performed by: OCCUPATIONAL THERAPIST

## 2025-05-13 PROCEDURE — 97035 APP MDLTY 1+ULTRASOUND EA 15: CPT | Mod: GO | Performed by: OCCUPATIONAL THERAPIST

## 2025-05-19 ENCOUNTER — RESULTS FOLLOW-UP (OUTPATIENT)
Dept: ENDOCRINOLOGY | Facility: CLINIC | Age: 30
End: 2025-05-19

## 2025-05-27 ENCOUNTER — THERAPY VISIT (OUTPATIENT)
Dept: PHYSICAL THERAPY | Facility: CLINIC | Age: 30
End: 2025-05-27
Payer: COMMERCIAL

## 2025-05-27 DIAGNOSIS — M79.671 ACUTE FOOT PAIN, RIGHT: Primary | ICD-10-CM

## 2025-05-27 PROCEDURE — 97035 APP MDLTY 1+ULTRASOUND EA 15: CPT | Mod: GP | Performed by: PHYSICAL THERAPIST

## 2025-05-27 PROCEDURE — 97140 MANUAL THERAPY 1/> REGIONS: CPT | Mod: GP | Performed by: PHYSICAL THERAPIST

## 2025-06-05 ENCOUNTER — OFFICE VISIT (OUTPATIENT)
Dept: CARDIOLOGY | Facility: CLINIC | Age: 30
End: 2025-06-05
Payer: COMMERCIAL

## 2025-06-05 ENCOUNTER — MYC REFILL (OUTPATIENT)
Dept: PEDIATRICS | Facility: CLINIC | Age: 30
End: 2025-06-05

## 2025-06-05 VITALS
OXYGEN SATURATION: 97 % | DIASTOLIC BLOOD PRESSURE: 82 MMHG | HEART RATE: 91 BPM | SYSTOLIC BLOOD PRESSURE: 137 MMHG | WEIGHT: 293 LBS | BODY MASS INDEX: 46.22 KG/M2

## 2025-06-05 DIAGNOSIS — E78.5 HYPERLIPIDEMIA, UNSPECIFIED HYPERLIPIDEMIA TYPE: Primary | ICD-10-CM

## 2025-06-05 DIAGNOSIS — Z82.49 FAMILY HISTORY OF ISCHEMIC HEART DISEASE: ICD-10-CM

## 2025-06-05 DIAGNOSIS — F90.0 ADHD (ATTENTION DEFICIT HYPERACTIVITY DISORDER), INATTENTIVE TYPE: ICD-10-CM

## 2025-06-05 RX ORDER — DEXTROAMPHETAMINE SACCHARATE, AMPHETAMINE ASPARTATE MONOHYDRATE, DEXTROAMPHETAMINE SULFATE AND AMPHETAMINE SULFATE 3.75; 3.75; 3.75; 3.75 MG/1; MG/1; MG/1; MG/1
15 CAPSULE, EXTENDED RELEASE ORAL DAILY
Qty: 90 CAPSULE | Refills: 0 | Status: SHIPPED | OUTPATIENT
Start: 2025-06-05

## 2025-06-05 NOTE — PATIENT INSTRUCTIONS
Thank you for coming to the [facility] @ [facility]; please note the following instructions:    1. Cardiology Providers: Dr. CARINE Espinal would like you to return for a cardiac follow up in 1 year (June).  We will contact you regarding your appointment when the time draws closer or you may call [phone] option #1 to arrange an appointment. Mean while, if you should have any questions or concerns regarding your heart health, please contact us. Thank you for choosing [facility] [place] for your care.          If you have any questions regarding your visit please contact your care team:     Cardiology  Telephone Number   Rica SUNG., RN  Sita SHERMAN, RN  Malissa BAIN, RN  Meghan SENA, RMA  Ginna ELLIOTT, WAGNER ABRBA, [place]  Deisy SHERMAN, EMILE Stoner., [phone] (option [phone])   For scheduling appts:     [phone] (select option [phone])       For the Device Clinic (Pacemakers and ICD's)  RN's :  Linda Rodriguez   During business hours: [phone]    *After business hours:  [phone] (select option 4)      VIRTUAL VISITS: If you have had a virtual visit and have testing needing to be scheduled, please call [phone]. Otherwise please allow 24-48 hours for staff to reach out to assist in scheduling.     Normal test result notifications will be released via [employer] or mailed within 7 business days.  All other test results, will be communicated via telephone once reviewed by your cardiologist.    If you need a medication refill please contact your pharmacy.  Please allow 3 business days for your refill to be completed.    As always, thank you for trusting us with your health care needs!

## 2025-06-05 NOTE — NURSING NOTE
"Chief Complaint   Patient presents with    RECHECK     Return general cardiology for follow-up per patient       Initial /82 (BP Location: Right arm, Patient Position: Chair, Cuff Size: Adult Large)   Pulse 91   Wt (!) 137.9 kg (304 lb)   SpO2 97%   BMI 46.22 kg/m   Estimated body mass index is 46.22 kg/m  as calculated from the following:    Height as of 4/3/25: 1.727 m (5' 8\").    Weight as of this encounter: 137.9 kg (304 lb)..  BP completed using cuff size: WAGNER Waters    "

## 2025-06-05 NOTE — LETTER
6/5/2025      RE: Stacey Mantilla  [address]  [facility] [address]       Dear Colleague,    Thank you for the opportunity to participate in the care of your patient, Stacey Mantilla, at the [facility]. Please see a copy of my visit note below.       SUBJECTIVE:  Stacey Mantilla is a 29 year old adult who presents for follow-up.  Family history of thoracic aortic aneurysm on father side and father had an MI at age 40.  Patient is a type I diabetic for 22 years.  No hypertension.  On treatment for hyperlipidemia.  Non-smoker.  No hypertension.  Patient is doing well.  Had no cardiac complaints today.    Patient Active Problem List    Diagnosis Date Noted     Acute foot pain, right 04/11/2025     Priority: Medium     Pain of finger of left hand 07/12/2024     Priority: Medium     LEANDRO (obstructive sleep apnea) 06/25/2024     Priority: Medium     3/29/2024 [facility] Sleep Study (300.0 lbs) - AHI 8.7, RDI 10.9, Supine AHI 15.3, REM AHI 23.5, Low O2 84.0%, Time Spent <=88% 2.9 minutes / Time Spent <=89% 7.0 minutes.       Bilateral carpal tunnel syndrome 04/15/2024     Priority: Medium     Trigger finger, left ring finger 02/19/2024     Priority: Medium     Left carpal tunnel syndrome 02/19/2024     Priority: Medium     Attention deficit hyperactivity disorder (ADHD), predominantly inattentive type 07/24/2023     Priority: Medium     10mg XR 30 per month  10mg IR 30 per month       Celiac disease 03/14/2022     Priority: Medium     Diagnosed around age 11       Elevated WBC count 06/13/2019     Priority: Medium     Normal retic% but slightly elevated absolute retic.  Normal smear.  CBC returned to normal  Negative HIV  Normal cortisol.    Likely related to inflammation. Will follow yearly       Morbid obesity (H) 05/28/2019     Priority: Medium     Type 1 diabetes mellitus with diabetic nephropathy (H) 12/05/2016     Priority:  Medium     Anxiety 06/17/2016     Priority: Medium     Mild intermittent asthma without complication 03/04/2016     Priority: Medium     Family history of heart disease 08/03/2015     Priority: Medium     Chronic kidney disease, stage I 08/03/2015     Priority: Medium     Diabetic nephropathy (H) 03/06/2014     Priority: Medium    .  Current Outpatient Medications   Medication Sig Dispense Refill     albuterol (PROAIR HFA/PROVENTIL HFA/VENTOLIN HFA) 108 (90 Base) MCG/ACT inhaler Inhale 2 puffs into the lungs every 6 hours 18 g 3     ALPRAZolam (XANAX) 0.5 MG tablet Take 1 tablet (0.5 mg) by mouth 3 times daily as needed for anxiety. NEEDS APPOINTMENT FOR MORE FILLS 5 tablet 0     amphetamine-dextroamphetamine (ADDERALL XR) 15 MG 24 hr capsule Take 1 capsule (15 mg) by mouth daily. 90 capsule 0     amphetamine-dextroamphetamine (ADDERALL) 10 MG tablet Take 1 tablet (10 mg) by mouth daily. 90 tablet 0     atorvastatin (LIPITOR) 40 MG tablet Take 1 tablet (40 mg) by mouth daily. 90 tablet 0     blood glucose (NO BRAND SPECIFIED) test strip Use to test blood sugar five times daily or as directed.  Dispense Brandy Contour Next Test Strips. 200 strip 11     Continuous Blood Gluc Sensor (DEXCOM G7 SENSOR) MISC Use as directed to monitor blood glucose. Change every 10 days. 9 each 0     Continuous Glucose Sensor (DEXCOM G7 SENSOR) MISC Change every 10 days. 9 each 2     enalapril (VASOTEC) 20 MG tablet Take 1 tablet (20 mg) by mouth 2 times daily. 180 tablet 3     fluticasone (FLONASE) 50 MCG/ACT nasal spray Spray 1 spray into both nostrils daily 16 g 11     Glucagon, rDNA, (GLUCAGON EMERGENCY) 1 MG KIT Use as directed in case of low blood glucose. 1 kit 3     insulin aspart (NOVOLOG VIAL) 100 UNITS/ML vial Use as directed, up to 150 units daily in insulin pump 150 mL 3     Insulin Infusion Pump Supplies (INSULIN PUMP SYRINGE RESERVOIR) MISC Change every other day as directed 45 each 3     Insulin Infusion Pump Supplies  "(SURE-T INFUSION SET 23\") MISC Change every 2-3 days 30 each 3     Insulin Pump Accessories MISC Infusion set per patient preference.  Change infusion set every other day 45 each 3     KETOSTIX test strip Use as directed in case of high glucose, vomiting or illness.once weekly 50 strip 3     metFORMIN (GLUCOPHAGE XR) 500 MG 24 hr tablet Take 1 tablet (500 mg) by mouth daily for 7 days, THEN 2 tablets (1,000 mg) daily for 7 days, THEN 3 tablets (1,500 mg) daily for 7 days, THEN 4 tablets (2,000 mg) daily. 360 tablet 3     Multiple Vitamins-Minerals (MULTIVITAMIN ADULT PO) Take 1 tablet by mouth daily       NOVOLOG PENFILL 100 UNIT/ML soln Take as directed up to 60 units per day 54 mL 3     buPROPion (WELLBUTRIN XL) 300 MG 24 hr tablet Take 1 tablet (300 mg) by mouth every morning. 90 tablet 3     busPIRone (BUSPAR) 15 MG tablet Take 0.5 tablets (7.5 mg) by mouth 2 times daily.       cyclobenzaprine (FLEXERIL) 5 MG tablet Take 1 tablet (5 mg) by mouth 3 times daily as needed for muscle spasms. 15 tablet 0     No current facility-administered medications for this visit.     Past Medical History:   Diagnosis Date     Asthma     Currently no treatment needed     Celiac disease      Chronic kidney disease, stage I 08/03/2015     Chronic sinusitis      Depressive disorder      Diabetes mellitus type 1 (H)      Diabetic nephropathy (H)      Family history of heart disease 08/03/2015     Hypertension      Hypothyroid      Pedal edema      Psoriasis      Past Surgical History:   Procedure Laterality Date     BIOPSY       ENT SURGERY       RELEASE CARPAL TUNNEL Left 04/04/2024    Procedure: RELEASE, LEFT CARPAL TUNNEL;  Surgeon: Gray Reyes MD;  Location: [facility] OR     RELEASE CARPAL TUNNEL Right 11/21/2024    Procedure: RIGHT OPEN CARPAL TUNNEL RELEASE;  Surgeon: Gray Reyes MD;  Location: [facility] OR     RELEASE TRIGGER FINGER Left 04/04/2024    Procedure: RELEASE, LEFT RING TRIGGER FINGER;  Surgeon: Gray Reyes MD;  Location: " [facility] OR     SOFT TISSUE SURGERY      Carpal tunnel and trigger finger     TONSILLECTOMY, ADENOIDECTOMY, COMBINED       Allergies   Allergen Reactions     Dog Epithelium (Canis Lupus Familiaris) Other (See Comments)     Sinus symptoms      Dogs Other (See Comments)     Sinus symptoms      Dust Mites      Sinus      Gluten Meal      Social History     Socioeconomic History     Marital status:      Spouse name: Not on file     Number of children: Not on file     Years of education: Not on file     Highest education level: Not on file   Occupational History     Not on file   Tobacco Use     Smoking status: Never     Passive exposure: Past     Smokeless tobacco: Never   Vaping Use     Vaping status: Never Used   Substance and Sexual Activity     Alcohol use: Not Currently     Comment: couple times per month will have 3 mixed drinks     Drug use: Yes     Types: Marijuana     Sexual activity: Yes     Partners: Male     Birth control/protection: Other     Comment: Partner is a transgender man, no need for birth control   Other Topics Concern     Parent/sibling w/ CABG, MI or angioplasty before 65F 55M? Yes      Service Not Asked     Blood Transfusions Not Asked     Caffeine Concern Yes     Comment: 1 cup tea per day     Occupational Exposure Not Asked     Hobby Hazards Not Asked     Sleep Concern No     Stress Concern Not Asked     Weight Concern Not Asked     Special Diet Yes     Comment: gluten free diet, low carbs     Back Care Not Asked     Exercise Yes     Comment: walking, ellyptical, swimming, weights     Bike Helmet Not Asked     Seat Belt Yes     Self-Exams Not Asked   Social History Narrative     Not on file     Social Drivers of Health     Financial Resource Strain: Low Risk  (7/1/2024)    Financial Resource Strain      Within the past 12 months, have you or your family members you live with been unable to get utilities (heat, electricity) when it was really needed?: No   Food Insecurity: Low Risk   (7/1/2024)    Food Insecurity      Within the past 12 months, did you worry that your food would run out before you got money to buy more?: No      Within the past 12 months, did the food you bought just not last and you didn t have money to get more?: No   Transportation Needs: Low Risk  (7/1/2024)    Transportation Needs      Within the past 12 months, has lack of transportation kept you from medical appointments, getting your medicines, non-medical meetings or appointments, work, or from getting things that you need?: No   Physical Activity: Insufficiently Active (7/1/2024)    Exercise Vital Sign      Days of Exercise per Week: 3 days      Minutes of Exercise per Session: 30 min   Stress: Stress Concern Present (7/1/2024)    [employer] - Occupational Stress Questionnaire      Feeling of Stress : To some extent   Social Connections: Unknown (7/1/2024)    Social Connection and Isolation Panel [NHANES]      Frequency of Communication with Friends and Family: Not on file      Frequency of Social Gatherings with Friends and Family: Once a week      Attends Religion Services: Not on file      Active Member of Clubs or Organizations: Not on file      Attends Club or Organization Meetings: Not on file      Marital Status: Not on file   Interpersonal Safety: Low Risk  (5/27/2025)    Interpersonal Safety      Do you feel physically and emotionally safe where you currently live?: Yes      Within the past 12 months, have you been hit, slapped, kicked or otherwise physically hurt by someone?: No      Within the past 12 months, have you been humiliated or emotionally abused in other ways by your partner or ex-partner?: No   Housing Stability: Low Risk  (7/1/2024)    Housing Stability      Do you have housing? : Yes      Are you worried about losing your housing?: No     Family History   Problem Relation Age of Onset     Gastrointestinal Disease Mother         Celiacs     Obesity Mother       Depression Mother      Fibromyalgia Mother      Cancer Mother         MDS     Cardiovascular Father 48        MI, stents, diabetic, type 2     Diabetes Father      Coronary Artery Disease Father      Obesity Father      Depression Father      Substance Abuse Father      Lung Cancer Father         Smoker     Other Cancer Father         Lung cancer and MDS (blood cancer)     Substance Abuse Sister      Bipolar Disorder Brother      Schizophrenia Brother      Myocardial Infarction Brother 50        Possibly MI     Substance Abuse Brother      Skin Cancer Maternal Grandmother      Cardiovascular Maternal Grandfather         Englarged heart, pacemaker, defib     Skin Cancer Maternal Grandfather      Obesity Maternal Half-Sister      Heart Disease Paternal Half-Brother 45     Melanoma No family hx of      Glaucoma No family hx of      Macular Degeneration No family hx of           REVIEW OF SYSTEMS:  General: negative, fever, chills, night sweats  Skin: negative, acne, rash, and scaling  Eyes: negative, double vision, eye pain, and photophobia  Ears/Nose/Throat: negative, nasal congestion, and purulent rhinorrhea  Respiratory: No dyspnea on exertion, No cough, No hemoptysis, and negative  Cardiovascular: negative, palpitations, tachycardia, irregular heart beat, chest pain, exertional chest pain or pressure, paroxysmal nocturnal dyspnea, dyspnea on exertion, and orthopnea     OBJECTIVE:  Blood pressure 137/82, pulse 91, weight (!) 137.9 kg (304 lb), SpO2 97%, not currently breastfeeding.  General Appearance: alert and no distress  Head: Normocephalic. No masses, lesions, tenderness or abnormalities  Eyes: conjuctiva clear, PERRL, EOM intact  Ears: External ears normal. Canals clear. TM's normal.  Nose: Nares normal  Mouth: normal  Neck: Supple, no cervical adenopathy, no thyromegaly  Lungs: clear to auscultation  Cardiac: regular rate and rhythm, normal S1 and S2, no murmur         ASSESSMENT/PLAN:  Patient here for  yearly follow-up.  Family history of aortic aneurysm and also premature coronary artery disease involving her father.  Currently patient is fairly active with no cardiac symptoms.  No prior cardiac history.  Patient is a type I diabetic for 22 years.  Also on treatment for hyperlipidemia.  Non-smoker.  No hypertension.  Current cardiac medications are atorvastatin 40 mg daily and enalapril 20 mg daily.  Patient's current BMI is over 45.  Patient's EKG shows normal sinus rhythm normal EKG.  Normal echocardiogram with normal biventricular function and no significant valvular abnormalities.  Patient had a CTA of chest this showed normal aortic size.  All results discussed with patient.  Suggested weight loss.  Patient is trying.  Her diabetes is well-controlled with an A1c of 6.6.  Normal lipid profile.  Normal blood pressure.  Patient is advised to continue her current medications and return to clinic in 1 year for a follow-up.  Total visit duration 30 minutes.  This included face-to-face interview, physical exam, chart review, review of prior CTA chest, EKG, echocardiogram and documentation.    Please do not hesitate to contact me if you have any questions/concerns.     Sincerely,     JONO Rees MD

## 2025-06-05 NOTE — PROGRESS NOTES
SUBJECTIVE:  Stacey Mantilla is a 29 year old adult who presents for follow-up.  Family history of thoracic aortic aneurysm on father side and father had an MI at age 40.  Patient is a type I diabetic for 22 years.  No hypertension.  On treatment for hyperlipidemia.  Non-smoker.  No hypertension.  Patient is doing well.  Had no cardiac complaints today.    Patient Active Problem List    Diagnosis Date Noted    Acute foot pain, right 04/11/2025     Priority: Medium    Pain of finger of left hand 07/12/2024     Priority: Medium    LEANDRO (obstructive sleep apnea) 06/25/2024     Priority: Medium     3/29/2024 [facility] Study (300.0 lbs) - AHI 8.7, RDI 10.9, Supine AHI 15.3, REM AHI 23.5, Low O2 84.0%, Time Spent <=88% 2.9 minutes / Time Spent <=89% 7.0 minutes.      Bilateral carpal tunnel syndrome 04/15/2024     Priority: Medium    Trigger finger, left ring finger 02/19/2024     Priority: Medium    Left carpal tunnel syndrome 02/19/2024     Priority: Medium    Attention deficit hyperactivity disorder (ADHD), predominantly inattentive type 07/24/2023     Priority: Medium     10mg XR 30 per month  10mg IR 30 per month      Celiac disease 03/14/2022     Priority: Medium     Diagnosed around age 11      Elevated WBC count 06/13/2019     Priority: Medium     Normal retic% but slightly elevated absolute retic.  Normal smear.  CBC returned to normal  Negative HIV  Normal cortisol.    Likely related to inflammation. Will follow yearly      Morbid obesity (H) 05/28/2019     Priority: Medium    Type 1 diabetes mellitus with diabetic nephropathy (H) 12/05/2016     Priority: Medium    Anxiety 06/17/2016     Priority: Medium    Mild intermittent asthma without complication 03/04/2016     Priority: Medium    Family history of heart disease 08/03/2015     Priority: Medium    Chronic kidney disease, stage I 08/03/2015     Priority: Medium    Diabetic nephropathy (H) 03/06/2014     Priority: Medium    .  Current  "Outpatient Medications   Medication Sig Dispense Refill    albuterol (PROAIR HFA/PROVENTIL HFA/VENTOLIN HFA) 108 (90 Base) MCG/ACT inhaler Inhale 2 puffs into the lungs every 6 hours 18 g 3    ALPRAZolam (XANAX) 0.5 MG tablet Take 1 tablet (0.5 mg) by mouth 3 times daily as needed for anxiety. NEEDS APPOINTMENT FOR MORE FILLS 5 tablet 0    amphetamine-dextroamphetamine (ADDERALL XR) 15 MG 24 hr capsule Take 1 capsule (15 mg) by mouth daily. 90 capsule 0    amphetamine-dextroamphetamine (ADDERALL) 10 MG tablet Take 1 tablet (10 mg) by mouth daily. 90 tablet 0    atorvastatin (LIPITOR) 40 MG tablet Take 1 tablet (40 mg) by mouth daily. 90 tablet 0    blood glucose (NO BRAND SPECIFIED) test strip Use to test blood sugar five times daily or as directed.  Dispense Swogo Contour Next Test Strips. 200 strip 11    Continuous Blood Gluc Sensor (DEXCOM G7 SENSOR) MISC Use as directed to monitor blood glucose. Change every 10 days. 9 each 0    Continuous Glucose Sensor (DEXCOM G7 SENSOR) MISC Change every 10 days. 9 each 2    enalapril (VASOTEC) 20 MG tablet Take 1 tablet (20 mg) by mouth 2 times daily. 180 tablet 3    fluticasone (FLONASE) 50 MCG/ACT nasal spray Spray 1 spray into both nostrils daily 16 g 11    Glucagon, rDNA, (GLUCAGON EMERGENCY) 1 MG KIT Use as directed in case of low blood glucose. 1 kit 3    insulin aspart (NOVOLOG VIAL) 100 UNITS/ML vial Use as directed, up to 150 units daily in insulin pump 150 mL 3    Insulin Infusion Pump Supplies (INSULIN PUMP SYRINGE RESERVOIR) MISC Change every other day as directed 45 each 3    Insulin Infusion Pump Supplies (SURE-T INFUSION SET 23\") MISC Change every 2-3 days 30 each 3    Insulin Pump Accessories MISC Infusion set per patient preference.  Change infusion set every other day 45 each 3    KETOSTIX test strip Use as directed in case of high glucose, vomiting or illness.once weekly 50 strip 3    metFORMIN (GLUCOPHAGE XR) 500 MG 24 hr tablet Take 1 tablet (500 mg) by " mouth daily for 7 days, THEN 2 tablets (1,000 mg) daily for 7 days, THEN 3 tablets (1,500 mg) daily for 7 days, THEN 4 tablets (2,000 mg) daily. 360 tablet 3    Multiple Vitamins-Minerals (MULTIVITAMIN ADULT PO) Take 1 tablet by mouth daily      NOVOLOG PENFILL 100 UNIT/ML soln Take as directed up to 60 units per day 54 mL 3    buPROPion (WELLBUTRIN XL) 300 MG 24 hr tablet Take 1 tablet (300 mg) by mouth every morning. 90 tablet 3    busPIRone (BUSPAR) 15 MG tablet Take 0.5 tablets (7.5 mg) by mouth 2 times daily.      cyclobenzaprine (FLEXERIL) 5 MG tablet Take 1 tablet (5 mg) by mouth 3 times daily as needed for muscle spasms. 15 tablet 0     No current facility-administered medications for this visit.     Past Medical History:   Diagnosis Date    Asthma     Currently no treatment needed    Celiac disease     Chronic kidney disease, stage I 08/03/2015    Chronic sinusitis     Depressive disorder     Diabetes mellitus type 1 (H)     Diabetic nephropathy (H)     Family history of heart disease 08/03/2015    Hypertension     Hypothyroid     Pedal edema     Psoriasis      Past Surgical History:   Procedure Laterality Date    BIOPSY      ENT SURGERY      RELEASE CARPAL TUNNEL Left 04/04/2024    Procedure: RELEASE, LEFT CARPAL TUNNEL;  Surgeon: Gray Reyes MD;  Location: [facility] OR    RELEASE CARPAL TUNNEL Right 11/21/2024    Procedure: RIGHT OPEN CARPAL TUNNEL RELEASE;  Surgeon: Gray Reyes MD;  Location: [facility] OR    RELEASE TRIGGER FINGER Left 04/04/2024    Procedure: RELEASE, LEFT RING TRIGGER FINGER;  Surgeon: Gray Reyes MD;  Location: [facility] OR    SOFT TISSUE SURGERY      Carpal tunnel and trigger finger    TONSILLECTOMY, ADENOIDECTOMY, COMBINED       Allergies   Allergen Reactions    Dog Epithelium (Canis Lupus Familiaris) Other (See Comments)     Sinus symptoms     Dogs Other (See Comments)     Sinus symptoms     Dust Mites      Sinus     Gluten Meal      Social History     Socioeconomic History    Marital status:       Spouse name: Not on file    Number of children: Not on file    Years of education: Not on file    Highest education level: Not on file   Occupational History    Not on file   Tobacco Use    Smoking status: Never     Passive exposure: Past    Smokeless tobacco: Never   Vaping Use    Vaping status: Never Used   Substance and Sexual Activity    Alcohol use: Not Currently     Comment: couple times per month will have 3 mixed drinks    Drug use: Yes     Types: Marijuana    Sexual activity: Yes     Partners: Male     Birth control/protection: Other     Comment: Partner is a transgender man, no need for birth control   Other Topics Concern    Parent/sibling w/ CABG, MI or angioplasty before 65F 55M? Yes     Service Not Asked    Blood Transfusions Not Asked    Caffeine Concern Yes     Comment: 1 cup tea per day    Occupational Exposure Not Asked    Hobby Hazards Not Asked    Sleep Concern No    Stress Concern Not Asked    Weight Concern Not Asked    Special Diet Yes     Comment: gluten free diet, low carbs    Back Care Not Asked    Exercise Yes     Comment: walking, ellyptical, swimming, weights    Bike Helmet Not Asked    Seat Belt Yes    Self-Exams Not Asked   Social History Narrative    Not on file     Social Drivers of Health     Financial Resource Strain: Low Risk  (7/1/2024)    Financial Resource Strain     Within the past 12 months, have you or your family members you live with been unable to get utilities (heat, electricity) when it was really needed?: No   Food Insecurity: Low Risk  (7/1/2024)    Food Insecurity     Within the past 12 months, did you worry that your food would run out before you got money to buy more?: No     Within the past 12 months, did the food you bought just not last and you didn t have money to get more?: No   Transportation Needs: Low Risk  (7/1/2024)    Transportation Needs     Within the past 12 months, has lack of transportation kept you from medical appointments,  getting your medicines, non-medical meetings or appointments, work, or from getting things that you need?: No   Physical Activity: Insufficiently Active (7/1/2024)    Exercise Vital Sign     Days of Exercise per Week: 3 days     Minutes of Exercise per Session: 30 min   Stress: Stress Concern Present (7/1/2024)    [employer] - Occupational Stress Questionnaire     Feeling of Stress : To some extent   Social Connections: Unknown (7/1/2024)    Social Connection and Isolation Panel [NHANES]     Frequency of Communication with Friends and Family: Not on file     Frequency of Social Gatherings with Friends and Family: Once a week     Attends Oriental orthodox Services: Not on file     Active Member of Clubs or Organizations: Not on file     Attends Club or Organization Meetings: Not on file     Marital Status: Not on file   Interpersonal Safety: Low Risk  (5/27/2025)    Interpersonal Safety     Do you feel physically and emotionally safe where you currently live?: Yes     Within the past 12 months, have you been hit, slapped, kicked or otherwise physically hurt by someone?: No     Within the past 12 months, have you been humiliated or emotionally abused in other ways by your partner or ex-partner?: No   Housing Stability: Low Risk  (7/1/2024)    Housing Stability     Do you have housing? : Yes     Are you worried about losing your housing?: No     Family History   Problem Relation Age of Onset    Gastrointestinal Disease Mother         Celiacs    Obesity Mother     Depression Mother     Fibromyalgia Mother     Cancer Mother         MDS    Cardiovascular Father 48        MI, stents, diabetic, type 2    Diabetes Father     Coronary Artery Disease Father     Obesity Father     Depression Father     Substance Abuse Father     Lung Cancer Father         Smoker    Other Cancer Father         Lung cancer and MDS (blood cancer)    Substance Abuse Sister     Bipolar Disorder Brother     Schizophrenia Brother      Myocardial Infarction Brother 50        Possibly MI    Substance Abuse Brother     Skin Cancer Maternal Grandmother     Cardiovascular Maternal Grandfather         Englarged heart, pacemaker, defib    Skin Cancer Maternal Grandfather     Obesity Maternal Half-Sister     Heart Disease Paternal Half-Brother 45    Melanoma No family hx of     Glaucoma No family hx of     Macular Degeneration No family hx of           REVIEW OF SYSTEMS:  General: negative, fever, chills, night sweats  Skin: negative, acne, rash, and scaling  Eyes: negative, double vision, eye pain, and photophobia  Ears/Nose/Throat: negative, nasal congestion, and purulent rhinorrhea  Respiratory: No dyspnea on exertion, No cough, No hemoptysis, and negative  Cardiovascular: negative, palpitations, tachycardia, irregular heart beat, chest pain, exertional chest pain or pressure, paroxysmal nocturnal dyspnea, dyspnea on exertion, and orthopnea     OBJECTIVE:  Blood pressure 137/82, pulse 91, weight (!) 137.9 kg (304 lb), SpO2 97%, not currently breastfeeding.  General Appearance: alert and no distress  Head: Normocephalic. No masses, lesions, tenderness or abnormalities  Eyes: conjuctiva clear, PERRL, EOM intact  Ears: External ears normal. Canals clear. TM's normal.  Nose: Nares normal  Mouth: normal  Neck: Supple, no cervical adenopathy, no thyromegaly  Lungs: clear to auscultation  Cardiac: regular rate and rhythm, normal S1 and S2, no murmur         ASSESSMENT/PLAN:  Patient here for yearly follow-up.  Family history of aortic aneurysm and also premature coronary artery disease involving her father.  Currently patient is fairly active with no cardiac symptoms.  No prior cardiac history.  Patient is a type I diabetic for 22 years.  Also on treatment for hyperlipidemia.  Non-smoker.  No hypertension.  Current cardiac medications are atorvastatin 40 mg daily and enalapril 20 mg daily.  Patient's current BMI is over 45.  Patient's EKG shows normal  sinus rhythm normal EKG.  Normal echocardiogram with normal biventricular function and no significant valvular abnormalities.  Patient had a CTA of chest this showed normal aortic size.  All results discussed with patient.  Suggested weight loss.  Patient is trying.  Her diabetes is well-controlled with an A1c of 6.6.  Normal lipid profile.  Normal blood pressure.  Patient is advised to continue her current medications and return to clinic in 1 year for a follow-up.  Total visit duration 30 minutes.  This included face-to-face interview, physical exam, chart review, review of prior CTA chest, EKG, echocardiogram and documentation.

## 2025-06-09 ENCOUNTER — THERAPY VISIT (OUTPATIENT)
Dept: PHYSICAL THERAPY | Facility: CLINIC | Age: 30
End: 2025-06-09
Payer: COMMERCIAL

## 2025-06-09 ENCOUNTER — PATIENT OUTREACH (OUTPATIENT)
Dept: CARE COORDINATION | Facility: CLINIC | Age: 30
End: 2025-06-09

## 2025-06-09 DIAGNOSIS — M79.671 ACUTE FOOT PAIN, RIGHT: Primary | ICD-10-CM

## 2025-06-09 PROCEDURE — 97035 APP MDLTY 1+ULTRASOUND EA 15: CPT | Mod: GP | Performed by: PHYSICAL THERAPIST

## 2025-06-09 PROCEDURE — 97140 MANUAL THERAPY 1/> REGIONS: CPT | Mod: GP | Performed by: PHYSICAL THERAPIST

## 2025-06-12 NOTE — TELEPHONE ENCOUNTER
FUTURE VISIT INFORMATION      FUTURE VISIT INFORMATION:  Date: 9/10/25  Time: 1:30  Location: [facility] [place]  REFERRAL INFORMATION:  Referring provider:    Referring providers clinic:    Reason for visit/diagnosis  Gender affirming breast reduction      RECORDS REQUESTED FROM:       Clinic name Comments Records Status Imaging Status    OV 12/3/24-[facility] epic     XR Chest done 1/11/24  MRA Chest done 9/26/23  PAC

## 2025-06-17 NOTE — PROGRESS NOTES
Virtual Visit Details    Type of service:  Video Visit     Originating Location (pt. Location): Home    Distant Location (provider location):  Off-site  Platform used for Video Visit: Brian    Outcome for 06/17/25 2:04 PM: [employer] message sent  Kel Maurer MA  Outcome for 06/27/25 11:11 AM: Left Voicemail   Kel Maurer MA  Outcome for 06/30/25 9:40 AM: Data obtained via Dexcom website  Kle Maurer MA  Outcome for 06/30/25 9:43 AM: Per patient, will upload device before appointment. Patient is having trouble with Tandem upload but will attempt again today. Patient unavailable for an appointment to help with upload.  Kel Maurer MA  Outcome for 07/01/25 1:42 PM: Data obtained via Tandem website  Mary Hoffman MA      This 30-year-old woman was seen in follow-up for her long-standing type 1 diabetes.   Stacey's diabetes is complicated by nephropathy. She also has hyperlipidemia, a history of hypothyroidism (although she has been off of levothyroxine for several years) and celiac disease. She is comanaged with Laureen Goldstein.  She uses a tandem control IQ pump.  The data are below.  Overall she reports that things are going very well.  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  She generally can avoid hypoglycemia with activity.  She will feel that she gets low if she drops quickly but if she slowly drops, she may not feel anything until she is in the 50s.  She has not needed the assistance of another to help with a low blood sugar.  She keeps glucagon with her at all times while she is at work and all of her coworkers know what to do with it.  She is up-to-date with her eye visits and has no retinopathy.  She continues to be very active each day.  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  She denies having any paresthesias or foot ulcers.  She has no chest pain  or shortness of breath.  Mood is upbeat.    She wonders what I think about the her safety if she participates in a fasting ceremony next summer.  She has a job way and feels disconnected from the [place].  She would be interested in participating in this ceremony to feel a bit closer.  She would need to fast for 3 days and wonders if I think that is a good idea.                        Current Outpatient Medications   Medication Sig Dispense Refill    albuterol (PROAIR HFA/PROVENTIL HFA/VENTOLIN HFA) 108 (90 Base) MCG/ACT inhaler Inhale 2 puffs into the lungs every 6 hours 18 g 3    ALPRAZolam (XANAX) 0.5 MG tablet Take 1 tablet (0.5 mg) by mouth 3 times daily as needed for anxiety. NEEDS APPOINTMENT FOR MORE FILLS 5 tablet 0    amphetamine-dextroamphetamine (ADDERALL XR) 15 MG 24 hr capsule Take 1 capsule (15 mg) by mouth daily. 90 capsule 0    amphetamine-dextroamphetamine (ADDERALL) 10 MG tablet Take 1 tablet (10 mg) by mouth daily. 90 tablet 0    atorvastatin (LIPITOR) 40 MG tablet Take 1 tablet (40 mg) by mouth daily. 90 tablet 0    blood glucose (NO BRAND SPECIFIED) test strip Use to test blood sugar five times daily or as directed.  Dispense Brandy Contour Next Test Strips. 200 strip 11    buPROPion (WELLBUTRIN XL) 300 MG 24 hr tablet Take 1 tablet (300 mg) by mouth every morning. 90 tablet 3    busPIRone (BUSPAR) 15 MG tablet Take 0.5 tablets (7.5 mg) by mouth 2 times daily.      Continuous Blood Gluc Sensor (DEXCOM G7 SENSOR) MISC Use as directed to monitor blood glucose. Change every 10 days. 9 each 0    Continuous Glucose Sensor (DEXCOM G7 SENSOR) MISC Change every 10 days. 9 each 2    cyclobenzaprine (FLEXERIL) 5 MG tablet Take 1 tablet (5 mg) by mouth 3 times daily as needed for muscle spasms. 15 tablet 0    enalapril (VASOTEC) 20 MG tablet Take 1 tablet (20 mg) by mouth 2 times daily. 180 tablet 3    fluticasone (FLONASE) 50 MCG/ACT nasal spray Spray 1 spray into both nostrils daily 16 g 11    Glucagon,  "rDNA, (GLUCAGON EMERGENCY) 1 MG KIT Use as directed in case of low blood glucose. 1 kit 3    insulin aspart (NOVOLOG VIAL) 100 UNITS/ML vial Use as directed, up to 150 units daily in insulin pump 150 mL 3    Insulin Infusion Pump Supplies (INSULIN PUMP SYRINGE RESERVOIR) MISC Change every other day as directed 45 each 3    Insulin Infusion Pump Supplies (SURE-T INFUSION SET 23\") MISC Change every 2-3 days 30 each 3    Insulin Pump Accessories MISC Infusion set per patient preference.  Change infusion set every other day 45 each 3    KETOSTIX test strip Use as directed in case of high glucose, vomiting or illness.once weekly 50 strip 3    metFORMIN (GLUCOPHAGE XR) 500 MG 24 hr tablet Take 1 tablet (500 mg) by mouth daily for 7 days, THEN 2 tablets (1,000 mg) daily for 7 days, THEN 3 tablets (1,500 mg) daily for 7 days, THEN 4 tablets (2,000 mg) daily. 360 tablet 3    Multiple Vitamins-Minerals (MULTIVITAMIN ADULT PO) Take 1 tablet by mouth daily      NOVOLOG PENFILL 100 UNIT/ML soln Take as directed up to 60 units per day 54 mL 3     No current facility-administered medications for this visit.              98.3  F (36.8  C)  as of 5/1/2025       Pulse: 91  as of 6/5/2025      BP: 137/82  as of 6/5/2025      Resp: 16  as of 5/1/2025      SpO2: 97%  as of 6/5/2025      Body Mass Index: None      Systolic (Patient Repo...: Not taken      Diastolic (Patient Rep...: Not taken      Height: 1.727 m (5' 8\")  as of 4/3/2025      Weight: 137.9 kg (304 lb) Abnormal   as of 6/5/2025        On exam she is in no acute distress.    Recent Labs   Lab Test 05/01/25  1348 04/18/25  1109 08/09/24  1048 08/09/24  1040 07/02/24  1224 01/11/24  1454 11/24/23  0824 06/05/23  1738 11/15/21  0901 11/15/21  0823 02/17/20  1104 01/28/20  0000   A1C 6.6*  --   --   --  6.5*  --    < >  --    < >  --    < >  --    HEMOGLOBINA1  --   --   --   --   --   --   --   --   --  6.9  --  7.0*   TSH 2.83  --   --   --  2.27  --   --   --    < >  --    < " >  --    LDL  --  95  --   --   --  89   < >  --    < >  --    < >  --    HDL  --  62  --   --   --  66   < >  --    < >  --    < >  --    TRIG  --  73  --   --   --  68   < >  --    < >  --    < >  --    CR 0.66  --   --  0.61  --  0.74  --   --    < >  --    < >  --    MICROL  --   --  <12.0  --   --   --   --  <12.0   < >  --    < >  --     < > = values in this interval not displayed.     Assessment and plan:    1.  Diabetes control.  overall she is doing very well.  Her CGM data shows she is nearly always in target.  She recognizes her lows if she falls quickly but may not sense them if they fall slowly.  She does have glucagon with her.  I made no changes in her pump settings today.    2.  Diabetes complications.  She is up-to-date with her screenings and has a history of early nephropathy.  She is on Vasotec and her urine albumin is negative.    3.history of hypothyroidism.  She has been off T4 for some time.  Her most recent TSH is in the normal range.    4.CVD risk.  She is taking her statin and her LDL is less than 100.  Her blood pressure is fine.    Follow-up with Laureen Goldstein in 6 months and me in 12 months.    I spent 20 minutes on the video visit with the patient (start time 3: 001 end time 3: 2 0).  On the same day of visit I spent an additional 10 minutes reviewing her chart, reviewing and interpreting lab data, and doing documentation.  This time was exclusive of the time I spent reviewing her continuous glucose monitor.  My interpretation of that monitor is above.    iBa Allan MD  ]

## 2025-07-01 ENCOUNTER — VIRTUAL VISIT (OUTPATIENT)
Dept: ENDOCRINOLOGY | Facility: CLINIC | Age: 30
End: 2025-07-01
Payer: COMMERCIAL

## 2025-07-01 VITALS — HEIGHT: 68 IN | BODY MASS INDEX: 44.41 KG/M2 | WEIGHT: 293 LBS

## 2025-07-01 DIAGNOSIS — E10.21 TYPE 1 DIABETES MELLITUS WITH DIABETIC NEPHROPATHY (H): Primary | ICD-10-CM

## 2025-07-01 PROCEDURE — 98006 SYNCH AUDIO-VIDEO EST MOD 30: CPT | Mod: 25 | Performed by: INTERNAL MEDICINE

## 2025-07-01 PROCEDURE — 1126F AMNT PAIN NOTED NONE PRSNT: CPT | Mod: 95 | Performed by: INTERNAL MEDICINE

## 2025-07-01 PROCEDURE — 95251 CONT GLUC MNTR ANALYSIS I&R: CPT | Performed by: INTERNAL MEDICINE

## 2025-07-01 ASSESSMENT — PAIN SCALES - GENERAL: PAINLEVEL_OUTOF10: NO PAIN (0)

## 2025-07-01 NOTE — LETTER
7/1/2025       RE: Stacey Mantilla  [address]  [facility] [address]     Dear Colleague,    Thank you for referring your patient, Stacey Mantilla, to the [facility] [place] [facility]. Please see a copy of my visit note below.    Virtual Visit Details    Type of service:  Video Visit     Originating Location (pt. Location): Home    Distant Location (provider location):  Off-site  Platform used for Video Visit: Brian    Outcome for 06/17/25 2:04 PM: [email or web address] message sent  Kel Maurer MA  Outcome for 06/27/25 11:11 AM: Left Voicemail   Kel Maurer MA  Outcome for 06/30/25 9:40 AM: Data obtained via Dexcom website  Kel Maurer MA  Outcome for 06/30/25 9:43 AM: Per patient, will upload device before appointment. Patient is having trouble with Tandem upload but will attempt again today. Patient unavailable for an appointment to help with upload.  Kel Maurer MA  Outcome for 07/01/25 1:42 PM: Data obtained via Tandem website  Mary Hoffman MA      This 30-year-old woman was seen in follow-up for her long-standing type 1 diabetes.   Stacey's diabetes is complicated by nephropathy. She also has hyperlipidemia, a history of hypothyroidism (although she has been off of levothyroxine for several years) and celiac disease. She is comanaged with Laureen Goldstein.  She uses a tandem control IQ pump.  The data are below.  Overall she reports that things are going very well.  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  She generally can avoid hypoglycemia with activity.  She will feel that she gets low if she drops quickly but if she slowly drops, she may not feel anything until she is in the 50s.  She has not needed the assistance of another to help with a low blood sugar.  She keeps glucagon with her at all times while she is at work and all of her coworkers know  what to do with it.  She is up-to-date with her eye visits and has no retinopathy.  She continues to be very active each day.  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  She denies having any paresthesias or foot ulcers.  She has no chest pain or shortness of breath.  Mood is upbeat.    She wonders what I think about the her safety if she participates in a fasting ceremony next summer.  She has a job way and feels disconnected from the [place].  She would be interested in participating in this ceremony to feel a bit closer.  She would need to fast for 3 days and wonders if I think that is a good idea.                        Current Outpatient Medications   Medication Sig Dispense Refill     albuterol (PROAIR HFA/PROVENTIL HFA/VENTOLIN HFA) 108 (90 Base) MCG/ACT inhaler Inhale 2 puffs into the lungs every 6 hours 18 g 3     ALPRAZolam (XANAX) 0.5 MG tablet Take 1 tablet (0.5 mg) by mouth 3 times daily as needed for anxiety. NEEDS APPOINTMENT FOR MORE FILLS 5 tablet 0     amphetamine-dextroamphetamine (ADDERALL XR) 15 MG 24 hr capsule Take 1 capsule (15 mg) by mouth daily. 90 capsule 0     amphetamine-dextroamphetamine (ADDERALL) 10 MG tablet Take 1 tablet (10 mg) by mouth daily. 90 tablet 0     atorvastatin (LIPITOR) 40 MG tablet Take 1 tablet (40 mg) by mouth daily. 90 tablet 0     blood glucose (NO BRAND SPECIFIED) test strip Use to test blood sugar five times daily or as directed.  Dispense Brandy Contour Next Test Strips. 200 strip 11     buPROPion (WELLBUTRIN XL) 300 MG 24 hr tablet Take 1 tablet (300 mg) by mouth every morning. 90 tablet 3     busPIRone (BUSPAR) 15 MG tablet Take 0.5 tablets (7.5 mg) by mouth 2 times daily.       Continuous Blood Gluc Sensor (DEXCOM G7 SENSOR) MISC Use as directed to monitor blood glucose. Change every 10 days. 9 each 0     Continuous Glucose Sensor (DEXCOM G7 SENSOR) MISC Change every 10 days. 9 each 2      "cyclobenzaprine (FLEXERIL) 5 MG tablet Take 1 tablet (5 mg) by mouth 3 times daily as needed for muscle spasms. 15 tablet 0     enalapril (VASOTEC) 20 MG tablet Take 1 tablet (20 mg) by mouth 2 times daily. 180 tablet 3     fluticasone (FLONASE) 50 MCG/ACT nasal spray Spray 1 spray into both nostrils daily 16 g 11     Glucagon, rDNA, (GLUCAGON EMERGENCY) 1 MG KIT Use as directed in case of low blood glucose. 1 kit 3     insulin aspart (NOVOLOG VIAL) 100 UNITS/ML vial Use as directed, up to 150 units daily in insulin pump 150 mL 3     Insulin Infusion Pump Supplies (INSULIN PUMP SYRINGE RESERVOIR) MISC Change every other day as directed 45 each 3     Insulin Infusion Pump Supplies (SURE-T INFUSION SET 23\") MISC Change every 2-3 days 30 each 3     Insulin Pump Accessories MISC Infusion set per patient preference.  Change infusion set every other day 45 each 3     KETOSTIX test strip Use as directed in case of high glucose, vomiting or illness.once weekly 50 strip 3     metFORMIN (GLUCOPHAGE XR) 500 MG 24 hr tablet Take 1 tablet (500 mg) by mouth daily for 7 days, THEN 2 tablets (1,000 mg) daily for 7 days, THEN 3 tablets (1,500 mg) daily for 7 days, THEN 4 tablets (2,000 mg) daily. 360 tablet 3     Multiple Vitamins-Minerals (MULTIVITAMIN ADULT PO) Take 1 tablet by mouth daily       NOVOLOG PENFILL 100 UNIT/ML soln Take as directed up to 60 units per day 54 mL 3     No current facility-administered medications for this visit.              98.3  F (36.8  C)  as of 5/1/2025       Pulse: 91  as of 6/5/2025      BP: 137/82  as of 6/5/2025      Resp: 16  as of 5/1/2025      SpO2: 97%  as of 6/5/2025      Body Mass Index: None      Systolic (Patient Repo...: Not taken      Diastolic (Patient Rep...: Not taken      Height: 1.727 m (5' 8\")  as of 4/3/2025      Weight: 137.9 kg (304 lb) Abnormal   as of 6/5/2025        On exam she is in no acute distress.    Recent Labs   Lab Test 05/01/25  1348 04/18/25  1109 08/09/24  1048 " 08/09/24  1040 07/02/24  1224 01/11/24  1454 11/24/23  0824 06/05/23  1738 11/15/21  0901 11/15/21  0823 02/17/20  1104 01/28/20  0000   A1C 6.6*  --   --   --  6.5*  --    < >  --    < >  --    < >  --    HEMOGLOBINA1  --   --   --   --   --   --   --   --   --  6.9  --  7.0*   TSH 2.83  --   --   --  2.27  --   --   --    < >  --    < >  --    LDL  --  95  --   --   --  89   < >  --    < >  --    < >  --    HDL  --  62  --   --   --  66   < >  --    < >  --    < >  --    TRIG  --  73  --   --   --  68   < >  --    < >  --    < >  --    CR 0.66  --   --  0.61  --  0.74  --   --    < >  --    < >  --    MICROL  --   --  <12.0  --   --   --   --  <12.0   < >  --    < >  --     < > = values in this interval not displayed.     Assessment and plan:    1.  Diabetes control.  overall she is doing very well.  Her CGM data shows she is nearly always in target.  She recognizes her lows if she falls quickly but may not sense them if they fall slowly.  She does have glucagon with her.  I made no changes in her pump settings today.    2.  Diabetes complications.  She is up-to-date with her screenings and has a history of early nephropathy.  She is on Vasotec and her urine albumin is negative.    3.history of hypothyroidism.  She has been off T4 for some time.  Her most recent TSH is in the normal range.    4.CVD risk.  She is taking her statin and her LDL is less than 100.  Her blood pressure is fine.    Follow-up with Laureen Goldstein in 6 months and me in 12 months.    I spent 20 minutes on the video visit with the patient (start time 3: 001 end time 3: 2 0).  On the same day of visit I spent an additional 10 minutes reviewing her chart, reviewing and interpreting lab data, and doing documentation.  This time was exclusive of the time I spent reviewing her continuous glucose monitor.  My interpretation of that monitor is above.    Bia Allan MD  ]        Again, thank you for allowing me to participate in the care of  your patient.      Sincerely,    Bia Allan MD

## 2025-07-01 NOTE — NURSING NOTE
Current patient location: [facility] [address]  [facility] [address]    Is the patient currently in the state of [place]? YES    Visit mode: VIDEO    If the visit is dropped, the patient can be reconnected by:VIDEO VISIT: Text to cell phone:   Telephone Information:   Mobile [phone]       Will anyone else be joining the visit? NO  (If patient encounters technical issues they should call [phone]    Are changes needed to the allergy or medication list? No    Are refills needed on medications prescribed by this physician? NO    Rooming Documentation:  Not applicable    Reason for visit: RECHECK    Meghan GODOYF

## 2025-07-02 ENCOUNTER — THERAPY VISIT (OUTPATIENT)
Dept: OCCUPATIONAL THERAPY | Facility: CLINIC | Age: 30
End: 2025-07-02
Payer: COMMERCIAL

## 2025-07-02 DIAGNOSIS — M65.342 TRIGGER FINGER, LEFT RING FINGER: Primary | ICD-10-CM

## 2025-07-02 DIAGNOSIS — M79.645 PAIN OF FINGER OF LEFT HAND: ICD-10-CM

## 2025-07-02 PROCEDURE — 97110 THERAPEUTIC EXERCISES: CPT | Mod: GO | Performed by: OCCUPATIONAL THERAPIST

## 2025-07-02 PROCEDURE — 97140 MANUAL THERAPY 1/> REGIONS: CPT | Mod: GO | Performed by: OCCUPATIONAL THERAPIST

## 2025-07-02 PROCEDURE — 97035 APP MDLTY 1+ULTRASOUND EA 15: CPT | Mod: GO | Performed by: OCCUPATIONAL THERAPIST

## 2025-07-11 PROBLEM — M79.671 ACUTE FOOT PAIN, RIGHT: Status: RESOLVED | Noted: 2025-04-11 | Resolved: 2025-07-11

## 2025-07-30 DIAGNOSIS — R80.9 PROTEINURIA, UNSPECIFIED TYPE: Primary | ICD-10-CM

## 2025-08-05 ENCOUNTER — TELEPHONE (OUTPATIENT)
Dept: ENDOCRINOLOGY | Facility: CLINIC | Age: 30
End: 2025-08-05
Payer: COMMERCIAL

## 2025-08-05 DIAGNOSIS — E10.21 TYPE 1 DIABETES MELLITUS WITH DIABETIC NEPHROPATHY (H): Primary | ICD-10-CM

## 2025-08-05 RX ORDER — INSULIN PMP CART/SET/SYR/NEEDL
1 KIT MISCELLANEOUS SEE ADMIN INSTRUCTIONS
Qty: 2 KIT | Refills: 11 | Status: SHIPPED | OUTPATIENT
Start: 2025-08-05

## 2025-08-05 RX ORDER — INSULIN PUMP/CART/SET/SYR/NEED
1 KIT MISCELLANEOUS SEE ADMIN INSTRUCTIONS
Qty: 1 KIT | Refills: 0 | Status: SHIPPED | OUTPATIENT
Start: 2025-08-05

## 2025-08-11 ENCOUNTER — PATIENT OUTREACH (OUTPATIENT)
Dept: CARE COORDINATION | Facility: CLINIC | Age: 30
End: 2025-08-11
Payer: COMMERCIAL

## 2025-08-13 ENCOUNTER — PATIENT OUTREACH (OUTPATIENT)
Dept: CARE COORDINATION | Facility: CLINIC | Age: 30
End: 2025-08-13
Payer: COMMERCIAL

## 2025-08-23 DIAGNOSIS — J30.1 ALLERGIC RHINITIS DUE TO POLLEN, UNSPECIFIED SEASONALITY: ICD-10-CM

## 2025-08-25 ENCOUNTER — MYC REFILL (OUTPATIENT)
Dept: PEDIATRICS | Facility: CLINIC | Age: 30
End: 2025-08-25
Payer: COMMERCIAL

## 2025-08-25 DIAGNOSIS — J45.909 MILD ASTHMA WITHOUT COMPLICATION, UNSPECIFIED WHETHER PERSISTENT: ICD-10-CM

## 2025-08-25 RX ORDER — ALBUTEROL SULFATE 90 UG/1
2 INHALANT RESPIRATORY (INHALATION) EVERY 6 HOURS
Qty: 18 G | Refills: 2 | Status: SHIPPED | OUTPATIENT
Start: 2025-08-25

## 2025-08-25 RX ORDER — FLUTICASONE PROPIONATE 50 MCG
1 SPRAY, SUSPENSION (ML) NASAL DAILY
Qty: 16 G | Refills: 2 | Status: SHIPPED | OUTPATIENT
Start: 2025-08-25

## 2025-08-31 ENCOUNTER — MYC MEDICAL ADVICE (OUTPATIENT)
Dept: PEDIATRICS | Facility: CLINIC | Age: 30
End: 2025-08-31
Payer: COMMERCIAL

## 2025-09-10 ENCOUNTER — PRE VISIT (OUTPATIENT)
Dept: PLASTIC SURGERY | Facility: CLINIC | Age: 30
End: 2025-09-10

## (undated) DEVICE — BLADE KNIFE BEAVER MINI BEAVER6400

## (undated) DEVICE — SOL NACL 0.9% IRRIG 500ML BOTTLE 2F7123

## (undated) DEVICE — LINEN ORTHO PACK 5446

## (undated) DEVICE — LINEN TOWEL PACK X5 5464

## (undated) DEVICE — SU MONOCRYL 4-0 PS-2 27" UND Y426H

## (undated) DEVICE — PREP CHLORAPREP 26ML TINTED ORANGE  260815

## (undated) DEVICE — DRSG ABDOMINAL 07 1/2X8" 7197D

## (undated) DEVICE — CAST PADDING 3" STERILE 9043S

## (undated) DEVICE — GLOVE BIOGEL PI MICRO SZ 7.0 48570

## (undated) DEVICE — PACK HAND CUSTOM ASC

## (undated) DEVICE — DRSG STERI STRIP 1X5" R1548

## (undated) DEVICE — COVER CAMERA IN-LIGHT DISP LT-C02

## (undated) DEVICE — DRSG GAUZE 4X4" 3033

## (undated) DEVICE — BNDG ELASTIC 3"X5YDS UNSTERILE 6611-30

## (undated) DEVICE — LINEN DRAPE 54X72" 5467

## (undated) RX ORDER — LIDOCAINE HYDROCHLORIDE 10 MG/ML
INJECTION, SOLUTION EPIDURAL; INFILTRATION; INTRACAUDAL; PERINEURAL
Status: DISPENSED
Start: 2023-12-11

## (undated) RX ORDER — DEXAMETHASONE SODIUM PHOSPHATE 4 MG/ML
INJECTION, SOLUTION INTRA-ARTICULAR; INTRALESIONAL; INTRAMUSCULAR; INTRAVENOUS; SOFT TISSUE
Status: DISPENSED
Start: 2023-12-11

## (undated) RX ORDER — BUPIVACAINE HYDROCHLORIDE 2.5 MG/ML
INJECTION, SOLUTION EPIDURAL; INFILTRATION; INTRACAUDAL
Status: DISPENSED
Start: 2024-04-04

## (undated) RX ORDER — BUPIVACAINE HYDROCHLORIDE 5 MG/ML
INJECTION, SOLUTION EPIDURAL; INTRACAUDAL
Status: DISPENSED
Start: 2024-04-04

## (undated) RX ORDER — LIDOCAINE HYDROCHLORIDE AND EPINEPHRINE 10; 10 MG/ML; UG/ML
INJECTION, SOLUTION INFILTRATION; PERINEURAL
Status: DISPENSED
Start: 2024-04-04